# Patient Record
Sex: FEMALE | Race: WHITE | NOT HISPANIC OR LATINO | Employment: OTHER | URBAN - METROPOLITAN AREA
[De-identification: names, ages, dates, MRNs, and addresses within clinical notes are randomized per-mention and may not be internally consistent; named-entity substitution may affect disease eponyms.]

---

## 2017-01-02 ENCOUNTER — TELEPHONE (OUTPATIENT)
Dept: SOCIAL WORK | Facility: HOSPITAL | Age: 77
End: 2017-01-02

## 2017-01-02 NOTE — TELEPHONE ENCOUNTER
Spoke with pt today in f/u and she states she is feeling better and was able to fill her scripts and is taking them as directed. She will call tomorrow for f/u appt. Oly WELLER

## 2017-01-03 ENCOUNTER — OFFICE VISIT (OUTPATIENT)
Dept: INTERNAL MEDICINE | Facility: CLINIC | Age: 77
End: 2017-01-03

## 2017-01-03 VITALS
BODY MASS INDEX: 36.86 KG/M2 | OXYGEN SATURATION: 84 % | HEIGHT: 63 IN | DIASTOLIC BLOOD PRESSURE: 84 MMHG | HEART RATE: 76 BPM | WEIGHT: 208 LBS | SYSTOLIC BLOOD PRESSURE: 130 MMHG

## 2017-01-03 DIAGNOSIS — K57.32 DIVERTICULITIS OF LARGE INTESTINE WITHOUT PERFORATION OR ABSCESS WITHOUT BLEEDING: Primary | ICD-10-CM

## 2017-01-03 PROCEDURE — 99213 OFFICE O/P EST LOW 20 MIN: CPT | Performed by: INTERNAL MEDICINE

## 2017-01-03 NOTE — PATIENT INSTRUCTIONS
Diverticulitis  Diverticulitis is inflammation or infection of small pouches in your colon that form when you have a condition called diverticulosis. The pouches in your colon are called diverticula. Your colon, or large intestine, is where water is absorbed and stool is formed.  Complications of diverticulitis can include:  · Bleeding.  · Severe infection.  · Severe pain.  · Perforation of your colon.  · Obstruction of your colon.  CAUSES   Diverticulitis is caused by bacteria.  Diverticulitis happens when stool becomes trapped in diverticula. This allows bacteria to grow in the diverticula, which can lead to inflammation and infection.  RISK FACTORS  People with diverticulosis are at risk for diverticulitis. Eating a diet that does not include enough fiber from fruits and vegetables may make diverticulitis more likely to develop.  SYMPTOMS   Symptoms of diverticulitis may include:  · Abdominal pain and tenderness. The pain is normally located on the left side of the abdomen, but may occur in other areas.  · Fever and chills.  · Bloating.  · Cramping.  · Nausea.  · Vomiting.  · Constipation.  · Diarrhea.  · Blood in your stool.  DIAGNOSIS   Your health care provider will ask you about your medical history and do a physical exam. You may need to have tests done because many medical conditions can cause the same symptoms as diverticulitis. Tests may include:  · Blood tests.  · Urine tests.  · Imaging tests of the abdomen, including X-rays and CT scans.  When your condition is under control, your health care provider may recommend that you have a colonoscopy. A colonoscopy can show how severe your diverticula are and whether something else is causing your symptoms.  TREATMENT   Most cases of diverticulitis are mild and can be treated at home. Treatment may include:  · Taking over-the-counter pain medicines.  · Following a clear liquid diet.  · Taking antibiotic medicines by mouth for 7-10 days.  More severe cases may  be treated at a hospital. Treatment may include:  · Not eating or drinking.  · Taking prescription pain medicine.  · Receiving antibiotic medicines through an IV tube.  · Receiving fluids and nutrition through an IV tube.  · Surgery.  HOME CARE INSTRUCTIONS   · Follow your health care provider's instructions carefully.  · Follow a full liquid diet or other diet as directed by your health care provider. After your symptoms improve, your health care provider may tell you to change your diet. He or she may recommend you eat a high-fiber diet. Fruits and vegetables are good sources of fiber. Fiber makes it easier to pass stool.  · Take fiber supplements or probiotics as directed by your health care provider.  · Only take medicines as directed by your health care provider.  · Keep all your follow-up appointments.  SEEK MEDICAL CARE IF:   · Your pain does not improve.  · You have a hard time eating food.  · Your bowel movements do not return to normal.  SEEK IMMEDIATE MEDICAL CARE IF:   · Your pain becomes worse.  · Your symptoms do not get better.  · Your symptoms suddenly get worse.  · You have a fever.  · You have repeated vomiting.  · You have bloody or black, tarry stools.  MAKE SURE YOU:   · Understand these instructions.  · Will watch your condition.  · Will get help right away if you are not doing well or get worse.     This information is not intended to replace advice given to you by your health care provider. Make sure you discuss any questions you have with your health care provider.     Document Released: 09/27/2006 Document Revised: 12/23/2014 Document Reviewed: 11/12/2014  [employer] Interactive Patient Education ©2016 [employer]

## 2017-01-03 NOTE — MR AVS SNAPSHOT
Inessa GALEAS Shelton   1/3/2017 3:00 PM   Office Visit    Dept Phone:  [phone]   Encounter #:  [id number]    Provider:  Gisele Dockery MD   Department:  [facility] INTERNAL MEDICINE                Your Full Care Plan              Your Updated Medication List          This list is accurate as of: 1/3/17  3:35 PM.  Always use your most recent med list.                ACETAMINOPHEN EXTRA STRENGTH 500 MG tablet   Generic drug:  acetaminophen       amoxicillin-clavulanate 875-125 MG per tablet   Commonly known as:  AUGMENTIN   Take 1 tablet by mouth 2 (Two) Times a Day.       Calcium Citrate-Vitamin D 250-200 MG-UNIT tablet       celecoxib 200 MG capsule   Commonly known as:  CeleBREX   TAKE ONE CAPSULE BY MOUTH DAILY       ezetimibe 10 MG tablet   Commonly known as:  ZETIA   Take 1 tablet by mouth daily.       glucose blood test strip       HYDROcodone-acetaminophen 5-325 MG per tablet   Commonly known as:  NORCO   Take 1 tablet by mouth Every 6 (Six) Hours As Needed for moderate pain (4-6).       hydrocortisone 2.5 % ointment       levothyroxine 25 MCG tablet   Commonly known as:  SYNTHROID, LEVOTHROID   TAKE ONE TABLET BY MOUTH DAILY AS DIRECTED       losartan-hydrochlorothiazide 100-12.5 MG per tablet   Commonly known as:  HYZAAR   TAKE ONE TABLET BY MOUTH DAILY       MULTI-DAY VITAMINS tablet       omeprazole 40 MG capsule   Commonly known as:  priLOSEC       ondansetron ODT 4 MG disintegrating tablet   Commonly known as:  ZOFRAN-ODT   Take 1 tablet by mouth Every 6 (Six) Hours As Needed for nausea or vomiting.       sotalol 80 MG tablet   Commonly known as:  BETAPACE   TAKE ONE TABLET BY MOUTH DAILY       traMADol  MG 24 hr tablet   Commonly known as:  ULTRAM-ER   TAKE ONE TABLET BY MOUTH DAILY       warfarin 5 MG tablet   Commonly known as:  COUMADIN   TAKE ONE TABLET BY MOUTH DAILY               Instructions    Diverticulitis  Diverticulitis is inflammation or  infection of small pouches in your colon that form when you have a condition called diverticulosis. The pouches in your colon are called diverticula. Your colon, or large intestine, is where water is absorbed and stool is formed.  Complications of diverticulitis can include:  · Bleeding.  · Severe infection.  · Severe pain.  · Perforation of your colon.  · Obstruction of your colon.  CAUSES   Diverticulitis is caused by bacteria.  Diverticulitis happens when stool becomes trapped in diverticula. This allows bacteria to grow in the diverticula, which can lead to inflammation and infection.  RISK FACTORS  People with diverticulosis are at risk for diverticulitis. Eating a diet that does not include enough fiber from fruits and vegetables may make diverticulitis more likely to develop.  SYMPTOMS   Symptoms of diverticulitis may include:  · Abdominal pain and tenderness. The pain is normally located on the left side of the abdomen, but may occur in other areas.  · Fever and chills.  · Bloating.  · Cramping.  · Nausea.  · Vomiting.  · Constipation.  · Diarrhea.  · Blood in your stool.  DIAGNOSIS   Your health care provider will ask you about your medical history and do a physical exam. You may need to have tests done because many medical conditions can cause the same symptoms as diverticulitis. Tests may include:  · Blood tests.  · Urine tests.  · Imaging tests of the abdomen, including X-rays and CT scans.  When your condition is under control, your health care provider may recommend that you have a colonoscopy. A colonoscopy can show how severe your diverticula are and whether something else is causing your symptoms.  TREATMENT   Most cases of diverticulitis are mild and can be treated at home. Treatment may include:  · Taking over-the-counter pain medicines.  · Following a clear liquid diet.  · Taking antibiotic medicines by mouth for 7-10 days.  More severe cases may be treated at a hospital. Treatment may  include:  · Not eating or drinking.  · Taking prescription pain medicine.  · Receiving antibiotic medicines through an IV tube.  · Receiving fluids and nutrition through an IV tube.  · Surgery.  HOME CARE INSTRUCTIONS   · Follow your health care provider's instructions carefully.  · Follow a full liquid diet or other diet as directed by your health care provider. After your symptoms improve, your health care provider may tell you to change your diet. He or she may recommend you eat a high-fiber diet. Fruits and vegetables are good sources of fiber. Fiber makes it easier to pass stool.  · Take fiber supplements or probiotics as directed by your health care provider.  · Only take medicines as directed by your health care provider.  · Keep all your follow-up appointments.  SEEK MEDICAL CARE IF:   · Your pain does not improve.  · You have a hard time eating food.  · Your bowel movements do not return to normal.  SEEK IMMEDIATE MEDICAL CARE IF:   · Your pain becomes worse.  · Your symptoms do not get better.  · Your symptoms suddenly get worse.  · You have a fever.  · You have repeated vomiting.  · You have bloody or black, tarry stools.  MAKE SURE YOU:   · Understand these instructions.  · Will watch your condition.  · Will get help right away if you are not doing well or get worse.     This information is not intended to replace advice given to you by your health care provider. Make sure you discuss any questions you have with your health care provider.     Document Released: 09/27/2006 Document Revised: 12/23/2014 Document Reviewed: 11/12/2014  [employer] Interactive Patient Education ©2016 [employer]       Patient Instructions History      Upcoming Appointments     Visit Type Date Time Department    OFFICE VISIT 1/3/2017  3:00 PM [facility]    LAB 1/6/2017 11:40 AM [facility]    BONE DENSITY 1/30/2017  9:00 AM [facility]    [facility] 1/30/2017  8:50 AM [facility]    FOLLOW UP 2/6/2017 11:30 AM [facility]    FOLLOW UP SLEEP CLINIC 10/12/2017 11:00 AM  [facility]      [employer] Signup     [facility] [email or web address] allows you to send messages to your doctor, view your test results, renew your prescriptions, schedule appointments, and more. To sign up, go to [employer] and click on the Sign Up Now link in the New User? box. Enter your [email or web address] Activation Code exactly as it appears below along with the last four digits of your Social Security Number and your Date of Birth () to complete the sign-up process. If you do not sign up before the expiration date, you must request a new code.    [email or web address] Activation Code: [email or web address]  Expires: 2017  4:08 PM    If you have questions, you can email [facility] [email or web address] or call [phone] to talk to our [email or web address] staff. Remember, [email or web address] is NOT to be used for urgent needs. For medical emergencies, dial 911.               Other Info from Your Visit           Your Appointments     2017 11:40 AM EST   [facility] (--)    [address] [id number]   [phone]            2017  8:50 AM EST   [facility] with [facility] INTERNAL MEDICINE (--)    [address] [address]   [phone]            2017  9:00 AM EST   Bone Density with DEXA [facility] INTERNAL MEDICINE (--)    [address] [address]   [phone]            2017 11:30 AM EST   Follow Up with Gisele Dockery MD   [facility] INTERNAL MEDICINE (--)    [address] [address]   [phone]           Arrive 15 minutes prior to appointment.            Oct 12, 2017 11:00 AM EDT   Follow Up Sleep Clinic with  [facility] PROVIDER SCHEDULE   [facility] ([place])    [address] [facility] [address] [phone]            1.  If you are currently on a CPAP or BiPAP please bring in your SD card or memory card.  2.  If you are experiencing problems with your current mask, please bring your mask with you to your appointment.                Allergies     Iodinated Diagnostic Agents  Hives    Chest tightness  Facial swelling    Livalo [Pitavastatin]      Muscle tension and joint pain    Plaquenil [Hydroxychloroquine Sulfate]      Muscles tense up and joint pain    Statins      Joint pain      Reason for Visit     Diverticulitis ER follow up      Vital Signs     Blood Pressure Pulse Height Weight Oxygen Saturation Body Mass Index    130/84 76 62.5\" (158.8 cm) 208 lb (94.3 kg) 84% 37.44 kg/m2    Smoking Status                   Never Smoker             "

## 2017-01-06 ENCOUNTER — LAB (OUTPATIENT)
Dept: INTERNAL MEDICINE | Facility: CLINIC | Age: 77
End: 2017-01-06

## 2017-01-06 ENCOUNTER — ANTICOAGULATION VISIT (OUTPATIENT)
Dept: INTERNAL MEDICINE | Facility: CLINIC | Age: 77
End: 2017-01-06

## 2017-01-06 DIAGNOSIS — I48.91 ATRIAL FIBRILLATION, UNSPECIFIED TYPE (HCC): Primary | ICD-10-CM

## 2017-01-06 LAB — INR PPP: 2.1 (ref 0.9–1.1)

## 2017-01-06 PROCEDURE — 85610 PROTHROMBIN TIME: CPT | Performed by: INTERNAL MEDICINE

## 2017-01-06 PROCEDURE — 36416 COLLJ CAPILLARY BLOOD SPEC: CPT | Performed by: INTERNAL MEDICINE

## 2017-01-09 RX ORDER — LEVOTHYROXINE SODIUM 0.03 MG/1
TABLET ORAL
Qty: 30 TABLET | Refills: 5 | Status: SHIPPED | OUTPATIENT
Start: 2017-01-09 | End: 2017-07-08 | Stop reason: SDUPTHER

## 2017-01-16 RX ORDER — WARFARIN SODIUM 5 MG/1
TABLET ORAL
Qty: 30 TABLET | Refills: 0 | Status: SHIPPED | OUTPATIENT
Start: 2017-01-16 | End: 2017-02-25 | Stop reason: SDUPTHER

## 2017-01-19 ENCOUNTER — ANTICOAGULATION VISIT (OUTPATIENT)
Dept: INTERNAL MEDICINE | Facility: CLINIC | Age: 77
End: 2017-01-19

## 2017-01-19 ENCOUNTER — LAB (OUTPATIENT)
Dept: INTERNAL MEDICINE | Facility: CLINIC | Age: 77
End: 2017-01-19

## 2017-01-19 DIAGNOSIS — I48.91 ATRIAL FIBRILLATION, UNSPECIFIED TYPE (HCC): Primary | ICD-10-CM

## 2017-01-19 LAB — INR PPP: 2.5 (ref 0.9–1.1)

## 2017-01-19 PROCEDURE — 85610 PROTHROMBIN TIME: CPT | Performed by: INTERNAL MEDICINE

## 2017-01-19 PROCEDURE — 36416 COLLJ CAPILLARY BLOOD SPEC: CPT | Performed by: INTERNAL MEDICINE

## 2017-01-30 ENCOUNTER — CLINICAL SUPPORT (OUTPATIENT)
Dept: INTERNAL MEDICINE | Facility: CLINIC | Age: 77
End: 2017-01-30

## 2017-01-30 ENCOUNTER — ANTICOAGULATION VISIT (OUTPATIENT)
Dept: INTERNAL MEDICINE | Facility: CLINIC | Age: 77
End: 2017-01-30

## 2017-01-30 DIAGNOSIS — I48.91 ATRIAL FIBRILLATION, UNSPECIFIED TYPE (HCC): Primary | ICD-10-CM

## 2017-01-30 DIAGNOSIS — I10 ESSENTIAL HYPERTENSION: ICD-10-CM

## 2017-01-30 DIAGNOSIS — E03.9 ACQUIRED HYPOTHYROIDISM: Primary | ICD-10-CM

## 2017-01-30 DIAGNOSIS — E78.5 HYPERLIPIDEMIA, UNSPECIFIED HYPERLIPIDEMIA TYPE: ICD-10-CM

## 2017-01-30 DIAGNOSIS — Z78.0 POSTMENOPAUSAL: ICD-10-CM

## 2017-01-30 LAB — INR PPP: 3.3 (ref 0.9–1.1)

## 2017-01-30 PROCEDURE — 77080 DXA BONE DENSITY AXIAL: CPT | Performed by: INTERNAL MEDICINE

## 2017-01-30 PROCEDURE — 85610 PROTHROMBIN TIME: CPT | Performed by: INTERNAL MEDICINE

## 2017-01-30 PROCEDURE — 36416 COLLJ CAPILLARY BLOOD SPEC: CPT | Performed by: INTERNAL MEDICINE

## 2017-01-31 LAB
ALBUMIN SERPL-MCNC: 4.1 G/DL (ref 3.5–5.2)
ALBUMIN/GLOB SERPL: 1.6 G/DL
ALP SERPL-CCNC: 104 U/L (ref 39–117)
ALT SERPL-CCNC: 26 U/L (ref 1–33)
AST SERPL-CCNC: 18 U/L (ref 1–32)
BASOPHILS # BLD AUTO: 0.04 10*3/MM3 (ref 0–0.2)
BASOPHILS NFR BLD AUTO: 0.8 % (ref 0–1.5)
BILIRUB SERPL-MCNC: 0.4 MG/DL (ref 0.1–1.2)
BUN SERPL-MCNC: 26 MG/DL (ref 8–23)
BUN/CREAT SERPL: 29.2 (ref 7–25)
CALCIUM SERPL-MCNC: 9.6 MG/DL (ref 8.6–10.5)
CHLORIDE SERPL-SCNC: 102 MMOL/L (ref 98–107)
CHOLEST SERPL-MCNC: 245 MG/DL (ref 0–200)
CO2 SERPL-SCNC: 21.6 MMOL/L (ref 22–29)
CREAT SERPL-MCNC: 0.89 MG/DL (ref 0.57–1)
EOSINOPHIL # BLD AUTO: 0.33 10*3/MM3 (ref 0–0.7)
EOSINOPHIL NFR BLD AUTO: 6.5 % (ref 0.3–6.2)
ERYTHROCYTE [DISTWIDTH] IN BLOOD BY AUTOMATED COUNT: 13 % (ref 11.7–13)
GLOBULIN SER CALC-MCNC: 2.5 GM/DL
GLUCOSE SERPL-MCNC: 138 MG/DL (ref 65–99)
HCT VFR BLD AUTO: 42.3 % (ref 35.6–45.5)
HDLC SERPL-MCNC: 48 MG/DL (ref 40–60)
HGB BLD-MCNC: 14.3 G/DL (ref 11.9–15.5)
IMM GRANULOCYTES # BLD: 0 10*3/MM3 (ref 0–0.03)
IMM GRANULOCYTES NFR BLD: 0 % (ref 0–0.5)
LDLC SERPL CALC-MCNC: ABNORMAL MG/DL
LYMPHOCYTES # BLD AUTO: 1.81 10*3/MM3 (ref 0.9–4.8)
LYMPHOCYTES NFR BLD AUTO: 35.5 % (ref 19.6–45.3)
MCH RBC QN AUTO: 32 PG (ref 26.9–32)
MCHC RBC AUTO-ENTMCNC: 33.8 G/DL (ref 32.4–36.3)
MCV RBC AUTO: 94.6 FL (ref 80.5–98.2)
MONOCYTES # BLD AUTO: 0.46 10*3/MM3 (ref 0.2–1.2)
MONOCYTES NFR BLD AUTO: 9 % (ref 5–12)
NEUTROPHILS # BLD AUTO: 2.46 10*3/MM3 (ref 1.9–8.1)
NEUTROPHILS NFR BLD AUTO: 48.2 % (ref 42.7–76)
PLATELET # BLD AUTO: 241 10*3/MM3 (ref 140–500)
POTASSIUM SERPL-SCNC: 3.9 MMOL/L (ref 3.5–5.2)
PROT SERPL-MCNC: 6.6 G/DL (ref 6–8.5)
RBC # BLD AUTO: 4.47 10*6/MM3 (ref 3.9–5.2)
SODIUM SERPL-SCNC: 140 MMOL/L (ref 136–145)
TRIGL SERPL-MCNC: 422 MG/DL (ref 0–150)
TSH SERPL DL<=0.005 MIU/L-ACNC: 3 MIU/ML (ref 0.27–4.2)
VLDLC SERPL CALC-MCNC: ABNORMAL MG/DL
WBC # BLD AUTO: 5.1 10*3/MM3 (ref 4.5–10.7)

## 2017-02-06 ENCOUNTER — ANTICOAGULATION VISIT (OUTPATIENT)
Dept: INTERNAL MEDICINE | Facility: CLINIC | Age: 77
End: 2017-02-06

## 2017-02-06 ENCOUNTER — OFFICE VISIT (OUTPATIENT)
Dept: INTERNAL MEDICINE | Facility: CLINIC | Age: 77
End: 2017-02-06

## 2017-02-06 VITALS
DIASTOLIC BLOOD PRESSURE: 78 MMHG | BODY MASS INDEX: 36.86 KG/M2 | HEIGHT: 63 IN | SYSTOLIC BLOOD PRESSURE: 110 MMHG | WEIGHT: 208 LBS | OXYGEN SATURATION: 98 % | HEART RATE: 78 BPM

## 2017-02-06 DIAGNOSIS — E03.9 ACQUIRED HYPOTHYROIDISM: ICD-10-CM

## 2017-02-06 DIAGNOSIS — I10 ESSENTIAL HYPERTENSION: Primary | ICD-10-CM

## 2017-02-06 DIAGNOSIS — M85.80 OSTEOPENIA: ICD-10-CM

## 2017-02-06 DIAGNOSIS — K64.9 BLEEDING HEMORRHOID: Primary | ICD-10-CM

## 2017-02-06 DIAGNOSIS — I48.91 ATRIAL FIBRILLATION, UNSPECIFIED TYPE (HCC): ICD-10-CM

## 2017-02-06 DIAGNOSIS — E78.5 HYPERLIPIDEMIA, UNSPECIFIED HYPERLIPIDEMIA TYPE: ICD-10-CM

## 2017-02-06 LAB — INR PPP: 2.5 (ref 0.9–1.1)

## 2017-02-06 PROCEDURE — 99214 OFFICE O/P EST MOD 30 MIN: CPT | Performed by: INTERNAL MEDICINE

## 2017-02-06 PROCEDURE — 85610 PROTHROMBIN TIME: CPT | Performed by: INTERNAL MEDICINE

## 2017-02-06 PROCEDURE — 36416 COLLJ CAPILLARY BLOOD SPEC: CPT | Performed by: INTERNAL MEDICINE

## 2017-02-06 RX ORDER — FENOFIBRATE 145 MG/1
145 TABLET, COATED ORAL DAILY
Qty: 30 TABLET | Refills: 5 | Status: SHIPPED | OUTPATIENT
Start: 2017-02-06 | End: 2017-05-08 | Stop reason: ALTCHOICE

## 2017-02-06 NOTE — PROGRESS NOTES
Subjective     Inessa Peoples is a 77 y.o. female who presents with   Chief Complaint   Patient presents with   • Hypertension   • Hyperlipidemia   • Hypothyroidism       History of Present Illness     HTN. Control is good.   HLD. She has tried four statin drugs and has not been able to tolerate them. Zetia is not adequate.  Discussed adding Tricor.  Hypothyroidism. Thyroid is controlled.   Osteopenia. She is maintained on calcium and D.  Reviewed recent DEXA report.     Due for protime.      Review of Systems   Respiratory: Negative.    Cardiovascular: Negative.        The following portions of the patient's history were reviewed and updated as appropriate: allergies, current medications and problem list.    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       • Dyspnea on exertion 04/18/2016   • Chronic osteoarthritis 04/18/2016   • Deep vein thrombosis of lower extremity 04/18/2016     Note Last Updated: 4/18/2016     Description: developed unprovoked on Xarelto.     • Atrial fibrillation 04/18/2016       Current Outpatient Prescriptions on File Prior to Visit   Medication Sig Dispense Refill   • acetaminophen (ACETAMINOPHEN EXTRA STRENGTH) 500 MG tablet Take 500 mg by mouth. Up to two in the morning and 2 at night when needed     •  "amoxicillin-clavulanate (AUGMENTIN) 875-125 MG per tablet Take 1 tablet by mouth 2 (Two) Times a Day. 20 tablet 0   • Calcium Citrate-Vitamin D 250-200 MG-UNIT tablet Take by mouth.     • celecoxib (CeleBREX) 200 MG capsule TAKE ONE CAPSULE BY MOUTH DAILY 90 capsule 0   • ezetimibe (ZETIA) 10 MG tablet Take 1 tablet by mouth daily. (Patient taking differently: Take 5 mg by mouth daily.) 90 tablet 3   • glucose blood test strip OneTouch Ultra Blue In Vitro Strip; Patient Sig: OneTouch Ultra Blue In Vitro Strip ; 0; 31-Jul-2015; Active     • HYDROcodone-acetaminophen (NORCO) 5-325 MG per tablet Take 1 tablet by mouth Every 6 (Six) Hours As Needed for moderate pain (4-6). 12 tablet 0   • hydrocortisone 2.5 % ointment As needed     • levothyroxine (SYNTHROID, LEVOTHROID) 25 MCG tablet TAKE ONE TABLET BY MOUTH DAILY AS DIRECTED 30 tablet 0   • levothyroxine (SYNTHROID, LEVOTHROID) 25 MCG tablet TAKE ONE TABLET BY MOUTH DAILY AS DIRECTED 30 tablet 5   • losartan-hydrochlorothiazide (HYZAAR) 100-12.5 MG per tablet TAKE ONE TABLET BY MOUTH DAILY 90 tablet 0   • Multiple Vitamin (MULTI-DAY VITAMINS) tablet Take 1 tablet by mouth daily.     • omeprazole (PriLOSEC) 40 MG capsule daily.     • ondansetron ODT (ZOFRAN-ODT) 4 MG disintegrating tablet Take 1 tablet by mouth Every 6 (Six) Hours As Needed for nausea or vomiting. 12 tablet 0   • sotalol (BETAPACE) 80 MG tablet TAKE ONE TABLET BY MOUTH DAILY 30 tablet 5   • traMADol ER (ULTRAM-ER) 100 MG 24 hr tablet TAKE ONE TABLET BY MOUTH DAILY 90 tablet 0   • warfarin (COUMADIN) 5 MG tablet TAKE ONE TABLET BY MOUTH DAILY 30 tablet 0     No current facility-administered medications on file prior to visit.        Objective     Visit Vitals   • /78   • Pulse 78   • Ht 62.5\" (158.8 cm)   • Wt 208 lb (94.3 kg)   • SpO2 98%   • BMI 37.44 kg/m2       Physical Exam   Constitutional: She is oriented to person, place, and time. She appears well-developed and well-nourished.   HENT: "   Head: Normocephalic and atraumatic.   Cardiovascular: Normal rate, regular rhythm and normal heart sounds.    Pulmonary/Chest: Effort normal and breath sounds normal.   Neurological: She is alert and oriented to person, place, and time.   Skin: Skin is warm and dry.   Psychiatric: She has a normal mood and affect. Her behavior is normal.       Assessment/Plan   Inessa was seen today for hypertension, hyperlipidemia and hypothyroidism.    Diagnoses and all orders for this visit:    Essential hypertension    Hyperlipidemia, unspecified hyperlipidemia type    Acquired hypothyroidism    Osteopenia    Atrial fibrillation, unspecified type  -     POC INR    Other orders  -     fenofibrate (TRICOR) 145 MG tablet; Take 1 tablet by mouth Daily.        Discussion  HTN. Continue current regimen.  HLD. Add Tricor.    Hypothyroidism. Continue levothyroxine.   Osteopenia. I recommend to get 1200 mg of calcium and 1000 IUs of vitamin D through diet and supplements and to participate in a weight based exercise to prevent loss of bone mineral density. Bone mineral will be monitored every two years.    INR monitoring.  See flow sheet.  She therapeutic.  Recheck in two weeks since starting new med.        Future Appointments  Date Time Provider Department Center   2/8/2017 10:30 AM [facility] None   2/9/2017 1:45 PM AGA Clarke GS [facility] None   5/5/2017 11:10 AM [facility] None   5/8/2017 11:15 AM MD [facility] None   10/12/2017 11:00 AM  [facility] PROVIDER SCHEDULE  [facility]

## 2017-02-08 ENCOUNTER — OFFICE VISIT (OUTPATIENT)
Dept: CARDIOLOGY | Facility: CLINIC | Age: 77
End: 2017-02-08

## 2017-02-08 ENCOUNTER — LAB (OUTPATIENT)
Dept: LAB | Facility: HOSPITAL | Age: 77
End: 2017-02-08

## 2017-02-08 VITALS
HEART RATE: 67 BPM | SYSTOLIC BLOOD PRESSURE: 130 MMHG | DIASTOLIC BLOOD PRESSURE: 82 MMHG | BODY MASS INDEX: 36.41 KG/M2 | WEIGHT: 205.5 LBS | HEIGHT: 63 IN

## 2017-02-08 DIAGNOSIS — D86.9 SARCOIDOSIS: ICD-10-CM

## 2017-02-08 DIAGNOSIS — R06.09 DYSPNEA ON EXERTION: Primary | ICD-10-CM

## 2017-02-08 DIAGNOSIS — R06.09 DYSPNEA ON EXERTION: ICD-10-CM

## 2017-02-08 DIAGNOSIS — I48.91 ATRIAL FIBRILLATION, UNSPECIFIED TYPE (HCC): ICD-10-CM

## 2017-02-08 LAB — NT-PROBNP SERPL-MCNC: 45.5 PG/ML (ref 0–1800)

## 2017-02-08 PROCEDURE — 83880 ASSAY OF NATRIURETIC PEPTIDE: CPT

## 2017-02-08 PROCEDURE — 36415 COLL VENOUS BLD VENIPUNCTURE: CPT

## 2017-02-08 PROCEDURE — 99214 OFFICE O/P EST MOD 30 MIN: CPT | Performed by: NURSE PRACTITIONER

## 2017-02-09 PROCEDURE — 93000 ELECTROCARDIOGRAM COMPLETE: CPT | Performed by: NURSE PRACTITIONER

## 2017-02-09 NOTE — PROGRESS NOTES
Date of Office Visit: 2017  Encounter Provider: AGA Mobley  Place of Service: [facility] CARDIOLOGY  Patient Name: Inessa Peoples  :1940    Chief Complaint   Patient presents with   • Shortness of Breath   :     HPI: Inessa Peoples is a 77 y.o. female comes in today for  Yearly followup and complaints of shortness of breath. She is a patient of Dr. Stone, and I am seeing her for the 1st time today. She has a history of paroxysmal atrial fibrillation, sarcoid lung disease, sleep apnea, obesity, hypertension, and left bundle branch block. She has been on sotalol for her atrial fibrillation. In the past she was on Xarelto but developed a DVT and was switched to warfarin. She reports that her sarcoidosis was found in a lung nodule per her report. She reports that she was sent to [facility] for a complete workup and had tons of testing performed but then was unable to get in touch with a physician for followup. She has had a chronic left bundle branch block. Her last echocardiogram was recorded in 2015 showing an EF of 64%, grade I diastolic dysfunction, trace MR, trivial AR and mild OK. In 2016, she had a nuclear stress test for I believe complaints of shortness of breath. This was normal.     Today, she comes in to clinic reporting that she has had an increase in shortness of breath over the past year. She feels like she has had more trouble lately. With minimal exertion, she has severe shortness of breath. She just has to walk really slow and take her time. She is able to give herself a bath and able to go to the grocery store, but she has to walk with a cart. She has some quick palpitations that happen very infrequently. They are only a few beats at a time. She does not experience dizziness or chest pain with these. She feels like her abdomen is bloated but denies lower extremity edema. She complains of being lightheaded, but this is when she lies  down or sits up and she feels the room spinning, consistent with vertigo. She denies chest pain, orthopnea or PND. She states she has had an increase in fatigue over the past few months.         Past Medical History   Diagnosis Date   • A-fib    • Abnormal ECG    • Allergic rhinitis    • Atrial fibrillation    • Bleeds easily      warfarin   • Breast screening    • Broken bones    • Calf DVT (deep venous thrombosis)    • Chronic anticoagulation    • Chronic osteoarthritis    • CTS (carpal tunnel syndrome)    • Deep vein thrombosis      left calf   • AHN (dyspnea on exertion)    • Fatigue    • H/O malignant melanoma of skin    • Hematuria, gross    • Hyperlipidemia    • Hypertension    • Hypothyroidism    • IFG (impaired fasting glucose)    • Insomnia    • Kidney stone    • LBBB (left bundle branch block)    • Left leg swelling    • LLQ abdominal pain    • Long term current use of systemic steroids    • Low back pain    • Malignant melanoma of skin    • Neck pain    • Obesity    • Osteoarthritis    • Osteopenia    • Paralysis, diaphragm    • 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 for tuberculosis    • Rash    • RBBB (right bundle branch block)    • Renal calculi    • Sarcoid    • Sarcoidosis    • Sleep apnea      on CPAP   • Thyroid disease 1975     parathyroid tumor-removed   • UTI (urinary tract infection)    • Visit for screening mammogram        Past Surgical History   Procedure Laterality Date   • Cystotomy       bladder with direct removal of calculus   • Cholecystectomy     • Knee surgery Right    • Carpal tunnel release     • Other surgical history       parathyroid resection   • Kidney stone surgery     • Carpal tunnel release Bilateral    • Parathyroidectomy             Review of Systems   Constitution: Positive for malaise/fatigue. Negative for fever.   HENT: Negative for ear pain, hearing loss, nosebleeds and sore throat.    Eyes: Negative for double vision, pain, vision loss in left eye, vision loss in right eye and visual  "disturbance.   Cardiovascular: Positive for dyspnea on exertion. Negative for claudication and leg swelling.   Respiratory: Negative for cough, snoring and wheezing.    Endocrine: Negative for cold intolerance, heat intolerance and polyuria.   Skin: Negative for color change, itching and rash.   Musculoskeletal: Negative for joint pain, joint swelling and muscle cramps.   Gastrointestinal: Negative for abdominal pain, diarrhea, melena, nausea and vomiting.   Genitourinary: Negative for bladder incontinence and hematuria.   Neurological: Negative for excessive daytime sleepiness, dizziness, light-headedness, paresthesias and seizures.   Psychiatric/Behavioral: Negative for depression. The patient is not nervous/anxious.    All other systems reviewed and are negative.    All other systems reviewed and are negative    Allergies   Allergen Reactions   • Iodinated Diagnostic Agents Hives     Chest tightness  Facial swelling   • Livalo [Pitavastatin]      Muscle tension and joint pain   • Plaquenil [Hydroxychloroquine Sulfate]      Muscles tense up and joint pain   • Statins      Joint pain       All aspects of family and social history reviewed.          Objective:     Vitals:    02/08/17 1103   BP: 130/82   BP Location: Left arm   Cuff Size: Large Adult   Pulse: 67   Weight: 205 lb 8 oz (93.2 kg)   Height: 62.5\" (158.8 cm)     Body mass index is 36.99 kg/(m^2).    PHYSICAL EXAM:  Physical Exam   Constitutional: She is oriented to person, place, and time. She appears well-developed and well-nourished.   obese   HENT:   Head: Normocephalic and atraumatic.   Neck: Neck supple. No JVD present.   Cardiovascular: Normal rate, regular rhythm, normal heart sounds and intact distal pulses.    Pulses:       Carotid pulses are 2+ on the right side, and 2+ on the left side.       Radial pulses are 2+ on the right side, and 2+ on the left side.        Dorsalis pedis pulses are 2+ on the right side, and 2+ on the left side. "   Pulmonary/Chest: Effort normal and breath sounds normal. No accessory muscle usage. No respiratory distress. She has no rales.   Abdominal: Soft. Normal appearance and bowel sounds are normal. There is no tenderness.   Musculoskeletal: Normal range of motion. She exhibits no edema.   Neurological: She is alert and oriented to person, place, and time.   Skin: Skin is warm, dry and intact. She is not diaphoretic.   Psychiatric: She has a normal mood and affect. Her speech is normal and behavior is normal. Judgment and thought content normal. Cognition and memory are normal.         ECG 12 Lead  Date/Time: 2/9/2017 10:41 AM  Performed by: LESLI DONOHUE  Authorized by: LESLI DONOHUE   Rhythm: sinus rhythm  Rate: normal  BPM: 67  Conduction: left bundle branch block  ST Segments: ST segments normal  T Waves: T waves normal  QRS axis: normal  Other: no other findings  Clinical impression: abnormal ECG  Comments: Indication: sarcoid, LBBB                Assessment:       Diagnosis Plan   1. Dyspnea on exertion  BNP    Adult Transthoracic Echo Complete   2. Sarcoidosis  BNP    Adult Transthoracic Echo Complete   3. Atrial fibrillation, unspecified type          Orders Placed This Encounter   Procedures   • BNP     Standing Status:   Future     Number of Occurrences:   1     Standing Expiration Date:   2/8/2018   • Adult Transthoracic Echo Complete     Standing Status:   Future     Order Specific Question:   Reason for exam?     Answer:   Heart Failure, Cardiomyopathy, or Hypertension     Order Specific Question:   Hypertension, Heart Failure, or Cardiomyopathy specification?     Answer:   Known Cardiomyopathy     Order Specific Question:   Change in clinical status, cardiac exam, or medical therapy?     Answer:   Yes     Comments:   dyspnea       Current Outpatient Prescriptions   Medication Sig Dispense Refill   • acetaminophen (ACETAMINOPHEN EXTRA STRENGTH) 500 MG tablet Take 500 mg by mouth. Up to two in the  morning and 2 at night when needed     • celecoxib (CeleBREX) 200 MG capsule TAKE ONE CAPSULE BY MOUTH DAILY 90 capsule 0   • ezetimibe (ZETIA) 10 MG tablet Take 1 tablet by mouth daily. (Patient taking differently: Take 5 mg by mouth daily.) 90 tablet 3   • fenofibrate (TRICOR) 145 MG tablet Take 1 tablet by mouth Daily. 30 tablet 5   • glucose blood test strip OneTouch Ultra Blue In Vitro Strip; Patient Sig: OneTouch Ultra Blue In Vitro Strip ; 0; 31-Jul-2015; Active     • levothyroxine (SYNTHROID, LEVOTHROID) 25 MCG tablet TAKE ONE TABLET BY MOUTH DAILY AS DIRECTED 30 tablet 5   • losartan-hydrochlorothiazide (HYZAAR) 100-12.5 MG per tablet TAKE ONE TABLET BY MOUTH DAILY 90 tablet 0   • Multiple Vitamin (MULTI-DAY VITAMINS) tablet Take 1 tablet by mouth daily.     • omeprazole (PriLOSEC) 40 MG capsule daily.     • sotalol (BETAPACE) 80 MG tablet TAKE ONE TABLET BY MOUTH DAILY 30 tablet 5   • warfarin (COUMADIN) 5 MG tablet TAKE ONE TABLET BY MOUTH DAILY (Patient taking differently: TAKE ONE TABLET BY MOUTH DAILY 4 days wk and 2.5mg 3 days week) 30 tablet 0     No current facility-administered medications for this visit.             Plan:       1. sarcoidosis-she complains today of shortness of breath.  According to her history, she has had sarcoidosis and  she does not follow with anybody for this.  She reports she saw a pulmonologist about a year ago.  Her EKG does show a left bundle branch block.  She does not have a pacemaker ICD.  With her complaints of shortness of breath, we will obtain an echocardiogram.  I'm also going to check a BNP. If her echo and lab work are normal, I will advise follow-up with her pulmonologist. Her EKG remains stable with LBBB. She is at risk for heart block with sarcoidosis.     2. Atrial Fibrillation and Atrial Flutter  Assessment  • The patient has paroxysmal atrial fibrillation  • This is non-valvular in etiology  • The patient's CHADS2-VASc score is 3  • A EER1SS7-CEAr score  of 2 or more is considered a high risk for a thromboembolic event  • Warfarin prescribed    Plan  • Attempt to maintain sinus rhythm  • Continue sotalol for rhythm control  • QTc 508. Discussed with Dr. Stone. Ok to continue current dosing.           Follow up in office in 1 year or sooner if echo indicates    As always, it has been a pleasure to participate in this patient's care.      Sincerely,      AGA Mobley

## 2017-02-10 ENCOUNTER — TELEPHONE (OUTPATIENT)
Dept: CARDIOLOGY | Facility: CLINIC | Age: 77
End: 2017-02-10

## 2017-02-10 ENCOUNTER — HOSPITAL ENCOUNTER (OUTPATIENT)
Dept: CARDIOLOGY | Facility: HOSPITAL | Age: 77
Discharge: HOME | End: 2017-02-10
Admitting: NURSE PRACTITIONER

## 2017-02-10 VITALS
HEIGHT: 63 IN | BODY MASS INDEX: 36.32 KG/M2 | SYSTOLIC BLOOD PRESSURE: 120 MMHG | OXYGEN SATURATION: 98 % | WEIGHT: 205 LBS | DIASTOLIC BLOOD PRESSURE: 78 MMHG | HEART RATE: 64 BPM

## 2017-02-10 DIAGNOSIS — D86.9 SARCOIDOSIS: ICD-10-CM

## 2017-02-10 DIAGNOSIS — R06.09 DYSPNEA ON EXERTION: ICD-10-CM

## 2017-02-10 LAB
ASCENDING AORTA: 2.3 CM
BH CV ECHO MEAS - ACS: 2.1 CM
BH CV ECHO MEAS - AI DEC SLOPE: 119.3 CM/SEC^2
BH CV ECHO MEAS - AI MAX PG: 44.5 MMHG
BH CV ECHO MEAS - AI MAX VEL: 333.4 CM/SEC
BH CV ECHO MEAS - AI P1/2T: 818.7 MSEC
BH CV ECHO MEAS - AO MAX PG (FULL): 5 MMHG
BH CV ECHO MEAS - AO MAX PG: 10 MMHG
BH CV ECHO MEAS - AO MEAN PG (FULL): 2.8 MMHG
BH CV ECHO MEAS - AO MEAN PG: 5.3 MMHG
BH CV ECHO MEAS - AO ROOT AREA (BSA CORRECTED): 1.3
BH CV ECHO MEAS - AO ROOT AREA: 5.7 CM^2
BH CV ECHO MEAS - AO ROOT DIAM: 2.7 CM
BH CV ECHO MEAS - AO V2 MAX: 158.1 CM/SEC
BH CV ECHO MEAS - AO V2 MEAN: 108.6 CM/SEC
BH CV ECHO MEAS - AO V2 VTI: 35.6 CM
BH CV ECHO MEAS - AVA(I,A): 2.1 CM^2
BH CV ECHO MEAS - AVA(I,D): 2.1 CM^2
BH CV ECHO MEAS - AVA(V,A): 2.2 CM^2
BH CV ECHO MEAS - AVA(V,D): 2.2 CM^2
BH CV ECHO MEAS - BSA(HAYCOCK): 2.2 M^2
BH CV ECHO MEAS - BSA: 2.1 M^2
BH CV ECHO MEAS - BZI_BMI: 28.6 KILOGRAMS/M^2
BH CV ECHO MEAS - BZI_METRIC_HEIGHT: 180.3 CM
BH CV ECHO MEAS - BZI_METRIC_WEIGHT: 93 KG
BH CV ECHO MEAS - CONTRAST EF (2CH): 55.2 ML/M^2
BH CV ECHO MEAS - CONTRAST EF 4CH: 46.5 ML/M^2
BH CV ECHO MEAS - EDV(CUBED): 61 ML
BH CV ECHO MEAS - EDV(MOD-SP2): 143 ML
BH CV ECHO MEAS - EDV(MOD-SP4): 114 ML
BH CV ECHO MEAS - EDV(TEICH): 67.4 ML
BH CV ECHO MEAS - EF(CUBED): 77.4 %
BH CV ECHO MEAS - EF(MOD-SP2): 55.2 %
BH CV ECHO MEAS - EF(MOD-SP4): 51 %
BH CV ECHO MEAS - EF(TEICH): 70.2 %
BH CV ECHO MEAS - ESV(CUBED): 13.8 ML
BH CV ECHO MEAS - ESV(MOD-SP2): 64 ML
BH CV ECHO MEAS - ESV(MOD-SP4): 61 ML
BH CV ECHO MEAS - ESV(TEICH): 20.1 ML
BH CV ECHO MEAS - FS: 39.1 %
BH CV ECHO MEAS - IVS/LVPW: 1
BH CV ECHO MEAS - IVSD: 1.1 CM
BH CV ECHO MEAS - LAT PEAK E' VEL: 7 CM/SEC
BH CV ECHO MEAS - LV DIASTOLIC VOL/BSA (35-75): 53.5 ML/M^2
BH CV ECHO MEAS - LV MASS(C)D: 140.6 GRAMS
BH CV ECHO MEAS - LV MASS(C)DI: 66 GRAMS/M^2
BH CV ECHO MEAS - LV MAX PG: 5 MMHG
BH CV ECHO MEAS - LV MEAN PG: 2.5 MMHG
BH CV ECHO MEAS - LV SYSTOLIC VOL/BSA (12-30): 28.6 ML/M^2
BH CV ECHO MEAS - LV V1 MAX: 111.6 CM/SEC
BH CV ECHO MEAS - LV V1 MEAN: 72.4 CM/SEC
BH CV ECHO MEAS - LV V1 VTI: 23.5 CM
BH CV ECHO MEAS - LVIDD: 3.9 CM
BH CV ECHO MEAS - LVIDS: 2.4 CM
BH CV ECHO MEAS - LVLD AP2: 8.5 CM
BH CV ECHO MEAS - LVLD AP4: 7.9 CM
BH CV ECHO MEAS - LVLS AP2: 7 CM
BH CV ECHO MEAS - LVLS AP4: 7 CM
BH CV ECHO MEAS - LVOT AREA (M): 3.1 CM^2
BH CV ECHO MEAS - LVOT AREA: 3.2 CM^2
BH CV ECHO MEAS - LVOT DIAM: 2 CM
BH CV ECHO MEAS - LVPWD: 1.1 CM
BH CV ECHO MEAS - MED PEAK E' VEL: 6 CM/SEC
BH CV ECHO MEAS - MV A DUR: 0.14 SEC
BH CV ECHO MEAS - MV A MAX VEL: 89.3 CM/SEC
BH CV ECHO MEAS - MV DEC SLOPE: 401.1 CM/SEC^2
BH CV ECHO MEAS - MV DEC TIME: 0.19 SEC
BH CV ECHO MEAS - MV E MAX VEL: 77.1 CM/SEC
BH CV ECHO MEAS - MV E/A: 0.86
BH CV ECHO MEAS - MV MAX PG: 2.4 MMHG
BH CV ECHO MEAS - MV MEAN PG: 1.3 MMHG
BH CV ECHO MEAS - MV P1/2T MAX VEL: 79.8 CM/SEC
BH CV ECHO MEAS - MV P1/2T: 58.3 MSEC
BH CV ECHO MEAS - MV V2 MAX: 77.4 CM/SEC
BH CV ECHO MEAS - MV V2 MEAN: 55.3 CM/SEC
BH CV ECHO MEAS - MV V2 VTI: 32.2 CM
BH CV ECHO MEAS - MVA P1/2T LCG: 2.8 CM^2
BH CV ECHO MEAS - MVA(P1/2T): 3.8 CM^2
BH CV ECHO MEAS - MVA(VTI): 2.3 CM^2
BH CV ECHO MEAS - PA ACC TIME: 0.11 SEC
BH CV ECHO MEAS - PA MAX PG (FULL): 2.4 MMHG
BH CV ECHO MEAS - PA MAX PG: 3.9 MMHG
BH CV ECHO MEAS - PA PR(ACCEL): 29.9 MMHG
BH CV ECHO MEAS - PA V2 MAX: 99.1 CM/SEC
BH CV ECHO MEAS - PULM A REVS DUR: 0.12 SEC
BH CV ECHO MEAS - PULM A REVS VEL: 26.6 CM/SEC
BH CV ECHO MEAS - PULM DIAS VEL: 32.3 CM/SEC
BH CV ECHO MEAS - PULM S/D: 1.8
BH CV ECHO MEAS - PULM SYS VEL: 58.9 CM/SEC
BH CV ECHO MEAS - PVA(V,A): 2.1 CM^2
BH CV ECHO MEAS - PVA(V,D): 2.1 CM^2
BH CV ECHO MEAS - QP/QS: 0.66
BH CV ECHO MEAS - RAP SYSTOLE: 3 MMHG
BH CV ECHO MEAS - RV MAX PG: 1.6 MMHG
BH CV ECHO MEAS - RV MEAN PG: 0.79 MMHG
BH CV ECHO MEAS - RV V1 MAX: 62.6 CM/SEC
BH CV ECHO MEAS - RV V1 MEAN: 41.6 CM/SEC
BH CV ECHO MEAS - RV V1 VTI: 14.8 CM
BH CV ECHO MEAS - RVOT AREA: 3.3 CM^2
BH CV ECHO MEAS - RVOT DIAM: 2.1 CM
BH CV ECHO MEAS - RVSP: 27 MMHG
BH CV ECHO MEAS - SI(AO): 94.7 ML/M^2
BH CV ECHO MEAS - SI(CUBED): 22.2 ML/M^2
BH CV ECHO MEAS - SI(LVOT): 34.9 ML/M^2
BH CV ECHO MEAS - SI(MOD-SP2): 37.1 ML/M^2
BH CV ECHO MEAS - SI(MOD-SP4): 24.9 ML/M^2
BH CV ECHO MEAS - SI(TEICH): 22.2 ML/M^2
BH CV ECHO MEAS - SUP REN AO DIAM: 1.8 CM
BH CV ECHO MEAS - SV(AO): 201.9 ML
BH CV ECHO MEAS - SV(CUBED): 47.2 ML
BH CV ECHO MEAS - SV(LVOT): 74.3 ML
BH CV ECHO MEAS - SV(MOD-SP2): 79 ML
BH CV ECHO MEAS - SV(MOD-SP4): 53 ML
BH CV ECHO MEAS - SV(RVOT): 49.4 ML
BH CV ECHO MEAS - SV(TEICH): 47.3 ML
BH CV ECHO MEAS - TAPSE (>1.6): 2.2 CM2
BH CV ECHO MEAS - TR MAX VEL: 243 CM/SEC
BH CV XLRA - RV BASE: 2.9 CM
BH CV XLRA - TDI S': 11 CM/SEC
E/E' RATIO: 12.5
LEFT ATRIUM VOLUME INDEX: 26 ML/M2
LV EF 2D ECHO EST: 51 %
SINUS: 2.9 CM
STJ: 2.4 CM

## 2017-02-10 PROCEDURE — 93306 TTE W/DOPPLER COMPLETE: CPT | Performed by: INTERNAL MEDICINE

## 2017-02-10 PROCEDURE — C8929 TTE W OR WO FOL WCON,DOPPLER: HCPCS

## 2017-02-10 PROCEDURE — 25010000002 PERFLUTREN (DEFINITY) 8.476 MG IN SODIUM CHLORIDE 10 ML INJECTION: Performed by: NURSE PRACTITIONER

## 2017-02-10 RX ADMIN — PERFLUTREN 1.5 ML: 6.52 INJECTION, SUSPENSION INTRAVENOUS at 14:14

## 2017-02-20 RX ORDER — SOTALOL HYDROCHLORIDE 80 MG/1
TABLET ORAL
Qty: 30 TABLET | Refills: 4 | Status: SHIPPED | OUTPATIENT
Start: 2017-02-20 | End: 2017-07-22 | Stop reason: SDUPTHER

## 2017-02-27 RX ORDER — WARFARIN SODIUM 5 MG/1
TABLET ORAL
Qty: 30 TABLET | Refills: 0 | Status: SHIPPED | OUTPATIENT
Start: 2017-02-27 | End: 2017-04-08 | Stop reason: SDUPTHER

## 2017-03-03 ENCOUNTER — ANTICOAGULATION VISIT (OUTPATIENT)
Dept: INTERNAL MEDICINE | Facility: CLINIC | Age: 77
End: 2017-03-03

## 2017-03-03 ENCOUNTER — TRANSCRIBE ORDERS (OUTPATIENT)
Dept: CARDIOLOGY | Facility: CLINIC | Age: 77
End: 2017-03-03

## 2017-03-03 DIAGNOSIS — I48.91 ATRIAL FIBRILLATION, UNSPECIFIED TYPE (HCC): ICD-10-CM

## 2017-03-03 DIAGNOSIS — R06.02 SOB (SHORTNESS OF BREATH): Primary | ICD-10-CM

## 2017-03-03 LAB — INR PPP: 4.1 (ref 0.9–1.1)

## 2017-03-03 PROCEDURE — 36416 COLLJ CAPILLARY BLOOD SPEC: CPT | Performed by: INTERNAL MEDICINE

## 2017-03-03 PROCEDURE — 85610 PROTHROMBIN TIME: CPT | Performed by: INTERNAL MEDICINE

## 2017-03-08 ENCOUNTER — TRANSCRIBE ORDERS (OUTPATIENT)
Dept: ADMINISTRATIVE | Facility: HOSPITAL | Age: 77
End: 2017-03-08

## 2017-03-08 DIAGNOSIS — R06.02 SOB (SHORTNESS OF BREATH): Primary | ICD-10-CM

## 2017-03-10 ENCOUNTER — HOSPITAL ENCOUNTER (OUTPATIENT)
Dept: CARDIOLOGY | Facility: HOSPITAL | Age: 77
Discharge: HOME | End: 2017-03-10
Attending: INTERNAL MEDICINE | Admitting: INTERNAL MEDICINE

## 2017-03-10 ENCOUNTER — ANTICOAGULATION VISIT (OUTPATIENT)
Dept: INTERNAL MEDICINE | Facility: CLINIC | Age: 77
End: 2017-03-10

## 2017-03-10 VITALS
HEART RATE: 82 BPM | HEIGHT: 63 IN | SYSTOLIC BLOOD PRESSURE: 124 MMHG | BODY MASS INDEX: 36.32 KG/M2 | DIASTOLIC BLOOD PRESSURE: 80 MMHG | WEIGHT: 205 LBS | OXYGEN SATURATION: 98 %

## 2017-03-10 DIAGNOSIS — R06.02 SOB (SHORTNESS OF BREATH): ICD-10-CM

## 2017-03-10 DIAGNOSIS — I48.91 ATRIAL FIBRILLATION, UNSPECIFIED TYPE (HCC): ICD-10-CM

## 2017-03-10 LAB
BH CV SE - AT - BREATHING RESERVE: 49.3
BH CV SE - AT - PEAK METS: 3.6
BH CV SE - AT - PEAK RER: 0.99
BH CV SE - AT - PEAK VO2: 12.7
BH CV SE - AT - RESPIRATORY RATE: 30
BH CV SE - AT - RESTING O2 PULSE: 95
BH CV SE - REST - BREATHING RESERVE: 87.6
BH CV SE - REST - PEAK METS: 0.9
BH CV SE - REST - PEAK RER: 0.82
BH CV SE - REST - PEAK VO2: 3
BH CV SE - REST - RESPIRATORY RATE: 16
BH CV SE - REST - RESTING O2 PULSE: 96
BH CV SE - VO2 MAX - BREATHING RESERVE: 26.7
BH CV SE - VO2 MAX - PEAK METS: 4.2
BH CV SE - VO2 MAX - PEAK RER: 1.12
BH CV SE - VO2 MAX - RESPIRATORY RATE: 43
BH CV SE - VO2 MAX - RESTING O2 PULSE: 95
BH CV STRESS BP STAGE 1: NORMAL
BH CV STRESS BP STAGE 2: NORMAL
BH CV STRESS BP STAGE 3: NORMAL
BH CV STRESS DURATION MIN STAGE 1: 3
BH CV STRESS DURATION MIN STAGE 2: 3
BH CV STRESS DURATION MIN STAGE 3: 3
BH CV STRESS DURATION SEC STAGE 1: 0
BH CV STRESS DURATION SEC STAGE 2: 0
BH CV STRESS DURATION SEC STAGE 3: 0
BH CV STRESS HR STAGE 1: 88
BH CV STRESS HR STAGE 2: 99
BH CV STRESS HR STAGE 3: 122
BH CV STRESS METS STAGE 3: 4.2
BH CV STRESS O2 STAGE 1: 95
BH CV STRESS O2 STAGE 2: 95
BH CV STRESS O2 STAGE 3: 95
BH CV STRESS PROTOCOL 1: NORMAL
BH CV STRESS STAGE 1: 1
BH CV STRESS STAGE 2: 2
BH CV STRESS STAGE 3: 3
BHC CV SE - VO2 MAX - PEAK VO2: 14.7
INR PPP: 2.2 (ref 0.9–1.1)
Lab: 29
Lab: 34
Lab: 35
Lab: 38
Lab: 43
MAXIMAL PREDICTED HEART RATE: 145 BPM
PERCENT MAX PREDICTED HR: 84.14 %
STRESS BASELINE BP: NORMAL MMHG
STRESS BASELINE HR: 82 BPM
STRESS PERCENT HR: 99 %
STRESS POST O2 SAT PEAK: 95 %
STRESS POST PEAK BP: NORMAL MMHG
STRESS POST PEAK HR: 122 BPM
STRESS TARGET HR: 123 BPM

## 2017-03-10 PROCEDURE — 94621 CARDIOPULM EXERCISE TESTING: CPT | Performed by: INTERNAL MEDICINE

## 2017-03-10 PROCEDURE — 85610 PROTHROMBIN TIME: CPT | Performed by: INTERNAL MEDICINE

## 2017-03-10 PROCEDURE — 93018 CV STRESS TEST I&R ONLY: CPT | Performed by: INTERNAL MEDICINE

## 2017-03-10 PROCEDURE — 94621 CARDIOPULM EXERCISE TESTING: CPT

## 2017-03-10 PROCEDURE — 36416 COLLJ CAPILLARY BLOOD SPEC: CPT | Performed by: INTERNAL MEDICINE

## 2017-03-13 ENCOUNTER — HOSPITAL ENCOUNTER (OUTPATIENT)
Dept: NUCLEAR MEDICINE | Facility: HOSPITAL | Age: 77
Discharge: HOME | End: 2017-03-13
Attending: INTERNAL MEDICINE

## 2017-03-13 ENCOUNTER — HOSPITAL ENCOUNTER (OUTPATIENT)
Dept: CT IMAGING | Facility: HOSPITAL | Age: 77
Discharge: HOME | End: 2017-03-13
Attending: INTERNAL MEDICINE | Admitting: INTERNAL MEDICINE

## 2017-03-13 DIAGNOSIS — R06.02 SOB (SHORTNESS OF BREATH): ICD-10-CM

## 2017-03-13 PROCEDURE — 0 TECHNETIUM ALBUMIN AGGREGATED: Performed by: INTERNAL MEDICINE

## 2017-03-13 PROCEDURE — 71250 CT THORAX DX C-: CPT

## 2017-03-13 PROCEDURE — A9540 TC99M MAA: HCPCS | Performed by: INTERNAL MEDICINE

## 2017-03-13 PROCEDURE — 0 XENON XE 133: Performed by: INTERNAL MEDICINE

## 2017-03-13 PROCEDURE — A9558 XE133 XENON 10MCI: HCPCS | Performed by: INTERNAL MEDICINE

## 2017-03-13 PROCEDURE — 78582 LUNG VENTILAT&PERFUS IMAGING: CPT

## 2017-03-13 RX ORDER — LOSARTAN POTASSIUM AND HYDROCHLOROTHIAZIDE 12.5; 1 MG/1; MG/1
TABLET ORAL
Qty: 90 TABLET | Refills: 0 | Status: SHIPPED | OUTPATIENT
Start: 2017-03-13 | End: 2017-06-09 | Stop reason: SDUPTHER

## 2017-03-13 RX ORDER — CELECOXIB 200 MG/1
CAPSULE ORAL
Qty: 90 CAPSULE | Refills: 0 | Status: SHIPPED | OUTPATIENT
Start: 2017-03-13 | End: 2017-06-09 | Stop reason: SDUPTHER

## 2017-03-13 RX ADMIN — Medication 1 DOSE: at 13:21

## 2017-03-13 RX ADMIN — Medication 7.4 MILLICURIE: at 13:14

## 2017-03-17 ENCOUNTER — ANTICOAGULATION VISIT (OUTPATIENT)
Dept: INTERNAL MEDICINE | Facility: CLINIC | Age: 77
End: 2017-03-17

## 2017-03-17 DIAGNOSIS — I48.91 ATRIAL FIBRILLATION, UNSPECIFIED TYPE (HCC): ICD-10-CM

## 2017-03-17 LAB — INR PPP: 3 (ref 0.9–1.1)

## 2017-03-17 PROCEDURE — 85610 PROTHROMBIN TIME: CPT | Performed by: INTERNAL MEDICINE

## 2017-03-17 PROCEDURE — 36416 COLLJ CAPILLARY BLOOD SPEC: CPT | Performed by: INTERNAL MEDICINE

## 2017-03-28 ENCOUNTER — ANTICOAGULATION VISIT (OUTPATIENT)
Dept: INTERNAL MEDICINE | Facility: CLINIC | Age: 77
End: 2017-03-28

## 2017-03-28 DIAGNOSIS — I48.91 ATRIAL FIBRILLATION, UNSPECIFIED TYPE (HCC): ICD-10-CM

## 2017-03-28 LAB — INR PPP: 3.5 (ref 0.9–1.1)

## 2017-03-28 PROCEDURE — 85610 PROTHROMBIN TIME: CPT | Performed by: INTERNAL MEDICINE

## 2017-03-28 PROCEDURE — 36416 COLLJ CAPILLARY BLOOD SPEC: CPT | Performed by: INTERNAL MEDICINE

## 2017-04-10 RX ORDER — WARFARIN SODIUM 5 MG/1
TABLET ORAL
Qty: 30 TABLET | Refills: 0 | Status: SHIPPED | OUTPATIENT
Start: 2017-04-10 | End: 2017-05-22 | Stop reason: SDUPTHER

## 2017-04-11 ENCOUNTER — ANTICOAGULATION VISIT (OUTPATIENT)
Dept: INTERNAL MEDICINE | Facility: CLINIC | Age: 77
End: 2017-04-11

## 2017-04-11 ENCOUNTER — OFFICE VISIT (OUTPATIENT)
Dept: INTERNAL MEDICINE | Facility: CLINIC | Age: 77
End: 2017-04-11

## 2017-04-11 VITALS
OXYGEN SATURATION: 93 % | BODY MASS INDEX: 36.86 KG/M2 | SYSTOLIC BLOOD PRESSURE: 130 MMHG | DIASTOLIC BLOOD PRESSURE: 88 MMHG | WEIGHT: 208 LBS | HEIGHT: 63 IN | HEART RATE: 75 BPM

## 2017-04-11 DIAGNOSIS — I48.91 ATRIAL FIBRILLATION, UNSPECIFIED TYPE (HCC): ICD-10-CM

## 2017-04-11 DIAGNOSIS — K62.5 RECTAL BLEEDING: Primary | ICD-10-CM

## 2017-04-11 DIAGNOSIS — M54.50 MIDLINE LOW BACK PAIN WITHOUT SCIATICA, UNSPECIFIED CHRONICITY: ICD-10-CM

## 2017-04-11 LAB — INR PPP: 2.6 (ref 0.9–1.1)

## 2017-04-11 PROCEDURE — 99214 OFFICE O/P EST MOD 30 MIN: CPT | Performed by: INTERNAL MEDICINE

## 2017-04-11 PROCEDURE — 85610 PROTHROMBIN TIME: CPT | Performed by: INTERNAL MEDICINE

## 2017-04-11 PROCEDURE — 36416 COLLJ CAPILLARY BLOOD SPEC: CPT | Performed by: INTERNAL MEDICINE

## 2017-04-11 RX ORDER — NYSTATIN AND TRIAMCINOLONE ACETONIDE 100000; 1 [USP'U]/G; MG/G
OINTMENT TOPICAL 2 TIMES DAILY
Qty: 30 G | Refills: 0 | Status: SHIPPED | OUTPATIENT
Start: 2017-04-11 | End: 2017-07-13

## 2017-04-11 RX ORDER — IPRATROPIUM BROMIDE 42 UG/1
2 SPRAY, METERED NASAL AS NEEDED
COMMUNITY
Start: 2017-04-04 | End: 2017-10-16

## 2017-04-11 NOTE — PROGRESS NOTES
Subjective     Inessa Peoples is a 77 y.o. female who presents with   Chief Complaint   Patient presents with   • Hip Pain   • Back Pain   • Rectal Bleeding       History of Present Illness     Rectal bleeding.  Around the rectum she is irritated.  When she wipes she sees blood at times.  Only scant amount.  She is irritation in the perirectal area but also states her hemorrhoids bother her.  She had an appointment with Dr. Castro but had to cancel it.      She is having a lot of muscle and back pain.  She states the symptoms started after starting the fenofibrate.  She wants to stop it.      She is due for INR.      Review of Systems   Gastrointestinal: Negative for abdominal pain.       The following portions of the patient's history were reviewed and updated as appropriate: allergies, current medications and problem list.    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       • Dyspnea on exertion 04/18/2016   • Chronic osteoarthritis 04/18/2016   • Deep vein thrombosis of lower extremity 04/18/2016     Note Last Updated: 4/18/2016     Description: developed unprovoked on Xarelto.     • Atrial fibrillation 04/18/2016       Current Outpatient Prescriptions on File Prior to Visit   Medication Sig Dispense Refill   • acetaminophen (ACETAMINOPHEN  "EXTRA STRENGTH) 500 MG tablet Take 500 mg by mouth. Up to two in the morning and 2 at night when needed     • celecoxib (CeleBREX) 200 MG capsule TAKE ONE CAPSULE BY MOUTH DAILY 90 capsule 0   • ezetimibe (ZETIA) 10 MG tablet Take 1 tablet by mouth daily. (Patient taking differently: Take 5 mg by mouth daily.) 90 tablet 3   • fenofibrate (TRICOR) 145 MG tablet Take 1 tablet by mouth Daily. 30 tablet 5   • glucose blood test strip OneTouch Ultra Blue In Vitro Strip; Patient Sig: OneTouch Ultra Blue In Vitro Strip ; 0; 31-Jul-2015; Active     • levothyroxine (SYNTHROID, LEVOTHROID) 25 MCG tablet TAKE ONE TABLET BY MOUTH DAILY AS DIRECTED 30 tablet 5   • losartan-hydrochlorothiazide (HYZAAR) 100-12.5 MG per tablet TAKE ONE TABLET BY MOUTH DAILY 90 tablet 0   • Multiple Vitamin (MULTI-DAY VITAMINS) tablet Take 1 tablet by mouth daily.     • omeprazole (PriLOSEC) 40 MG capsule daily.     • sotalol (BETAPACE) 80 MG tablet TAKE ONE TABLET BY MOUTH DAILY 30 tablet 4   • warfarin (COUMADIN) 5 MG tablet TAKE ONE TABLET BY MOUTH DAILY 30 tablet 0     No current facility-administered medications on file prior to visit.        Objective     /88  Pulse 75  Ht 63\" (160 cm)  Wt 208 lb (94.3 kg)  SpO2 93%  BMI 36.85 kg/m2    Physical Exam   Constitutional: She is oriented to person, place, and time. She appears well-developed and well-nourished.   HENT:   Head: Normocephalic and atraumatic.   Pulmonary/Chest: Effort normal.   Genitourinary: Rectal exam shows internal hemorrhoid. Rectal exam shows no external hemorrhoid.   Genitourinary Comments: Erythema of the skin perirectally.     Neurological: She is alert and oriented to person, place, and time.   Psychiatric: She has a normal mood and affect. Her behavior is normal.       Assessment/Plan   Inessa was seen today for hip pain, back pain and rectal bleeding.    Diagnoses and all orders for this visit:    Rectal bleeding  -     Ambulatory Referral to General Surgery  -   "   POC INR    Atrial fibrillation, unspecified type  -     POC INR    Midline low back pain without sciatica, unspecified chronicity    Other orders  -     nystatin-triamcinolone (MYCOLOG) 081880-9.1 UNIT/GM-% ointment; Apply  topically 2 (Two) Times a Day.        Discussion    Rectal bleeding secondary and perirectal redness and irritation.  Trial of topical cream.  Refer to Dr. Castro who she has seen in the past for opinion on rectal bleeding.  Afib.  INR is therapeutic today.   LBP/myalgias.  She attributes to fenofibrate. Trial of stopping.    25 minutes spent with the patient with greater than 50% of time spent counseling the following topics:, diagnostic results, impressions           Future Appointments  Date Time Provider Department Center   5/5/2017 11:10 AM [facility] None   5/8/2017 11:15 AM [facility] None   10/12/2017 11:00 AM [facility] SCHEDU[facility]

## 2017-05-05 ENCOUNTER — ANTICOAGULATION VISIT (OUTPATIENT)
Dept: INTERNAL MEDICINE | Facility: CLINIC | Age: 77
End: 2017-05-05

## 2017-05-05 DIAGNOSIS — I48.91 ATRIAL FIBRILLATION, UNSPECIFIED TYPE (HCC): ICD-10-CM

## 2017-05-05 DIAGNOSIS — E78.5 HYPERLIPIDEMIA, UNSPECIFIED HYPERLIPIDEMIA TYPE: Primary | ICD-10-CM

## 2017-05-05 LAB
ALBUMIN SERPL-MCNC: 4.2 G/DL (ref 3.5–5.2)
ALBUMIN/GLOB SERPL: 1.6 G/DL
ALP SERPL-CCNC: 83 U/L (ref 39–117)
ALT SERPL-CCNC: 24 U/L (ref 1–33)
AST SERPL-CCNC: 21 U/L (ref 1–32)
BILIRUB SERPL-MCNC: 0.9 MG/DL (ref 0.1–1.2)
BUN SERPL-MCNC: 23 MG/DL (ref 8–23)
BUN/CREAT SERPL: 25.8 (ref 7–25)
CALCIUM SERPL-MCNC: 10.1 MG/DL (ref 8.6–10.5)
CHLORIDE SERPL-SCNC: 102 MMOL/L (ref 98–107)
CHOLEST SERPL-MCNC: 252 MG/DL (ref 0–200)
CO2 SERPL-SCNC: 22.9 MMOL/L (ref 22–29)
CREAT SERPL-MCNC: 0.89 MG/DL (ref 0.57–1)
GLOBULIN SER CALC-MCNC: 2.7 GM/DL
GLUCOSE SERPL-MCNC: 109 MG/DL (ref 65–99)
HDLC SERPL-MCNC: 57 MG/DL (ref 40–60)
INR PPP: 1.3 (ref 0.9–1.1)
LDLC SERPL CALC-MCNC: 152 MG/DL (ref 0–100)
POTASSIUM SERPL-SCNC: 4.1 MMOL/L (ref 3.5–5.2)
PROT SERPL-MCNC: 6.9 G/DL (ref 6–8.5)
SODIUM SERPL-SCNC: 141 MMOL/L (ref 136–145)
TRIGL SERPL-MCNC: 213 MG/DL (ref 0–150)
TSH SERPL DL<=0.005 MIU/L-ACNC: 2.62 MIU/ML (ref 0.27–4.2)
VLDLC SERPL CALC-MCNC: 42.6 MG/DL (ref 5–40)

## 2017-05-05 PROCEDURE — 36416 COLLJ CAPILLARY BLOOD SPEC: CPT | Performed by: INTERNAL MEDICINE

## 2017-05-05 PROCEDURE — 85610 PROTHROMBIN TIME: CPT | Performed by: INTERNAL MEDICINE

## 2017-05-08 ENCOUNTER — OFFICE VISIT (OUTPATIENT)
Dept: INTERNAL MEDICINE | Facility: CLINIC | Age: 77
End: 2017-05-08

## 2017-05-08 VITALS
SYSTOLIC BLOOD PRESSURE: 130 MMHG | OXYGEN SATURATION: 96 % | HEART RATE: 70 BPM | WEIGHT: 209 LBS | HEIGHT: 63 IN | DIASTOLIC BLOOD PRESSURE: 78 MMHG | BODY MASS INDEX: 37.03 KG/M2

## 2017-05-08 DIAGNOSIS — E03.9 ACQUIRED HYPOTHYROIDISM: ICD-10-CM

## 2017-05-08 DIAGNOSIS — I10 ESSENTIAL HYPERTENSION: Primary | ICD-10-CM

## 2017-05-08 DIAGNOSIS — E78.5 HYPERLIPIDEMIA, UNSPECIFIED HYPERLIPIDEMIA TYPE: ICD-10-CM

## 2017-05-08 PROCEDURE — 99214 OFFICE O/P EST MOD 30 MIN: CPT | Performed by: INTERNAL MEDICINE

## 2017-05-15 ENCOUNTER — OFFICE VISIT (OUTPATIENT)
Dept: SURGERY | Facility: CLINIC | Age: 77
End: 2017-05-15

## 2017-05-15 VITALS — BODY MASS INDEX: 37.03 KG/M2 | HEART RATE: 75 BPM | HEIGHT: 63 IN | OXYGEN SATURATION: 94 % | WEIGHT: 209 LBS

## 2017-05-15 DIAGNOSIS — G47.33 OSA ON CPAP: Chronic | ICD-10-CM

## 2017-05-15 DIAGNOSIS — I82.4Z2 DEEP VEIN THROMBOSIS (DVT) OF DISTAL VEIN OF LEFT LOWER EXTREMITY, UNSPECIFIED CHRONICITY (HCC): ICD-10-CM

## 2017-05-15 DIAGNOSIS — K57.32 DIVERTICULITIS OF LARGE INTESTINE WITHOUT PERFORATION OR ABSCESS WITHOUT BLEEDING: Primary | ICD-10-CM

## 2017-05-15 DIAGNOSIS — Z99.89 OSA ON CPAP: Chronic | ICD-10-CM

## 2017-05-15 DIAGNOSIS — Z79.01 CHRONIC ANTICOAGULATION: ICD-10-CM

## 2017-05-15 PROCEDURE — 99204 OFFICE O/P NEW MOD 45 MIN: CPT | Performed by: SURGERY

## 2017-05-15 RX ORDER — AMOXICILLIN AND CLAVULANATE POTASSIUM 875; 125 MG/1; MG/1
1 TABLET, FILM COATED ORAL 2 TIMES DAILY
Qty: 20 TABLET | Refills: 0 | Status: SHIPPED | OUTPATIENT
Start: 2017-05-15 | End: 2017-05-25

## 2017-05-22 RX ORDER — WARFARIN SODIUM 5 MG/1
TABLET ORAL
Qty: 30 TABLET | Refills: 0 | Status: SHIPPED | OUTPATIENT
Start: 2017-05-22 | End: 2017-07-06 | Stop reason: SDUPTHER

## 2017-05-25 ENCOUNTER — ANTICOAGULATION VISIT (OUTPATIENT)
Dept: INTERNAL MEDICINE | Facility: CLINIC | Age: 77
End: 2017-05-25

## 2017-05-25 DIAGNOSIS — I48.91 ATRIAL FIBRILLATION, UNSPECIFIED TYPE (HCC): ICD-10-CM

## 2017-05-25 LAB — INR PPP: 2 (ref 0.9–1.1)

## 2017-05-25 PROCEDURE — 85610 PROTHROMBIN TIME: CPT | Performed by: INTERNAL MEDICINE

## 2017-06-05 RX ORDER — EZETIMIBE 10 MG/1
TABLET ORAL
Qty: 30 TABLET | Refills: 10 | Status: SHIPPED | OUTPATIENT
Start: 2017-06-05 | End: 2017-07-13

## 2017-06-09 ENCOUNTER — ANTICOAGULATION VISIT (OUTPATIENT)
Dept: INTERNAL MEDICINE | Facility: CLINIC | Age: 77
End: 2017-06-09

## 2017-06-09 DIAGNOSIS — I48.91 ATRIAL FIBRILLATION, UNSPECIFIED TYPE (HCC): ICD-10-CM

## 2017-06-09 LAB — INR PPP: 2.2 (ref 0.9–1.1)

## 2017-06-09 PROCEDURE — 36416 COLLJ CAPILLARY BLOOD SPEC: CPT | Performed by: INTERNAL MEDICINE

## 2017-06-09 PROCEDURE — 85610 PROTHROMBIN TIME: CPT | Performed by: INTERNAL MEDICINE

## 2017-06-09 RX ORDER — LOSARTAN POTASSIUM AND HYDROCHLOROTHIAZIDE 12.5; 1 MG/1; MG/1
TABLET ORAL
Qty: 30 TABLET | Refills: 0 | Status: SHIPPED | OUTPATIENT
Start: 2017-06-09 | End: 2017-09-09 | Stop reason: SDUPTHER

## 2017-06-09 RX ORDER — CELECOXIB 200 MG/1
CAPSULE ORAL
Qty: 30 CAPSULE | Refills: 0 | Status: SHIPPED | OUTPATIENT
Start: 2017-06-09 | End: 2017-09-09 | Stop reason: SDUPTHER

## 2017-06-12 ENCOUNTER — OFFICE VISIT (OUTPATIENT)
Dept: SURGERY | Facility: CLINIC | Age: 77
End: 2017-06-12

## 2017-06-12 VITALS — BODY MASS INDEX: 36.95 KG/M2 | WEIGHT: 208.6 LBS | HEART RATE: 65 BPM | OXYGEN SATURATION: 97 %

## 2017-06-12 DIAGNOSIS — Z87.442 HISTORY OF KIDNEY STONES: ICD-10-CM

## 2017-06-12 DIAGNOSIS — J98.6 DIAPHRAGMATIC PARALYSIS: ICD-10-CM

## 2017-06-12 DIAGNOSIS — G47.33 OSA ON CPAP: Primary | Chronic | ICD-10-CM

## 2017-06-12 DIAGNOSIS — R06.09 DYSPNEA ON EXERTION: ICD-10-CM

## 2017-06-12 DIAGNOSIS — Z79.01 CHRONIC ANTICOAGULATION: ICD-10-CM

## 2017-06-12 DIAGNOSIS — I82.4Z2 DEEP VEIN THROMBOSIS (DVT) OF DISTAL VEIN OF LEFT LOWER EXTREMITY, UNSPECIFIED CHRONICITY (HCC): ICD-10-CM

## 2017-06-12 DIAGNOSIS — K57.32 DIVERTICULITIS OF LARGE INTESTINE WITHOUT PERFORATION OR ABSCESS WITHOUT BLEEDING: ICD-10-CM

## 2017-06-12 DIAGNOSIS — R10.9 FLANK PAIN: ICD-10-CM

## 2017-06-12 DIAGNOSIS — D86.9 SARCOIDOSIS: ICD-10-CM

## 2017-06-12 DIAGNOSIS — Z99.89 OSA ON CPAP: Primary | Chronic | ICD-10-CM

## 2017-06-12 DIAGNOSIS — I48.20 CHRONIC ATRIAL FIBRILLATION (HCC): ICD-10-CM

## 2017-06-12 PROCEDURE — 99214 OFFICE O/P EST MOD 30 MIN: CPT | Performed by: SURGERY

## 2017-06-12 NOTE — PROGRESS NOTES
Chief Complaint:  Left lower quadrant pain    STEPHANIA Wylie returns today, her prior visit having been on 5/15/17, for rectal bleeding.  That visit led to a suspicion that she actually had persistent diverticulitis, with her rectal bleeding being only of a perianal nature.  No prescribed her Augmentin, which she says virtually calls no improvement she denies any fever and says this really isn't associated with any sort of bowel habit changes.    She is very frustrated today saying she just feels bad, and is getting no answers.  She's been to see the pulmonologist and the cardiologist for her dyspnea on exertion.  She indicates that Dr. Stone told her it was due to her sotalol.  She seen the pulmonologist as well who did not think that there was anything of a single factor that could be attributable.  She says she can't even get a full sentence out without getting short of breath.    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    She describes her pain is worse unless she sitting down.  Bending forward in a chair makes it better.  Standing makes it worse, almost as if there is something that compresses and causes the pain.  She is even having pain up into her left rib.    I did review her prior x-rays, and it does seem that she's had an MRI that indicated at T12 11 1 disc protrusion causing thecal sac indentation.  This makes me think that it very well may be in fact a radiculopathy, although she says the pain begins in the front and radiates to the back.    I had to review her old CT of the chest as well due to her complaints of shortness of breath, in the context of possible operative risk.  It did not show a PEN and her nuclear medicine study did not show suggestion of a PE.    We had looked at her CT scan slice by slice at her last visit which shows focal wall thickening and pericolonic inflammation near the junction of the descending and sigmoid, done December 2016.   She has a contrast allergy and this makes getting an excellent CT result difficult.  She does have a history of kidney stones    Of note she has had melanoma.  Her sister has had colon cancer.    Her last colonoscopy was in 2013 by me and she had diverticulosis but no polyps.  I looked this up in the old records as she recalled that she had polyps, but she did not..    Also complicating the potential that she needs surgery is the fact that she hassleep apnea on CPAP.    Past Medical History:   Diagnosis Date   • Abnormal ECG    • Allergic rhinitis    • Atrial fibrillation    • Bleeds easily     warfarin   • Breast screening    • Broken bones    • Calf DVT (deep venous thrombosis)    • Chronic anticoagulation    • Colon polyp    • CTS (carpal tunnel syndrome)    • Deep vein thrombosis     left calf   • Diverticulitis of colon    • AHN (dyspnea on exertion)    • Fatigue    • H/O malignant melanoma of skin    • Hematuria, gross    • Hyperlipidemia    • Hypertension    • Hypothyroidism    • IFG (impaired fasting glucose)    • Insomnia    • LBBB (left bundle branch block)    • Left leg swelling    • LLQ abdominal pain    • Long term current use of systemic steroids    • Low back pain    • Malignant melanoma of skin    • Neck pain    • Obesity    • Osteoarthritis    • Osteopenia    • Paralysis, diaphragm    • 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 for tuberculosis    • Rash    • RBBB (right bundle branch block)    • Renal calculi    • Sarcoid    • Sarcoidosis    • Sigmoid diverticulosis 06/26/2013   • Sleep apnea     on CPAP   • UTI (urinary tract infection)    • Visit for screening mammogram      Past Surgical History:   Procedure Laterality Date   • CARPAL TUNNEL RELEASE Bilateral 1980'S    [facility]   • CATARACT EXTRACTION Bilateral    • COLONOSCOPY N/A 06/26/2013    MILD SIGMOID DIVERTICULOSIS, DR. TANISHA THORNTON   • COLONOSCOPY N/A 10/19/2010    SIGMOID DIVERTICULOSIS & INTERNAL HEMORRHOIDS, HEMORRHOIDAL BANDING X4,   TANISHA THORNTON   • COLONOSCOPY W/ BIOPSIES N/A 05/12/2008    RECTUM BIOPSY: COLONIC MUCOSA W/ FOCAL INCREASE OF INFLAMMATORY CELLS IN THE MUSCULARIS MUCOSA INCLUDING EOSINOPHILS, SIGMOID DIVERTICULOSIS, POSSIBLE PROCTITIS, DR. TANISHA THORNTON   • CYSTOSCOPY W/ LASER LITHOTRIPSY Right 10/10/2014    CYSTOSCOPY W/ BILATERAL RETROGRAD PYELOGRAMS, RIGHT URETERAL PYELOSCOPY & LASER LITOTRIPSY, RIGHT DOUBLE-J STENT PLACEMENT, DR. CLAIRE ROCK   • EXTRACORPOREAL SHOCK WAVE LITHOTRIPSY (ESWL) Right 09/10/2009    DR. CLAIRE ROCK   • LAPAROSCOPIC CHOLECYSTECTOMY N/A 07/19/2012    DR. TANISHA THORNTON   • LUMBAR EPIDURAL INJECTION N/A 04/23/2015    LUMBAR EPIDURAL STEROID INJECTION X3   • PARATHYROIDECTOMY     • TOTAL KNEE ARTHROPLASTY Right 03/25/2014    DR. MARK MARES     Family History   Problem Relation Age of Onset   • Heart disease Mother    • Diabetes type II Mother    • Heart disease Father    • Cancer Sister    • Heart disease Brother      CABG   • Cancer Brother    • Diabetes type II Brother    • Heart disease Sister    • Aneurysm Sister      abdominal aorta   • Colon cancer Sister    • Diabetes type II Sister    • Lung cancer Sister    • Colon polyps Sister    • COPD Sister      Social History     Social History   • Marital status:      Spouse name: EDY   • Number of children: N/A   • Years of education: N/A     Occupational History   • RETIRED      Social History Main Topics   • Smoking status: Never Smoker   • Smokeless tobacco: Never Used   • Alcohol use Yes      Comment: social drinker   • Drug use: No   • Sexual activity: Not on file     Other Topics Concern   • Not on file     Social History Narrative       Occupation/Additional Social Hx: Retired      Current Outpatient Prescriptions:   •  acetaminophen (ACETAMINOPHEN EXTRA STRENGTH) 500 MG tablet, Take 500 mg by mouth. Up to two in the morning and 2 at night when needed, Disp: , Rfl:   •  Calcium-Phosphorus-Vitamin D (CITRACAL +D3 PO), Take  1 tablet by mouth Daily., Disp: , Rfl:   •  celecoxib (CeleBREX) 200 MG capsule, TAKE ONE CAPSULE BY MOUTH DAILY, Disp: 30 capsule, Rfl: 0  •  ezetimibe (ZETIA) 10 MG tablet, Take 1 tablet by mouth daily., Disp: 90 tablet, Rfl: 3  •  ezetimibe (ZETIA) 10 MG tablet, TAKE ONE TABLET BY MOUTH DAILY, Disp: 30 tablet, Rfl: 10  •  ipratropium (ATROVENT) 0.06 % nasal spray, 2 sprays into each nostril As Needed., Disp: , Rfl:   •  levothyroxine (SYNTHROID, LEVOTHROID) 25 MCG tablet, TAKE ONE TABLET BY MOUTH DAILY AS DIRECTED, Disp: 30 tablet, Rfl: 5  •  losartan-hydrochlorothiazide (HYZAAR) 100-12.5 MG per tablet, TAKE ONE TABLET BY MOUTH DAILY, Disp: 30 tablet, Rfl: 0  •  Multiple Vitamin (MULTI-DAY VITAMINS) tablet, Take 1 tablet by mouth daily., Disp: , Rfl:   •  nystatin-triamcinolone (MYCOLOG) 859253-2.1 UNIT/GM-% ointment, Apply  topically 2 (Two) Times a Day. (Patient taking differently: Apply 1 application topically Daily.), Disp: 30 g, Rfl: 0  •  omeprazole (PriLOSEC) 40 MG capsule, Take 40 mg by mouth Daily., Disp: , Rfl:   •  sotalol (BETAPACE) 80 MG tablet, TAKE ONE TABLET BY MOUTH DAILY, Disp: 30 tablet, Rfl: 4  •  warfarin (COUMADIN) 5 MG tablet, TAKE ONE TABLET BY MOUTH DAILY, Disp: 30 tablet, Rfl: 0    Allergies   Allergen Reactions   • Fenofibrate      myalgias   • Livalo [Pitavastatin]      Muscle tension and joint pain   • Plaquenil [Hydroxychloroquine Sulfate]      Muscles tense up and joint pain   • Statins      Joint pain   • Iodinated Diagnostic Agents Rash     Chest tightness  Facial swelling       Preventative Medicine  Colonoscopy: 2013    Review of Systems   Constitutional: Positive for activity change, appetite change, fatigue and unexpected weight change (gain of about 20 # in last year).   HENT: Positive for postnasal drip, rhinorrhea, sneezing and trouble swallowing.    Respiratory: Positive for apnea, cough, choking, shortness of breath and stridor.         RIB PAIN ON LEFT   Cardiovascular:  Positive for leg swelling.   Gastrointestinal: Positive for abdominal distention, abdominal pain, anal bleeding and diarrhea.   Genitourinary: Positive for difficulty urinating and flank pain.   Musculoskeletal: Positive for arthralgias, back pain, gait problem, joint swelling, myalgias, neck pain and neck stiffness.   Neurological: Positive for dizziness.   All other systems reviewed and are negative.      Vitals:    06/12/17 1256   Pulse: 65   SpO2: 97%   Weight: 208 lb 9.6 oz (94.6 kg)       PHYSICAL EXAM:    Pulse 65  Wt 208 lb 9.6 oz (94.6 kg)  SpO2 97%  BMI 36.95 kg/m2  Body mass index is 36.95 kg/(m^2).    Constitutional: well developed, well nourished, Appears stated age  Eyes: sclera nonicteric, conjunctiva not injected  ENMT: Hearing grossly intact, trachea midline, thyroid without masses  CVS: RRR, no murmur, peripheral edema not present  Respiratory: CTA, normal respiratory effort   Gastrointestinal: no hepatosplenomegaly, abdomen slightly distended, tender in the left lower quadrant, with no guarding, no rebound, abdominal hernia not present  Genitourinary: inguinal hernia not present  Musculoskeletal: gait normal, muscle mass normal  Skin: warm and dry, no rashes visible, orderly  Neurological: awake and alert, seems to have reasonable capacity for understanding for medical decision making  Psychiatric: appears to have reasonable judgement,   Lymphatics: no cervical adenopathy    Radiographic Findings: CT with focal diverticulitis, distal descending/proximal sigmoid junction.  Happily her colon does not approximate the spleen closely at the splenic flexure, such that a splenic flexure mobilization would be less risky if needed.  MRI with T12L1 disc protrusion with the cul-de-sac indentation    Lab Findings: Creatinine is 0.89, INR is 2.2    Pamphlet reviewed: Laparoscopic colon resection previously    IMPRESSION:  · Sigmoid diverticulitis, persistent, possibly.  I'm uncertain if her pain is really  attributable to this versus a bulging disc  · Rectal bleeding, thought to be likely perianal in nature  · History of melanoma  · Sleep apnea on CPAP  · Osteoarthritis on Celebrex  · Hypertension on losartan/HCTZ, sotalol, Zetia  · History of DVT on Coumadin  · History of kidney stones  · Atrial fibrillation  · Sister with colon cancer  · Hypothyroidism on levothyroxine    PLAN:  · Discussed again the potential need for a colon resection.  Based on the chronicity of her symptoms I think it's quite likely that we may need to proceed in that direction, but obviously with her age, sleep apnea, history of DVT, atrial fibrillation, she would be at even further risk.  Even more concerning today is her complaints of dyspnea on exertion, unable to even get out of full sentence, with no clear method of improving that for possible surgery  · CT scan without contrast to evaluate him in fact she still has diverticulitis.  If she does I think we need to proceed with a colonoscopy and then surgery, and we'll need to discuss with Dr. Dockery the potential of any additional evaluation she thinks might be necessary to optimize her for surgery.  If the CT scan is negative, we'll discuss with Dr. Dockery about getting evaluation for possible radiculopathy.    Nasra Castro MD    28 minutes spent with 15 of this in counseling    Addend 06/19/17     CT  IMPRESSION:  Tiny nonobstructing calculi in the left kidney. Mild to  moderate colonic diverticulosis without evidence of diverticulitis.  Otherwise unremarkable CT scan of the abdomen and pelvis. No acute  process is identified.      This report was finalized on 6/16/2017 5:02 PM by Dr. Kieran Silva MD.    Will get Dr Dockery's thoughts.  MD Nasra Delatorre MD

## 2017-06-16 ENCOUNTER — HOSPITAL ENCOUNTER (OUTPATIENT)
Dept: CT IMAGING | Facility: HOSPITAL | Age: 77
Discharge: HOME | End: 2017-06-16
Attending: SURGERY | Admitting: SURGERY

## 2017-06-16 DIAGNOSIS — K57.32 DIVERTICULITIS OF LARGE INTESTINE WITHOUT PERFORATION OR ABSCESS WITHOUT BLEEDING: ICD-10-CM

## 2017-06-16 DIAGNOSIS — R10.9 FLANK PAIN: ICD-10-CM

## 2017-06-16 PROCEDURE — 74176 CT ABD & PELVIS W/O CONTRAST: CPT

## 2017-06-16 PROCEDURE — 0 DIATRIZOATE MEGLUMINE & SODIUM PER 1 ML: Performed by: SURGERY

## 2017-06-16 RX ADMIN — DIATRIZOATE MEGLUMINE AND DIATRIZOATE SODIUM 30 ML: 660; 100 LIQUID ORAL; RECTAL at 10:01

## 2017-06-20 ENCOUNTER — TELEPHONE (OUTPATIENT)
Dept: SURGERY | Facility: CLINIC | Age: 77
End: 2017-06-20

## 2017-06-21 NOTE — TELEPHONE ENCOUNTER
Please let her know that her CT did not show any inflammation in the colon, no diverticulitis.  I have put a call into Dr Dockery to see if she feels like her pain could be from her back.  If that work up is negative, i think we should do a c scope.    Nasra Castro MD

## 2017-06-23 ENCOUNTER — OFFICE VISIT (OUTPATIENT)
Dept: INTERNAL MEDICINE | Facility: CLINIC | Age: 77
End: 2017-06-23

## 2017-06-23 VITALS
HEART RATE: 74 BPM | DIASTOLIC BLOOD PRESSURE: 80 MMHG | HEIGHT: 63 IN | BODY MASS INDEX: 36.86 KG/M2 | SYSTOLIC BLOOD PRESSURE: 120 MMHG | WEIGHT: 208 LBS | OXYGEN SATURATION: 97 %

## 2017-06-23 DIAGNOSIS — G89.29 CHRONIC LEFT-SIDED LOW BACK PAIN WITHOUT SCIATICA: ICD-10-CM

## 2017-06-23 DIAGNOSIS — M54.50 CHRONIC LEFT-SIDED LOW BACK PAIN WITHOUT SCIATICA: ICD-10-CM

## 2017-06-23 DIAGNOSIS — R53.83 OTHER FATIGUE: ICD-10-CM

## 2017-06-23 DIAGNOSIS — R06.09 DOE (DYSPNEA ON EXERTION): Primary | ICD-10-CM

## 2017-06-23 DIAGNOSIS — M25.552 LEFT HIP PAIN: ICD-10-CM

## 2017-06-23 PROBLEM — I77.810 DILATION OF THORACIC AORTA: Status: ACTIVE | Noted: 2017-06-23

## 2017-06-23 LAB
HCT VFR BLDA CALC: 44.2 %
HGB BLDA-MCNC: 15.1 G/DL
LYMPHOCYTES # BLD: 34.2 %
MCH, POC: 32.2
MCHC, POC: 34.3
MCV, POC: 93.8
MONOCYTES # BLD: 8.6 %
PLATELET # BLD: 217 10*3/MM3
PMV BLD: 8.6 FL
POC NEUTROPHIL: 57.2 %
RBC, POC: 4.71
RDW, POC: 13.4
WBC # BLD: 5.8 10*3/UL

## 2017-06-23 PROCEDURE — 99215 OFFICE O/P EST HI 40 MIN: CPT | Performed by: INTERNAL MEDICINE

## 2017-06-23 PROCEDURE — 85025 COMPLETE CBC W/AUTO DIFF WBC: CPT | Performed by: INTERNAL MEDICINE

## 2017-06-23 RX ORDER — TRAMADOL HYDROCHLORIDE 50 MG/1
50 TABLET ORAL 3 TIMES DAILY
Qty: 90 TABLET | Refills: 1 | Status: SHIPPED | OUTPATIENT
Start: 2017-06-23 | End: 2018-07-03 | Stop reason: SDUPTHER

## 2017-06-23 RX ORDER — ALBUTEROL SULFATE 90 UG/1
2 AEROSOL, METERED RESPIRATORY (INHALATION) EVERY 4 HOURS PRN
Qty: 1 INHALER | Refills: 0 | Status: SHIPPED | OUTPATIENT
Start: 2017-06-23 | End: 2018-06-19

## 2017-06-23 NOTE — PROGRESS NOTES
Subjective     Inessa Peoples is a 77 y.o. female who presents with   Chief Complaint   Patient presents with   • Shortness of Breath     follow up   • Back Pain       History of Present Illness     SOA.  Occurs with exertion.  Very little exertion gets her very winded.  Going on six months.  Stays the same without progression.  No PND.  No orthonea.  No CP.  She wheezes with exertion.  Chronic cough.  She first saw her cardiologist.  She has had an echo and stress test.  They referred her back to her pulmonologist who did a CT of the chest and a VQ scan.  She had had PFTs in last one year and they were not repeated.  She was told it was likely the sotalol that she takes but she has been on that for a number of years.      Pain from the groin into the rib cage and radiates to the left side of back.  Movement makes the pain worse.  Going on for a number of years.  She has had back x-rays and an MRI.  She has also seen [facility] with they had little to offer surgically.  She states the focus of her pain is the left sided low back around her SI joint.      Review of Systems   Respiratory: Positive for shortness of breath and wheezing. Negative for cough.    Cardiovascular: Negative for chest pain and palpitations.       The following portions of the patient's history were reviewed and updated as appropriate: allergies, current medications and problem list.    Patient Active Problem List    Diagnosis Date Noted   • Dilation of thoracic aorta 06/23/2017   • Chronic anticoagulation 05/15/2017   • Diverticulitis of large intestine without perforation or abscess without bleeding 01/03/2017   • ANY on CPAP + 10 - Dr Lu 10/15/2016   • History of melanoma excision 08/05/2016   • GERD (gastroesophageal reflux disease) 08/05/2016   • History of kidney stones 08/05/2016   • Sarcoidosis 04/18/2016   • Diaphragmatic paralysis 04/18/2016   • Osteopenia 04/18/2016   • Neck pain 04/18/2016   • Low back pain 04/18/2016   • Insomnia  04/18/2016   • Impaired fasting glucose 04/18/2016   • Hypothyroidism 04/18/2016   • Hypertension 04/18/2016   • Hyperlipidemia 04/18/2016     Note Last Updated: 8/5/2016     Intolerant to trials of multiple statins over the years.       • Dyspnea on exertion 04/18/2016   • Chronic osteoarthritis 04/18/2016   • Deep vein thrombosis (DVT) of left lower extremity 04/18/2016     Note Last Updated: 4/18/2016     Description: developed unprovoked on Xarelto.     • Atrial fibrillation 04/18/2016       Current Outpatient Prescriptions on File Prior to Visit   Medication Sig Dispense Refill   • acetaminophen (ACETAMINOPHEN EXTRA STRENGTH) 500 MG tablet Take 500 mg by mouth. Up to two in the morning and 2 at night when needed     • Calcium-Phosphorus-Vitamin D (CITRACAL +D3 PO) Take 1 tablet by mouth Daily.     • celecoxib (CeleBREX) 200 MG capsule TAKE ONE CAPSULE BY MOUTH DAILY 30 capsule 0   • ezetimibe (ZETIA) 10 MG tablet Take 1 tablet by mouth daily. 90 tablet 3   • ezetimibe (ZETIA) 10 MG tablet TAKE ONE TABLET BY MOUTH DAILY 30 tablet 10   • ipratropium (ATROVENT) 0.06 % nasal spray 2 sprays into each nostril As Needed.     • levothyroxine (SYNTHROID, LEVOTHROID) 25 MCG tablet TAKE ONE TABLET BY MOUTH DAILY AS DIRECTED 30 tablet 5   • losartan-hydrochlorothiazide (HYZAAR) 100-12.5 MG per tablet TAKE ONE TABLET BY MOUTH DAILY 30 tablet 0   • Multiple Vitamin (MULTI-DAY VITAMINS) tablet Take 1 tablet by mouth daily.     • nystatin-triamcinolone (MYCOLOG) 281988-5.1 UNIT/GM-% ointment Apply  topically 2 (Two) Times a Day. (Patient taking differently: Apply 1 application topically Daily.) 30 g 0   • omeprazole (PriLOSEC) 40 MG capsule Take 40 mg by mouth Daily.     • sotalol (BETAPACE) 80 MG tablet TAKE ONE TABLET BY MOUTH DAILY 30 tablet 4   • warfarin (COUMADIN) 5 MG tablet TAKE ONE TABLET BY MOUTH DAILY 30 tablet 0     No current facility-administered medications on file prior to visit.        Objective     /80  " Pulse 74  Ht 63\" (160 cm)  Wt 208 lb (94.3 kg)  SpO2 97%  BMI 36.85 kg/m2    Physical Exam   Constitutional: She is oriented to person, place, and time. She appears well-developed and well-nourished.   HENT:   Head: Normocephalic and atraumatic.   Right Ear: Hearing and tympanic membrane normal.   Left Ear: Hearing and tympanic membrane normal.   Mouth/Throat: No oropharyngeal exudate or posterior oropharyngeal erythema.   Cardiovascular: Normal rate, regular rhythm and normal heart sounds.    Pulmonary/Chest: Effort normal and breath sounds normal.   Musculoskeletal:        Left hip: She exhibits normal range of motion, no tenderness, no bony tenderness and no deformity.        Lumbar back: She exhibits no tenderness, no bony tenderness and no deformity.   Neurological: She is alert and oriented to person, place, and time.   Skin: Skin is warm and dry.   Psychiatric: She has a normal mood and affect. Her behavior is normal.     X-rays of the left hip  performed today for following indication:   pain.  X-ray reveal djd.  There is no available x-ray for comparison.  X-ray sent to radiology for official interpretation and findings.        Assessment/Plan   Inessa was seen today for shortness of breath and back pain.    Diagnoses and all orders for this visit:    AHN (dyspnea on exertion)  -     Full Pulmonary Function Test With Bronchodilator  -     POC CBC With / Auto Diff    Other fatigue  -     POC CBC With / Auto Diff    Chronic left-sided low back pain without sciatica  -     Ambulatory Referral to Pain Management    Left hip pain  -     XR Hip With or Without Pelvis 2 - 3 View Left  -     Ambulatory Referral to Pain Management    Other orders  -     traMADol (ULTRAM) 50 MG tablet; Take 1 tablet by mouth 3 (Three) Times a Day.  -     albuterol (PROVENTIL HFA;VENTOLIN HFA) 108 (90 BASE) MCG/ACT inhaler; Inhale 2 puffs Every 4 (Four) Hours As Needed for Wheezing.        Discussion  Patient presents with " ongoing AHN for the past six months with evaluation and tests from both cardiology and pulmonary with echo, stress, CT, VQ.  PFTs are reordered because they have not been done in some time.  Sarcoidosis, weight, diastolic dysfunction and medications all likely contribute.  Rx for prn inhaler. PFTs are reordered.    Chronic LBP for several years with some radiation into the left groin.  I do think it is originating from her back however.  She has done epidurals in the past.  She has see neurosurgery and ortho in the past.  Rx for prn tradmadol which she has used in the past.  Refer to pain management to see if they have anything in the way of injections to offer.      As part of this patient's treatment plan I am prescribing controlled substances. The patient has been made aware of appropriate use of such medications, including potential risk of somnolence, limited ability to drive and/or work safely, and potential for dependence or overdose. It has also been made clear that these medications are for use by this patient only, without concomitant use of alcohol or other substances unless prescribed. The has completed prescribing agreement detailing terms of continued prescribing of controlled substances, including monitoring LUIS reports, urine drug screening, and pill counts if necessary. The patient is aware that inappropriate use will result in cessation of prescribing such medications.     45 minutes spent with the patient with 30 minutes spent counseling the following topics:, diagnostic results, risks and benefits of treatment options, impressions         Future Appointments  Date Time Provider Department Center   7/7/2017 2:30 PM [facility] None   8/18/2017 9:30 AM [facility] None   10/12/2017 11:00 AM  [facility] PROVIDER SCHEDULE  [facility]

## 2017-06-29 ENCOUNTER — HOSPITAL ENCOUNTER (OUTPATIENT)
Dept: RESPIRATORY THERAPY | Facility: HOSPITAL | Age: 77
Discharge: HOME | End: 2017-06-29
Admitting: INTERNAL MEDICINE

## 2017-06-29 PROCEDURE — 94726 PLETHYSMOGRAPHY LUNG VOLUMES: CPT

## 2017-06-29 PROCEDURE — 94729 DIFFUSING CAPACITY: CPT

## 2017-06-29 PROCEDURE — 94060 EVALUATION OF WHEEZING: CPT

## 2017-06-29 RX ORDER — ALBUTEROL SULFATE 2.5 MG/3ML
2.5 SOLUTION RESPIRATORY (INHALATION) ONCE
Status: COMPLETED | OUTPATIENT
Start: 2017-06-29 | End: 2017-06-29

## 2017-06-29 RX ADMIN — ALBUTEROL SULFATE 2.5 MG: 2.5 SOLUTION RESPIRATORY (INHALATION) at 11:01

## 2017-07-07 ENCOUNTER — ANTICOAGULATION VISIT (OUTPATIENT)
Dept: INTERNAL MEDICINE | Facility: CLINIC | Age: 77
End: 2017-07-07

## 2017-07-07 DIAGNOSIS — I48.20 CHRONIC ATRIAL FIBRILLATION (HCC): ICD-10-CM

## 2017-07-07 LAB — INR PPP: 2.3 (ref 0.9–1.1)

## 2017-07-07 PROCEDURE — 85610 PROTHROMBIN TIME: CPT | Performed by: INTERNAL MEDICINE

## 2017-07-07 PROCEDURE — 36416 COLLJ CAPILLARY BLOOD SPEC: CPT | Performed by: INTERNAL MEDICINE

## 2017-07-07 RX ORDER — WARFARIN SODIUM 5 MG/1
TABLET ORAL
Qty: 30 TABLET | Refills: 0 | Status: SHIPPED | OUTPATIENT
Start: 2017-07-07 | End: 2017-08-16 | Stop reason: SDUPTHER

## 2017-07-10 RX ORDER — LEVOTHYROXINE SODIUM 0.03 MG/1
TABLET ORAL
Qty: 30 TABLET | Refills: 4 | Status: SHIPPED | OUTPATIENT
Start: 2017-07-10 | End: 2017-12-12 | Stop reason: SDUPTHER

## 2017-07-13 ENCOUNTER — OFFICE VISIT (OUTPATIENT)
Dept: PAIN MEDICINE | Facility: CLINIC | Age: 77
End: 2017-07-13

## 2017-07-13 VITALS
SYSTOLIC BLOOD PRESSURE: 109 MMHG | TEMPERATURE: 97.8 F | DIASTOLIC BLOOD PRESSURE: 72 MMHG | HEART RATE: 75 BPM | RESPIRATION RATE: 18 BRPM | HEIGHT: 63 IN | OXYGEN SATURATION: 92 % | BODY MASS INDEX: 36.32 KG/M2 | WEIGHT: 205 LBS

## 2017-07-13 DIAGNOSIS — M54.5 LOW BACK PAIN, UNSPECIFIED BACK PAIN LATERALITY, UNSPECIFIED CHRONICITY, WITH SCIATICA PRESENCE UNSPECIFIED: ICD-10-CM

## 2017-07-13 DIAGNOSIS — G89.29 OTHER CHRONIC PAIN: Primary | ICD-10-CM

## 2017-07-13 DIAGNOSIS — M46.1 SACROILIAC INFLAMMATION (HCC): ICD-10-CM

## 2017-07-13 DIAGNOSIS — M51.36 BULGE OF LUMBAR DISC WITHOUT MYELOPATHY: ICD-10-CM

## 2017-07-13 DIAGNOSIS — M53.3 SACROILIAC PAIN: ICD-10-CM

## 2017-07-13 PROBLEM — M51.369 BULGE OF LUMBAR DISC WITHOUT MYELOPATHY: Status: ACTIVE | Noted: 2017-07-13

## 2017-07-13 LAB
POC AMPHETAMINES: NEGATIVE
POC BARBITURATES: NEGATIVE
POC BENZODIAZEPHINES: NEGATIVE
POC COCAINE: NEGATIVE
POC METHADONE: NEGATIVE
POC METHAMPHETAMINE SCREEN URINE: NEGATIVE
POC OPIATES: NEGATIVE
POC OXYCODONE: NEGATIVE
POC PHENCYCLIDINE: NEGATIVE
POC PROPOXYPHENE: NEGATIVE
POC THC: NEGATIVE
POC TRICYCLIC ANTIDEPRESSANTS: NEGATIVE

## 2017-07-13 PROCEDURE — 99204 OFFICE O/P NEW MOD 45 MIN: CPT | Performed by: NURSE PRACTITIONER

## 2017-07-13 PROCEDURE — 80305 DRUG TEST PRSMV DIR OPT OBS: CPT | Performed by: NURSE PRACTITIONER

## 2017-07-13 NOTE — PATIENT INSTRUCTIONS
Sacroiliac Joint Dysfunction  Sacroiliac joint dysfunction is a condition that causes inflammation on one or both sides of the sacroiliac (SI) joint. The SI joint connects the lower part of the spine (sacrum) with the two upper portions of the pelvis (ilium). This condition causes deep aching or burning pain in the low back. In some cases, the pain may also spread into one or both buttocks or hips or spread down the legs.  CAUSES  This condition may be caused by:  · Pregnancy. During pregnancy, extra stress is put on the SI joints because the pelvis widens.  · Injury, such as:    Car accidents.    Sport-related injuries.    Work-related injuries.  · Having one leg that is shorter than the other.  · Conditions that affect the joints, such as:    Rheumatoid arthritis.    Gout.    Psoriatic arthritis.    Joint infection (septic arthritis).  Sometimes, the cause of SI joint dysfunction is not known.  SYMPTOMS  Symptoms of this condition include:  · Aching or burning pain in the lower back. The pain may also spread to other areas, such as:    Buttocks.    Groin.    Thighs and legs.  · Muscle spasms in or around the painful areas.  · Increased pain when standing, walking, running, stair climbing, bending, or lifting.  DIAGNOSIS  Your health care provider will do a physical exam and take your medical history. During the exam, the health care provider may move one or both of your legs to different positions to check for pain. Various tests may be done to help verify the diagnosis, including:  · Imaging tests to look for other causes of pain. These may include:    MRI.    CT scan.    Bone scan.  · Diagnostic injection. A numbing medicine is injected into the SI joint using a needle. If the pain is temporarily improved or stopped after the injection, this can indicate that SI joint dysfunction is the problem.  TREATMENT  Treatment may vary depending on the cause and severity of your condition. Treatment options may  include:  · Applying ice or heat to the lower back area. This can help to reduce pain and muscle spasms.  · Medicines to relieve pain or inflammation or to relax the muscles.  · Wearing a back brace (sacroiliac brace) to help support the joint while your back is healing.  · Physical therapy to increase muscle strength around the joint and flexibility at the joint. This may also involve learning proper body positions and ways of moving to relieve stress on the joint.  · Direct manipulation of the SI joint.  · Injections of steroid medicine into the joint in order to reduce pain and swelling.  · Radiofrequency ablation to burn away nerves that are carrying pain messages from the joint.  · Use of a device that provides electrical stimulation in order to reduce pain at the joint.  · Surgery to put in screws and plates that limit or prevent joint motion. This is rare.  HOME CARE INSTRUCTIONS  · Rest as needed. Limit your activities as directed by your health care provider.  · Take medicines only as directed by your health care provider.  · If directed, apply ice to the affected area:    Put ice in a plastic bag.    Place a towel between your skin and the bag.    Leave the ice on for 20 minutes, 2-3 times per day.  · Use a heating pad or a moist heat pack as directed by your health care provider.  · Exercise as directed by your health care provider or physical therapist.  · Keep all follow-up visits as directed by your health care provider. This is important.  SEEK MEDICAL CARE IF:  · Your pain is not controlled with medicine.  · You have a fever.  · You have increasingly severe pain.  SEEK IMMEDIATE MEDICAL CARE IF:  · You have weakness, numbness, or tingling in your legs or feet.  · You lose control of your bladder or bowel.     This information is not intended to replace advice given to you by your health care provider. Make sure you discuss any questions you have with your health care provider.     Document Released:  03/16/2010 Document Revised: 05/03/2016 Document Reviewed: 08/25/2015  [employer] Interactive Patient Education ©2017 [employer]

## 2017-07-13 NOTE — PROGRESS NOTES
CHIEF COMPLAINT  Ms. Peoples states that her back pain started 3 years ago. She states that she has 3 lumbar epidural done at [facility] in 2015 but states they didn't help. She has tried Aqua therapy but states that it didn't help. She has seen neurosurgery in Sept 2016 and her back issue didn't need surgery at the time.    Subjective   Inessa Peoples is a  RHD 77 y.o. female.   She presents to the office for evaluation of low back pain. She was referred here by Dr. Dockery(PCP). She retired book-keeper/. She lives with her spouse. Does not smoke. Does drink alcohol, 2-3/week. Denies any illicit substance use. Family history is negative for alcoholism or addiction. Family history is negative for back problems. Family history is positive for osteoarthritis and Lupus(sister).    Complains of pain in her left low back, occasionally on right side. Pain can radiate into left hip and into posterior thighs. Also has tingling that can radiate into right calf and right foot. Her primary complaint is her left low back. Describes the left low back pain as continuous aching and throbbing. Worst during day. Pain increases with walking, standing, sitting and laying; pain decreases with changing positions, massage, medication. Was prescribed Tramadol 50 mg by her PCP. Usually takes at night and as needed.  Notes moderate relief. ADL's by self.    Back pain started in 2014, following her knee surgery. Pain has continued to worsen. Had 3 LESI performed at [facility] in 2015 with no improvement. Has completed PT/Aquatherapy with minimal improvement. Has been seen by neurosurgery with no surgical options.     Is on Coumadin for Afib. Seen by Dr. Stone.     Back Pain   This is a chronic problem. The current episode started more than 1 year ago. The problem occurs constantly. The problem has been gradually worsening since onset. The pain is present in the lumbar spine and sacro-iliac. The quality of the pain  is described as aching. The pain does not radiate (left hip, right calf and foot). The pain is at a severity of 5/10. The pain is moderate. The pain is worse during the day. The symptoms are aggravated by bending, position, sitting, standing and twisting. Associated symptoms include weakness (bilateral legs). Pertinent negatives include no bladder incontinence, bowel incontinence, chest pain or numbness. Risk factors include obesity, menopause, history of cancer and lack of exercise (history of melanoma--no Chemo or RAD). She has tried analgesics and heat for the symptoms. The treatment provided moderate relief.      PEG Assessment   What number best describes your pain on average in the past week?5  What number best describes how, during the past week, pain has interfered with your enjoyment of life?7  What number best describes how, during the past week, pain has interfered with your general activity?  5      Current Outpatient Prescriptions:   •  acetaminophen (ACETAMINOPHEN EXTRA STRENGTH) 500 MG tablet, Take 500 mg by mouth. Up to two in the morning and 2 at night when needed, Disp: , Rfl:   •  albuterol (PROVENTIL HFA;VENTOLIN HFA) 108 (90 BASE) MCG/ACT inhaler, Inhale 2 puffs Every 4 (Four) Hours As Needed for Wheezing., Disp: 1 inhaler, Rfl: 0  •  Calcium-Phosphorus-Vitamin D (CITRACAL +D3 PO), Take 1 tablet by mouth Daily., Disp: , Rfl:   •  celecoxib (CeleBREX) 200 MG capsule, TAKE ONE CAPSULE BY MOUTH DAILY, Disp: 30 capsule, Rfl: 0  •  ezetimibe (ZETIA) 10 MG tablet, Take 1 tablet by mouth daily., Disp: 90 tablet, Rfl: 3  •  ipratropium (ATROVENT) 0.06 % nasal spray, 2 sprays into each nostril As Needed., Disp: , Rfl:   •  levothyroxine (SYNTHROID, LEVOTHROID) 25 MCG tablet, TAKE ONE TABLET BY MOUTH DAILY AS DIRECTED, Disp: 30 tablet, Rfl: 4  •  losartan-hydrochlorothiazide (HYZAAR) 100-12.5 MG per tablet, TAKE ONE TABLET BY MOUTH DAILY, Disp: 30 tablet, Rfl: 0  •  Multiple Vitamin (MULTI-DAY VITAMINS)  tablet, Take 1 tablet by mouth daily., Disp: , Rfl:   •  omeprazole (PriLOSEC) 40 MG capsule, Take 40 mg by mouth Daily., Disp: , Rfl:   •  sotalol (BETAPACE) 80 MG tablet, TAKE ONE TABLET BY MOUTH DAILY, Disp: 30 tablet, Rfl: 4  •  traMADol (ULTRAM) 50 MG tablet, Take 1 tablet by mouth 3 (Three) Times a Day., Disp: 90 tablet, Rfl: 1  •  warfarin (COUMADIN) 5 MG tablet, TAKE ONE TABLET BY MOUTH DAILY, Disp: 30 tablet, Rfl: 0    The following portions of the patient's history were reviewed and updated as appropriate: allergies, current medications, past family history, past medical history, past social history, past surgical history and problem list.      REVIEW OF PERTINENT MEDICAL DATA    [facility] #[id number]--[facility] for tramadol 50 mg quantity of 90 from Dr. Gisele Dockery on 6/23/17.  Prescription for hydrocodone 5-3 25 quantity 12 from Carol Mccauley on 12-30-17.  Prescription for tramadol  mg quantity of 30 from Dr. Dockery on 7/11/16.  There are 2 providers and one pharmacy on this [place].  Otherwise [facility] is negative.    AHUMADA Inventory-- 7    Reviewed Dr. Dockery office visit 6-23-17-- patient presents for follow-up of shortness of breath.  Also complains of pain from the groin into the rib cage and radiates into left-sided back.  Has a normal present time.  Has had back x-rays and MRI.  Has seen neurosurgery with nothing to offer surgically.  Pain is located in left-sided low back around her SI joint.  Chronic low back pain for several years with some radiation into left groin.  Has seen [facility] neurosurgery.  Has done epidurals in the past.  Refer to pain management see if they have anything else to offer in terms of injections.  Also given prescription for tramadol 50 mg 1 by mouth 3 times a day when necessary.    LUMBAR MRI 8-9-16      Reviewed office visit with Alicia YUNG (neurosurgery) on 9/8/16--presents for low back pain.  Pain is isolated to low back with tenderness from L4 5  "that radiates bilaterally.  No radicular or myelopathic objective findings on exam.  Suggest start physical therapy.    Review of Systems   Constitutional: Positive for fatigue.   HENT: Negative for congestion.    Eyes: Negative for visual disturbance.   Respiratory: Positive for shortness of breath and wheezing. Negative for cough.    Cardiovascular: Positive for palpitations. Negative for chest pain and leg swelling.   Gastrointestinal: Negative for bowel incontinence, constipation and diarrhea.   Genitourinary: Positive for difficulty urinating. Negative for bladder incontinence.   Musculoskeletal: Positive for back pain.   Neurological: Positive for weakness (bilateral legs). Negative for numbness.   Psychiatric/Behavioral: Positive for sleep disturbance. Negative for suicidal ideas. The patient is nervous/anxious.        Vitals:    07/13/17 1029   BP: 109/72   Pulse: 75   Resp: 18   Temp: 97.8 °F (36.6 °C)   SpO2: 92%   Weight: 205 lb (93 kg)   Height: 62.5\" (158.8 cm)   PainSc:   5   PainLoc: Back     Objective   Physical Exam   Constitutional: She is oriented to person, place, and time. Vital signs are normal. She appears well-developed and well-nourished. She is cooperative.   HENT:   Head: Normocephalic and atraumatic.   Nose: Nose normal.   Eyes: Conjunctivae, EOM and lids are normal. Pupils are equal, round, and reactive to light.   Neck: Trachea normal. Neck supple.   Cardiovascular: Normal rate, regular rhythm and normal heart sounds.    Pulmonary/Chest: Effort normal and breath sounds normal. No respiratory distress.   Abdominal:   OBESE   Musculoskeletal:        Lumbar back: She exhibits decreased range of motion, tenderness, bony tenderness and pain.   NEGATIVE SLR BILATERALLY    EXQUISITE TENDERNESS TO PALPATION OF LEFT SI, MINIMAL ON RIGHT SI    POSITIVE LUKE ON LEFT AND CONTRALATERAL WITH PAIN ON LEFT, NEGATIVE ON RIGHT    NEGATIVE SLR BILATERALLY    Neurological: She is alert and oriented to " person, place, and time. She has normal strength. No cranial nerve deficit. Coordination and gait normal.   Reflex Scores:       Patellar reflexes are 2+ on the right side and 2+ on the left side.       Achilles reflexes are 2+ on the right side and 2+ on the left side.  Skin: Skin is warm, dry and intact.   Psychiatric: She has a normal mood and affect. Her speech is normal and behavior is normal. Judgment and thought content normal. Cognition and memory are normal.   Nursing note and vitals reviewed.      Assessment/Plan   Inessa was seen today for back pain.    Diagnoses and all orders for this visit:    Other chronic pain  -     Urine Drug Screen Confirmation  -     POC Urine Drug Screen, Triage    Low back pain, unspecified back pain laterality, unspecified chronicity, with sciatica presence unspecified  -     Urine Drug Screen Confirmation  -     POC Urine Drug Screen, Triage    Bulge of lumbar disc without myelopathy  -     Urine Drug Screen Confirmation  -     POC Urine Drug Screen, Triage    Sacroiliac inflammation  -     Case Request  -     Urine Drug Screen Confirmation  -     POC Urine Drug Screen, Triage    Sacroiliac pain  -     Case Request  -     Urine Drug Screen Confirmation  -     POC Urine Drug Screen, Triage      --- Routine new patient initial UDS in office today.  The specimen was viewed and the immunoassay result reviewed and is NEGATIVE(APPROPRIATE).  This specimen will be sent to [facility] laboratory for confirmation.     --- LEFT SI joint injection. Stop Coumadin 5 days prior. Reviewed the procedure at length with the patient.  Included in the review was expectations, complications, risk and benefits.The procedure was described in detail and the risks, benefits and alternatives were discussed with the patient (including but not limited to: bleeding, infection, nerve damage, worsening of pain, inability to perform injection, paralysis, seizures, and death) who agreed to proceed.  Discussed  the potential for sedation if warranted/wanted.  Questions were answered and in a way the patient could understand.  Patient verbalized understanding and wishes to proceed.  This intervention will be ordered.  --- Discussed retry of PT when pain is calmed down after injection.  --- could consider TFLESI for radicular symptoms but her primary complaint is her left low back pain.  --- Follow-up after procedure or sooner if needed       LUIS REPORT      LUIS report has been reviewed and scanned into the patient's chart.    Date of last LUIS : 7-11-17            EMR Dragon/Transcription disclaimer:   Much of this encounter note is an electronic transcription/translation of spoken language to printed text. The electronic translation of spoken language may permit erroneous, or at times, nonsensical words or phrases to be inadvertently transcribed; Although I have reviewed the note for such errors, some may still exist.

## 2017-07-23 RX ORDER — SOTALOL HYDROCHLORIDE 80 MG/1
TABLET ORAL
Qty: 90 TABLET | Refills: 0 | Status: SHIPPED | OUTPATIENT
Start: 2017-07-23 | End: 2017-10-22 | Stop reason: SDUPTHER

## 2017-07-24 ENCOUNTER — TELEPHONE (OUTPATIENT)
Dept: INTERNAL MEDICINE | Facility: CLINIC | Age: 77
End: 2017-07-24

## 2017-07-24 NOTE — TELEPHONE ENCOUNTER
She is having epidural injection on 8/3 and they want her to stop coumadin 5 days before is this ok? CC    Yes that is OK.  PARRISHW

## 2017-08-03 ENCOUNTER — OUTSIDE FACILITY SERVICE (OUTPATIENT)
Dept: PAIN MEDICINE | Facility: CLINIC | Age: 77
End: 2017-08-03

## 2017-08-03 ENCOUNTER — DOCUMENTATION (OUTPATIENT)
Dept: PAIN MEDICINE | Facility: CLINIC | Age: 77
End: 2017-08-03

## 2017-08-03 PROCEDURE — 27096 INJECT SACROILIAC JOINT: CPT | Performed by: ANESTHESIOLOGY

## 2017-08-04 NOTE — PROGRESS NOTES
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  We discussed various topics including but not limited to bleeding, infection, injury, postprocedural site soreness, painful flareup, worsening of clinical picture, paralysis, coma, and death.     SURGEON:  Wolfgang Means MD    REASON FOR PROCEDURE:    Presentation consistent with sacroilitis / SI related pain on history and physical exam    SEDATION:  Patient declined administration of moderate sedation    ANESTHETIC AGENT:  Marcaine 0.5%  STEROID AGENT:  40mg DepoMedrol    DESCRIPTON OF PROCEDURE:  After obtaining informed consent, IV access  was not obtained in the preoperative area.  The patient was transported to the operative suite and placed in the prone position with a pillow under the pelvic area. EKG, blood pressure, and pulse oximeter were monitored. The lumbosacral area was prepped with Chloraprep and draped in a sterile fashion.     Under fluoroscopic guidance the inferior most portion of the LEFT sacroiliac joint was identified. The overlying skin and subcutaneous tissue was anesthetized with 1% lidocaine. A 22-gauge spinal needle was introduced from the inferior most portion of the joint into the sacroiliac joint under fluoroscopic guidance in the AP dimension with slight oblique rotation to the contralateral side.  Aspiration was negative.  After confirming the position of the needle with fluoroscopy, 1 mL of Omnipaque was injected and after seeing appropriate spread into the joint a total of 2.5 mL of Marcaine, with approximately 40 mg of  DepoMedrol, was injected very slowly.  Vital signs remained stable.  The onset of analgesia was noted.    ESTIMATED BLOOD LOSS:  minimal  SPECIMENS:  None    COMPLICATIONS:  No complications were noted. and There was no indication of vascular uptake on live injection of contrast dye.    TOLERANCE & DISCHARGE CONDITION:    The patient tolerated the procedure well.  The patient was transported to the recovery area without difficulties.  The patient was discharged to home under the care of family in stable and satisfactory condition.    PLAN OF CARE:  1. The patient was given our standard instruction sheet and will resume all medications as per the medication reconciliation sheet.  2. The patient will Repeat injection 4 wks.  3. The patient is instructed to keep a pain log hourly for 8 hours after the procedure.

## 2017-08-11 ENCOUNTER — TELEPHONE (OUTPATIENT)
Dept: INTERNAL MEDICINE | Facility: CLINIC | Age: 77
End: 2017-08-11

## 2017-08-16 RX ORDER — WARFARIN SODIUM 5 MG/1
TABLET ORAL
Qty: 30 TABLET | Refills: 0 | Status: SHIPPED | OUTPATIENT
Start: 2017-08-16 | End: 2017-10-07 | Stop reason: SDUPTHER

## 2017-08-18 ENCOUNTER — OFFICE VISIT (OUTPATIENT)
Dept: INTERNAL MEDICINE | Facility: CLINIC | Age: 77
End: 2017-08-18

## 2017-08-18 ENCOUNTER — ANTICOAGULATION VISIT (OUTPATIENT)
Dept: INTERNAL MEDICINE | Facility: CLINIC | Age: 77
End: 2017-08-18

## 2017-08-18 VITALS
BODY MASS INDEX: 35.97 KG/M2 | OXYGEN SATURATION: 96 % | WEIGHT: 203 LBS | HEART RATE: 64 BPM | DIASTOLIC BLOOD PRESSURE: 78 MMHG | SYSTOLIC BLOOD PRESSURE: 128 MMHG | HEIGHT: 63 IN

## 2017-08-18 DIAGNOSIS — M54.5 LOW BACK PAIN, UNSPECIFIED BACK PAIN LATERALITY, UNSPECIFIED CHRONICITY, WITH SCIATICA PRESENCE UNSPECIFIED: ICD-10-CM

## 2017-08-18 DIAGNOSIS — I48.20 CHRONIC ATRIAL FIBRILLATION (HCC): ICD-10-CM

## 2017-08-18 DIAGNOSIS — I10 ESSENTIAL HYPERTENSION: Primary | ICD-10-CM

## 2017-08-18 DIAGNOSIS — Z79.01 CHRONIC ANTICOAGULATION: ICD-10-CM

## 2017-08-18 LAB — INR PPP: 1.8 (ref 0.9–1.1)

## 2017-08-18 PROCEDURE — 99214 OFFICE O/P EST MOD 30 MIN: CPT | Performed by: INTERNAL MEDICINE

## 2017-08-18 PROCEDURE — 36416 COLLJ CAPILLARY BLOOD SPEC: CPT | Performed by: INTERNAL MEDICINE

## 2017-08-18 PROCEDURE — 85610 PROTHROMBIN TIME: CPT | Performed by: INTERNAL MEDICINE

## 2017-08-18 RX ORDER — ACYCLOVIR 50 MG/G
OINTMENT TOPICAL
COMMUNITY
Start: 2017-08-03 | End: 2017-12-11

## 2017-08-18 NOTE — PROGRESS NOTES
Subjective     Inessa Peoples is a 77 y.o. female who presents with   Chief Complaint   Patient presents with   • Hypertension   • Anticoagulation   • Back Pain       History of Present Illness     HTN.  Control is good.   Anticoagualtion.  Due for INR.    Back pain.  Epidurals helped a lot.  She is feeling much better.  Only rarely using Tramadol.   Breathing is better.     Review of Systems   Respiratory: Positive for shortness of breath.    Cardiovascular: Positive for leg swelling. Negative for chest pain.       The following portions of the patient's history were reviewed and updated as appropriate: allergies, current medications and problem list.    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       • Dyspnea on exertion 04/18/2016   • Chronic osteoarthritis 04/18/2016   • History of DVT (deep vein thrombosis) 04/18/2016     Note Last Updated: 4/18/2016     Description: developed unprovoked on Xarelto.     • Atrial fibrillation 04/18/2016       Current Outpatient Prescriptions on File Prior to Visit   Medication  "Sig Dispense Refill   • acetaminophen (ACETAMINOPHEN EXTRA STRENGTH) 500 MG tablet Take 500 mg by mouth. Up to two in the morning and 2 at night when needed     • albuterol (PROVENTIL HFA;VENTOLIN HFA) 108 (90 BASE) MCG/ACT inhaler Inhale 2 puffs Every 4 (Four) Hours As Needed for Wheezing. 1 inhaler 0   • Calcium-Phosphorus-Vitamin D (CITRACAL +D3 PO) Take 1 tablet by mouth Daily.     • celecoxib (CeleBREX) 200 MG capsule TAKE ONE CAPSULE BY MOUTH DAILY 30 capsule 0   • ezetimibe (ZETIA) 10 MG tablet Take 1 tablet by mouth daily. 90 tablet 3   • ipratropium (ATROVENT) 0.06 % nasal spray 2 sprays into each nostril As Needed.     • levothyroxine (SYNTHROID, LEVOTHROID) 25 MCG tablet TAKE ONE TABLET BY MOUTH DAILY AS DIRECTED 30 tablet 4   • losartan-hydrochlorothiazide (HYZAAR) 100-12.5 MG per tablet TAKE ONE TABLET BY MOUTH DAILY 30 tablet 0   • Multiple Vitamin (MULTI-DAY VITAMINS) tablet Take 1 tablet by mouth daily.     • omeprazole (PriLOSEC) 40 MG capsule Take 40 mg by mouth Daily.     • sotalol (BETAPACE) 80 MG tablet TAKE ONE TABLET BY MOUTH DAILY 90 tablet 0   • traMADol (ULTRAM) 50 MG tablet Take 1 tablet by mouth 3 (Three) Times a Day. 90 tablet 1   • warfarin (COUMADIN) 5 MG tablet TAKE ONE TABLET BY MOUTH DAILY 30 tablet 0     No current facility-administered medications on file prior to visit.        Objective     /78  Pulse 64  Ht 63\" (160 cm)  Wt 203 lb (92.1 kg)  SpO2 96%  BMI 35.96 kg/m2    Physical Exam   Constitutional: She is oriented to person, place, and time. She appears well-developed and well-nourished.   HENT:   Head: Normocephalic and atraumatic.   Cardiovascular: Normal rate, regular rhythm and normal heart sounds.    Pulmonary/Chest: Effort normal and breath sounds normal.   Neurological: She is alert and oriented to person, place, and time.   Skin: Skin is warm and dry.   Psychiatric: She has a normal mood and affect. Her behavior is normal.       Assessment/Plan   Inessa was " seen today for hypertension, anticoagulation and back pain.    Diagnoses and all orders for this visit:    Essential hypertension    Chronic anticoagulation  -     POC INR    Other chronic pain    Low back pain, unspecified back pain laterality, unspecified chronicity, with sciatica presence unspecified        Discussion  HTN.  Continue current regimen.   Chronic anticoagulation.  See INR flowsheet.    Chronic LBP.  Continue with pain management for injections.  Continue prn Tramadol.         Future Appointments  Date Time Provider Department Center   8/31/2017 12:15 PM Wolfgang Means MD [facility] None   [facility]   11/13/2017 1:00 PM [facility] None   11/20/2017 1:00 PM Gisele Dockery MD [facility] None

## 2017-08-31 ENCOUNTER — OUTSIDE FACILITY SERVICE (OUTPATIENT)
Dept: PAIN MEDICINE | Facility: CLINIC | Age: 77
End: 2017-08-31

## 2017-08-31 ENCOUNTER — DOCUMENTATION (OUTPATIENT)
Dept: PAIN MEDICINE | Facility: CLINIC | Age: 77
End: 2017-08-31

## 2017-08-31 PROCEDURE — 27096 INJECT SACROILIAC JOINT: CPT | Performed by: ANESTHESIOLOGY

## 2017-09-01 NOTE — PROGRESS NOTES
LEFT Sacroiliac Joint Injection  [facility]    PREOPERATIVE DIAGNOSIS:   Sacroiliac joint dysfunction on the LEFT    POSTOPERATIVE DIAGNOSIS:  Sacroiliac joint dysfunction on the LEFT    PROCEDURE:  Sacroiliac Joint Injection, on the LEFT, with fluoroscopic guidance    PRE-PROCEDURE DISCUSSION WITH PATIENT:    Risks and complications were discussed with the patient prior to starting the procedure and informed consent was obtained.  We discussed various topics including but not limited to bleeding, infection, injury, postprocedural site soreness, painful flareup, worsening of clinical picture, paralysis, coma, and death.     SURGEON:  Wolfgang Means MD    REASON FOR PROCEDURE:    Tender to palpation over the affected SI joint. Previous diagnostic positivity of SI joint injection.   Positive Alfredito Test.    SEDATION:  Patient declined administration of moderate sedation    ANESTHETIC AGENT:  Marcaine 0.5%  STEROID AGENT:  40mg DepoMedrol    DESCRIPTON OF PROCEDURE:  After obtaining informed consent, IV access  was not obtained in the preoperative area.  The patient was transported to the operative suite and placed in the prone position with a pillow under the pelvic area. EKG, blood pressure, and pulse oximeter were monitored. The lumbosacral area was prepped with Chloraprep and draped in a sterile fashion.     Under fluoroscopic guidance the inferior most portion of the LEFT sacroiliac joint was identified. The overlying skin and subcutaneous tissue was anesthetized with 1% lidocaine. A 22-gauge spinal needle was introduced from the inferior most portion of the joint into the sacroiliac joint under fluoroscopic guidance in the AP dimension with slight oblique rotation to the contralateral side.  Aspiration was negative.  After confirming the position of the needle with fluoroscopy, 1 mL of Omnipaque was injected and after seeing appropriate spread into the joint a total of 2.5 mL of Marcaine,  with approximately 40 mg of DepoMedrol, was injected very slowly.  Vital signs remained stable.  The onset of analgesia was noted.    ESTIMATED BLOOD LOSS:  minimal  SPECIMENS:  None    COMPLICATIONS:  No complications were noted. and There was no indication of vascular uptake on live injection of contrast dye.    TOLERANCE & DISCHARGE CONDITION:    The patient tolerated the procedure well.  The patient was transported to the recovery area without difficulties.  The patient was discharged to home under the care of family in stable and satisfactory condition.    PLAN OF CARE:  1. The patient was given our standard instruction sheet and will resume all medications as per the medication reconciliation sheet.  2. The patient will Return to clinic 2-3 wks.  3. The patient is instructed to keep a pain log hourly for 8 hours after the procedure.

## 2017-09-07 ENCOUNTER — ANTICOAGULATION VISIT (OUTPATIENT)
Dept: INTERNAL MEDICINE | Facility: CLINIC | Age: 77
End: 2017-09-07

## 2017-09-07 DIAGNOSIS — I48.20 CHRONIC ATRIAL FIBRILLATION (HCC): ICD-10-CM

## 2017-09-07 LAB — INR PPP: 1.6 (ref 0.9–1.1)

## 2017-09-07 PROCEDURE — 36416 COLLJ CAPILLARY BLOOD SPEC: CPT | Performed by: INTERNAL MEDICINE

## 2017-09-07 PROCEDURE — 85610 PROTHROMBIN TIME: CPT | Performed by: INTERNAL MEDICINE

## 2017-09-07 NOTE — PATIENT INSTRUCTIONS
Pt stuck in left hand 2nd digit. Pt tolerated it well no reaction. 09/07/2017 RJ.   Patient was off her dose for 5 days due to a procedure. Patient started back on dose on 09/03/2017.

## 2017-09-11 RX ORDER — LOSARTAN POTASSIUM AND HYDROCHLOROTHIAZIDE 12.5; 1 MG/1; MG/1
TABLET ORAL
Qty: 30 TABLET | Refills: 0 | Status: SHIPPED | OUTPATIENT
Start: 2017-09-11 | End: 2017-10-07 | Stop reason: SDUPTHER

## 2017-09-11 RX ORDER — CELECOXIB 200 MG/1
CAPSULE ORAL
Qty: 30 CAPSULE | Refills: 0 | Status: SHIPPED | OUTPATIENT
Start: 2017-09-11 | End: 2017-10-07 | Stop reason: SDUPTHER

## 2017-09-15 ENCOUNTER — ANTICOAGULATION VISIT (OUTPATIENT)
Dept: INTERNAL MEDICINE | Facility: CLINIC | Age: 77
End: 2017-09-15

## 2017-09-15 DIAGNOSIS — I48.20 CHRONIC ATRIAL FIBRILLATION (HCC): ICD-10-CM

## 2017-09-15 LAB — INR PPP: 2 (ref 0.9–1.1)

## 2017-09-15 PROCEDURE — 90662 IIV NO PRSV INCREASED AG IM: CPT | Performed by: INTERNAL MEDICINE

## 2017-09-15 PROCEDURE — 36416 COLLJ CAPILLARY BLOOD SPEC: CPT | Performed by: INTERNAL MEDICINE

## 2017-09-15 PROCEDURE — 90471 IMMUNIZATION ADMIN: CPT | Performed by: INTERNAL MEDICINE

## 2017-09-15 PROCEDURE — 85610 PROTHROMBIN TIME: CPT | Performed by: INTERNAL MEDICINE

## 2017-09-20 ENCOUNTER — OFFICE VISIT (OUTPATIENT)
Dept: PAIN MEDICINE | Facility: CLINIC | Age: 77
End: 2017-09-20

## 2017-09-20 VITALS
HEIGHT: 63 IN | OXYGEN SATURATION: 94 % | SYSTOLIC BLOOD PRESSURE: 137 MMHG | RESPIRATION RATE: 16 BRPM | BODY MASS INDEX: 35.08 KG/M2 | DIASTOLIC BLOOD PRESSURE: 82 MMHG | HEART RATE: 67 BPM | TEMPERATURE: 97.6 F | WEIGHT: 198 LBS

## 2017-09-20 DIAGNOSIS — M51.36 BULGE OF LUMBAR DISC WITHOUT MYELOPATHY: ICD-10-CM

## 2017-09-20 DIAGNOSIS — M46.1 SACROILIAC INFLAMMATION (HCC): ICD-10-CM

## 2017-09-20 DIAGNOSIS — M53.3 SACROILIAC PAIN: ICD-10-CM

## 2017-09-20 DIAGNOSIS — G89.29 OTHER CHRONIC PAIN: Primary | ICD-10-CM

## 2017-09-20 DIAGNOSIS — M54.5 LOW BACK PAIN, UNSPECIFIED BACK PAIN LATERALITY, UNSPECIFIED CHRONICITY, WITH SCIATICA PRESENCE UNSPECIFIED: ICD-10-CM

## 2017-09-20 PROCEDURE — 99214 OFFICE O/P EST MOD 30 MIN: CPT | Performed by: NURSE PRACTITIONER

## 2017-09-20 NOTE — PROGRESS NOTES
"CHIEF COMPLAINT  Pt states L sided LBP is currently 70% reduced since 8/31/17 L S.I. Joint injection.  Pt however reports R sided LBP \"from waist up to R kidney\"      Subjective   Inessa Peoples is a 77 y.o. female  who presents to the office for follow-up of procedure.  She completed a left SI joint injection   on  8-31-17 and8-3-17 performed by Dr. Means for management of low back pain. Patient reports 70% ongoing relief from the procedure.  She states \"my pain is different now.\" She is now noticing pain in her right low back. Admits her left low back is \"a lot better.\"    Complains of pain in her right low back. Today her pain is 3-4/10VAS. Her pain level in her left low back is 1-2/10VAS. Has stiffness in the morning. Her pain is worst during the morning.  Pain improves as day progresses. Pain increases with bending, sitting on commode; pain decreases with rest, laying, ice. Is prescribed pain medication by her PCP.  ADL's by self.     Back Pain   This is a chronic problem. The current episode started more than 1 year ago. The problem occurs constantly. The pain is present in the lumbar spine and sacro-iliac. The quality of the pain is described as aching. The pain does not radiate (left hip, right calf and foot). The pain is at a severity of 3/10 (right sided low back\). The pain is moderate. The pain is worse during the day. The symptoms are aggravated by bending, position, sitting, standing and twisting. Associated symptoms include weakness (bilateral legs). Pertinent negatives include no bladder incontinence, bowel incontinence, chest pain or numbness. Risk factors include obesity, menopause, history of cancer and lack of exercise (history of melanoma--no Chemo or RAD). She has tried analgesics and heat (left SI joint injection--significant ongoing relief) for the symptoms. The treatment provided moderate relief.      PEG Assessment   What number best describes your pain on average in the past " "week?4  What number best describes how, during the past week, pain has interfered with your enjoyment of life?4  What number best describes how, during the past week, pain has interfered with your general activity?  7    The following portions of the patient's history were reviewed and updated as appropriate: allergies, current medications, past family history, past medical history, past social history, past surgical history and problem list.    Review of Systems   Constitutional: Positive for fatigue. Negative for activity change and appetite change.   HENT: Negative for congestion.    Eyes: Negative for visual disturbance.   Respiratory: Positive for apnea. Negative for cough, shortness of breath and wheezing.    Cardiovascular: Negative for chest pain, palpitations (At FIB) and leg swelling.   Gastrointestinal: Negative for bowel incontinence, constipation and diarrhea.   Genitourinary: Positive for difficulty urinating. Negative for bladder incontinence.   Musculoskeletal: Positive for back pain.   Neurological: Positive for weakness (bilateral legs). Negative for dizziness, light-headedness and numbness.   Psychiatric/Behavioral: Negative for sleep disturbance and suicidal ideas. The patient is nervous/anxious.        Vitals:    09/20/17 1054   BP: 137/82   Pulse: 67   Resp: 16   Temp: 97.6 °F (36.4 °C)   SpO2: 94%   Weight: 198 lb (89.8 kg)   Height: 63\" (160 cm)   PainSc: 3  Comment: R sided LBP ranges from 3-7/10   PainLoc: Back     Objective   Physical Exam   Constitutional: She is oriented to person, place, and time. Vital signs are normal. She appears well-developed and well-nourished. She is cooperative.   HENT:   Head: Normocephalic and atraumatic.   Nose: Nose normal.   Eyes: Conjunctivae, EOM and lids are normal. Pupils are equal, round, and reactive to light.   Neck: Trachea normal. Neck supple.   Cardiovascular: Normal rate, regular rhythm and normal heart sounds.    Pulmonary/Chest: Effort normal " and breath sounds normal. No respiratory distress.   Abdominal:   OBESE   Musculoskeletal:        Lumbar back: She exhibits decreased range of motion, tenderness, bony tenderness and pain.   NEGATIVE SLR BILATERALLY    Moderate tenderness to palpation of right, negative on left.    POSITIVE LUKE ON right AND CONTRALATERAL WITH PAIN ON LEFT, NEGATIVE ON left    NEGATIVE SLR BILATERALLY    Neurological: She is alert and oriented to person, place, and time. She has normal strength. No cranial nerve deficit. Coordination and gait normal.   Reflex Scores:       Patellar reflexes are 2+ on the right side and 2+ on the left side.  Skin: Skin is warm, dry and intact.   Psychiatric: She has a normal mood and affect. Her speech is normal and behavior is normal. Judgment and thought content normal. Cognition and memory are normal.   Nursing note and vitals reviewed.      Assessment/Plan   Inessa was seen today for back pain.    Diagnoses and all orders for this visit:    Other chronic pain    Bulge of lumbar disc without myelopathy  -     Ambulatory Referral to Physical Therapy Evaluate and treat ([facility] in [place])    Sacroiliac inflammation  -     Case Request  -     Ambulatory Referral to Physical Therapy Evaluate and treat ([facility] in [place])    Sacroiliac pain  -     Case Request  -     Ambulatory Referral to Physical Therapy Evaluate and treat ([facility] in [place])    Low back pain, unspecified back pain laterality, unspecified chronicity, with sciatica presence unspecified  -     Ambulatory Referral to Physical Therapy Evaluate and treat ([facility] in [place])      --- refer to PT. Patient requests [facility] in [place]. Can start after injection.  --- Right SI joint injection. Stop Coumadin 5 days prior. Reviewed the procedure at length with the patient.  Included in the review was expectations, complications, risk and benefits.The procedure was described in detail and the risks, benefits and  alternatives were discussed with the patient (including but not limited to: bleeding, infection, nerve damage, worsening of pain, inability to perform injection, paralysis, seizures, and death) who agreed to proceed.  Discussed the potential for sedation if warranted/wanted.  Questions were answered and in a way the patient could understand.  Patient verbalized understanding and wishes to proceed.  This intervention will be ordered.  --- Follow-up after procedure or sooner if needed.       LUIS REPORT    LUIS report has been reviewed and scanned into the patient's chart.    Date of last LUIS : 9-18-17        EMR Dragon/Transcription disclaimer:   Much of this encounter note is an electronic transcription/translation of spoken language to printed text. The electronic translation of spoken language may permit erroneous, or at times, nonsensical words or phrases to be inadvertently transcribed; Although I have reviewed the note for such errors, some may still exist.

## 2017-09-26 ENCOUNTER — OUTSIDE FACILITY SERVICE (OUTPATIENT)
Dept: PAIN MEDICINE | Facility: CLINIC | Age: 77
End: 2017-09-26

## 2017-09-26 ENCOUNTER — DOCUMENTATION (OUTPATIENT)
Dept: PAIN MEDICINE | Facility: CLINIC | Age: 77
End: 2017-09-26

## 2017-09-26 PROCEDURE — 27096 INJECT SACROILIAC JOINT: CPT | Performed by: ANESTHESIOLOGY

## 2017-09-26 NOTE — PROGRESS NOTES
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  We discussed various topics including but not limited to bleeding, infection, injury, postprocedural site soreness, painful flareup, worsening of clinical picture, paralysis, coma, and death.     SURGEON:  Wolfgang Means MD    REASON FOR PROCEDURE:    Tender to palpation over the affected SI joint. Positive Alfredito Test.    SEDATION:  Patient declined administration of moderate sedation    ANESTHETIC AGENT:  Marcaine 0.5%  STEROID AGENT:  40mg DepoMedrol    DESCRIPTON OF PROCEDURE:  After obtaining informed consent, IV access was not obtained in the preoperative area.  The patient was transported to the operative suite and placed in the prone position with a pillow under the pelvic area. EKG, blood pressure, and pulse oximeter were monitored. The lumbosacral area was prepped with Chloraprep and draped in a sterile fashion.     Under fluoroscopic guidance the inferior most portion of the RIGHT sacroiliac joint was identified. The overlying skin and subcutaneous tissue was anesthetized with 1% lidocaine. A 22-gauge spinal needle was introduced from the inferior most portion of the joint into the sacroiliac joint under fluoroscopic guidance in the AP dimension with slight oblique rotation to the contralateral side.  Aspiration was negative.  After confirming the position of the needle with fluoroscopy, 1 mL of Omnipaque was injected and after seeing appropriate spread into the joint a total of 2.5 mL of Marcaine, with approximately 40 mg of DepoMedrol, was injected  very slowly.  Vital signs remained stable.  The onset of analgesia was noted.    ESTIMATED BLOOD LOSS:  minimal  SPECIMENS:  None    COMPLICATIONS:  No complications were noted. and There was no indication of vascular uptake on live injection of contrast dye.    TOLERANCE & DISCHARGE CONDITION:    The patient tolerated the procedure well.  The patient was transported to the recovery area without difficulties.  The patient was discharged to home under the care of family in stable and satisfactory condition.    PLAN OF CARE:  1. The patient was given our standard instruction sheet and will resume all medications as per the medication reconciliation sheet.  2. The patient will Return to clinic 3 wks.  3. The patient is instructed to keep a pain log hourly for 8 hours after the procedure.

## 2017-09-29 ENCOUNTER — ANTICOAGULATION VISIT (OUTPATIENT)
Dept: INTERNAL MEDICINE | Facility: CLINIC | Age: 77
End: 2017-09-29

## 2017-09-29 DIAGNOSIS — I48.20 CHRONIC ATRIAL FIBRILLATION (HCC): ICD-10-CM

## 2017-09-29 LAB — INR PPP: 1.1 (ref 0.9–1.1)

## 2017-09-29 PROCEDURE — 85610 PROTHROMBIN TIME: CPT | Performed by: INTERNAL MEDICINE

## 2017-09-29 PROCEDURE — 36416 COLLJ CAPILLARY BLOOD SPEC: CPT | Performed by: INTERNAL MEDICINE

## 2017-09-29 NOTE — PATIENT INSTRUCTIONS
Patient states she held dose for 5 days due to injections for her back. She resume her dose back on Tuesday.

## 2017-10-09 ENCOUNTER — TELEPHONE (OUTPATIENT)
Dept: CARDIOLOGY | Facility: CLINIC | Age: 77
End: 2017-10-09

## 2017-10-09 ENCOUNTER — ANTICOAGULATION VISIT (OUTPATIENT)
Dept: INTERNAL MEDICINE | Facility: CLINIC | Age: 77
End: 2017-10-09

## 2017-10-09 DIAGNOSIS — I48.20 CHRONIC ATRIAL FIBRILLATION (HCC): ICD-10-CM

## 2017-10-09 LAB — INR PPP: 1.8 (ref 0.9–1.1)

## 2017-10-09 PROCEDURE — 85610 PROTHROMBIN TIME: CPT | Performed by: INTERNAL MEDICINE

## 2017-10-09 PROCEDURE — 36416 COLLJ CAPILLARY BLOOD SPEC: CPT | Performed by: INTERNAL MEDICINE

## 2017-10-09 RX ORDER — WARFARIN SODIUM 5 MG/1
TABLET ORAL
Qty: 30 TABLET | Refills: 0 | Status: SHIPPED | OUTPATIENT
Start: 2017-10-09 | End: 2017-11-18 | Stop reason: SDUPTHER

## 2017-10-09 RX ORDER — CELECOXIB 200 MG/1
CAPSULE ORAL
Qty: 30 CAPSULE | Refills: 0 | Status: SHIPPED | OUTPATIENT
Start: 2017-10-09 | End: 2017-11-11 | Stop reason: SDUPTHER

## 2017-10-09 RX ORDER — LOSARTAN POTASSIUM AND HYDROCHLOROTHIAZIDE 12.5; 1 MG/1; MG/1
TABLET ORAL
Qty: 30 TABLET | Refills: 0 | Status: SHIPPED | OUTPATIENT
Start: 2017-10-09 | End: 2017-11-11 | Stop reason: SDUPTHER

## 2017-10-09 NOTE — TELEPHONE ENCOUNTER
10/09/17  Inessa Peoples  1940    Home Phone [phone]   Mobile [phone]     Ms. Peoples was transferred to me while trying to schedule an appt before the end of the year. She states that she has had increased episodes of irregular heart rhythm recently. She has not measured her HR/ BP. She denies chest pain, SOA, or lightheadedness with the irregular rhythm.     She is taking 80mg sotalol daily and warfarin. She denies any recent changes in stress level, diet, alcohol intake, or medications.     When should she be seen?    Carol KEITA RN

## 2017-10-12 ENCOUNTER — HOSPITAL ENCOUNTER (OUTPATIENT)
Dept: SLEEP MEDICINE | Facility: HOSPITAL | Age: 77
Discharge: HOME | End: 2017-10-12
Admitting: INTERNAL MEDICINE

## 2017-10-12 PROCEDURE — 99213 OFFICE O/P EST LOW 20 MIN: CPT | Performed by: INTERNAL MEDICINE

## 2017-10-12 PROCEDURE — G0463 HOSPITAL OUTPT CLINIC VISIT: HCPCS

## 2017-10-15 NOTE — PROGRESS NOTES
Follow Up [facility] Note       Patient Care Team:  Gisele Dockery MD as PCP - General (Internal Medicine)    Chief Complaint:  ANY     Interval History:   The patient was last seen by me in October 2016.  She is stable and unchanged.  However, she describes some increasing back issues.  She goes to bed 11:30 PM and awakens at 9 AM.  She awakens 2-3 times during the nighttime.  [place] Sleepiness Scale is normal at 0.    Review of Systems:  Recorded on the Sleep Questionnaire.  Unremarkable except for nasal congestion and postnasal drip, AHN, and HUSSEIN.    Social History:  She has 2 caffeinated beverages a day.  Social History     Social History   • Marital status:      Spouse name: EDY   • Number of children: N/A   • Years of education: N/A     Occupational History   • RETIRED      Social History Main Topics   • Smoking status: Never Smoker   • Smokeless tobacco: Never Used   • Alcohol use Yes      Comment: social drinker   • Drug use: No   • Sexual activity: Not on file     Other Topics Concern   • Not on file     Social History Narrative       Allergies:  Fenofibrate; Livalo [pitavastatin]; Plaquenil [hydroxychloroquine sulfate]; Statins; and Iodinated diagnostic agents     Medication Review:  Her list was reviewed.      Vital Signs:  Height 62.5 inches and weight 198 and she is obese with a body mass index of 35-36.    Physical Exam:    Constitutional:  Well developed white female and appears in no apparent distress.  Awake & oriented times 3.  Normal mood with normal recent and remote memory and normal judgement.  Eyes:  Conjunctivae normal.  Oropharynx:  moist mucous membranes without exudate and a large tongue and normal uvula and moderate narrowing of the posterior pharyngeal opening.      Results Review:  DME is [facility] and she uses nasal pillows.  Downloads between April 15 and October 11, 2017 compliances 99% and average usage is 8 hours and 4 minutes and average AHI is normal without  leak and she uses CPAP +10.       Impression:   Obstructive sleep apnea adequately treated with CPAP +10 with good compliance and usage and no complaints of hypersomnolence.      Plan:  Good sleep hygiene measures should be maintained.  Weight loss would be beneficial in this patient who is obese by BMI.  The patient is benefiting from the treatment being provided.     The patient will continue CPAP +10.  The sleep disorder staff did review her interface.  A new small Wisp nasal mask recommended.  A new order will be sent to her DME for all needed supplies.    The patient will call for any problems and will follow up in one year.      Jewel Lu MD  10/15/17  5:37 PM

## 2017-10-16 ENCOUNTER — OFFICE VISIT (OUTPATIENT)
Dept: PAIN MEDICINE | Facility: CLINIC | Age: 77
End: 2017-10-16

## 2017-10-16 VITALS
HEIGHT: 63 IN | TEMPERATURE: 97.7 F | WEIGHT: 201 LBS | OXYGEN SATURATION: 95 % | HEART RATE: 72 BPM | BODY MASS INDEX: 35.61 KG/M2

## 2017-10-16 DIAGNOSIS — G89.29 OTHER CHRONIC PAIN: Primary | ICD-10-CM

## 2017-10-16 DIAGNOSIS — M54.5 LOW BACK PAIN, UNSPECIFIED BACK PAIN LATERALITY, UNSPECIFIED CHRONICITY, WITH SCIATICA PRESENCE UNSPECIFIED: ICD-10-CM

## 2017-10-16 DIAGNOSIS — M51.36 BULGE OF LUMBAR DISC WITHOUT MYELOPATHY: ICD-10-CM

## 2017-10-16 DIAGNOSIS — M46.1 SACROILIAC INFLAMMATION (HCC): ICD-10-CM

## 2017-10-16 DIAGNOSIS — M53.3 SACROILIAC PAIN: ICD-10-CM

## 2017-10-16 PROCEDURE — 99212 OFFICE O/P EST SF 10 MIN: CPT | Performed by: NURSE PRACTITIONER

## 2017-10-16 NOTE — PROGRESS NOTES
CHIEF COMPLAINT  Pt continues with significant pain reduction following 9/26/17 R S. I. Joint injection.  Pt also taking a compound pain ointment per Dr Tai for R shoulder arthritis pain.      Subjective   Inessa Peoples is a 77 y.o. female  who presents to the office for follow-up of procedure.  She completed a right SI joint injection   on  9-26-17 performed by Dr. Means for management of low back pain. Patient reports significant relief from the procedure.      She completed a left SI joint injection   on  8-31-17 and 8-3-17 performed by Dr. Means for management of low back pain.    Complains of pain in her low back. Today her pain is 0-1/10VAS. Describes the pain as intermittent and improved. Her pain, which was worst in morning, has improved as well. Pain increases with housework(mopping, sweeping); pain decreases with stretching, sit, cold and medication. ADL's by self.    Back Pain   This is a chronic problem. The current episode started more than 1 year ago. The problem occurs constantly. Progression since onset: improved since last office visit. The pain is present in the lumbar spine and sacro-iliac. The quality of the pain is described as aching. The pain does not radiate (left hip, right calf and foot). The pain is at a severity of 1/10 (right sided low back\). The pain is moderate. The pain is worse during the day. The symptoms are aggravated by bending, position, sitting, standing and twisting. Associated symptoms include weakness (bilateral legs). Pertinent negatives include no bladder incontinence, bowel incontinence, chest pain, dysuria or numbness. Risk factors include obesity, menopause, history of cancer and lack of exercise (history of melanoma--no Chemo or RAD). She has tried analgesics and heat (left SI joint injection--significant ongoing relief) for the symptoms. The treatment provided moderate relief.      PEG Assessment   What number best describes your pain on average in the past  "week?2  What number best describes how, during the past week, pain has interfered with your enjoyment of life?2  What number best describes how, during the past week, pain has interfered with your general activity?  2    The following portions of the patient's history were reviewed and updated as appropriate: allergies, current medications, past family history, past medical history, past social history, past surgical history and problem list.    Review of Systems   Constitutional: Positive for fatigue. Negative for activity change and appetite change.   HENT: Negative for congestion.    Eyes: Negative for visual disturbance.   Respiratory: Positive for apnea and shortness of breath (upon exertion). Negative for cough, chest tightness and wheezing.    Cardiovascular: Negative for chest pain, palpitations (At FIB) and leg swelling.   Gastrointestinal: Negative for bowel incontinence, constipation, diarrhea and nausea.   Genitourinary: Negative for bladder incontinence, difficulty urinating and dysuria.   Musculoskeletal: Positive for arthralgias and back pain.   Neurological: Positive for weakness (bilateral legs). Negative for dizziness, light-headedness and numbness.   Psychiatric/Behavioral: Negative for sleep disturbance and suicidal ideas. The patient is nervous/anxious.        Vitals:    10/16/17 1203   Pulse: 72   Temp: 97.7 °F (36.5 °C)   SpO2: 95%   Weight: 201 lb (91.2 kg)   Height: 63\" (160 cm)   PainSc: 0-No pain  Comment: R sided LBP ranges from 0-4/10   PainLoc: Back     Objective   Physical Exam   Constitutional: She is oriented to person, place, and time. Vital signs are normal. She appears well-developed and well-nourished. She is cooperative.   HENT:   Head: Normocephalic and atraumatic.   Nose: Nose normal.   Eyes: Conjunctivae and lids are normal.   Neck: Trachea normal. Neck supple.   Cardiovascular: Normal rate, regular rhythm and normal heart sounds.    Pulmonary/Chest: Effort normal and breath " sounds normal. No respiratory distress.   Abdominal:   OBESE   Musculoskeletal:        Lumbar back: She exhibits decreased range of motion, tenderness, bony tenderness and pain.   Minimal tenderness of bilateral SI joints       Neurological: She is alert and oriented to person, place, and time. Gait normal.   Reflex Scores:       Patellar reflexes are 2+ on the right side and 2+ on the left side.  Skin: Skin is warm, dry and intact.   Psychiatric: She has a normal mood and affect. Her speech is normal and behavior is normal. Judgment and thought content normal. Cognition and memory are normal.   Nursing note and vitals reviewed.      Assessment/Plan   Inessa was seen today for back pain.    Diagnoses and all orders for this visit:    Other chronic pain    Bulge of lumbar disc without myelopathy    Sacroiliac inflammation    Sacroiliac pain    Low back pain, unspecified back pain laterality, unspecified chronicity, with sciatica presence unspecified      --- Refer to PT--- patient requests [facility] in [place]  --- Repeat injection PRN. Could consider RFA in future PRN. Did discuss with patient.  --- Follow-up 6-8 weeks or sooner if needed.       LUIS REPORT      LUIS report has been reviewed and scanned into the patient's chart.    Date of last LUIS : 10-13-17    ----------  Education about Sacroiliac joint injections:  This Sacroiliac joint injection (blockade) we have suggested is intended for diagnostic purposes, with the intent of offering the patient Radiofrequency thermal rhizotomy (RF) of the sensory branches to the joint if the block is diagnostically effective.  The diagnostic blockade is necessary to determine the likelihood that RF therapy could be efficacious in providing long term relief to the patient.    In this procedure, the sacroiliac joint is aligned with imaging, and under image guidance a needle is placed with the needle tip into the joint.  The needle position is confirmed to be  appropriately in the joint before injection of medication into the joint.  When xray fluoroscopy is used, contrast dye is used to confirm a proper arthrogram (i.e., outline of the joint).  When ultrasound is used, IV fluid (normal saline) is injected to see the flow of the fluid into the joint.  Once confirmed, then the medication can be injected into the joint.  Oftentimes this medication is a combination of local anesthetics (for diagnostic purposes) and also a steroid (to decrease irritation & inflammation in the joint, also known as sacroilitis).      Medically, a successful RF procedure should provide a patient with 50% pain relief or more for at least 6 months.  Clinical experience suggests that successful patients receive relief more in the range of 12 months on average.  We also discussed that many patients receive therapeutic success from the intraarticular joint injection, and may not require RF ablation.  If a patient receives more than 8 weeks of relief from joint injection, then occasional repeat joint blocks for therapeutic purposes is a very reasonable alternative therapy.  This course of therapy is consistent with our LCDs according to our CMS  in the area, and therefore other insurance providers should follow accordingly.  We will monitor our patients to screen for these therapeutic responders and will offer RF therapy only when necessary.      We discussed that joint injections & also RF procedures are not without risks.  Best practices regarding anticoagulant use & neuraxial procedures will be respected.  Oftentimes a patient on an anticoagulant can be offered a joint injection safely, but again this is not risk-free, and such patients give consent with regards to this increased bleeding risk, which could cause problems including but not limited to worsening of pain, nerve damage, or muscle damage.  Patients that are ill or otherwise may be at risk for sepsis will not have their  spines accessed by neuraxial injections of any type.  This patient will not be offered these therapies if there is an increased risk.   We discussed that there is a risk of postprocedural pain and also a risk of worsening of clinical picture with these procedures as with any neuraxial procedure.    ----------          EMR Dragon/Transcription disclaimer:   Much of this encounter note is an electronic transcription/translation of spoken language to printed text. The electronic translation of spoken language may permit erroneous, or at times, nonsensical words or phrases to be inadvertently transcribed; Although I have reviewed the note for such errors, some may still exist.

## 2017-10-18 ENCOUNTER — TELEPHONE (OUTPATIENT)
Dept: INTERNAL MEDICINE | Facility: CLINIC | Age: 77
End: 2017-10-18

## 2017-10-18 DIAGNOSIS — Z12.31 ENCOUNTER FOR SCREENING MAMMOGRAM FOR BREAST CANCER: Primary | ICD-10-CM

## 2017-10-18 NOTE — TELEPHONE ENCOUNTER
Patient would like a referral put in for her annual mammogram. [facility].  JITENDRA Saldaña patient order placed. tony

## 2017-10-23 ENCOUNTER — ANTICOAGULATION VISIT (OUTPATIENT)
Dept: INTERNAL MEDICINE | Facility: CLINIC | Age: 77
End: 2017-10-23

## 2017-10-23 DIAGNOSIS — I48.20 CHRONIC ATRIAL FIBRILLATION (HCC): ICD-10-CM

## 2017-10-23 LAB — INR PPP: 1.5 (ref 0.9–1.1)

## 2017-10-23 PROCEDURE — 85610 PROTHROMBIN TIME: CPT | Performed by: INTERNAL MEDICINE

## 2017-10-23 PROCEDURE — 36416 COLLJ CAPILLARY BLOOD SPEC: CPT | Performed by: INTERNAL MEDICINE

## 2017-10-23 RX ORDER — SOTALOL HYDROCHLORIDE 80 MG/1
TABLET ORAL
Qty: 30 TABLET | Refills: 0 | Status: SHIPPED | OUTPATIENT
Start: 2017-10-23 | End: 2017-11-11 | Stop reason: SDUPTHER

## 2017-10-30 ENCOUNTER — ANTICOAGULATION VISIT (OUTPATIENT)
Dept: INTERNAL MEDICINE | Facility: CLINIC | Age: 77
End: 2017-10-30

## 2017-10-30 ENCOUNTER — TELEPHONE (OUTPATIENT)
Dept: SLEEP MEDICINE | Facility: HOSPITAL | Age: 77
End: 2017-10-30

## 2017-10-30 DIAGNOSIS — I48.20 CHRONIC ATRIAL FIBRILLATION (HCC): ICD-10-CM

## 2017-10-30 LAB — INR PPP: 2.1 (ref 0.9–1.1)

## 2017-10-30 PROCEDURE — 36416 COLLJ CAPILLARY BLOOD SPEC: CPT | Performed by: INTERNAL MEDICINE

## 2017-10-30 PROCEDURE — 85610 PROTHROMBIN TIME: CPT | Performed by: INTERNAL MEDICINE

## 2017-10-30 NOTE — TELEPHONE ENCOUNTER
Pt called asking what mask we fit her with , did not think the DME gave her the same mask.. Pt said she dropped her machine and DME said she needed an order for a new machine. Will have dr Lu sign order for new machine.

## 2017-11-06 ENCOUNTER — APPOINTMENT (OUTPATIENT)
Dept: WOMENS IMAGING | Facility: HOSPITAL | Age: 77
End: 2017-11-06

## 2017-11-06 PROCEDURE — G0202 SCR MAMMO BI INCL CAD: HCPCS | Performed by: RADIOLOGY

## 2017-11-06 PROCEDURE — 77067 SCR MAMMO BI INCL CAD: CPT | Performed by: RADIOLOGY

## 2017-11-06 PROCEDURE — 77063 BREAST TOMOSYNTHESIS BI: CPT | Performed by: RADIOLOGY

## 2017-11-13 ENCOUNTER — LAB (OUTPATIENT)
Dept: INTERNAL MEDICINE | Facility: CLINIC | Age: 77
End: 2017-11-13

## 2017-11-13 ENCOUNTER — ANTICOAGULATION VISIT (OUTPATIENT)
Dept: INTERNAL MEDICINE | Facility: CLINIC | Age: 77
End: 2017-11-13

## 2017-11-13 DIAGNOSIS — E78.5 HYPERLIPIDEMIA, UNSPECIFIED HYPERLIPIDEMIA TYPE: ICD-10-CM

## 2017-11-13 DIAGNOSIS — K21.9 GASTROESOPHAGEAL REFLUX DISEASE, ESOPHAGITIS PRESENCE NOT SPECIFIED: ICD-10-CM

## 2017-11-13 DIAGNOSIS — Z00.00 HEALTHCARE MAINTENANCE: ICD-10-CM

## 2017-11-13 DIAGNOSIS — E03.9 ACQUIRED HYPOTHYROIDISM: ICD-10-CM

## 2017-11-13 DIAGNOSIS — I10 ESSENTIAL HYPERTENSION: Primary | ICD-10-CM

## 2017-11-13 DIAGNOSIS — Z86.718 HISTORY OF DVT (DEEP VEIN THROMBOSIS): ICD-10-CM

## 2017-11-13 DIAGNOSIS — M85.80 OSTEOPENIA, UNSPECIFIED LOCATION: ICD-10-CM

## 2017-11-13 DIAGNOSIS — I48.20 CHRONIC ATRIAL FIBRILLATION (HCC): ICD-10-CM

## 2017-11-13 DIAGNOSIS — R73.01 IMPAIRED FASTING GLUCOSE: ICD-10-CM

## 2017-11-13 LAB — INR PPP: 1.8 (ref 0.9–1.1)

## 2017-11-13 PROCEDURE — 36416 COLLJ CAPILLARY BLOOD SPEC: CPT | Performed by: INTERNAL MEDICINE

## 2017-11-13 PROCEDURE — 85610 PROTHROMBIN TIME: CPT | Performed by: INTERNAL MEDICINE

## 2017-11-13 RX ORDER — CELECOXIB 200 MG/1
CAPSULE ORAL
Qty: 30 CAPSULE | Refills: 0 | Status: SHIPPED | OUTPATIENT
Start: 2017-11-13 | End: 2017-12-12 | Stop reason: SDUPTHER

## 2017-11-13 RX ORDER — SOTALOL HYDROCHLORIDE 80 MG/1
TABLET ORAL
Qty: 30 TABLET | Refills: 0 | Status: SHIPPED | OUTPATIENT
Start: 2017-11-13 | End: 2017-12-23 | Stop reason: SDUPTHER

## 2017-11-13 RX ORDER — LOSARTAN POTASSIUM AND HYDROCHLOROTHIAZIDE 12.5; 1 MG/1; MG/1
TABLET ORAL
Qty: 30 TABLET | Refills: 0 | Status: SHIPPED | OUTPATIENT
Start: 2017-11-13 | End: 2017-12-12 | Stop reason: SDUPTHER

## 2017-11-14 LAB
ALBUMIN SERPL-MCNC: 4.5 G/DL (ref 3.5–5.2)
ALBUMIN/GLOB SERPL: 1.6 G/DL
ALP SERPL-CCNC: 102 U/L (ref 39–117)
ALT SERPL-CCNC: 24 U/L (ref 1–33)
APPEARANCE UR: ABNORMAL
AST SERPL-CCNC: 18 U/L (ref 1–32)
BACTERIA #/AREA URNS HPF: ABNORMAL /HPF
BASOPHILS # BLD AUTO: 0.03 10*3/MM3 (ref 0–0.2)
BASOPHILS NFR BLD AUTO: 0.5 % (ref 0–1.5)
BILIRUB SERPL-MCNC: 1.1 MG/DL (ref 0.1–1.2)
BILIRUB UR QL STRIP: NEGATIVE
BUN SERPL-MCNC: 28 MG/DL (ref 8–23)
BUN/CREAT SERPL: 27.7 (ref 7–25)
CALCIUM SERPL-MCNC: 10.3 MG/DL (ref 8.6–10.5)
CHLORIDE SERPL-SCNC: 99 MMOL/L (ref 98–107)
CHOLEST SERPL-MCNC: 254 MG/DL (ref 0–200)
CO2 SERPL-SCNC: 24.1 MMOL/L (ref 22–29)
COLOR UR: YELLOW
CREAT SERPL-MCNC: 1.01 MG/DL (ref 0.57–1)
EOSINOPHIL # BLD AUTO: 0.36 10*3/MM3 (ref 0–0.7)
EOSINOPHIL NFR BLD AUTO: 5.9 % (ref 0.3–6.2)
EPI CELLS #/AREA URNS HPF: >10 /HPF
ERYTHROCYTE [DISTWIDTH] IN BLOOD BY AUTOMATED COUNT: 13.3 % (ref 11.7–13)
GFR SERPLBLD CREATININE-BSD FMLA CKD-EPI: 53 ML/MIN/1.73
GFR SERPLBLD CREATININE-BSD FMLA CKD-EPI: 65 ML/MIN/1.73
GLOBULIN SER CALC-MCNC: 2.8 GM/DL
GLUCOSE SERPL-MCNC: 115 MG/DL (ref 65–99)
GLUCOSE UR QL: NEGATIVE
HCT VFR BLD AUTO: 42.5 % (ref 35.6–45.5)
HDLC SERPL-MCNC: 58 MG/DL (ref 40–60)
HGB BLD-MCNC: 14.6 G/DL (ref 11.9–15.5)
HGB UR QL STRIP: NEGATIVE
IMM GRANULOCYTES # BLD: 0.02 10*3/MM3 (ref 0–0.03)
IMM GRANULOCYTES NFR BLD: 0.3 % (ref 0–0.5)
KETONES UR QL STRIP: NEGATIVE
LDLC SERPL CALC-MCNC: 153 MG/DL (ref 0–100)
LEUKOCYTE ESTERASE UR QL STRIP: NEGATIVE
LYMPHOCYTES # BLD AUTO: 2.27 10*3/MM3 (ref 0.9–4.8)
LYMPHOCYTES NFR BLD AUTO: 37.5 % (ref 19.6–45.3)
MCH RBC QN AUTO: 32.5 PG (ref 26.9–32)
MCHC RBC AUTO-ENTMCNC: 34.4 G/DL (ref 32.4–36.3)
MCV RBC AUTO: 94.7 FL (ref 80.5–98.2)
MICRO URNS: ABNORMAL
MICRO URNS: ABNORMAL
MONOCYTES # BLD AUTO: 0.67 10*3/MM3 (ref 0.2–1.2)
MONOCYTES NFR BLD AUTO: 11.1 % (ref 5–12)
MUCOUS THREADS URNS QL MICRO: PRESENT /HPF
NEUTROPHILS # BLD AUTO: 2.71 10*3/MM3 (ref 1.9–8.1)
NEUTROPHILS NFR BLD AUTO: 44.7 % (ref 42.7–76)
NITRITE UR QL STRIP: NEGATIVE
PH UR STRIP: 5 [PH] (ref 5–7.5)
PLATELET # BLD AUTO: 268 10*3/MM3 (ref 140–500)
POTASSIUM SERPL-SCNC: 4.1 MMOL/L (ref 3.5–5.2)
PROT SERPL-MCNC: 7.3 G/DL (ref 6–8.5)
PROT UR QL STRIP: NEGATIVE
RBC # BLD AUTO: 4.49 10*6/MM3 (ref 3.9–5.2)
RBC #/AREA URNS HPF: ABNORMAL /HPF
SODIUM SERPL-SCNC: 139 MMOL/L (ref 136–145)
SP GR UR: 1.02 (ref 1–1.03)
TRIGL SERPL-MCNC: 217 MG/DL (ref 0–150)
TSH SERPL DL<=0.005 MIU/L-ACNC: 2.41 MIU/ML (ref 0.27–4.2)
URINALYSIS REFLEX: ABNORMAL
UROBILINOGEN UR STRIP-MCNC: 0.2 MG/DL (ref 0.2–1)
VLDLC SERPL CALC-MCNC: 43.4 MG/DL (ref 5–40)
WBC # BLD AUTO: 6.06 10*3/MM3 (ref 4.5–10.7)
WBC #/AREA URNS HPF: ABNORMAL /HPF
YEAST #/AREA URNS HPF: PRESENT /HPF

## 2017-11-20 ENCOUNTER — OFFICE VISIT (OUTPATIENT)
Dept: INTERNAL MEDICINE | Facility: CLINIC | Age: 77
End: 2017-11-20

## 2017-11-20 VITALS
HEIGHT: 63 IN | BODY MASS INDEX: 35.79 KG/M2 | DIASTOLIC BLOOD PRESSURE: 70 MMHG | WEIGHT: 202 LBS | HEART RATE: 67 BPM | OXYGEN SATURATION: 98 % | SYSTOLIC BLOOD PRESSURE: 110 MMHG

## 2017-11-20 DIAGNOSIS — Z12.4 ENCOUNTER FOR SCREENING FOR CERVICAL CANCER: ICD-10-CM

## 2017-11-20 DIAGNOSIS — Z00.00 WELL ADULT EXAM: Primary | ICD-10-CM

## 2017-11-20 PROCEDURE — 99397 PER PM REEVAL EST PAT 65+ YR: CPT | Performed by: INTERNAL MEDICINE

## 2017-11-20 RX ORDER — IPRATROPIUM BROMIDE 42 UG/1
SPRAY, METERED NASAL
COMMUNITY
Start: 2017-11-18 | End: 2017-11-20

## 2017-11-20 RX ORDER — WARFARIN SODIUM 5 MG/1
TABLET ORAL
Qty: 30 TABLET | Refills: 0 | Status: SHIPPED | OUTPATIENT
Start: 2017-11-20 | End: 2017-12-31 | Stop reason: SDUPTHER

## 2017-11-20 NOTE — PROGRESS NOTES
Subjective     Inessa Peoples is a 77 y.o. female who presents for a complete physical exam.      History of Present Illness     HTN. Control is good.   HLD. She has tried four statin drugs and fenofibrate.  She has not been able to tolerate them.  She is able to tolerate Zetia only.  She is interested in seeing a dietician.    Hypothyroidism. Thyroid is controlled.   Chronic LBP.  She is maintained on tramadol qhs and Celebrex during the day.    Due for protime.    Review of Systems   Constitutional: Negative.    HENT: Negative.    Eyes: Negative.    Respiratory: Positive for shortness of breath.    Cardiovascular: Negative.    Gastrointestinal: Negative.    Endocrine: Negative.    Genitourinary: Negative.    Musculoskeletal: Positive for back pain.   Skin: Negative.    Allergic/Immunologic: Negative.    Neurological: Negative.    Hematological: Negative.    Psychiatric/Behavioral: Negative.        The following portions of the patient's history were reviewed and updated as appropriate: allergies, current medications, past family history, past medical history, past social history, past surgical history and problem list.  Health maintenance tab was reviewed and updated with the patient.       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   • Chronic anticoagulation 05/15/2017   • Diverticulitis of large intestine without perforation or abscess without bleeding 01/03/2017   • ANY on CPAP + 10 - Dr Lu 10/15/2016   • History of melanoma excision 08/05/2016   • GERD (gastroesophageal reflux disease) 08/05/2016   • History of kidney stones 08/05/2016   • Sarcoidosis 04/18/2016   • Diaphragmatic paralysis 04/18/2016   • Osteopenia 04/18/2016   • Neck pain 04/18/2016   • Low back pain 04/18/2016   • Insomnia  04/18/2016   • Impaired fasting glucose 04/18/2016   • Hypothyroidism 04/18/2016   • Hypertension 04/18/2016   • Hyperlipidemia 04/18/2016     Note Last Updated: 8/5/2016     Intolerant to trials of multiple statins over the years.       • Dyspnea on exertion 04/18/2016   • Chronic osteoarthritis 04/18/2016   • History of DVT (deep vein thrombosis) 04/18/2016     Note Last Updated: 4/18/2016     Description: developed unprovoked on Xarelto.     • Atrial fibrillation 04/18/2016       Past Medical History:   Diagnosis Date   • Abnormal ECG    • Allergic rhinitis    • Atrial fibrillation    • Bleeds easily     warfarin   • Breast screening    • Broken bones    • Calf DVT (deep venous thrombosis)    • Chronic anticoagulation    • Colon polyp    • CTS (carpal tunnel syndrome)    • Deep vein thrombosis     left calf   • Diverticulitis of colon    • AHN (dyspnea on exertion)    • Fatigue    • H/O malignant melanoma of skin    • Hematuria, gross    • Hyperlipidemia    • Hypertension    • Hypothyroidism    • IFG (impaired fasting glucose)    • Insomnia    • LBBB (left bundle branch block)    • Left leg swelling    • LLQ abdominal pain    • Long term current use of systemic steroids    • Low back pain    • Malignant melanoma of skin    • Neck pain    • Obesity    • Osteoarthritis    • Osteopenia    • Paralysis, diaphragm    • 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 for tuberculosis    • Rash    • RBBB (right bundle branch block)    • Renal calculi    • Sarcoid    • Sarcoidosis    • Sigmoid diverticulosis 06/26/2013   • Sleep apnea     on CPAP   • UTI (urinary tract infection)    • Visit for screening mammogram        Past Surgical History:   Procedure Laterality Date   • CARPAL TUNNEL RELEASE Bilateral 1980'S    [facility]   • CATARACT EXTRACTION Bilateral    • COLONOSCOPY N/A 06/26/2013    MILD SIGMOID DIVERTICULOSIS, DR. TANISHA THORNTON   • COLONOSCOPY N/A 10/19/2010    SIGMOID DIVERTICULOSIS & INTERNAL HEMORRHOIDS, HEMORRHOIDAL  BANDING X4, DR. TANISHA THORNTON   • COLONOSCOPY W/ BIOPSIES N/A 05/12/2008    RECTUM BIOPSY: COLONIC MUCOSA W/ FOCAL INCREASE OF INFLAMMATORY CELLS IN THE MUSCULARIS MUCOSA INCLUDING EOSINOPHILS, SIGMOID DIVERTICULOSIS, POSSIBLE PROCTITIS, DR. TANISHA THORNTON   • CYSTOSCOPY W/ LASER LITHOTRIPSY Right 10/10/2014    CYSTOSCOPY W/ BILATERAL RETROGRAD PYELOGRAMS, RIGHT URETERAL PYELOSCOPY & LASER LITOTRIPSY, RIGHT DOUBLE-J STENT PLACEMENT, DR. CLAIRE ROCK   • EXTRACORPOREAL SHOCK WAVE LITHOTRIPSY (ESWL) Right 09/10/2009    DR. CLAIRE ROCK   • LAPAROSCOPIC CHOLECYSTECTOMY N/A 07/19/2012    DR. TANISHA THORNTON   • LUMBAR EPIDURAL INJECTION N/A 04/23/2015    LUMBAR EPIDURAL STEROID INJECTION X3   • PARATHYROIDECTOMY     • TOTAL KNEE ARTHROPLASTY Right 03/25/2014    DR. MARK MARES       Family History   Problem Relation Age of Onset   • Heart disease Mother    • Diabetes type II Mother    • Heart disease Father    • Cancer Sister    • Heart disease Brother      CABG   • Cancer Brother    • Diabetes type II Brother    • Heart disease Sister    • Aneurysm Sister      abdominal aorta   • Colon cancer Sister    • Diabetes type II Sister    • Lung cancer Sister    • Colon polyps Sister    • COPD Sister        Social History     Social History   • Marital status:      Spouse name: EDY   • Number of children: N/A   • Years of education: N/A     Occupational History   • RETIRED      Social History Main Topics   • Smoking status: Never Smoker   • Smokeless tobacco: Never Used   • Alcohol use Yes      Comment: social drinker   • Drug use: No   • Sexual activity: Not on file     Other Topics Concern   • Not on file     Social History Narrative       Current Outpatient Prescriptions on File Prior to Visit   Medication Sig Dispense Refill   • acetaminophen (ACETAMINOPHEN EXTRA STRENGTH) 500 MG tablet Take 500 mg by mouth. Up to two in the morning and 2 at night when needed     • acyclovir (ZOVIRAX) 5 % ointment     "  • albuterol (PROVENTIL HFA;VENTOLIN HFA) 108 (90 BASE) MCG/ACT inhaler Inhale 2 puffs Every 4 (Four) Hours As Needed for Wheezing. 1 inhaler 0   • celecoxib (CeleBREX) 200 MG capsule TAKE ONE CAPSULE BY MOUTH DAILY 30 capsule 0   • ezetimibe (ZETIA) 10 MG tablet Take 1 tablet by mouth daily. 90 tablet 3   • levothyroxine (SYNTHROID, LEVOTHROID) 25 MCG tablet TAKE ONE TABLET BY MOUTH DAILY AS DIRECTED 30 tablet 4   • losartan-hydrochlorothiazide (HYZAAR) 100-12.5 MG per tablet TAKE ONE TABLET BY MOUTH DAILY 30 tablet 0   • Multiple Vitamin (MULTI-DAY VITAMINS) tablet Take 1 tablet by mouth daily.     • omeprazole (PriLOSEC) 40 MG capsule Take 40 mg by mouth Daily.     • sotalol (BETAPACE) 80 MG tablet TAKE ONE TABLET BY MOUTH DAILY 30 tablet 0   • traMADol (ULTRAM) 50 MG tablet Take 1 tablet by mouth 3 (Three) Times a Day. 90 tablet 1   • warfarin (COUMADIN) 5 MG tablet TAKE ONE TABLET BY MOUTH DAILY 30 tablet 0   • Calcium-Phosphorus-Vitamin D (CITRACAL +D3 PO) Take 1 tablet by mouth Daily.       No current facility-administered medications on file prior to visit.        Allergies   Allergen Reactions   • Fenofibrate      myalgias   • Livalo [Pitavastatin]      Muscle tension and joint pain   • Plaquenil [Hydroxychloroquine Sulfate]      Muscles tense up and joint pain   • Statins      Joint pain   • Iodinated Diagnostic Agents Rash     Chest tightness  Facial swelling       Immunization History   Administered Date(s) Administered   • Flu Vaccine High Dose PF 65YR+ 09/15/2017   • Influenza, Quadrivalent 10/30/2015   • Pneumococcal Conjugate 07/23/2015   • Pneumococcal Polysaccharide 08/05/2016   • Tdap 09/15/2006, 08/05/2016   • Zoster 09/15/2007       Objective     /70  Pulse 67  Ht 63\" (160 cm)  Wt 202 lb (91.6 kg)  SpO2 98%  BMI 35.78 kg/m2    Physical Exam   Constitutional: She is oriented to person, place, and time. She appears well-developed and well-nourished.   HENT:   Head: Normocephalic and " atraumatic.   Right Ear: Hearing, tympanic membrane and external ear normal.   Left Ear: Hearing, tympanic membrane and external ear normal.   Nose: Nose normal.   Mouth/Throat: Oropharynx is clear and moist.   Neck: Neck supple. No thyromegaly present.   Cardiovascular: Normal rate, regular rhythm and normal heart sounds.    No murmur heard.  Pulmonary/Chest: Effort normal and breath sounds normal. Right breast exhibits no mass. Left breast exhibits no mass.   Abdominal: Soft. She exhibits no distension. There is no hepatosplenomegaly. There is no tenderness.   Genitourinary: Vagina normal and uterus normal. No breast tenderness. There is no lesion on the right labia. There is no lesion on the left labia. Cervix exhibits no motion tenderness, no discharge and no friability. Right adnexum displays no mass and no tenderness. Left adnexum displays no mass and no tenderness.   Lymphadenopathy:     She has no cervical adenopathy.   Neurological: She is alert and oriented to person, place, and time.   Skin: Skin is warm and dry.   Psychiatric: She has a normal mood and affect. Her speech is normal and behavior is normal. Judgment and thought content normal. Cognition and memory are normal.       Assessment/Plan   Inessa was seen today for annual exam.    Diagnoses and all orders for this visit:    Well adult exam    Encounter for screening for cervical cancer   -     Pap IG, Rfx HPV ASCU - ThinPrep Vial, Cervix        Discussion    Patient presents today for a CPE.      Jorge has a poor diet.   Patient follows an inadequate exercise regimen.   Mammogram is up to date.   Colon cancer screening is up to date.   Pap smear obtained today. Immunizations are up to date.   DEXA is up to date.  I recommend a diet high in fruits, vegetables, whole grains, lean meats, nuts and beans.  I recommend limiting red meat, full fat dairy, eggs and processed white carbohydrates.  I recommend aerobic exercise at least 3 days per week. I  also recommend to watch salt intake.    HTN. Continue current regimen.  HLD.  Continue Zetia.    Hypothyroidism. Continue levothyroxine.   Chronic LBP.  Contract updated.  Continue current regimen.        Health Maintenance   Topic Date Due   • MEDICARE ANNUAL WELLNESS  01/27/2016   • MAMMOGRAM  11/16/2017   • COLONOSCOPY  06/26/2018   • LIPID PANEL  11/13/2018   • DXA SCAN  01/30/2019   • TDAP/TD VACCINES (3 - Td) 08/05/2026   • INFLUENZA VACCINE  Completed   • PNEUMOCOCCAL VACCINES (65+ LOW/MEDIUM RISK)  Completed   • ZOSTER VACCINE  Completed            Future Appointments  Date Time Provider Department Center   12/11/2017 3:20 PM AGA Ross [facility] PM EASPT None   12/13/2017 11:40 AM Demar Stone MD [facility] CD [facility] None   10/10/2018 10:45 AM Jewel Lu MD [facility] None

## 2017-11-22 LAB
CONV .: NORMAL
CYTOLOGIST CVX/VAG CYTO: NORMAL
CYTOLOGY CVX/VAG DOC THIN PREP: NORMAL
DX ICD CODE: NORMAL
HIV 1 & 2 AB SER-IMP: NORMAL
OTHER STN SPEC: NORMAL
PATH REPORT.FINAL DX SPEC: NORMAL
STAT OF ADQ CVX/VAG CYTO-IMP: NORMAL

## 2017-12-06 ENCOUNTER — ANTICOAGULATION VISIT (OUTPATIENT)
Dept: INTERNAL MEDICINE | Facility: CLINIC | Age: 77
End: 2017-12-06

## 2017-12-06 DIAGNOSIS — Z79.01 CHRONIC ANTICOAGULATION: Primary | ICD-10-CM

## 2017-12-06 LAB
INR PPP: 2 (ref 0.9–1.1)
INR PPP: 2 (ref 0.9–1.1)

## 2017-12-06 PROCEDURE — 85610 PROTHROMBIN TIME: CPT | Performed by: INTERNAL MEDICINE

## 2017-12-06 PROCEDURE — 36416 COLLJ CAPILLARY BLOOD SPEC: CPT | Performed by: INTERNAL MEDICINE

## 2017-12-07 ENCOUNTER — ANTICOAGULATION VISIT (OUTPATIENT)
Dept: INTERNAL MEDICINE | Facility: CLINIC | Age: 77
End: 2017-12-07

## 2017-12-07 DIAGNOSIS — I48.91 ATRIAL FIBRILLATION, UNSPECIFIED TYPE (HCC): ICD-10-CM

## 2017-12-11 ENCOUNTER — OFFICE VISIT (OUTPATIENT)
Dept: PAIN MEDICINE | Facility: CLINIC | Age: 77
End: 2017-12-11

## 2017-12-11 VITALS
WEIGHT: 200.4 LBS | HEIGHT: 63 IN | RESPIRATION RATE: 18 BRPM | BODY MASS INDEX: 35.51 KG/M2 | OXYGEN SATURATION: 97 % | HEART RATE: 70 BPM | SYSTOLIC BLOOD PRESSURE: 144 MMHG | DIASTOLIC BLOOD PRESSURE: 69 MMHG | TEMPERATURE: 98 F

## 2017-12-11 DIAGNOSIS — M51.36 BULGE OF LUMBAR DISC WITHOUT MYELOPATHY: ICD-10-CM

## 2017-12-11 DIAGNOSIS — M46.1 SACROILIAC INFLAMMATION (HCC): ICD-10-CM

## 2017-12-11 DIAGNOSIS — M53.3 SACROILIAC PAIN: ICD-10-CM

## 2017-12-11 DIAGNOSIS — G89.29 OTHER CHRONIC PAIN: Primary | ICD-10-CM

## 2017-12-11 PROCEDURE — 99212 OFFICE O/P EST SF 10 MIN: CPT | Performed by: NURSE PRACTITIONER

## 2017-12-11 NOTE — PROGRESS NOTES
"CHIEF COMPLAINT  Follow-up for back pain. Ms. Peoples states that her back pain has gotten better since her last appt. She states that she has completed PT since her last appt.    Subjective   Inessa Peoples is a 77 y.o. female  who presents to the office for follow-up.She has a history of chronic back pain, which is improved since last office visit. Has been going to PT but ran out of insurance for this. Wants to return and her PT is trying to appeal to insurance.     She reports no pain in her back today. The pain can increase with being on feet and household chores.  She continues sparing use of Tramadol as prescribed by Dr. Dcokery. ADL\"s by self.     Back Pain   This is a chronic problem. The current episode started more than 1 year ago. The problem occurs intermittently. Progression since onset: improved since last office visit. The pain is present in the lumbar spine and sacro-iliac. The quality of the pain is described as aching. The pain does not radiate (left hip, right calf and foot). The pain is at a severity of 0/10 (right sided low back\). The patient is experiencing no pain. The pain is worse during the day. The symptoms are aggravated by bending, position, sitting, standing and twisting (housework). Pertinent negatives include no bladder incontinence, bowel incontinence, chest pain, dysuria, numbness or weakness. Risk factors include obesity, menopause, history of cancer and lack of exercise (history of melanoma--no Chemo or RAD). She has tried analgesics and heat (left SI joint injection--significant ongoing relief) for the symptoms. The treatment provided moderate relief.      PEG Assessment   What number best describes your pain on average in the past week?0  What number best describes how, during the past week, pain has interfered with your enjoyment of life?0  What number best describes how, during the past week, pain has interfered with your general activity?  0    The following portions of " "the patient's history were reviewed and updated as appropriate: allergies, current medications, past family history, past medical history, past social history, past surgical history and problem list.    Review of Systems   Constitutional: Negative for fatigue.   HENT: Negative for congestion.    Eyes: Negative for visual disturbance.   Respiratory: Negative for cough, shortness of breath and wheezing.    Cardiovascular: Negative.  Negative for chest pain.   Gastrointestinal: Negative for bowel incontinence, constipation and diarrhea.   Genitourinary: Negative for bladder incontinence, difficulty urinating and dysuria.   Musculoskeletal: Negative for back pain.   Neurological: Negative for weakness and numbness.   Psychiatric/Behavioral: Negative for sleep disturbance and suicidal ideas. The patient is not nervous/anxious.        Vitals:    12/11/17 1522   BP: 144/69   Pulse: 70   Resp: 18   Temp: 98 °F (36.7 °C)   SpO2: 97%   Weight: 90.9 kg (200 lb 6.4 oz)   Height: 160 cm (63\")   PainSc: 0-No pain     Objective   Physical Exam   Constitutional: She is oriented to person, place, and time. Vital signs are normal. She appears well-developed and well-nourished. She is cooperative.   HENT:   Head: Normocephalic and atraumatic.   Nose: Nose normal.   Eyes: Conjunctivae and lids are normal.   Neck: Trachea normal. Neck supple.   Cardiovascular: Normal rate.    Pulmonary/Chest: Effort normal. No respiratory distress.   Abdominal:   OBESE   Musculoskeletal:        Lumbar back: She exhibits no tenderness, no bony tenderness and no pain.   Minimal tenderness of bilateral SI joints        Neurological: She is alert and oriented to person, place, and time. Gait normal.   Reflex Scores:       Patellar reflexes are 2+ on the right side and 2+ on the left side.  Skin: Skin is warm, dry and intact.   Psychiatric: She has a normal mood and affect. Her speech is normal and behavior is normal. Judgment and thought content normal. " Cognition and memory are normal.   Nursing note and vitals reviewed.      Assessment/Plan   Inessa was seen today for back pain.    Diagnoses and all orders for this visit:    Other chronic pain    Bulge of lumbar disc without myelopathy    Sacroiliac pain    Sacroiliac inflammation      --- can repeat SI joint injections in future PRN.  --- Suggest continuing with PT as she has had improvement with this.  --- Follow-up 6-12 months or sooner if needed.         LUIS REPORT  LUIS report has been reviewed and scanned into the patient's chart.    Date of last LUIS : 12-7-17        EMR Dragon/Transcription disclaimer:   Much of this encounter note is an electronic transcription/translation of spoken language to printed text. The electronic translation of spoken language may permit erroneous, or at times, nonsensical words or phrases to be inadvertently transcribed; Although I have reviewed the note for such errors, some may still exist.

## 2017-12-12 RX ORDER — LEVOTHYROXINE SODIUM 0.03 MG/1
TABLET ORAL
Qty: 30 TABLET | Refills: 3 | Status: SHIPPED | OUTPATIENT
Start: 2017-12-12 | End: 2018-04-22 | Stop reason: SDUPTHER

## 2017-12-12 RX ORDER — LOSARTAN POTASSIUM AND HYDROCHLOROTHIAZIDE 12.5; 1 MG/1; MG/1
TABLET ORAL
Qty: 30 TABLET | Refills: 0 | Status: SHIPPED | OUTPATIENT
Start: 2017-12-12 | End: 2018-01-14 | Stop reason: SDUPTHER

## 2017-12-12 RX ORDER — CELECOXIB 200 MG/1
CAPSULE ORAL
Qty: 30 CAPSULE | Refills: 0 | Status: SHIPPED | OUTPATIENT
Start: 2017-12-12 | End: 2018-01-06 | Stop reason: SDUPTHER

## 2017-12-13 ENCOUNTER — OFFICE VISIT (OUTPATIENT)
Dept: CARDIOLOGY | Facility: CLINIC | Age: 77
End: 2017-12-13

## 2017-12-13 VITALS
SYSTOLIC BLOOD PRESSURE: 162 MMHG | DIASTOLIC BLOOD PRESSURE: 108 MMHG | HEART RATE: 80 BPM | WEIGHT: 200 LBS | HEIGHT: 63 IN | BODY MASS INDEX: 35.44 KG/M2

## 2017-12-13 DIAGNOSIS — Z79.01 CHRONIC ANTICOAGULATION: ICD-10-CM

## 2017-12-13 DIAGNOSIS — R06.09 DYSPNEA ON EXERTION: ICD-10-CM

## 2017-12-13 DIAGNOSIS — I10 ESSENTIAL HYPERTENSION: ICD-10-CM

## 2017-12-13 DIAGNOSIS — I48.0 PAROXYSMAL ATRIAL FIBRILLATION (HCC): Primary | ICD-10-CM

## 2017-12-13 PROCEDURE — 93000 ELECTROCARDIOGRAM COMPLETE: CPT | Performed by: INTERNAL MEDICINE

## 2017-12-13 PROCEDURE — 99213 OFFICE O/P EST LOW 20 MIN: CPT | Performed by: INTERNAL MEDICINE

## 2017-12-13 RX ORDER — SPIRONOLACTONE 25 MG/1
25 TABLET ORAL DAILY
Qty: 30 TABLET | Refills: 3 | Status: SHIPPED | OUTPATIENT
Start: 2017-12-13 | End: 2018-04-07 | Stop reason: SDUPTHER

## 2017-12-13 NOTE — PROGRESS NOTES
Date of Office Visit: 2017  Encounter Provider: Demar Stone MD  Place of Service: [facility] CARDIOLOGY  Patient Name: Inessa Peoples  : 1940    Subjective:     Encounter Date:2017      Patient ID: Inessa Peoples is a 77 y.o. female who has a cc of PAF and a one year cc of AHN. And decreased exercise tolerance.     Palp are rare and short-lived and at night.     No anginal chest pain,     No soa at rest    No fainting,  No orthostasis.   No edema.   Exercise tolerance: parking lot to here she gets dyspnea.     There have been no hospital admission since the last visit.     There have been no bleeding events.       Past Medical History:   Diagnosis Date   • Abnormal ECG    • Allergic rhinitis    • Atrial fibrillation    • Bleeds easily     warfarin   • Breast screening    • Broken bones    • Calf DVT (deep venous thrombosis)    • Chronic anticoagulation    • Colon polyp    • CTS (carpal tunnel syndrome)    • Deep vein thrombosis     left calf   • Diverticulitis of colon    • AHN (dyspnea on exertion)    • Fatigue    • H/O malignant melanoma of skin    • Hematuria, gross    • Hyperlipidemia    • Hypertension    • Hypothyroidism    • IFG (impaired fasting glucose)    • Insomnia    • LBBB (left bundle branch block)    • Left leg swelling    • LLQ abdominal pain    • Long term current use of systemic steroids    • Low back pain    • Malignant melanoma of skin    • Neck pain    • Obesity    • Osteoarthritis    • Osteopenia    • Paralysis, diaphragm    • 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 for tuberculosis    • Rash    • RBBB (right bundle branch block)    • Renal calculi    • Sarcoid    • Sarcoidosis    • Sigmoid diverticulosis 2013   • Sleep apnea     on CPAP   • UTI (urinary tract infection)    • Visit for screening mammogram        Social History     Social History   • Marital status:      Spouse name: EDY   • Number of children: N/A   • Years of education: N/A  "    Occupational History   • RETIRED      Social History Main Topics   • Smoking status: Never Smoker   • Smokeless tobacco: Never Used   • Alcohol use Yes      Comment: social drinker   • Drug use: No   • Sexual activity: Not on file     Other Topics Concern   • Not on file     Social History Narrative       Review of Systems   Constitution: Negative for fever and night sweats.   HENT: Negative for ear pain and stridor.    Eyes: Negative for discharge and visual halos.   Cardiovascular: Negative for cyanosis.   Respiratory: Positive for shortness of breath. Negative for hemoptysis and sputum production.    Hematologic/Lymphatic: Negative for adenopathy.   Skin: Negative for nail changes and unusual hair distribution.   Musculoskeletal: Positive for arthritis and joint pain. Negative for gout and joint swelling.   Gastrointestinal: Negative for bowel incontinence and flatus.   Genitourinary: Negative for dysuria and flank pain.   Neurological: Negative for seizures and tremors.   Psychiatric/Behavioral: Negative for altered mental status. The patient is not nervous/anxious.             Objective:     Vitals:    12/13/17 1143   BP: (!) 162/108   Pulse: 80   Weight: 90.7 kg (200 lb)   Height: 158.8 cm (62.5\")         Physical Exam   Constitutional: She is oriented to person, place, and time.   HENT:   Head: Normocephalic and atraumatic.   Eyes: Right eye exhibits no discharge. Left eye exhibits no discharge.   Neck: No JVD present. No thyromegaly present.   Cardiovascular: Normal rate and regular rhythm.  Exam reveals no gallop and no friction rub.    No murmur heard.  Pulmonary/Chest: Effort normal and breath sounds normal. She has no rales.   Abdominal: Soft. Bowel sounds are normal. There is no tenderness.   Musculoskeletal: Normal range of motion. She exhibits no edema or deformity.   Neurological: She is alert and oriented to person, place, and time. She exhibits normal muscle tone.   Skin: Skin is warm and dry. " No erythema.   Psychiatric: She has a normal mood and affect. Her behavior is normal. Thought content normal.         ECG 12 Lead  Date/Time: 12/13/2017 12:26 PM  Performed by: SERA RM  Authorized by: SERA RM   Comparison: compared with previous ECG   Similar to previous ECG  Rhythm: sinus rhythm  Conduction: left bundle branch block  Clinical impression: abnormal ECG            Lab Review:       Assessment:          Diagnosis Plan   1. Paroxysmal atrial fibrillation     2. Essential hypertension     3. Chronic anticoagulation     4. Dyspnea on exertion            Plan:       AHN most likely multifactorial -- had a hypertensive response to exercise on stress    Prev ok nuclear    Great LV function     No AF     Not likely due to to BB as she has been on this before    BP up today too     I rec exercise bid; aldactone and labs   And fu

## 2017-12-20 ENCOUNTER — LAB (OUTPATIENT)
Dept: LAB | Facility: HOSPITAL | Age: 77
End: 2017-12-20

## 2017-12-20 DIAGNOSIS — I10 ESSENTIAL HYPERTENSION: ICD-10-CM

## 2017-12-20 DIAGNOSIS — I48.0 PAROXYSMAL ATRIAL FIBRILLATION (HCC): ICD-10-CM

## 2017-12-20 LAB
ANION GAP SERPL CALCULATED.3IONS-SCNC: 15 MMOL/L
BUN BLD-MCNC: 34 MG/DL (ref 8–23)
BUN/CREAT SERPL: 32.1 (ref 7–25)
CALCIUM SPEC-SCNC: 10 MG/DL (ref 8.6–10.5)
CHLORIDE SERPL-SCNC: 102 MMOL/L (ref 98–107)
CO2 SERPL-SCNC: 24 MMOL/L (ref 22–29)
CREAT BLD-MCNC: 1.06 MG/DL (ref 0.57–1)
GFR SERPL CREATININE-BSD FRML MDRD: 51 ML/MIN/1.73
GLUCOSE BLD-MCNC: 129 MG/DL (ref 65–99)
POTASSIUM BLD-SCNC: 3.5 MMOL/L (ref 3.5–5.2)
SODIUM BLD-SCNC: 141 MMOL/L (ref 136–145)

## 2017-12-20 PROCEDURE — 80048 BASIC METABOLIC PNL TOTAL CA: CPT

## 2017-12-20 PROCEDURE — 36415 COLL VENOUS BLD VENIPUNCTURE: CPT

## 2017-12-22 ENCOUNTER — TELEPHONE (OUTPATIENT)
Dept: CARDIOLOGY | Facility: CLINIC | Age: 77
End: 2017-12-22

## 2017-12-26 RX ORDER — SOTALOL HYDROCHLORIDE 80 MG/1
TABLET ORAL
Qty: 30 TABLET | Refills: 2 | Status: SHIPPED | OUTPATIENT
Start: 2017-12-26 | End: 2018-03-26 | Stop reason: SDUPTHER

## 2018-01-02 RX ORDER — WARFARIN SODIUM 5 MG/1
TABLET ORAL
Qty: 30 TABLET | Refills: 0 | Status: SHIPPED | OUTPATIENT
Start: 2018-01-02 | End: 2018-02-10 | Stop reason: SDUPTHER

## 2018-01-03 ENCOUNTER — ANTICOAGULATION VISIT (OUTPATIENT)
Dept: INTERNAL MEDICINE | Facility: CLINIC | Age: 78
End: 2018-01-03

## 2018-01-03 DIAGNOSIS — I48.0 PAROXYSMAL ATRIAL FIBRILLATION (HCC): ICD-10-CM

## 2018-01-03 LAB — INR PPP: 3.4 (ref 0.9–1.1)

## 2018-01-03 PROCEDURE — 36416 COLLJ CAPILLARY BLOOD SPEC: CPT | Performed by: INTERNAL MEDICINE

## 2018-01-03 PROCEDURE — 85610 PROTHROMBIN TIME: CPT | Performed by: INTERNAL MEDICINE

## 2018-01-08 RX ORDER — CELECOXIB 200 MG/1
CAPSULE ORAL
Qty: 30 CAPSULE | Refills: 0 | Status: SHIPPED | OUTPATIENT
Start: 2018-01-08 | End: 2018-02-10 | Stop reason: SDUPTHER

## 2018-01-15 ENCOUNTER — ANTICOAGULATION VISIT (OUTPATIENT)
Dept: INTERNAL MEDICINE | Facility: CLINIC | Age: 78
End: 2018-01-15

## 2018-01-15 DIAGNOSIS — I48.0 PAROXYSMAL ATRIAL FIBRILLATION (HCC): ICD-10-CM

## 2018-01-15 LAB — INR PPP: 1.9 (ref 0.9–1.1)

## 2018-01-15 PROCEDURE — 36416 COLLJ CAPILLARY BLOOD SPEC: CPT | Performed by: INTERNAL MEDICINE

## 2018-01-15 PROCEDURE — 85610 PROTHROMBIN TIME: CPT | Performed by: INTERNAL MEDICINE

## 2018-01-15 RX ORDER — LOSARTAN POTASSIUM AND HYDROCHLOROTHIAZIDE 12.5; 1 MG/1; MG/1
TABLET ORAL
Qty: 30 TABLET | Refills: 0 | Status: SHIPPED | OUTPATIENT
Start: 2018-01-15 | End: 2018-02-10 | Stop reason: SDUPTHER

## 2018-01-24 NOTE — TELEPHONE ENCOUNTER
Echocardiogram stable. bnp normal. Pt to f/u with her pulmonologist.    Attending Nte:  22 mos old male brought in by parents for cough x last night. No fevers. Parenst state cough was dry and harsh. Dad was sick last week with cough. Has been eating and drinking well, also has stuffy nose. No meds given. NKDA. No daily meds. Vaccines UTD. No medical history. No surgeries. Here VSS> heis very well appearing. Ears-TM intact, Throat-no erythema, Heart-S1S2nl, Lungs CTA bl, Abd soft. WHile cryign, mild croupy stridor noted. WIll give dexamethasone and dc home. To also send RVP. If symptoms persists or worsen, to return to ER>  Shanelle Lopez MD

## 2018-01-30 ENCOUNTER — ANTICOAGULATION VISIT (OUTPATIENT)
Dept: INTERNAL MEDICINE | Facility: CLINIC | Age: 78
End: 2018-01-30

## 2018-01-30 DIAGNOSIS — I48.0 PAROXYSMAL ATRIAL FIBRILLATION (HCC): ICD-10-CM

## 2018-01-30 LAB — INR PPP: 1.6 (ref 0.9–1.1)

## 2018-01-30 PROCEDURE — 36416 COLLJ CAPILLARY BLOOD SPEC: CPT | Performed by: INTERNAL MEDICINE

## 2018-01-30 PROCEDURE — 85610 PROTHROMBIN TIME: CPT | Performed by: INTERNAL MEDICINE

## 2018-02-12 RX ORDER — WARFARIN SODIUM 5 MG/1
TABLET ORAL
Qty: 30 TABLET | Refills: 0 | Status: SHIPPED | OUTPATIENT
Start: 2018-02-12 | End: 2018-03-24 | Stop reason: SDUPTHER

## 2018-02-12 RX ORDER — LOSARTAN POTASSIUM AND HYDROCHLOROTHIAZIDE 12.5; 1 MG/1; MG/1
TABLET ORAL
Qty: 30 TABLET | Refills: 0 | Status: SHIPPED | OUTPATIENT
Start: 2018-02-12 | End: 2018-03-10 | Stop reason: SDUPTHER

## 2018-02-12 RX ORDER — CELECOXIB 200 MG/1
CAPSULE ORAL
Qty: 30 CAPSULE | Refills: 0 | Status: SHIPPED | OUTPATIENT
Start: 2018-02-12 | End: 2018-03-10 | Stop reason: SDUPTHER

## 2018-02-13 ENCOUNTER — OFFICE VISIT (OUTPATIENT)
Dept: INTERNAL MEDICINE | Facility: CLINIC | Age: 78
End: 2018-02-13

## 2018-02-13 VITALS
HEIGHT: 63 IN | WEIGHT: 198 LBS | TEMPERATURE: 97.8 F | DIASTOLIC BLOOD PRESSURE: 80 MMHG | SYSTOLIC BLOOD PRESSURE: 138 MMHG | BODY MASS INDEX: 35.08 KG/M2 | OXYGEN SATURATION: 97 % | HEART RATE: 78 BPM

## 2018-02-13 DIAGNOSIS — J20.8 ACUTE BRONCHITIS DUE TO OTHER SPECIFIED ORGANISMS: Primary | ICD-10-CM

## 2018-02-13 DIAGNOSIS — I48.0 PAROXYSMAL ATRIAL FIBRILLATION (HCC): ICD-10-CM

## 2018-02-13 LAB — INR PPP: 2.9 (ref 0.9–1.1)

## 2018-02-13 PROCEDURE — 36416 COLLJ CAPILLARY BLOOD SPEC: CPT | Performed by: INTERNAL MEDICINE

## 2018-02-13 PROCEDURE — 71046 X-RAY EXAM CHEST 2 VIEWS: CPT | Performed by: INTERNAL MEDICINE

## 2018-02-13 PROCEDURE — 99214 OFFICE O/P EST MOD 30 MIN: CPT | Performed by: INTERNAL MEDICINE

## 2018-02-13 PROCEDURE — 85610 PROTHROMBIN TIME: CPT | Performed by: INTERNAL MEDICINE

## 2018-02-13 RX ORDER — IPRATROPIUM BROMIDE 42 UG/1
SPRAY, METERED NASAL
COMMUNITY
Start: 2018-01-03 | End: 2018-03-27

## 2018-02-13 RX ORDER — DOXYCYCLINE 100 MG/1
100 CAPSULE ORAL EVERY 12 HOURS SCHEDULED
Qty: 20 CAPSULE | Refills: 0 | Status: SHIPPED | OUTPATIENT
Start: 2018-02-13 | End: 2018-03-27

## 2018-02-13 NOTE — PROGRESS NOTES
Subjective     Inessa Peoples is a 78 y.o. female who presents with   Chief Complaint   Patient presents with   • Cough     times 2 weeks   • coughing up blood     today       History of Present Illness     Cough for two weeks with productive of purulent sputum.  Head and nasal congestion.  SInus pain associated.  No fever.  No achiness.    Niquel helpful.  Today coughed up some blood with a particular severe cough.  Worse at night.      She is on coumadin.  Protime done today.     Review of Systems   Constitutional: Negative for fever.   HENT: Positive for congestion and sinus pain.    Respiratory: Positive for cough. Negative for shortness of breath.    Cardiovascular: Negative for chest pain and leg swelling.       The following portions of the patient's history were reviewed and updated as appropriate: allergies, current medications and problem list.    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       • Dyspnea on exertion 04/18/2016   • Chronic osteoarthritis 04/18/2016   • History of DVT (deep vein thrombosis)  "04/18/2016     Note Last Updated: 4/18/2016     Description: developed unprovoked on Xarelto.     • Atrial fibrillation 04/18/2016       Current Outpatient Prescriptions on File Prior to Visit   Medication Sig Dispense Refill   • albuterol (PROVENTIL HFA;VENTOLIN HFA) 108 (90 BASE) MCG/ACT inhaler Inhale 2 puffs Every 4 (Four) Hours As Needed for Wheezing. 1 inhaler 0   • Calcium-Phosphorus-Vitamin D (CITRACAL +D3 PO) Take 1 tablet by mouth Daily.     • celecoxib (CeleBREX) 200 MG capsule TAKE ONE CAPSULE BY MOUTH DAILY 30 capsule 0   • ezetimibe (ZETIA) 10 MG tablet Take 1 tablet by mouth daily. 90 tablet 3   • levothyroxine (SYNTHROID, LEVOTHROID) 25 MCG tablet TAKE ONE TABLET BY MOUTH DAILY AS DIRECTED 30 tablet 3   • losartan-hydrochlorothiazide (HYZAAR) 100-12.5 MG per tablet TAKE ONE TABLET BY MOUTH DAILY 30 tablet 0   • Melatonin 3 MG tablet Take  by mouth.     • Multiple Vitamin (MULTI-DAY VITAMINS) tablet Take 1 tablet by mouth daily.     • omeprazole (PriLOSEC) 40 MG capsule Take 40 mg by mouth Daily.     • PENNSAID 2 % solution      • sotalol (BETAPACE) 80 MG tablet TAKE ONE TABLET BY MOUTH DAILY 30 tablet 2   • spironolactone (ALDACTONE) 25 MG tablet Take 1 tablet by mouth Daily. 30 tablet 3   • traMADol (ULTRAM) 50 MG tablet Take 1 tablet by mouth 3 (Three) Times a Day. 90 tablet 1   • warfarin (COUMADIN) 5 MG tablet TAKE ONE TABLET BY MOUTH DAILY 30 tablet 0     No current facility-administered medications on file prior to visit.        Objective     /80  Pulse 78  Temp 97.8 °F (36.6 °C)  Ht 158.8 cm (62.52\")  Wt 89.8 kg (198 lb)  SpO2 97%  BMI 35.61 kg/m2    Physical Exam   Constitutional: She is oriented to person, place, and time. She appears well-developed and well-nourished.   HENT:   Head: Normocephalic and atraumatic.   Right Ear: Hearing and tympanic membrane normal.   Left Ear: Hearing and tympanic membrane normal.   Mouth/Throat: No oropharyngeal exudate or posterior oropharyngeal " erythema.   Cardiovascular: Normal rate, regular rhythm and normal heart sounds.    Pulmonary/Chest: Effort normal and breath sounds normal.   Neurological: She is alert and oriented to person, place, and time.   Skin: Skin is warm and dry.   Psychiatric: She has a normal mood and affect. Her behavior is normal.     X-rays of the chest  performed today for following indication:   cough.  X-ray reveal nad, elevated left diaphragm.  No change 4/27/2016.  X-ray sent to radiology for official interpretation and findings.        Assessment/Plan   Inessa was seen today for cough and coughing up blood.    Diagnoses and all orders for this visit:    Acute bronchitis due to other specified organisms    Paroxysmal atrial fibrillation  -     POC INR  -     XR Chest PA & Lateral    Other orders  -     doxycycline (MONODOX) 100 MG capsule; Take 1 capsule by mouth Every 12 (Twelve) Hours.        Discussion  Patient presents with episodes of acute bronchitis.  A prescription for antibiotics is provided today.  .  Let me know they are not feeling better over the next 3 days or if there is any change in symptoms.    Afib on coumadin.  INR is 2.9.  Likely antibiotic will impact.  No change and recheck one week to follow.             Future Appointments  Date Time Provider Department Center   3/14/2018 11:20 AM AGA Bhakta CD [facility] None   5/14/2018 10:10 AM [facility] None   5/21/2018 11:15 AM MD NICOLE Et[facility] None   10/10/2018 10:45 AM Jewel Lu MD [facility] None

## 2018-02-28 ENCOUNTER — ANTICOAGULATION VISIT (OUTPATIENT)
Dept: INTERNAL MEDICINE | Facility: CLINIC | Age: 78
End: 2018-02-28

## 2018-02-28 DIAGNOSIS — I48.91 ATRIAL FIBRILLATION, UNSPECIFIED TYPE (HCC): ICD-10-CM

## 2018-02-28 LAB — INR PPP: 5.2 (ref 0.9–1.1)

## 2018-02-28 PROCEDURE — 36416 COLLJ CAPILLARY BLOOD SPEC: CPT | Performed by: INTERNAL MEDICINE

## 2018-02-28 PROCEDURE — 85610 PROTHROMBIN TIME: CPT | Performed by: INTERNAL MEDICINE

## 2018-03-02 ENCOUNTER — ANTICOAGULATION VISIT (OUTPATIENT)
Dept: INTERNAL MEDICINE | Facility: CLINIC | Age: 78
End: 2018-03-02

## 2018-03-02 DIAGNOSIS — I48.91 ATRIAL FIBRILLATION, UNSPECIFIED TYPE (HCC): ICD-10-CM

## 2018-03-02 LAB — INR PPP: 3.1 (ref 0.9–1.1)

## 2018-03-02 PROCEDURE — 36416 COLLJ CAPILLARY BLOOD SPEC: CPT | Performed by: INTERNAL MEDICINE

## 2018-03-02 PROCEDURE — 85610 PROTHROMBIN TIME: CPT | Performed by: INTERNAL MEDICINE

## 2018-03-12 RX ORDER — CELECOXIB 200 MG/1
CAPSULE ORAL
Qty: 30 CAPSULE | Refills: 0 | Status: SHIPPED | OUTPATIENT
Start: 2018-03-12 | End: 2018-04-13 | Stop reason: SDUPTHER

## 2018-03-12 RX ORDER — LOSARTAN POTASSIUM AND HYDROCHLOROTHIAZIDE 12.5; 1 MG/1; MG/1
TABLET ORAL
Qty: 30 TABLET | Refills: 0 | Status: SHIPPED | OUTPATIENT
Start: 2018-03-12 | End: 2018-04-07 | Stop reason: SDUPTHER

## 2018-03-15 ENCOUNTER — ANTICOAGULATION VISIT (OUTPATIENT)
Dept: INTERNAL MEDICINE | Facility: CLINIC | Age: 78
End: 2018-03-15

## 2018-03-15 DIAGNOSIS — I48.91 ATRIAL FIBRILLATION, UNSPECIFIED TYPE (HCC): ICD-10-CM

## 2018-03-15 LAB — INR PPP: 2.5 (ref 0.9–1.1)

## 2018-03-15 PROCEDURE — 85610 PROTHROMBIN TIME: CPT | Performed by: INTERNAL MEDICINE

## 2018-03-15 PROCEDURE — 36416 COLLJ CAPILLARY BLOOD SPEC: CPT | Performed by: INTERNAL MEDICINE

## 2018-03-26 RX ORDER — WARFARIN SODIUM 5 MG/1
TABLET ORAL
Qty: 30 TABLET | Refills: 0 | Status: SHIPPED | OUTPATIENT
Start: 2018-03-26 | End: 2018-05-11 | Stop reason: SDUPTHER

## 2018-03-27 ENCOUNTER — OFFICE VISIT (OUTPATIENT)
Dept: CARDIOLOGY | Facility: CLINIC | Age: 78
End: 2018-03-27

## 2018-03-27 VITALS
WEIGHT: 195 LBS | SYSTOLIC BLOOD PRESSURE: 132 MMHG | BODY MASS INDEX: 34.55 KG/M2 | HEART RATE: 72 BPM | HEIGHT: 63 IN | DIASTOLIC BLOOD PRESSURE: 84 MMHG

## 2018-03-27 DIAGNOSIS — E66.09 CLASS 2 OBESITY DUE TO EXCESS CALORIES WITH BODY MASS INDEX (BMI) OF 35.0 TO 35.9 IN ADULT, UNSPECIFIED WHETHER SERIOUS COMORBIDITY PRESENT: ICD-10-CM

## 2018-03-27 DIAGNOSIS — R06.09 DYSPNEA ON EXERTION: ICD-10-CM

## 2018-03-27 DIAGNOSIS — G47.33 OSA ON CPAP: Chronic | ICD-10-CM

## 2018-03-27 DIAGNOSIS — I10 ESSENTIAL HYPERTENSION: ICD-10-CM

## 2018-03-27 DIAGNOSIS — Z99.89 OSA ON CPAP: Chronic | ICD-10-CM

## 2018-03-27 DIAGNOSIS — I48.0 PAROXYSMAL ATRIAL FIBRILLATION (HCC): Primary | ICD-10-CM

## 2018-03-27 PROBLEM — E66.812 CLASS 2 OBESITY DUE TO EXCESS CALORIES WITH BODY MASS INDEX (BMI) OF 35.0 TO 35.9 IN ADULT: Status: ACTIVE | Noted: 2018-03-27

## 2018-03-27 PROCEDURE — 99213 OFFICE O/P EST LOW 20 MIN: CPT | Performed by: NURSE PRACTITIONER

## 2018-03-27 PROCEDURE — 93000 ELECTROCARDIOGRAM COMPLETE: CPT | Performed by: NURSE PRACTITIONER

## 2018-03-27 RX ORDER — SOTALOL HYDROCHLORIDE 80 MG/1
TABLET ORAL
Qty: 30 TABLET | Refills: 1 | Status: SHIPPED | OUTPATIENT
Start: 2018-03-27 | End: 2018-05-26 | Stop reason: SDUPTHER

## 2018-03-27 NOTE — PROGRESS NOTES
Date of Office Visit: 2018  Encounter Provider: AGA Bhakta  Place of Service: [facility] CARDIOLOGY  Patient Name: Inessa Peoples  :1940    Chief Complaint   Patient presents with   • Atrial Fibrillation   • Shortness of Breath   :     HPI: Inessa Peoples is a 78 y.o. female who is a patient of Dr. Arteaga with PAF. She saw him in December with complaints of dyspnea with minimal exertion. Felt to be multifactorial. He added spironolactone and she returns today for follow up.    She is better. The spironolactone seems to have helped her dyspnea. She still has some but is improved. She has not had any episodes of PAF. She does have occasional palpitations. She had PNA and bronchitis it February and is following up with Dr. Dockery today but says that she thinks it has all resolved. She does not have chest pain or edema.     She is on warfarin and sotalol 80 mg daily. She had BMP one week after starting spironolactone and it was fine. She has lost 5 lbs since last visit. She has ANY and is compliant with CPAP.       Past Medical History:   Diagnosis Date   • Abnormal ECG    • Allergic rhinitis    • Atrial fibrillation    • Bleeds easily     warfarin   • Breast screening    • Broken bones    • Calf DVT (deep venous thrombosis)    • Chronic anticoagulation    • Colon polyp    • CTS (carpal tunnel syndrome)    • Deep vein thrombosis     left calf   • Diverticulitis of colon    • AHN (dyspnea on exertion)    • Fatigue    • H/O malignant melanoma of skin    • Hematuria, gross    • Hyperlipidemia    • Hypertension    • Hypothyroidism    • IFG (impaired fasting glucose)    • Insomnia    • LBBB (left bundle branch block)    • Left leg swelling    • LLQ abdominal pain    • Long term current use of systemic steroids    • Low back pain    • Malignant melanoma of skin    • Neck pain    • Obesity    • Osteoarthritis    • Osteopenia    • Paralysis, diaphragm    • Positive  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 TANISHA THORNTON   • COLONOSCOPY N/A 10/19/2010    SIGMOID DIVERTICULOSIS & INTERNAL HEMORRHOIDS, HEMORRHOIDAL BANDING X4, DR. TANISHA THORNTON   • COLONOSCOPY W/ BIOPSIES N/A 05/12/2008    RECTUM BIOPSY: COLONIC MUCOSA W/ FOCAL INCREASE OF INFLAMMATORY CELLS IN THE MUSCULARIS MUCOSA INCLUDING EOSINOPHILS, SIGMOID DIVERTICULOSIS, POSSIBLE PROCTITIS, DR. TANISHA THORNTON   • CYSTOSCOPY W/ LASER LITHOTRIPSY Right 10/10/2014    CYSTOSCOPY W/ BILATERAL RETROGRAD PYELOGRAMS, RIGHT URETERAL PYELOSCOPY & LASER LITOTRIPSY, RIGHT DOUBLE-J STENT PLACEMENT, DR. CLAIRE ROCK   • EXTRACORPOREAL SHOCK WAVE LITHOTRIPSY (ESWL) Right 09/10/2009    DR. CLAIRE ROCK   • LAPAROSCOPIC CHOLECYSTECTOMY N/A 07/19/2012    DR. TANISHA THORNTON   • LUMBAR EPIDURAL INJECTION N/A 04/23/2015    LUMBAR EPIDURAL STEROID INJECTION X3   • PARATHYROIDECTOMY     • TOTAL KNEE ARTHROPLASTY Right 03/25/2014    DR. MARK MARES       Social History     Social History   • Marital status:      Spouse name: EDY   • Number of children: N/A   • Years of education: N/A     Occupational History   • RETIRED      Social History Main Topics   • Smoking status: Never Smoker   • Smokeless tobacco: Never Used   • Alcohol use Yes      Comment: social drinker   • Drug use: No   • Sexual activity: Not on file     Other Topics Concern   • Not on file     Social History Narrative   • No narrative on file       Family History   Problem Relation Age of Onset   • Heart disease Mother     • Diabetes type II Mother    • Heart disease Father    • Cancer Sister    • Heart disease Brother      CABG   • Cancer Brother    • Diabetes type II Brother    • Heart disease Sister    • Aneurysm Sister      abdominal aorta   • Colon cancer Sister    • Diabetes type II Sister    • Lung cancer Sister    • Colon polyps Sister    • COPD Sister        Review of Systems   Constitution: Negative for chills, fever and malaise/fatigue.   Cardiovascular: Positive for dyspnea on exertion (improved) and palpitations (occasional). Negative for chest pain, leg swelling, near-syncope, orthopnea, paroxysmal nocturnal dyspnea and syncope.   Respiratory: Negative for cough and shortness of breath.    Musculoskeletal: Negative for joint pain, joint swelling and myalgias.   Gastrointestinal: Negative for abdominal pain, diarrhea, melena, nausea and vomiting.   Genitourinary: Negative for frequency and hematuria.   Neurological: Negative for light-headedness, numbness, paresthesias and seizures.   Allergic/Immunologic: Negative.    All other systems reviewed and are negative.      Allergies   Allergen Reactions   • Fenofibrate      myalgias   • Livalo [Pitavastatin]      Muscle tension and joint pain   • Plaquenil [Hydroxychloroquine Sulfate]      Muscles tense up and joint pain   • Statins      Joint pain   • Iodinated Diagnostic Agents Rash     Chest tightness  Facial swelling         Current Outpatient Prescriptions:   •  albuterol (PROVENTIL HFA;VENTOLIN HFA) 108 (90 BASE) MCG/ACT inhaler, Inhale 2 puffs Every 4 (Four) Hours As Needed for Wheezing., Disp: 1 inhaler, Rfl: 0  •  Calcium-Phosphorus-Vitamin D (CITRACAL +D3 PO), Take 1 tablet by mouth Daily., Disp: , Rfl:   •  celecoxib (CeleBREX) 200 MG capsule, TAKE ONE CAPSULE BY MOUTH DAILY, Disp: 30 capsule, Rfl: 0  •  ezetimibe (ZETIA) 10 MG tablet, Take 1 tablet by mouth daily., Disp: 90 tablet, Rfl: 3  •  levothyroxine (SYNTHROID, LEVOTHROID) 25 MCG tablet, TAKE ONE TABLET BY  "MOUTH DAILY AS DIRECTED, Disp: 30 tablet, Rfl: 3  •  losartan-hydrochlorothiazide (HYZAAR) 100-12.5 MG per tablet, TAKE ONE TABLET BY MOUTH DAILY, Disp: 30 tablet, Rfl: 0  •  Melatonin 3 MG tablet, Take  by mouth., Disp: , Rfl:   •  Multiple Vitamin (MULTI-DAY VITAMINS) tablet, Take 1 tablet by mouth daily., Disp: , Rfl:   •  omeprazole (PriLOSEC) 40 MG capsule, Take 40 mg by mouth Daily., Disp: , Rfl:   •  PENNSAID 2 % solution, , Disp: , Rfl:   •  sotalol (BETAPACE) 80 MG tablet, TAKE ONE TABLET BY MOUTH DAILY, Disp: 30 tablet, Rfl: 1  •  spironolactone (ALDACTONE) 25 MG tablet, Take 1 tablet by mouth Daily., Disp: 30 tablet, Rfl: 3  •  traMADol (ULTRAM) 50 MG tablet, Take 1 tablet by mouth 3 (Three) Times a Day., Disp: 90 tablet, Rfl: 1  •  warfarin (COUMADIN) 5 MG tablet, TAKE ONE TABLET BY MOUTH DAILY, Disp: 30 tablet, Rfl: 0      Objective:     Vitals:    03/27/18 0950   BP: 132/84   Pulse: 72   Weight: 88.5 kg (195 lb)   Height: 158.8 cm (62.52\")     Body mass index is 35.08 kg/m².    PHYSICAL EXAM:    Vitals Reviewed.   General Appearance: No acute distress, well developed and well nourished.   Eyes: Conjunctiva and lids: No erythema, swelling, or discharge. Sclera non-icteric.   HENT: Atraumatic, normocephalic. External eyes, ears, and nose normal.   Respiratory: No signs of respiratory distress. Respiration rhythm and depth normal.   Clear to auscultation. No rales, crackles, rhonchi, or wheezing auscultated.   Cardiovascular:  Jugular Venous Pressure: Normal  Heart Rate and Rhythm: Normal, Heart Sounds: Normal S1 and S2. No S3 or S4 noted.  Murmurs: No murmurs noted. No rubs, thrills, or gallops.   Arterial Pulses:  Posterior tibialis and dorsalis pedis pulses normal.   Lower Extremities: No edema noted.  Gastrointestinal:  Abdomen soft, non-distended, non-tender.   Musculoskeletal: Normal movement of extremities  Skin and Nails: General appearance normal. No pallor, cyanosis, diaphoresis. Skin " temperature normal. No clubbing of fingernails.   Psychiatric: Patient alert and oriented to person, place, and time. Speech and behavior appropriate. Normal mood and affect.       ECG 12 Lead  Date/Time: 3/27/2018 10:04 AM  Performed by: BOB HERNANDEZ  Authorized by: BOB HERNANDEZ   Comparison: compared with previous ECG from 12/13/2017  Similar to previous ECG  Rhythm: sinus rhythm  Conduction: left bundle branch block              Assessment:       Diagnosis Plan   1. Paroxysmal atrial fibrillation     2. Essential hypertension     3. ANY on CPAP + 10 - Dr Lu     4. Dyspnea on exertion     5. Class 2 obesity due to excess calories with body mass index (BMI) of 35.0 to 35.9 in adult, unspecified whether serious comorbidity present            Plan:       1. Atrial Fibrillation and Atrial Flutter  Assessment  • The patient has paroxysmal atrial fibrillation  • The patient's CHADS2-VASc score is 4  • A [id number] score of 2 or more is considered a high risk for a thromboembolic event  • Warfarin prescribed  • PAF. No episodes. Occasional palpitations. Continue warfarin and sotalol.EKG okay. Renal function ok in December.     Plan  • Attempt to maintain sinus rhythm  • Continue warfarin for antithrombotic therapy, bleeding issues discussed  • Continue sotalol for rhythm control    2. HTN, controlled. She monitors at home.    3. ANY, compliant with CPAP.    4 AHN, improved with addition of spironolactone.    5. For the problem of overweight/obesity, we discussed the importance of lifestyle measures and strategies for weight loss, such as improved nutrition, regular exercise and sleep hygiene.      Return to see Dr. Stone in 6 months.     As always, it has been a pleasure to participate in your patient's care.      Sincerely,         AGA Hartman

## 2018-03-30 DIAGNOSIS — R93.89 ABNORMAL CHEST XRAY: Primary | ICD-10-CM

## 2018-04-05 ENCOUNTER — HOSPITAL ENCOUNTER (OUTPATIENT)
Dept: CT IMAGING | Facility: HOSPITAL | Age: 78
Discharge: HOME | End: 2018-04-05
Admitting: INTERNAL MEDICINE

## 2018-04-05 DIAGNOSIS — R93.89 ABNORMAL CHEST XRAY: ICD-10-CM

## 2018-04-05 PROCEDURE — 71250 CT THORAX DX C-: CPT

## 2018-04-09 RX ORDER — SPIRONOLACTONE 25 MG/1
TABLET ORAL
Qty: 30 TABLET | Refills: 2 | Status: SHIPPED | OUTPATIENT
Start: 2018-04-09 | End: 2018-07-12 | Stop reason: SDUPTHER

## 2018-04-09 RX ORDER — LOSARTAN POTASSIUM AND HYDROCHLOROTHIAZIDE 12.5; 1 MG/1; MG/1
TABLET ORAL
Qty: 30 TABLET | Refills: 0 | Status: SHIPPED | OUTPATIENT
Start: 2018-04-09 | End: 2018-05-12 | Stop reason: SDUPTHER

## 2018-04-11 ENCOUNTER — OFFICE VISIT (OUTPATIENT)
Dept: PAIN MEDICINE | Facility: CLINIC | Age: 78
End: 2018-04-11

## 2018-04-11 VITALS
OXYGEN SATURATION: 96 % | TEMPERATURE: 97.9 F | BODY MASS INDEX: 34.73 KG/M2 | HEART RATE: 62 BPM | DIASTOLIC BLOOD PRESSURE: 74 MMHG | WEIGHT: 196 LBS | SYSTOLIC BLOOD PRESSURE: 128 MMHG | HEIGHT: 63 IN | RESPIRATION RATE: 18 BRPM

## 2018-04-11 DIAGNOSIS — M51.36 BULGE OF LUMBAR DISC WITHOUT MYELOPATHY: ICD-10-CM

## 2018-04-11 DIAGNOSIS — G89.29 OTHER CHRONIC PAIN: Primary | ICD-10-CM

## 2018-04-11 DIAGNOSIS — M53.3 SACROILIAC PAIN: ICD-10-CM

## 2018-04-11 DIAGNOSIS — M54.5 LOW BACK PAIN, UNSPECIFIED BACK PAIN LATERALITY, UNSPECIFIED CHRONICITY, WITH SCIATICA PRESENCE UNSPECIFIED: ICD-10-CM

## 2018-04-11 PROCEDURE — 99214 OFFICE O/P EST MOD 30 MIN: CPT | Performed by: NURSE PRACTITIONER

## 2018-04-11 NOTE — PROGRESS NOTES
CHIEF COMPLAINT  Back pain has decreased since last office visit.    Subjective   Inessa Peoples is a 78 y.o. female  who presents to the office for follow-up.She has a history of back pain.    Previous benefit with SI joint injection, last was on 9/2017.  She was last seen in December of 2017 and was doing quite well at that time.  Was doing great with mininal pain up until about a month ago, pain has returned and has been progressively worsening over the past month.      Back Pain   This is a chronic problem. The current episode started more than 1 year ago. The problem occurs intermittently. The pain is present in the lumbar spine and sacro-iliac. The quality of the pain is described as aching. The pain does not radiate. The pain is at a severity of 4/10. The patient is experiencing no pain. The pain is worse during the day. The symptoms are aggravated by bending, position, sitting, standing and twisting (housework). Pertinent negatives include no bladder incontinence, bowel incontinence, chest pain, dysuria, fever, headaches, numbness or weakness. Risk factors include obesity, menopause, history of cancer and lack of exercise (history of melanoma--no Chemo or RAD). She has tried analgesics and heat (SI joint inejction) for the symptoms. The treatment provided significant relief.      PEG Assessment   What number best describes your pain on average in the past week?4  What number best describes how, during the past week, pain has interfered with your enjoyment of life?0  What number best describes how, during the past week, pain has interfered with your general activity?  2    The following portions of the patient's history were reviewed and updated as appropriate: allergies, current medications, past family history, past medical history, past social history, past surgical history and problem list.    Review of Systems   Constitutional: Negative for chills and fever.   Respiratory: Positive for shortness of  "breath.    Cardiovascular: Negative for chest pain.   Gastrointestinal: Negative for bowel incontinence, constipation, diarrhea, nausea and vomiting.   Genitourinary: Negative for bladder incontinence, difficulty urinating, dyspareunia and dysuria.   Musculoskeletal: Positive for back pain.   Neurological: Negative for dizziness, weakness, light-headedness, numbness and headaches.   Psychiatric/Behavioral: Negative for confusion, hallucinations, self-injury, sleep disturbance and suicidal ideas. The patient is not nervous/anxious.        Vitals:    04/11/18 1537   BP: 128/74   Pulse: 62   Resp: 18   Temp: 97.9 °F (36.6 °C)   SpO2: 96%   Weight: 88.9 kg (196 lb)   Height: 158.8 cm (62.5\")   PainSc:   4   PainLoc: Back         Objective   Physical Exam   Constitutional: She is oriented to person, place, and time. Vital signs are normal. She appears well-developed and well-nourished. She is cooperative.   HENT:   Head: Normocephalic and atraumatic.   Nose: Nose normal.   Eyes: Conjunctivae and lids are normal.   Neck: Trachea normal. Neck supple.   Cardiovascular: Normal rate.    Pulmonary/Chest: Effort normal. No respiratory distress.   Abdominal:   OBESE   Musculoskeletal:        Lumbar back: She exhibits tenderness and pain. She exhibits no bony tenderness.   + tenderness left SI  + LUKE left       Neurological: She is alert and oriented to person, place, and time. Gait normal.   Reflex Scores:       Patellar reflexes are 2+ on the right side and 2+ on the left side.  Skin: Skin is warm, dry and intact.   Psychiatric: She has a normal mood and affect. Her speech is normal and behavior is normal. Judgment and thought content normal. Cognition and memory are normal.   Nursing note and vitals reviewed.      Assessment/Plan   Inessa was seen today for back pain.    Diagnoses and all orders for this visit:    Other chronic pain    Bulge of lumbar disc without myelopathy    Low back pain, unspecified back pain laterality, " unspecified chronicity, with sciatica presence unspecified    Sacroiliac pain  -     Case Request      --- Left SI joint injection.  Coumadin must be held X 5 days   --- Follow-up after procedure           LUIS REPORT  LUIS report has been reviewed and scanned into the patient's chart.    Date of last LUIS : 4/10/18    EMR Dragon/Transcription disclaimer:   Much of this encounter note is an electronic transcription/translation of spoken language to printed text. The electronic translation of spoken language may permit erroneous, or at times, nonsensical words or phrases to be inadvertently transcribed; Although I have reviewed the note for such errors, some may still exist.       Initial Visit with AGA Aldana

## 2018-04-16 ENCOUNTER — ANTICOAGULATION VISIT (OUTPATIENT)
Dept: INTERNAL MEDICINE | Facility: CLINIC | Age: 78
End: 2018-04-16

## 2018-04-16 DIAGNOSIS — I48.0 PAROXYSMAL ATRIAL FIBRILLATION (HCC): ICD-10-CM

## 2018-04-16 LAB — INR PPP: 2.2 (ref 0.9–1.1)

## 2018-04-16 PROCEDURE — 85610 PROTHROMBIN TIME: CPT | Performed by: INTERNAL MEDICINE

## 2018-04-16 PROCEDURE — 36416 COLLJ CAPILLARY BLOOD SPEC: CPT | Performed by: INTERNAL MEDICINE

## 2018-04-16 RX ORDER — CELECOXIB 200 MG/1
CAPSULE ORAL
Qty: 30 CAPSULE | Refills: 0 | Status: SHIPPED | OUTPATIENT
Start: 2018-04-16 | End: 2018-05-12 | Stop reason: SDUPTHER

## 2018-04-23 RX ORDER — LEVOTHYROXINE SODIUM 0.03 MG/1
TABLET ORAL
Qty: 30 TABLET | Refills: 2 | Status: SHIPPED | OUTPATIENT
Start: 2018-04-23 | End: 2018-07-28 | Stop reason: SDUPTHER

## 2018-04-26 ENCOUNTER — DOCUMENTATION (OUTPATIENT)
Dept: PAIN MEDICINE | Facility: CLINIC | Age: 78
End: 2018-04-26

## 2018-04-26 ENCOUNTER — OUTSIDE FACILITY SERVICE (OUTPATIENT)
Dept: PAIN MEDICINE | Facility: CLINIC | Age: 78
End: 2018-04-26

## 2018-04-26 PROCEDURE — 27096 INJECT SACROILIAC JOINT: CPT | Performed by: ANESTHESIOLOGY

## 2018-04-27 NOTE — PROGRESS NOTES
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  We discussed various topics including but not limited to bleeding, infection, injury, postprocedural site soreness, painful flareup, worsening of clinical picture, paralysis, coma, and death.     SURGEON:  Wolfgang Means MD    REASON FOR PROCEDURE:    Patient has pain consistent with SI pathology on history and physical exam. Positive sacroiliac provocation maneuvers noted.   Tender to palpation over the affected SI joint. Previous clinically significant therapeutic effect of SI joint injection.   She held coumadin appropriately.     SEDATION:  Patient declined administration of moderate sedation    ANESTHETIC AGENT:  Marcaine 0.5%  STEROID AGENT:  40mg DepoMedrol    DESCRIPTON OF PROCEDURE:  After obtaining informed consent, IV access  was not obtained in the preoperative area.  The patient was transported to the operative suite and placed in the prone position with a pillow under the pelvic area. EKG, blood pressure, and pulse oximeter were monitored. The lumbosacral area was prepped with Chloraprep and draped in a sterile fashion.     Under fluoroscopic guidance the inferior most portion of the LEFT sacroiliac joint was identified. The overlying skin and subcutaneous tissue was anesthetized with 1% lidocaine. A 22-gauge spinal needle was introduced from the inferior most portion of the joint into the sacroiliac joint under fluoroscopic guidance in the AP dimension with slight oblique rotation to the contralateral side.  Aspiration was negative.  After confirming  the position of the needle with fluoroscopy, 1 mL of Omnipaque was injected and after seeing appropriate spread into the joint a total of 2.5 mL of Marcaine, with approximately 40 mg of DepoMedrol, was injected very slowly.  Vital signs remained stable.  The onset of analgesia was noted.    ESTIMATED BLOOD LOSS:  minimal  SPECIMENS:  None    COMPLICATIONS:  No complications were noted. and There was no indication of vascular uptake on live injection of contrast dye.    TOLERANCE & DISCHARGE CONDITION:    The patient tolerated the procedure well.  The patient was transported to the recovery area without difficulties.  The patient was discharged to home under the care of family in stable and satisfactory condition.    PLAN OF CARE:  1. The patient was given our standard instruction sheet and will resume all medications as per the medication reconciliation sheet.  2. The patient will Return to clinic 4-6 wks and Repeat injection in 2-4 wks PRN.  3. The patient is instructed to keep a pain log hourly for 8 hours after the procedure.

## 2018-05-04 ENCOUNTER — ANTICOAGULATION VISIT (OUTPATIENT)
Dept: INTERNAL MEDICINE | Facility: CLINIC | Age: 78
End: 2018-05-04

## 2018-05-04 DIAGNOSIS — I48.0 PAROXYSMAL ATRIAL FIBRILLATION (HCC): ICD-10-CM

## 2018-05-04 LAB — INR PPP: 1.5 (ref 0.9–1.1)

## 2018-05-04 PROCEDURE — 85610 PROTHROMBIN TIME: CPT | Performed by: INTERNAL MEDICINE

## 2018-05-04 PROCEDURE — 36416 COLLJ CAPILLARY BLOOD SPEC: CPT | Performed by: INTERNAL MEDICINE

## 2018-05-07 RX ORDER — EZETIMIBE 10 MG/1
TABLET ORAL
Qty: 30 TABLET | Refills: 9 | Status: SHIPPED | OUTPATIENT
Start: 2018-05-07 | End: 2019-03-09 | Stop reason: SDUPTHER

## 2018-05-11 ENCOUNTER — ANTICOAGULATION VISIT (OUTPATIENT)
Dept: INTERNAL MEDICINE | Facility: CLINIC | Age: 78
End: 2018-05-11

## 2018-05-11 DIAGNOSIS — I48.0 PAROXYSMAL ATRIAL FIBRILLATION (HCC): ICD-10-CM

## 2018-05-11 LAB — INR PPP: 1.7 (ref 0.9–1.1)

## 2018-05-11 PROCEDURE — 85610 PROTHROMBIN TIME: CPT | Performed by: INTERNAL MEDICINE

## 2018-05-11 PROCEDURE — 36416 COLLJ CAPILLARY BLOOD SPEC: CPT | Performed by: INTERNAL MEDICINE

## 2018-05-14 DIAGNOSIS — I10 ESSENTIAL HYPERTENSION: ICD-10-CM

## 2018-05-14 DIAGNOSIS — E03.9 ACQUIRED HYPOTHYROIDISM: Primary | ICD-10-CM

## 2018-05-14 DIAGNOSIS — E78.5 HYPERLIPIDEMIA, UNSPECIFIED HYPERLIPIDEMIA TYPE: ICD-10-CM

## 2018-05-14 LAB
ALBUMIN SERPL-MCNC: 4.3 G/DL (ref 3.5–5.2)
ALBUMIN/GLOB SERPL: 1.8 G/DL
ALP SERPL-CCNC: 90 U/L (ref 39–117)
ALT SERPL-CCNC: 22 U/L (ref 1–33)
AST SERPL-CCNC: 15 U/L (ref 1–32)
BASOPHILS # BLD AUTO: 0.03 10*3/MM3 (ref 0–0.2)
BASOPHILS NFR BLD AUTO: 0.5 % (ref 0–1.5)
BILIRUB SERPL-MCNC: 0.7 MG/DL (ref 0.1–1.2)
BUN SERPL-MCNC: 43 MG/DL (ref 8–23)
BUN/CREAT SERPL: 34.4 (ref 7–25)
CALCIUM SERPL-MCNC: 10.4 MG/DL (ref 8.6–10.5)
CHLORIDE SERPL-SCNC: 102 MMOL/L (ref 98–107)
CHOLEST SERPL-MCNC: 225 MG/DL (ref 0–200)
CO2 SERPL-SCNC: 23.6 MMOL/L (ref 22–29)
CREAT SERPL-MCNC: 1.25 MG/DL (ref 0.57–1)
EOSINOPHIL # BLD AUTO: 0.22 10*3/MM3 (ref 0–0.7)
EOSINOPHIL NFR BLD AUTO: 3.6 % (ref 0.3–6.2)
ERYTHROCYTE [DISTWIDTH] IN BLOOD BY AUTOMATED COUNT: 12.9 % (ref 11.7–13)
GFR SERPLBLD CREATININE-BSD FMLA CKD-EPI: 42 ML/MIN/1.73
GFR SERPLBLD CREATININE-BSD FMLA CKD-EPI: 51 ML/MIN/1.73
GLOBULIN SER CALC-MCNC: 2.4 GM/DL
GLUCOSE SERPL-MCNC: 95 MG/DL (ref 65–99)
HCT VFR BLD AUTO: 41.5 % (ref 35.6–45.5)
HDLC SERPL-MCNC: 60 MG/DL (ref 40–60)
HGB BLD-MCNC: 14.2 G/DL (ref 11.9–15.5)
IMM GRANULOCYTES # BLD: 0.02 10*3/MM3 (ref 0–0.03)
IMM GRANULOCYTES NFR BLD: 0.3 % (ref 0–0.5)
LDLC SERPL CALC-MCNC: 120 MG/DL (ref 0–100)
LYMPHOCYTES # BLD AUTO: 2.33 10*3/MM3 (ref 0.9–4.8)
LYMPHOCYTES NFR BLD AUTO: 38.4 % (ref 19.6–45.3)
MCH RBC QN AUTO: 32.8 PG (ref 26.9–32)
MCHC RBC AUTO-ENTMCNC: 34.2 G/DL (ref 32.4–36.3)
MCV RBC AUTO: 95.8 FL (ref 80.5–98.2)
MONOCYTES # BLD AUTO: 0.48 10*3/MM3 (ref 0.2–1.2)
MONOCYTES NFR BLD AUTO: 7.9 % (ref 5–12)
NEUTROPHILS # BLD AUTO: 3 10*3/MM3 (ref 1.9–8.1)
NEUTROPHILS NFR BLD AUTO: 49.6 % (ref 42.7–76)
PLATELET # BLD AUTO: 245 10*3/MM3 (ref 140–500)
POTASSIUM SERPL-SCNC: 4.8 MMOL/L (ref 3.5–5.2)
PROT SERPL-MCNC: 6.7 G/DL (ref 6–8.5)
RBC # BLD AUTO: 4.33 10*6/MM3 (ref 3.9–5.2)
SODIUM SERPL-SCNC: 139 MMOL/L (ref 136–145)
TRIGL SERPL-MCNC: 227 MG/DL (ref 0–150)
TSH SERPL DL<=0.005 MIU/L-ACNC: 3.08 MIU/ML (ref 0.27–4.2)
VLDLC SERPL CALC-MCNC: 45.4 MG/DL (ref 5–40)
WBC # BLD AUTO: 6.06 10*3/MM3 (ref 4.5–10.7)

## 2018-05-14 RX ORDER — WARFARIN SODIUM 5 MG/1
TABLET ORAL
Qty: 30 TABLET | Refills: 0 | Status: SHIPPED | OUTPATIENT
Start: 2018-05-14 | End: 2018-06-23 | Stop reason: SDUPTHER

## 2018-05-14 RX ORDER — CELECOXIB 200 MG/1
CAPSULE ORAL
Qty: 30 CAPSULE | Refills: 0 | Status: SHIPPED | OUTPATIENT
Start: 2018-05-14 | End: 2018-06-11 | Stop reason: SDUPTHER

## 2018-05-14 RX ORDER — LOSARTAN POTASSIUM AND HYDROCHLOROTHIAZIDE 12.5; 1 MG/1; MG/1
TABLET ORAL
Qty: 30 TABLET | Refills: 0 | Status: SHIPPED | OUTPATIENT
Start: 2018-05-14 | End: 2018-05-21

## 2018-05-21 ENCOUNTER — OFFICE VISIT (OUTPATIENT)
Dept: INTERNAL MEDICINE | Facility: CLINIC | Age: 78
End: 2018-05-21

## 2018-05-21 VITALS
SYSTOLIC BLOOD PRESSURE: 140 MMHG | HEIGHT: 63 IN | WEIGHT: 194 LBS | HEART RATE: 77 BPM | OXYGEN SATURATION: 96 % | DIASTOLIC BLOOD PRESSURE: 82 MMHG | BODY MASS INDEX: 34.38 KG/M2

## 2018-05-21 DIAGNOSIS — I10 ESSENTIAL HYPERTENSION: ICD-10-CM

## 2018-05-21 DIAGNOSIS — Z12.11 COLON CANCER SCREENING: Primary | ICD-10-CM

## 2018-05-21 DIAGNOSIS — E03.9 ACQUIRED HYPOTHYROIDISM: ICD-10-CM

## 2018-05-21 DIAGNOSIS — M25.552 LEFT HIP PAIN: ICD-10-CM

## 2018-05-21 DIAGNOSIS — E78.5 HYPERLIPIDEMIA, UNSPECIFIED HYPERLIPIDEMIA TYPE: ICD-10-CM

## 2018-05-21 PROCEDURE — 73502 X-RAY EXAM HIP UNI 2-3 VIEWS: CPT | Performed by: INTERNAL MEDICINE

## 2018-05-21 PROCEDURE — 99214 OFFICE O/P EST MOD 30 MIN: CPT | Performed by: INTERNAL MEDICINE

## 2018-05-21 RX ORDER — LOSARTAN POTASSIUM 100 MG/1
100 TABLET ORAL DAILY
Qty: 90 TABLET | Refills: 3 | Status: SHIPPED | OUTPATIENT
Start: 2018-05-21 | End: 2018-06-12

## 2018-05-21 NOTE — PROGRESS NOTES
Subjective     Inessa Peoples is a 78 y.o. female who presents with   Chief Complaint   Patient presents with   • Hypertension   • Hyperlipidemia   • Hypothyroidism       History of Present Illness     HTN.  Fair control with current.  She is on aldactone now.  Her kidneys look dry.  She is not a good water drinker.    HLD.  Control is fair with Zetia.  Hypothyroidism. Good with levothyroxine.     C/o fall three weeks ago.  Fell on her left hip.  It is hurting ever since.  Hurts to do stairs.  Hurts to lay on it.      Review of Systems   Respiratory: Negative.    Cardiovascular: Negative.         Leg cramps.         The following portions of the patient's history were reviewed and updated as appropriate: allergies, current medications and problem list.    Patient Active Problem List    Diagnosis Date Noted   • Class 2 obesity due to excess calories with body mass index (BMI) of 35.0 to 35.9 in adult 03/27/2018   • Chronic pain 07/13/2017   • Bulge of lumbar disc without myelopathy 07/13/2017   • Sacroiliac inflammation 07/13/2017   • Sacroiliac pain 07/13/2017   • Dilation of thoracic aorta 06/23/2017   • Chronic anticoagulation 05/15/2017   • Diverticulitis of large intestine without perforation or abscess without bleeding 01/03/2017   • ANY on CPAP + 10 - Dr Lu 10/15/2016   • History of melanoma excision 08/05/2016   • GERD (gastroesophageal reflux disease) 08/05/2016   • History of kidney stones 08/05/2016   • Sarcoidosis 04/18/2016   • 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       • Dyspnea on exertion 04/18/2016   • Chronic osteoarthritis 04/18/2016   • History of DVT (deep vein  "thrombosis) 04/18/2016     Note Last Updated: 4/18/2016     Description: developed unprovoked on Xarelto.     • Atrial fibrillation 04/18/2016       Current Outpatient Prescriptions on File Prior to Visit   Medication Sig Dispense Refill   • albuterol (PROVENTIL HFA;VENTOLIN HFA) 108 (90 BASE) MCG/ACT inhaler Inhale 2 puffs Every 4 (Four) Hours As Needed for Wheezing. 1 inhaler 0   • Calcium-Phosphorus-Vitamin D (CITRACAL +D3 PO) Take 1 tablet by mouth Daily.     • celecoxib (CeleBREX) 200 MG capsule TAKE ONE CAPSULE BY MOUTH DAILY 30 capsule 0   • ezetimibe (ZETIA) 10 MG tablet TAKE ONE TABLET BY MOUTH DAILY 30 tablet 9   • levothyroxine (SYNTHROID, LEVOTHROID) 25 MCG tablet TAKE ONE TABLET BY MOUTH DAILY AS DIRECTED 30 tablet 2   • Melatonin 3 MG tablet Take  by mouth.     • Multiple Vitamin (MULTI-DAY VITAMINS) tablet Take 1 tablet by mouth daily.     • omeprazole (PriLOSEC) 40 MG capsule Take 40 mg by mouth Daily.     • PENNSAID 2 % solution      • sotalol (BETAPACE) 80 MG tablet TAKE ONE TABLET BY MOUTH DAILY 30 tablet 1   • spironolactone (ALDACTONE) 25 MG tablet TAKE ONE TABLET BY MOUTH DAILY 30 tablet 2   • traMADol (ULTRAM) 50 MG tablet Take 1 tablet by mouth 3 (Three) Times a Day. 90 tablet 1   • warfarin (COUMADIN) 5 MG tablet TAKE ONE TABLET BY MOUTH DAILY 30 tablet 0   • [DISCONTINUED] losartan-hydrochlorothiazide (HYZAAR) 100-12.5 MG per tablet TAKE ONE TABLET BY MOUTH DAILY 30 tablet 0     No current facility-administered medications on file prior to visit.        Objective     /82   Pulse 77   Ht 158.8 cm (62.52\")   Wt 88 kg (194 lb)   SpO2 96%   BMI 34.90 kg/m²     Physical Exam   Constitutional: She is oriented to person, place, and time. She appears well-developed and well-nourished.   HENT:   Head: Normocephalic and atraumatic.   Cardiovascular: Normal rate, regular rhythm and normal heart sounds.    Pulmonary/Chest: Effort normal and breath sounds normal.   Neurological: She is alert " and oriented to person, place, and time.   Skin: Skin is warm and dry.   Psychiatric: She has a normal mood and affect. Her behavior is normal.       Assessment/Plan   Inessa was seen today for hypertension, hyperlipidemia and hypothyroidism.    Diagnoses and all orders for this visit:    Colon cancer screening  -     Ambulatory Referral For Screening Colonoscopy    Left hip pain  -     XR Hip With or Without Pelvis 2 - 3 View Left    Essential hypertension    Hyperlipidemia, unspecified hyperlipidemia type    Acquired hypothyroidism    Other orders  -     losartan (COZAAR) 100 MG tablet; Take 1 tablet by mouth Daily.        Discussion    HTN.  Change to losartan from losartan HCT since she is dry on aldactone and having cramps.  F/u three weeks.   HLD.  Continue current.  Hypothyroidism.  Continue levothyroxine.    Left hip pain.  No evidence of fracture.  Trial of conservative management with NSAIDs and rest.  Let me know if not feeling better over the next several weeks or if there is any change in symptoms.           Future Appointments  Date Time Provider Department Center   5/29/2018 12:40 PM [facility] None   6/11/2018 9:40 AM [facility] None   6/15/2018 11:15 AM Gisele Dockery MD [facility] None   10/10/2018 10:45 AM Jewel Lu MD [facility] None   10/26/2018 11:40 AM Demar Stone MD [facility] None

## 2018-05-23 ENCOUNTER — TELEPHONE (OUTPATIENT)
Dept: INTERNAL MEDICINE | Facility: CLINIC | Age: 78
End: 2018-05-23

## 2018-05-23 NOTE — TELEPHONE ENCOUNTER
msg left informing pt. Also told pt to return call to office if she had any questions or concerns.    ----- Message from Gisele Dockery MD sent at 5/22/2018  4:28 PM EDT -----  Radiologist called mild OA in hip.

## 2018-05-25 ENCOUNTER — ANTICOAGULATION VISIT (OUTPATIENT)
Dept: INTERNAL MEDICINE | Facility: CLINIC | Age: 78
End: 2018-05-25

## 2018-05-25 DIAGNOSIS — I48.0 PAROXYSMAL ATRIAL FIBRILLATION (HCC): ICD-10-CM

## 2018-05-25 LAB — INR PPP: 5.1 (ref 0.9–1.1)

## 2018-05-25 PROCEDURE — 85610 PROTHROMBIN TIME: CPT | Performed by: INTERNAL MEDICINE

## 2018-05-25 PROCEDURE — 36416 COLLJ CAPILLARY BLOOD SPEC: CPT | Performed by: INTERNAL MEDICINE

## 2018-05-29 ENCOUNTER — OFFICE VISIT (OUTPATIENT)
Dept: PAIN MEDICINE | Facility: CLINIC | Age: 78
End: 2018-05-29

## 2018-05-29 VITALS
RESPIRATION RATE: 18 BRPM | SYSTOLIC BLOOD PRESSURE: 132 MMHG | TEMPERATURE: 98.1 F | WEIGHT: 198 LBS | HEIGHT: 63 IN | BODY MASS INDEX: 35.08 KG/M2 | DIASTOLIC BLOOD PRESSURE: 84 MMHG | HEART RATE: 55 BPM | OXYGEN SATURATION: 96 %

## 2018-05-29 DIAGNOSIS — G89.29 OTHER CHRONIC PAIN: Primary | ICD-10-CM

## 2018-05-29 DIAGNOSIS — M51.36 BULGE OF LUMBAR DISC WITHOUT MYELOPATHY: ICD-10-CM

## 2018-05-29 DIAGNOSIS — M54.5 LOW BACK PAIN, UNSPECIFIED BACK PAIN LATERALITY, UNSPECIFIED CHRONICITY, WITH SCIATICA PRESENCE UNSPECIFIED: ICD-10-CM

## 2018-05-29 DIAGNOSIS — M53.3 SACROILIAC PAIN: ICD-10-CM

## 2018-05-29 PROCEDURE — 99213 OFFICE O/P EST LOW 20 MIN: CPT | Performed by: NURSE PRACTITIONER

## 2018-05-29 RX ORDER — SOTALOL HYDROCHLORIDE 80 MG/1
TABLET ORAL
Qty: 30 TABLET | Refills: 0 | Status: SHIPPED | OUTPATIENT
Start: 2018-05-29 | End: 2018-06-30 | Stop reason: SDUPTHER

## 2018-05-29 NOTE — PROGRESS NOTES
CHIEF COMPLAINT  Back pain has increased since last visit. She states she received relief from her injection until she fell the first part of May and states her back and Left hip have increased pain.    Subjective   Inessa Peoples is a 78 y.o. female  who presents to the office for follow-up of procedure.  She completed a LEFT Sacroiliac Joint Injection   on  4/26/18 performed by Dr. Means for management of low back pain/sacroiliac joint pain. Patient reports significant relief from the procedure until she fell at the beginning of May.  Previous SI joint injections provided benefit for close to 6 months.      Back Pain   This is a chronic problem. The current episode started more than 1 year ago. The problem occurs intermittently. The pain is present in the lumbar spine and sacro-iliac. The quality of the pain is described as aching. The pain does not radiate. The pain is at a severity of 5/10. The patient is experiencing no pain. The pain is worse during the day. The symptoms are aggravated by bending, position, sitting, standing and twisting (housework). Pertinent negatives include no bladder incontinence, bowel incontinence, chest pain, dysuria, fever, headaches, numbness or weakness. Risk factors include obesity, menopause, history of cancer and lack of exercise. She has tried analgesics and heat (SI joint inejction) for the symptoms. The treatment provided significant relief.      PEG Assessment   What number best describes your pain on average in the past week?5  What number best describes how, during the past week, pain has interfered with your enjoyment of life?1  What number best describes how, during the past week, pain has interfered with your general activity?  1    The following portions of the patient's history were reviewed and updated as appropriate: allergies, current medications, past family history, past medical history, past social history, past surgical history and problem list.    Review of  "Systems   Constitutional: Negative for chills and fever.   Respiratory: Positive for shortness of breath.    Cardiovascular: Negative for chest pain.   Gastrointestinal: Negative for bowel incontinence, constipation, diarrhea, nausea and vomiting.   Genitourinary: Negative for bladder incontinence, difficulty urinating, dyspareunia and dysuria.   Musculoskeletal: Positive for back pain.   Neurological: Negative for dizziness, weakness, light-headedness, numbness and headaches.   Psychiatric/Behavioral: Negative for confusion, hallucinations, self-injury, sleep disturbance and suicidal ideas. The patient is not nervous/anxious.      Vitals:    05/29/18 1245   BP: 132/84   Pulse: 55   Resp: 18   Temp: 98.1 °F (36.7 °C)   SpO2: 96%   Weight: 89.8 kg (198 lb)   Height: 158.8 cm (62.52\")   PainSc:   5   PainLoc: Back     Objective   Physical Exam   Constitutional: She is oriented to person, place, and time. Vital signs are normal. She appears well-developed and well-nourished. She is cooperative.   HENT:   Head: Normocephalic and atraumatic.   Nose: Nose normal.   Eyes: Conjunctivae and lids are normal.   Neck: Trachea normal. Neck supple.   Cardiovascular: Normal rate.    Pulmonary/Chest: Effort normal. No respiratory distress.   Abdominal:   OBESE   Musculoskeletal:        Lumbar back: She exhibits tenderness and pain. She exhibits no bony tenderness.   + tenderness left SI  + LUKE left       Neurological: She is alert and oriented to person, place, and time. Gait normal.   Reflex Scores:       Patellar reflexes are 2+ on the right side and 2+ on the left side.  Skin: Skin is warm, dry and intact.   Psychiatric: She has a normal mood and affect. Her speech is normal and behavior is normal. Cognition and memory are normal.   Nursing note and vitals reviewed.    Assessment/Plan   Inessa was seen today for back pain.    Diagnoses and all orders for this visit:    Other chronic pain    Bulge of lumbar disc without " myelopathy    Low back pain, unspecified back pain laterality, unspecified chronicity, with sciatica presence unspecified    Sacroiliac pain      --- repeat left SI joint injection.  HOLD COUMADIN X 5 DAYS FOR PROCEDURE  --- Could consider RF in the future if no ongoing therapeutic benefit after the next SI joint injection  --- Follow-up after procedure         LUIS REPORT  LUIS report has been reviewed and scanned into the patient's chart.    As the clinician, I personally reviewed the LUIS from 5/25/2018 while the patient was in the office today.    EMR Dragon/Transcription disclaimer:   Much of this encounter note is an electronic transcription/translation of spoken language to printed text. The electronic translation of spoken language may permit erroneous, or at times, nonsensical words or phrases to be inadvertently transcribed; Although I have reviewed the note for such errors, some may still exist.

## 2018-05-30 ENCOUNTER — TRANSCRIBE ORDERS (OUTPATIENT)
Dept: ADMINISTRATIVE | Facility: HOSPITAL | Age: 78
End: 2018-05-30

## 2018-05-30 DIAGNOSIS — R13.19 OTHER DYSPHAGIA: Primary | ICD-10-CM

## 2018-06-01 ENCOUNTER — ANTICOAGULATION VISIT (OUTPATIENT)
Dept: INTERNAL MEDICINE | Facility: CLINIC | Age: 78
End: 2018-06-01

## 2018-06-01 DIAGNOSIS — I48.0 PAROXYSMAL ATRIAL FIBRILLATION (HCC): ICD-10-CM

## 2018-06-01 LAB — INR PPP: 1.4 (ref 0.9–1.1)

## 2018-06-01 PROCEDURE — 36416 COLLJ CAPILLARY BLOOD SPEC: CPT | Performed by: INTERNAL MEDICINE

## 2018-06-01 PROCEDURE — 85610 PROTHROMBIN TIME: CPT | Performed by: INTERNAL MEDICINE

## 2018-06-05 ENCOUNTER — HOSPITAL ENCOUNTER (OUTPATIENT)
Dept: GENERAL RADIOLOGY | Facility: HOSPITAL | Age: 78
Discharge: HOME | End: 2018-06-05
Attending: OTOLARYNGOLOGY | Admitting: OTOLARYNGOLOGY

## 2018-06-05 DIAGNOSIS — R13.19 OTHER DYSPHAGIA: ICD-10-CM

## 2018-06-05 PROCEDURE — 92611 MOTION FLUOROSCOPY/SWALLOW: CPT

## 2018-06-05 PROCEDURE — 74230 X-RAY XM SWLNG FUNCJ C+: CPT

## 2018-06-05 PROCEDURE — G8997 SWALLOW GOAL STATUS: HCPCS

## 2018-06-05 PROCEDURE — G8998 SWALLOW D/C STATUS: HCPCS

## 2018-06-05 PROCEDURE — G8996 SWALLOW CURRENT STATUS: HCPCS

## 2018-06-05 RX ADMIN — BARIUM SULFATE 65 ML: 960 POWDER, FOR SUSPENSION ORAL at 09:37

## 2018-06-05 RX ADMIN — BARIUM SULFATE 50 ML: 400 SUSPENSION ORAL at 09:38

## 2018-06-05 RX ADMIN — BARIUM SULFATE 8 ML: 980 POWDER, FOR SUSPENSION ORAL at 09:39

## 2018-06-08 ENCOUNTER — ANTICOAGULATION VISIT (OUTPATIENT)
Dept: INTERNAL MEDICINE | Facility: CLINIC | Age: 78
End: 2018-06-08

## 2018-06-08 DIAGNOSIS — I48.0 PAROXYSMAL ATRIAL FIBRILLATION (HCC): ICD-10-CM

## 2018-06-08 DIAGNOSIS — I10 ESSENTIAL (PRIMARY) HYPERTENSION: Primary | ICD-10-CM

## 2018-06-08 LAB — INR PPP: 1.7 (ref 0.9–1.1)

## 2018-06-08 PROCEDURE — 36416 COLLJ CAPILLARY BLOOD SPEC: CPT | Performed by: INTERNAL MEDICINE

## 2018-06-08 PROCEDURE — 85610 PROTHROMBIN TIME: CPT | Performed by: INTERNAL MEDICINE

## 2018-06-09 LAB
ALBUMIN SERPL-MCNC: 4.1 G/DL (ref 3.5–5.2)
ALBUMIN/GLOB SERPL: 1.6 G/DL
ALP SERPL-CCNC: 83 U/L (ref 39–117)
ALT SERPL-CCNC: 19 U/L (ref 1–33)
AST SERPL-CCNC: 15 U/L (ref 1–32)
BILIRUB SERPL-MCNC: 0.6 MG/DL (ref 0.1–1.2)
BUN SERPL-MCNC: 19 MG/DL (ref 8–23)
BUN/CREAT SERPL: 19.2 (ref 7–25)
CALCIUM SERPL-MCNC: 9.9 MG/DL (ref 8.6–10.5)
CHLORIDE SERPL-SCNC: 103 MMOL/L (ref 98–107)
CO2 SERPL-SCNC: 23.2 MMOL/L (ref 22–29)
CREAT SERPL-MCNC: 0.99 MG/DL (ref 0.57–1)
GFR SERPLBLD CREATININE-BSD FMLA CKD-EPI: 55 ML/MIN/1.73
GFR SERPLBLD CREATININE-BSD FMLA CKD-EPI: 66 ML/MIN/1.73
GLOBULIN SER CALC-MCNC: 2.6 GM/DL
GLUCOSE SERPL-MCNC: 93 MG/DL (ref 65–99)
POTASSIUM SERPL-SCNC: 4.5 MMOL/L (ref 3.5–5.2)
PROT SERPL-MCNC: 6.7 G/DL (ref 6–8.5)
SODIUM SERPL-SCNC: 139 MMOL/L (ref 136–145)

## 2018-06-11 RX ORDER — CELECOXIB 200 MG/1
CAPSULE ORAL
Qty: 30 CAPSULE | Refills: 0 | Status: SHIPPED | OUTPATIENT
Start: 2018-06-11 | End: 2018-07-12 | Stop reason: SDUPTHER

## 2018-06-12 ENCOUNTER — OFFICE VISIT (OUTPATIENT)
Dept: INTERNAL MEDICINE | Facility: CLINIC | Age: 78
End: 2018-06-12

## 2018-06-12 VITALS
SYSTOLIC BLOOD PRESSURE: 148 MMHG | BODY MASS INDEX: 35.97 KG/M2 | HEART RATE: 69 BPM | OXYGEN SATURATION: 97 % | DIASTOLIC BLOOD PRESSURE: 80 MMHG | WEIGHT: 200 LBS

## 2018-06-12 DIAGNOSIS — I10 ESSENTIAL HYPERTENSION: Primary | ICD-10-CM

## 2018-06-12 DIAGNOSIS — R79.89 ELEVATED SERUM CREATININE: ICD-10-CM

## 2018-06-12 PROCEDURE — 99213 OFFICE O/P EST LOW 20 MIN: CPT | Performed by: INTERNAL MEDICINE

## 2018-06-12 RX ORDER — LOSARTAN POTASSIUM AND HYDROCHLOROTHIAZIDE 12.5; 1 MG/1; MG/1
1 TABLET ORAL DAILY
Qty: 90 TABLET | Refills: 3 | Status: SHIPPED | OUTPATIENT
Start: 2018-06-12 | End: 2018-10-26

## 2018-06-12 NOTE — PATIENT INSTRUCTIONS
"DASH Eating Plan  DASH stands for \"Dietary Approaches to Stop Hypertension.\" The DASH eating plan is a healthy eating plan that has been shown to reduce high blood pressure (hypertension). It may also reduce your risk for type 2 diabetes, heart disease, and stroke. The DASH eating plan may also help with weight loss.  What are tips for following this plan?  General guidelines   · Avoid eating more than 2,300 mg (milligrams) of salt (sodium) a day. If you have hypertension, you may need to reduce your sodium intake to 1,500 mg a day.  · Limit alcohol intake to no more than 1 drink a day for nonpregnant women and 2 drinks a day for men. One drink equals 12 oz of beer, 5 oz of wine, or 1½ oz of hard liquor.  · Work with your health care provider to maintain a healthy body weight or to lose weight. Ask what an ideal weight is for you.  · Get at least 30 minutes of exercise that causes your heart to beat faster (aerobic exercise) most days of the week. Activities may include walking, swimming, or biking.  · Work with your health care provider or diet and nutrition specialist (dietitian) to adjust your eating plan to your individual calorie needs.  Reading food labels   · Check food labels for the amount of sodium per serving. Choose foods with less than 5 percent of the Daily Value of sodium. Generally, foods with less than 300 mg of sodium per serving fit into this eating plan.  · To find whole grains, look for the word \"whole\" as the first word in the ingredient list.  Shopping   · Buy products labeled as \"low-sodium\" or \"no salt added.\"  · Buy fresh foods. Avoid canned foods and premade or frozen meals.  Cooking   · Avoid adding salt when cooking. Use salt-free seasonings or herbs instead of table salt or sea salt. Check with your health care provider or pharmacist before using salt substitutes.  · Do not mcdonald foods. Cook foods using healthy methods such as baking, boiling, grilling, and broiling instead.  · Cook with " heart-healthy oils, such as olive, canola, soybean, or sunflower oil.  Meal planning     · Eat a balanced diet that includes:  ¨ 5 or more servings of fruits and vegetables each day. At each meal, try to fill half of your plate with fruits and vegetables.  ¨ Up to 6-8 servings of whole grains each day.  ¨ Less than 6 oz of lean meat, poultry, or fish each day. A 3-oz serving of meat is about the same size as a deck of cards. One egg equals 1 oz.  ¨ 2 servings of low-fat dairy each day.  ¨ A serving of nuts, seeds, or beans 5 times each week.  ¨ Heart-healthy fats. Healthy fats called Omega-3 fatty acids are found in foods such as flaxseeds and coldwater fish, like sardines, salmon, and mackerel.  · Limit how much you eat of the following:  ¨ Canned or prepackaged foods.  ¨ Food that is high in trans fat, such as fried foods.  ¨ Food that is high in saturated fat, such as fatty meat.  ¨ Sweets, desserts, sugary drinks, and other foods with added sugar.  ¨ Full-fat dairy products.  · Do not salt foods before eating.  · Try to eat at least 2 vegetarian meals each week.  · Eat more home-cooked food and less restaurant, buffet, and fast food.  · When eating at a restaurant, ask that your food be prepared with less salt or no salt, if possible.  What foods are recommended?  The items listed may not be a complete list. Talk with your dietitian about what dietary choices are best for you.  Grains   Whole-grain or whole-wheat bread. Whole-grain or whole-wheat pasta. Brown rice. Oatmeal. Quinoa. Bulgur. Whole-grain and low-sodium cereals. Elyssa bread. Low-fat, low-sodium crackers. Whole-wheat flour tortillas.  Vegetables   Fresh or frozen vegetables (raw, steamed, roasted, or grilled). Low-sodium or reduced-sodium tomato and vegetable juice. Low-sodium or reduced-sodium tomato sauce and tomato paste. Low-sodium or reduced-sodium canned vegetables.  Fruits   All fresh, dried, or frozen fruit. Canned fruit in natural juice  (without added sugar).  Meat and other protein foods   Skinless chicken or turkey. Ground chicken or turkey. Pork with fat trimmed off. Fish and seafood. Egg whites. Dried beans, peas, or lentils. Unsalted nuts, nut butters, and seeds. Unsalted canned beans. Lean cuts of beef with fat trimmed off. Low-sodium, lean deli meat.  Dairy   Low-fat (1%) or fat-free (skim) milk. Fat-free, low-fat, or reduced-fat cheeses. Nonfat, low-sodium ricotta or cottage cheese. Low-fat or nonfat yogurt. Low-fat, low-sodium cheese.  Fats and oils   Soft margarine without trans fats. Vegetable oil. Low-fat, reduced-fat, or light mayonnaise and salad dressings (reduced-sodium). Canola, safflower, olive, soybean, and sunflower oils. Avocado.  Seasoning and other foods   Herbs. Spices. Seasoning mixes without salt. Unsalted popcorn and pretzels. Fat-free sweets.  What foods are not recommended?  The items listed may not be a complete list. Talk with your dietitian about what dietary choices are best for you.  Grains   Baked goods made with fat, such as croissants, muffins, or some breads. Dry pasta or rice meal packs.  Vegetables   Creamed or fried vegetables. Vegetables in a cheese sauce. Regular canned vegetables (not low-sodium or reduced-sodium). Regular canned tomato sauce and paste (not low-sodium or reduced-sodium). Regular tomato and vegetable juice (not low-sodium or reduced-sodium). Pickles. Olives.  Fruits   Canned fruit in a light or heavy syrup. Fried fruit. Fruit in cream or butter sauce.  Meat and other protein foods   Fatty cuts of meat. Ribs. Fried meat. Anand. Sausage. Bologna and other processed lunch meats. Salami. Fatback. Hotdogs. Bratwurst. Salted nuts and seeds. Canned beans with added salt. Canned or smoked fish. Whole eggs or egg yolks. Chicken or turkey with skin.  Dairy   Whole or 2% milk, cream, and half-and-half. Whole or full-fat cream cheese. Whole-fat or sweetened yogurt. Full-fat cheese. Nondairy creamers.  Whipped toppings. Processed cheese and cheese spreads.  Fats and oils   Butter. Stick margarine. Lard. Shortening. Ghee. Anand fat. Tropical oils, such as coconut, palm kernel, or palm oil.  Seasoning and other foods   Salted popcorn and pretzels. Onion salt, garlic salt, seasoned salt, table salt, and sea salt. Worcestershire sauce. Tartar sauce. Barbecue sauce. Teriyaki sauce. Soy sauce, including reduced-sodium. Steak sauce. Canned and packaged gravies. Fish sauce. Oyster sauce. Cocktail sauce. Horseradish that you find on the shelf. Ketchup. Mustard. Meat flavorings and tenderizers. Bouillon cubes. Hot sauce and Tabasco sauce. Premade or packaged marinades. Premade or packaged taco seasonings. Relishes. Regular salad dressings.  Where to find more information:  · National Heart, Lung, and Blood [place]: www.nhlbi.nih.gov  · American Heart Association: www.heart.org  Summary  · The DASH eating plan is a healthy eating plan that has been shown to reduce high blood pressure (hypertension). It may also reduce your risk for type 2 diabetes, heart disease, and stroke.  · With the DASH eating plan, you should limit salt (sodium) intake to 2,300 mg a day. If you have hypertension, you may need to reduce your sodium intake to 1,500 mg a day.  · When on the DASH eating plan, aim to eat more fresh fruits and vegetables, whole grains, lean proteins, low-fat dairy, and heart-healthy fats.  · Work with your health care provider or diet and nutrition specialist (dietitian) to adjust your eating plan to your individual calorie needs.  This information is not intended to replace advice given to you by your health care provider. Make sure you discuss any questions you have with your health care provider.  Document Released: 12/06/2012 Document Revised: 12/11/2017 Document Reviewed: 12/11/2017  [employer] Patient Education © 2017 [employer]

## 2018-06-12 NOTE — PROGRESS NOTES
Subjective     Inessa Peoples is a 78 y.o. female who presents with   Chief Complaint   Patient presents with   • Bilateral Edema       History of Present Illness     Off HCTZ she has started to swell.  She wants to go back on.  She is trying to be a better water drinker.    HTN.  She is under fair control at home.  Reviewed recent labs, kidneys are improved.      Review of Systems    The following portions of the patient's history were reviewed and updated as appropriate: allergies, current medications and problem list.    Patient Active Problem List    Diagnosis Date Noted   • Class 2 obesity due to excess calories with body mass index (BMI) of 35.0 to 35.9 in adult 03/27/2018   • Chronic pain 07/13/2017   • Bulge of lumbar disc without myelopathy 07/13/2017   • Sacroiliac inflammation 07/13/2017   • Sacroiliac pain 07/13/2017   • Dilation of thoracic aorta 06/23/2017   • Chronic anticoagulation 05/15/2017   • Diverticulitis of large intestine without perforation or abscess without bleeding 01/03/2017   • ANY on CPAP + 10 - Dr Lu 10/15/2016   • History of melanoma excision 08/05/2016   • GERD (gastroesophageal reflux disease) 08/05/2016   • History of kidney stones 08/05/2016   • Sarcoidosis 04/18/2016   • 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       • Dyspnea on exertion 04/18/2016   • Chronic osteoarthritis 04/18/2016   • History of DVT (deep vein thrombosis) 04/18/2016     Note Last Updated: 4/18/2016     Description: developed unprovoked on Xarelto.     • Atrial fibrillation 04/18/2016       Current Outpatient Prescriptions on File Prior to Visit   Medication Sig Dispense Refill   •  albuterol (PROVENTIL HFA;VENTOLIN HFA) 108 (90 BASE) MCG/ACT inhaler Inhale 2 puffs Every 4 (Four) Hours As Needed for Wheezing. 1 inhaler 0   • Calcium-Phosphorus-Vitamin D (CITRACAL +D3 PO) Take 1 tablet by mouth Daily.     • celecoxib (CeleBREX) 200 MG capsule TAKE ONE CAPSULE BY MOUTH DAILY 30 capsule 0   • ezetimibe (ZETIA) 10 MG tablet TAKE ONE TABLET BY MOUTH DAILY 30 tablet 9   • levothyroxine (SYNTHROID, LEVOTHROID) 25 MCG tablet TAKE ONE TABLET BY MOUTH DAILY AS DIRECTED 30 tablet 2   • Melatonin 3 MG tablet Take  by mouth.     • Multiple Vitamin (MULTI-DAY VITAMINS) tablet Take 1 tablet by mouth daily.     • omeprazole (PriLOSEC) 40 MG capsule Take 40 mg by mouth Daily.     • PENNSAID 2 % solution      • sotalol (BETAPACE) 80 MG tablet TAKE ONE TABLET BY MOUTH DAILY 30 tablet 0   • spironolactone (ALDACTONE) 25 MG tablet TAKE ONE TABLET BY MOUTH DAILY 30 tablet 2   • traMADol (ULTRAM) 50 MG tablet Take 1 tablet by mouth 3 (Three) Times a Day. 90 tablet 1   • warfarin (COUMADIN) 5 MG tablet TAKE ONE TABLET BY MOUTH DAILY 30 tablet 0   • [DISCONTINUED] losartan (COZAAR) 100 MG tablet Take 1 tablet by mouth Daily. 90 tablet 3     No current facility-administered medications on file prior to visit.        Objective     /80   Pulse 69   Wt 90.7 kg (200 lb)   SpO2 97%   BMI 35.97 kg/m²     Physical Exam   Constitutional: She is oriented to person, place, and time. She appears well-developed and well-nourished.   HENT:   Head: Normocephalic and atraumatic.   Cardiovascular: Normal rate, regular rhythm and normal heart sounds.    No LE edema   Pulmonary/Chest: Effort normal and breath sounds normal.   Neurological: She is alert and oriented to person, place, and time.   Skin: Skin is warm and dry.   Psychiatric: She has a normal mood and affect. Her behavior is normal.       Assessment/Plan   Inessa was seen today for bilateral edema.    Diagnoses and all orders for this visit:    Essential  hypertension    Elevated serum creatinine  -     Basic Metabolic Panel    Other orders  -     losartan-hydrochlorothiazide (HYZAAR) 100-12.5 MG per tablet; Take 1 tablet by mouth Daily.        Discussion  Patient presents in f/u of kidney function and HTN.  She request to return back to losartan HCT.  She will continue to work on water consumption.  Recheck BMP when she has her next protime.         Future Appointments  Date Time Provider Department Center   6/18/2018 1:00 PM [facility] None   6/18/2018 1:00 PM [facility] None   6/19/2018 11:00 AM [facility] None   9/14/2018 1:00 PM Gisele Dockery MD [facility] None   10/10/2018 10:45 AM Jewel Lu MD [facility] None   10/26/2018 11:40 AM Demar Stone MD [facility] None

## 2018-06-18 ENCOUNTER — ANTICOAGULATION VISIT (OUTPATIENT)
Dept: INTERNAL MEDICINE | Facility: CLINIC | Age: 78
End: 2018-06-18

## 2018-06-18 DIAGNOSIS — I48.0 PAROXYSMAL ATRIAL FIBRILLATION (HCC): ICD-10-CM

## 2018-06-18 LAB — INR PPP: 1.8 (ref 0.9–1.1)

## 2018-06-18 PROCEDURE — 85610 PROTHROMBIN TIME: CPT | Performed by: INTERNAL MEDICINE

## 2018-06-18 PROCEDURE — 36416 COLLJ CAPILLARY BLOOD SPEC: CPT | Performed by: INTERNAL MEDICINE

## 2018-06-19 ENCOUNTER — OFFICE VISIT (OUTPATIENT)
Dept: SURGERY | Facility: CLINIC | Age: 78
End: 2018-06-19

## 2018-06-19 VITALS — WEIGHT: 196.3 LBS | OXYGEN SATURATION: 98 % | HEART RATE: 72 BPM | HEIGHT: 63 IN | BODY MASS INDEX: 34.78 KG/M2

## 2018-06-19 DIAGNOSIS — Z86.79 HISTORY OF ATRIAL FIBRILLATION: ICD-10-CM

## 2018-06-19 DIAGNOSIS — K21.9 GASTROESOPHAGEAL REFLUX DISEASE, ESOPHAGITIS PRESENCE NOT SPECIFIED: ICD-10-CM

## 2018-06-19 DIAGNOSIS — Z86.718 HISTORY OF DVT OF LOWER EXTREMITY: ICD-10-CM

## 2018-06-19 DIAGNOSIS — G47.30 SLEEP APNEA WITH USE OF CONTINUOUS POSITIVE AIRWAY PRESSURE (CPAP): ICD-10-CM

## 2018-06-19 DIAGNOSIS — Z87.39 HISTORY OF OSTEOARTHRITIS: ICD-10-CM

## 2018-06-19 DIAGNOSIS — Z80.0 FAMILY HISTORY OF COLON CANCER: ICD-10-CM

## 2018-06-19 DIAGNOSIS — Z12.11 COLON CANCER SCREENING: Primary | ICD-10-CM

## 2018-06-19 DIAGNOSIS — Z79.01 ANTICOAGULATED ON COUMADIN: ICD-10-CM

## 2018-06-19 LAB
BUN SERPL-MCNC: 30 MG/DL (ref 8–23)
BUN/CREAT SERPL: 26.1 (ref 7–25)
CALCIUM SERPL-MCNC: 10.6 MG/DL (ref 8.6–10.5)
CHLORIDE SERPL-SCNC: 102 MMOL/L (ref 98–107)
CO2 SERPL-SCNC: 23.9 MMOL/L (ref 22–29)
CREAT SERPL-MCNC: 1.15 MG/DL (ref 0.57–1)
GFR SERPLBLD CREATININE-BSD FMLA CKD-EPI: 46 ML/MIN/1.73
GFR SERPLBLD CREATININE-BSD FMLA CKD-EPI: 56 ML/MIN/1.73
GLUCOSE SERPL-MCNC: 135 MG/DL (ref 65–99)
POTASSIUM SERPL-SCNC: 4.1 MMOL/L (ref 3.5–5.2)
SODIUM SERPL-SCNC: 140 MMOL/L (ref 136–145)

## 2018-06-19 PROCEDURE — 99214 OFFICE O/P EST MOD 30 MIN: CPT | Performed by: PHYSICIAN ASSISTANT

## 2018-06-19 NOTE — PROGRESS NOTES
CC:   Chief Complaint   Patient presents with   • Colonoscopy      Subjective:   Inessa Peoples is a 78 y.o. female who is seen today for evaluation of GI symptoms and Coumadin usage at the referral of Dr. Gisele Dockery.     She reports that she had a DVT 3 years ago and was then prescribed Coumadin. She denies any surgery or travel prior to the DVT that would have precipitated it. She denies any history of Factor V Leiden. She reports that she has been off of her Coumadin for sciatic nerve shots, but denies taking any Lovenox injections during that time. Her last INR was drawn on 6/18/2018 and was found to be 1.8. She reports that she is having another one drawn on 6/25/2018.     She also endorses a history of GERD, for which she takes Omeprazole. She states that this controls her symptoms without any nocturnal aspiration.     Regarding lower GI symptoms, she reports some occasional left-sided abdominal pain but that is not worsening. She denies any rectal bleeding or changes in her stools.     She denies any family history of upper GI malignancies. She has a family history of Colon Cancer in her brother and sister.     Her last colonoscopy was performed in 2013 with Dr. Castro. Findings included mild sigmoid diverticulosis. Recommended follow up in 5 years due to her sister's history of colon cancer.     Past Medical History:   Diagnosis Date   • Abnormal ECG    • Allergic rhinitis    • Atrial fibrillation    • Bleeds easily     warfarin   • Breast screening    • Broken bones    • Calf DVT (deep venous thrombosis)    • Chronic anticoagulation    • Colon polyp    • CTS (carpal tunnel syndrome)    • Deep vein thrombosis     left calf   • Diverticulitis of colon    • AHN (dyspnea on exertion)    • Fatigue    • H/O malignant melanoma of skin    • Hematuria, gross    • History of kidney stones    • Hyperlipidemia    • Hypertension    • Hyperthyroidism    • IFG (impaired fasting glucose)    • Insomnia    • LBBB  (left bundle branch block)    • Left leg swelling    • LLQ abdominal pain    • Long term current use of systemic steroids    • Low back pain    • Malignant melanoma of skin    • Neck pain    • Obesity    • Osteoarthritis    • Osteopenia    • Paralysis, diaphragm    • 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 for tuberculosis    • Rash    • RBBB (right bundle branch block)    • Renal calculi    • Sarcoid    • Sigmoid diverticulosis 06/26/2013   • Sleep apnea     on CPAP   • UTI (urinary tract infection)    • Visit for screening mammogram      Past Surgical History:   Procedure Laterality Date   • CARPAL TUNNEL RELEASE Bilateral 1980'S    [facility]   • CATARACT EXTRACTION Bilateral 2001    Dr. Nic Hidalgo   • COLONOSCOPY N/A 06/26/2013    MILD SIGMOID DIVERTICULOSIS, repeat 5 years based on sister w/ colona cancer-DR. TANISHA THORNTON   • COLONOSCOPY N/A 10/19/2010    SIGMOID DIVERTICULOSIS & INTERNAL HEMORRHOIDS, HEMORRHOIDAL BANDING X4, DR. TANISHA THORNTON   • COLONOSCOPY W/ BIOPSIES N/A 05/12/2008    RECTUM BIOPSY: COLONIC MUCOSA W/ FOCAL INCREASE OF INFLAMMATORY CELLS IN THE MUSCULARIS MUCOSA INCLUDING EOSINOPHILS, SIGMOID DIVERTICULOSIS, POSSIBLE PROCTITIS, DR. TANISHA THORNTON   • CYSTOSCOPY, RETROGRADE PYELOGRAM AND STENT INSERTION Bilateral 10/10/2014    w/ Right ureteral pyeloscopy & laser lithotripsy, Right Double-J stent placement-Dr. Julio Tai   • EXTRACORPOREAL SHOCK WAVE LITHOTRIPSY (ESWL) Right 09/10/2009    DR. JULIO TAI   • LAPAROSCOPIC CHOLECYSTECTOMY N/A 07/19/2012    DR. TANISHA THORNTON   • LUMBAR EPIDURAL INJECTION N/A 04/23/2015    LUMBAR EPIDURAL STEROID INJECTION X3   • PARATHYROIDECTOMY N/A 2001   • SKIN CANCER EXCISION     • THUMB ARTHROSCOPY Right 1982   • TOTAL KNEE ARTHROPLASTY Right 03/25/2014    [employer] [facility] total knee, 55 femoral component, 71 tibial tray w/ a 40 mm stem, 34 mm x 7.8 mm three-peg patella, and a 14 standard poly-Dr. Ty Canchola         Current Outpatient  Prescriptions:   •  Calcium-Phosphorus-Vitamin D (CITRACAL +D3 PO), Take 1 tablet by mouth Daily., Disp: , Rfl:   •  celecoxib (CeleBREX) 200 MG capsule, TAKE ONE CAPSULE BY MOUTH DAILY, Disp: 30 capsule, Rfl: 0  •  ezetimibe (ZETIA) 10 MG tablet, TAKE ONE TABLET BY MOUTH DAILY, Disp: 30 tablet, Rfl: 9  •  fluocinonide (LIDEX) 0.05 % external solution, Apply 1 application topically 2 (Two) Times a Day., Disp: , Rfl:   •  levothyroxine (SYNTHROID, LEVOTHROID) 25 MCG tablet, TAKE ONE TABLET BY MOUTH DAILY AS DIRECTED, Disp: 30 tablet, Rfl: 2  •  losartan-hydrochlorothiazide (HYZAAR) 100-12.5 MG per tablet, Take 1 tablet by mouth Daily., Disp: 90 tablet, Rfl: 3  •  melatonin 5 MG sublingual tablet sublingual tablet, Take 5 mg by mouth Every Night., Disp: , Rfl:   •  Multiple Vitamin (MULTI-DAY VITAMINS) tablet, Take 1 tablet by mouth daily., Disp: , Rfl:   •  omeprazole (PriLOSEC) 40 MG capsule, Take 40 mg by mouth Daily., Disp: , Rfl:   •  sotalol (BETAPACE) 80 MG tablet, TAKE ONE TABLET BY MOUTH DAILY, Disp: 30 tablet, Rfl: 0  •  spironolactone (ALDACTONE) 25 MG tablet, TAKE ONE TABLET BY MOUTH DAILY, Disp: 30 tablet, Rfl: 2  •  traMADol (ULTRAM) 50 MG tablet, Take 1 tablet by mouth 3 (Three) Times a Day. (Patient taking differently: Take 50 mg by mouth Daily.), Disp: 90 tablet, Rfl: 1  •  warfarin (COUMADIN) 5 MG tablet, TAKE ONE TABLET BY MOUTH DAILY, Disp: 30 tablet, Rfl: 0  •  PENNSAID 2 % solution, , Disp: , Rfl:       Allergies   Allergen Reactions   • Iodinated Diagnostic Agents Swelling and Other (See Comments)     PAINS ACROSS CHEST, Facial swelling   • Fenofibrate Myalgia   • Livalo [Pitavastatin] Myalgia     Joint Pain, Muscle tightness   • Plaquenil [Hydroxychloroquine Sulfate] Myalgia     Joint Pain, Muscle Tightness   • Statins Myalgia     Joint pain, Muscle Tightness       Family History   Problem Relation Age of Onset   • Heart disease Mother    • Diabetes type II Mother    • Heart disease Father    •  "Cancer Sister    • Heart disease Brother         CABG   • Diabetes type II Brother    • Colon cancer Brother    • Colon polyps Brother    • Heart disease Sister    • Aneurysm Sister         abdominal aorta   • Colon cancer Sister    • Diabetes type II Sister    • Lung cancer Sister    • Colon polyps Sister    • COPD Sister      Social History   Substance Use Topics   • Smoking status: Never Smoker   • Smokeless tobacco: Never Used   • Alcohol use Yes      Comment: social drinker     Occupation/Social: She is  and would be helped by her spouse if she needed surgery. She does not smoke, she drinks socially. She is under a mild level of stress and the most strenuous activity that she performs is house chores.     Preventative Medicine  Colonoscopy: 6/26/2013 Dr. Castro. Sigmoid diverticulosis found. Recommended 5 year follow up due to sister with colon cancer  Mammogram: Dec 2017    Review of Systems   Constitutional: Positive for fatigue.   HENT: Positive for congestion, postnasal drip, rhinorrhea, sneezing, trouble swallowing and voice change.    Respiratory: Positive for apnea, cough, choking and shortness of breath.    Gastrointestinal: Positive for abdominal pain and diarrhea.        Reflux   Genitourinary: Positive for flank pain and urgency.   Musculoskeletal: Positive for arthralgias, back pain, gait problem, joint swelling, myalgias, neck pain and neck stiffness.   Skin:        Breast tenderness   Allergic/Immunologic: Positive for environmental allergies and immunocompromised state.   Neurological: Positive for weakness.   Psychiatric/Behavioral: Positive for dysphoric mood.   All other systems reviewed and are negative.    Objective:   Vitals:    06/19/18 1101   Pulse: 72   SpO2: 98%   Weight: 89 kg (196 lb 4.8 oz)   Height: 158.8 cm (62.5\")     Body mass index is 35.33 kg/m².    Physical Exam   Constitutional: She is oriented to person, place, and time. She appears well-developed and well-nourished. "   HENT:   Head: Normocephalic and atraumatic.   Eyes: Conjunctivae are normal. No scleral icterus.   Cardiovascular: Normal rate and regular rhythm.    No murmur heard.  Pulmonary/Chest: Effort normal and breath sounds normal. She has no wheezes.   Abdominal: Soft. Bowel sounds are normal. She exhibits no distension. There is tenderness ( mild, left lower quadrant to palpation).   Musculoskeletal: Normal range of motion. She exhibits no deformity.   Neurological: She is alert and oriented to person, place, and time.   Skin: Skin is warm and dry.   Psychiatric: She has a normal mood and affect. Her behavior is normal.     1. Colon cancer screening    2. Family history of colon cancer in brother and sister    3. GERD, controlled on Omeprazole    4. History of DVT of lower extremity, on Coumadin    5. Anticoagulated on Coumadin, INR 1.8 on 6/18/2018    6. History of atrial fibrillation    7. Sleep apnea with use of continuous positive airway pressure (CPAP)    8. History of osteoarthritis, on Celebrex      Discussed at today's office visit were her symptoms of GERD, which are controlled on Omeprazole and do not include any nocturnal aspiration. She is due for a screening colonoscopy, and does not have any lower GI symptoms at this time. The risks and rationale associated with colonoscopy were discussed with the patient at the time of the visit. Any additional follow up will be arranged after the results of the colonoscopy have been reviewed    Also discussed is her need for Coumadin anticoagulation with her history of DVT. She will contact Dr. Dockery's office for approval to be off of her Coumadin for 5 days prior to Cscope, as well as to discuss if Lovenox bridging will be necessary.     She was instructed to bring her CPAP the day of the procedure.     Discussed Celebrex for osteoarthritis, and recommended she hold it for 5 days. She feels that she can hold it for 5 days prior to colonoscopy.     She verbalized  agreement with and understanding of the plan.       Addendum 6/25/2018: Discussed with Dr. Castro, who recommended holding Coumadin 4 days, no Lovenox bridge, and no need to contact Dr. Dockery. Called patient with updated instructions. She verbalized agreement with and understanding of the plan and had no further questions.

## 2018-06-19 NOTE — PATIENT INSTRUCTIONS
Bring your CPAP the day of your procedure  Please check with Dr. Dockery's office regarding holding Coumadin prior to your colonoscopy: if it is ok to hold for 5 days and whether she would like Lovenox bridging during that time.

## 2018-06-25 RX ORDER — WARFARIN SODIUM 5 MG/1
TABLET ORAL
Qty: 30 TABLET | Refills: 2 | Status: SHIPPED | OUTPATIENT
Start: 2018-06-25 | End: 2018-10-21 | Stop reason: SDUPTHER

## 2018-07-02 ENCOUNTER — ANTICOAGULATION VISIT (OUTPATIENT)
Dept: INTERNAL MEDICINE | Facility: CLINIC | Age: 78
End: 2018-07-02

## 2018-07-02 DIAGNOSIS — I48.0 PAROXYSMAL ATRIAL FIBRILLATION (HCC): ICD-10-CM

## 2018-07-02 LAB — INR PPP: 2.7 (ref 0.9–1.1)

## 2018-07-02 PROCEDURE — 85610 PROTHROMBIN TIME: CPT | Performed by: INTERNAL MEDICINE

## 2018-07-02 PROCEDURE — 36416 COLLJ CAPILLARY BLOOD SPEC: CPT | Performed by: INTERNAL MEDICINE

## 2018-07-02 RX ORDER — SOTALOL HYDROCHLORIDE 80 MG/1
TABLET ORAL
Qty: 30 TABLET | Refills: 0 | Status: SHIPPED | OUTPATIENT
Start: 2018-07-02 | End: 2018-07-29 | Stop reason: SDUPTHER

## 2018-07-05 RX ORDER — TRAMADOL HYDROCHLORIDE 50 MG/1
TABLET ORAL
Qty: 90 TABLET | Refills: 5 | Status: SHIPPED | OUTPATIENT
Start: 2018-07-05 | End: 2020-05-11

## 2018-07-06 ENCOUNTER — OFFICE VISIT (OUTPATIENT)
Dept: INTERNAL MEDICINE | Facility: CLINIC | Age: 78
End: 2018-07-06

## 2018-07-06 VITALS
SYSTOLIC BLOOD PRESSURE: 114 MMHG | HEART RATE: 56 BPM | WEIGHT: 198 LBS | OXYGEN SATURATION: 96 % | TEMPERATURE: 97.8 F | HEIGHT: 63 IN | DIASTOLIC BLOOD PRESSURE: 78 MMHG | BODY MASS INDEX: 35.08 KG/M2

## 2018-07-06 DIAGNOSIS — J20.8 ACUTE BRONCHITIS DUE TO OTHER SPECIFIED ORGANISMS: Primary | ICD-10-CM

## 2018-07-06 PROCEDURE — 99213 OFFICE O/P EST LOW 20 MIN: CPT | Performed by: INTERNAL MEDICINE

## 2018-07-06 RX ORDER — DOXYCYCLINE 100 MG/1
100 CAPSULE ORAL EVERY 12 HOURS SCHEDULED
Qty: 20 CAPSULE | Refills: 0 | Status: SHIPPED | OUTPATIENT
Start: 2018-07-06 | End: 2018-09-14

## 2018-07-06 NOTE — PROGRESS NOTES
Subjective     Inessa Peoples is a 78 y.o. female who presents with   Chief Complaint   Patient presents with   • Cough   • URI   • Diarrhea       History of Present Illness     Start one week ago with cough and congestion.  SOA and wheezes is associated.  She is very achy. Ears hurt.  NO sinus pain.  Sore throat is associated.  No chest pain.  No fever.  She was started on cefdinir by her daughter four days ago.      Review of Systems   Constitutional: Positive for fever.   Respiratory: Positive for cough, shortness of breath and wheezing.    Cardiovascular: Negative for chest pain.   Gastrointestinal: Positive for diarrhea.   Musculoskeletal: Positive for back pain.       The following portions of the patient's history were reviewed and updated as appropriate: allergies, current medications and problem list.    Patient Active Problem List    Diagnosis Date Noted   • Family history of colon cancer 06/19/2018     Note Last Updated: 6/19/2018     Added automatically from request for surgery [id number]     • Colon cancer screening 06/19/2018     Note Last Updated: 6/19/2018     Added automatically from request for surgery [id number]     • Class 2 obesity due to excess calories with body mass index (BMI) of 35.0 to 35.9 in adult 03/27/2018   • Chronic pain 07/13/2017   • Bulge of lumbar disc without myelopathy 07/13/2017   • Sacroiliac inflammation (CMS/HCC) 07/13/2017   • Sacroiliac pain 07/13/2017   • Dilation of thoracic aorta (CMS/HCC) 06/23/2017   • Chronic anticoagulation 05/15/2017   • Diverticulitis of large intestine without perforation or abscess without bleeding 01/03/2017   • ANY on CPAP + 10 - Dr Lu 10/15/2016   • History of melanoma excision 08/05/2016   • GERD (gastroesophageal reflux disease) 08/05/2016   • History of kidney stones 08/05/2016   • Sarcoidosis 04/18/2016   • Diaphragmatic paralysis 04/18/2016   • Osteopenia 04/18/2016   • Neck pain 04/18/2016   • Low back pain 04/18/2016   • Insomnia  "04/18/2016   • Impaired fasting glucose 04/18/2016   • Hypothyroidism 04/18/2016   • Hypertension 04/18/2016   • Hyperlipidemia 04/18/2016     Note Last Updated: 8/5/2016     Intolerant to trials of multiple statins over the years.       • Dyspnea on exertion 04/18/2016   • Chronic osteoarthritis 04/18/2016   • History of DVT (deep vein thrombosis) 04/18/2016     Note Last Updated: 4/18/2016     Description: developed unprovoked on Xarelto.     • Atrial fibrillation (CMS/HCC) 04/18/2016       Current Outpatient Prescriptions on File Prior to Visit   Medication Sig Dispense Refill   • Calcium-Phosphorus-Vitamin D (CITRACAL +D3 PO) Take 1 tablet by mouth Daily.     • celecoxib (CeleBREX) 200 MG capsule TAKE ONE CAPSULE BY MOUTH DAILY 30 capsule 0   • ezetimibe (ZETIA) 10 MG tablet TAKE ONE TABLET BY MOUTH DAILY 30 tablet 9   • fluocinonide (LIDEX) 0.05 % external solution Apply 1 application topically 2 (Two) Times a Day.     • levothyroxine (SYNTHROID, LEVOTHROID) 25 MCG tablet TAKE ONE TABLET BY MOUTH DAILY AS DIRECTED 30 tablet 2   • losartan-hydrochlorothiazide (HYZAAR) 100-12.5 MG per tablet Take 1 tablet by mouth Daily. 90 tablet 3   • melatonin 5 MG sublingual tablet sublingual tablet Take 5 mg by mouth Every Night.     • Multiple Vitamin (MULTI-DAY VITAMINS) tablet Take 1 tablet by mouth daily.     • omeprazole (PriLOSEC) 40 MG capsule Take 40 mg by mouth Daily.     • PENNSAID 2 % solution      • sotalol (BETAPACE) 80 MG tablet TAKE ONE TABLET BY MOUTH DAILY 30 tablet 0   • spironolactone (ALDACTONE) 25 MG tablet TAKE ONE TABLET BY MOUTH DAILY 30 tablet 2   • traMADol (ULTRAM) 50 MG tablet TAKE ONE TABLET BY MOUTH THREE TIMES A DAY 90 tablet 5   • warfarin (COUMADIN) 5 MG tablet TAKE ONE TABLET BY MOUTH DAILY 30 tablet 2     No current facility-administered medications on file prior to visit.        Objective     /78   Pulse 56   Temp 97.8 °F (36.6 °C)   Ht 158.8 cm (62.52\")   Wt 89.8 kg (198 lb)   " SpO2 96%   BMI 35.61 kg/m²     Physical Exam   Constitutional: She is oriented to person, place, and time. She appears well-developed and well-nourished.   HENT:   Head: Normocephalic and atraumatic.   Right Ear: Hearing and tympanic membrane normal.   Left Ear: Hearing and tympanic membrane normal.   Mouth/Throat: No oropharyngeal exudate or posterior oropharyngeal erythema.   Cardiovascular: Normal rate, regular rhythm and normal heart sounds.    Pulmonary/Chest: Effort normal and breath sounds normal.   Neurological: She is alert and oriented to person, place, and time.   Skin: Skin is warm and dry.   Psychiatric: She has a normal mood and affect. Her behavior is normal.       Assessment/Plan   Inessa was seen today for cough, uri and diarrhea.    Diagnoses and all orders for this visit:    Acute bronchitis due to other specified organisms    Other orders  -     doxycycline (MONODOX) 100 MG capsule; Take 1 capsule by mouth Every 12 (Twelve) Hours.        Discussion  Patient presents with episodes of acute bronchitis.  A prescription for antibiotics is provided today.  The patient is instructed to take along with Mucinex DM.  Let me know they are not feeling better over the next 3 days or if there is any change in symptoms.             Future Appointments  Date Time Provider Department Center   7/11/2018 1:00 PM [facility] None   9/14/2018 1:00 PM MD [facility] Etienne [facility] None   10/10/2018 10:45 AM MD ARLE[facility] Helton [facility] None   10/26/2018 11:40 AM MD ARLEEN Hay [facility] None

## 2018-07-13 RX ORDER — SPIRONOLACTONE 25 MG/1
TABLET ORAL
Qty: 30 TABLET | Refills: 1 | Status: SHIPPED | OUTPATIENT
Start: 2018-07-13 | End: 2018-09-10 | Stop reason: SDUPTHER

## 2018-07-13 RX ORDER — CELECOXIB 200 MG/1
200 CAPSULE ORAL DAILY
Qty: 30 CAPSULE | Refills: 5 | Status: SHIPPED | OUTPATIENT
Start: 2018-07-13 | End: 2019-02-16 | Stop reason: SDUPTHER

## 2018-07-13 RX ORDER — CELECOXIB 200 MG/1
CAPSULE ORAL
Qty: 30 CAPSULE | Refills: 0 | Status: SHIPPED | OUTPATIENT
Start: 2018-07-13 | End: 2018-07-13 | Stop reason: SDUPTHER

## 2018-07-18 ENCOUNTER — ANTICOAGULATION VISIT (OUTPATIENT)
Dept: INTERNAL MEDICINE | Facility: CLINIC | Age: 78
End: 2018-07-18

## 2018-07-18 DIAGNOSIS — I48.0 PAROXYSMAL ATRIAL FIBRILLATION (HCC): ICD-10-CM

## 2018-07-18 LAB — INR PPP: 1.7 (ref 0.9–1.1)

## 2018-07-18 PROCEDURE — 85610 PROTHROMBIN TIME: CPT | Performed by: INTERNAL MEDICINE

## 2018-07-18 PROCEDURE — 36416 COLLJ CAPILLARY BLOOD SPEC: CPT | Performed by: INTERNAL MEDICINE

## 2018-07-30 RX ORDER — LEVOTHYROXINE SODIUM 0.03 MG/1
TABLET ORAL
Qty: 30 TABLET | Refills: 1 | Status: SHIPPED | OUTPATIENT
Start: 2018-07-30 | End: 2018-09-27 | Stop reason: SDUPTHER

## 2018-07-30 RX ORDER — SOTALOL HYDROCHLORIDE 80 MG/1
TABLET ORAL
Qty: 30 TABLET | Refills: 0 | Status: SHIPPED | OUTPATIENT
Start: 2018-07-30 | End: 2018-08-27 | Stop reason: SDUPTHER

## 2018-08-01 ENCOUNTER — ANTICOAGULATION VISIT (OUTPATIENT)
Dept: INTERNAL MEDICINE | Facility: CLINIC | Age: 78
End: 2018-08-01

## 2018-08-01 DIAGNOSIS — I48.0 PAROXYSMAL ATRIAL FIBRILLATION (HCC): ICD-10-CM

## 2018-08-01 LAB — INR PPP: 1.5 (ref 0.9–1.1)

## 2018-08-01 PROCEDURE — 85610 PROTHROMBIN TIME: CPT | Performed by: INTERNAL MEDICINE

## 2018-08-01 PROCEDURE — 36416 COLLJ CAPILLARY BLOOD SPEC: CPT | Performed by: INTERNAL MEDICINE

## 2018-08-01 NOTE — PROGRESS NOTES
Pt is having a colonoscopy on 8/6/18 and her surgeon told her to hold her medicine until after the procedure. Please advise.

## 2018-08-06 ENCOUNTER — HOSPITAL ENCOUNTER (OUTPATIENT)
Facility: HOSPITAL | Age: 78
Setting detail: HOSPITAL OUTPATIENT SURGERY
Discharge: HOME | End: 2018-08-06
Attending: SURGERY | Admitting: SURGERY

## 2018-08-06 ENCOUNTER — ANESTHESIA (OUTPATIENT)
Dept: GASTROENTEROLOGY | Facility: HOSPITAL | Age: 78
End: 2018-08-06

## 2018-08-06 ENCOUNTER — ANESTHESIA EVENT (OUTPATIENT)
Dept: GASTROENTEROLOGY | Facility: HOSPITAL | Age: 78
End: 2018-08-06

## 2018-08-06 VITALS
WEIGHT: 190.25 LBS | SYSTOLIC BLOOD PRESSURE: 106 MMHG | OXYGEN SATURATION: 95 % | HEART RATE: 62 BPM | RESPIRATION RATE: 16 BRPM | BODY MASS INDEX: 33.71 KG/M2 | TEMPERATURE: 97.4 F | HEIGHT: 63 IN | DIASTOLIC BLOOD PRESSURE: 56 MMHG

## 2018-08-06 PROCEDURE — 25010000002 GLUCAGON (RDNA) PER 1 MG: Performed by: SURGERY

## 2018-08-06 PROCEDURE — 45378 DIAGNOSTIC COLONOSCOPY: CPT | Performed by: SURGERY

## 2018-08-06 PROCEDURE — 25010000002 PROPOFOL 10 MG/ML EMULSION: Performed by: ANESTHESIOLOGY

## 2018-08-06 RX ORDER — LIDOCAINE HYDROCHLORIDE 20 MG/ML
INJECTION, SOLUTION INFILTRATION; PERINEURAL AS NEEDED
Status: DISCONTINUED | OUTPATIENT
Start: 2018-08-06 | End: 2018-08-06 | Stop reason: SURG

## 2018-08-06 RX ORDER — PROPOFOL 10 MG/ML
VIAL (ML) INTRAVENOUS CONTINUOUS PRN
Status: DISCONTINUED | OUTPATIENT
Start: 2018-08-06 | End: 2018-08-06 | Stop reason: SURG

## 2018-08-06 RX ORDER — PROPOFOL 10 MG/ML
VIAL (ML) INTRAVENOUS AS NEEDED
Status: DISCONTINUED | OUTPATIENT
Start: 2018-08-06 | End: 2018-08-06 | Stop reason: SURG

## 2018-08-06 RX ORDER — SODIUM CHLORIDE, SODIUM LACTATE, POTASSIUM CHLORIDE, CALCIUM CHLORIDE 600; 310; 30; 20 MG/100ML; MG/100ML; MG/100ML; MG/100ML
30 INJECTION, SOLUTION INTRAVENOUS CONTINUOUS PRN
Status: DISCONTINUED | OUTPATIENT
Start: 2018-08-06 | End: 2018-08-06 | Stop reason: HOSPADM

## 2018-08-06 RX ADMIN — SODIUM CHLORIDE, POTASSIUM CHLORIDE, SODIUM LACTATE AND CALCIUM CHLORIDE 30 ML/HR: 600; 310; 30; 20 INJECTION, SOLUTION INTRAVENOUS at 10:10

## 2018-08-06 RX ADMIN — PROPOFOL 50 MG: 10 INJECTION, EMULSION INTRAVENOUS at 11:00

## 2018-08-06 RX ADMIN — PROPOFOL 75 MG: 10 INJECTION, EMULSION INTRAVENOUS at 10:58

## 2018-08-06 RX ADMIN — LIDOCAINE HYDROCHLORIDE 50 MG: 20 INJECTION, SOLUTION INFILTRATION; PERINEURAL at 10:57

## 2018-08-06 RX ADMIN — PROPOFOL 125 MCG/KG/MIN: 10 INJECTION, EMULSION INTRAVENOUS at 10:58

## 2018-08-06 NOTE — ANESTHESIA PREPROCEDURE EVALUATION
Anesthesia Evaluation     Patient summary reviewed     NPO Liquid Status: > 8 hours           Airway   No difficulty expected  Dental      Pulmonary    (+) sleep apnea,   Cardiovascular     Rhythm: regular    (+) hypertension, PVD,       Neuro/Psych  GI/Hepatic/Renal/Endo    (+) obesity,   hypothyroidism,     Musculoskeletal     Abdominal    Substance History      OB/GYN          Other                        Anesthesia Plan    ASA 3     MAC     Anesthetic plan and risks discussed with patient.

## 2018-08-06 NOTE — ANESTHESIA POSTPROCEDURE EVALUATION
"Patient: Inessa Peoples    Procedure Summary     Date:  08/06/18 Room / Location:  [facility] ENDOSCOPY 4 /  [facility]    Anesthesia Start:  1053 Anesthesia Stop:  1114    Procedure:  COLONOSCOPY TO CECUM AND INTO TERMINAL ILEUM (N/A ) Diagnosis:       Family history of colon cancer      Colon cancer screening      (Family history of colon cancer [Z80.0])      (Colon cancer screening [Z12.11])    Surgeon:  Nasra Castro MD Provider:  Wing Echevarria MD    Anesthesia Type:  MAC ASA Status:  3          Anesthesia Type: MAC  Last vitals  BP   (!) 84/66 (08/06/18 1123)   Temp   36.3 °C (97.4 °F) (08/06/18 0958)   Pulse   64 (08/06/18 1123)   Resp   16 (08/06/18 1123)     SpO2   95 % (08/06/18 1123)     Post Anesthesia Care and Evaluation    Patient location during evaluation: bedside  Patient participation: complete - patient participated  Level of consciousness: awake and alert  Pain score: 0  Pain management: adequate  Airway patency: patent  Anesthetic complications: No anesthetic complications    Cardiovascular status: acceptable  Respiratory status: acceptable  Hydration status: acceptable    Comments: BP (!) 84/66 (BP Location: Left arm)   Pulse 64   Temp 36.3 °C (97.4 °F) (Oral)   Resp 16   Ht 158.8 cm (62.5\")   Wt 86.3 kg (190 lb 4 oz)   SpO2 95%   BMI 34.24 kg/m²       "

## 2018-08-06 NOTE — H&P
Cc: Endoscopy Visit    HPI: 78 y.o. female here for screening c scope with a brother and sister with colon cancer.  She has known diverticulosis, and has had intermittent left lower quadrant pain for the last year.  She has been off her coumadin for 5 days, that being for a fib and a history of DVT.    Past Medical History:   Diagnosis Date   • Abnormal ECG    • Allergic rhinitis    • Atrial fibrillation (CMS/HCC)    • Bleeds easily (CMS/HCC)     warfarin   • Breast screening    • Broken bones    • Calf DVT (deep venous thrombosis) (CMS/HCC)    • Chronic anticoagulation    • Colon polyp    • CTS (carpal tunnel syndrome)    • Deep vein thrombosis (CMS/HCC)     left calf   • Diverticulitis of colon    • AHN (dyspnea on exertion)    • Fatigue    • H/O malignant melanoma of skin    • Hematuria, gross    • History of colon polyps    • History of kidney stones    • Hyperlipidemia    • Hypertension    • Hyperthyroidism    • IFG (impaired fasting glucose)    • Insomnia    • LBBB (left bundle branch block)    • Left leg swelling    • LLQ abdominal pain    • Long term current use of systemic steroids    • Low back pain    • Malignant melanoma of skin (CMS/HCC)    • Neck pain    • Obesity    • Osteoarthritis    • Osteopenia    • Paralysis, diaphragm    • 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 for tuberculosis    • Rash    • RBBB (right bundle branch block)    • Renal calculi    • Sarcoid    • Sigmoid diverticulosis 06/26/2013   • Sleep apnea     on CPAP   • UTI (urinary tract infection)    • Visit for screening mammogram        Past Surgical History:   Procedure Laterality Date   • CARPAL TUNNEL RELEASE Bilateral 1980'S    [facility]   • CATARACT EXTRACTION Bilateral 2001    Dr. Nic Hidalgo   • COLONOSCOPY N/A 06/26/2013    MILD SIGMOID DIVERTICULOSIS, repeat 5 years based on sister w/ colona cancer-DR. TANISHA THORNTON   • COLONOSCOPY N/A 10/19/2010    SIGMOID DIVERTICULOSIS & INTERNAL HEMORRHOIDS, HEMORRHOIDAL BANDING X4, DR. GARAY  NORBERTO   • COLONOSCOPY W/ BIOPSIES N/A 05/12/2008    RECTUM BIOPSY: COLONIC MUCOSA W/ FOCAL INCREASE OF INFLAMMATORY CELLS IN THE MUSCULARIS MUCOSA INCLUDING EOSINOPHILS, SIGMOID DIVERTICULOSIS, POSSIBLE PROCTITIS, DR. TANISHA THORNTON   • CYSTOSCOPY, RETROGRADE PYELOGRAM AND STENT INSERTION Bilateral 10/10/2014    w/ Right ureteral pyeloscopy & laser lithotripsy, Right Double-J stent placement-Dr. Julio Tai   • EXTRACORPOREAL SHOCK WAVE LITHOTRIPSY (ESWL) Right 09/10/2009    DR. JULIO TAI   • LAPAROSCOPIC CHOLECYSTECTOMY N/A 07/19/2012    DR. TANISHA THORNTON   • LUMBAR EPIDURAL INJECTION N/A 04/23/2015    LUMBAR EPIDURAL STEROID INJECTION X3   • PARATHYROIDECTOMY N/A 2001   • SKIN CANCER EXCISION     • THUMB ARTHROSCOPY Right 1982   • TOTAL KNEE ARTHROPLASTY Right 03/25/2014    [employer] [facility] total knee, 55 femoral component, 71 tibial tray w/ a 40 mm stem, 34 mm x 7.8 mm three-peg patella, and a 14 standard poly-DrUriel Canchola       is allergic to iodinated diagnostic agents; fenofibrate; livalo [pitavastatin]; plaquenil [hydroxychloroquine sulfate]; and statins.       Medication List      ASK your doctor about these medications    celecoxib 200 MG capsule  Commonly known as:  CeleBREX  Take 1 capsule by mouth Daily.     CITRACAL +D3 PO     doxycycline 100 MG capsule  Commonly known as:  MONODOX  Take 1 capsule by mouth Every 12 (Twelve) Hours.     ezetimibe 10 MG tablet  Commonly known as:  ZETIA  TAKE ONE TABLET BY MOUTH DAILY     fluocinonide 0.05 % external solution  Commonly known as:  LIDEX     levothyroxine 25 MCG tablet  Commonly known as:  SYNTHROID, LEVOTHROID  TAKE ONE TABLET BY MOUTH DAILY AS DIRECTED     losartan-hydrochlorothiazide 100-12.5 MG per tablet  Commonly known as:  HYZAAR  Take 1 tablet by mouth Daily.     melatonin 5 MG sublingual tablet sublingual tablet     MULTI-DAY VITAMINS tablet     omeprazole 40 MG capsule  Commonly known as:  priLOSEC     PENNSAID 2 %  solution  Generic drug:  Diclofenac Sodium     sotalol 80 MG tablet  Commonly known as:  BETAPACE  TAKE ONE TABLET BY MOUTH DAILY     spironolactone 25 MG tablet  Commonly known as:  ALDACTONE  TAKE ONE TABLET BY MOUTH DAILY     traMADol 50 MG tablet  Commonly known as:  ULTRAM  TAKE ONE TABLET BY MOUTH THREE TIMES A DAY     warfarin 5 MG tablet  Commonly known as:  COUMADIN  TAKE ONE TABLET BY MOUTH DAILY          Family History   Problem Relation Age of Onset   • Heart disease Mother    • Diabetes type II Mother    • Heart disease Father    • Cancer Sister    • Heart disease Brother         CABG   • Diabetes type II Brother    • Colon cancer Brother    • Colon polyps Brother    • Heart disease Sister    • Aneurysm Sister         abdominal aorta   • Colon cancer Sister    • Diabetes type II Sister    • Lung cancer Sister    • Colon polyps Sister    • COPD Sister        Social History     Social History   • Marital status:      Spouse name: EDY   • Number of children: 4   • Years of education: 14yrs     Occupational History   • RETIRED      Social History Main Topics   • Smoking status: Never Smoker   • Smokeless tobacco: Never Used   • Alcohol use Yes      Comment: social drinker   • Drug use: No   • Sexual activity: Defer     Other Topics Concern   • Not on file     Social History Narrative   • No narrative on file       Vitals:    08/06/18 0958   BP: 111/58   Pulse: 68   Resp: 18   Temp: 97.4 °F (36.3 °C)   SpO2: 94%       Body mass index is 34.24 kg/m².    Physical Exam    General: No acute distress  Lungs: No labored breathing, Pulse oximetry on room air is 94%.  Heart: RRR  Abdo: Soft  Mental:  Awake, alert, and oriented    Imp:     · Left lower quadrant pain  · Brother and sister with colon cancer     Plan:  · c lupis Castro MD  10:55 AM

## 2018-08-06 NOTE — DISCHARGE INSTRUCTIONS
For the next 24 hours patient needs to be with a responsible adult.    For 24 hours DO NOT drive, operate machinery, appliances, drink alcohol, make important decisions or sign legal documents.    Start with a light or bland diet and advance to regular diet as tolerated.    Call Dr Castro for problems [phone]    Problems may include but not limited to: large amounts of bleeding, trouble breathing, repeated vomiting, severe unrelieved pain, fever or chills.      WHAT ARE DIVERTICULOSIS AND DIVERTICULITIS?  Many people have small pouches in their colons that bulge outward through weak spots, like an inner tube that pokes through weak places in a tire.  Each pouch is called a diverticulum.  The condition of having diverticula is DIVERTICULOSIS.  The condition becomes more common as people age.  About half of all people over the age of 60 have diverticulosis    When pouches become infected or inflamed, the condition is called DIVERTICULITIS.  This happens in 10% to 25% of people with diverticulosis.  Diverticulosis and diverticulitis are also called DIVERTICULAR DISEASE.     WHAT ARE THE SYMPTOMS?  Diverticulosis - Most people do not have any discomfort or symptoms.  However, symptoms may include mild cramps, bloating, and constipation.  Other diseases such as irritable bowel syndrome (IBS) and stomach ulcers cause similar problems, so these symptoms do not always mean a person has diverticulosis.  You should visit your doctor if you have these troubling symptoms.    Diverticulitis - The most common symptom is abdominal pain.  The most common sign is tenderness around the left side of the lower abdomen.  If infection is the cause, fever, nausea, vomiting, chills, cramping, and constipation may occur as well.  The severity depends on the extent of the infection and complications.    WHAT ARE THE COMPLICATIONS?  Diverticulitis can lead to bleeding, infections,perforations or tears, or blockages.  These complications  always require treatment to prevent them from proggressing and causing serous illness.    Bleeding from a diverticula is a rare complication.  When this occurs, blood may appear in the toilet or in your stool.  Bleeding can be severe, but it may stop by itself and not require treatment.  Doctors believe bleeding diverticula are caused by a small blood vessel in a diverticulum that weakens and finally bursts.  If you have bleeding from the rectum, you should see your doctor.  If the bleeding does not stop you may need surgery.    Abscess, Perforation, and Peritonitis - The infection causing diverticulitis often clears up after a few days of treatment with antibiotics.  If the condition gets worse, an abscess may form in the colon.  An abscess is an infected area with pus that may cause swelling and destroy tissue.  Sometimes the infected diverticula may develop small holes, called perforations.  These perforations allow pus to leak out of the colon into the abdominal area.  If the abscess is small and remains in the colon, it may clear up after treatment with antibiotics.  If not, the doctor may need to drain it.  A large abscess can become a serious problem if the infection leaks out and contaminates areas outside the colon.  Infection that spreads into the abdominal cavity is called peritonitis.  Peritonitis requires immediate surgery toclean the abdominal cavity and remove the damaged part of the colon.  Without surgery, peritonitis can be fatal.    FISTULA  A fistula is an abnormal connection of tissue between two organs or between an organ and the skin.  When damaged tissues come into contact with each other during infection, they sometimes stick together.  If they heal that way, a fistula forms.  When diverticulitis-related infection spreads through out the colon, the colon's tissue may stick to nearby tissues.  The organs usually involved are the bladder, small intestine, and skin.  The problem can be  corrected with surgery to remove the fistula and affected part of the colon.    INTESTINAL OBSTRUCTION  The scarring caused by infection may cause partial or total blockage of the large intestine.  When this happens, the colon is unable to move bowel contents normally.  When the obstruction totally blocks the intestine, emergency surgery is necessary.  Partial blockage is not an emergency, so the surgery to correct it can be planned.    WHAT CAUSES DIVERTICULAR DISEASE  Although not proven, the dominant theory is that a low-fiber diet is the main cause of diverticular disease.  The disease was first noticed in the [place] in the early 1900s.  At about the same time, processed foods were introduced into the American diet.  Many processed foods contain refined, low-fiber flour.  Unlike whole-wheat flour, refined flour has no wheat bran.    Diverticular disease is common in developed or industrialized countries-particularly the [place], [place], and [place]-where low-fiber diets are common.  The disease is rare in countries of [place] and [place], where people eat high-fiber vegetable diets.    Fiber is the part of fruits, vegetables, and whole grains that the body cannot digest.  Some fiber dissolves easily in water (soluble fiber).  It takes on a soft, jelly-like texture in the intestines.  Some fiber passes almost unchanged through the intestines (insoluble fiber).  Both kinds of fiber help make stools soft and easy to pass.  Fiber also prevents constipation.    Constipation makes the muscles strain to move stool that is too hard.  It is the main cause of increased pressure in the colon.  This excess pressure might cause the weak spots in the colon to bulge out and become diverticula.  Diverticulitis occurs when diverticula become infected or inflamed.  Doctors are not certain what causes the infection.  It may begin when stool or bacteria are caught in the diverticula.  An attack of diverticulitis can  develop suddenly and without warning.    HOW DOES THE DOCTOR DIAGNOSE DIVERTICULAR DISEASE  The doctor asks about medical history, does a physical exam, and may perform one or more diagnostic tests.  Because most people do not have symptoms, diverticulosis is often found through tests ordered for another ailment.    When taking a medical history, the doctor may ask about bowel habits, symptoms, pain, diet, and medications.  The physical exam usually involves a digital rectal exam.  To preform this test. The doctor inserts a gloved, lubricated finger into the rectum to detect tenderness, blockage, or blood.  The doctor may check stool for signs of bleeding and test blood for signs of infection.  The doctor may also order x-rays or other tests.    WHAT IS THE TREATMENT FOR DIVERTICULAR DISEASE  Increasing the amount of fiber in the diet may reduce symptoms of diverticulosis and prevent complications such as diverticulitis.  Fiber keeps stool soft and lowers pressure inside the colon so that bowel contents can move through easily.  The American Dietetic Association. Recommends 20 to 35 grams of fiber each day.  The doctor may also recommend taking a fiber product such as Citrucel or Metamucil once a dya.  These products are mixed water and provide about 2 to 3.5 grams of fiber per  Tablespoon, mixed with 8 ounces of water.    Avoidance of nuts, popcorn, and sunflower, pumpkin, marybeth, and sesame seeds has been recommended by physicians out of fear that food particles could enter, block, or irritate the diverticula.  However, no scientific data support this treatment measure.  Eating a high-fiber diet is the only requirement highly emphasized across the medical literature.  Eliminating specific foods is not necessary.  The seeds in tomatoes, zucchini, cucumbers, strawberries, and raspberries, as well as poppy seeds, are generally considered harmless.  People differ in amounts and types of foods the can eat.  Decisions  about diet should be made based on what works best for each person.  Keeping a food diary may help identify what foods may cause symptoms.    If cramps, bloating, and constipation are problems, the doctor may prescribe  Short course of pain medication.  However, many medications affect emptying of the colon, an undesirable side effect for people with diverticulosis.    DIVERTICULITIS  Treatment focuses on clearing up the infection and inflammation, resting the colon, and preventing or minimizing complications.  An attack of diverticulitis without complications may respond to antibiotics within a few days if treated early.  To help the colon rest, the doctor may recommend bed rest and a liquid diet, along with a pain reliever.    An acute attack with severe pain or sever infection may require a hospital stay.  Most acute cases of diverticulitis are treated with antibiotics and a liquid diet.  The antibiotics are given by injection into a vein.  In some cases, however, surgery may be necessary.    WHEN IS SURGERY NECESSARY  If attacks are severe or frequent, the doctor may advise surgery.  The surgeon removes the affected part of the colon and joins the remaining sections.  This typed of surgery, called colon resection, aims to keep attacks from coming back and to prevent complications.  The doctor may also recommend surgery for complications of a fistula or intestinal obstruction.    If antibiotics do not correct an attack, emergency surgery may be required.  Other reasons for emergency surgery include a large abscess, perforation, peritonitis, or continued bleeding.    Emergency surgery usually involves 2 operations.  The first will clear the infected abdominal cavity and remove part of the colon.  Because infection and sometimes obstruction, it is not safe to rejoin the colon during the first operation.  Instead, the surgeon creates a temporary hole, or stoma, in the abdomen.  The end of the colon is connected to  the hole, a procedure called a colostomy, to allow normal eating and bowel movements.  The stool goes into a bag attached to the opening in the abdomen.  In the second operation, the surgeon rejoins the ends of the colon.

## 2018-08-06 NOTE — OP NOTE
Colonoscopy Procedure Note  Inessa Peoples  1940  Date of Procedure: 08/06/18    Pre-operative Diagnosis:    · Intermittent left lower quadrant pain  · Brother and sister with colon cancer    Post-operative Diagnosis:  · Diverticulosis, with mild blunting of the haustral folds    Procedure: Colonoscopy with terminal ileoscopy     Findings/Treatments:   · Diverticulosis, with mild blunting of the haustral folds consistent with diverticulitis previously       Recommendations:   · C scope in 3 years  · Resume coumadin today  · Diverticulosis pamphlet  · Copy of photographs to be given from today's procedure prior to discharge.    Surgeon: Matthew    Anesthetic: MAC per Wing Echevarria MD    Indications:  As above    Scope Withdrawal Time:  6 minutes  27 seconds    Procedure Details     MAC anesthesia was induced.  The 180 Colonoscopy was inserted blindly into the rectum and advanced to the cecum, with relative ease,  without need for pressure, lift, or turning.  Cecum was identified by ileocecal valve and appendiceal orifice and photographed for documentation.  Terminal ileum was intubated and was normal.    Prep quality was excellent.  A careful inspection was made as the colonoscope was withdrawn, including a retroflexed view of the rectum; there was no suggestion of presence of angiodysplasias, colitis, or polyps but there were moderate number of diverticula, with blunting of the haustral folds,with no interventions.     Retroflexion in the rectum revealed no abnormalities.    Nasra Castro MD  08/06/18  11:13 AM

## 2018-08-13 ENCOUNTER — ANTICOAGULATION VISIT (OUTPATIENT)
Dept: INTERNAL MEDICINE | Facility: CLINIC | Age: 78
End: 2018-08-13

## 2018-08-13 DIAGNOSIS — I48.0 PAROXYSMAL ATRIAL FIBRILLATION (HCC): ICD-10-CM

## 2018-08-13 LAB — INR PPP: 1.2 (ref 0.9–1.1)

## 2018-08-13 PROCEDURE — 85610 PROTHROMBIN TIME: CPT | Performed by: INTERNAL MEDICINE

## 2018-08-13 PROCEDURE — 36416 COLLJ CAPILLARY BLOOD SPEC: CPT | Performed by: INTERNAL MEDICINE

## 2018-08-28 RX ORDER — SOTALOL HYDROCHLORIDE 80 MG/1
TABLET ORAL
Qty: 30 TABLET | Refills: 0 | Status: SHIPPED | OUTPATIENT
Start: 2018-08-28 | End: 2018-09-27 | Stop reason: SDUPTHER

## 2018-08-29 ENCOUNTER — ANTICOAGULATION VISIT (OUTPATIENT)
Dept: INTERNAL MEDICINE | Facility: CLINIC | Age: 78
End: 2018-08-29

## 2018-08-29 DIAGNOSIS — I48.0 PAROXYSMAL ATRIAL FIBRILLATION (HCC): ICD-10-CM

## 2018-08-29 LAB — INR PPP: 1.6 (ref 0.9–1.1)

## 2018-08-29 PROCEDURE — 36416 COLLJ CAPILLARY BLOOD SPEC: CPT | Performed by: INTERNAL MEDICINE

## 2018-08-29 PROCEDURE — 85610 PROTHROMBIN TIME: CPT | Performed by: INTERNAL MEDICINE

## 2018-09-10 RX ORDER — SPIRONOLACTONE 25 MG/1
TABLET ORAL
Qty: 30 TABLET | Refills: 6 | Status: SHIPPED | OUTPATIENT
Start: 2018-09-10 | End: 2019-04-13 | Stop reason: SDUPTHER

## 2018-09-14 ENCOUNTER — OFFICE VISIT (OUTPATIENT)
Dept: INTERNAL MEDICINE | Facility: CLINIC | Age: 78
End: 2018-09-14

## 2018-09-14 ENCOUNTER — ANTICOAGULATION VISIT (OUTPATIENT)
Dept: INTERNAL MEDICINE | Facility: CLINIC | Age: 78
End: 2018-09-14

## 2018-09-14 VITALS
WEIGHT: 199 LBS | HEART RATE: 63 BPM | SYSTOLIC BLOOD PRESSURE: 124 MMHG | BODY MASS INDEX: 35.82 KG/M2 | DIASTOLIC BLOOD PRESSURE: 84 MMHG | OXYGEN SATURATION: 95 %

## 2018-09-14 DIAGNOSIS — E03.9 ACQUIRED HYPOTHYROIDISM: ICD-10-CM

## 2018-09-14 DIAGNOSIS — E78.5 HYPERLIPIDEMIA, UNSPECIFIED HYPERLIPIDEMIA TYPE: ICD-10-CM

## 2018-09-14 DIAGNOSIS — I48.0 PAROXYSMAL ATRIAL FIBRILLATION (HCC): ICD-10-CM

## 2018-09-14 DIAGNOSIS — I10 ESSENTIAL HYPERTENSION: Primary | ICD-10-CM

## 2018-09-14 LAB — INR PPP: 2.9 (ref 0.9–1.1)

## 2018-09-14 PROCEDURE — 36416 COLLJ CAPILLARY BLOOD SPEC: CPT | Performed by: INTERNAL MEDICINE

## 2018-09-14 PROCEDURE — 90662 IIV NO PRSV INCREASED AG IM: CPT | Performed by: INTERNAL MEDICINE

## 2018-09-14 PROCEDURE — G0008 ADMIN INFLUENZA VIRUS VAC: HCPCS | Performed by: INTERNAL MEDICINE

## 2018-09-14 PROCEDURE — 99214 OFFICE O/P EST MOD 30 MIN: CPT | Performed by: INTERNAL MEDICINE

## 2018-09-14 PROCEDURE — 85610 PROTHROMBIN TIME: CPT | Performed by: INTERNAL MEDICINE

## 2018-09-14 NOTE — PROGRESS NOTES
Subjective     Inessa Peoples is a 78 y.o. female who presents with   Chief Complaint   Patient presents with   • Hypertension   • Hyperlipidemia   • Hypothyroidism       History of Present Illness     HTN. Control is good.   HLD.  She is able to tolerate Zetia only.    Hypothyroidism. She is due for check of labs.  Due protime    Review of Systems    The following portions of the patient's history were reviewed and updated as appropriate: allergies, current medications and problem list.    Patient Active Problem List    Diagnosis Date Noted   • Family history of colon cancer 06/19/2018     Note Last Updated: 6/19/2018     Added automatically from request for surgery [id number]     • Colon cancer screening 06/19/2018     Note Last Updated: 6/19/2018     Added automatically from request for surgery [id number]     • Class 2 obesity due to excess calories with body mass index (BMI) of 35.0 to 35.9 in adult 03/27/2018   • Chronic pain 07/13/2017   • Bulge of lumbar disc without myelopathy 07/13/2017   • Sacroiliac inflammation (CMS/HCC) 07/13/2017   • Sacroiliac pain 07/13/2017   • Dilation of thoracic aorta (CMS/HCC) 06/23/2017   • Chronic anticoagulation 05/15/2017   • Diverticulitis of large intestine without perforation or abscess without bleeding 01/03/2017   • ANY on CPAP + 10 - Dr Lu 10/15/2016   • History of melanoma excision 08/05/2016   • GERD (gastroesophageal reflux disease) 08/05/2016   • History of kidney stones 08/05/2016   • Sarcoidosis 04/18/2016   • 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       • Dyspnea on exertion 04/18/2016   • Chronic osteoarthritis 04/18/2016   • History of DVT  (deep vein thrombosis) 04/18/2016     Note Last Updated: 4/18/2016     Description: developed unprovoked on Xarelto.     • Atrial fibrillation (CMS/HCC) 04/18/2016       Current Outpatient Prescriptions on File Prior to Visit   Medication Sig Dispense Refill   • Calcium-Phosphorus-Vitamin D (CITRACAL +D3 PO) Take 1 tablet by mouth Daily.     • celecoxib (CeleBREX) 200 MG capsule Take 1 capsule by mouth Daily. 30 capsule 5   • ezetimibe (ZETIA) 10 MG tablet TAKE ONE TABLET BY MOUTH DAILY 30 tablet 9   • fluocinonide (LIDEX) 0.05 % external solution Apply 1 application topically 2 (Two) Times a Day.     • levothyroxine (SYNTHROID, LEVOTHROID) 25 MCG tablet TAKE ONE TABLET BY MOUTH DAILY AS DIRECTED 30 tablet 1   • losartan-hydrochlorothiazide (HYZAAR) 100-12.5 MG per tablet Take 1 tablet by mouth Daily. 90 tablet 3   • melatonin 5 MG sublingual tablet sublingual tablet Take 5 mg by mouth Every Night.     • Multiple Vitamin (MULTI-DAY VITAMINS) tablet Take 1 tablet by mouth daily.     • PENNSAID 2 % solution      • sotalol (BETAPACE) 80 MG tablet TAKE ONE TABLET BY MOUTH DAILY 30 tablet 0   • spironolactone (ALDACTONE) 25 MG tablet TAKE ONE TABLET BY MOUTH DAILY 30 tablet 6   • traMADol (ULTRAM) 50 MG tablet TAKE ONE TABLET BY MOUTH THREE TIMES A DAY 90 tablet 5   • warfarin (COUMADIN) 5 MG tablet TAKE ONE TABLET BY MOUTH DAILY 30 tablet 2   • omeprazole (PriLOSEC) 40 MG capsule Take 40 mg by mouth Daily.     • [DISCONTINUED] doxycycline (MONODOX) 100 MG capsule Take 1 capsule by mouth Every 12 (Twelve) Hours. 20 capsule 0     No current facility-administered medications on file prior to visit.        Objective     /84   Pulse 63   Wt 90.3 kg (199 lb)   SpO2 95%   BMI 35.82 kg/m²     Physical Exam   Constitutional: She is oriented to person, place, and time. She appears well-developed and well-nourished.   HENT:   Head: Normocephalic and atraumatic.   Cardiovascular: Normal rate, regular rhythm and normal  heart sounds.    Pulmonary/Chest: Effort normal and breath sounds normal.   Neurological: She is alert and oriented to person, place, and time.   Skin: Skin is warm and dry.   Psychiatric: She has a normal mood and affect. Her behavior is normal.       Assessment/Plan   Inessa was seen today for hypertension, hyperlipidemia and hypothyroidism.    Diagnoses and all orders for this visit:    Essential hypertension  -     CBC & Differential  -     Comprehensive Metabolic Panel  -     TSH Rfx On Abnormal To Free T4    Hyperlipidemia, unspecified hyperlipidemia type  -     CBC & Differential  -     Comprehensive Metabolic Panel  -     TSH Rfx On Abnormal To Free T4    Acquired hypothyroidism  -     CBC & Differential  -     Comprehensive Metabolic Panel  -     TSH Rfx On Abnormal To Free T4    Paroxysmal atrial fibrillation (CMS/HCC)    Other orders  -     Fluzone High Dose =>65Years        Discussion  HTN. Continue current regimen.  HLD.  Continue Zetia.    Hypothyroidism. Continue levothyroxine.   Chronic afib.  Check protime today.  See flow sheet.        Future Appointments  Date Time Provider Department Center   10/10/2018 10:45 AM Jewel Lu MD [facility] None   10/26/2018 11:40 AM Demar Stone MD [id number] None

## 2018-09-15 LAB
ALBUMIN SERPL-MCNC: 4.2 G/DL (ref 3.5–5.2)
ALBUMIN/GLOB SERPL: 1.7 G/DL
ALP SERPL-CCNC: 83 U/L (ref 39–117)
ALT SERPL-CCNC: 22 U/L (ref 1–33)
AST SERPL-CCNC: 17 U/L (ref 1–32)
BASOPHILS # BLD AUTO: 0.03 10*3/MM3 (ref 0–0.2)
BASOPHILS NFR BLD AUTO: 0.5 % (ref 0–1.5)
BILIRUB SERPL-MCNC: 0.5 MG/DL (ref 0.1–1.2)
BUN SERPL-MCNC: 28 MG/DL (ref 8–23)
BUN/CREAT SERPL: 28.6 (ref 7–25)
CALCIUM SERPL-MCNC: 10.1 MG/DL (ref 8.6–10.5)
CHLORIDE SERPL-SCNC: 102 MMOL/L (ref 98–107)
CO2 SERPL-SCNC: 22.9 MMOL/L (ref 22–29)
CREAT SERPL-MCNC: 0.98 MG/DL (ref 0.57–1)
EOSINOPHIL # BLD AUTO: 0.26 10*3/MM3 (ref 0–0.7)
EOSINOPHIL NFR BLD AUTO: 4.4 % (ref 0.3–6.2)
ERYTHROCYTE [DISTWIDTH] IN BLOOD BY AUTOMATED COUNT: 13.1 % (ref 11.7–13)
GLOBULIN SER CALC-MCNC: 2.5 GM/DL
GLUCOSE SERPL-MCNC: 93 MG/DL (ref 65–99)
HCT VFR BLD AUTO: 38 % (ref 35.6–45.5)
HGB BLD-MCNC: 13 G/DL (ref 11.9–15.5)
IMM GRANULOCYTES # BLD: 0.01 10*3/MM3 (ref 0–0.03)
IMM GRANULOCYTES NFR BLD: 0.2 % (ref 0–0.5)
LYMPHOCYTES # BLD AUTO: 2.08 10*3/MM3 (ref 0.9–4.8)
LYMPHOCYTES NFR BLD AUTO: 35.1 % (ref 19.6–45.3)
MCH RBC QN AUTO: 31.9 PG (ref 26.9–32)
MCHC RBC AUTO-ENTMCNC: 34.2 G/DL (ref 32.4–36.3)
MCV RBC AUTO: 93.1 FL (ref 80.5–98.2)
MONOCYTES # BLD AUTO: 0.49 10*3/MM3 (ref 0.2–1.2)
MONOCYTES NFR BLD AUTO: 8.3 % (ref 5–12)
NEUTROPHILS # BLD AUTO: 3.07 10*3/MM3 (ref 1.9–8.1)
NEUTROPHILS NFR BLD AUTO: 51.7 % (ref 42.7–76)
PLATELET # BLD AUTO: 243 10*3/MM3 (ref 140–500)
POTASSIUM SERPL-SCNC: 4.4 MMOL/L (ref 3.5–5.2)
PROT SERPL-MCNC: 6.7 G/DL (ref 6–8.5)
RBC # BLD AUTO: 4.08 10*6/MM3 (ref 3.9–5.2)
SODIUM SERPL-SCNC: 138 MMOL/L (ref 136–145)
TSH SERPL DL<=0.005 MIU/L-ACNC: 2.55 MIU/ML (ref 0.27–4.2)
WBC # BLD AUTO: 5.93 10*3/MM3 (ref 4.5–10.7)

## 2018-09-27 RX ORDER — SOTALOL HYDROCHLORIDE 80 MG/1
TABLET ORAL
Qty: 30 TABLET | Refills: 3 | Status: SHIPPED | OUTPATIENT
Start: 2018-09-27 | End: 2019-01-26 | Stop reason: SDUPTHER

## 2018-09-27 RX ORDER — LEVOTHYROXINE SODIUM 0.03 MG/1
TABLET ORAL
Qty: 30 TABLET | Refills: 0 | Status: SHIPPED | OUTPATIENT
Start: 2018-09-27 | End: 2018-10-31 | Stop reason: SDUPTHER

## 2018-10-04 ENCOUNTER — ANTICOAGULATION VISIT (OUTPATIENT)
Dept: INTERNAL MEDICINE | Facility: CLINIC | Age: 78
End: 2018-10-04

## 2018-10-04 DIAGNOSIS — I48.0 PAROXYSMAL ATRIAL FIBRILLATION (HCC): ICD-10-CM

## 2018-10-04 LAB — INR PPP: 2.7 (ref 0.9–1.1)

## 2018-10-04 PROCEDURE — 36416 COLLJ CAPILLARY BLOOD SPEC: CPT | Performed by: INTERNAL MEDICINE

## 2018-10-04 PROCEDURE — 85610 PROTHROMBIN TIME: CPT | Performed by: INTERNAL MEDICINE

## 2018-10-10 ENCOUNTER — OFFICE VISIT (OUTPATIENT)
Dept: SLEEP MEDICINE | Facility: HOSPITAL | Age: 78
End: 2018-10-10
Attending: INTERNAL MEDICINE

## 2018-10-10 VITALS
SYSTOLIC BLOOD PRESSURE: 107 MMHG | DIASTOLIC BLOOD PRESSURE: 69 MMHG | HEART RATE: 61 BPM | BODY MASS INDEX: 36.44 KG/M2 | OXYGEN SATURATION: 97 % | HEIGHT: 62 IN | WEIGHT: 198 LBS

## 2018-10-10 DIAGNOSIS — Z99.89 OSA ON CPAP: Primary | Chronic | ICD-10-CM

## 2018-10-10 DIAGNOSIS — G47.33 OSA ON CPAP: Primary | Chronic | ICD-10-CM

## 2018-10-10 PROCEDURE — 99213 OFFICE O/P EST LOW 20 MIN: CPT | Performed by: INTERNAL MEDICINE

## 2018-10-10 PROCEDURE — G0463 HOSPITAL OUTPT CLINIC VISIT: HCPCS

## 2018-10-10 NOTE — PROGRESS NOTES
"Follow Up [facility] Disorders [facility] Note     Chief Complaint:  ANY     Primary Care Physician: Gisele Dockery MD    Interval History:   The patient was seen by me one year ago.  She is stable.  She goes to bed 11 PM and awakens at 9 AM.  Her mask wakes her up because it's uncomfortable and she has to adjusted.  Trout Sleepiness Scale is normal 1.  She does complain of back pain.    Review of Systems:  Recorded on the Sleep Questionnaire.  Unremarkable except for nasal congestion and postnasal drip and occasional cough    Social History:    Social History     Social History   • Marital status:      Spouse name: EDY   • Number of children: 4   • Years of education: 14yrs     Occupational History   • RETIRED      Social History Main Topics   • Smoking status: Never Smoker   • Smokeless tobacco: Never Used   • Alcohol use Yes      Comment: social drinker   • Drug use: No   • Sexual activity: Defer     Other Topics Concern   • Not on file       Allergies:  Iodinated diagnostic agents; Fenofibrate; Livalo [pitavastatin]; Plaquenil [hydroxychloroquine sulfate]; and Statins     Medication Review:  Reviewed.      Vital Signs:    Vitals:    10/10/18 0900   BP: 107/69   BP Location: Left arm   Patient Position: Sitting   Pulse: 61   SpO2: 97%   Weight: 89.8 kg (198 lb)   Height: 157.5 cm (62\")     Body mass index is 36.21 kg/m².    Physical Exam:    Constitutional:  Well developed white female and appears in no apparent distress.  Awake & oriented times 3.  Normal mood with normal recent and remote memory and normal judgement.  Eyes:  Conjunctivae normal.  Oropharynx:  moist mucous membranes without exudate and a large tongue and normal uvula and moderate narrowing of the posterior pharyngeal opening      Results Review:  DME is [facility] and she uses a nasal interface.  Downloads between July 12 in October 9, 2018 compliances 99%.  Average usage is 8 hours and 4 minutes.  Average AHI is normal without leak.  CPAP " pressure is +10.       Impression:   Obstructive sleep apnea adequately treated with CPAP +10 with good compliance and usage and no significant complaints of upper somnolence       Plan:  Good sleep hygiene measures should be maintained.  Weight loss would be beneficial in this patient who is obese by BMI.  The patient is benefiting from the treatment being provided.     The patient will continue CPAP +10 and a new prescription will be sent to her DME    The patient will call for any problems and will follow up in one year.      Jewel Lu MD  Sleep Medicine  10/10/18  11:04 AM

## 2018-10-22 RX ORDER — WARFARIN SODIUM 5 MG/1
TABLET ORAL
Qty: 30 TABLET | Refills: 1 | Status: SHIPPED | OUTPATIENT
Start: 2018-10-22 | End: 2019-01-17 | Stop reason: SDUPTHER

## 2018-10-26 ENCOUNTER — OFFICE VISIT (OUTPATIENT)
Dept: CARDIOLOGY | Facility: CLINIC | Age: 78
End: 2018-10-26

## 2018-10-26 ENCOUNTER — ANTICOAGULATION VISIT (OUTPATIENT)
Dept: INTERNAL MEDICINE | Facility: CLINIC | Age: 78
End: 2018-10-26

## 2018-10-26 VITALS
DIASTOLIC BLOOD PRESSURE: 88 MMHG | HEART RATE: 67 BPM | HEIGHT: 62 IN | BODY MASS INDEX: 36.62 KG/M2 | WEIGHT: 199 LBS | SYSTOLIC BLOOD PRESSURE: 126 MMHG

## 2018-10-26 DIAGNOSIS — I48.0 PAROXYSMAL ATRIAL FIBRILLATION (HCC): ICD-10-CM

## 2018-10-26 DIAGNOSIS — J98.6 DIAPHRAGMATIC PARALYSIS: ICD-10-CM

## 2018-10-26 DIAGNOSIS — I48.0 PAROXYSMAL ATRIAL FIBRILLATION (HCC): Primary | ICD-10-CM

## 2018-10-26 DIAGNOSIS — R06.09 DYSPNEA ON EXERTION: ICD-10-CM

## 2018-10-26 DIAGNOSIS — I10 ESSENTIAL HYPERTENSION: ICD-10-CM

## 2018-10-26 LAB — INR PPP: 4.6 (ref 0.9–1.1)

## 2018-10-26 PROCEDURE — 85610 PROTHROMBIN TIME: CPT | Performed by: INTERNAL MEDICINE

## 2018-10-26 PROCEDURE — 99213 OFFICE O/P EST LOW 20 MIN: CPT | Performed by: INTERNAL MEDICINE

## 2018-10-26 PROCEDURE — 36416 COLLJ CAPILLARY BLOOD SPEC: CPT | Performed by: INTERNAL MEDICINE

## 2018-10-26 PROCEDURE — 93000 ELECTROCARDIOGRAM COMPLETE: CPT | Performed by: INTERNAL MEDICINE

## 2018-10-26 PROCEDURE — 93793 ANTICOAG MGMT PT WARFARIN: CPT | Performed by: INTERNAL MEDICINE

## 2018-10-26 RX ORDER — LOSARTAN POTASSIUM AND HYDROCHLOROTHIAZIDE 12.5; 5 MG/1; MG/1
1 TABLET ORAL DAILY
Qty: 90 TABLET | Refills: 3 | Status: SHIPPED | OUTPATIENT
Start: 2018-10-26 | End: 2019-01-11

## 2018-10-26 NOTE — PROGRESS NOTES
Date of Office Visit: 10/26/2018  Encounter Provider: Demar Stone MD  Place of Service: [facility] CARDIOLOGY  Patient Name: Inessa Peoples  : 1940    Subjective:     Encounter Date:10/26/2018      Patient ID: Inessa Peoples is a 78 y.o. female who has a cc of PAF on sotalol and HTN and LBBB    Occ episodes of skipped heart beats mostly when she is tired and no prolonged AF     She reports having sometimes days where her pressure is below 100 systolic     No anginal chest pain,   Moderate ahn that is better on aldactone / she has paralyzed diaphragm   No soa,   No fainting,  No orthostasis.   No edema.   Exercise tolerance: poor -- limited to about a 1/4 mile.     There have been no hospital admission since the last visit.     There have been no bleeding events.       Past Medical History:   Diagnosis Date   • Abnormal ECG    • Allergic rhinitis    • Atrial fibrillation (CMS/HCC)    • Bleeds easily (CMS/HCC)     warfarin   • Breast screening    • Broken bones    • Calf DVT (deep venous thrombosis) (CMS/HCC)    • Chronic anticoagulation    • Colon polyp    • CTS (carpal tunnel syndrome)    • Deep vein thrombosis (CMS/HCC)     left calf   • Diverticulitis of colon    • AHN (dyspnea on exertion)    • Fatigue    • H/O malignant melanoma of skin    • Hematuria, gross    • History of colon polyps    • History of kidney stones    • Hyperlipidemia    • Hypertension    • Hyperthyroidism    • IFG (impaired fasting glucose)    • Insomnia    • LBBB (left bundle branch block)    • Left leg swelling    • LLQ abdominal pain    • Long term current use of systemic steroids    • Low back pain    • Malignant melanoma of skin (CMS/HCC)    • Neck pain    • Obesity    • Osteoarthritis    • Osteopenia    • Paralysis, diaphragm    • 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 for tuberculosis    • Rash    • RBBB (right bundle branch block)    • Renal calculi    • Sarcoid    • Sigmoid diverticulosis 2013   • Sleep  "apnea     on CPAP   • UTI (urinary tract infection)    • Visit for screening mammogram        Social History     Social History   • Marital status:      Spouse name: EDY   • Number of children: 4   • Years of education: 14yrs     Occupational History   • RETIRED      Social History Main Topics   • Smoking status: Never Smoker   • Smokeless tobacco: Never Used   • Alcohol use Yes      Comment: social drinker   • Drug use: No   • Sexual activity: Defer     Other Topics Concern   • Not on file     Social History Narrative   • No narrative on file       Review of Systems   Constitution: Negative for fever and night sweats.   HENT: Negative for ear pain and stridor.    Eyes: Negative for discharge and visual halos.   Cardiovascular: Negative for cyanosis.   Respiratory: Positive for shortness of breath. Negative for hemoptysis and sputum production.    Hematologic/Lymphatic: Negative for adenopathy.   Skin: Negative for nail changes and unusual hair distribution.   Musculoskeletal: Positive for arthritis, back pain and joint pain. Negative for gout and joint swelling.   Gastrointestinal: Negative for bowel incontinence and flatus.   Genitourinary: Negative for dysuria and flank pain.   Neurological: Negative for seizures and tremors.   Psychiatric/Behavioral: Negative for altered mental status. The patient is not nervous/anxious.             Objective:     Vitals:    10/26/18 1150   BP: 126/88   Pulse: 67   Weight: 90.3 kg (199 lb)   Height: 157.5 cm (62.01\")         Physical Exam   Constitutional: She is oriented to person, place, and time.   HENT:   Head: Normocephalic and atraumatic.   Eyes: Right eye exhibits no discharge. Left eye exhibits no discharge.   Neck: No JVD present. No thyromegaly present.   Cardiovascular: Normal rate and regular rhythm.  Exam reveals no gallop and no friction rub.    No murmur heard.  Pulmonary/Chest: Effort normal and breath sounds normal. She has no rales.   Abdominal: Soft. " Bowel sounds are normal. There is no tenderness.   Musculoskeletal: Normal range of motion. She exhibits no edema or deformity.   Neurological: She is alert and oriented to person, place, and time. She exhibits normal muscle tone.   Skin: Skin is warm and dry. No erythema.   Psychiatric: She has a normal mood and affect. Her behavior is normal. Thought content normal.         ECG 12 Lead  Date/Time: 10/26/2018 12:17 PM  Performed by: SERA RM  Authorized by: SERA RM   Comparison: compared with previous ECG   Similar to previous ECG  Rhythm: sinus rhythm  Conduction: complete LBBB  Clinical impression: abnormal ECG            Lab Review:       Assessment:          Diagnosis Plan   1. Paroxysmal atrial fibrillation (CMS/HCC)     2. Essential hypertension     3. Diaphragmatic paralysis     4. Dyspnea on exertion            Plan:       Atrial Fibrillation and Atrial Flutter  Assessment  • The patient has paroxysmal atrial fibrillation  • This is non-valvular in etiology    Plan  • Continue warfarin for antithrombotic therapy, bleeding issues discussed  • Continue sotalol for rhythm control  • Continue beta blocker for rate control      Concerned about low BP   Will reduce losartan dose     Encouraged exercise activity and weight loss.

## 2018-10-31 ENCOUNTER — TELEPHONE (OUTPATIENT)
Dept: INTERNAL MEDICINE | Facility: CLINIC | Age: 78
End: 2018-10-31

## 2018-10-31 RX ORDER — LEVOTHYROXINE SODIUM 0.03 MG/1
TABLET ORAL
Qty: 30 TABLET | Refills: 0 | Status: SHIPPED | OUTPATIENT
Start: 2018-10-31 | End: 2018-11-25 | Stop reason: SDUPTHER

## 2018-10-31 NOTE — TELEPHONE ENCOUNTER
Patient called pharmacy for refill on Levothyroxine 3 days ago. She states she has not heard anything from them. I checked chart and do not see that it has been refilled.

## 2018-11-06 ENCOUNTER — OFFICE VISIT (OUTPATIENT)
Dept: PAIN MEDICINE | Facility: CLINIC | Age: 78
End: 2018-11-06

## 2018-11-06 VITALS
TEMPERATURE: 97.3 F | DIASTOLIC BLOOD PRESSURE: 70 MMHG | HEIGHT: 62 IN | HEART RATE: 63 BPM | OXYGEN SATURATION: 92 % | RESPIRATION RATE: 18 BRPM | WEIGHT: 200 LBS | BODY MASS INDEX: 36.8 KG/M2 | SYSTOLIC BLOOD PRESSURE: 108 MMHG

## 2018-11-06 DIAGNOSIS — G89.29 OTHER CHRONIC PAIN: Primary | ICD-10-CM

## 2018-11-06 DIAGNOSIS — M46.1 SACROILIAC INFLAMMATION (HCC): ICD-10-CM

## 2018-11-06 DIAGNOSIS — M51.36 BULGE OF LUMBAR DISC WITHOUT MYELOPATHY: ICD-10-CM

## 2018-11-06 DIAGNOSIS — Z79.01 CHRONIC ANTICOAGULATION: ICD-10-CM

## 2018-11-06 DIAGNOSIS — M47.816 LUMBAR FACET ARTHROPATHY: ICD-10-CM

## 2018-11-06 PROCEDURE — 99214 OFFICE O/P EST MOD 30 MIN: CPT | Performed by: NURSE PRACTITIONER

## 2018-11-06 RX ORDER — METHOCARBAMOL 750 MG/1
750 TABLET, FILM COATED ORAL NIGHTLY PRN
Qty: 30 TABLET | Refills: 1 | Status: SHIPPED | OUTPATIENT
Start: 2018-11-06 | End: 2019-03-08 | Stop reason: SDUPTHER

## 2018-11-06 NOTE — PROGRESS NOTES
"CHIEF COMPLAINT  Back pain has increased since last visit    Subjective   Inessa Peoples is a 78 y.o. female  who presents to the office for follow-up.She has a history of chronic back pain. Reports this is worse since last office visit. Last evaluated 5-29-18.    Complains of pain in her back. Today her pain is 8/10VAS(no irregular breathing, fixed hair and make-up). Describes the pain as continuous aching and throbbing. \"It feels like a lot of pressure in my tailbone and my upper low back.\" Pain increases with walking, prolonged standing; pain decreases with sitting, laying, and previous injections.  Trying to do HEP in morning.  Is taking Tramadol per her PCP. Takes Tylenol OTC PRN.  Cannot take NSAIDS due to Coumadin. ADL's by self.  THe pain is now more in the middle of her back and radiates up and out. Has difficulty with household chores, such as doing dishes. Has to lean to relieve pressure on back.  Admits she leans on grocery cart while shopping.    She completed a LEFT Sacroiliac Joint Injection   on  4/26/18 performed by Dr. Means for management of low back pain/sacroiliac joint pain. Reported significant relief previously.    She completed a right SI joint injection   on  9-26-17 performed by Dr. Means for management of low back pain.  Back Pain   This is a chronic problem. The current episode started more than 1 year ago. The problem occurs intermittently. Progression since onset: worse since last office visit. The pain is present in the lumbar spine and sacro-iliac. The quality of the pain is described as aching. The pain does not radiate (left hip, right calf and foot). The pain is at a severity of 8/10 (right sided low back\). The pain is worse during the day. The symptoms are aggravated by bending, position, sitting, standing and twisting (housework). Associated symptoms include numbness and weakness. Pertinent negatives include no bladder incontinence, bowel incontinence, chest pain, " "dysuria, fever or headaches. Risk factors include obesity, menopause, history of cancer and lack of exercise (history of melanoma--no Chemo or RAD). She has tried analgesics and heat (left SI joint injection--significant ongoing relief) for the symptoms. The treatment provided moderate relief.          PEG Assessment   What number best describes your pain on average in the past week?8  What number best describes how, during the past week, pain has interfered with your enjoyment of life?5  What number best describes how, during the past week, pain has interfered with your general activity?  6    The following portions of the patient's history were reviewed and updated as appropriate: allergies, current medications, past family history, past medical history, past social history, past surgical history and problem list.    Review of Systems   Constitutional: Negative for chills and fever.   Respiratory: Positive for shortness of breath.    Cardiovascular: Negative for chest pain.   Gastrointestinal: Negative for bowel incontinence, constipation, diarrhea, nausea and vomiting.   Genitourinary: Negative for bladder incontinence, difficulty urinating, dyspareunia and dysuria.   Musculoskeletal: Positive for back pain.   Neurological: Positive for weakness and numbness. Negative for dizziness, light-headedness and headaches.   Psychiatric/Behavioral: Positive for sleep disturbance. Negative for confusion, hallucinations, self-injury and suicidal ideas. The patient is not nervous/anxious.        Vitals:    11/06/18 1356   BP: 108/70   Pulse: 63   Resp: 18   Temp: 97.3 °F (36.3 °C)   SpO2: 92%   Weight: 90.7 kg (200 lb)   Height: 157.5 cm (62.01\")   PainSc:   8   PainLoc: Back     Objective   Physical Exam   Constitutional: She is oriented to person, place, and time. Vital signs are normal. She appears well-developed and well-nourished. She is cooperative.   HENT:   Head: Normocephalic and atraumatic.   Nose: Nose normal. "   Eyes: Conjunctivae and lids are normal.   Neck: Trachea normal. Neck supple.   Cardiovascular: Normal rate.    Pulmonary/Chest: Effort normal.   Abdominal:   OBESE   Musculoskeletal:        Lumbar back: She exhibits tenderness, bony tenderness (moderate tenderness of bilateral L2-L5 facets; + loading manuever) and pain.   Minimal tenderness of bilateral SI joints     Negative SLR bilaterally        Neurological: She is alert and oriented to person, place, and time. Gait abnormal.   Reflex Scores:       Patellar reflexes are 2+ on the right side and 2+ on the left side.  Skin: Skin is warm, dry and intact.   Psychiatric: She has a normal mood and affect. Her speech is normal and behavior is normal. Judgment and thought content normal. Cognition and memory are normal.   Nursing note and vitals reviewed.      Assessment/Plan   Inessa was seen today for back pain.    Diagnoses and all orders for this visit:    Other chronic pain    Bulge of lumbar disc without myelopathy    Sacroiliac inflammation (CMS/HCC)    Lumbar facet arthropathy  -     Case Request    Chronic anticoagulation    Other orders  -     methocarbamol (ROBAXIN) 750 MG tablet; Take 1 tablet by mouth At Night As Needed for Muscle Spasms.      --- bilateral L2-L5 MBB x2, 2-4 weeks apart. Stop Coumadin 5 days prior. Reviewed the procedure at length with the patient.  Included in the review was expectations, complications, risk and benefits.The procedure was described in detail and the risks, benefits and alternatives were discussed with the patient (including but not limited to: bleeding, infection, nerve damage, worsening of pain, inability to perform injection, paralysis, seizures, and death) who agreed to proceed.  Discussed the potential for sedation if warranted/wanted.  Questions were answered and in a way the patient could understand.  Patient verbalized understanding and wishes to proceed.  This intervention will be ordered.  --- Trial of Robaxin  "750 mg ngihtly PRN. Discussed medication with the patient.  Included in this discussion was the potential for side effects and adverse events.  Patient verbalized understanding and wished to proceed.  Prescription will be sent to pharmacy.  --- Follow-up after procedure or sooner if needed    -------  Education about Medial Branch Blockade and RF Therapy:    This medial branch blockade (MBB) suggested is intended for diagnostic purposes, with the intent of offering the patient Radiofrequency thermal rhizotomy (RF) if the MBB is diagnostically effective.  The diagnostic blockade is necessary to determine the likelihood that RF therapy could be efficacious in providing long term relief to the patient.    Medial branches are sensory nerve branches that connect to a facet joint and transmit sensations & pain signals from that joint.  Facet is a term for the type of joints found in the spine.  Medial branches are the nerves that go to a facet, and therefore are also sometimes called \"facet joint nerves\" (FJNs).      In a medial branch blockade procedure, xray fluoroscopy is used to verify the locations of the outside of the joint lines which are being targeted.  Under xray guidance, needles are placed to these areas.  Contrast dye is injected to confirm proper placement, with dye flowing over the joint area, and to ensure that the dye does not flow into unintended areas such as a vein.  When this is confirmed, local anesthetic is injected to block the medial branch at that joint level.      If MBBs are diagnostically successful in blocking pain, then the patient is most likely a great candidate for Radiofrequency of those facet joint nerves.  In the RF procedure, needles are placed to the joint lines in the same fashion, and after testing, the needle tips are heated to thermally treat the nerves, blocking the nerves by in essence damaging the nerves with the heat treatment.       Medically, a successful RF procedure " should provide a patient with 50% pain relief or more for at least 6 months.  Clinical experience suggests that successful patients receive relief more in the range of 12 months on average.  We also discussed that a fortunate minority of patients receive therapeutic success from the MBB, and may not require RF ablation.  If a patient receives more than 8 weeks of relief from MBB, then occasional repeat MBB for therapeutic purposes is a very reasonable alternative therapy.  This course of therapy is consistent with our LCDs according to our CMS  in the area, and therefore other insurance providers should follow accordingly.  We will monitor our patients to screen for these therapeutic responders and will offer RF therapy only when necessary.        We discussed that MBB & RF are not without risks.  Guidelines regarding anticoagulant use & neuraxial procedures will be respected.  Patients that are ill or otherwise may be at risk for sepsis will not have their spines accessed by neuraxial injections of any type.  This patient will not be offered these therapies if there is an increased risk.   We discussed that there is a risk of postprocedural pain and also a risk of worsening of clinical picture with these procedures as with any neuraxial procedure.    -------         LUIS REPORT  LUIS report has been reviewed and scanned into the patient's chart.    As the clinician, I personally reviewed the LUIS from 11-5-18 while the patient was in the office today.          EMR Dragon/Transcription disclaimer:   Much of this encounter note is an electronic transcription/translation of spoken language to printed text. The electronic translation of spoken language may permit erroneous, or at times, nonsensical words or phrases to be inadvertently transcribed; Although I have reviewed the note for such errors, some may still exist.

## 2018-11-08 ENCOUNTER — PRIOR AUTHORIZATION (OUTPATIENT)
Dept: PAIN MEDICINE | Facility: CLINIC | Age: 78
End: 2018-11-08

## 2018-11-08 NOTE — TELEPHONE ENCOUNTER
Sent PA for Methocarbamal 750 mg to [facility] through [employer] (Key # [id number]) and waiting on response

## 2018-11-09 ENCOUNTER — ANTICOAGULATION VISIT (OUTPATIENT)
Dept: INTERNAL MEDICINE | Facility: CLINIC | Age: 78
End: 2018-11-09

## 2018-11-09 DIAGNOSIS — I48.0 PAROXYSMAL ATRIAL FIBRILLATION (HCC): ICD-10-CM

## 2018-11-09 LAB — INR PPP: 2.1 (ref 0.9–1.1)

## 2018-11-09 PROCEDURE — 85610 PROTHROMBIN TIME: CPT | Performed by: INTERNAL MEDICINE

## 2018-11-09 PROCEDURE — 36416 COLLJ CAPILLARY BLOOD SPEC: CPT | Performed by: INTERNAL MEDICINE

## 2018-11-09 PROCEDURE — 93793 ANTICOAG MGMT PT WARFARIN: CPT | Performed by: INTERNAL MEDICINE

## 2018-11-15 ENCOUNTER — DOCUMENTATION (OUTPATIENT)
Dept: PAIN MEDICINE | Facility: CLINIC | Age: 78
End: 2018-11-15

## 2018-11-15 ENCOUNTER — OUTSIDE FACILITY SERVICE (OUTPATIENT)
Dept: PAIN MEDICINE | Facility: CLINIC | Age: 78
End: 2018-11-15

## 2018-11-15 PROCEDURE — 64495 INJ PARAVERT F JNT L/S 3 LEV: CPT | Performed by: ANESTHESIOLOGY

## 2018-11-15 PROCEDURE — 64494 INJ PARAVERT F JNT L/S 2 LEV: CPT | Performed by: ANESTHESIOLOGY

## 2018-11-15 PROCEDURE — 64493 INJ PARAVERT F JNT L/S 1 LEV: CPT | Performed by: ANESTHESIOLOGY

## 2018-11-16 NOTE — PROGRESS NOTES
Bilateral L2-5 Lumbar Medial Branch Blockade  [facility]    PREOPERATIVE DIAGNOSIS:  Lumbar spondylosis without myelopathy    POSTOPERATIVE DIAGNOSIS:  Lumbar spondylosis without myelopathy    PROCEDURE:   Diagnostic Bilateral Lumbar Medial Branch Nerve Blockades, with fluoroscopy:  L2, L3, L4, and L5 nerves (at the L3, L4, L5 transverse processes and the sacral alar groove) to block facet joints L3-4, L4-5, and L5-S1  1. 01363-47 -- Bilateral Lumbar Facet blocks, 1st Level  2. 43370-36 -- Bilateral Lumbar Facet blocks, 2nd  Level  3. 96212-30 -- Bilateral Lumbar Facet blocks, 3rd Level    PRE-PROCEDURE DISCUSSION WITH PATIENT:    Risks and complications were discussed with the patient prior to starting the procedure and informed consent was obtained.      SURGEON:  Wolfgang Means MD    REASON FOR PROCEDURE:   The patient complains of pain that seems to have a significant axial component, The patient has a mixed nociceptive / neuropathic painful presentation but the axial pain seems to be more problematic and incomplete relief after SIJ injection as previously noted.     SEDATION:  Patient declined administration of moderate sedation    ANESTHETIC:  Marcaine 0.5%  STEROID:  Methylprednisolone (DEPO MEDROL) 80mg/ml  TOTAL VOLUME OF SOLUTION: 8ml    DESCRIPTON OF PROCEDURE:  After obtaining informed consent, IV access was not obtained in the preoperative area.   The patient was taken to the operating room.  The patient was placed in the prone position with a pillow under the abdomen. All pressure points were well padded.  EKG, blood pressure, and pulse oximeter were monitored.  The patient was monitored and sedated by the RN under my direction. The lumbosacral area was prepped with Chloraprep and draped in a sterile fashion. Under fluoroscopic guidance the transverse processes of the L3, L4, and L5 vertebrae at the junctions of the superior articular processes were identified on the right. Also  identified was the groove between the ala and the superior articular process of the sacrum on the ipsilateral side.  Skin and subcutaneous tissue were anesthetized with 1% lidocaine above each of these points. A 22-gauge spinal needle was introduced under fluoroscopic guidance at the above junctions. Aspiration was negative for blood and CSF.  After confirming the position of the needle with fluoroscope in all views, 0.25 mL of Omnipaque was injected, and after seeing the proper spread a total of 1 mL of the anesthetic solution noted above was injected at each of these points.  Needles were removed intact from each of the areas.  A similar procedure was repeated to block the L2, L3, L4, and L5 nerves on the contralateral side.   Onset of analgesia was noted.  Vital signs remained stable throughout.      ESTIMATED BLOOD LOSS:  <5 mL  SPECIMENS:  none    COMPLICATIONS:   No complications were noted., There was no indication of vascular uptake on live injection of contrast dye. and The patient did not have any signs of postprocedure numbness nor weakness.    TOLERANCE & DISCHARGE CONDITION:    The patient tolerated the procedure well.  The patient was transported to the recovery area without difficulties.  The patient was discharged to home under the care of family in stable and satisfactory condition.    PLAN OF CARE:  1. The patient was given our standard instruction sheet.  2. We discussed that Lumbar Medial Branch Blockade is a diagnostic procedure in consideration for radiofrequency ablation if two diagnostic procedures prove to be positive for significant benefit.  If sustained relief of 6 to eight weeks is obtained, then an alternative plan could be therapeutic lumbar branch blockades.  3. The patient is asked to keep a pain log each hour for 8 hours after the procedure today.  4. The patient will  Repeat injection 2-4 wks.  5. The patient will resume all medications as per the medication reconciliation  sheet.

## 2018-11-26 RX ORDER — LEVOTHYROXINE SODIUM 0.03 MG/1
TABLET ORAL
Qty: 30 TABLET | Refills: 0 | Status: SHIPPED | OUTPATIENT
Start: 2018-11-26 | End: 2018-12-29 | Stop reason: SDUPTHER

## 2018-11-27 ENCOUNTER — APPOINTMENT (OUTPATIENT)
Dept: WOMENS IMAGING | Facility: HOSPITAL | Age: 78
End: 2018-11-27

## 2018-11-27 PROCEDURE — 77063 BREAST TOMOSYNTHESIS BI: CPT | Performed by: RADIOLOGY

## 2018-11-27 PROCEDURE — 77067 SCR MAMMO BI INCL CAD: CPT | Performed by: RADIOLOGY

## 2018-11-30 ENCOUNTER — ANTICOAGULATION VISIT (OUTPATIENT)
Dept: INTERNAL MEDICINE | Facility: CLINIC | Age: 78
End: 2018-11-30

## 2018-11-30 DIAGNOSIS — I48.0 PAROXYSMAL ATRIAL FIBRILLATION (HCC): ICD-10-CM

## 2018-11-30 LAB — INR PPP: 1.3 (ref 0.9–1.1)

## 2018-11-30 PROCEDURE — 36416 COLLJ CAPILLARY BLOOD SPEC: CPT | Performed by: INTERNAL MEDICINE

## 2018-11-30 PROCEDURE — 85610 PROTHROMBIN TIME: CPT | Performed by: INTERNAL MEDICINE

## 2018-12-06 ENCOUNTER — DOCUMENTATION (OUTPATIENT)
Dept: PAIN MEDICINE | Facility: CLINIC | Age: 78
End: 2018-12-06

## 2018-12-06 NOTE — PROGRESS NOTES
She presented for repeat LMBB today... The plan was anticipated to be a Bilateral L25 LMBB.    She had the same on 11-15-18.    Today she tells us that she had 100% relief the day of the procedure on 11-15-18 and has 90%+ ongoing relief.    So repeat procedure was cancelled.    Plan:  Office visit follow up in 2-4 wks.    - if pain returns soon then repeat LMBB

## 2018-12-14 ENCOUNTER — ANTICOAGULATION VISIT (OUTPATIENT)
Dept: INTERNAL MEDICINE | Facility: CLINIC | Age: 78
End: 2018-12-14

## 2018-12-14 DIAGNOSIS — I48.0 PAROXYSMAL ATRIAL FIBRILLATION (HCC): ICD-10-CM

## 2018-12-14 LAB — INR PPP: 2.3 (ref 0.9–1.1)

## 2018-12-14 PROCEDURE — 93793 ANTICOAG MGMT PT WARFARIN: CPT | Performed by: INTERNAL MEDICINE

## 2018-12-14 PROCEDURE — 36416 COLLJ CAPILLARY BLOOD SPEC: CPT | Performed by: INTERNAL MEDICINE

## 2018-12-14 PROCEDURE — 85610 PROTHROMBIN TIME: CPT | Performed by: INTERNAL MEDICINE

## 2018-12-27 ENCOUNTER — OFFICE VISIT (OUTPATIENT)
Dept: PAIN MEDICINE | Facility: CLINIC | Age: 78
End: 2018-12-27

## 2018-12-27 VITALS
HEART RATE: 65 BPM | SYSTOLIC BLOOD PRESSURE: 102 MMHG | BODY MASS INDEX: 36.07 KG/M2 | TEMPERATURE: 97.6 F | WEIGHT: 196 LBS | RESPIRATION RATE: 18 BRPM | DIASTOLIC BLOOD PRESSURE: 64 MMHG | HEIGHT: 62 IN | OXYGEN SATURATION: 94 %

## 2018-12-27 DIAGNOSIS — M51.36 BULGE OF LUMBAR DISC WITHOUT MYELOPATHY: ICD-10-CM

## 2018-12-27 DIAGNOSIS — M46.1 SACROILIAC INFLAMMATION (HCC): ICD-10-CM

## 2018-12-27 DIAGNOSIS — G89.29 OTHER CHRONIC PAIN: Primary | ICD-10-CM

## 2018-12-27 DIAGNOSIS — M47.816 LUMBAR FACET ARTHROPATHY: ICD-10-CM

## 2018-12-27 PROCEDURE — 99213 OFFICE O/P EST LOW 20 MIN: CPT | Performed by: NURSE PRACTITIONER

## 2018-12-27 NOTE — PROGRESS NOTES
CHIEF COMPLAINT  Back pain has decreased since last visit. she had 100% relief the day of the procedure on 11-15-18 and has 90%+ ongoing relief. She states the pain is starting to come back    Subjective   Inessa Peoples is a 78 y.o. female  who presents to the office for follow-up of procedure.  She completed a Bilateral L2-5 Lumbar Medial Branch Blockade   on  11/15/18 performed by Dr. Means for management of LOW BACK PAIN. Patient reports SIGNIFICANT relief from the procedure. This started wearing off over the past few days.   Was to have second LMBB on 12-6-18 but was still having significant relief. The procedure was cancelled at that time.    Complains of pain in her low back. Today her pain is 3/10VAs. Describes the pain as continuous dull pressure. Pain increases with walking, standing, household chores; pain decreases with sitting, laying down, and medication. ADL's by self. Denies any bowel or bladder changes since last office visit.    She completed a LEFT Sacroiliac Joint Injection   on  4/26/18 performed by Dr. Means for management of low back pain/sacroiliac joint pain. Reported significant relief previously.    She completed a right SI joint injection   on  9-26-17 performed by Dr. Means for management of low back pain.  Back Pain   This is a chronic problem. The current episode started more than 1 year ago. The problem occurs intermittently. Progression since onset: improved since last office visit. The pain is present in the lumbar spine and sacro-iliac. The quality of the pain is described as aching. The pain does not radiate (left hip, right calf and foot). The pain is at a severity of 3/10. The pain is worse during the day. The symptoms are aggravated by bending, position, sitting, standing and twisting (housework). Pertinent negatives include no bladder incontinence, bowel incontinence, chest pain, dysuria, fever, headaches, numbness or weakness. Risk factors include obesity, menopause,  "history of cancer and lack of exercise (history of melanoma--no Chemo or RAD). She has tried analgesics and heat (left SI joint injection--significant ongoing relief) for the symptoms. The treatment provided moderate relief.      PEG Assessment   What number best describes your pain on average in the past week?3  What number best describes how, during the past week, pain has interfered with your enjoyment of life?0  What number best describes how, during the past week, pain has interfered with your general activity?  0    The following portions of the patient's history were reviewed and updated as appropriate: allergies, current medications, past family history, past medical history, past social history, past surgical history and problem list.    Review of Systems   Constitutional: Negative for chills and fever.   Respiratory: Positive for shortness of breath.    Cardiovascular: Negative for chest pain.   Gastrointestinal: Positive for diarrhea. Negative for bowel incontinence, constipation, nausea and vomiting.   Genitourinary: Negative for bladder incontinence, difficulty urinating, dyspareunia and dysuria.   Musculoskeletal: Positive for back pain.   Neurological: Negative for dizziness, weakness, light-headedness, numbness and headaches.   Psychiatric/Behavioral: Negative for confusion, hallucinations, self-injury, sleep disturbance and suicidal ideas. The patient is not nervous/anxious.        Vitals:    12/27/18 1242   BP: 102/64   Pulse: 65   Resp: 18   Temp: 97.6 °F (36.4 °C)   TempSrc: Oral   SpO2: 94%   Weight: 88.9 kg (196 lb)   Height: 157.5 cm (62.01\")   PainSc:   3   PainLoc: Back     Objective   Physical Exam   Constitutional: She is oriented to person, place, and time. Vital signs are normal. She appears well-developed and well-nourished. She is cooperative.   HENT:   Head: Normocephalic and atraumatic.   Nose: Nose normal.   Eyes: Conjunctivae and lids are normal.   Neck: Trachea normal. Neck " supple.   Cardiovascular: Normal rate.   Pulmonary/Chest: Effort normal.   Abdominal:   OBESE   Musculoskeletal:        Lumbar back: She exhibits tenderness, bony tenderness (moderate tenderness of bilateral L2-L5 facets; + loading manuever) and pain.   Minimal tenderness of bilateral SI joints     Negative SLR bilaterally        Neurological: She is alert and oriented to person, place, and time. Gait abnormal.   Reflex Scores:       Patellar reflexes are 2+ on the right side and 2+ on the left side.  Skin: Skin is warm, dry and intact.   Psychiatric: She has a normal mood and affect. Her speech is normal and behavior is normal. Judgment and thought content normal. Cognition and memory are normal.   Nursing note and vitals reviewed.      Assessment/Plan   Inessa was seen today for back pain.    Diagnoses and all orders for this visit:    Other chronic pain    Bulge of lumbar disc without myelopathy    Sacroiliac inflammation (CMS/HCC)    Lumbar facet arthropathy  -     Case Request      --- repeat bilateral L2-L5 MBB x1. No blood thinners. Reviewed the procedure at length with the patient.  Included in the review was expectations, complications, risk and benefits.The procedure was described in detail and the risks, benefits and alternatives were discussed with the patient (including but not limited to: bleeding, infection, nerve damage, worsening of pain, inability to perform injection, paralysis, seizures, and death) who agreed to proceed.  Discussed the potential for sedation if warranted/wanted.  The procedure will plan to be performed at [facility] with fluoroscopic guidance(unless ultrasound is indicated). Questions were answered and in a way the patient could understand.  Patient verbalized understanding and wishes to proceed.  This intervention will be ordered.  --- Follow-up after procedure or sooner if needed.     LUIS REPORT  LUIS report has been reviewed and scanned into the  patient's chart.    As the clinician, I personally reviewed the LUIS from 12-24-18 while the patient was in the office today.          EMR Dragon/Transcription disclaimer:   Much of this encounter note is an electronic transcription/translation of spoken language to printed text. The electronic translation of spoken language may permit erroneous, or at times, nonsensical words or phrases to be inadvertently transcribed; Although I have reviewed the note for such errors, some may still exist.

## 2018-12-31 RX ORDER — LEVOTHYROXINE SODIUM 0.03 MG/1
TABLET ORAL
Qty: 30 TABLET | Refills: 0 | Status: SHIPPED | OUTPATIENT
Start: 2018-12-31 | End: 2019-01-26 | Stop reason: SDUPTHER

## 2019-01-02 ENCOUNTER — ANTICOAGULATION VISIT (OUTPATIENT)
Dept: INTERNAL MEDICINE | Facility: CLINIC | Age: 79
End: 2019-01-02

## 2019-01-02 DIAGNOSIS — I48.0 PAROXYSMAL ATRIAL FIBRILLATION (HCC): ICD-10-CM

## 2019-01-02 LAB — INR PPP: 7.8 (ref 0.9–1.1)

## 2019-01-02 PROCEDURE — 36416 COLLJ CAPILLARY BLOOD SPEC: CPT | Performed by: INTERNAL MEDICINE

## 2019-01-02 PROCEDURE — 85610 PROTHROMBIN TIME: CPT | Performed by: INTERNAL MEDICINE

## 2019-01-02 PROCEDURE — 93793 ANTICOAG MGMT PT WARFARIN: CPT | Performed by: INTERNAL MEDICINE

## 2019-01-04 ENCOUNTER — ANTICOAGULATION VISIT (OUTPATIENT)
Dept: INTERNAL MEDICINE | Facility: CLINIC | Age: 79
End: 2019-01-04

## 2019-01-04 DIAGNOSIS — I48.0 PAROXYSMAL ATRIAL FIBRILLATION (HCC): ICD-10-CM

## 2019-01-04 LAB — INR PPP: 4.9 (ref 0.9–1.1)

## 2019-01-04 PROCEDURE — 93793 ANTICOAG MGMT PT WARFARIN: CPT | Performed by: INTERNAL MEDICINE

## 2019-01-04 PROCEDURE — 36416 COLLJ CAPILLARY BLOOD SPEC: CPT | Performed by: INTERNAL MEDICINE

## 2019-01-04 PROCEDURE — 85610 PROTHROMBIN TIME: CPT | Performed by: INTERNAL MEDICINE

## 2019-01-07 ENCOUNTER — ANTICOAGULATION VISIT (OUTPATIENT)
Dept: INTERNAL MEDICINE | Facility: CLINIC | Age: 79
End: 2019-01-07

## 2019-01-07 DIAGNOSIS — E03.9 HYPOTHYROIDISM, UNSPECIFIED TYPE: ICD-10-CM

## 2019-01-07 DIAGNOSIS — I10 HYPERTENSION, UNSPECIFIED TYPE: ICD-10-CM

## 2019-01-07 DIAGNOSIS — I48.0 PAROXYSMAL ATRIAL FIBRILLATION (HCC): ICD-10-CM

## 2019-01-07 DIAGNOSIS — E78.5 HYPERLIPIDEMIA, UNSPECIFIED HYPERLIPIDEMIA TYPE: Primary | ICD-10-CM

## 2019-01-07 LAB — INR PPP: 1.9 (ref 0.9–1.1)

## 2019-01-07 PROCEDURE — 85610 PROTHROMBIN TIME: CPT | Performed by: INTERNAL MEDICINE

## 2019-01-07 PROCEDURE — 93793 ANTICOAG MGMT PT WARFARIN: CPT | Performed by: INTERNAL MEDICINE

## 2019-01-07 PROCEDURE — 36416 COLLJ CAPILLARY BLOOD SPEC: CPT | Performed by: INTERNAL MEDICINE

## 2019-01-11 ENCOUNTER — OFFICE VISIT (OUTPATIENT)
Dept: INTERNAL MEDICINE | Facility: CLINIC | Age: 79
End: 2019-01-11

## 2019-01-11 VITALS
WEIGHT: 203 LBS | HEIGHT: 62 IN | HEART RATE: 69 BPM | BODY MASS INDEX: 37.36 KG/M2 | SYSTOLIC BLOOD PRESSURE: 110 MMHG | DIASTOLIC BLOOD PRESSURE: 70 MMHG | OXYGEN SATURATION: 98 %

## 2019-01-11 DIAGNOSIS — D86.9 SARCOIDOSIS: ICD-10-CM

## 2019-01-11 DIAGNOSIS — E03.9 ACQUIRED HYPOTHYROIDISM: ICD-10-CM

## 2019-01-11 DIAGNOSIS — E78.5 HYPERLIPIDEMIA, UNSPECIFIED HYPERLIPIDEMIA TYPE: ICD-10-CM

## 2019-01-11 DIAGNOSIS — I48.0 PAROXYSMAL ATRIAL FIBRILLATION (HCC): ICD-10-CM

## 2019-01-11 DIAGNOSIS — Z00.00 MEDICARE ANNUAL WELLNESS VISIT, SUBSEQUENT: Primary | ICD-10-CM

## 2019-01-11 DIAGNOSIS — Z82.49 FAMILY HISTORY OF AORTIC ANEURYSM: ICD-10-CM

## 2019-01-11 DIAGNOSIS — I10 ESSENTIAL HYPERTENSION: ICD-10-CM

## 2019-01-11 DIAGNOSIS — Z13.6 SCREENING FOR AAA (ABDOMINAL AORTIC ANEURYSM): ICD-10-CM

## 2019-01-11 PROBLEM — Z12.11 COLON CANCER SCREENING: Status: RESOLVED | Noted: 2018-06-19 | Resolved: 2019-01-11

## 2019-01-11 LAB
ALBUMIN SERPL-MCNC: 4 G/DL (ref 3.5–5.2)
ALBUMIN/GLOB SERPL: 1.3 G/DL
ALP SERPL-CCNC: 98 U/L (ref 39–117)
ALT SERPL-CCNC: 24 U/L (ref 1–33)
APPEARANCE UR: CLEAR
AST SERPL-CCNC: 18 U/L (ref 1–32)
BACTERIA #/AREA URNS HPF: ABNORMAL /HPF
BACTERIA UR CULT: NORMAL
BACTERIA UR CULT: NORMAL
BASOPHILS # BLD AUTO: 0.05 10*3/MM3 (ref 0–0.2)
BASOPHILS NFR BLD AUTO: 0.9 % (ref 0–1.5)
BILIRUB SERPL-MCNC: 0.6 MG/DL (ref 0.1–1.2)
BILIRUB UR QL STRIP: NEGATIVE
BUN SERPL-MCNC: 25 MG/DL (ref 8–23)
BUN/CREAT SERPL: 25 (ref 7–25)
CALCIUM SERPL-MCNC: 10.1 MG/DL (ref 8.6–10.5)
CASTS URNS MICRO: ABNORMAL
CASTS URNS QL MICRO: PRESENT /LPF
CHLORIDE SERPL-SCNC: 106 MMOL/L (ref 98–107)
CHOLEST SERPL-MCNC: 219 MG/DL (ref 0–200)
CO2 SERPL-SCNC: 25.7 MMOL/L (ref 22–29)
COLOR UR: YELLOW
CREAT SERPL-MCNC: 1 MG/DL (ref 0.57–1)
EOSINOPHIL # BLD AUTO: 0.32 10*3/MM3 (ref 0–0.7)
EOSINOPHIL NFR BLD AUTO: 5.8 % (ref 0.3–6.2)
EPI CELLS #/AREA URNS HPF: >10 /HPF
ERYTHROCYTE [DISTWIDTH] IN BLOOD BY AUTOMATED COUNT: 13.1 % (ref 11.7–13)
GLOBULIN SER CALC-MCNC: 3.1 GM/DL
GLUCOSE SERPL-MCNC: 107 MG/DL (ref 65–99)
GLUCOSE UR QL: NEGATIVE
HCT VFR BLD AUTO: 40.9 % (ref 35.6–45.5)
HDLC SERPL-MCNC: 55 MG/DL (ref 40–60)
HGB BLD-MCNC: 13.6 G/DL (ref 11.9–15.5)
HGB UR QL STRIP: NEGATIVE
IMM GRANULOCYTES # BLD AUTO: 0.02 10*3/MM3 (ref 0–0.03)
IMM GRANULOCYTES NFR BLD AUTO: 0.4 % (ref 0–0.5)
KETONES UR QL STRIP: NEGATIVE
LDLC SERPL CALC-MCNC: 132 MG/DL (ref 0–100)
LEUKOCYTE ESTERASE UR QL STRIP: NEGATIVE
LYMPHOCYTES # BLD AUTO: 2.03 10*3/MM3 (ref 0.9–4.8)
LYMPHOCYTES NFR BLD AUTO: 36.6 % (ref 19.6–45.3)
MCH RBC QN AUTO: 31.4 PG (ref 26.9–32)
MCHC RBC AUTO-ENTMCNC: 33.3 G/DL (ref 32.4–36.3)
MCV RBC AUTO: 94.5 FL (ref 80.5–98.2)
MICRO URNS: NORMAL
MICRO URNS: NORMAL
MONOCYTES # BLD AUTO: 0.67 10*3/MM3 (ref 0.2–1.2)
MONOCYTES NFR BLD AUTO: 12.1 % (ref 5–12)
MUCOUS THREADS URNS QL MICRO: PRESENT /HPF
NEUTROPHILS # BLD AUTO: 2.46 10*3/MM3 (ref 1.9–8.1)
NEUTROPHILS NFR BLD AUTO: 44.2 % (ref 42.7–76)
NITRITE UR QL STRIP: NEGATIVE
PH UR STRIP: 5.5 [PH] (ref 5–7.5)
PLATELET # BLD AUTO: 288 10*3/MM3 (ref 140–500)
POTASSIUM SERPL-SCNC: 4.5 MMOL/L (ref 3.5–5.2)
PROT SERPL-MCNC: 7.1 G/DL (ref 6–8.5)
PROT UR QL STRIP: NEGATIVE
RBC # BLD AUTO: 4.33 10*6/MM3 (ref 3.9–5.2)
RBC #/AREA URNS HPF: ABNORMAL /HPF
SODIUM SERPL-SCNC: 143 MMOL/L (ref 136–145)
SP GR UR: 1.02 (ref 1–1.03)
TRIGL SERPL-MCNC: 158 MG/DL (ref 0–150)
TSH SERPL DL<=0.005 MIU/L-ACNC: 2.88 MIU/ML (ref 0.27–4.2)
URINALYSIS REFLEX: NORMAL
UROBILINOGEN UR STRIP-MCNC: 0.2 MG/DL (ref 0.2–1)
VLDLC SERPL CALC-MCNC: 31.6 MG/DL (ref 5–40)
WBC # BLD AUTO: 5.55 10*3/MM3 (ref 4.5–10.7)
WBC #/AREA URNS HPF: ABNORMAL /HPF

## 2019-01-11 PROCEDURE — 99214 OFFICE O/P EST MOD 30 MIN: CPT | Performed by: INTERNAL MEDICINE

## 2019-01-11 PROCEDURE — G0402 INITIAL PREVENTIVE EXAM: HCPCS | Performed by: INTERNAL MEDICINE

## 2019-01-11 RX ORDER — LOSARTAN POTASSIUM 50 MG/1
50 TABLET ORAL DAILY
Qty: 90 TABLET | Refills: 3 | Status: SHIPPED | OUTPATIENT
Start: 2019-01-11 | End: 2020-02-03

## 2019-01-11 RX ORDER — FLUOROURACIL 50 MG/G
CREAM TOPICAL 2 TIMES DAILY
COMMUNITY
End: 2020-05-11

## 2019-01-11 RX ORDER — FLUOCINONIDE TOPICAL SOLUTION USP, 0.05% 0.5 MG/ML
SOLUTION TOPICAL 2 TIMES DAILY
COMMUNITY
End: 2020-05-11

## 2019-01-11 NOTE — PROGRESS NOTES
Subjective     Inessa Peoples is a 78 y.o. female who presents for an annual wellness visit as well as check up of htn, hld, thyroid.      History of Present Illness     HTN. Control is good.   HLD. She has tried four statin drugs and fenofibrate.  She has not been able to tolerate them.  She is able to tolerate Zetia only.  She is interested in seeing a dietician.    Hypothyroidism. Thyroid is controlled.   Chronic LBP.  She is maintained on tramadol qhs and Celebrex during the day.    Afib.  She is maintained on coumadin.   Discussed ongoing issue of SOA.  Discussed full cardiopulmonary work up with cardiology and pulmonary and reviewed findings.  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      Review of Systems   Respiratory: Positive for shortness of breath.    Cardiovascular: Positive for leg swelling. Negative for chest pain.   Musculoskeletal: Positive for arthralgias and back pain.       The following portions of the patient's history were reviewed and updated as appropriate: allergies, current medications, past family history, past medical history, past social history, past surgical history and problem list.  Health maintenance tab was reviewed and updated with the patient.       Patient Active Problem List    Diagnosis Date Noted   • Lumbar facet arthropathy 11/06/2018   • Family history of colon cancer 06/19/2018     Note Last Updated: 6/19/2018     Added automatically from request for surgery [id number]     • Class 2 obesity due to excess calories with body mass index (BMI) of 35.0 to 35.9 in adult 03/27/2018   • Chronic pain 07/13/2017   • Bulge of lumbar disc without myelopathy 07/13/2017   • Sacroiliac inflammation (CMS/[facility]) 07/13/2017   • Sacroiliac pain 07/13/2017   • Dilation of thoracic aorta (CMS/HCC) 06/23/2017   • Chronic anticoagulation 05/15/2017   • Diverticulitis of large intestine without perforation or abscess without bleeding 01/03/2017   •  ANY on CPAP + 10 - Dr Lu 10/15/2016   • History of melanoma excision 08/05/2016   • GERD (gastroesophageal reflux disease) 08/05/2016   • History of kidney stones 08/05/2016   • Sarcoidosis 04/18/2016   • 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       • Dyspnea on exertion 04/18/2016   • Chronic osteoarthritis 04/18/2016   • History of DVT (deep vein thrombosis) 04/18/2016     Note Last Updated: 4/18/2016     Description: developed unprovoked on Xarelto.     • Atrial fibrillation (CMS/HCC) 04/18/2016       Past Medical History:   Diagnosis Date   • Abnormal ECG    • Allergic rhinitis    • Atrial fibrillation (CMS/HCC)    • Bleeds easily (CMS/HCC)     warfarin   • Breast screening    • Broken bones    • Calf DVT (deep venous thrombosis) (CMS/HCC)    • Chronic anticoagulation    • Colon polyp    • CTS (carpal tunnel syndrome)    • Deep vein thrombosis (CMS/HCC)     left calf   • Diverticulitis of colon    • AHN (dyspnea on exertion)    • Fatigue    • H/O malignant melanoma of skin    • Hematuria, gross    • History of colon polyps    • History of kidney stones    • Hyperlipidemia    • Hypertension    • Hyperthyroidism    • IFG (impaired fasting glucose)    • Insomnia    • LBBB (left bundle branch block)    • Left leg swelling    • LLQ abdominal pain    • Long term current use of systemic steroids    • Low back pain    • Malignant melanoma of skin (CMS/HCC)    • Neck pain    • Obesity    • Osteoarthritis    • Osteopenia    • Paralysis, diaphragm    • 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 for tuberculosis    • Rash    • RBBB (right bundle branch block)    • Renal calculi    • Sarcoid    • Sigmoid diverticulosis  06/26/2013   • Sleep apnea     on CPAP   • UTI (urinary tract infection)    • Visit for screening mammogram        Past Surgical History:   Procedure Laterality Date   • CARPAL TUNNEL RELEASE Bilateral 1980'S    [facility]   • CATARACT EXTRACTION Bilateral 2001    Dr. Nic Hidalgo   • COLONOSCOPY N/A 06/26/2013    MILD SIGMOID DIVERTICULOSIS, repeat 5 years based on sister w/ colona cancer-DR. NASRA CASTRO   • COLONOSCOPY N/A 10/19/2010    SIGMOID DIVERTICULOSIS & INTERNAL HEMORRHOIDS, HEMORRHOIDAL BANDING X4, DR. NASRA CASTRO   • COLONOSCOPY N/A 8/6/2018    Procedure: COLONOSCOPY TO CECUM AND INTO TERMINAL ILEUM;  Surgeon: Nasra Castro MD;  Location: [facility] ENDOSCOPY;  Service: Gastroenterology   • COLONOSCOPY W/ BIOPSIES N/A 05/12/2008    RECTUM BIOPSY: COLONIC MUCOSA W/ FOCAL INCREASE OF INFLAMMATORY CELLS IN THE MUSCULARIS MUCOSA INCLUDING EOSINOPHILS, SIGMOID DIVERTICULOSIS, POSSIBLE PROCTITIS, DR. NASRA CASTRO   • CYSTOSCOPY, RETROGRADE PYELOGRAM AND STENT INSERTION Bilateral 10/10/2014    w/ Right ureteral pyeloscopy & laser lithotripsy, Right Double-J stent placement-Dr. Julio Tai   • EXTRACORPOREAL SHOCK WAVE LITHOTRIPSY (ESWL) Right 09/10/2009    DR. JULIO TAI   • LAPAROSCOPIC CHOLECYSTECTOMY N/A 07/19/2012    DR. NASRA CASTRO   • LUMBAR EPIDURAL INJECTION N/A 04/23/2015    LUMBAR EPIDURAL STEROID INJECTION X3   • PARATHYROIDECTOMY N/A 2001   • SKIN CANCER EXCISION     • THUMB ARTHROSCOPY Right 1982   • TOTAL KNEE ARTHROPLASTY Right 03/25/2014    [employer] total knee, 55 femoral component, 71 tibial tray w/ a 40 mm stem, 34 mm x 7.8 mm three-peg patella, and a 14 standard poly-Dr. Ty Canchola       Family History   Problem Relation Age of Onset   • Heart disease Mother    • Diabetes type II Mother    • Heart disease Father    • Cancer Sister    • Heart disease Brother         CABG   • Diabetes type II Brother    • Colon cancer Brother    • Colon polyps Brother     • Heart disease Sister    • Aneurysm Sister         abdominal aorta   • Colon cancer Sister    • Diabetes type II Sister    • Lung cancer Sister    • Colon polyps Sister    • COPD Sister        Social History     Socioeconomic History   • Marital status:      Spouse name: EDY   • Number of children: 4   • Years of education: 14yrs   • Highest education level: Not on file   Social Needs   • Financial resource strain: Not on file   • Food insecurity - worry: Not on file   • Food insecurity - inability: Not on file   • Transportation needs - medical: Not on file   • Transportation needs - non-medical: Not on file   Occupational History   • Occupation: RETIRED   Tobacco Use   • Smoking status: Never Smoker   • Smokeless tobacco: Never Used   Substance and Sexual Activity   • Alcohol use: Yes     Comment: social drinker   • Drug use: No   • Sexual activity: Defer   Other Topics Concern   • Not on file   Social History Narrative   • Not on file       Current Outpatient Medications on File Prior to Visit   Medication Sig Dispense Refill   • Calcium-Phosphorus-Vitamin D (CITRACAL +D3 PO) Take 1 tablet by mouth Daily.     • celecoxib (CeleBREX) 200 MG capsule Take 1 capsule by mouth Daily. 30 capsule 5   • ezetimibe (ZETIA) 10 MG tablet TAKE ONE TABLET BY MOUTH DAILY 30 tablet 9   • fluocinonide (LIDEX) 0.05 % external solution Apply  topically to the appropriate area as directed 2 (Two) Times a Day.     • fluorouracil (EFUDEX) 5 % cream Apply  topically to the appropriate area as directed 2 (Two) Times a Day.     • levothyroxine (SYNTHROID, LEVOTHROID) 25 MCG tablet TAKE ONE TABLET BY MOUTH DAILY AS DIRECTED 30 tablet 0   • melatonin 5 MG sublingual tablet sublingual tablet Take 5 mg by mouth Every Night.     • methocarbamol (ROBAXIN) 750 MG tablet Take 1 tablet by mouth At Night As Needed for Muscle Spasms. 30 tablet 1   • Multiple Vitamin (MULTI-DAY VITAMINS) tablet Take 1 tablet by mouth daily.     • sotalol  "(BETAPACE) 80 MG tablet TAKE ONE TABLET BY MOUTH DAILY 30 tablet 3   • spironolactone (ALDACTONE) 25 MG tablet TAKE ONE TABLET BY MOUTH DAILY 30 tablet 6   • traMADol (ULTRAM) 50 MG tablet TAKE ONE TABLET BY MOUTH THREE TIMES A DAY 90 tablet 5   • warfarin (COUMADIN) 5 MG tablet TAKE ONE TABLET BY MOUTH DAILY 30 tablet 1   • [DISCONTINUED] losartan-hydrochlorothiazide (HYZAAR) 50-12.5 MG per tablet Take 1 tablet by mouth Daily. 90 tablet 3     No current facility-administered medications on file prior to visit.        Allergies   Allergen Reactions   • Iodinated Diagnostic Agents Swelling and Other (See Comments)     PAINS ACROSS CHEST, Facial swelling   • Fenofibrate Myalgia   • Livalo [Pitavastatin] Myalgia     Joint Pain, Muscle tightness   • Plaquenil [Hydroxychloroquine Sulfate] Myalgia     Joint Pain, Muscle Tightness   • Statins Myalgia     Joint pain, Muscle Tightness       Immunization History   Administered Date(s) Administered   • Flu Mist 10/30/2015   • Flu Vaccine High Dose PF 65YR+ 09/15/2017, 09/14/2018   • Pneumococcal Conjugate 13-Valent (PCV13) 07/23/2015   • Pneumococcal Polysaccharide (PPSV23) 08/05/2016   • Tdap 09/15/2006, 08/05/2016   • Zostavax 09/15/2007       Objective     /70   Pulse 69   Ht 157.5 cm (62.01\")   Wt 92.1 kg (203 lb)   SpO2 98%   BMI 37.12 kg/m²     Physical Exam   Constitutional: She is oriented to person, place, and time. She appears well-developed and well-nourished.   HENT:   Head: Normocephalic and atraumatic.   Right Ear: Hearing, tympanic membrane and external ear normal.   Left Ear: Hearing, tympanic membrane and external ear normal.   Nose: Nose normal.   Mouth/Throat: Oropharynx is clear and moist. No oropharyngeal exudate or posterior oropharyngeal erythema.   Neck: Neck supple. No thyromegaly present.   Cardiovascular: Normal rate, regular rhythm and normal heart sounds.   No murmur heard.  Pulmonary/Chest: Effort normal and breath sounds normal. "   Abdominal: Soft. She exhibits no distension. There is no hepatosplenomegaly. There is no tenderness.   Lymphadenopathy:     She has no cervical adenopathy.   Neurological: She is alert and oriented to person, place, and time.   Skin: Skin is warm and dry.   Psychiatric: She has a normal mood and affect. Her speech is normal and behavior is normal. Judgment and thought content normal. Cognition and memory are normal.       Assessment/Plan   Inessa was seen today for medicare wellness-subsequent.    Diagnoses and all orders for this visit:    Medicare annual wellness visit, subsequent    Family history of aortic aneurysm  -     Abdominal Aortic Aneurysm Screening Medicare CAR; Future    Screening for AAA (abdominal aortic aneurysm)  -     Abdominal Aortic Aneurysm Screening Medicare CAR; Future    Essential hypertension    Hyperlipidemia, unspecified hyperlipidemia type    Paroxysmal atrial fibrillation (CMS/HCC)    Acquired hypothyroidism    Other orders  -     losartan (COZAAR) 50 MG tablet; Take 1 tablet by mouth Daily.        Discussion    AWV.  See scanned forms and/or computer for preet history, PHQ-9, functional ability questionnaire, cognitive impairment screening.  Direct observation of cognitive abilities:  The patient does not exhibit any impairment in cognitive abilities upon direct observation at today's visit.   These were all reviewed with the patient and the patient was provided with a personal prevention plan of service in patient instructions.  Patient was given advice or information on the following topics:  nutrition, exercise, weight management  HTN. Continue current regimen.  HLD.  Continue Zetia.    Hypothyroidism. Continue levothyroxine.   Afib.  Continue rate management and coumadin.  Due check on Monday.   Chronic AHN underlying sarcoid and diaphragm elevation.  Reviewed PFTs.  Trial of Stiolto sample given.    Chronic OA pain.  Continue Celebrex, tylenol and prn Tramadol.    I have  recommended that the patient get the following immunizations:  Shingrix and hepatitis A.        Health Maintenance   Topic Date Due   • HEPATITIS A VACCINE ADULT (1 of 2) 01/20/1958   • ZOSTER VACCINE (2 of 3) 11/10/2007   • MEDICARE ANNUAL WELLNESS  01/27/2016   • DXA SCAN  01/30/2019   • LIPID PANEL  01/08/2020   • MAMMOGRAM  11/27/2020   • COLONOSCOPY  08/06/2021   • TDAP/TD VACCINES (3 - Td) 08/05/2026   • INFLUENZA VACCINE  Completed   • PNEUMOCOCCAL VACCINES (65+ LOW/MEDIUM RISK)  Completed            Future Appointments   Date Time Provider Department Center   1/14/2019  2:30 PM [facility] None   5/3/2019 10:40 AM Mirela Ríos APRN [facility] None   10/10/2019 10:15 AM Jewel Lu MD [facility] None

## 2019-01-14 ENCOUNTER — TRANSCRIBE ORDERS (OUTPATIENT)
Dept: ADMINISTRATIVE | Facility: HOSPITAL | Age: 79
End: 2019-01-14

## 2019-01-14 ENCOUNTER — ANTICOAGULATION VISIT (OUTPATIENT)
Dept: INTERNAL MEDICINE | Facility: CLINIC | Age: 79
End: 2019-01-14

## 2019-01-14 DIAGNOSIS — I48.0 PAROXYSMAL ATRIAL FIBRILLATION (HCC): ICD-10-CM

## 2019-01-14 LAB — INR PPP: 3.8 (ref 0.9–1.1)

## 2019-01-14 PROCEDURE — 36416 COLLJ CAPILLARY BLOOD SPEC: CPT | Performed by: INTERNAL MEDICINE

## 2019-01-14 PROCEDURE — 85610 PROTHROMBIN TIME: CPT | Performed by: INTERNAL MEDICINE

## 2019-01-14 PROCEDURE — 93793 ANTICOAG MGMT PT WARFARIN: CPT | Performed by: INTERNAL MEDICINE

## 2019-01-17 RX ORDER — WARFARIN SODIUM 5 MG/1
TABLET ORAL
Qty: 30 TABLET | Refills: 0 | Status: SHIPPED | OUTPATIENT
Start: 2019-01-17 | End: 2019-01-28 | Stop reason: SDUPTHER

## 2019-01-18 DIAGNOSIS — Z82.49 FAMILY HISTORY OF AORTIC ANEURYSM: Primary | ICD-10-CM

## 2019-01-21 ENCOUNTER — ANTICOAGULATION VISIT (OUTPATIENT)
Dept: INTERNAL MEDICINE | Facility: CLINIC | Age: 79
End: 2019-01-21

## 2019-01-21 DIAGNOSIS — I48.0 PAROXYSMAL ATRIAL FIBRILLATION (HCC): ICD-10-CM

## 2019-01-21 LAB — INR PPP: 1.8 (ref 0.9–1.1)

## 2019-01-21 PROCEDURE — 93793 ANTICOAG MGMT PT WARFARIN: CPT | Performed by: INTERNAL MEDICINE

## 2019-01-21 PROCEDURE — 85610 PROTHROMBIN TIME: CPT | Performed by: INTERNAL MEDICINE

## 2019-01-21 PROCEDURE — 36416 COLLJ CAPILLARY BLOOD SPEC: CPT | Performed by: INTERNAL MEDICINE

## 2019-01-28 ENCOUNTER — ANTICOAGULATION VISIT (OUTPATIENT)
Dept: INTERNAL MEDICINE | Facility: CLINIC | Age: 79
End: 2019-01-28

## 2019-01-28 DIAGNOSIS — I48.0 PAROXYSMAL ATRIAL FIBRILLATION (HCC): ICD-10-CM

## 2019-01-28 DIAGNOSIS — Z13.6 ENCOUNTER FOR SCREENING FOR VASCULAR DISEASE: Primary | ICD-10-CM

## 2019-01-28 LAB — INR PPP: 1.3 (ref 0.9–1.1)

## 2019-01-28 PROCEDURE — 36416 COLLJ CAPILLARY BLOOD SPEC: CPT | Performed by: INTERNAL MEDICINE

## 2019-01-28 PROCEDURE — 93793 ANTICOAG MGMT PT WARFARIN: CPT | Performed by: INTERNAL MEDICINE

## 2019-01-28 PROCEDURE — 85610 PROTHROMBIN TIME: CPT | Performed by: INTERNAL MEDICINE

## 2019-01-28 RX ORDER — WARFARIN SODIUM 3 MG/1
5 TABLET ORAL DAILY
Qty: 30 TABLET | Refills: 11 | Status: SHIPPED | OUTPATIENT
Start: 2019-01-28 | End: 2019-01-29

## 2019-01-28 RX ORDER — SOTALOL HYDROCHLORIDE 80 MG/1
TABLET ORAL
Qty: 30 TABLET | Refills: 2 | Status: SHIPPED | OUTPATIENT
Start: 2019-01-28 | End: 2019-04-27 | Stop reason: SDUPTHER

## 2019-01-28 RX ORDER — LEVOTHYROXINE SODIUM 0.03 MG/1
TABLET ORAL
Qty: 30 TABLET | Refills: 0 | Status: SHIPPED | OUTPATIENT
Start: 2019-01-28 | End: 2019-03-02 | Stop reason: SDUPTHER

## 2019-01-29 RX ORDER — WARFARIN SODIUM 3 MG/1
3 TABLET ORAL DAILY
Qty: 90 TABLET | Refills: 1 | Status: SHIPPED | OUTPATIENT
Start: 2019-01-29 | End: 2019-07-06 | Stop reason: SDUPTHER

## 2019-01-30 ENCOUNTER — APPOINTMENT (OUTPATIENT)
Dept: ULTRASOUND IMAGING | Facility: HOSPITAL | Age: 79
End: 2019-01-30

## 2019-02-04 ENCOUNTER — TELEPHONE (OUTPATIENT)
Dept: INTERNAL MEDICINE | Facility: CLINIC | Age: 79
End: 2019-02-04

## 2019-02-04 ENCOUNTER — ANTICOAGULATION VISIT (OUTPATIENT)
Dept: INTERNAL MEDICINE | Facility: CLINIC | Age: 79
End: 2019-02-04

## 2019-02-04 DIAGNOSIS — I48.0 PAROXYSMAL ATRIAL FIBRILLATION (HCC): ICD-10-CM

## 2019-02-04 LAB
INR PPP: 1.7 (ref 0.9–1.1)
INR PPP: 1.7 (ref 0.9–1.1)

## 2019-02-04 PROCEDURE — 85610 PROTHROMBIN TIME: CPT | Performed by: INTERNAL MEDICINE

## 2019-02-04 PROCEDURE — 36416 COLLJ CAPILLARY BLOOD SPEC: CPT | Performed by: INTERNAL MEDICINE

## 2019-02-04 NOTE — TELEPHONE ENCOUNTER
----- Message from Gisele Dockery MD sent at 2/4/2019 10:20 AM EST -----  No change and recheck Friday.  If not therapeutic, I will increase dose.

## 2019-02-08 ENCOUNTER — HOSPITAL ENCOUNTER (OUTPATIENT)
Dept: CARDIOLOGY | Facility: HOSPITAL | Age: 79
Discharge: HOME | End: 2019-02-08
Admitting: INTERNAL MEDICINE

## 2019-02-08 ENCOUNTER — ANTICOAGULATION VISIT (OUTPATIENT)
Dept: INTERNAL MEDICINE | Facility: CLINIC | Age: 79
End: 2019-02-08

## 2019-02-08 VITALS
BODY MASS INDEX: 37.17 KG/M2 | SYSTOLIC BLOOD PRESSURE: 136 MMHG | HEART RATE: 60 BPM | WEIGHT: 202 LBS | DIASTOLIC BLOOD PRESSURE: 67 MMHG | HEIGHT: 62 IN

## 2019-02-08 DIAGNOSIS — Z13.6 ENCOUNTER FOR SCREENING FOR VASCULAR DISEASE: ICD-10-CM

## 2019-02-08 DIAGNOSIS — I48.0 PAROXYSMAL ATRIAL FIBRILLATION (HCC): ICD-10-CM

## 2019-02-08 LAB
BH CV ECHO MEAS - DIST AO DIAM: 1.34 CM
BH CV VAS BP LEFT ARM: NORMAL MMHG
BH CV VAS BP RIGHT ARM: NORMAL MMHG
BH CV XLRA MEAS - MID AO DIAM: 1.64 CM
BH CV XLRA MEAS - PAD LEFT ABI DP: 1.07
BH CV XLRA MEAS - PAD LEFT ABI PT: 1.1
BH CV XLRA MEAS - PAD LEFT ARM: 136 MMHG
BH CV XLRA MEAS - PAD LEFT LEG DP: 146 MMHG
BH CV XLRA MEAS - PAD LEFT LEG PT: 150 MMHG
BH CV XLRA MEAS - PAD RIGHT ABI DP: 1.1
BH CV XLRA MEAS - PAD RIGHT ABI PT: 1.1
BH CV XLRA MEAS - PAD RIGHT ARM: 132 MMHG
BH CV XLRA MEAS - PAD RIGHT LEG DP: 150 MMHG
BH CV XLRA MEAS - PAD RIGHT LEG PT: 150 MMHG
BH CV XLRA MEAS - PROX AO DIAM: 2.05 CM
BH CV XLRA MEAS LEFT ICA/CCA RATIO: 1.16
BH CV XLRA MEAS LEFT MID CCA PSV: NORMAL CM/SEC
BH CV XLRA MEAS LEFT MID ICA PSV: NORMAL CM/SEC
BH CV XLRA MEAS LEFT PROX ECA PSV: NORMAL CM/SEC
BH CV XLRA MEAS RIGHT ICA/CCA RATIO: 1.14
BH CV XLRA MEAS RIGHT MID CCA PSV: NORMAL CM/SEC
BH CV XLRA MEAS RIGHT MID ICA PSV: NORMAL CM/SEC
BH CV XLRA MEAS RIGHT PROX ECA PSV: NORMAL CM/SEC
INR PPP: 1.8 (ref 0.9–1.1)

## 2019-02-08 PROCEDURE — 93799 UNLISTED CV SVC/PROCEDURE: CPT

## 2019-02-08 PROCEDURE — 36416 COLLJ CAPILLARY BLOOD SPEC: CPT | Performed by: INTERNAL MEDICINE

## 2019-02-08 PROCEDURE — 93793 ANTICOAG MGMT PT WARFARIN: CPT | Performed by: INTERNAL MEDICINE

## 2019-02-08 PROCEDURE — 85610 PROTHROMBIN TIME: CPT | Performed by: INTERNAL MEDICINE

## 2019-02-18 RX ORDER — CELECOXIB 200 MG/1
CAPSULE ORAL
Qty: 30 CAPSULE | Refills: 4 | Status: SHIPPED | OUTPATIENT
Start: 2019-02-18 | End: 2019-07-12

## 2019-02-22 ENCOUNTER — ANTICOAGULATION VISIT (OUTPATIENT)
Dept: INTERNAL MEDICINE | Facility: CLINIC | Age: 79
End: 2019-02-22

## 2019-02-22 DIAGNOSIS — I48.0 PAROXYSMAL ATRIAL FIBRILLATION (HCC): ICD-10-CM

## 2019-02-22 LAB — INR PPP: 1.6 (ref 0.9–1.1)

## 2019-02-22 PROCEDURE — 36416 COLLJ CAPILLARY BLOOD SPEC: CPT | Performed by: INTERNAL MEDICINE

## 2019-02-22 PROCEDURE — 85610 PROTHROMBIN TIME: CPT | Performed by: INTERNAL MEDICINE

## 2019-02-22 PROCEDURE — 93793 ANTICOAG MGMT PT WARFARIN: CPT | Performed by: INTERNAL MEDICINE

## 2019-03-04 ENCOUNTER — ANTICOAGULATION VISIT (OUTPATIENT)
Dept: INTERNAL MEDICINE | Facility: CLINIC | Age: 79
End: 2019-03-04

## 2019-03-04 DIAGNOSIS — I48.0 PAROXYSMAL ATRIAL FIBRILLATION (HCC): ICD-10-CM

## 2019-03-04 LAB — INR PPP: 1.8 (ref 0.9–1.1)

## 2019-03-04 PROCEDURE — 85610 PROTHROMBIN TIME: CPT | Performed by: INTERNAL MEDICINE

## 2019-03-04 PROCEDURE — 93793 ANTICOAG MGMT PT WARFARIN: CPT | Performed by: INTERNAL MEDICINE

## 2019-03-04 PROCEDURE — 36416 COLLJ CAPILLARY BLOOD SPEC: CPT | Performed by: INTERNAL MEDICINE

## 2019-03-04 RX ORDER — LEVOTHYROXINE SODIUM 0.03 MG/1
TABLET ORAL
Qty: 30 TABLET | Refills: 0 | Status: SHIPPED | OUTPATIENT
Start: 2019-03-04 | End: 2019-03-30 | Stop reason: SDUPTHER

## 2019-03-11 RX ORDER — EZETIMIBE 10 MG/1
TABLET ORAL
Qty: 30 TABLET | Refills: 8 | Status: SHIPPED | OUTPATIENT
Start: 2019-03-11 | End: 2019-05-31 | Stop reason: SDUPTHER

## 2019-03-11 RX ORDER — METHOCARBAMOL 750 MG/1
TABLET, FILM COATED ORAL
Qty: 30 TABLET | Refills: 0 | Status: SHIPPED | OUTPATIENT
Start: 2019-03-11 | End: 2019-05-07 | Stop reason: SDUPTHER

## 2019-03-20 ENCOUNTER — ANTICOAGULATION VISIT (OUTPATIENT)
Dept: INTERNAL MEDICINE | Facility: CLINIC | Age: 79
End: 2019-03-20

## 2019-03-20 DIAGNOSIS — I48.91 ATRIAL FIBRILLATION, UNSPECIFIED TYPE (HCC): ICD-10-CM

## 2019-03-20 LAB — INR PPP: 2.8 (ref 0.9–1.1)

## 2019-03-20 PROCEDURE — 93793 ANTICOAG MGMT PT WARFARIN: CPT | Performed by: INTERNAL MEDICINE

## 2019-03-20 PROCEDURE — 85610 PROTHROMBIN TIME: CPT | Performed by: INTERNAL MEDICINE

## 2019-03-20 PROCEDURE — 36416 COLLJ CAPILLARY BLOOD SPEC: CPT | Performed by: INTERNAL MEDICINE

## 2019-03-25 ENCOUNTER — TELEPHONE (OUTPATIENT)
Dept: INTERNAL MEDICINE | Facility: CLINIC | Age: 79
End: 2019-03-25

## 2019-03-25 DIAGNOSIS — M79.671 RIGHT FOOT PAIN: Primary | ICD-10-CM

## 2019-04-01 RX ORDER — LEVOTHYROXINE SODIUM 0.03 MG/1
TABLET ORAL
Qty: 30 TABLET | Refills: 0 | Status: SHIPPED | OUTPATIENT
Start: 2019-04-01 | End: 2019-04-27 | Stop reason: SDUPTHER

## 2019-04-02 ENCOUNTER — ANTICOAGULATION VISIT (OUTPATIENT)
Dept: INTERNAL MEDICINE | Facility: CLINIC | Age: 79
End: 2019-04-02

## 2019-04-02 DIAGNOSIS — I48.91 ATRIAL FIBRILLATION, UNSPECIFIED TYPE (HCC): ICD-10-CM

## 2019-04-02 LAB — INR PPP: 2.7 (ref 0.9–1.1)

## 2019-04-02 PROCEDURE — 85610 PROTHROMBIN TIME: CPT | Performed by: INTERNAL MEDICINE

## 2019-04-02 PROCEDURE — 93793 ANTICOAG MGMT PT WARFARIN: CPT | Performed by: INTERNAL MEDICINE

## 2019-04-02 PROCEDURE — 36416 COLLJ CAPILLARY BLOOD SPEC: CPT | Performed by: INTERNAL MEDICINE

## 2019-04-10 ENCOUNTER — ANTICOAGULATION VISIT (OUTPATIENT)
Dept: INTERNAL MEDICINE | Facility: CLINIC | Age: 79
End: 2019-04-10

## 2019-04-10 DIAGNOSIS — I48.91 ATRIAL FIBRILLATION, UNSPECIFIED TYPE (HCC): ICD-10-CM

## 2019-04-10 LAB — INR PPP: 3.1 (ref 0.9–1.1)

## 2019-04-10 PROCEDURE — 93793 ANTICOAG MGMT PT WARFARIN: CPT | Performed by: INTERNAL MEDICINE

## 2019-04-10 PROCEDURE — 85610 PROTHROMBIN TIME: CPT | Performed by: INTERNAL MEDICINE

## 2019-04-10 PROCEDURE — 36416 COLLJ CAPILLARY BLOOD SPEC: CPT | Performed by: INTERNAL MEDICINE

## 2019-04-15 RX ORDER — SPIRONOLACTONE 25 MG/1
TABLET ORAL
Qty: 30 TABLET | Refills: 5 | Status: SHIPPED | OUTPATIENT
Start: 2019-04-15 | End: 2019-06-03 | Stop reason: SDUPTHER

## 2019-04-23 ENCOUNTER — ANTICOAGULATION VISIT (OUTPATIENT)
Dept: INTERNAL MEDICINE | Facility: CLINIC | Age: 79
End: 2019-04-23

## 2019-04-23 DIAGNOSIS — I48.91 ATRIAL FIBRILLATION, UNSPECIFIED TYPE (HCC): ICD-10-CM

## 2019-04-23 LAB — INR PPP: 3.3 (ref 0.9–1.1)

## 2019-04-23 PROCEDURE — 36416 COLLJ CAPILLARY BLOOD SPEC: CPT | Performed by: INTERNAL MEDICINE

## 2019-04-23 PROCEDURE — 85610 PROTHROMBIN TIME: CPT | Performed by: INTERNAL MEDICINE

## 2019-04-23 PROCEDURE — 93793 ANTICOAG MGMT PT WARFARIN: CPT | Performed by: INTERNAL MEDICINE

## 2019-04-29 RX ORDER — LEVOTHYROXINE SODIUM 0.03 MG/1
TABLET ORAL
Qty: 30 TABLET | Refills: 0 | Status: SHIPPED | OUTPATIENT
Start: 2019-04-29 | End: 2019-06-01 | Stop reason: SDUPTHER

## 2019-04-29 RX ORDER — SOTALOL HYDROCHLORIDE 80 MG/1
TABLET ORAL
Qty: 30 TABLET | Refills: 1 | Status: SHIPPED | OUTPATIENT
Start: 2019-04-29 | End: 2019-06-12 | Stop reason: SDUPTHER

## 2019-04-30 ENCOUNTER — TELEPHONE (OUTPATIENT)
Dept: INTERNAL MEDICINE | Facility: CLINIC | Age: 79
End: 2019-04-30

## 2019-04-30 NOTE — TELEPHONE ENCOUNTER
Patient states she is having surgery on her foot and needs to know when to stop warfarin? [facility]    One week prior.  SLW    Patient advised. [facility]

## 2019-05-03 ENCOUNTER — OFFICE VISIT (OUTPATIENT)
Dept: CARDIOLOGY | Facility: CLINIC | Age: 79
End: 2019-05-03

## 2019-05-03 VITALS
HEIGHT: 61 IN | HEART RATE: 66 BPM | SYSTOLIC BLOOD PRESSURE: 134 MMHG | DIASTOLIC BLOOD PRESSURE: 72 MMHG | WEIGHT: 196 LBS | BODY MASS INDEX: 37 KG/M2

## 2019-05-03 DIAGNOSIS — I10 ESSENTIAL HYPERTENSION: ICD-10-CM

## 2019-05-03 DIAGNOSIS — E66.09 CLASS 2 OBESITY DUE TO EXCESS CALORIES WITH BODY MASS INDEX (BMI) OF 35.0 TO 35.9 IN ADULT, UNSPECIFIED WHETHER SERIOUS COMORBIDITY PRESENT: ICD-10-CM

## 2019-05-03 DIAGNOSIS — Z99.89 OSA ON CPAP: Chronic | ICD-10-CM

## 2019-05-03 DIAGNOSIS — G47.33 OSA ON CPAP: Chronic | ICD-10-CM

## 2019-05-03 DIAGNOSIS — I48.0 PAROXYSMAL ATRIAL FIBRILLATION (HCC): Primary | ICD-10-CM

## 2019-05-03 PROCEDURE — 99213 OFFICE O/P EST LOW 20 MIN: CPT | Performed by: NURSE PRACTITIONER

## 2019-05-03 PROCEDURE — 93000 ELECTROCARDIOGRAM COMPLETE: CPT | Performed by: NURSE PRACTITIONER

## 2019-05-03 NOTE — PROGRESS NOTES
Date of Office Visit: 2019  Encounter Provider: AGA Bhakta  Place of Service: [facility] CARDIOLOGY  Patient Name: Inessa Peoples  :1940    Chief Complaint   Patient presents with   • Paroxysmal atrial fibrillation   :     HPI: Inessa Peoples is a 79 y.o. female who is a patient of Dr. Arteaga with a history of PAF, left bundle branch block and hypertension.  She presents today for routine follow-up.    Today she reports that she is doing pretty good.  She has not had any prolonged episodes of atrial fib and has just some occasional palpitations.  She is on warfarin for anticoagulation and has not had any bleeding issues.  She denies any chest pain, significant dyspnea, PND, orthopnea or edema.    She does have sleep apnea and is compliant with her CPAP.  She has recently started doing the keto diet and says that she has lost 15 pounds and she thinks that this is helped her breathing and has made her overall feel better.    She did have lab work in January which showed that her renal function was okay.  She is currently on sotalol 80 mg daily.       Past Medical History:   Diagnosis Date   • Abnormal ECG    • Allergic rhinitis    • Atrial fibrillation (CMS/HCC)    • Bleeds easily (CMS/HCC)     warfarin   • Breast screening    • Broken bones    • Calf DVT (deep venous thrombosis) (CMS/HCC)    • Chronic anticoagulation    • Colon polyp    • CTS (carpal tunnel syndrome)    • Deep vein thrombosis (CMS/HCC)     left calf   • Diverticulitis of colon    • AHN (dyspnea on exertion)    • Fatigue    • H/O malignant melanoma of skin    • Hematuria, gross    • History of colon polyps    • History of kidney stones    • Hyperlipidemia    • Hypertension    • Hyperthyroidism    • IFG (impaired fasting glucose)    • Insomnia    • LBBB (left bundle branch block)    • Left leg swelling    • LLQ abdominal pain    • Long term current use of systemic steroids    • Low back pain     • Malignant melanoma of skin (CMS/HCC)    • Neck pain    • Obesity     class 2 obesity due to excess calories with BMI of 35.0 to 35.9 in adult   • Osteoarthritis    • Osteopenia    • Paralysis, diaphragm    • Paroxysmal atrial fibrillation (CMS/HCC)    • 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 Lu   • UTI (urinary tract infection)    • Visit for screening mammogram        Past Surgical History:   Procedure Laterality Date   • CARPAL TUNNEL RELEASE Bilateral 1980'S    [facility]   • CATARACT EXTRACTION Bilateral 2001    Dr. Nic Hidalgo   • COLONOSCOPY N/A 06/26/2013    MILD SIGMOID DIVERTICULOSIS, repeat 5 years based on sister w/ colona cancer-DR. NASRA CASTRO   • COLONOSCOPY N/A 10/19/2010    SIGMOID DIVERTICULOSIS & INTERNAL HEMORRHOIDS, HEMORRHOIDAL BANDING X4, DR. NASRA CASTRO   • COLONOSCOPY N/A 8/6/2018    Procedure: COLONOSCOPY TO CECUM AND INTO TERMINAL ILEUM;  Surgeon: Nasra Castro MD;  Location: [facility] ENDOSCOPY;  Service: Gastroenterology   • COLONOSCOPY W/ BIOPSIES N/A 05/12/2008    RECTUM BIOPSY: COLONIC MUCOSA W/ FOCAL INCREASE OF INFLAMMATORY CELLS IN THE MUSCULARIS MUCOSA INCLUDING EOSINOPHILS, SIGMOID DIVERTICULOSIS, POSSIBLE PROCTITIS, DR. NASRA CASTRO   • CYSTOSCOPY, RETROGRADE PYELOGRAM AND STENT INSERTION Bilateral 10/10/2014    w/ Right ureteral pyeloscopy & laser lithotripsy, Right Double-J stent placement-Dr. Julio Tai   • EXTRACORPOREAL SHOCK WAVE LITHOTRIPSY (ESWL) Right 09/10/2009    DR. JULIO TAI   • LAPAROSCOPIC CHOLECYSTECTOMY N/A 07/19/2012    DR. NASRA CASTRO   • LUMBAR EPIDURAL INJECTION N/A 04/23/2015    LUMBAR EPIDURAL STEROID INJECTION X3   • PARATHYROIDECTOMY N/A 2001   • SKIN CANCER EXCISION     • THUMB ARTHROSCOPY Right 1982   • TOTAL KNEE ARTHROPLASTY Right 03/25/2014    [employer]  Ryanguard total knee, 55 femoral component, 71 tibial tray w/ a 40 mm stem, 34 mm x 7.8 mm three-peg patella, and a 14 standard poly-Dr. Ty Canchola       Social History     Socioeconomic History   • Marital status:      Spouse name: EDY   • Number of children: 4   • Years of education: 14yrs   • Highest education level: Not on file   Occupational History   • Occupation: RETIRED   Tobacco Use   • Smoking status: Never Smoker   • Smokeless tobacco: Never Used   Substance and Sexual Activity   • Alcohol use: Yes     Comment: social drinker   • Drug use: No   • Sexual activity: Defer       Family History   Problem Relation Age of Onset   • Heart disease Mother    • Diabetes type II Mother    • Heart disease Father    • Cancer Sister    • Colon cancer Sister    • Colon polyps Sister    • Lung cancer Sister    • Heart disease Brother         CABG   • Diabetes type II Brother    • Colon cancer Brother    • Colon polyps Brother    • Arrhythmia Brother    • Heart disease Sister    • Diabetes type II Sister    • Aortic aneurysm Sister    • COPD Sister    • Arrhythmia Sister    • Cerebral aneurysm Sister    • COPD Sister    • Lupus Sister    • Asthma Maternal Aunt    • Aortic aneurysm Brother        Review of Systems   Constitution: Negative for chills, fever and malaise/fatigue.   Cardiovascular: Positive for palpitations (Occasional). Negative for chest pain, dyspnea on exertion, leg swelling, near-syncope, orthopnea, paroxysmal nocturnal dyspnea and syncope.   Respiratory: Negative for cough and shortness of breath.    Hematologic/Lymphatic: Negative.    Musculoskeletal: Negative for joint pain, joint swelling and myalgias.   Gastrointestinal: Negative for abdominal pain, diarrhea, melena, nausea and vomiting.   Genitourinary: Negative for frequency and hematuria.   Neurological: Negative for light-headedness, numbness, paresthesias and seizures.   Allergic/Immunologic: Negative.    All other systems reviewed and  are negative.      Allergies   Allergen Reactions   • Iodinated Diagnostic Agents Swelling and Other (See Comments)     PAINS ACROSS CHEST, Facial swelling   • Fenofibrate Myalgia   • Livalo [Pitavastatin] Myalgia     Joint Pain, Muscle tightness   • Plaquenil [Hydroxychloroquine Sulfate] Myalgia     Joint Pain, Muscle Tightness   • Statins Myalgia     Joint pain, Muscle Tightness         Current Outpatient Medications:   •  Calcium-Phosphorus-Vitamin D (CITRACAL +D3 PO), Take 1 tablet by mouth Daily., Disp: , Rfl:   •  celecoxib (CeleBREX) 200 MG capsule, TAKE ONE CAPSULE BY MOUTH DAILY, Disp: 30 capsule, Rfl: 4  •  ezetimibe (ZETIA) 10 MG tablet, TAKE ONE TABLET BY MOUTH DAILY, Disp: 30 tablet, Rfl: 8  •  fluocinonide (LIDEX) 0.05 % external solution, Apply  topically to the appropriate area as directed 2 (Two) Times a Day., Disp: , Rfl:   •  fluorouracil (EFUDEX) 5 % cream, Apply  topically to the appropriate area as directed 2 (Two) Times a Day., Disp: , Rfl:   •  levothyroxine (SYNTHROID, LEVOTHROID) 25 MCG tablet, TAKE ONE TABLET BY MOUTH DAILY AS DIRECTED, Disp: 30 tablet, Rfl: 0  •  losartan (COZAAR) 50 MG tablet, Take 1 tablet by mouth Daily., Disp: 90 tablet, Rfl: 3  •  melatonin 5 MG sublingual tablet sublingual tablet, Take 5 mg by mouth Every Night., Disp: , Rfl:   •  methocarbamol (ROBAXIN) 750 MG tablet, TAKE ONE TABLET BY MOUTH ONCE NIGHTLY AS NEEDED FOR MUSCLE SPASMS, Disp: 30 tablet, Rfl: 0  •  Multiple Vitamin (MULTI-DAY VITAMINS) tablet, Take 1 tablet by mouth daily., Disp: , Rfl:   •  sotalol (BETAPACE) 80 MG tablet, TAKE ONE TABLET BY MOUTH DAILY, Disp: 30 tablet, Rfl: 1  •  spironolactone (ALDACTONE) 25 MG tablet, TAKE ONE TABLET BY MOUTH DAILY, Disp: 30 tablet, Rfl: 5  •  traMADol (ULTRAM) 50 MG tablet, TAKE ONE TABLET BY MOUTH THREE TIMES A DAY, Disp: 90 tablet, Rfl: 5  •  warfarin (COUMADIN) 3 MG tablet, Take 1 tablet by mouth Daily., Disp: 90 tablet, Rfl: 1      Objective:     Vitals:     "05/03/19 1051   BP: 134/72   Pulse: 66   Weight: 88.9 kg (196 lb)   Height: 156.2 cm (61.5\")     Body mass index is 36.44 kg/m².    PHYSICAL EXAM:    Vitals Reviewed.   General Appearance: No acute distress, well developed and well nourished.   Eyes: Conjunctiva and lids: No erythema, swelling, or discharge. Sclera non-icteric.   HENT: Atraumatic, normocephalic. External eyes, ears, and nose normal.   Respiratory: No signs of respiratory distress. Respiration rhythm and depth normal.   Clear to auscultation. No rales, crackles, rhonchi, or wheezing auscultated.   Cardiovascular:  Jugular Venous Pressure: Normal  Heart Rate and Rhythm: Normal, Heart Sounds: Normal S1 and S2. No S3 or S4 noted.  Murmurs: No murmurs noted. No rubs, thrills, or gallops.   Arterial Pulses:  Posterior tibialis and dorsalis pedis pulses normal.   Lower Extremities: No edema noted.  Gastrointestinal:  Abdomen soft, non-distended, non-tender.   Musculoskeletal: Normal movement of extremities  Skin and Nails: General appearance normal. No pallor, cyanosis, diaphoresis. Skin temperature normal. No clubbing of fingernails.   Psychiatric: Patient alert and oriented to person, place, and time. Speech and behavior appropriate. Normal mood and affect.       ECG 12 Lead  Date/Time: 5/3/2019 11:28 AM  Performed by: Mirela Ríos APRN  Authorized by: Mirela Ríos APRN   Comparison: compared with previous ECG   Similar to previous ECG  Rhythm: sinus rhythm  BPM: 66  Conduction: left bundle branch block              Assessment:       Diagnosis Plan   1. Paroxysmal atrial fibrillation (CMS/HCC)     2. Essential hypertension     3. ANY on CPAP + 10 - Dr Lu     4. Class 2 obesity due to excess calories with body mass index (BMI) of 35.0 to 35.9 in adult, unspecified whether serious comorbidity present            Plan:       1. Atrial Fibrillation and Atrial Flutter  Assessment  • The patient has paroxysmal atrial fibrillation  • The patient's " CHADS2-VASc score is 6  • A [id number] score of 2 or more is considered a high risk for a thromboembolic event  • Warfarin prescribed  • Sinus rhythm.  Sotalol daily for PAF, well controlled.  Has not had any sustained episodes and has only occasional palpitations.    Warfarin for anticoagulation, no bleeding issues.    Renal function was normal in January.    Plan  • Attempt to maintain sinus rhythm  • Continue warfarin for antithrombotic therapy, bleeding issues discussed  • Continue sotalol for rhythm control    2. HTN, controlled.    3.  ANY, compliant with CPAP.    4. For the problem of overweight/obesity, we discussed the importance of lifestyle measures and strategies for weight loss, such as improved nutrition, regular exercise and sleep hygiene.      Follow-up with Dr. Stone in 6 months.    As always, it has been a pleasure to participate in your patient's care.      Sincerely,         AGA Hartman

## 2019-05-07 ENCOUNTER — ANTICOAGULATION VISIT (OUTPATIENT)
Dept: INTERNAL MEDICINE | Facility: CLINIC | Age: 79
End: 2019-05-07

## 2019-05-07 DIAGNOSIS — I48.0 PAROXYSMAL ATRIAL FIBRILLATION (HCC): ICD-10-CM

## 2019-05-07 LAB — INR PPP: 2.9 (ref 0.9–1.1)

## 2019-05-07 PROCEDURE — 36416 COLLJ CAPILLARY BLOOD SPEC: CPT | Performed by: INTERNAL MEDICINE

## 2019-05-07 PROCEDURE — 93793 ANTICOAG MGMT PT WARFARIN: CPT | Performed by: INTERNAL MEDICINE

## 2019-05-07 PROCEDURE — 85610 PROTHROMBIN TIME: CPT | Performed by: INTERNAL MEDICINE

## 2019-05-08 RX ORDER — METHOCARBAMOL 750 MG/1
TABLET, FILM COATED ORAL
Qty: 30 TABLET | Refills: 0 | Status: SHIPPED | OUTPATIENT
Start: 2019-05-08 | End: 2019-08-10 | Stop reason: SDUPTHER

## 2019-05-29 ENCOUNTER — OFFICE VISIT (OUTPATIENT)
Dept: PAIN MEDICINE | Facility: CLINIC | Age: 79
End: 2019-05-29

## 2019-05-29 VITALS
DIASTOLIC BLOOD PRESSURE: 68 MMHG | WEIGHT: 194.8 LBS | OXYGEN SATURATION: 94 % | SYSTOLIC BLOOD PRESSURE: 120 MMHG | RESPIRATION RATE: 18 BRPM | HEIGHT: 62 IN | TEMPERATURE: 97.5 F | BODY MASS INDEX: 35.85 KG/M2 | HEART RATE: 65 BPM

## 2019-05-29 DIAGNOSIS — M70.62 TROCHANTERIC BURSITIS OF BOTH HIPS: ICD-10-CM

## 2019-05-29 DIAGNOSIS — Z79.01 CHRONIC ANTICOAGULATION: ICD-10-CM

## 2019-05-29 DIAGNOSIS — M51.36 BULGE OF LUMBAR DISC WITHOUT MYELOPATHY: Primary | ICD-10-CM

## 2019-05-29 DIAGNOSIS — M47.816 LUMBAR FACET ARTHROPATHY: ICD-10-CM

## 2019-05-29 DIAGNOSIS — M70.61 TROCHANTERIC BURSITIS OF BOTH HIPS: ICD-10-CM

## 2019-05-29 DIAGNOSIS — M46.1 SACROILIAC INFLAMMATION (HCC): ICD-10-CM

## 2019-05-29 PROCEDURE — 99214 OFFICE O/P EST MOD 30 MIN: CPT | Performed by: NURSE PRACTITIONER

## 2019-05-29 RX ORDER — HYDROCODONE BITARTRATE AND ACETAMINOPHEN 7.5; 325 MG/1; MG/1
TABLET ORAL
COMMUNITY
Start: 2019-05-21 | End: 2019-11-12

## 2019-05-29 NOTE — PROGRESS NOTES
"CHIEF COMPLAINT  Follow-up for back pain. Ms. Peoples states that her back pain has improved since her last appt. She states that she has had an increase in hip pain for the last couple of months.    Subjective   Inessa Peoples is a 79 y.o. female  who presents to the office for follow-up.She has a history of chronic back pain. REports this is imrpved since last office visit but only since right foot surgery. Has been off feet and back.     She completed a Bilateral L2-5 Lumbar Medial Branch Blockade   on  11/15/18 performed by Dr. Means for management of LOW BACK PAIN. Patient reports SIGNIFICANT relief from the procedure. This started wearing off over the past few days.   Was to have second LMBB on 12-6-18 but was still having significant relief. The procedure was cancelled at that time.    She completed a LEFT Sacroiliac Joint Injection   on  4/26/18 performed by Dr. Means for management of low back pain/sacroiliac joint pain. Reported significant relief previously.   She completed a right SI joint injection   on  9-26-17 performed by Dr. Means for management of low back pain.    Had right foot surgery 5-21-19 with Dr Salinas Hassan. \"For Jasmyn.\"     Complains of pain in her hips and low back. Today her pain is 5/10VAS. Describes the pain as nearly continuous aching and throbbing. Pain icnreases with walking, standing, stairs; pain decreases with medication, rest and procedures. ADL's by self. Denies any bowel or bladder changes.     Back Pain   This is a chronic problem. The current episode started more than 1 year ago. The problem occurs intermittently. Progression since onset: improved since last office visit. The pain is present in the lumbar spine and sacro-iliac. The quality of the pain is described as aching. The pain does not radiate (left hip, right calf and foot). The pain is at a severity of 3/10. The pain is worse during the day. The symptoms are aggravated by bending, position, sitting, " standing and twisting (housework). Pertinent negatives include no bladder incontinence, bowel incontinence, chest pain, dysuria, fever, headaches, numbness or weakness. Risk factors include obesity, menopause, history of cancer and lack of exercise (history of melanoma--no Chemo or RAD). She has tried analgesics and heat (left SI joint injection--significant ongoing relief) for the symptoms. The treatment provided moderate relief.   Hip Pain    The incident occurred more than 1 week ago. There was no injury mechanism. The pain is present in the left hip and right hip. The pain is at a severity of 5/10. The pain is moderate. The pain has been worsening since onset. Associated symptoms include an inability to bear weight and a loss of motion. Pertinent negatives include no numbness. She has tried NSAIDs for the symptoms.        PEG Assessment   What number best describes your pain on average in the past week?3  What number best describes how, during the past week, pain has interfered with your enjoyment of life?3  What number best describes how, during the past week, pain has interfered with your general activity?  3      The following portions of the patient's history were reviewed and updated as appropriate: allergies, current medications, past family history, past medical history, past social history, past surgical history and problem list.    Review of Systems   Constitutional: Negative for fatigue and fever.   HENT: Negative for congestion.    Eyes: Negative for visual disturbance.   Respiratory: Negative for cough, shortness of breath and wheezing.    Cardiovascular: Negative.  Negative for chest pain.   Gastrointestinal: Negative for bowel incontinence, constipation and diarrhea.   Genitourinary: Negative for bladder incontinence, difficulty urinating and dysuria.   Musculoskeletal: Positive for arthralgias (bilateral hips) and back pain.   Neurological: Negative for weakness, numbness and headaches.  "  Psychiatric/Behavioral: Negative for sleep disturbance and suicidal ideas. The patient is not nervous/anxious.        Vitals:    05/29/19 1441   BP: 120/68   Pulse: 65   Resp: 18   Temp: 97.5 °F (36.4 °C)   SpO2: 94%   Weight: 88.4 kg (194 lb 12.8 oz)   Height: 156.2 cm (61.5\")   PainSc:   3   PainLoc: Back     Objective   Physical Exam   Constitutional: She is oriented to person, place, and time. Vital signs are normal. She appears well-developed and well-nourished. She is cooperative.   HENT:   Head: Normocephalic and atraumatic.   Nose: Nose normal.   Eyes: Conjunctivae and lids are normal.   Neck: Trachea normal. Neck supple.   Cardiovascular: Normal rate.   Pulmonary/Chest: Effort normal.   Abdominal:   OBESE   Musculoskeletal:        Right hip: She exhibits decreased range of motion, tenderness and bony tenderness.        Left hip: She exhibits decreased range of motion, tenderness and bony tenderness.        Lumbar back: She exhibits tenderness, bony tenderness (moderate tenderness of bilateral L2-L5 facets; + loading manuever) and pain.   Minimal tenderness of bilateral SI joints     Negative SLR bilaterally   Walking shoe of right foot     Neurological: She is alert and oriented to person, place, and time. Gait abnormal.   Reflex Scores:       Patellar reflexes are 2+ on the right side and 2+ on the left side.  Skin: Skin is warm, dry and intact.   Psychiatric: She has a normal mood and affect. Her speech is normal and behavior is normal. Judgment and thought content normal. Cognition and memory are normal.   Nursing note and vitals reviewed.          Assessment/Plan   Inessa was seen today for back pain.    Diagnoses and all orders for this visit:    Bulge of lumbar disc without myelopathy    Sacroiliac inflammation (CMS/HCC)    Lumbar facet arthropathy  -     Case Request    Trochanteric bursitis of both hips  -     Case Request    Chronic anticoagulation      --- bilateral L2-L5 MBB and bilateral " "greater trochanteric bursa injections. Stop Warfarin 5 days prior to procedure.  No blood thinners. Reviewed the procedure at length with the patient.  Included in the review was expectations, complications, risk and benefits.The procedure was described in detail and the risks, benefits and alternatives were discussed with the patient (including but not limited to: bleeding, infection, nerve damage, worsening of pain, inability to perform injection, paralysis, seizures, and death) who agreed to proceed.  Discussed the potential for sedation if warranted/wanted.  The procedure will plan to be performed at [facility] with fluoroscopic guidance(unless ultrasound is indicated). Questions were answered and in a way the patient could understand.  Patient verbalized understanding and wishes to proceed.  This intervention will be ordered.  Discussed with patient that all procedures are part of a multimodal plan of care and include either formal PT or a home exercise program.    ---continue with regimen otherwise.  --- Follow-up after procedure or sooner if needed.  -------  Education about Medial Branch Blockade and RF Therapy:    This medial branch blockade (MBB) suggested is intended for diagnostic purposes, with the intent of offering the patient Radiofrequency thermal rhizotomy (RF) if the MBB is diagnostically effective.  The diagnostic blockade is necessary to determine the likelihood that RF therapy could be efficacious in providing long term relief to the patient.    Medial branches are sensory nerve branches that connect to a facet joint and transmit sensations & pain signals from that joint.  Facet is a term for the type of joints found in the spine.  Medial branches are the nerves that go to a facet, and therefore are also sometimes called \"facet joint nerves\" (FJNs).      In a medial branch blockade procedure, xray fluoroscopy is used to verify the locations of the outside of the joint " lines which are being targeted.  Under xray guidance, needles are placed to these areas.  Contrast dye is injected to confirm proper placement, with dye flowing over the joint area, and to ensure that the dye does not flow into unintended areas such as a vein.  When this is confirmed, local anesthetic is injected to block the medial branch at that joint level.      If MBBs are diagnostically successful in blocking pain, then the patient is most likely a great candidate for Radiofrequency of those facet joint nerves.  In the RF procedure, needles are placed to the joint lines in the same fashion, and after testing, the needle tips are heated to thermally treat the nerves, blocking the nerves by in essence damaging the nerves with the heat treatment.       Medically, a successful RF procedure should provide a patient with 50% pain relief or more for at least 6 months.  Clinical experience suggests that successful patients receive relief more in the range of 12 months on average.  We also discussed that a fortunate minority of patients receive therapeutic success from the MBB, and may not require RF ablation.  If a patient receives more than 8 weeks of relief from MBB, then occasional repeat MBB for therapeutic purposes is a very reasonable alternative therapy.  This course of therapy is consistent with our LCDs according to our CMS  in the area, and therefore other insurance providers should follow accordingly.  We will monitor our patients to screen for these therapeutic responders and will offer RF therapy only when necessary.        We discussed that MBB & RF are not without risks.  Guidelines regarding anticoagulant use & neuraxial procedures will be respected.  Patients that are ill or otherwise may be at risk for sepsis will not have their spines accessed by neuraxial injections of any type.  This patient will not be offered these therapies if there is an increased risk.   We discussed that there is  a risk of postprocedural pain and also a risk of worsening of clinical picture with these procedures as with any neuraxial procedure.    -------         LUIS REPORT  LUIS report has been reviewed and scanned into the patient's chart.    As the clinician, I personally reviewed the LUIS from 5-28-19 while the patient was in the office today.        EMR Dragon/Transcription disclaimer:   Much of this encounter note is an electronic transcription/translation of spoken language to printed text. The electronic translation of spoken language may permit erroneous, or at times, nonsensical words or phrases to be inadvertently transcribed; Although I have reviewed the note for such errors, some may still exist.

## 2019-05-31 RX ORDER — EZETIMIBE 10 MG/1
10 TABLET ORAL DAILY
Qty: 30 TABLET | Refills: 8 | Status: SHIPPED | OUTPATIENT
Start: 2019-05-31 | End: 2020-05-11 | Stop reason: SDUPTHER

## 2019-06-03 RX ORDER — LEVOTHYROXINE SODIUM 0.03 MG/1
TABLET ORAL
Qty: 30 TABLET | Refills: 0 | Status: SHIPPED | OUTPATIENT
Start: 2019-06-03 | End: 2019-06-29 | Stop reason: SDUPTHER

## 2019-06-03 RX ORDER — SPIRONOLACTONE 25 MG/1
25 TABLET ORAL DAILY
Qty: 90 TABLET | Refills: 3 | Status: SHIPPED | OUTPATIENT
Start: 2019-06-03 | End: 2020-11-16

## 2019-06-04 ENCOUNTER — ANTICOAGULATION VISIT (OUTPATIENT)
Dept: INTERNAL MEDICINE | Facility: CLINIC | Age: 79
End: 2019-06-04

## 2019-06-04 DIAGNOSIS — I48.0 PAROXYSMAL ATRIAL FIBRILLATION (HCC): ICD-10-CM

## 2019-06-04 LAB — INR PPP: 1.9 (ref 0.9–1.1)

## 2019-06-04 PROCEDURE — 36416 COLLJ CAPILLARY BLOOD SPEC: CPT | Performed by: INTERNAL MEDICINE

## 2019-06-04 PROCEDURE — 93793 ANTICOAG MGMT PT WARFARIN: CPT | Performed by: INTERNAL MEDICINE

## 2019-06-04 PROCEDURE — 85610 PROTHROMBIN TIME: CPT | Performed by: INTERNAL MEDICINE

## 2019-06-12 RX ORDER — SOTALOL HYDROCHLORIDE 80 MG/1
80 TABLET ORAL DAILY
Qty: 90 TABLET | Refills: 3 | Status: SHIPPED | OUTPATIENT
Start: 2019-06-12 | End: 2019-07-12

## 2019-06-25 ENCOUNTER — OUTSIDE FACILITY SERVICE (OUTPATIENT)
Dept: PAIN MEDICINE | Facility: CLINIC | Age: 79
End: 2019-06-25

## 2019-06-25 ENCOUNTER — DOCUMENTATION (OUTPATIENT)
Dept: PAIN MEDICINE | Facility: CLINIC | Age: 79
End: 2019-06-25

## 2019-06-25 PROCEDURE — 64494 INJ PARAVERT F JNT L/S 2 LEV: CPT | Performed by: ANESTHESIOLOGY

## 2019-06-25 PROCEDURE — 64495 INJ PARAVERT F JNT L/S 3 LEV: CPT | Performed by: ANESTHESIOLOGY

## 2019-06-25 PROCEDURE — 20610 DRAIN/INJ JOINT/BURSA W/O US: CPT | Performed by: ANESTHESIOLOGY

## 2019-06-25 PROCEDURE — 64493 INJ PARAVERT F JNT L/S 1 LEV: CPT | Performed by: ANESTHESIOLOGY

## 2019-06-26 NOTE — PROGRESS NOTES
TWO INDEPENDENT PROCEDURES:  LMBB & GTB    It is of note that preoperatively and INR was checked, which was 1.1.  She was instructed to wait 8 hours before restarting warfarin, and to hold the warfarin in the same manner that she did this time before further procedures if such as necessary.    -------      Bilateral L2-5 Lumbar Medial Branch Blockade  [facility]    PREOPERATIVE DIAGNOSIS:  Lumbar spondylosis without myelopathy    POSTOPERATIVE DIAGNOSIS:  Lumbar spondylosis without myelopathy    PROCEDURE:   Diagnostic Bilateral Lumbar Medial Branch Nerve Blockades, with fluoroscopy:  L2, L3, L4, and L5 nerves (at the L3, L4, L5 transverse processes and the sacral alar groove) to block facet joints L3-4, L4-5, and L5-S1  1. 07773-82 -- Bilateral Lumbar Facet blocks, 1st Level  2. 87113-41 -- Bilateral Lumbar Facet blocks, 2nd  Level  3. 58990-74 -- Bilateral Lumbar Facet blocks, 3rd Level    PRE-PROCEDURE DISCUSSION WITH PATIENT:    Risks and complications were discussed with the patient prior to starting the procedure and informed consent was obtained.      SURGEON:  Wolfgang Means MD    REASON FOR PROCEDURE:   The patient complains of pain that seems to have a significant axial component and Tenderness of the affected facet joints on exam under fluoroscopy    SEDATION:  Patient declined administration of moderate sedation    ANESTHETIC:  Marcaine 0.5%  STEROID:  Methylprednisolone (DEPO MEDROL) 80mg/ml  TOTAL VOLUME OF SOLUTION: 8ml    DESCRIPTON OF PROCEDURE:  After obtaining informed consent, IV access was not obtained in the preoperative area.   The patient was taken to the operating room.  The patient was placed in the prone position with a pillow under the abdomen. All pressure points were well padded.  EKG, blood pressure, and pulse oximeter were monitored.  The patient was monitored and sedated by the RN under my direction. The lumbosacral area was prepped with Chloraprep and draped in  a sterile fashion. Under fluoroscopic guidance the transverse processes of the L3, L4, and L5 vertebrae at the junctions of the superior articular processes were identified on the right. Also identified was the groove between the ala and the superior articular process of the sacrum on the ipsilateral side.  Skin and subcutaneous tissue were anesthetized with 1% lidocaine above each of these points. A 22-gauge spinal needle was introduced under fluoroscopic guidance at the above junctions. Aspiration was negative for blood and CSF.  After confirming the position of the needle with fluoroscope in all views, 0.25 mL of Omnipaque was injected, and after seeing the proper spread a total of 1 mL of the anesthetic solution noted above was injected at each of these points.  Needles were removed intact from each of the areas.  A similar procedure was repeated to block the L2, L3, L4, and L5 nerves on the contralateral side.   Onset of analgesia was noted.  Vital signs remained stable throughout.      ESTIMATED BLOOD LOSS:  <5 mL  SPECIMENS:  none    COMPLICATIONS:   No complications were noted., There was no indication of vascular uptake on live injection of contrast dye., There was no indication of intrathecal uptake on live injection of contrast dye., There was not any evidence of dural puncture.   and The patient did not have any signs of postprocedure numbness nor weakness.    TOLERANCE & DISCHARGE CONDITION:    The patient tolerated the procedure well.  The patient was transported to the recovery area without difficulties.  The patient was discharged to home under the care of family in stable and satisfactory condition.    PLAN OF CARE:  1. The patient was given our standard instruction sheet.  2. We discussed that Lumbar Medial Branch Blockade is a diagnostic procedure in consideration for radiofrequency ablation if two diagnostic procedures prove to be positive for significant benefit.  If sustained relief of 6 to  eight weeks is obtained, then an alternative plan could be therapeutic lumbar branch blockades.  3. The patient is asked to keep a pain log each hour for 8 hours after the procedure today.  4. The patient will  Return to clinic 4 wks.  5. The patient will resume all medications as per the medication reconciliation sheet.        -------    Bilateral Greater Trochanteric Hip Bursa injection under fluoroscopic guidance    [facility]        PREOPERATIVE DIAGNOSIS:   Greater trochanteric hip bursitis bilaterally     POSTOPERATIVE DIAGNOSIS:  Same as preop     PROCEDURE:  Greater trochanteric bursa injections, under fluoroscopic guidance, bilaterally     PRE-PROCEDURE DISCUSSION WITH PATIENT:    Risks and complications were discussed with the patient prior to starting the procedure and informed consent was obtained.  We discussed various topics including but not limited to bleeding, infection, injury, postprocedural site soreness, painful flareup, worsening of clinical picture, paralysis, coma, and death.      SURGEON:  Wolfgang Means MD     REASON FOR PROCEDURE:     Hip bursitis is flared up with tenderness of the GT Bursa on each side       SEDATION:  Patient declined administration of moderate sedation    ANESTHETIC AGENT:  Marcaine 0.5%  STEROID AGENT:  Methylprednisolone (DEPO MEDROL) 80mg/ml  Total volume of injected solution:   8 mL, with half this volume injected on each side    DESCRIPTON OF PROCEDURE:    After obtaining informed consent, IV access was obtained in the preoperative area.  The patient was transported to the operative suite and placed in the right lateral decubitus position with a pillow under the pelvic area. EKG, blood pressure, and pulse oximeter were monitored.  Injectate solution prepared as above    Area over the greater trochanter on the Left side was palpated and marked on the skin and then cleansed with Hibiclens ×2. A sterile needle was inserted about 1 & 1/2 inches  in to the skin and into the bursa, under fluoro guidance, with periosteum contact over the greater trochanter and the needle was withdrawn about 2 mm into the bursa. Aspiration was negative and slowly after confirming bursa spread with contrast solution then injectate was injected into the hip bursa.  The needle was removed intact on each side and bleeding was minimal. The insertion site was dressed with a Band-Aid.  There were no apparent complications.      The same was performed on the contralateral side in similar fashion.       ESTIMATED BLOOD LOSS:  None  SPECIMENS:  None  COMPLICATIONS:   No complications were noted. and The patient did not have any signs of postprocedure numbness nor weakness.       TOLERANCE & DISCHARGE CONDITION:    The patient tolerated the procedure well.  The patient was transported to the recovery area without difficulties.  The patient was discharged to home under the care of family in stable and satisfactory condition.     PLAN OF CARE:  1. The patient was given our standard instruction sheet and will resume all medications as per the medication reconciliation sheet.  2. The patient will Return to clinic 4 wks  3. The patient is instructed to keep a pain log hourly for 8 hours after the procedure.

## 2019-07-01 ENCOUNTER — ANTICOAGULATION VISIT (OUTPATIENT)
Dept: INTERNAL MEDICINE | Facility: CLINIC | Age: 79
End: 2019-07-01

## 2019-07-01 DIAGNOSIS — I48.0 PAROXYSMAL ATRIAL FIBRILLATION (HCC): ICD-10-CM

## 2019-07-01 LAB — INR PPP: 1.8 (ref 0.9–1.1)

## 2019-07-01 PROCEDURE — 93793 ANTICOAG MGMT PT WARFARIN: CPT | Performed by: INTERNAL MEDICINE

## 2019-07-01 PROCEDURE — 36416 COLLJ CAPILLARY BLOOD SPEC: CPT | Performed by: INTERNAL MEDICINE

## 2019-07-01 PROCEDURE — 85610 PROTHROMBIN TIME: CPT | Performed by: INTERNAL MEDICINE

## 2019-07-01 RX ORDER — LEVOTHYROXINE SODIUM 0.03 MG/1
TABLET ORAL
Qty: 30 TABLET | Refills: 0 | Status: SHIPPED | OUTPATIENT
Start: 2019-07-01 | End: 2019-07-27 | Stop reason: SDUPTHER

## 2019-07-01 RX ORDER — SOTALOL HYDROCHLORIDE 80 MG/1
TABLET ORAL
Qty: 30 TABLET | Refills: 0 | Status: SHIPPED | OUTPATIENT
Start: 2019-07-01 | End: 2019-07-27 | Stop reason: SDUPTHER

## 2019-07-08 DIAGNOSIS — E78.5 HYPERLIPIDEMIA, UNSPECIFIED HYPERLIPIDEMIA TYPE: Primary | ICD-10-CM

## 2019-07-08 RX ORDER — WARFARIN SODIUM 3 MG/1
TABLET ORAL
Qty: 90 TABLET | Refills: 0 | Status: SHIPPED | OUTPATIENT
Start: 2019-07-08 | End: 2019-10-05 | Stop reason: SDUPTHER

## 2019-07-09 LAB
ALBUMIN SERPL-MCNC: 3.6 G/DL (ref 3.5–5.2)
ALBUMIN/GLOB SERPL: 1.1 G/DL
ALP SERPL-CCNC: 110 U/L (ref 39–117)
ALT SERPL-CCNC: 25 U/L (ref 1–33)
AST SERPL-CCNC: 16 U/L (ref 1–32)
BASOPHILS # BLD AUTO: 0.04 10*3/MM3 (ref 0–0.2)
BASOPHILS NFR BLD AUTO: 0.3 % (ref 0–1.5)
BILIRUB SERPL-MCNC: 0.8 MG/DL (ref 0.2–1.2)
BUN SERPL-MCNC: 25 MG/DL (ref 8–23)
BUN/CREAT SERPL: 25.3 (ref 7–25)
CALCIUM SERPL-MCNC: 10 MG/DL (ref 8.6–10.5)
CHLORIDE SERPL-SCNC: 99 MMOL/L (ref 98–107)
CHOLEST SERPL-MCNC: 218 MG/DL (ref 0–200)
CO2 SERPL-SCNC: 22.6 MMOL/L (ref 22–29)
CREAT SERPL-MCNC: 0.99 MG/DL (ref 0.57–1)
EOSINOPHIL # BLD AUTO: 0.2 10*3/MM3 (ref 0–0.4)
EOSINOPHIL NFR BLD AUTO: 1.6 % (ref 0.3–6.2)
ERYTHROCYTE [DISTWIDTH] IN BLOOD BY AUTOMATED COUNT: 13.1 % (ref 12.3–15.4)
GLOBULIN SER CALC-MCNC: 3.3 GM/DL
GLUCOSE SERPL-MCNC: 102 MG/DL (ref 65–99)
HCT VFR BLD AUTO: 46.7 % (ref 34–46.6)
HDLC SERPL-MCNC: 61 MG/DL (ref 40–60)
HGB BLD-MCNC: 15.5 G/DL (ref 12–15.9)
IMM GRANULOCYTES # BLD AUTO: 0.19 10*3/MM3 (ref 0–0.05)
IMM GRANULOCYTES NFR BLD AUTO: 1.6 % (ref 0–0.5)
LDLC SERPL CALC-MCNC: 118 MG/DL (ref 0–100)
LYMPHOCYTES # BLD AUTO: 2.21 10*3/MM3 (ref 0.7–3.1)
LYMPHOCYTES NFR BLD AUTO: 18.1 % (ref 19.6–45.3)
MCH RBC QN AUTO: 31.5 PG (ref 26.6–33)
MCHC RBC AUTO-ENTMCNC: 33.2 G/DL (ref 31.5–35.7)
MCV RBC AUTO: 94.9 FL (ref 79–97)
MONOCYTES # BLD AUTO: 0.8 10*3/MM3 (ref 0.1–0.9)
MONOCYTES NFR BLD AUTO: 6.5 % (ref 5–12)
NEUTROPHILS # BLD AUTO: 8.8 10*3/MM3 (ref 1.7–7)
NEUTROPHILS NFR BLD AUTO: 71.9 % (ref 42.7–76)
NRBC BLD AUTO-RTO: 0 /100 WBC (ref 0–0.2)
PLATELET # BLD AUTO: 308 10*3/MM3 (ref 140–450)
POTASSIUM SERPL-SCNC: 5 MMOL/L (ref 3.5–5.2)
PROT SERPL-MCNC: 6.9 G/DL (ref 6–8.5)
RBC # BLD AUTO: 4.92 10*6/MM3 (ref 3.77–5.28)
SODIUM SERPL-SCNC: 136 MMOL/L (ref 136–145)
TRIGL SERPL-MCNC: 193 MG/DL (ref 0–150)
TSH SERPL DL<=0.005 MIU/L-ACNC: 2.17 MIU/ML (ref 0.27–4.2)
VLDLC SERPL CALC-MCNC: 38.6 MG/DL
WBC # BLD AUTO: 12.24 10*3/MM3 (ref 3.4–10.8)

## 2019-07-12 ENCOUNTER — OFFICE VISIT (OUTPATIENT)
Dept: INTERNAL MEDICINE | Facility: CLINIC | Age: 79
End: 2019-07-12

## 2019-07-12 ENCOUNTER — ANTICOAGULATION VISIT (OUTPATIENT)
Dept: INTERNAL MEDICINE | Facility: CLINIC | Age: 79
End: 2019-07-12

## 2019-07-12 VITALS
BODY MASS INDEX: 36.06 KG/M2 | SYSTOLIC BLOOD PRESSURE: 110 MMHG | DIASTOLIC BLOOD PRESSURE: 70 MMHG | HEIGHT: 61 IN | WEIGHT: 191 LBS | HEART RATE: 68 BPM | OXYGEN SATURATION: 98 %

## 2019-07-12 DIAGNOSIS — E78.5 HYPERLIPIDEMIA, UNSPECIFIED HYPERLIPIDEMIA TYPE: ICD-10-CM

## 2019-07-12 DIAGNOSIS — I48.0 PAROXYSMAL ATRIAL FIBRILLATION (HCC): ICD-10-CM

## 2019-07-12 DIAGNOSIS — I10 ESSENTIAL HYPERTENSION: Primary | ICD-10-CM

## 2019-07-12 DIAGNOSIS — T81.49XA SURGICAL WOUND INFECTION: ICD-10-CM

## 2019-07-12 DIAGNOSIS — E03.9 ACQUIRED HYPOTHYROIDISM: ICD-10-CM

## 2019-07-12 LAB — INR PPP: 3.5 (ref 0.9–1.1)

## 2019-07-12 PROCEDURE — 99214 OFFICE O/P EST MOD 30 MIN: CPT | Performed by: INTERNAL MEDICINE

## 2019-07-12 PROCEDURE — 85610 PROTHROMBIN TIME: CPT | Performed by: INTERNAL MEDICINE

## 2019-07-12 PROCEDURE — 36416 COLLJ CAPILLARY BLOOD SPEC: CPT | Performed by: INTERNAL MEDICINE

## 2019-07-12 NOTE — PROGRESS NOTES
Subjective     Inessa Peoples is a 79 y.o. female who presents with   Chief Complaint   Patient presents with   • Hypertension   • Hyperlipidemia   • Hypothyroidism       History of Present Illness     HTN. Control is good.   HLD. She has tried four statin drugs and fenofibrate.  She has not been able to tolerate them.  She is able to tolerate Zetia only.  She is interested in seeing a dietician.    Hypothyroidism. Thyroid is controlled.   Afib.  She is maintained on coumadin and is due for check.  Reviewed labs.  WBC count is a little up.  She states she has had issue with right foot since surgery six weeks ago.  She was given a zpak post-op but it continues to bother her.  No fever.  She is scheduled for f/u on the 25th.    Review of Systems   Constitutional: Negative for fever.   Respiratory: Negative.    Cardiovascular: Negative.        The following portions of the patient's history were reviewed and updated as appropriate: allergies, current medications and problem list.    Patient Active Problem List    Diagnosis Date Noted   • Trochanteric bursitis of both hips 05/29/2019   • Lumbar facet arthropathy 11/06/2018   • Family history of colon cancer 06/19/2018     Note Last Updated: 6/19/2018     Added automatically from request for surgery [id number]     • Class 2 obesity due to excess calories with body mass index (BMI) of 35.0 to 35.9 in adult 03/27/2018   • Chronic pain 07/13/2017   • Bulge of lumbar disc without myelopathy 07/13/2017   • Sacroiliac inflammation (CMS/HCC) 07/13/2017   • Sacroiliac pain 07/13/2017   • Dilation of thoracic aorta (CMS/HCC) 06/23/2017   • Chronic anticoagulation 05/15/2017   • Diverticulitis of large intestine without perforation or abscess without bleeding 01/03/2017   • ANY on CPAP + 10 - Dr Lu 10/15/2016   • History of melanoma excision 08/05/2016   • GERD (gastroesophageal reflux disease) 08/05/2016   • History of kidney stones 08/05/2016   • Sarcoidosis 04/18/2016   •  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       • Dyspnea on exertion 04/18/2016   • Chronic osteoarthritis 04/18/2016   • History of DVT (deep vein thrombosis) 04/18/2016     Note Last Updated: 4/18/2016     Description: developed unprovoked on Xarelto.     • Atrial fibrillation (CMS/HCC) 04/18/2016       Current Outpatient Medications on File Prior to Visit   Medication Sig Dispense Refill   • Calcium-Phosphorus-Vitamin D (CITRACAL +D3 PO) Take 1 tablet by mouth Daily.     • ezetimibe (ZETIA) 10 MG tablet Take 1 tablet by mouth Daily. 30 tablet 8   • fluocinonide (LIDEX) 0.05 % external solution Apply  topically to the appropriate area as directed 2 (Two) Times a Day.     • fluorouracil (EFUDEX) 5 % cream Apply  topically to the appropriate area as directed 2 (Two) Times a Day.     • HYDROcodone-acetaminophen (NORCO) 7.5-325 MG per tablet      • levothyroxine (SYNTHROID, LEVOTHROID) 25 MCG tablet TAKE ONE TABLET BY MOUTH DAILY AS DIRECTED 30 tablet 0   • losartan (COZAAR) 50 MG tablet Take 1 tablet by mouth Daily. 90 tablet 3   • melatonin 5 MG sublingual tablet sublingual tablet Take 5 mg by mouth Every Night.     • methocarbamol (ROBAXIN) 750 MG tablet TAKE ONE TABLET BY MOUTH ONCE NIGHTLY AS NEEDED FOR MUSCLE SPASMS 30 tablet 0   • Multiple Vitamin (MULTI-DAY VITAMINS) tablet Take 1 tablet by mouth daily.     • sotalol (BETAPACE) 80 MG tablet TAKE ONE TABLET BY MOUTH DAILY 30 tablet 0   • spironolactone (ALDACTONE) 25 MG tablet Take 1 tablet by mouth Daily. 90 tablet 3   • traMADol (ULTRAM) 50 MG tablet TAKE ONE TABLET BY MOUTH THREE TIMES A DAY 90 tablet 5   • warfarin (COUMADIN) 3 MG tablet TAKE  "ONE TABLET BY MOUTH DAILY 90 tablet 0   • [DISCONTINUED] celecoxib (CeleBREX) 200 MG capsule TAKE ONE CAPSULE BY MOUTH DAILY 30 capsule 4   • [DISCONTINUED] sotalol (BETAPACE) 80 MG tablet Take 1 tablet by mouth Daily. 90 tablet 3     No current facility-administered medications on file prior to visit.        Objective     /70   Pulse 68   Ht 156.2 cm (61.5\")   Wt 86.6 kg (191 lb)   SpO2 98%   BMI 35.51 kg/m²     Physical Exam   Constitutional: She is oriented to person, place, and time. She appears well-developed and well-nourished.   HENT:   Head: Normocephalic and atraumatic.   Pulmonary/Chest: Effort normal.   Musculoskeletal:        Right foot: There is tenderness and swelling. There is no bony tenderness.   Neurological: She is alert and oriented to person, place, and time.   Psychiatric: She has a normal mood and affect. Her behavior is normal.       Assessment/Plan   Inessa was seen today for hypertension, hyperlipidemia and hypothyroidism.    Diagnoses and all orders for this visit:    Essential hypertension    Hyperlipidemia, unspecified hyperlipidemia type    Acquired hypothyroidism    Surgical wound infection  -     Ambulatory Referral to Podiatry    Paroxysmal atrial fibrillation (CMS/HCC)  -     POCT INR        Discussion    HTN. Continue current regimen.  HLD.  Continue Zetia.    Hypothyroidism. Continue levothyroxine.   Afib.  Continue rate management and coumadin.  She is a little up on INR likely secondary to recent antibiotic use.  Hold one day and recheck one week.    I concerned about a surgical wound infection.  I don't think she should wait until the 25th to see Dr. Hassan again.  She has already been treated with a zpak.  We called to get her into her podiatrist today.  They are able to see her Monday.         Future Appointments   Date Time Provider Department Center   7/31/2019 11:40 AM Micheline Castillo APRN [facility] PM [facility] None   10/17/2019 11:30 AM Jewel Lu MD [facility] " [facility] None   11/8/2019  9:00 AM Demar Stone MD [facility] CD [facility] None

## 2019-07-15 RX ORDER — CELECOXIB 200 MG/1
CAPSULE ORAL
Qty: 30 CAPSULE | Refills: 3 | Status: SHIPPED | OUTPATIENT
Start: 2019-07-15 | End: 2019-11-16 | Stop reason: SDUPTHER

## 2019-07-19 ENCOUNTER — ANTICOAGULATION VISIT (OUTPATIENT)
Dept: INTERNAL MEDICINE | Facility: CLINIC | Age: 79
End: 2019-07-19

## 2019-07-19 DIAGNOSIS — I48.0 PAROXYSMAL ATRIAL FIBRILLATION (HCC): ICD-10-CM

## 2019-07-19 LAB — INR PPP: 4.2 (ref 0.9–1.1)

## 2019-07-19 PROCEDURE — 85610 PROTHROMBIN TIME: CPT | Performed by: INTERNAL MEDICINE

## 2019-07-19 PROCEDURE — 36416 COLLJ CAPILLARY BLOOD SPEC: CPT | Performed by: INTERNAL MEDICINE

## 2019-07-19 PROCEDURE — 93793 ANTICOAG MGMT PT WARFARIN: CPT | Performed by: INTERNAL MEDICINE

## 2019-07-22 ENCOUNTER — ANTICOAGULATION VISIT (OUTPATIENT)
Dept: INTERNAL MEDICINE | Facility: CLINIC | Age: 79
End: 2019-07-22

## 2019-07-22 DIAGNOSIS — I48.0 PAROXYSMAL ATRIAL FIBRILLATION (HCC): ICD-10-CM

## 2019-07-22 LAB — INR PPP: 2.7 (ref 0.9–1.1)

## 2019-07-22 PROCEDURE — 93793 ANTICOAG MGMT PT WARFARIN: CPT | Performed by: INTERNAL MEDICINE

## 2019-07-22 PROCEDURE — 36416 COLLJ CAPILLARY BLOOD SPEC: CPT | Performed by: INTERNAL MEDICINE

## 2019-07-22 PROCEDURE — 85610 PROTHROMBIN TIME: CPT | Performed by: INTERNAL MEDICINE

## 2019-07-29 RX ORDER — SOTALOL HYDROCHLORIDE 80 MG/1
TABLET ORAL
Qty: 30 TABLET | Refills: 0 | Status: SHIPPED | OUTPATIENT
Start: 2019-07-29 | End: 2019-08-28 | Stop reason: SDUPTHER

## 2019-07-29 RX ORDER — LEVOTHYROXINE SODIUM 0.03 MG/1
TABLET ORAL
Qty: 30 TABLET | Refills: 0 | Status: SHIPPED | OUTPATIENT
Start: 2019-07-29 | End: 2019-08-24 | Stop reason: SDUPTHER

## 2019-07-31 ENCOUNTER — OFFICE VISIT (OUTPATIENT)
Dept: PAIN MEDICINE | Facility: CLINIC | Age: 79
End: 2019-07-31

## 2019-07-31 VITALS
HEART RATE: 69 BPM | TEMPERATURE: 97 F | RESPIRATION RATE: 20 BRPM | OXYGEN SATURATION: 96 % | SYSTOLIC BLOOD PRESSURE: 124 MMHG | DIASTOLIC BLOOD PRESSURE: 77 MMHG | HEIGHT: 62 IN | BODY MASS INDEX: 35.33 KG/M2 | WEIGHT: 192 LBS

## 2019-07-31 DIAGNOSIS — M47.816 LUMBAR FACET ARTHROPATHY: ICD-10-CM

## 2019-07-31 DIAGNOSIS — M51.36 BULGE OF LUMBAR DISC WITHOUT MYELOPATHY: ICD-10-CM

## 2019-07-31 DIAGNOSIS — G89.29 OTHER CHRONIC PAIN: Primary | ICD-10-CM

## 2019-07-31 PROCEDURE — 99212 OFFICE O/P EST SF 10 MIN: CPT | Performed by: NURSE PRACTITIONER

## 2019-07-31 NOTE — PROGRESS NOTES
CHIEF COMPLAINT  F/u back pain. Pt had TWO INDEPENDENT PROCEDURES:  LMBB & GTB on 6/25/19. Pt sts receiving 95% pain relief x 3 weeks then pain returned to baseline. Pt had right foot surgery in May 2019, which became infected and is very painful which pt sts has exacerbated her lower back pain lately.     Subjective   Inessa Peoples is a 79 y.o. female  who presents to the office for follow-up.She has a history of chronic back pain. Reports this has been variable since last office visit.  Patient presents to the office for follow-up of PROCEDURE. Patient had a bilateral L2-L5 MBB performed by Dr. SPARKS on 6-23-19 for management of LOW BACK PAIN. Patient reports 90% relief from the procedure which lasted a few weeks.  Patient presents to the office for follow-up of PROCEDURE. Patient had a bilateral greater trochanteric bursa injection performed by Dr. SPARKS on 6-23-19 for management of hip pain. Patient reports 90% ONGOING relief from the procedure.    Had right great toe surgery in mid May. Had infection after surgery. Just finished antibiotics.    Complains of pain in her low back, hips and right foot. Today her pain is 3/10VAS.  Reports her hip pain is improved. Describes her back pain as intermittent aching and throbbing. Pain increases with walking, standing; pain decreases with rest, being off feet. ADL's by self. Denies any bowel or bladder changes.     Wants to wait on repeating back procedures until her right foot is feeling better.   Back Pain   This is a chronic problem. The current episode started more than 1 year ago. The problem occurs intermittently. Progression since onset: variable since last office visit. The pain is present in the lumbar spine and sacro-iliac. The quality of the pain is described as aching. The pain does not radiate (left hip, right calf and foot). The pain is at a severity of 3/10. The pain is worse during the day. The symptoms are aggravated by bending, position, sitting,  standing and twisting (housework). Associated symptoms include headaches (occ) and weakness (right foot). Pertinent negatives include no bladder incontinence, bowel incontinence, chest pain, dysuria, fever or numbness. Risk factors include obesity, menopause, history of cancer and lack of exercise (history of melanoma--no Chemo or RAD). She has tried analgesics and heat (left SI joint injection--significant ongoing relief) for the symptoms. The treatment provided moderate relief.   Hip Pain    The incident occurred more than 1 week ago. There was no injury mechanism. The pain is present in the left hip and right hip. The pain is at a severity of 3/10. The pain is moderate. The pain has been fluctuating since onset. Associated symptoms include an inability to bear weight and a loss of motion. Pertinent negatives include no numbness. She has tried NSAIDs for the symptoms.      She completed a Bilateral L2-5 Lumbar Medial Branch Blockade   on  11/15/18 performed by Dr. Means for management of LOW BACK PAIN. Patient reports SIGNIFICANT relief from the procedure. This started wearing off over the past few days.   Was to have second LMBB on 12-6-18 but was still having significant relief. The procedure was cancelled at that time.     She completed a LEFT Sacroiliac Joint Injection   on  4/26/18 performed by Dr. Means for management of low back pain/sacroiliac joint pain. Reported significant relief previously.  She completed a right SI joint injection   on  9-26-17 performed by Dr. Means for management of low back pain.    PEG Assessment   What number best describes your pain on average in the past week?3  What number best describes how, during the past week, pain has interfered with your enjoyment of life?1  What number best describes how, during the past week, pain has interfered with your general activity?  1    The following portions of the patient's history were reviewed and updated as appropriate: allergies,  "current medications, past family history, past medical history, past social history, past surgical history and problem list.    Review of Systems   Constitutional: Positive for activity change (dec) and fatigue (occ). Negative for fever.   HENT: Negative for congestion.    Eyes: Negative for visual disturbance.   Respiratory: Negative for cough, shortness of breath and wheezing.    Cardiovascular: Negative.  Negative for chest pain.   Gastrointestinal: Positive for nausea (occ r/t headaches). Negative for bowel incontinence, constipation and diarrhea.   Genitourinary: Negative for bladder incontinence, difficulty urinating and dysuria.   Musculoskeletal: Positive for arthralgias (right foot ) and back pain.   Neurological: Positive for weakness (right foot) and headaches (occ). Negative for dizziness and numbness.   Psychiatric/Behavioral: Negative for agitation, sleep disturbance and suicidal ideas. The patient is not nervous/anxious.        Vitals:    07/31/19 1144   BP: 124/77   Pulse: 69   Resp: 20   Temp: 97 °F (36.1 °C)   SpO2: 96%   Weight: 87.1 kg (192 lb)   Height: 156.2 cm (61.5\")   PainSc:   3   PainLoc: Back     Objective   Physical Exam   Constitutional: She is oriented to person, place, and time. Vital signs are normal. She appears well-developed and well-nourished. She is cooperative.   HENT:   Head: Normocephalic and atraumatic.   Nose: Nose normal.   Eyes: Conjunctivae and lids are normal.   Neck: Trachea normal. Neck supple.   Cardiovascular: Normal rate.   Pulmonary/Chest: Effort normal.   Abdominal:   OBESE   Musculoskeletal:        Right hip: She exhibits tenderness.        Left hip: She exhibits tenderness.        Lumbar back: She exhibits tenderness, bony tenderness (moderate tenderness of bilateral L2-L5 facets; + loading manuever) and pain.        Neurological: She is alert and oriented to person, place, and time. Gait abnormal.   Skin: Skin is warm, dry and intact.   Psychiatric: She has a " normal mood and affect. Her speech is normal and behavior is normal. Judgment and thought content normal. Cognition and memory are normal.   Nursing note and vitals reviewed.      Assessment/Plan   Inessa was seen today for back pain.    Diagnoses and all orders for this visit:    Other chronic pain    Lumbar facet arthropathy    Bulge of lumbar disc without myelopathy      --- consider lumbar RFL. Patient wants to wait at this time.  --- Follow-up 8-12 weeks or sooner if needed.       LUIS REPORT    LUIS report has been reviewed and scanned into the patient's chart.    As the clinician, I personally reviewed the LUIS from 7-30-19 while the patient was in the office today.          EMR Dragon/Transcription disclaimer:   Much of this encounter note is an electronic transcription/translation of spoken language to printed text. The electronic translation of spoken language may permit erroneous, or at times, nonsensical words or phrases to be inadvertently transcribed; Although I have reviewed the note for such errors, some may still exist.

## 2019-08-06 ENCOUNTER — ANTICOAGULATION VISIT (OUTPATIENT)
Dept: INTERNAL MEDICINE | Facility: CLINIC | Age: 79
End: 2019-08-06

## 2019-08-06 DIAGNOSIS — I48.0 PAROXYSMAL ATRIAL FIBRILLATION (HCC): ICD-10-CM

## 2019-08-06 LAB — INR PPP: 3.1 (ref 0.9–1.1)

## 2019-08-06 PROCEDURE — 85610 PROTHROMBIN TIME: CPT | Performed by: INTERNAL MEDICINE

## 2019-08-06 PROCEDURE — 36416 COLLJ CAPILLARY BLOOD SPEC: CPT | Performed by: INTERNAL MEDICINE

## 2019-08-12 RX ORDER — METHOCARBAMOL 750 MG/1
TABLET, FILM COATED ORAL
Qty: 30 TABLET | Refills: 0 | Status: SHIPPED | OUTPATIENT
Start: 2019-08-12 | End: 2019-09-15 | Stop reason: SDUPTHER

## 2019-08-16 ENCOUNTER — ANTICOAGULATION VISIT (OUTPATIENT)
Dept: INTERNAL MEDICINE | Facility: CLINIC | Age: 79
End: 2019-08-16

## 2019-08-16 DIAGNOSIS — I48.0 PAROXYSMAL ATRIAL FIBRILLATION (HCC): ICD-10-CM

## 2019-08-16 LAB — INR PPP: 1.9 (ref 0.9–1.1)

## 2019-08-16 PROCEDURE — 36416 COLLJ CAPILLARY BLOOD SPEC: CPT | Performed by: INTERNAL MEDICINE

## 2019-08-16 PROCEDURE — 93793 ANTICOAG MGMT PT WARFARIN: CPT | Performed by: INTERNAL MEDICINE

## 2019-08-16 PROCEDURE — 85610 PROTHROMBIN TIME: CPT | Performed by: INTERNAL MEDICINE

## 2019-08-26 RX ORDER — LEVOTHYROXINE SODIUM 0.03 MG/1
TABLET ORAL
Qty: 30 TABLET | Refills: 0 | Status: SHIPPED | OUTPATIENT
Start: 2019-08-26 | End: 2019-09-28 | Stop reason: SDUPTHER

## 2019-08-29 RX ORDER — SOTALOL HYDROCHLORIDE 80 MG/1
TABLET ORAL
Qty: 30 TABLET | Refills: 0 | Status: SHIPPED | OUTPATIENT
Start: 2019-08-29 | End: 2019-09-28 | Stop reason: SDUPTHER

## 2019-08-30 ENCOUNTER — ANTICOAGULATION VISIT (OUTPATIENT)
Dept: INTERNAL MEDICINE | Facility: CLINIC | Age: 79
End: 2019-08-30

## 2019-08-30 DIAGNOSIS — I48.0 PAROXYSMAL ATRIAL FIBRILLATION (HCC): ICD-10-CM

## 2019-08-30 LAB — INR PPP: 1.9 (ref 0.9–1.1)

## 2019-08-30 PROCEDURE — 85610 PROTHROMBIN TIME: CPT | Performed by: INTERNAL MEDICINE

## 2019-08-30 PROCEDURE — 36416 COLLJ CAPILLARY BLOOD SPEC: CPT | Performed by: INTERNAL MEDICINE

## 2019-09-03 ENCOUNTER — TELEPHONE (OUTPATIENT)
Dept: INTERNAL MEDICINE | Facility: CLINIC | Age: 79
End: 2019-09-03

## 2019-09-03 ENCOUNTER — ANTICOAGULATION VISIT (OUTPATIENT)
Dept: INTERNAL MEDICINE | Facility: CLINIC | Age: 79
End: 2019-09-03

## 2019-09-03 DIAGNOSIS — I48.0 PAROXYSMAL ATRIAL FIBRILLATION (HCC): ICD-10-CM

## 2019-09-03 NOTE — TELEPHONE ENCOUNTER
Foot Dr put her on Prednisone. Does she need need to adjust her warfarin? CLC    No, but we need to monitor her INR as we have been doing.  SLW    Patient advised. CLC

## 2019-09-09 ENCOUNTER — ANTICOAGULATION VISIT (OUTPATIENT)
Dept: INTERNAL MEDICINE | Facility: CLINIC | Age: 79
End: 2019-09-09

## 2019-09-09 DIAGNOSIS — I48.0 PAROXYSMAL ATRIAL FIBRILLATION (HCC): ICD-10-CM

## 2019-09-09 LAB — INR PPP: 2.7 (ref 0.9–1.1)

## 2019-09-09 PROCEDURE — 85610 PROTHROMBIN TIME: CPT | Performed by: INTERNAL MEDICINE

## 2019-09-09 PROCEDURE — 93793 ANTICOAG MGMT PT WARFARIN: CPT | Performed by: INTERNAL MEDICINE

## 2019-09-09 PROCEDURE — 36416 COLLJ CAPILLARY BLOOD SPEC: CPT | Performed by: INTERNAL MEDICINE

## 2019-09-16 RX ORDER — METHOCARBAMOL 750 MG/1
TABLET, FILM COATED ORAL
Qty: 30 TABLET | Refills: 1 | Status: SHIPPED | OUTPATIENT
Start: 2019-09-16 | End: 2019-12-04 | Stop reason: SDUPTHER

## 2019-09-30 ENCOUNTER — ANTICOAGULATION VISIT (OUTPATIENT)
Dept: INTERNAL MEDICINE | Facility: CLINIC | Age: 79
End: 2019-09-30

## 2019-09-30 DIAGNOSIS — I48.0 PAROXYSMAL ATRIAL FIBRILLATION (HCC): ICD-10-CM

## 2019-09-30 LAB — INR PPP: 1.2 (ref 0.9–1.1)

## 2019-09-30 PROCEDURE — 85610 PROTHROMBIN TIME: CPT | Performed by: INTERNAL MEDICINE

## 2019-09-30 PROCEDURE — 36416 COLLJ CAPILLARY BLOOD SPEC: CPT | Performed by: INTERNAL MEDICINE

## 2019-09-30 RX ORDER — SOTALOL HYDROCHLORIDE 80 MG/1
TABLET ORAL
Qty: 30 TABLET | Refills: 0 | Status: SHIPPED | OUTPATIENT
Start: 2019-09-30 | End: 2019-10-26 | Stop reason: SDUPTHER

## 2019-09-30 RX ORDER — LEVOTHYROXINE SODIUM 0.03 MG/1
TABLET ORAL
Qty: 30 TABLET | Refills: 0 | Status: SHIPPED | OUTPATIENT
Start: 2019-09-30 | End: 2019-10-26 | Stop reason: SDUPTHER

## 2019-10-04 ENCOUNTER — ANTICOAGULATION VISIT (OUTPATIENT)
Dept: INTERNAL MEDICINE | Facility: CLINIC | Age: 79
End: 2019-10-04

## 2019-10-04 DIAGNOSIS — I48.0 PAROXYSMAL ATRIAL FIBRILLATION (HCC): ICD-10-CM

## 2019-10-04 LAB — INR PPP: 1.5 (ref 0.9–1.1)

## 2019-10-04 PROCEDURE — 36416 COLLJ CAPILLARY BLOOD SPEC: CPT | Performed by: INTERNAL MEDICINE

## 2019-10-04 PROCEDURE — 90653 IIV ADJUVANT VACCINE IM: CPT | Performed by: INTERNAL MEDICINE

## 2019-10-04 PROCEDURE — 85610 PROTHROMBIN TIME: CPT | Performed by: INTERNAL MEDICINE

## 2019-10-04 PROCEDURE — G0008 ADMIN INFLUENZA VIRUS VAC: HCPCS | Performed by: INTERNAL MEDICINE

## 2019-10-07 RX ORDER — WARFARIN SODIUM 3 MG/1
TABLET ORAL
Qty: 90 TABLET | Refills: 0 | Status: SHIPPED | OUTPATIENT
Start: 2019-10-07 | End: 2020-01-27

## 2019-10-09 ENCOUNTER — ANTICOAGULATION VISIT (OUTPATIENT)
Dept: INTERNAL MEDICINE | Facility: CLINIC | Age: 79
End: 2019-10-09

## 2019-10-09 DIAGNOSIS — I48.0 PAROXYSMAL ATRIAL FIBRILLATION (HCC): ICD-10-CM

## 2019-10-09 LAB — INR PPP: 2.9 (ref 0.9–1.1)

## 2019-10-09 PROCEDURE — 85610 PROTHROMBIN TIME: CPT | Performed by: INTERNAL MEDICINE

## 2019-10-09 PROCEDURE — 36416 COLLJ CAPILLARY BLOOD SPEC: CPT | Performed by: INTERNAL MEDICINE

## 2019-10-14 RX ORDER — SPIRONOLACTONE 25 MG/1
TABLET ORAL
Qty: 30 TABLET | Refills: 4 | Status: SHIPPED | OUTPATIENT
Start: 2019-10-14 | End: 2019-11-12

## 2019-10-16 ENCOUNTER — ANTICOAGULATION VISIT (OUTPATIENT)
Dept: INTERNAL MEDICINE | Facility: CLINIC | Age: 79
End: 2019-10-16

## 2019-10-16 DIAGNOSIS — I48.0 PAROXYSMAL ATRIAL FIBRILLATION (HCC): ICD-10-CM

## 2019-10-16 LAB — INR PPP: 3.3 (ref 0.9–1.1)

## 2019-10-16 PROCEDURE — 85610 PROTHROMBIN TIME: CPT | Performed by: INTERNAL MEDICINE

## 2019-10-16 PROCEDURE — 36416 COLLJ CAPILLARY BLOOD SPEC: CPT | Performed by: INTERNAL MEDICINE

## 2019-10-17 ENCOUNTER — OFFICE VISIT (OUTPATIENT)
Dept: SLEEP MEDICINE | Facility: HOSPITAL | Age: 79
End: 2019-10-17
Attending: INTERNAL MEDICINE

## 2019-10-17 VITALS — WEIGHT: 203 LBS | HEIGHT: 62 IN | BODY MASS INDEX: 37.36 KG/M2

## 2019-10-17 DIAGNOSIS — E66.01 CLASS 2 SEVERE OBESITY DUE TO EXCESS CALORIES WITH SERIOUS COMORBIDITY AND BODY MASS INDEX (BMI) OF 37.0 TO 37.9 IN ADULT (HCC): ICD-10-CM

## 2019-10-17 DIAGNOSIS — Z99.89 OSA ON CPAP: Primary | Chronic | ICD-10-CM

## 2019-10-17 DIAGNOSIS — G47.33 OSA ON CPAP: Primary | Chronic | ICD-10-CM

## 2019-10-17 PROCEDURE — G0463 HOSPITAL OUTPT CLINIC VISIT: HCPCS

## 2019-10-17 PROCEDURE — 99213 OFFICE O/P EST LOW 20 MIN: CPT | Performed by: INTERNAL MEDICINE

## 2019-10-17 NOTE — PROGRESS NOTES
"Follow Up [facility] Disorders [facility] Note     Chief Complaint:  ANY     Primary Care Physician: Gisele Dockery MD    Interval History:   I last saw the patient one year ago.  She states she is unchanged.  She is stable.  She goes to bed at 11 PM and awakens at 9 AM.  Harlan Sleepiness Scale is normal at 0.    Patient had foot surgery twice since May.    Review of Systems:    A complete review of systems was done and all were negative with the exception of postnasal drip, AHN, and HUSSEIN    Social History:    Social History     Socioeconomic History   • Marital status:      Spouse name: EDY   • Number of children: 4   • Years of education: 14yrs   • Highest education level: Not on file   Occupational History   • Occupation: RETIRED   Tobacco Use   • Smoking status: Never Smoker   • Smokeless tobacco: Never Used   Substance and Sexual Activity   • Alcohol use: Yes     Comment: social drinker   • Drug use: No   • Sexual activity: Defer       Allergies:  Iodinated diagnostic agents; Fenofibrate; Livalo [pitavastatin]; Plaquenil [hydroxychloroquine sulfate]; and Statins     Medication Review: Her list was reviewed.      Vital Signs:    Vitals:    10/17/19 1100   Weight: 92.1 kg (203 lb)   Height: 157.5 cm (62\")     Body mass index is 37.13 kg/m².    Physical Exam:    Constitutional:  Well developed 79 y.o. female that appears in no apparent distress.  Awake & oriented times 3.  Normal mood with normal recent and remote memory and normal judgement.  Eyes:  Conjunctivae normal.  Oropharynx:  moist mucous membranes without exudate and a large tongue and normal uvula and moderate narrowing of the posterior pharyngeal opening     Results Review:  DME is [facility] and she uses a nasal mask.  Downloads between 7/19 and 10/16/2019 compliance 99%.  Average usage is 8 hours and 10 minutes.  Average AHI is normal without leak.  CPAP pressure is +10.       Impression:   Obstructive sleep apnea adequately treated with CPAP +10 " with good compliance and usage and no complaints of hypersomnolence.    Plan:  Good sleep hygiene measures should be maintained.  Weight loss would be beneficial in this patient who is obese by BMI.  The patient is benefiting from the treatment being provided.     The patient will continue CPAP +10.  She is uncomfortable with her nasal mask and she was shown an Air Fit N 30 I small and new orders sent to her DME    The patient will call for any problems and will follow up in 1 year.      Jewel Lu MD  Sleep Medicine  10/17/19  12:22 PM

## 2019-10-17 NOTE — PROGRESS NOTES
Pt mask fitted in lab with Airfit N30i size small. Pt requesting to switch DME companies to [employer].

## 2019-10-23 ENCOUNTER — OFFICE VISIT (OUTPATIENT)
Dept: PAIN MEDICINE | Facility: CLINIC | Age: 79
End: 2019-10-23

## 2019-10-23 ENCOUNTER — APPOINTMENT (OUTPATIENT)
Dept: LAB | Facility: HOSPITAL | Age: 79
End: 2019-10-23

## 2019-10-23 ENCOUNTER — OFFICE VISIT (OUTPATIENT)
Dept: INTERNAL MEDICINE | Facility: CLINIC | Age: 79
End: 2019-10-23

## 2019-10-23 ENCOUNTER — HOSPITAL ENCOUNTER (OUTPATIENT)
Dept: CT IMAGING | Facility: HOSPITAL | Age: 79
Discharge: HOME | End: 2019-10-23
Admitting: INTERNAL MEDICINE

## 2019-10-23 ENCOUNTER — ANTICOAGULATION VISIT (OUTPATIENT)
Dept: INTERNAL MEDICINE | Facility: CLINIC | Age: 79
End: 2019-10-23

## 2019-10-23 VITALS
OXYGEN SATURATION: 97 % | BODY MASS INDEX: 37.54 KG/M2 | HEIGHT: 62 IN | SYSTOLIC BLOOD PRESSURE: 158 MMHG | WEIGHT: 204 LBS | HEART RATE: 67 BPM | DIASTOLIC BLOOD PRESSURE: 70 MMHG

## 2019-10-23 VITALS
OXYGEN SATURATION: 95 % | HEART RATE: 62 BPM | SYSTOLIC BLOOD PRESSURE: 174 MMHG | WEIGHT: 203 LBS | RESPIRATION RATE: 16 BRPM | HEIGHT: 62 IN | TEMPERATURE: 97.8 F | DIASTOLIC BLOOD PRESSURE: 50 MMHG | BODY MASS INDEX: 37.36 KG/M2

## 2019-10-23 DIAGNOSIS — I48.0 PAROXYSMAL ATRIAL FIBRILLATION (HCC): ICD-10-CM

## 2019-10-23 DIAGNOSIS — G89.29 OTHER CHRONIC PAIN: Primary | ICD-10-CM

## 2019-10-23 DIAGNOSIS — M47.816 LUMBAR FACET ARTHROPATHY: ICD-10-CM

## 2019-10-23 DIAGNOSIS — M51.36 BULGE OF LUMBAR DISC WITHOUT MYELOPATHY: ICD-10-CM

## 2019-10-23 DIAGNOSIS — M54.6 THORACIC SPINE PAIN: ICD-10-CM

## 2019-10-23 DIAGNOSIS — M46.1 SACROILIAC INFLAMMATION (HCC): ICD-10-CM

## 2019-10-23 DIAGNOSIS — M54.6 ACUTE LEFT-SIDED THORACIC BACK PAIN: ICD-10-CM

## 2019-10-23 DIAGNOSIS — M53.3 SACROILIAC PAIN: ICD-10-CM

## 2019-10-23 DIAGNOSIS — R06.02 SOB (SHORTNESS OF BREATH): ICD-10-CM

## 2019-10-23 DIAGNOSIS — R07.89 CHEST WALL PAIN: ICD-10-CM

## 2019-10-23 DIAGNOSIS — M54.6 ACUTE LEFT-SIDED THORACIC BACK PAIN: Primary | ICD-10-CM

## 2019-10-23 LAB
D DIMER PPP FEU-MCNC: 0.77 MCGFEU/ML (ref 0–0.49)
INR PPP: 1.9 (ref 0.9–1.1)

## 2019-10-23 PROCEDURE — 85610 PROTHROMBIN TIME: CPT | Performed by: INTERNAL MEDICINE

## 2019-10-23 PROCEDURE — 36416 COLLJ CAPILLARY BLOOD SPEC: CPT | Performed by: INTERNAL MEDICINE

## 2019-10-23 PROCEDURE — 85379 FIBRIN DEGRADATION QUANT: CPT | Performed by: INTERNAL MEDICINE

## 2019-10-23 PROCEDURE — 36415 COLL VENOUS BLD VENIPUNCTURE: CPT | Performed by: INTERNAL MEDICINE

## 2019-10-23 PROCEDURE — 99212 OFFICE O/P EST SF 10 MIN: CPT | Performed by: NURSE PRACTITIONER

## 2019-10-23 PROCEDURE — 99214 OFFICE O/P EST MOD 30 MIN: CPT | Performed by: INTERNAL MEDICINE

## 2019-10-23 RX ORDER — CLINDAMYCIN HYDROCHLORIDE 300 MG/1
CAPSULE ORAL
COMMUNITY
Start: 2019-10-22 | End: 2019-11-12

## 2019-10-23 RX ORDER — AMLODIPINE BESYLATE 5 MG/1
5 TABLET ORAL DAILY
Qty: 30 TABLET | Refills: 6 | Status: SHIPPED | OUTPATIENT
Start: 2019-10-23 | End: 2020-05-25

## 2019-10-23 RX ORDER — FLUCONAZOLE 150 MG/1
TABLET ORAL
COMMUNITY
Start: 2019-10-04 | End: 2019-11-12

## 2019-10-23 NOTE — PROGRESS NOTES
"CHIEF COMPLAINT  Pt is here to f/u on back pain. Pt sts her pain has increased.    Subjective   Inessa Peoples is a 79 y.o. female  who presents to the office for follow-up.She has a history of chronic back pain. Reports her pain is worse since last office visit.    Had original right foot surgery in May 2019. Developed an infection and had surgery in September. \"they said I may need another surgery and possibly remove part of the bone.\"  Remains in walking boot.     Complains of pain her low back. TOday her pain is 7/10VAS. Describes the pain as nearly continuous aching and throbbing. Pain increases with walking, standing, activity; pain decreases with being off feet. Admits her right foot problems are impacting her pain. ADL's by self. Denies any bowel or bladder changes.    Back Pain   This is a chronic problem. The current episode started more than 1 year ago. The problem occurs intermittently. Progression since onset: worse since last office visit. The pain is present in the lumbar spine and sacro-iliac. The quality of the pain is described as aching. The pain does not radiate (left hip, right calf and foot). The pain is at a severity of 7/10. The pain is worse during the day. The symptoms are aggravated by bending, position, sitting, standing and twisting (housework). Associated symptoms include headaches (occ) and weakness (right foot). Pertinent negatives include no abdominal pain, bladder incontinence, bowel incontinence, chest pain, dysuria, fever or numbness. Risk factors include obesity, menopause, history of cancer and lack of exercise (history of melanoma--no Chemo or RAD). She has tried analgesics and heat (left SI joint injection--significant ongoing relief) for the symptoms. The treatment provided moderate relief.   Hip Pain    The incident occurred more than 1 week ago. There was no injury mechanism. The pain is present in the left hip and right hip. The pain is at a severity of 3/10. The pain is " moderate. The pain has been fluctuating since onset. Associated symptoms include an inability to bear weight and a loss of motion. Pertinent negatives include no numbness. She has tried NSAIDs for the symptoms.      Patient had a bilateral L2-L5 MBB performed by Dr. SPARKS on 6-23-19 for management of LOW BACK PAIN. Patient reports 90% relief from the procedure which lasted a few weeks.  Patient had a bilateral greater trochanteric bursa injection performed by Dr. SPARKS on 6-23-19 for management of hip pain. Patient reports 90% ONGOING relief from the procedure.  She completed a Bilateral L2-5 Lumbar Medial Branch Blockade   on  11/15/18 performed by Dr. Sparks for management of LOW BACK PAIN. Patient reports SIGNIFICANT relief from the procedure. This started wearing off over the past few days.   Was to have second LMBB on 12-6-18 but was still having significant relief. The procedure was cancelled at that time.     She completed a LEFT Sacroiliac Joint Injection   on  4/26/18 performed by Dr. Sparks for management of low back pain/sacroiliac joint pain. Reported significant relief previously.  She completed a right SI joint injection   on  9-26-17 performed by Dr. Sparks for management of low back pain.    PEG Assessment   What number best describes your pain on average in the past week?7  What number best describes how, during the past week, pain has interfered with your enjoyment of life?8  What number best describes how, during the past week, pain has interfered with your general activity?  8    The following portions of the patient's history were reviewed and updated as appropriate: allergies, current medications, past family history, past medical history, past social history, past surgical history and problem list.    Review of Systems   Constitutional: Negative for fatigue and fever.   HENT: Negative for congestion.    Respiratory: Negative for shortness of breath.    Cardiovascular: Negative for chest  "pain.   Gastrointestinal: Negative for abdominal pain and bowel incontinence.   Genitourinary: Negative for bladder incontinence, difficulty urinating and dysuria.   Musculoskeletal: Positive for back pain. Negative for neck pain.   Neurological: Positive for weakness (right foot) and headaches (occ). Negative for dizziness and numbness.   Psychiatric/Behavioral: Negative for sleep disturbance.       Vitals:    10/23/19 1142   BP: 174/50   Pulse: 62   Resp: 16   Temp: 97.8 °F (36.6 °C)   SpO2: 95%   Weight: 92.1 kg (203 lb)   Height: 157.5 cm (62.01\")   PainSc:   7   PainLoc: Back         Objective   Physical Exam   Constitutional: She is oriented to person, place, and time. Vital signs are normal. She appears well-developed and well-nourished. She is cooperative.   HENT:   Head: Normocephalic and atraumatic.   Nose: Nose normal.   Eyes: Conjunctivae and lids are normal.   Neck: Trachea normal. Neck supple.   Cardiovascular: Normal rate.   Pulmonary/Chest: Effort normal.   Abdominal:   OBESE   Musculoskeletal:        Lumbar back: She exhibits tenderness, bony tenderness (moderate tenderness of bilateral L2-L5 facets) and pain.        Neurological: She is alert and oriented to person, place, and time. Gait abnormal.   Skin: Skin is warm, dry and intact.   Psychiatric: She has a normal mood and affect. Her speech is normal and behavior is normal. Judgment and thought content normal. Cognition and memory are normal.   Nursing note and vitals reviewed.      Assessment/Plan   Inessa was seen today for back pain.    Diagnoses and all orders for this visit:    Other chronic pain    Sacroiliac pain    Sacroiliac inflammation (CMS/HCC)    Bulge of lumbar disc without myelopathy    Lumbar facet arthropathy      --- Reviewed consideration for RFL. Patient wants to wait at this time. \"I'm jsut overwhelmed with my foot stuff going on.\" Also she reports she is having left lateral chest pain. States it has been occurring since " Sunday. Encouraged patient to go to ER. She states she has an appointment with PCP in an hour and a half. Reviewed emergency care if needed.  --- Reviewed RFL does not expose to steroids.   --- Follow-up PRN.     LUIS REPORT      LUIS report has been reviewed and scanned into the patient's chart.    As the clinician, I personally reviewed the LUIS from 10-21-19 while the patient was in the office today.          EMR Dragon/Transcription disclaimer:   Much of this encounter note is an electronic transcription/translation of spoken language to printed text. The electronic translation of spoken language may permit erroneous, or at times, nonsensical words or phrases to be inadvertently transcribed; Although I have reviewed the note for such errors, some may still exist.

## 2019-10-23 NOTE — PROGRESS NOTES
Subjective     Inessa Peoples is a 79 y.o. female who presents with   Chief Complaint   Patient presents with   • Shortness of Breath   • Shoulder Blade Pain   • Hypertension       History of Present Illness     Left sided thoracic pain.  Going on four days.  She states she is a little more SOA.   No fever.  No sore throat.  No ear pain.  Pain was abrupt onset.    No cough.  Pain is constant and of moderate severity.  No increase with movement.     Review of Systems   Constitutional: Negative for fever.   HENT: Negative.    Respiratory: Positive for shortness of breath. Negative for cough, chest tightness and wheezing.    Cardiovascular: Negative for chest pain.       The following portions of the patient's history were reviewed and updated as appropriate: allergies, current medications and problem list.    Patient Active Problem List    Diagnosis Date Noted   • Trochanteric bursitis of both hips 05/29/2019   • Lumbar facet arthropathy 11/06/2018   • Family history of colon cancer 06/19/2018     Note Last Updated: 6/19/2018     Added automatically from request for surgery [id number]     • Class 2 obesity due to excess calories with body mass index (BMI) of 35.0 to 35.9 in adult 03/27/2018   • Chronic pain 07/13/2017   • Bulge of lumbar disc without myelopathy 07/13/2017   • Sacroiliac inflammation (CMS/HCC) 07/13/2017   • Sacroiliac pain 07/13/2017   • Dilation of thoracic aorta (CMS/HCC) 06/23/2017   • Chronic anticoagulation 05/15/2017   • Diverticulitis of large intestine without perforation or abscess without bleeding 01/03/2017   • ANY on CPAP + 10 - Dr Lu 10/15/2016   • History of melanoma excision 08/05/2016   • GERD (gastroesophageal reflux disease) 08/05/2016   • History of kidney stones 08/05/2016   • Sarcoidosis 04/18/2016   • 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       • Dyspnea on exertion 04/18/2016   • Chronic osteoarthritis 04/18/2016   • History of DVT (deep vein thrombosis) 04/18/2016     Note Last Updated: 4/18/2016     Description: developed unprovoked on Xarelto.     • Atrial fibrillation (CMS/HCC) 04/18/2016       Current Outpatient Medications on File Prior to Visit   Medication Sig Dispense Refill   • Calcium-Phosphorus-Vitamin D (CITRACAL +D3 PO) Take 1 tablet by mouth Daily.     • celecoxib (CeleBREX) 200 MG capsule TAKE ONE CAPSULE BY MOUTH DAILY 30 capsule 3   • clindamycin (CLEOCIN) 300 MG capsule      • ezetimibe (ZETIA) 10 MG tablet Take 1 tablet by mouth Daily. 30 tablet 8   • fluconazole (DIFLUCAN) 150 MG tablet      • fluocinonide (LIDEX) 0.05 % external solution Apply  topically to the appropriate area as directed 2 (Two) Times a Day.     • fluorouracil (EFUDEX) 5 % cream Apply  topically to the appropriate area as directed 2 (Two) Times a Day.     • HYDROcodone-acetaminophen (NORCO) 7.5-325 MG per tablet      • levothyroxine (SYNTHROID, LEVOTHROID) 25 MCG tablet TAKE ONE TABLET BY MOUTH DAILY 30 tablet 0   • losartan (COZAAR) 50 MG tablet Take 1 tablet by mouth Daily. 90 tablet 3   • melatonin 5 MG sublingual tablet sublingual tablet Take 5 mg by mouth Every Night.     • methocarbamol (ROBAXIN) 750 MG tablet TAKE ONE TABLET BY MOUTH ONCE NIGHTLY AS NEEDED FOR MUSCLE SPASMS 30 tablet 1   • Multiple Vitamin (MULTI-DAY VITAMINS) tablet Take 1 tablet by mouth daily.     • sotalol (BETAPACE) 80 MG tablet TAKE ONE TABLET BY MOUTH DAILY 30 tablet 0   • spironolactone (ALDACTONE) 25 MG tablet Take 1 tablet by mouth Daily. 90 tablet 3   • spironolactone (ALDACTONE) 25 MG tablet TAKE ONE TABLET BY MOUTH DAILY 30 tablet 4   • traMADol (ULTRAM) 50 MG tablet TAKE ONE TABLET  "BY MOUTH THREE TIMES A DAY 90 tablet 5   • warfarin (COUMADIN) 3 MG tablet TAKE ONE TABLET BY MOUTH DAILY 90 tablet 0     No current facility-administered medications on file prior to visit.        Objective     /70   Pulse 67   Ht 157.5 cm (62.01\")   Wt 92.5 kg (204 lb)   SpO2 97%   BMI 37.30 kg/m²     Physical Exam   Constitutional: She is oriented to person, place, and time. She appears well-developed and well-nourished.   HENT:   Head: Normocephalic and atraumatic.   Right Ear: Hearing and tympanic membrane normal.   Left Ear: Hearing and tympanic membrane normal.   Mouth/Throat: No oropharyngeal exudate or posterior oropharyngeal erythema.   Cardiovascular: Normal rate, regular rhythm and normal heart sounds.   Pulmonary/Chest: Effort normal and breath sounds normal.   Neurological: She is alert and oriented to person, place, and time.   Skin: Skin is warm and dry.   Psychiatric: She has a normal mood and affect. Her behavior is normal.     X-rays of the chest  performed today for following indication:   Pain in back.  X-ray reveal left elevation of diaphragm.   No change from 3/29/2018.  X-ray sent to radiology for official interpretation and findings.        Assessment/Plan   Inessa was seen today for shortness of breath, shoulder blade pain and hypertension.    Diagnoses and all orders for this visit:    Acute left-sided thoracic back pain  -     XR Spine Thoracic 3 View  -     XR Chest PA & Lateral  -     CT angiogram chest w contrast; Future  -     D-dimer, Quantitative  -     NM Lung Ventilation Perfusion; Future  -     XR Chest PA & Lateral; Future    Thoracic spine pain  -     XR Spine Thoracic 3 View  -     XR Chest PA & Lateral  -     Cancel: CBC & Differential  -     Cancel: Comprehensive Metabolic Panel  -     Cancel: CT angiogram chest w contrast; Future  -     CBC & Differential  -     Comprehensive Metabolic Panel  -     CT angiogram chest w contrast; Future  -     D-dimer, " Quantitative  -     NM Lung Ventilation Perfusion; Future  -     XR Chest PA & Lateral; Future    Paroxysmal atrial fibrillation (CMS/HCC)  -     POC INR  -     D-dimer, Quantitative    SOB (shortness of breath)  -     Cancel: CT angiogram chest w contrast; Future  -     CBC & Differential  -     Comprehensive Metabolic Panel  -     CT angiogram chest w contrast; Future  -     D-dimer, Quantitative  -     NM Lung Ventilation Perfusion; Future  -     XR Chest PA & Lateral; Future    Other orders  -     amLODIPine (NORVASC) 5 MG tablet; Take 1 tablet by mouth Daily.        Discussion    Patient presents with new left sided thoracic pain with minor SOA.  CXR is unchanged today.  D-dimer is positive today.  VQ scan will be ordered to rule out PE since she is unable to take contrast.  The pain is possible musculoskeletal.  I discussed with the patient a trial of conservative management with:   tylenol, rest and heat.  Let me know if not feeling better over the next several weeks or if there is any change in symptoms.  Atrial fibrillation.  INR is 1.9 today.  No change and recheck two weeks.    HTN.  Add Norvasc.  Discussed with the patient onset on action and the potential side effects including ankle edema.  The patient will let me know of any side effects from the medication.      15/25 minutes was spent in counseling of the following topics:, test results, impressions, treatment options           Future Appointments   Date Time Provider Department Center   11/8/2019  9:00 AM Demar Stone MD [id number] None   10/21/2020 11:00 AM Jewel Lu MD [facility] None

## 2019-10-23 NOTE — NURSING NOTE
Patient here for outpatient CTA Chest PE protocol.  Patient is allergic to Iodine contrast.  D/w Dr. Dockery over the phone.  Patient is not pre-medicated, cannot have the IV Contrast per Radiologist protocol.  She is cancelling the CT.  Patient is to go to the Lab for a stat d-dimer and return to her office to see what results show and they will go from there.  Informed patient and  of above, verbalized understanding.  Walked patient to lab and had them sign in.  Informed Rachel at desk of above.

## 2019-10-24 ENCOUNTER — HOSPITAL ENCOUNTER (OUTPATIENT)
Dept: GENERAL RADIOLOGY | Facility: HOSPITAL | Age: 79
Discharge: HOME | End: 2019-10-24

## 2019-10-24 ENCOUNTER — HOSPITAL ENCOUNTER (OUTPATIENT)
Dept: NUCLEAR MEDICINE | Facility: HOSPITAL | Age: 79
Discharge: HOME | End: 2019-10-24

## 2019-10-24 ENCOUNTER — HOSPITAL ENCOUNTER (OUTPATIENT)
Dept: GENERAL RADIOLOGY | Facility: HOSPITAL | Age: 79
Discharge: HOME | End: 2019-10-24
Admitting: INTERNAL MEDICINE

## 2019-10-24 DIAGNOSIS — R06.02 SOB (SHORTNESS OF BREATH): ICD-10-CM

## 2019-10-24 DIAGNOSIS — R07.89 CHEST WALL PAIN: ICD-10-CM

## 2019-10-24 DIAGNOSIS — M54.6 ACUTE LEFT-SIDED THORACIC BACK PAIN: ICD-10-CM

## 2019-10-24 PROCEDURE — 72072 X-RAY EXAM THORAC SPINE 3VWS: CPT

## 2019-10-24 PROCEDURE — 0 TECHNETIUM ALBUMIN AGGREGATED: Performed by: INTERNAL MEDICINE

## 2019-10-24 PROCEDURE — A9540 TC99M MAA: HCPCS | Performed by: INTERNAL MEDICINE

## 2019-10-24 PROCEDURE — 78582 LUNG VENTILAT&PERFUS IMAGING: CPT

## 2019-10-24 PROCEDURE — A9558 XE133 XENON 10MCI: HCPCS | Performed by: INTERNAL MEDICINE

## 2019-10-24 PROCEDURE — 71046 X-RAY EXAM CHEST 2 VIEWS: CPT

## 2019-10-24 PROCEDURE — 0 XENON XE 133: Performed by: INTERNAL MEDICINE

## 2019-10-24 RX ADMIN — XENON XE-133 9.3 MILLICURIE: 10 GAS RESPIRATORY (INHALATION) at 09:37

## 2019-10-24 RX ADMIN — Medication 1 DOSE: at 09:37

## 2019-10-25 ENCOUNTER — TELEPHONE (OUTPATIENT)
Dept: INTERNAL MEDICINE | Facility: CLINIC | Age: 79
End: 2019-10-25

## 2019-10-25 NOTE — TELEPHONE ENCOUNTER
No change she thinks its muscular as well. CLC    I d/w patient.  No change in left sided thoracic pain.  Trial of muscle relaxer over the weekend.  Given me progress report on Monday.  BEST

## 2019-10-26 PROBLEM — E66.01 CLASS 2 SEVERE OBESITY DUE TO EXCESS CALORIES WITH SERIOUS COMORBIDITY AND BODY MASS INDEX (BMI) OF 37.0 TO 37.9 IN ADULT: Status: ACTIVE | Noted: 2018-03-27

## 2019-10-28 ENCOUNTER — TELEPHONE (OUTPATIENT)
Dept: INTERNAL MEDICINE | Facility: CLINIC | Age: 79
End: 2019-10-28

## 2019-10-28 DIAGNOSIS — R07.89 CHEST WALL PAIN: Primary | ICD-10-CM

## 2019-10-28 RX ORDER — SOTALOL HYDROCHLORIDE 80 MG/1
TABLET ORAL
Qty: 30 TABLET | Refills: 0 | Status: SHIPPED | OUTPATIENT
Start: 2019-10-28 | End: 2019-11-30 | Stop reason: SDUPTHER

## 2019-10-28 RX ORDER — LEVOTHYROXINE SODIUM 0.03 MG/1
TABLET ORAL
Qty: 30 TABLET | Refills: 0 | Status: SHIPPED | OUTPATIENT
Start: 2019-10-28 | End: 2019-11-23 | Stop reason: SDUPTHER

## 2019-10-28 NOTE — TELEPHONE ENCOUNTER
Still having a lot of left side pain. CLC    Advise patient.  I would like to do a CT of the chest.  Ask Elham to schedule ASAP ? Tomorrow.  SLW    Patient advised. Does she need to prep for dye? CLC    I ordered without dye.  BEST

## 2019-10-29 ENCOUNTER — TELEPHONE (OUTPATIENT)
Dept: INTERNAL MEDICINE | Facility: CLINIC | Age: 79
End: 2019-10-29

## 2019-10-29 NOTE — TELEPHONE ENCOUNTER
Patient left msg regarding when she will be scheduled. I forwarded msg below to Elham, as requested.

## 2019-10-31 ENCOUNTER — HOSPITAL ENCOUNTER (OUTPATIENT)
Dept: CT IMAGING | Facility: HOSPITAL | Age: 79
Discharge: HOME | End: 2019-10-31
Admitting: INTERNAL MEDICINE

## 2019-10-31 DIAGNOSIS — R07.89 CHEST WALL PAIN: ICD-10-CM

## 2019-10-31 PROCEDURE — 71250 CT THORAX DX C-: CPT

## 2019-11-07 ENCOUNTER — TELEPHONE (OUTPATIENT)
Dept: PAIN MEDICINE | Facility: CLINIC | Age: 79
End: 2019-11-07

## 2019-11-07 DIAGNOSIS — M47.816 LUMBAR FACET ARTHROPATHY: Primary | ICD-10-CM

## 2019-11-07 NOTE — TELEPHONE ENCOUNTER
Placed order. Thanks.BB    ----- Message from Larissa Newsome sent at 11/7/2019  2:59 PM EST -----  Patient called stating her foot and chest are feeling better and wants to proceed with RFL, if you put in the order i'll send it back to get auth. Thanks!

## 2019-11-08 ENCOUNTER — OFFICE VISIT (OUTPATIENT)
Dept: CARDIOLOGY | Facility: CLINIC | Age: 79
End: 2019-11-08

## 2019-11-08 VITALS
HEART RATE: 71 BPM | HEIGHT: 62 IN | SYSTOLIC BLOOD PRESSURE: 154 MMHG | DIASTOLIC BLOOD PRESSURE: 82 MMHG | WEIGHT: 202 LBS | BODY MASS INDEX: 37.17 KG/M2

## 2019-11-08 DIAGNOSIS — I48.0 PAROXYSMAL ATRIAL FIBRILLATION (HCC): Primary | ICD-10-CM

## 2019-11-08 DIAGNOSIS — Z79.01 CHRONIC ANTICOAGULATION: ICD-10-CM

## 2019-11-08 DIAGNOSIS — G47.33 OSA ON CPAP: Chronic | ICD-10-CM

## 2019-11-08 DIAGNOSIS — I10 ESSENTIAL HYPERTENSION: ICD-10-CM

## 2019-11-08 DIAGNOSIS — Z99.89 OSA ON CPAP: Chronic | ICD-10-CM

## 2019-11-08 PROBLEM — I44.7 LBBB (LEFT BUNDLE BRANCH BLOCK): Status: ACTIVE | Noted: 2019-11-08

## 2019-11-08 PROCEDURE — 93000 ELECTROCARDIOGRAM COMPLETE: CPT | Performed by: INTERNAL MEDICINE

## 2019-11-08 PROCEDURE — 99214 OFFICE O/P EST MOD 30 MIN: CPT | Performed by: INTERNAL MEDICINE

## 2019-11-08 NOTE — PROGRESS NOTES
Date of Office Visit: 2019  Encounter Provider: Demar Stone MD  Place of Service: [facility] CARDIOLOGY  Patient Name: Inessa Peoples  : 1940    Subjective:     Encounter Date:2019      Patient ID: Inessa Peoples is a 79 y.o. female who has a cc of PAF, LBBB abnd HTN and she has struggled with her foot - orig surgery for bunion then it got infected and now has had 2 surgeries -- still hurts and limits her mobility and and she's been on antibiotics for months.    She has some episodes of fast and irreg beats -- mostly when she sits down, lasting seconds.       Her BP has been high too. Temperature is 92-95.       No anginal chest pain, Mildly sig ahn w/ minimal exertion.     She has had pain in her shoulder blade radiating to her left chest. -- CT negative. She still has this discomfort all the time --   No soa,   No fainting,  No orthostasis.   No edema.   Exercise tolerance: sedentary     There have been no hospital admission since the last visit.     There have been no bleeding events.       Past Medical History:   Diagnosis Date   • Abnormal ECG    • Allergic rhinitis    • Atrial fibrillation (CMS/HCC)    • Bleeds easily (CMS/HCC)     warfarin   • Breast screening    • Broken bones    • Calf DVT (deep venous thrombosis) (CMS/HCC)    • Chronic anticoagulation    • Colon polyp    • CTS (carpal tunnel syndrome)    • Deep vein thrombosis (CMS/HCC)     left calf   • Diverticulitis of colon    • AHN (dyspnea on exertion)    • Fatigue    • H/O malignant melanoma of skin    • Hematuria, gross    • History of colon polyps    • History of kidney stones    • Hyperlipidemia    • Hypertension    • Hyperthyroidism    • IFG (impaired fasting glucose)    • Insomnia    • LBBB (left bundle branch block)    • Left leg swelling    • LLQ abdominal pain    • Long term current use of systemic steroids    • Low back pain    • Malignant melanoma of skin (CMS/HCC)    • Neck pain    •  "Obesity     class 2 obesity due to excess calories with BMI of 35.0 to 35.9 in adult   • Osteoarthritis    • Osteopenia    • Paralysis, diaphragm    • Paroxysmal atrial fibrillation (CMS/HCC)    • 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 Lu   • UTI (urinary tract infection)    • Visit for screening mammogram        Social History     Socioeconomic History   • Marital status:      Spouse name: EDY   • Number of children: 4   • Years of education: 14yrs   • Highest education level: Not on file   Occupational History   • Occupation: RETIRED   Tobacco Use   • Smoking status: Never Smoker   • Smokeless tobacco: Never Used   Substance and Sexual Activity   • Alcohol use: Yes     Comment: social drinker   • Drug use: No   • Sexual activity: Defer       Review of Systems   Constitution: Negative for fever and night sweats.   HENT: Negative for ear pain and stridor.    Eyes: Negative for discharge and visual halos.   Cardiovascular: Negative for cyanosis.   Respiratory: Negative for hemoptysis and sputum production.    Hematologic/Lymphatic: Negative for adenopathy.   Skin: Negative for nail changes and unusual hair distribution.   Musculoskeletal: Negative for gout and joint swelling.   Gastrointestinal: Negative for bowel incontinence and flatus.   Genitourinary: Negative for dysuria and flank pain.   Neurological: Negative for seizures and tremors.   Psychiatric/Behavioral: Negative for altered mental status. The patient is not nervous/anxious.             Objective:     Vitals:    11/08/19 0931   BP: 154/82   BP Location: Right arm   Patient Position: Sitting   Cuff Size: Large Adult   Pulse: 71   Weight: 91.6 kg (202 lb)   Height: 157.5 cm (62\")         Physical Exam   Constitutional: She is oriented to person, place, and time.   HENT:   Head: " Normocephalic and atraumatic.   Eyes: Right eye exhibits no discharge. Left eye exhibits no discharge.   Neck: No JVD present. No thyromegaly present.   Cardiovascular: Normal rate and regular rhythm. Exam reveals no gallop and no friction rub.   No murmur heard.  Pulmonary/Chest: Effort normal and breath sounds normal. She has no rales.   Abdominal: Soft. Bowel sounds are normal. There is no tenderness.   Musculoskeletal: Normal range of motion. She exhibits no edema or deformity.   Neurological: She is alert and oriented to person, place, and time. She exhibits normal muscle tone.   Skin: Skin is warm and dry. No erythema.   Psychiatric: She has a normal mood and affect. Her behavior is normal. Thought content normal.         ECG 12 Lead  Date/Time: 11/8/2019 9:54 AM  Performed by: Demar Stone MD  Authorized by: Demar Stone MD   Comparison: compared with previous ECG   Similar to previous ECG  Rhythm: sinus rhythm  Conduction: left bundle branch block    Clinical impression: abnormal EKG            Lab Review:       Assessment:          Diagnosis Plan   1. Paroxysmal atrial fibrillation (CMS/HCC)     2. Essential hypertension     3. ANY on CPAP + 10 - Dr Lu     4. Chronic anticoagulation            Plan:     Heart wise -- her symptoms are not anginal; her exam is ok, no CHF on exam, history of normal ef. No edema, no murmur and she does not appear ill.    BP is up a bit but she is on NSAID and is overweight -- Will defer to dr zavala on bp but I might add more ARB and/or stop NSAID. Goal  systolic     Tolerating the warfarin ok.     Rhythm control wise -- she is doing well on sotalol with minimal symptoms, low dose and normal QT>     On CPAP    We disc weight loos.     I don't think we need to do any further cardiac tests

## 2019-11-12 ENCOUNTER — ANTICOAGULATION VISIT (OUTPATIENT)
Dept: INTERNAL MEDICINE | Facility: CLINIC | Age: 79
End: 2019-11-12

## 2019-11-12 ENCOUNTER — OFFICE VISIT (OUTPATIENT)
Dept: INTERNAL MEDICINE | Facility: CLINIC | Age: 79
End: 2019-11-12

## 2019-11-12 VITALS
WEIGHT: 203 LBS | OXYGEN SATURATION: 98 % | DIASTOLIC BLOOD PRESSURE: 88 MMHG | BODY MASS INDEX: 37.36 KG/M2 | HEIGHT: 62 IN | HEART RATE: 74 BPM | SYSTOLIC BLOOD PRESSURE: 130 MMHG

## 2019-11-12 DIAGNOSIS — I48.0 PAROXYSMAL ATRIAL FIBRILLATION (HCC): ICD-10-CM

## 2019-11-12 DIAGNOSIS — I10 ESSENTIAL HYPERTENSION: Primary | ICD-10-CM

## 2019-11-12 LAB — INR PPP: 2.1 (ref 0.9–1.1)

## 2019-11-12 PROCEDURE — 36416 COLLJ CAPILLARY BLOOD SPEC: CPT | Performed by: INTERNAL MEDICINE

## 2019-11-12 PROCEDURE — 99213 OFFICE O/P EST LOW 20 MIN: CPT | Performed by: INTERNAL MEDICINE

## 2019-11-12 PROCEDURE — 85610 PROTHROMBIN TIME: CPT | Performed by: INTERNAL MEDICINE

## 2019-11-12 NOTE — PROGRESS NOTES
Subjective     Inessa Peoples is a 79 y.o. female who presents with   Chief Complaint   Patient presents with   • Hypertension       History of Present Illness     HTN.  BP f/u.  It is starting to improve.  She just started Norvasc on 10/23.  We discussed that it is not up to full effect of six weeks yet.    Review of Systems   Respiratory: Negative.    Cardiovascular: Negative.        The following portions of the patient's history were reviewed and updated as appropriate: allergies, current medications and problem list.    Patient Active Problem List    Diagnosis Date Noted   • LBBB (left bundle branch block) 11/08/2019   • Trochanteric bursitis of both hips 05/29/2019   • Lumbar facet arthropathy 11/06/2018   • Family history of colon cancer 06/19/2018     Note Last Updated: 6/19/2018     Added automatically from request for surgery [id number]     • Class 2 severe obesity due to excess calories with serious comorbidity and body mass index (BMI) of 37.0 to 37.9 in adult (CMS/[facility]) 03/27/2018   • Chronic pain 07/13/2017   • Bulge of lumbar disc without myelopathy 07/13/2017   • Sacroiliac inflammation (CMS/[facility]) 07/13/2017   • Sacroiliac pain 07/13/2017   • Dilation of thoracic aorta (CMS/[facility]) 06/23/2017   • Chronic anticoagulation 05/15/2017   • Diverticulitis of large intestine without perforation or abscess without bleeding 01/03/2017   • ANY on CPAP + 10 - Dr Lu 10/15/2016   • History of melanoma excision 08/05/2016   • GERD (gastroesophageal reflux disease) 08/05/2016   • History of kidney stones 08/05/2016   • Sarcoidosis 04/18/2016   • 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       • Dyspnea  on exertion 04/18/2016   • Chronic osteoarthritis 04/18/2016   • History of DVT (deep vein thrombosis) 04/18/2016     Note Last Updated: 4/18/2016     Description: developed unprovoked on Xarelto.     • Atrial fibrillation (CMS/HCC) 04/18/2016       Current Outpatient Medications on File Prior to Visit   Medication Sig Dispense Refill   • amLODIPine (NORVASC) 5 MG tablet Take 1 tablet by mouth Daily. 30 tablet 6   • Calcium-Phosphorus-Vitamin D (CITRACAL +D3 PO) Take 1 tablet by mouth Daily.     • celecoxib (CeleBREX) 200 MG capsule TAKE ONE CAPSULE BY MOUTH DAILY 30 capsule 3   • ezetimibe (ZETIA) 10 MG tablet Take 1 tablet by mouth Daily. 30 tablet 8   • fluocinonide (LIDEX) 0.05 % external solution Apply  topically to the appropriate area as directed 2 (Two) Times a Day.     • fluorouracil (EFUDEX) 5 % cream Apply  topically to the appropriate area as directed 2 (Two) Times a Day.     • levothyroxine (SYNTHROID, LEVOTHROID) 25 MCG tablet TAKE ONE TABLET BY MOUTH DAILY 30 tablet 0   • losartan (COZAAR) 50 MG tablet Take 1 tablet by mouth Daily. 90 tablet 3   • melatonin 5 MG sublingual tablet sublingual tablet Take 5 mg by mouth Every Night.     • methocarbamol (ROBAXIN) 750 MG tablet TAKE ONE TABLET BY MOUTH ONCE NIGHTLY AS NEEDED FOR MUSCLE SPASMS 30 tablet 1   • Multiple Vitamin (MULTI-DAY VITAMINS) tablet Take 1 tablet by mouth daily.     • sotalol (BETAPACE) 80 MG tablet TAKE ONE TABLET BY MOUTH DAILY 30 tablet 0   • spironolactone (ALDACTONE) 25 MG tablet Take 1 tablet by mouth Daily. 90 tablet 3   • traMADol (ULTRAM) 50 MG tablet TAKE ONE TABLET BY MOUTH THREE TIMES A DAY 90 tablet 5   • warfarin (COUMADIN) 3 MG tablet TAKE ONE TABLET BY MOUTH DAILY 90 tablet 0   • [DISCONTINUED] HYDROcodone-acetaminophen (NORCO) 7.5-325 MG per tablet      • [DISCONTINUED] spironolactone (ALDACTONE) 25 MG tablet TAKE ONE TABLET BY MOUTH DAILY 30 tablet 4   • [DISCONTINUED] clindamycin (CLEOCIN) 300 MG capsule      •  "[DISCONTINUED] fluconazole (DIFLUCAN) 150 MG tablet        No current facility-administered medications on file prior to visit.        Objective     /88   Pulse 74   Ht 157.5 cm (62.01\")   Wt 92.1 kg (203 lb)   SpO2 98%   BMI 37.12 kg/m²     Physical Exam   Constitutional: She is oriented to person, place, and time. She appears well-developed and well-nourished.   HENT:   Head: Normocephalic and atraumatic.   Cardiovascular: Normal rate, regular rhythm and normal heart sounds.   Pulmonary/Chest: Effort normal and breath sounds normal.   Neurological: She is alert and oriented to person, place, and time.   Skin: Skin is warm and dry.   Psychiatric: She has a normal mood and affect. Her behavior is normal.       Assessment/Plan   Inessa was seen today for hypertension.    Diagnoses and all orders for this visit:    Essential hypertension    Paroxysmal atrial fibrillation (CMS/HCC)  -     POC INR        Discussion    HTN.  The patient will continue current regimen.    The patient is instructed to monitor at home and call in checks.    Chronic atrial fibrillation.  She is therapeutic.  See flow sheet.         Future Appointments   Date Time Provider Department Center   12/4/2019  9:00 AM Wolfgang Means MD [facility] PM [facility] None   1/13/2020 10:30 AM [facility] None   1/17/2020  2:30 PM Gisele Dockery MD [facility] None   5/11/2020 11:30 AM Mirela Ríos APRN [facility] CD [facility] None   10/21/2020 11:00 AM Jewel Lu MD [facility] None         "

## 2019-11-18 RX ORDER — CELECOXIB 200 MG/1
CAPSULE ORAL
Qty: 30 CAPSULE | Refills: 2 | Status: SHIPPED | OUTPATIENT
Start: 2019-11-18 | End: 2020-02-24

## 2019-11-25 RX ORDER — LEVOTHYROXINE SODIUM 0.03 MG/1
TABLET ORAL
Qty: 30 TABLET | Refills: 0 | Status: SHIPPED | OUTPATIENT
Start: 2019-11-25 | End: 2019-12-30

## 2019-12-02 ENCOUNTER — APPOINTMENT (OUTPATIENT)
Dept: WOMENS IMAGING | Facility: HOSPITAL | Age: 79
End: 2019-12-02

## 2019-12-02 PROCEDURE — 77063 BREAST TOMOSYNTHESIS BI: CPT | Performed by: RADIOLOGY

## 2019-12-02 PROCEDURE — 77067 SCR MAMMO BI INCL CAD: CPT | Performed by: RADIOLOGY

## 2019-12-02 RX ORDER — SOTALOL HYDROCHLORIDE 80 MG/1
TABLET ORAL
Qty: 30 TABLET | Refills: 0 | Status: SHIPPED | OUTPATIENT
Start: 2019-12-02 | End: 2019-12-30

## 2019-12-04 ENCOUNTER — LAB REQUISITION (OUTPATIENT)
Dept: LAB | Facility: HOSPITAL | Age: 79
End: 2019-12-04

## 2019-12-04 ENCOUNTER — OUTSIDE FACILITY SERVICE (OUTPATIENT)
Dept: PAIN MEDICINE | Facility: CLINIC | Age: 79
End: 2019-12-04

## 2019-12-04 ENCOUNTER — DOCUMENTATION (OUTPATIENT)
Dept: PAIN MEDICINE | Facility: CLINIC | Age: 79
End: 2019-12-04

## 2019-12-04 DIAGNOSIS — Z00.00 ENCOUNTER FOR GENERAL ADULT MEDICAL EXAMINATION WITHOUT ABNORMAL FINDINGS: ICD-10-CM

## 2019-12-04 LAB
INR PPP: 0.9
PROTHROMBIN TIME: 11.2 SECONDS (ref 11–15)

## 2019-12-04 PROCEDURE — 85610 PROTHROMBIN TIME: CPT

## 2019-12-04 PROCEDURE — 99152 MOD SED SAME PHYS/QHP 5/>YRS: CPT | Performed by: ANESTHESIOLOGY

## 2019-12-04 PROCEDURE — 64635 DESTROY LUMB/SAC FACET JNT: CPT | Performed by: ANESTHESIOLOGY

## 2019-12-04 PROCEDURE — 64636 DESTROY L/S FACET JNT ADDL: CPT | Performed by: ANESTHESIOLOGY

## 2019-12-04 NOTE — PROGRESS NOTES
Bilateral L2-5 Lumbar Medial Branch RADIOFREQUENCY  [facility]      PREOPERATIVE DIAGNOSIS:  Lumbar spondylosis without myelopathy    POSTOPERATIVE DIAGNOSIS:  Lumbar spondylosis without myelopathy    PROCEDURE:   Diagnostic Bilateral Lumbar Medial Branch Nerve thermal radiofrequency lesioning, with fluoroscopy:  L2, L3, L4, and L5 nerves (at the L3, L4, L5 transverse processes and the sacral alar groove) to thermally treat the innervation to facet joints L3-4, L4-5, and L5-S1  1. 04777-75 -- Bilateral L/S facet neuro destr., 1st Level  2. 83206-95 -- Bilateral L/S facet neuro destr., 2nd  Level  3. 71404-12 -- Bilateral  L/S facet neuro destr., 3rd Level    PRE-PROCEDURE DISCUSSION WITH PATIENT:    Risks and complications were discussed with the patient prior to starting the procedure and informed consent was obtained.      SURGEON:  Wolfgang Means MD    REASON FOR PROCEDURE:    The patient complains of pain that seems to have a significant axial component. Previous diagnostic positivity of MULTIPLE Lumbar Medial Branch Blockades at the same levels.    SEDATION:  Versed 2mg & Fentanyl 100 mcg IV (of note she was given Versed only after the intraop testing was completed)  TIME OF PROCEDURE:   The intraoperative procedure time after administration of the sedative was 25 minutes.       ANESTHETIC:  Lidocaine 2%  STEROID:  NONE      DESCRIPTON OF PROCEDURE:  After obtaining informed consent, IV access was obtained in the preoperative area.   The patient was taken to the operating room.  The patient was placed in the prone position with a pillow under the abdomen. All pressure points were well padded.  EKG, blood pressure, and pulse oximeter were monitored.  The patient was monitored and sedated by the RN under my direction. The lumbosacral area was prepped with Chloraprep and draped in a sterile fashion.     Under fluoroscopic guidance the transverse processes of the L3, L4, and L5 vertebrae at  the junctions of the superior articular processes were identified on the right.  Also identified was the groove between the ala and the superior articular process of the sacrum on the ipsilateral side.  Skin and subcutaneous tissue were anesthetized with 1ml of 1% lidocaine above each of these points. Then, radiofrequency probe needles were advanced in this fluoro view to the above junctions.  Aspiration was negative for blood and CSF.  After confirming the position of the needle with fluoroscope in all views, testing was initiated.  First, sensory testing was started on each needle a 1V and 50Hz and slowly decreased until painful pressure stimulation diminished at 0.5V.  Next, motor testing was confirmed to be negative at 3V and 2Hz for any radicular stimulation.  Then 1mL of the local anesthetic was instilled in each needle.  Two minutes elapsed, and during this time a lateral fluoroscopic view was confirmed again to ensure the needles had not advanced nor retracted.  Then, Radiofrequency Lesioning was initiated for 2.5 minutes at 85 degrees Celsius.  Needles were removed intact from each of the areas.     A similar procedure was repeated to address the L2, L3, L4, and L5 nerves on the contralateral side.   Onset of analgesia was noted.  Vital signs remained stable throughout.      ESTIMATED BLOOD LOSS:  <5 mL  SPECIMENS:  none    COMPLICATIONS:   Complications were noted as follows...  she had some skin irritation at the grounding pad site.  no breaks in skin.  and The patient did not have any signs of postprocedure numbness nor weakness.    TOLERANCE & DISCHARGE CONDITION:    The patient tolerated the procedure well.  The patient was transported to the recovery area without difficulties.  The patient was discharged to home under the care of family in stable and satisfactory condition.    PLAN OF CARE:  1. The patient was given our standard instruction sheet.  2. The patient will  Return to clinic 6 wks.  3. The  patient will resume all medications as per the medication reconciliation sheet.

## 2019-12-05 RX ORDER — METHOCARBAMOL 750 MG/1
TABLET, FILM COATED ORAL
Qty: 30 TABLET | Refills: 1 | Status: SHIPPED | OUTPATIENT
Start: 2019-12-05 | End: 2020-03-11

## 2019-12-20 ENCOUNTER — ANTICOAGULATION VISIT (OUTPATIENT)
Dept: INTERNAL MEDICINE | Facility: CLINIC | Age: 79
End: 2019-12-20

## 2019-12-20 DIAGNOSIS — I48.0 PAROXYSMAL ATRIAL FIBRILLATION (HCC): ICD-10-CM

## 2019-12-20 LAB — INR PPP: 1.3 (ref 0.9–1.1)

## 2019-12-20 PROCEDURE — 85610 PROTHROMBIN TIME: CPT | Performed by: INTERNAL MEDICINE

## 2019-12-20 PROCEDURE — 36416 COLLJ CAPILLARY BLOOD SPEC: CPT | Performed by: INTERNAL MEDICINE

## 2019-12-30 RX ORDER — LEVOTHYROXINE SODIUM 0.03 MG/1
TABLET ORAL
Qty: 30 TABLET | Refills: 0 | Status: SHIPPED | OUTPATIENT
Start: 2019-12-30 | End: 2020-01-23

## 2019-12-30 RX ORDER — SOTALOL HYDROCHLORIDE 80 MG/1
TABLET ORAL
Qty: 30 TABLET | Refills: 0 | Status: SHIPPED | OUTPATIENT
Start: 2019-12-30 | End: 2020-01-23

## 2019-12-31 ENCOUNTER — ANTICOAGULATION VISIT (OUTPATIENT)
Dept: INTERNAL MEDICINE | Facility: CLINIC | Age: 79
End: 2019-12-31

## 2019-12-31 DIAGNOSIS — I48.0 PAROXYSMAL ATRIAL FIBRILLATION (HCC): ICD-10-CM

## 2019-12-31 LAB — INR PPP: 2.1 (ref 0.9–1.1)

## 2019-12-31 PROCEDURE — 85610 PROTHROMBIN TIME: CPT | Performed by: INTERNAL MEDICINE

## 2020-01-03 ENCOUNTER — TELEPHONE (OUTPATIENT)
Dept: CARDIOLOGY | Facility: CLINIC | Age: 80
End: 2020-01-03

## 2020-01-03 NOTE — TELEPHONE ENCOUNTER
Pt is scheduled for a procedure with [facility] 1-8-2020. They are asking if pt can move forward with procedure. Pt has NO history of stroke or valve replacement...Terra

## 2020-01-21 ENCOUNTER — ANTICOAGULATION VISIT (OUTPATIENT)
Dept: INTERNAL MEDICINE | Facility: CLINIC | Age: 80
End: 2020-01-21

## 2020-01-21 ENCOUNTER — TELEPHONE (OUTPATIENT)
Dept: INTERNAL MEDICINE | Facility: CLINIC | Age: 80
End: 2020-01-21

## 2020-01-21 DIAGNOSIS — I48.0 PAROXYSMAL ATRIAL FIBRILLATION (HCC): ICD-10-CM

## 2020-01-21 LAB — INR PPP: 1.3

## 2020-01-21 NOTE — TELEPHONE ENCOUNTER
INR 1.3 taking 3 mg daily.  CC    I d/w patient nurse.  Just had foot surgery.  No change and recheck Friday.  She is also on six weeks of Rocephin and will need to be monitored closely on INR.  Nita please record.  BEST

## 2020-01-23 RX ORDER — SOTALOL HYDROCHLORIDE 80 MG/1
TABLET ORAL
Qty: 30 TABLET | Refills: 0 | Status: SHIPPED | OUTPATIENT
Start: 2020-01-23 | End: 2020-03-02

## 2020-01-23 RX ORDER — LEVOTHYROXINE SODIUM 0.03 MG/1
TABLET ORAL
Qty: 30 TABLET | Refills: 0 | Status: SHIPPED | OUTPATIENT
Start: 2020-01-23 | End: 2020-03-02

## 2020-01-24 ENCOUNTER — TELEPHONE (OUTPATIENT)
Dept: INTERNAL MEDICINE | Facility: CLINIC | Age: 80
End: 2020-01-24

## 2020-01-24 NOTE — TELEPHONE ENCOUNTER
No answer by nurse and no recording.   Called patient and advised her of medication dosage and recheck date. CLC

## 2020-01-24 NOTE — TELEPHONE ENCOUNTER
Home health called inr is 1.5 taking 3 mg daily. CLC    No change and recheck Monday.  Nita please record.  PARRISHW

## 2020-01-27 ENCOUNTER — TELEPHONE (OUTPATIENT)
Dept: INTERNAL MEDICINE | Facility: CLINIC | Age: 80
End: 2020-01-27

## 2020-01-27 RX ORDER — WARFARIN SODIUM 3 MG/1
TABLET ORAL
Qty: 90 TABLET | Refills: 0 | Status: SHIPPED | OUTPATIENT
Start: 2020-01-27 | End: 2020-04-06

## 2020-01-27 NOTE — TELEPHONE ENCOUNTER
Sachi-Nurse [phone]    INR 1.4 taking 3 mg daily.   CC    Change to 4.5 mg MWF and 3.0 mg Rest.  Recheck Friday.    Nita please change on flowsheet.  TONY    Nurse comes on Mondays is that okay? CLC    OK.  tony

## 2020-01-28 ENCOUNTER — ANTICOAGULATION VISIT (OUTPATIENT)
Dept: INTERNAL MEDICINE | Facility: CLINIC | Age: 80
End: 2020-01-28

## 2020-01-28 DIAGNOSIS — I48.0 PAROXYSMAL ATRIAL FIBRILLATION (HCC): ICD-10-CM

## 2020-01-28 LAB — INR PPP: 1.4

## 2020-01-29 ENCOUNTER — OFFICE VISIT (OUTPATIENT)
Dept: PAIN MEDICINE | Facility: CLINIC | Age: 80
End: 2020-01-29

## 2020-01-29 VITALS
SYSTOLIC BLOOD PRESSURE: 118 MMHG | HEART RATE: 64 BPM | WEIGHT: 205.4 LBS | DIASTOLIC BLOOD PRESSURE: 67 MMHG | TEMPERATURE: 98.1 F | RESPIRATION RATE: 20 BRPM | OXYGEN SATURATION: 95 % | BODY MASS INDEX: 37.8 KG/M2 | HEIGHT: 62 IN

## 2020-01-29 DIAGNOSIS — M47.816 LUMBAR FACET ARTHROPATHY: ICD-10-CM

## 2020-01-29 DIAGNOSIS — M19.90 CHRONIC OSTEOARTHRITIS: ICD-10-CM

## 2020-01-29 DIAGNOSIS — M46.1 SACROILIAC INFLAMMATION (HCC): ICD-10-CM

## 2020-01-29 DIAGNOSIS — G89.29 OTHER CHRONIC PAIN: Primary | ICD-10-CM

## 2020-01-29 DIAGNOSIS — M51.36 BULGE OF LUMBAR DISC WITHOUT MYELOPATHY: ICD-10-CM

## 2020-01-29 DIAGNOSIS — M53.3 SACROILIAC PAIN: ICD-10-CM

## 2020-01-29 PROCEDURE — 99212 OFFICE O/P EST SF 10 MIN: CPT | Performed by: NURSE PRACTITIONER

## 2020-01-29 RX ORDER — WARFARIN SODIUM 3 MG/1
3 TABLET ORAL DAILY
COMMUNITY
End: 2020-05-11

## 2020-01-29 RX ORDER — AMLODIPINE BESYLATE 5 MG/1
5 TABLET ORAL DAILY
COMMUNITY
Start: 2019-12-21 | End: 2020-05-11

## 2020-01-29 RX ORDER — METHOCARBAMOL 750 MG/1
750 TABLET, FILM COATED ORAL
COMMUNITY
End: 2020-05-11

## 2020-01-29 RX ORDER — CEFTRIAXONE 2 G/1
INJECTION, POWDER, FOR SOLUTION INTRAMUSCULAR; INTRAVENOUS
COMMUNITY
Start: 2020-01-28 | End: 2020-05-11

## 2020-01-29 RX ORDER — LEVOTHYROXINE SODIUM 0.03 MG/1
25 TABLET ORAL DAILY
COMMUNITY
End: 2020-05-11 | Stop reason: SDUPTHER

## 2020-01-29 RX ORDER — SOTALOL HYDROCHLORIDE 80 MG/1
80 TABLET ORAL
COMMUNITY
End: 2020-06-01

## 2020-01-29 RX ORDER — LOSARTAN POTASSIUM 50 MG/1
50 TABLET ORAL DAILY
COMMUNITY
End: 2020-06-01 | Stop reason: SDUPTHER

## 2020-01-29 RX ORDER — CHOLECALCIFEROL (VITAMIN D3) 125 MCG
5 CAPSULE ORAL DAILY
COMMUNITY
End: 2020-05-11 | Stop reason: SDUPTHER

## 2020-01-29 RX ORDER — CELECOXIB 200 MG/1
200 CAPSULE ORAL DAILY
COMMUNITY
Start: 2019-12-14 | End: 2020-04-26

## 2020-01-29 RX ORDER — SPIRONOLACTONE 25 MG/1
25 TABLET ORAL DAILY
COMMUNITY
End: 2020-05-11 | Stop reason: SDUPTHER

## 2020-01-29 RX ORDER — EZETIMIBE 10 MG/1
10 TABLET ORAL DAILY
COMMUNITY
Start: 2019-12-07 | End: 2020-06-08 | Stop reason: SDUPTHER

## 2020-01-29 NOTE — PROGRESS NOTES
CHIEF COMPLAINT  F/u back pain. Pt had Bilateral L2-5 Lumbar Medial Branch RADIOFREQUENCY on 12/4/19. Pt sts receiving 75% relief x a month and is still receiving relief today. Pt sts still feels pressure in lower back.     Subjective   Inessa Peoples is a 80 y.o. female  who presents to the office for follow-up.She has a history of chronic back pain. Reports her pain is improved since last office visit.    Patient presents to the office for follow-up of PROCEDURE. Patient had a bilateral L2-L5 RFL performed by Dr. MEANS on 12-4-19 for management of LOW BACK PAIN. Patient reports 75% ONGOING relief from the procedure.    Complains of pain in her low back. Today her pain is 4/10VAS. Describes her pain as continuous pressure with standing, this is alleviated with sitting, laying and resting. Denies any bowel or bladder changes. ADL's by self.     Has surgery on right great toe, last I&D 1-8-20 with DR Chaney. Under ID with Dr Rae. Continue with IV Rocephin.    Patient had a bilateral L2-L5 MBB performed by Dr. MEANS on 6-23-19 for management of LOW BACK PAIN. Patient reports 90% relief from the procedure which lasted a few weeks.  Patient had a bilateral greater trochanteric bursa injection performed by Dr. MEANS on 6-23-19 for management of hip pain. Patient reports 90% ONGOING relief from the procedure.  She completed a Bilateral L2-5 Lumbar Medial Branch Blockade   on  11/15/18 performed by Dr. Means for management of LOW BACK PAIN. Patient reports SIGNIFICANT relief from the procedure. This started wearing off over the past few days.   Was to have second LMBB on 12-6-18 but was still having significant relief. The procedure was cancelled at that time.     She completed a LEFT Sacroiliac Joint Injection   on  4/26/18 performed by Dr. Means for management of low back pain/sacroiliac joint pain. Reported significant relief previously.  She completed a right SI joint injection   on  9-26-17 performed by  Dr. Means for management of low back pain.  Back Pain   This is a chronic problem. The current episode started more than 1 year ago. The problem occurs intermittently. Progression since onset: improved since last office visit. The pain is present in the lumbar spine and sacro-iliac. The quality of the pain is described as aching. The pain does not radiate (left hip, right calf and foot). The pain is at a severity of 4/10. The pain is worse during the day. The symptoms are aggravated by bending, position, sitting, standing and twisting (housework). Associated symptoms include weakness (right foot). Pertinent negatives include no abdominal pain, bladder incontinence, bowel incontinence, chest pain, dysuria, fever, headaches or numbness. Risk factors include obesity, menopause, history of cancer and lack of exercise (history of melanoma--no Chemo or RAD). She has tried analgesics and heat (left SI joint injection--significant ongoing relief) for the symptoms. The treatment provided moderate relief.   Hip Pain    The incident occurred more than 1 week ago. There was no injury mechanism. The pain is present in the left hip and right hip. The pain is at a severity of 3/10. The pain is moderate. The pain has been fluctuating since onset. Associated symptoms include an inability to bear weight and a loss of motion. Pertinent negatives include no numbness. She has tried NSAIDs for the symptoms.      PEG Assessment   What number best describes your pain on average in the past week?4  What number best describes how, during the past week, pain has interfered with your enjoyment of life?4  What number best describes how, during the past week, pain has interfered with your general activity?  4    The following portions of the patient's history were reviewed and updated as appropriate: allergies, current medications, past family history, past medical history, past social history, past surgical history and problem list.    Review  "of Systems   Constitutional: Positive for activity change (improved). Negative for fatigue and fever.   HENT: Negative for congestion.    Eyes: Negative for visual disturbance.   Respiratory: Negative for shortness of breath.    Cardiovascular: Negative for chest pain.   Gastrointestinal: Negative for abdominal pain, bowel incontinence, constipation and diarrhea.   Genitourinary: Negative for bladder incontinence, difficulty urinating and dysuria.   Musculoskeletal: Positive for back pain. Negative for gait problem and neck pain.   Neurological: Positive for weakness (right foot). Negative for dizziness, numbness and headaches.   Psychiatric/Behavioral: Negative for agitation, sleep disturbance and suicidal ideas. The patient is not nervous/anxious.        Vitals:    01/29/20 1432   BP: 118/67   Pulse: 64   Resp: 20   Temp: 98.1 °F (36.7 °C)   SpO2: 95%   Weight: 93.2 kg (205 lb 6.4 oz)   Height: 157.5 cm (62.01\")   PainSc:   4   PainLoc: Back     Objective   Physical Exam   Constitutional: She is oriented to person, place, and time. Vital signs are normal. She appears well-developed and well-nourished. She is cooperative.   HENT:   Head: Normocephalic and atraumatic.   Nose: Nose normal.   Eyes: Conjunctivae and lids are normal.   Neck: Trachea normal. Neck supple.   Cardiovascular: Normal rate.   Pulmonary/Chest: Effort normal.   Abdominal:   OBESE   Musculoskeletal:        Lumbar back: She exhibits tenderness, bony tenderness (mild tenderness of bilateral L2-L5 facets) and pain.        Neurological: She is alert and oriented to person, place, and time. Gait abnormal.   Skin: Skin is warm, dry and intact.   Psychiatric: She has a normal mood and affect. Her speech is normal and behavior is normal. Judgment and thought content normal. Cognition and memory are normal.   Nursing note and vitals reviewed.      Assessment/Plan   Inessa was seen today for back pain.    Diagnoses and all orders for this visit:    Other " chronic pain    Sacroiliac pain    Chronic osteoarthritis    Bulge of lumbar disc without myelopathy    Sacroiliac inflammation (CMS/HCC)    Lumbar facet arthropathy      --- hold on lumbar interventions at this time.  --- continue with Dr bustillos.  --- Follow-up 3 months or sooner if needed.     LUIS REPORT    LUIS report has been reviewed and scanned into the patient's chart.    As the clinician, I personally reviewed the LUIS from 1-28-20 while the patient was in the office today.          EMR Dragon/Transcription disclaimer:   Much of this encounter note is an electronic transcription/translation of spoken language to printed text. The electronic translation of spoken language may permit erroneous, or at times, nonsensical words or phrases to be inadvertently transcribed; Although I have reviewed the note for such errors, some may still exist.

## 2020-02-03 RX ORDER — LOSARTAN POTASSIUM 50 MG/1
TABLET ORAL
Qty: 90 TABLET | Refills: 2 | Status: SHIPPED | OUTPATIENT
Start: 2020-02-03 | End: 2020-05-11 | Stop reason: SDUPTHER

## 2020-02-04 ENCOUNTER — TELEPHONE (OUTPATIENT)
Dept: INTERNAL MEDICINE | Facility: CLINIC | Age: 80
End: 2020-02-04

## 2020-02-04 NOTE — TELEPHONE ENCOUNTER
INR 2.0 4.5 mg 3 days and 3 mg the rest. CC    Record on flowsheet.  No change and recheck one week.  PARRISHW

## 2020-02-11 ENCOUNTER — TELEPHONE (OUTPATIENT)
Dept: INTERNAL MEDICINE | Facility: CLINIC | Age: 80
End: 2020-02-11

## 2020-02-11 ENCOUNTER — ANTICOAGULATION VISIT (OUTPATIENT)
Dept: INTERNAL MEDICINE | Facility: CLINIC | Age: 80
End: 2020-02-11

## 2020-02-11 DIAGNOSIS — I48.0 PAROXYSMAL ATRIAL FIBRILLATION (HCC): ICD-10-CM

## 2020-02-11 LAB — INR PPP: 2.8

## 2020-02-17 ENCOUNTER — ANTICOAGULATION VISIT (OUTPATIENT)
Dept: INTERNAL MEDICINE | Facility: CLINIC | Age: 80
End: 2020-02-17

## 2020-02-17 ENCOUNTER — TELEPHONE (OUTPATIENT)
Dept: INTERNAL MEDICINE | Facility: CLINIC | Age: 80
End: 2020-02-17

## 2020-02-17 DIAGNOSIS — I48.0 PAROXYSMAL ATRIAL FIBRILLATION (HCC): ICD-10-CM

## 2020-02-17 LAB — INR PPP: 1.9

## 2020-02-24 RX ORDER — CELECOXIB 200 MG/1
CAPSULE ORAL
Qty: 30 CAPSULE | Refills: 1 | Status: SHIPPED | OUTPATIENT
Start: 2020-02-24 | End: 2020-04-26

## 2020-02-25 ENCOUNTER — ANTICOAGULATION VISIT (OUTPATIENT)
Dept: INTERNAL MEDICINE | Facility: CLINIC | Age: 80
End: 2020-02-25

## 2020-02-25 DIAGNOSIS — I48.0 PAROXYSMAL ATRIAL FIBRILLATION (HCC): ICD-10-CM

## 2020-02-25 LAB — INR PPP: 2 (ref 0.9–1.1)

## 2020-02-25 PROCEDURE — 93793 ANTICOAG MGMT PT WARFARIN: CPT | Performed by: INTERNAL MEDICINE

## 2020-02-25 PROCEDURE — 36416 COLLJ CAPILLARY BLOOD SPEC: CPT | Performed by: INTERNAL MEDICINE

## 2020-02-25 PROCEDURE — 85610 PROTHROMBIN TIME: CPT | Performed by: INTERNAL MEDICINE

## 2020-03-02 RX ORDER — LEVOTHYROXINE SODIUM 0.03 MG/1
TABLET ORAL
Qty: 30 TABLET | Refills: 0 | Status: SHIPPED | OUTPATIENT
Start: 2020-03-02 | End: 2020-04-06

## 2020-03-02 RX ORDER — SOTALOL HYDROCHLORIDE 80 MG/1
TABLET ORAL
Qty: 30 TABLET | Refills: 0 | Status: SHIPPED | OUTPATIENT
Start: 2020-03-02 | End: 2020-05-11 | Stop reason: SDUPTHER

## 2020-03-09 RX ORDER — SOTALOL HYDROCHLORIDE 80 MG/1
TABLET ORAL
Qty: 30 TABLET | Refills: 0 | Status: SHIPPED | OUTPATIENT
Start: 2020-03-09 | End: 2020-05-04

## 2020-03-11 RX ORDER — METHOCARBAMOL 750 MG/1
TABLET, FILM COATED ORAL
Qty: 30 TABLET | Refills: 1 | Status: SHIPPED | OUTPATIENT
Start: 2020-03-11 | End: 2020-05-11

## 2020-03-16 RX ORDER — SPIRONOLACTONE 25 MG/1
TABLET ORAL
Qty: 60 TABLET | Refills: 3 | Status: SHIPPED | OUTPATIENT
Start: 2020-03-16 | End: 2020-05-11 | Stop reason: SDUPTHER

## 2020-03-23 ENCOUNTER — ANTICOAGULATION VISIT (OUTPATIENT)
Dept: INTERNAL MEDICINE | Facility: CLINIC | Age: 80
End: 2020-03-23

## 2020-03-23 DIAGNOSIS — I48.0 PAROXYSMAL ATRIAL FIBRILLATION (HCC): ICD-10-CM

## 2020-03-23 LAB — INR PPP: 3.3 (ref 0.9–1.1)

## 2020-03-23 PROCEDURE — 93793 ANTICOAG MGMT PT WARFARIN: CPT | Performed by: INTERNAL MEDICINE

## 2020-03-23 PROCEDURE — 85610 PROTHROMBIN TIME: CPT | Performed by: INTERNAL MEDICINE

## 2020-03-23 PROCEDURE — 36416 COLLJ CAPILLARY BLOOD SPEC: CPT | Performed by: INTERNAL MEDICINE

## 2020-04-03 ENCOUNTER — ANTICOAGULATION VISIT (OUTPATIENT)
Dept: INTERNAL MEDICINE | Facility: CLINIC | Age: 80
End: 2020-04-03

## 2020-04-03 DIAGNOSIS — I48.0 PAROXYSMAL ATRIAL FIBRILLATION (HCC): ICD-10-CM

## 2020-04-03 LAB — INR PPP: 1.9 (ref 0.9–1.1)

## 2020-04-03 PROCEDURE — 36416 COLLJ CAPILLARY BLOOD SPEC: CPT | Performed by: INTERNAL MEDICINE

## 2020-04-03 PROCEDURE — 85610 PROTHROMBIN TIME: CPT | Performed by: INTERNAL MEDICINE

## 2020-04-06 RX ORDER — WARFARIN SODIUM 3 MG/1
TABLET ORAL
Qty: 90 TABLET | Refills: 1 | Status: SHIPPED | OUTPATIENT
Start: 2020-04-06 | End: 2020-10-05

## 2020-04-06 RX ORDER — LEVOTHYROXINE SODIUM 0.03 MG/1
TABLET ORAL
Qty: 90 TABLET | Refills: 1 | Status: SHIPPED | OUTPATIENT
Start: 2020-04-06 | End: 2020-09-27

## 2020-04-17 ENCOUNTER — ANTICOAGULATION VISIT (OUTPATIENT)
Dept: INTERNAL MEDICINE | Facility: CLINIC | Age: 80
End: 2020-04-17

## 2020-04-17 DIAGNOSIS — I48.0 PAROXYSMAL ATRIAL FIBRILLATION (HCC): ICD-10-CM

## 2020-04-17 LAB — INR PPP: 2 (ref 0.9–1.1)

## 2020-04-17 PROCEDURE — 85610 PROTHROMBIN TIME: CPT | Performed by: INTERNAL MEDICINE

## 2020-04-26 RX ORDER — CELECOXIB 200 MG/1
CAPSULE ORAL
Qty: 90 CAPSULE | Refills: 0 | Status: SHIPPED | OUTPATIENT
Start: 2020-04-26 | End: 2020-07-30

## 2020-04-29 ENCOUNTER — TELEMEDICINE (OUTPATIENT)
Dept: PAIN MEDICINE | Facility: CLINIC | Age: 80
End: 2020-04-29

## 2020-04-29 DIAGNOSIS — M70.62 TROCHANTERIC BURSITIS OF BOTH HIPS: ICD-10-CM

## 2020-04-29 DIAGNOSIS — M46.1 SACROILIAC INFLAMMATION (HCC): ICD-10-CM

## 2020-04-29 DIAGNOSIS — M19.90 CHRONIC OSTEOARTHRITIS: ICD-10-CM

## 2020-04-29 DIAGNOSIS — M47.816 LUMBAR FACET ARTHROPATHY: ICD-10-CM

## 2020-04-29 DIAGNOSIS — G89.29 OTHER CHRONIC PAIN: Primary | ICD-10-CM

## 2020-04-29 DIAGNOSIS — M51.36 BULGE OF LUMBAR DISC WITHOUT MYELOPATHY: ICD-10-CM

## 2020-04-29 DIAGNOSIS — M70.61 TROCHANTERIC BURSITIS OF BOTH HIPS: ICD-10-CM

## 2020-04-29 PROCEDURE — 99213 OFFICE O/P EST LOW 20 MIN: CPT | Performed by: NURSE PRACTITIONER

## 2020-04-29 NOTE — PROGRESS NOTES
TELEMEDICINE - VIDEO VISIT    You have chosen to receive care through a telehealth visit.  Do you consent to use a video/audio connection for your medical care today? Yes    CHIEF COMPLAINT  Back pain and joint pain  Subjective   Inessa Peoples is a 80 y.o. female  who presents for a video visit follow-up.She has a history of chronic back and joint pain.    Complains of pain in her low back, hips and joints. Today her pain is 5-6/10VAS. Her left low back is her primary complaint. This is different from her middle back pain. THis is still stable.  She continues with CElebrex 200 mg daily (per Dr zavala), as well as Tylenol PRN and Robaxin PRN.  Also has started taking BACK PAIN OFF, has noticed improvement with this.  Cannot take NSAIDS due to blood thinner.     Has been having intermittent urge incontinence in morning upon awakening. No other instances of incontinence.    Patient had a bilateral L2-L5 RFL performed by Dr. MEANS on 12-4-19 for management of LOW BACK PAIN. Patient reports 75% ONGOING relief from the procedure.    Has surgery on right great toe, last I&D 1-8-20 with DR Chaney. Under ID with Dr Rae. Continued with IV Rocephin. Reports it has now healed.      Patient had a bilateral L2-L5 MBB performed by Dr. MEANS on 6-23-19 for management of LOW BACK PAIN. Patient reports 90% relief from the procedure which lasted a few weeks.  Patient had a bilateral greater trochanteric bursa injection performed by Dr. MEANS on 6-23-19 for management of hip pain. Patient reports 90% ONGOING relief from the procedure.  She completed a Bilateral L2-5 Lumbar Medial Branch Blockade   on  11/15/18 performed by Dr. Means for management of LOW BACK PAIN. Patient reports SIGNIFICANT relief from the procedure. This started wearing off over the past few days.   Was to have second LMBB on 12-6-18 but was still having significant relief. The procedure was cancelled at that time.     She completed a LEFT Sacroiliac  Joint Injection   on  4/26/18 performed by Dr. Means for management of low back pain/sacroiliac joint pain. Reported significant relief previously.  She completed a right SI joint injection   on  9-26-17 performed by Dr. Means for management of low back pain.    Back Pain   This is a chronic problem. The current episode started more than 1 year ago. The problem occurs intermittently. The pain is present in the lumbar spine and sacro-iliac. The quality of the pain is described as aching. The pain does not radiate (left hip, right calf and foot). The pain is at a severity of 5/10. The pain is moderate. The pain is worse during the day. The symptoms are aggravated by bending, position, sitting, standing and twisting (housework). Associated symptoms include bladder incontinence and weakness (right foot). Pertinent negatives include no abdominal pain, bowel incontinence, chest pain, dysuria, fever, headaches or numbness. Risk factors include obesity, menopause, history of cancer and lack of exercise (history of melanoma--no Chemo or RAD). She has tried analgesics and heat (left SI joint injection--significant ongoing relief) for the symptoms. The treatment provided moderate relief.   Hip Pain    The incident occurred more than 1 week ago. There was no injury mechanism. The pain is present in the left hip and right hip. The pain is moderate. The pain has been fluctuating since onset. Associated symptoms include an inability to bear weight and a loss of motion. Pertinent negatives include no numbness. She has tried NSAIDs for the symptoms.        The following portions of the patient's history were reviewed and updated as appropriate: allergies, current medications, past family history, past medical history, past social history, past surgical history and problem list.    Review of Systems   Constitutional: Negative for fever.   Cardiovascular: Negative for chest pain.   Gastrointestinal: Negative for abdominal pain and  bowel incontinence.   Genitourinary: Positive for bladder incontinence. Negative for dysuria.   Musculoskeletal: Positive for back pain.   Neurological: Positive for weakness (right foot). Negative for numbness and headaches.     There were no vitals filed for this visit.    Objective   Physical Exam   Constitutional: She is oriented to person, place, and time. She appears well-developed and well-nourished.   HENT:   Head: Normocephalic and atraumatic.   Pulmonary/Chest: Effort normal.   Neurological: She is alert and oriented to person, place, and time.   Psychiatric: She has a normal mood and affect. Her speech is normal and behavior is normal. Judgment and thought content normal. Cognition and memory are normal.       Assessment/Plan   Diagnoses and all orders for this visit:    Other chronic pain    Sacroiliac inflammation (CMS/HCC)    Trochanteric bursitis of both hips    Lumbar facet arthropathy    Chronic osteoarthritis    Bulge of lumbar disc without myelopathy      ----------------    Our practice is offering alternative &/or electronic methods to continue to follow our patients while at the same time further the efforts toward social distancing, in accordance with our organizational policies, professional societies' guidance, and gubernatorial mandates.  I support the Healthy at Home campaign and in this visit I have counseled the patient on our needs to limit in-person office visits and physical encounters with medical facilities whenever possible.  I have also educated the patient on the medical necessities of maintaining social distancing while we continue to function during this crisis period.      The patient was counseled on the need to consider telehealth options. The patient was offered the opportunity for a Video Visit using [employer]. The patient agreed to a Video Visit. The patient was counseled on the need for a check-in visit. The patient was educated about our efforts to comply with monitoring  standards when prescribing potent medications.    TIME:  Total Time:  13 minutes. Topics discussed are outlined in the Assessment/Plan section of the note.  Was able to establish an visual connection. After 5 minutes of attempting, could still not obtain an audio connection. Therefore changed to telephone encounter.  ----------------    --- discussed HEP, chiropractor.  --- Discussed consideration for left SI joint and left hip bursa injection PRN. Will wait at this time. Patient wants to try home techniques first. Will call if pain worsens.  --- Reviewed RED FLAG SYMPTOMS, including but not limited to-- fever, chills, stiff neck, headache, flu-like symptoms, increased tenderness and redness. Also reviewed loss of bowel and bladder control and saddle anesthesia. If patient notices this, he is to call office immediately. Patient verbalized understanding.  --- Will call as needed for refills.   --- reviewed si joint injection. Reviewed she gavino not need to stop coumadin.  --- Follow-up 3-4 months or sooner if needed.    Unable to complete visit using a video connection to the patient. A phone visit was used to complete this visits. Total time of discussion was 13 minutes.         LUIS REPORT    LUIS report has been reviewed and scanned into the patient's chart.    As the clinician, I personally reviewed the LUIS from 4-27-20 while the patient was on the telemedicine visit today.      -------    EMR Dragon/Transcription disclaimer:   Much of this encounter note is an electronic transcription/translation of spoken language to printed text. The electronic translation of spoken language may permit erroneous, or at times, nonsensical words or phrases to be inadvertently transcribed; Although I have reviewed the note for such errors, some may still exist.

## 2020-05-04 RX ORDER — SOTALOL HYDROCHLORIDE 80 MG/1
TABLET ORAL
Qty: 30 TABLET | Refills: 0 | Status: SHIPPED | OUTPATIENT
Start: 2020-05-04 | End: 2020-05-11 | Stop reason: SDUPTHER

## 2020-05-11 ENCOUNTER — TELEMEDICINE (OUTPATIENT)
Dept: CARDIOLOGY | Facility: CLINIC | Age: 80
End: 2020-05-11

## 2020-05-11 VITALS
BODY MASS INDEX: 38.09 KG/M2 | HEIGHT: 62 IN | DIASTOLIC BLOOD PRESSURE: 75 MMHG | WEIGHT: 207 LBS | HEART RATE: 69 BPM | SYSTOLIC BLOOD PRESSURE: 133 MMHG

## 2020-05-11 DIAGNOSIS — I48.0 PAROXYSMAL ATRIAL FIBRILLATION (HCC): Primary | ICD-10-CM

## 2020-05-11 DIAGNOSIS — I10 ESSENTIAL HYPERTENSION: ICD-10-CM

## 2020-05-11 DIAGNOSIS — E66.01 CLASS 2 SEVERE OBESITY DUE TO EXCESS CALORIES WITH SERIOUS COMORBIDITY AND BODY MASS INDEX (BMI) OF 37.0 TO 37.9 IN ADULT (HCC): ICD-10-CM

## 2020-05-11 PROCEDURE — 99442 PR PHYS/QHP TELEPHONE EVALUATION 11-20 MIN: CPT | Performed by: NURSE PRACTITIONER

## 2020-05-11 RX ORDER — TRIAMCINOLONE ACETONIDE 55 UG/1
2 SPRAY, METERED NASAL DAILY
COMMUNITY
End: 2021-12-01

## 2020-05-11 RX ORDER — FEXOFENADINE HCL 180 MG/1
180 TABLET ORAL DAILY
COMMUNITY
End: 2022-02-16 | Stop reason: ALTCHOICE

## 2020-05-11 NOTE — PROGRESS NOTES
Date of Office Visit: 2020  Encounter Provider: AGA Bhakta  Place of Service: [facility]  Patient Name: Inessa Peoples  :1940    Chief Complaint   Patient presents with   • Follow-up   • Atrial Fibrillation   :     HPI: Inessa Peoples is a 80 y.o. female who is a patient of Dr. Arteaga. She has a history of PAF, LBBB, HTN, ANY treated with CPAP, sarcoid and obesity.     She was last seen by Dr. Stone in 10/2019.     She has been on sotalol for her PAF and  chronically AC with warfarin.     She had surgery for osteomyelitis of her great toe of her right foot in January. This was her 3rd surgery in a year.    She was scheduled to come into office today for a routine follow up visit but secondary to COVID 19 pandemic we are doing a telehealth visit.    This patient has consented to a telehealth visit via video. The visit was scheduled as a telehealth video visit to comply with patient safety concerns in accordance with CDC recommendations.  All vitals recorded within this visit are reported by the patient.  I spent  20 minutes in total including but not limited to the 13 minutes spent in direct conversation with this patient.     We were unable to get her camera to allow access via Vizibility so this ended up being a telephone/audio visit.     She reports that she is doing okay. She has some occasional episodes of palpitations and rapid heart beat but they only last seconds to minutes. She has chronic dyspnea with exertion which she says is unchanged--she attributes this to her sarcoid.     She has joint pain all over from arthritis which she says has been worse the last few weeks---we discussed, suspect it is the recent change in weather---she is sensitive to this.     She has recovered from her foot surgery and has been trying to get back to some regular exercise on her treadmill---she has only been doing this for a week or so.     She denies  chest pain, PND, or orthopnea. She reports some ankle edema---which after discussing is probably from the amlodipine.     She is on warfarin for AC, no bleeding issues---followed by Dr. Dockery.      Past Medical History:   Diagnosis Date   • Abnormal ECG    • Allergic rhinitis    • Atrial fibrillation (CMS/HCC)    • Bleeds easily (CMS/HCC)     warfarin   • Breast screening    • Broken bones    • Calf DVT (deep venous thrombosis) (CMS/HCC)    • Chronic anticoagulation    • Colon polyp    • CTS (carpal tunnel syndrome)    • Deep vein thrombosis (CMS/HCC)     left calf   • Diverticulitis of colon    • AHN (dyspnea on exertion)    • Fatigue    • H/O malignant melanoma of skin    • Hematuria, gross    • History of colon polyps    • History of kidney stones    • Hyperlipidemia    • Hypertension    • Hyperthyroidism    • IFG (impaired fasting glucose)    • Insomnia    • LBBB (left bundle branch block)    • Left leg swelling    • LLQ abdominal pain    • Long term current use of systemic steroids    • Low back pain    • Malignant melanoma of skin (CMS/HCC)    • Neck pain    • Obesity     class 2 obesity due to excess calories with BMI of 35.0 to 35.9 in adult   • Osteoarthritis    • Osteopenia    • Paralysis, diaphragm    • Paroxysmal atrial fibrillation (CMS/HCC)    • 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 Lu   • UTI (urinary tract infection)    • Visit for screening mammogram        Past Surgical History:   Procedure Laterality Date   • CARPAL TUNNEL RELEASE Bilateral 1980'S    [facility]   • CATARACT EXTRACTION Bilateral 2001    Dr. Nic Hidalgo   • COLONOSCOPY N/A 06/26/2013    MILD SIGMOID DIVERTICULOSIS, repeat 5 years based on sister w/ colona cancer-DR. TANISHA THORNTON   • COLONOSCOPY N/A 10/19/2010    SIGMOID DIVERTICULOSIS & INTERNAL  HEMORRHOIDS, HEMORRHOIDAL BANDING X4, DR. NASRA CASTRO   • COLONOSCOPY N/A 8/6/2018    Procedure: COLONOSCOPY TO CECUM AND INTO TERMINAL ILEUM;  Surgeon: Nasra Castro MD;  Location: [facility];  Service: Gastroenterology   • COLONOSCOPY W/ BIOPSIES N/A 05/12/2008    RECTUM BIOPSY: COLONIC MUCOSA W/ FOCAL INCREASE OF INFLAMMATORY CELLS IN THE MUSCULARIS MUCOSA INCLUDING EOSINOPHILS, SIGMOID DIVERTICULOSIS, POSSIBLE PROCTITIS, DR. NASRA CASTRO   • CYSTOSCOPY, RETROGRADE PYELOGRAM AND STENT INSERTION Bilateral 10/10/2014    w/ Right ureteral pyeloscopy & laser lithotripsy, Right Double-J stent placement-Dr. Julio Tai   • EXTRACORPOREAL SHOCK WAVE LITHOTRIPSY (ESWL) Right 09/10/2009    DR. JULIO TAI   • HAMMER TOE REPAIR     • LAPAROSCOPIC CHOLECYSTECTOMY N/A 07/19/2012    DR. NASRA CASTRO   • LUMBAR EPIDURAL INJECTION N/A 04/23/2015    LUMBAR EPIDURAL STEROID INJECTION X3   • PARATHYROIDECTOMY N/A 2001   • SKIN CANCER EXCISION     • THUMB ARTHROSCOPY Right 1982   • TOTAL KNEE ARTHROPLASTY Right 03/25/2014    [employer] [facility] total knee, 55 femoral component, 71 tibial tray w/ a 40 mm stem, 34 mm x 7.8 mm three-peg patella, and a 14 standard poly-Dr. Ty Canchola       Social History     Socioeconomic History   • Marital status:      Spouse name: EDY   • Number of children: 4   • Years of education: 14yrs   • Highest education level: Not on file   Occupational History   • Occupation: RETIRED   Tobacco Use   • Smoking status: Never Smoker   • Smokeless tobacco: Never Used   Substance and Sexual Activity   • Alcohol use: Yes     Comment: social drinker/caffeine use    • Drug use: No   • Sexual activity: Defer       Family History   Problem Relation Age of Onset   • Heart disease Mother    • Diabetes type II Mother    • Heart disease Father    • Cancer Sister    • Colon cancer Sister    • Colon polyps Sister    • Lung cancer Sister    • Heart disease Brother         CABG   • Diabetes  type II Brother    • Colon cancer Brother    • Colon polyps Brother    • Arrhythmia Brother    • Heart disease Sister    • Diabetes type II Sister    • Aortic aneurysm Sister    • COPD Sister    • Arrhythmia Sister    • Cerebral aneurysm Sister    • COPD Sister    • Lupus Sister    • Asthma Maternal Aunt    • Aortic aneurysm Brother        Review of Systems   Constitution: Positive for malaise/fatigue. Negative for chills and fever.   Cardiovascular: Positive for dyspnea on exertion, irregular heartbeat, leg swelling and palpitations. Negative for chest pain, near-syncope, orthopnea, paroxysmal nocturnal dyspnea and syncope.   Respiratory: Negative for cough and shortness of breath.    Hematologic/Lymphatic: Negative.    Musculoskeletal: Positive for arthritis and joint pain. Negative for joint swelling and myalgias.   Gastrointestinal: Negative for abdominal pain, diarrhea, melena, nausea and vomiting.   Genitourinary: Negative for frequency and hematuria.   Neurological: Negative for light-headedness, numbness, paresthesias and seizures.   Allergic/Immunologic: Negative.    All other systems reviewed and are negative.      Allergies   Allergen Reactions   • Iodinated Diagnostic Agents Swelling and Other (See Comments)     PAINS ACROSS CHEST, Facial swelling   • Fenofibrate Myalgia   • Livalo [Pitavastatin] Myalgia     Joint Pain, Muscle tightness   • Plaquenil [Hydroxychloroquine Sulfate] Myalgia     Joint Pain, Muscle Tightness   • Statins Myalgia     Joint pain, Muscle Tightness         Current Outpatient Medications:   •  amLODIPine (NORVASC) 5 MG tablet, Take 1 tablet by mouth Daily., Disp: 30 tablet, Rfl: 6  •  Calcium-Phosphorus-Vitamin D (CITRACAL +D3 PO), Take 1 tablet by mouth Daily., Disp: , Rfl:   •  celecoxib (CeleBREX) 200 MG capsule, TAKE 1 CAPSULE BY MOUTH DAILY, Disp: 90 capsule, Rfl: 0  •  ezetimibe (ZETIA) 10 MG tablet, Take 10 mg by mouth Daily., Disp: , Rfl:   •  fexofenadine (ALLEGRA) 180 MG  "tablet, Take 180 mg by mouth Daily., Disp: , Rfl:   •  levothyroxine (SYNTHROID, LEVOTHROID) 25 MCG tablet, TAKE ONE TABLET BY MOUTH DAILY, Disp: 90 tablet, Rfl: 1  •  losartan (COZAAR) 50 MG tablet, Take 50 mg by mouth Daily., Disp: , Rfl:   •  melatonin 5 MG sublingual tablet sublingual tablet, Take 5 mg by mouth Every Night., Disp: , Rfl:   •  Multiple Vitamin (MULTI-DAY VITAMINS) tablet, Take 1 tablet by mouth daily., Disp: , Rfl:   •  sotalol (BETAPACE) 80 MG tablet, Take 80 mg by mouth., Disp: , Rfl:   •  spironolactone (ALDACTONE) 25 MG tablet, Take 1 tablet by mouth Daily., Disp: 90 tablet, Rfl: 3  •  Triamcinolone Acetonide (NASACORT) 55 MCG/ACT nasal inhaler, into the nostril(s) as directed by provider Daily., Disp: , Rfl:   •  warfarin (COUMADIN) 3 MG tablet, TAKE ONE TABLET BY MOUTH DAILY, Disp: 90 tablet, Rfl: 1      Objective:     Vitals:    05/11/20 1120   BP: 133/75   BP Location: Left arm   Patient Position: Sitting   Cuff Size: Adult   Pulse: 69   Weight: 93.9 kg (207 lb)   Height: 157.5 cm (62\")     Body mass index is 37.86 kg/m².    PHYSICAL EXAM: Audio telehealth visit.     Vitals Reviewed.     Respiratory: No signs of respiratory distress during audio visit.      Psychiatric: Patient alert and oriented to person, place, and time. Speech and behavior appropriate. Normal mood and affect.         Assessment:       Diagnosis Plan   1. Paroxysmal atrial fibrillation (CMS/HCC)     2. Essential hypertension     3. Class 2 severe obesity due to excess calories with serious comorbidity and body mass index (BMI) of 37.0 to 37.9 in adult (CMS/HCC)            Plan:       1. PAF, well controlled on sotalol, warfarin for AC.    2. HTN, controlled.     3. Obesity. We discussed the importance of weight loss, nutrition and regular exercise.     She is coming to Dr. Dockery's office on Friday for INR---her office is in the same building as ours so will have her stop by for EKG only to check QTc with her being on " sotalol and have her follow up with Dr. Stone in 6 months. She had labs in January and renal function was normal.     As always, it has been a pleasure to participate in your patient's care.      Sincerely,         AGA Hartman

## 2020-05-12 RX ORDER — METHOCARBAMOL 750 MG/1
TABLET, FILM COATED ORAL
Qty: 30 TABLET | Refills: 1 | Status: SHIPPED | OUTPATIENT
Start: 2020-05-12 | End: 2020-06-01

## 2020-05-15 ENCOUNTER — ANTICOAGULATION VISIT (OUTPATIENT)
Dept: INTERNAL MEDICINE | Facility: CLINIC | Age: 80
End: 2020-05-15

## 2020-05-15 ENCOUNTER — CLINICAL SUPPORT (OUTPATIENT)
Dept: CARDIOLOGY | Facility: CLINIC | Age: 80
End: 2020-05-15

## 2020-05-15 DIAGNOSIS — I48.0 PAF (PAROXYSMAL ATRIAL FIBRILLATION) (HCC): Primary | ICD-10-CM

## 2020-05-15 DIAGNOSIS — I48.0 PAROXYSMAL ATRIAL FIBRILLATION (HCC): ICD-10-CM

## 2020-05-15 LAB — INR PPP: 2.6 (ref 0.9–1.1)

## 2020-05-15 PROCEDURE — 36416 COLLJ CAPILLARY BLOOD SPEC: CPT | Performed by: INTERNAL MEDICINE

## 2020-05-15 PROCEDURE — 93793 ANTICOAG MGMT PT WARFARIN: CPT | Performed by: INTERNAL MEDICINE

## 2020-05-15 PROCEDURE — 85610 PROTHROMBIN TIME: CPT | Performed by: INTERNAL MEDICINE

## 2020-05-15 NOTE — PROGRESS NOTES
ECG 12 Lead  Date/Time: 5/18/2020 8:11 AM  Performed by: Mirela Ríos APRN  Authorized by: Mirela Ríos APRN   Comparison: compared with previous ECG   Similar to previous ECG  Rhythm: sinus rhythm  BPM: 65  Conduction: left bundle branch block  Comments: QTc ok           Pt in office today for EKG Only. Medications reviewed and verified. Pt stated c/o SOA, but was normal for her. No other complaints. AGA Hartman viewed EKG and cleared pt to go home.    LITO Medel

## 2020-05-18 PROCEDURE — 93000 ELECTROCARDIOGRAM COMPLETE: CPT | Performed by: NURSE PRACTITIONER

## 2020-05-25 RX ORDER — AMLODIPINE BESYLATE 5 MG/1
TABLET ORAL
Qty: 90 TABLET | Refills: 3 | Status: SHIPPED | OUTPATIENT
Start: 2020-05-25 | End: 2020-06-01

## 2020-06-01 ENCOUNTER — OFFICE VISIT (OUTPATIENT)
Dept: INTERNAL MEDICINE | Facility: CLINIC | Age: 80
End: 2020-06-01

## 2020-06-01 VITALS
BODY MASS INDEX: 38.64 KG/M2 | HEIGHT: 62 IN | HEART RATE: 61 BPM | WEIGHT: 210 LBS | SYSTOLIC BLOOD PRESSURE: 130 MMHG | OXYGEN SATURATION: 96 % | DIASTOLIC BLOOD PRESSURE: 70 MMHG

## 2020-06-01 DIAGNOSIS — M25.50 ARTHRALGIA, UNSPECIFIED JOINT: ICD-10-CM

## 2020-06-01 DIAGNOSIS — R60.0 BILATERAL LEG EDEMA: ICD-10-CM

## 2020-06-01 DIAGNOSIS — I10 ESSENTIAL HYPERTENSION: ICD-10-CM

## 2020-06-01 DIAGNOSIS — R05.9 COUGH: ICD-10-CM

## 2020-06-01 DIAGNOSIS — R06.02 SOB (SHORTNESS OF BREATH): Primary | ICD-10-CM

## 2020-06-01 PROCEDURE — 99214 OFFICE O/P EST MOD 30 MIN: CPT | Performed by: INTERNAL MEDICINE

## 2020-06-01 RX ORDER — LOSARTAN POTASSIUM 100 MG/1
100 TABLET ORAL DAILY
Qty: 90 TABLET | Refills: 3 | Status: SHIPPED | OUTPATIENT
Start: 2020-06-01 | End: 2020-12-15 | Stop reason: SDUPTHER

## 2020-06-01 RX ORDER — SOTALOL HYDROCHLORIDE 80 MG/1
TABLET ORAL
Qty: 30 TABLET | Refills: 0 | Status: SHIPPED | OUTPATIENT
Start: 2020-06-01 | End: 2020-07-06

## 2020-06-01 NOTE — PROGRESS NOTES
Subjective     Inessa Peoples is a 80 y.o. female who presents with   Chief Complaint   Patient presents with   • joint pain     swelling of feet and legs   • Edema       History of Present Illness     C/o fatigue.  Muscle aches and joint aches.  Multiple joints hurt.  She is followed by the pain.  Bilateral shoulders, knees, hip, elbows. She has seen rheumatology in the past but no autoimmune condition was found.   Swelling in her legs and her ankle.  She is having trouble breathing.  This is a chronic issue with history of a full work up but worse than when I saw her for this in October.  She is UTD with her cardiologist.   SOA is worse with exertion.  No PND.  No orthopenia.  She last saw [facility] for this in 2017.      Review of Systems   Constitutional: Positive for fatigue.   Respiratory: Positive for cough and shortness of breath. Negative for wheezing.    Cardiovascular: Positive for leg swelling. Negative for chest pain.   Musculoskeletal: Positive for arthralgias and myalgias.       The following portions of the patient's history were reviewed and updated as appropriate: allergies, current medications and problem list.    Patient Active Problem List    Diagnosis Date Noted   • LBBB (left bundle branch block) 11/08/2019   • Trochanteric bursitis of both hips 05/29/2019   • Lumbar facet arthropathy 11/06/2018   • Family history of colon cancer 06/19/2018     Note Last Updated: 6/19/2018     Added automatically from request for surgery [id number]     • Class 2 severe obesity due to excess calories with serious comorbidity and body mass index (BMI) of 37.0 to 37.9 in adult (CMS/[facility]) 03/27/2018   • Chronic pain 07/13/2017   • Bulge of lumbar disc without myelopathy 07/13/2017   • Sacroiliac inflammation (CMS/[facility]) 07/13/2017   • Sacroiliac pain 07/13/2017   • Dilation of thoracic aorta (CMS/[facility]) 06/23/2017   • Chronic anticoagulation 05/15/2017   • Diverticulitis of large intestine without  perforation or abscess without bleeding 01/03/2017   • ANY on CPAP + 10 - Dr Lu 10/15/2016   • History of melanoma excision 08/05/2016   • GERD (gastroesophageal reflux disease) 08/05/2016   • History of kidney stones 08/05/2016   • Sarcoidosis 04/18/2016   • 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       • Dyspnea on exertion 04/18/2016   • Chronic osteoarthritis 04/18/2016   • History of DVT (deep vein thrombosis) 04/18/2016     Note Last Updated: 4/18/2016     Description: developed unprovoked on Xarelto.     • Atrial fibrillation (CMS/HCC) 04/18/2016       Current Outpatient Medications on File Prior to Visit   Medication Sig Dispense Refill   • Calcium-Phosphorus-Vitamin D (CITRACAL +D3 PO) Take 1 tablet by mouth Daily.     • celecoxib (CeleBREX) 200 MG capsule TAKE 1 CAPSULE BY MOUTH DAILY 90 capsule 0   • ezetimibe (ZETIA) 10 MG tablet Take 10 mg by mouth Daily.     • fexofenadine (ALLEGRA) 180 MG tablet Take 180 mg by mouth Daily.     • levothyroxine (SYNTHROID, LEVOTHROID) 25 MCG tablet TAKE ONE TABLET BY MOUTH DAILY 90 tablet 1   • melatonin 5 MG sublingual tablet sublingual tablet Take 5 mg by mouth Every Night.     • Multiple Vitamin (MULTI-DAY VITAMINS) tablet Take 1 tablet by mouth daily.     • sotalol (BETAPACE) 80 MG tablet TAKE ONE TABLET BY MOUTH DAILY 30 tablet 0   • spironolactone (ALDACTONE) 25 MG tablet Take 1 tablet by mouth Daily. 90 tablet 3   • Triamcinolone Acetonide (NASACORT) 55 MCG/ACT nasal inhaler into the nostril(s) as directed by provider Daily.     • warfarin (COUMADIN) 3 MG tablet TAKE ONE TABLET BY MOUTH DAILY 90 tablet 1   • [DISCONTINUED] amLODIPine (NORVASC)  "5 MG tablet TAKE ONE TABLET BY MOUTH DAILY 90 tablet 3   • [DISCONTINUED] losartan (COZAAR) 50 MG tablet Take 50 mg by mouth Daily.     • methocarbamol (ROBAXIN) 750 MG tablet TAKE ONE TABLET BY MOUTH ONCE NIGHTLY AS NEEDED FOR MUSCLE SPASMS 30 tablet 1   • [DISCONTINUED] sotalol (BETAPACE) 80 MG tablet Take 80 mg by mouth.       No current facility-administered medications on file prior to visit.        Objective     /70   Pulse 61   Ht 157.5 cm (62\")   Wt 95.3 kg (210 lb)   SpO2 96%   BMI 38.41 kg/m²     Physical Exam   Constitutional: She is oriented to person, place, and time. She appears well-developed and well-nourished.   HENT:   Head: Normocephalic and atraumatic.   Cardiovascular: Normal rate, regular rhythm and normal heart sounds.   Pulmonary/Chest: Effort normal and breath sounds normal.   Musculoskeletal:        Right elbow: She exhibits no swelling and no effusion.        Left elbow: She exhibits no swelling and no effusion.        Right wrist: She exhibits no tenderness, no bony tenderness, no swelling and no effusion.        Left wrist: She exhibits no tenderness, no bony tenderness, no swelling and no effusion.        Right knee: She exhibits no swelling and no effusion.        Left knee: She exhibits no swelling and no effusion.   Neurological: She is alert and oriented to person, place, and time.   Skin: Skin is warm and dry.   Psychiatric: She has a normal mood and affect. Her behavior is normal.       Assessment/Plan   Inessa was seen today for joint pain and edema.    Diagnoses and all orders for this visit:    SOB (shortness of breath)  -     CT Chest Without Contrast; Future  -     Pulmonary Function Test; Future  -     CBC & Differential  -     TSH Rfx On Abnormal To Free T4  -     Comprehensive Metabolic Panel  -     URBANO With / DsDNA, RNP, Sjogrens A / B, Smith  -     C-reactive Protein  -     Cyclic Citrul Peptide Antibody, IgG / IgA  -     Rheumatoid Factor  -     Sedimentation " Rate  -     proBNP    Cough  -     CT Chest Without Contrast; Future  -     Pulmonary Function Test; Future  -     CBC & Differential  -     TSH Rfx On Abnormal To Free T4  -     Comprehensive Metabolic Panel  -     URBANO With / DsDNA, RNP, Sjogrens A / B, Smith  -     C-reactive Protein  -     Cyclic Citrul Peptide Antibody, IgG / IgA  -     Rheumatoid Factor  -     Sedimentation Rate  -     proBNP    Arthralgia, unspecified joint  -     CBC & Differential  -     TSH Rfx On Abnormal To Free T4  -     Comprehensive Metabolic Panel  -     URBANO With / DsDNA, RNP, Sjogrens A / B, Smith  -     C-reactive Protein  -     Cyclic Citrul Peptide Antibody, IgG / IgA  -     Rheumatoid Factor  -     Sedimentation Rate  -     proBNP    Essential hypertension    Bilateral leg edema    Other orders  -     losartan (COZAAR) 100 MG tablet; Take 1 tablet by mouth Daily.        Discussion    Patient presents with multiple issues going on.  For her edema she is UTD with her cardiologist.  Norvasc is likely contributing.  She is going to stop.  Increase losartan to 100mg daily.    Multiple joint arthralgias/myalgias.  Further evaluate with additional labs today.    Progressive AHN and chronic cough.  Repeat CT of the chest and PFTs.    15/25 minutes was spent in counseling of the following topics:, impressions, treatment options         Future Appointments   Date Time Provider Department Center   6/15/2020 11:30 AM [facility] None   10/21/2020 11:00 AM Jewel Lu MD [facility] None   11/16/2020 11:00 AM [facility] None   11/23/2020  1:30 PM Gisele Dockery MD [facility] None

## 2020-06-03 LAB
ALBUMIN SERPL-MCNC: 4.4 G/DL (ref 3.5–5.2)
ALBUMIN/GLOB SERPL: 1.7 G/DL
ALP SERPL-CCNC: 94 U/L (ref 39–117)
ALT SERPL-CCNC: 22 U/L (ref 1–33)
ANA SER QL: NEGATIVE
AST SERPL-CCNC: 16 U/L (ref 1–32)
BASOPHILS # BLD AUTO: 0.04 10*3/MM3 (ref 0–0.2)
BASOPHILS NFR BLD AUTO: 0.8 % (ref 0–1.5)
BILIRUB SERPL-MCNC: 0.9 MG/DL (ref 0.2–1.2)
BUN SERPL-MCNC: 25 MG/DL (ref 8–23)
BUN/CREAT SERPL: 25.3 (ref 7–25)
CALCIUM SERPL-MCNC: 10.2 MG/DL (ref 8.6–10.5)
CCP IGA+IGG SERPL IA-ACNC: 11 UNITS (ref 0–19)
CHLORIDE SERPL-SCNC: 105 MMOL/L (ref 98–107)
CO2 SERPL-SCNC: 24.2 MMOL/L (ref 22–29)
CREAT SERPL-MCNC: 0.99 MG/DL (ref 0.57–1)
CRP SERPL-MCNC: 0.15 MG/DL (ref 0–0.5)
EOSINOPHIL # BLD AUTO: 0.19 10*3/MM3 (ref 0–0.4)
EOSINOPHIL NFR BLD AUTO: 3.6 % (ref 0.3–6.2)
ERYTHROCYTE [DISTWIDTH] IN BLOOD BY AUTOMATED COUNT: 13.5 % (ref 12.3–15.4)
ERYTHROCYTE [SEDIMENTATION RATE] IN BLOOD BY WESTERGREN METHOD: 6 MM/HR (ref 0–30)
GLOBULIN SER CALC-MCNC: 2.6 GM/DL
GLUCOSE SERPL-MCNC: 106 MG/DL (ref 65–99)
HCT VFR BLD AUTO: 41.1 % (ref 34–46.6)
HGB BLD-MCNC: 14.4 G/DL (ref 12–15.9)
IMM GRANULOCYTES # BLD AUTO: 0.01 10*3/MM3 (ref 0–0.05)
IMM GRANULOCYTES NFR BLD AUTO: 0.2 % (ref 0–0.5)
LYMPHOCYTES # BLD AUTO: 1.74 10*3/MM3 (ref 0.7–3.1)
LYMPHOCYTES NFR BLD AUTO: 33.4 % (ref 19.6–45.3)
MCH RBC QN AUTO: 32.1 PG (ref 26.6–33)
MCHC RBC AUTO-ENTMCNC: 35 G/DL (ref 31.5–35.7)
MCV RBC AUTO: 91.5 FL (ref 79–97)
MONOCYTES # BLD AUTO: 0.51 10*3/MM3 (ref 0.1–0.9)
MONOCYTES NFR BLD AUTO: 9.8 % (ref 5–12)
NEUTROPHILS # BLD AUTO: 2.72 10*3/MM3 (ref 1.7–7)
NEUTROPHILS NFR BLD AUTO: 52.2 % (ref 42.7–76)
NRBC BLD AUTO-RTO: 0 /100 WBC (ref 0–0.2)
NT-PROBNP SERPL-MCNC: 116 PG/ML (ref 0–738)
PLATELET # BLD AUTO: 249 10*3/MM3 (ref 140–450)
POTASSIUM SERPL-SCNC: 4.8 MMOL/L (ref 3.5–5.2)
PROT SERPL-MCNC: 7 G/DL (ref 6–8.5)
RBC # BLD AUTO: 4.49 10*6/MM3 (ref 3.77–5.28)
RHEUMATOID FACT SERPL-ACNC: 10.3 IU/ML (ref 0–13.9)
SODIUM SERPL-SCNC: 137 MMOL/L (ref 136–145)
TSH SERPL DL<=0.005 MIU/L-ACNC: 2.04 UIU/ML (ref 0.27–4.2)
WBC # BLD AUTO: 5.21 10*3/MM3 (ref 3.4–10.8)

## 2020-06-08 RX ORDER — EZETIMIBE 10 MG/1
10 TABLET ORAL DAILY
Qty: 30 TABLET | Refills: 2 | Status: SHIPPED | OUTPATIENT
Start: 2020-06-08 | End: 2020-06-22

## 2020-06-09 ENCOUNTER — TELEPHONE (OUTPATIENT)
Dept: PAIN MEDICINE | Facility: CLINIC | Age: 80
End: 2020-06-09

## 2020-06-09 ENCOUNTER — HOSPITAL ENCOUNTER (OUTPATIENT)
Dept: CT IMAGING | Facility: HOSPITAL | Age: 80
Discharge: HOME | End: 2020-06-09
Admitting: INTERNAL MEDICINE

## 2020-06-09 DIAGNOSIS — M46.1 SACROILIAC INFLAMMATION (HCC): Primary | ICD-10-CM

## 2020-06-09 DIAGNOSIS — R05.9 COUGH: ICD-10-CM

## 2020-06-09 DIAGNOSIS — M70.62 GREATER TROCHANTERIC BURSITIS OF LEFT HIP: ICD-10-CM

## 2020-06-09 DIAGNOSIS — R06.02 SOB (SHORTNESS OF BREATH): ICD-10-CM

## 2020-06-09 PROCEDURE — 71250 CT THORAX DX C-: CPT

## 2020-06-09 NOTE — TELEPHONE ENCOUNTER
Patient wants to proceed with the left SI joint and left hip bursa injection that was discussed in April.

## 2020-06-11 DIAGNOSIS — R06.09 DOE (DYSPNEA ON EXERTION): Primary | ICD-10-CM

## 2020-06-12 ENCOUNTER — LAB REQUISITION (OUTPATIENT)
Dept: LAB | Facility: HOSPITAL | Age: 80
End: 2020-06-12

## 2020-06-12 DIAGNOSIS — M46.1 SACROILIITIS, NOT ELSEWHERE CLASSIFIED (HCC): ICD-10-CM

## 2020-06-13 PROCEDURE — U0004 COV-19 TEST NON-CDC HGH THRU: HCPCS | Performed by: ANESTHESIOLOGY

## 2020-06-15 LAB
REF LAB TEST METHOD: NORMAL
SARS-COV-2 RNA RESP QL NAA+PROBE: NOT DETECTED

## 2020-06-16 ENCOUNTER — DOCUMENTATION (OUTPATIENT)
Dept: PAIN MEDICINE | Facility: CLINIC | Age: 80
End: 2020-06-16

## 2020-06-16 ENCOUNTER — OUTSIDE FACILITY SERVICE (OUTPATIENT)
Dept: PAIN MEDICINE | Facility: CLINIC | Age: 80
End: 2020-06-16

## 2020-06-16 ENCOUNTER — APPOINTMENT (OUTPATIENT)
Dept: RESPIRATORY THERAPY | Facility: HOSPITAL | Age: 80
End: 2020-06-16

## 2020-06-16 PROCEDURE — 20610 DRAIN/INJ JOINT/BURSA W/O US: CPT | Performed by: ANESTHESIOLOGY

## 2020-06-16 PROCEDURE — 27096 INJECT SACROILIAC JOINT: CPT | Performed by: ANESTHESIOLOGY

## 2020-06-16 NOTE — PROGRESS NOTES
Unilateral Sacroiliac Joint Injection + Greater Trochanteric Hip Bursa injection under fluoroscopic guidance    [facility]        PREOPERATIVE DIAGNOSIS:   Sacroiliac joint dysfunction on the Left as well as greater trochanteric hip bursitis on the Left     POSTOPERATIVE DIAGNOSIS:  Same as preop     PROCEDURE:  Sacroiliac Joint Injection with fluoroscopic guidance, plus greater trochanteric bursa injection, under fluoroscopic guidance as well, on the aforementioned laterality      PRE-PROCEDURE DISCUSSION WITH PATIENT:    Risks and complications were discussed with the patient prior to starting the procedure and informed consent was obtained.  We discussed various topics including but not limited to bleeding, infection, injury, postprocedural site soreness, painful flareup, worsening of clinical picture, paralysis, coma, and death.      SURGEON:  Wolfgang Means MD     REASON FOR PROCEDURE:    Patient has pain consistent with SI pathology on history and physical exam. Positive sacroiliac provocation maneuvers noted.   Hip bursitis is flared up also.     SEDATION:  Patient declined administration of moderate sedation    ANESTHETIC AGENT:  Marcaine 0.5%  STEROID AGENT:  80mg DepoMedrol split between the SI joint injection and hip bursa     DESCRIPTON OF PROCEDURE:  After obtaining informed consent, IV access was obtained in the preoperative area.  The patient was transported to the operative suite and placed in the prone position with a pillow under the pelvic area. EKG, blood pressure, and pulse oximeter were monitored.     A solution was prepared of 5ml of Marcaine 0.5% and 80mg Depomedrol, for a total of 6ml.    The lumbosacral area was prepped with Chloraprep and draped in a sterile fashion. Under fluoroscopic guidance the inferior most portion of the aforementioned SI joint was identified. The overlying skin and subcutaneous tissue was anesthetized with 1% lidocaine. A 22-gauge spinal needle  was introduced from the inferior most portion of the joint into the SI joint under fluoroscopic guidance in the AP dimension with slight oblique rotation to the contralateral side.  Aspiration was negative.  After confirming the position of the needle with fluoroscopy, 1 mL of Omnipaque was injected and after seeing appropriate spread into the joint a total of 3mL of 0.5% Marcaine, with approximately 40 mg of DepoMedrol, was injected very slowly.  Needle was removed intact.  Vital signs remained stable.  The onset of analgesia was noted.      Area over the greater trochanter on the aforementioned side was palpated and marked on the skin and then cleansed with Hibiclens ×2. A sterile needle was inserted about 1 & 1/2 inches in to the skin and into the bursa, under fluoro guidance, with periosteum contact over the greater trochanter and the needle was withdrawn about 2 mm into the bursa. Aspiration was negative and slowly after confirming bursa spread with omnipaque on each side, the rest of the injectate syringe was injected into the hip bursa.   The needle was removed intact on each side and bleeding was minimal. The insertion site was dressed with a Band-Aid.  There were no apparent complications.       ESTIMATED BLOOD LOSS:  None  SPECIMENS:  None  COMPLICATIONS:   No complications were noted., There was no indication of vascular uptake on live injection of contrast dye. and The patient did not have any signs of postprocedure numbness nor weakness.     TOLERANCE & DISCHARGE CONDITION:    The patient tolerated the procedure well.  The patient was transported to the recovery area without difficulties.  The patient was discharged to home under the care of family in stable and satisfactory condition.     PLAN OF CARE:  1. The patient was given our standard instruction sheet and will resume all medications as per the medication reconciliation sheet.  2. The patient will Return to clinic in 8 wks and Repeat injection in  4 wks PRN  3. The patient is instructed to keep a pain log hourly for 8 hours after the procedure.

## 2020-06-22 ENCOUNTER — TRANSCRIBE ORDERS (OUTPATIENT)
Dept: CARDIOLOGY | Facility: CLINIC | Age: 80
End: 2020-06-22

## 2020-06-22 ENCOUNTER — TELEPHONE (OUTPATIENT)
Dept: CARDIOLOGY | Facility: CLINIC | Age: 80
End: 2020-06-22

## 2020-06-22 ENCOUNTER — HOSPITAL ENCOUNTER (OUTPATIENT)
Dept: CARDIOLOGY | Facility: HOSPITAL | Age: 80
Discharge: HOME | End: 2020-06-22
Admitting: INTERNAL MEDICINE

## 2020-06-22 ENCOUNTER — LAB (OUTPATIENT)
Dept: LAB | Facility: HOSPITAL | Age: 80
End: 2020-06-22

## 2020-06-22 VITALS
OXYGEN SATURATION: 93 % | HEART RATE: 64 BPM | BODY MASS INDEX: 38.64 KG/M2 | DIASTOLIC BLOOD PRESSURE: 102 MMHG | HEIGHT: 62 IN | SYSTOLIC BLOOD PRESSURE: 160 MMHG | WEIGHT: 210 LBS

## 2020-06-22 DIAGNOSIS — R94.39 ABNORMAL STRESS TEST: Primary | ICD-10-CM

## 2020-06-22 DIAGNOSIS — Z13.6 SCREENING FOR ISCHEMIC HEART DISEASE: ICD-10-CM

## 2020-06-22 DIAGNOSIS — R06.09 DOE (DYSPNEA ON EXERTION): ICD-10-CM

## 2020-06-22 DIAGNOSIS — Z01.810 PRE-OPERATIVE CARDIOVASCULAR EXAMINATION: Primary | ICD-10-CM

## 2020-06-22 DIAGNOSIS — Z01.810 PRE-OPERATIVE CARDIOVASCULAR EXAMINATION: ICD-10-CM

## 2020-06-22 LAB
ANION GAP SERPL CALCULATED.3IONS-SCNC: 14.6 MMOL/L (ref 5–15)
ASCENDING AORTA: 3.1 CM
BASOPHILS # BLD AUTO: 0.03 10*3/MM3 (ref 0–0.2)
BASOPHILS NFR BLD AUTO: 0.4 % (ref 0–1.5)
BH CV ECHO MEAS - ACS: 2 CM
BH CV ECHO MEAS - AO MAX PG: 9.9 MMHG
BH CV ECHO MEAS - AO ROOT AREA (BSA CORRECTED): 1.8
BH CV ECHO MEAS - AO ROOT AREA: 9.6 CM^2
BH CV ECHO MEAS - AO ROOT DIAM: 3.5 CM
BH CV ECHO MEAS - AO V2 MAX: 157 CM/SEC
BH CV ECHO MEAS - ASC AORTA: 3.1 CM
BH CV ECHO MEAS - BSA(HAYCOCK): 2.1 M^2
BH CV ECHO MEAS - BSA: 2 M^2
BH CV ECHO MEAS - BZI_BMI: 38.4 KILOGRAMS/M^2
BH CV ECHO MEAS - BZI_METRIC_HEIGHT: 157.5 CM
BH CV ECHO MEAS - BZI_METRIC_WEIGHT: 95.3 KG
BH CV ECHO MEAS - EDV(MOD-SP4): 99 ML
BH CV ECHO MEAS - EDV(TEICH): 44.2 ML
BH CV ECHO MEAS - EF(CUBED): 69.4 %
BH CV ECHO MEAS - EF(MOD-BP): 63 %
BH CV ECHO MEAS - EF(MOD-SP4): 41.4 %
BH CV ECHO MEAS - EF(TEICH): 62.3 %
BH CV ECHO MEAS - ESV(MOD-SP4): 58 ML
BH CV ECHO MEAS - ESV(TEICH): 16.7 ML
BH CV ECHO MEAS - FS: 32.6 %
BH CV ECHO MEAS - IVS/LVPW: 1.2
BH CV ECHO MEAS - IVSD: 1.1 CM
BH CV ECHO MEAS - LAT PEAK E' VEL: 7.1 CM/SEC
BH CV ECHO MEAS - LV DIASTOLIC VOL/BSA (35-75): 50.7 ML/M^2
BH CV ECHO MEAS - LV MASS(C)D: 100.6 GRAMS
BH CV ECHO MEAS - LV MASS(C)DI: 51.6 GRAMS/M^2
BH CV ECHO MEAS - LV SYSTOLIC VOL/BSA (12-30): 29.7 ML/M^2
BH CV ECHO MEAS - LVIDD: 3.3 CM
BH CV ECHO MEAS - LVIDS: 2.2 CM
BH CV ECHO MEAS - LVLD AP4: 7.2 CM
BH CV ECHO MEAS - LVLS AP4: 6.8 CM
BH CV ECHO MEAS - LVOT AREA (M): 2.8 CM^2
BH CV ECHO MEAS - LVOT AREA: 2.9 CM^2
BH CV ECHO MEAS - LVOT DIAM: 1.9 CM
BH CV ECHO MEAS - LVPWD: 0.97 CM
BH CV ECHO MEAS - MED PEAK E' VEL: 7 CM/SEC
BH CV ECHO MEAS - MV A DUR: 0.12 SEC
BH CV ECHO MEAS - MV A MAX VEL: 107.1 CM/SEC
BH CV ECHO MEAS - MV DEC SLOPE: 290 CM/SEC^2
BH CV ECHO MEAS - MV DEC TIME: 0.25 SEC
BH CV ECHO MEAS - MV E MAX VEL: 72.8 CM/SEC
BH CV ECHO MEAS - MV E/A: 0.68
BH CV ECHO MEAS - MV P1/2T MAX VEL: 74.1 CM/SEC
BH CV ECHO MEAS - MV P1/2T: 74.9 MSEC
BH CV ECHO MEAS - MVA P1/2T LCG: 3 CM^2
BH CV ECHO MEAS - MVA(P1/2T): 2.9 CM^2
BH CV ECHO MEAS - PULM A REVS DUR: 0.13 SEC
BH CV ECHO MEAS - PULM A REVS VEL: 32.6 CM/SEC
BH CV ECHO MEAS - PULM DIAS VEL: 31.7 CM/SEC
BH CV ECHO MEAS - PULM S/D: 1.9
BH CV ECHO MEAS - PULM SYS VEL: 60.4 CM/SEC
BH CV ECHO MEAS - SI(CUBED): 12.8 ML/M^2
BH CV ECHO MEAS - SI(MOD-SP4): 21 ML/M^2
BH CV ECHO MEAS - SI(TEICH): 14.1 ML/M^2
BH CV ECHO MEAS - SV(CUBED): 25 ML
BH CV ECHO MEAS - SV(MOD-SP4): 41 ML
BH CV ECHO MEAS - SV(TEICH): 27.5 ML
BH CV ECHO MEAS - TAPSE (>1.6): 2.1 CM2
BH CV ECHO MEASUREMENTS AVERAGE E/E' RATIO: 10.33
BH CV STRESS BP STAGE 1: NORMAL
BH CV STRESS BP STAGE 2: NORMAL
BH CV STRESS DURATION MIN STAGE 1: 3
BH CV STRESS DURATION MIN STAGE 2: 3
BH CV STRESS DURATION SEC STAGE 1: 0
BH CV STRESS DURATION SEC STAGE 2: 0
BH CV STRESS ECHO POST STRESS EJECTION FRACTION EF: 40 %
BH CV STRESS GRADE STAGE 1: 10
BH CV STRESS GRADE STAGE 2: 12
BH CV STRESS HR STAGE 1: 101
BH CV STRESS HR STAGE 2: 119
BH CV STRESS METS STAGE 1: 5
BH CV STRESS METS STAGE 2: 7.5
BH CV STRESS PROTOCOL 1: NORMAL
BH CV STRESS SPEED STAGE 1: 1.7
BH CV STRESS SPEED STAGE 2: 2.5
BH CV STRESS STAGE 1: 1
BH CV STRESS STAGE 2: 2
BH CV XLRA - RV BASE: 2.5 CM
BH CV XLRA - RV LENGTH: 6.2 CM
BH CV XLRA - RV MID: 2.3 CM
BH CV XLRA - TDI S': 10 CM/SEC
BUN BLD-MCNC: 29 MG/DL (ref 8–23)
BUN/CREAT SERPL: 24.6 (ref 7–25)
CALCIUM SPEC-SCNC: 9.8 MG/DL (ref 8.6–10.5)
CHLORIDE SERPL-SCNC: 102 MMOL/L (ref 98–107)
CO2 SERPL-SCNC: 18.4 MMOL/L (ref 22–29)
CREAT BLD-MCNC: 1.18 MG/DL (ref 0.57–1)
DEPRECATED RDW RBC AUTO: 45.9 FL (ref 37–54)
EOSINOPHIL # BLD AUTO: 0.15 10*3/MM3 (ref 0–0.4)
EOSINOPHIL NFR BLD AUTO: 2 % (ref 0.3–6.2)
ERYTHROCYTE [DISTWIDTH] IN BLOOD BY AUTOMATED COUNT: 13.3 % (ref 12.3–15.4)
GFR SERPL CREATININE-BSD FRML MDRD: 44 ML/MIN/1.73
GLUCOSE BLD-MCNC: 141 MG/DL (ref 65–99)
HCT VFR BLD AUTO: 43.5 % (ref 34–46.6)
HGB BLD-MCNC: 14.9 G/DL (ref 12–15.9)
IMM GRANULOCYTES # BLD AUTO: 0.05 10*3/MM3 (ref 0–0.05)
IMM GRANULOCYTES NFR BLD AUTO: 0.7 % (ref 0–0.5)
LEFT ATRIUM VOLUME INDEX: 24 ML/M2
LYMPHOCYTES # BLD AUTO: 1.95 10*3/MM3 (ref 0.7–3.1)
LYMPHOCYTES NFR BLD AUTO: 26.1 % (ref 19.6–45.3)
MAXIMAL PREDICTED HEART RATE: 142 BPM
MCH RBC QN AUTO: 32.1 PG (ref 26.6–33)
MCHC RBC AUTO-ENTMCNC: 34.3 G/DL (ref 31.5–35.7)
MCV RBC AUTO: 93.8 FL (ref 79–97)
MONOCYTES # BLD AUTO: 0.63 10*3/MM3 (ref 0.1–0.9)
MONOCYTES NFR BLD AUTO: 8.4 % (ref 5–12)
NEUTROPHILS # BLD AUTO: 4.67 10*3/MM3 (ref 1.7–7)
NEUTROPHILS NFR BLD AUTO: 62.4 % (ref 42.7–76)
NRBC BLD AUTO-RTO: 0.1 /100 WBC (ref 0–0.2)
PERCENT MAX PREDICTED HR: 83.8 %
PLATELET # BLD AUTO: 270 10*3/MM3 (ref 140–450)
PMV BLD AUTO: 10.5 FL (ref 6–12)
POTASSIUM BLD-SCNC: 4.4 MMOL/L (ref 3.5–5.2)
RBC # BLD AUTO: 4.64 10*6/MM3 (ref 3.77–5.28)
SINUS: 3.1 CM
SODIUM BLD-SCNC: 135 MMOL/L (ref 136–145)
STJ: 2.8 CM
STRESS BASELINE BP: NORMAL MMHG
STRESS PERCENT HR: 99 %
STRESS POST EXERCISE DUR MIN: 6 MIN
STRESS POST PEAK BP: NORMAL MMHG
STRESS POST PEAK HR: 119 BPM
STRESS TARGET HR: 121 BPM
WBC NRBC COR # BLD: 7.48 10*3/MM3 (ref 3.4–10.8)

## 2020-06-22 PROCEDURE — 93017 CV STRESS TEST TRACING ONLY: CPT

## 2020-06-22 PROCEDURE — 93352 ADMIN ECG CONTRAST AGENT: CPT | Performed by: INTERNAL MEDICINE

## 2020-06-22 PROCEDURE — 93018 CV STRESS TEST I&R ONLY: CPT | Performed by: INTERNAL MEDICINE

## 2020-06-22 PROCEDURE — 80048 BASIC METABOLIC PNL TOTAL CA: CPT

## 2020-06-22 PROCEDURE — 93325 DOPPLER ECHO COLOR FLOW MAPG: CPT | Performed by: INTERNAL MEDICINE

## 2020-06-22 PROCEDURE — 93350 STRESS TTE ONLY: CPT

## 2020-06-22 PROCEDURE — 93350 STRESS TTE ONLY: CPT | Performed by: INTERNAL MEDICINE

## 2020-06-22 PROCEDURE — 93320 DOPPLER ECHO COMPLETE: CPT | Performed by: INTERNAL MEDICINE

## 2020-06-22 PROCEDURE — 25010000002 PERFLUTREN (DEFINITY) 8.476 MG IN SODIUM CHLORIDE 0.9 % 10 ML INJECTION: Performed by: INTERNAL MEDICINE

## 2020-06-22 PROCEDURE — 93325 DOPPLER ECHO COLOR FLOW MAPG: CPT

## 2020-06-22 PROCEDURE — 85025 COMPLETE CBC W/AUTO DIFF WBC: CPT

## 2020-06-22 PROCEDURE — 93016 CV STRESS TEST SUPVJ ONLY: CPT | Performed by: INTERNAL MEDICINE

## 2020-06-22 PROCEDURE — 93320 DOPPLER ECHO COMPLETE: CPT

## 2020-06-22 PROCEDURE — 36415 COLL VENOUS BLD VENIPUNCTURE: CPT

## 2020-06-22 RX ORDER — EZETIMIBE 10 MG/1
TABLET ORAL
Qty: 90 TABLET | Refills: 3 | Status: SHIPPED | OUTPATIENT
Start: 2020-06-22 | End: 2020-12-07 | Stop reason: SDUPTHER

## 2020-06-22 RX ADMIN — PERFLUTREN 3 ML: 6.52 INJECTION, SUSPENSION INTRAVENOUS at 14:21

## 2020-06-22 NOTE — TELEPHONE ENCOUNTER
Oly,  This patient is a patient of yours and Dr. Stone's.  She had a markedly abnormal stress echo.  However her O2 saturation also dropped with exercise.  Getting her set up for right and left heart cath.KIM

## 2020-06-24 ENCOUNTER — TELEPHONE (OUTPATIENT)
Dept: INTERNAL MEDICINE | Facility: CLINIC | Age: 80
End: 2020-06-24

## 2020-06-24 ENCOUNTER — TRANSCRIBE ORDERS (OUTPATIENT)
Dept: SLEEP MEDICINE | Facility: HOSPITAL | Age: 80
End: 2020-06-24

## 2020-06-24 ENCOUNTER — TELEPHONE (OUTPATIENT)
Dept: CARDIOLOGY | Facility: CLINIC | Age: 80
End: 2020-06-24

## 2020-06-24 DIAGNOSIS — Z01.818 OTHER SPECIFIED PRE-OPERATIVE EXAMINATION: Primary | ICD-10-CM

## 2020-06-24 PROBLEM — R94.39 ABNORMAL STRESS TEST: Status: ACTIVE | Noted: 2020-06-24

## 2020-06-24 NOTE — TELEPHONE ENCOUNTER
Patient is having cardiac cath Monday 06/29/2020 and wants to know when she should stop her warfarin. Please call her at [phone].

## 2020-06-24 NOTE — TELEPHONE ENCOUNTER
Pt is on Warfarin and is scheduled for a right and left heart cath with you on 6/29. How many days she need to hold it? Thank you.

## 2020-06-25 ENCOUNTER — APPOINTMENT (OUTPATIENT)
Dept: RESPIRATORY THERAPY | Facility: HOSPITAL | Age: 80
End: 2020-06-25

## 2020-06-26 ENCOUNTER — LAB (OUTPATIENT)
Dept: LAB | Facility: HOSPITAL | Age: 80
End: 2020-06-26

## 2020-06-26 DIAGNOSIS — Z01.818 OTHER SPECIFIED PRE-OPERATIVE EXAMINATION: ICD-10-CM

## 2020-06-26 PROCEDURE — C9803 HOPD COVID-19 SPEC COLLECT: HCPCS

## 2020-06-26 PROCEDURE — U0004 COV-19 TEST NON-CDC HGH THRU: HCPCS

## 2020-06-27 LAB
REF LAB TEST METHOD: NORMAL
SARS-COV-2 RNA RESP QL NAA+PROBE: NOT DETECTED

## 2020-06-29 ENCOUNTER — HOSPITAL ENCOUNTER (OUTPATIENT)
Facility: HOSPITAL | Age: 80
Setting detail: HOSPITAL OUTPATIENT SURGERY
Discharge: HOME | End: 2020-06-29
Attending: INTERNAL MEDICINE | Admitting: INTERNAL MEDICINE

## 2020-06-29 VITALS
DIASTOLIC BLOOD PRESSURE: 83 MMHG | TEMPERATURE: 98.3 F | OXYGEN SATURATION: 94 % | SYSTOLIC BLOOD PRESSURE: 149 MMHG | BODY MASS INDEX: 37.54 KG/M2 | RESPIRATION RATE: 16 BRPM | HEART RATE: 63 BPM | WEIGHT: 204 LBS | HEIGHT: 62 IN

## 2020-06-29 DIAGNOSIS — R94.39 ABNORMAL STRESS TEST: ICD-10-CM

## 2020-06-29 LAB
INR PPP: 1 (ref 0.8–1.2)
INR PPP: 1 (ref 0.9–1.1)
PROTHROMBIN TIME: 12 SECONDS
PROTHROMBIN TIME: 12 SECONDS (ref 12.8–15.2)

## 2020-06-29 PROCEDURE — 0 IOPAMIDOL PER 1 ML: Performed by: INTERNAL MEDICINE

## 2020-06-29 PROCEDURE — 25010000002 HEPARIN (PORCINE) PER 1000 UNITS: Performed by: INTERNAL MEDICINE

## 2020-06-29 PROCEDURE — 85610 PROTHROMBIN TIME: CPT

## 2020-06-29 PROCEDURE — 25010000002 METHYLPREDNISOLONE PER 125 MG: Performed by: INTERNAL MEDICINE

## 2020-06-29 PROCEDURE — C1894 INTRO/SHEATH, NON-LASER: HCPCS | Performed by: INTERNAL MEDICINE

## 2020-06-29 PROCEDURE — 99153 MOD SED SAME PHYS/QHP EA: CPT | Performed by: INTERNAL MEDICINE

## 2020-06-29 PROCEDURE — 93460 R&L HRT ART/VENTRICLE ANGIO: CPT | Performed by: INTERNAL MEDICINE

## 2020-06-29 PROCEDURE — 25010000002 FENTANYL CITRATE (PF) 100 MCG/2ML SOLUTION: Performed by: INTERNAL MEDICINE

## 2020-06-29 PROCEDURE — C1769 GUIDE WIRE: HCPCS | Performed by: INTERNAL MEDICINE

## 2020-06-29 PROCEDURE — 85014 HEMATOCRIT: CPT

## 2020-06-29 PROCEDURE — 85018 HEMOGLOBIN: CPT

## 2020-06-29 PROCEDURE — 25010000002 DIPHENHYDRAMINE PER 50 MG: Performed by: INTERNAL MEDICINE

## 2020-06-29 PROCEDURE — 25010000002 MIDAZOLAM PER 1 MG: Performed by: INTERNAL MEDICINE

## 2020-06-29 PROCEDURE — 99152 MOD SED SAME PHYS/QHP 5/>YRS: CPT | Performed by: INTERNAL MEDICINE

## 2020-06-29 RX ORDER — LIDOCAINE HYDROCHLORIDE 10 MG/ML
0.1 INJECTION, SOLUTION EPIDURAL; INFILTRATION; INTRACAUDAL; PERINEURAL ONCE AS NEEDED
Status: DISCONTINUED | OUTPATIENT
Start: 2020-06-29 | End: 2020-06-29 | Stop reason: HOSPADM

## 2020-06-29 RX ORDER — LIDOCAINE HYDROCHLORIDE 20 MG/ML
INJECTION, SOLUTION INFILTRATION; PERINEURAL AS NEEDED
Status: DISCONTINUED | OUTPATIENT
Start: 2020-06-29 | End: 2020-06-29 | Stop reason: HOSPADM

## 2020-06-29 RX ORDER — SODIUM CHLORIDE 0.9 % (FLUSH) 0.9 %
10 SYRINGE (ML) INJECTION AS NEEDED
Status: CANCELLED | OUTPATIENT
Start: 2020-06-29

## 2020-06-29 RX ORDER — METHOCARBAMOL 750 MG/1
750 TABLET, FILM COATED ORAL NIGHTLY
COMMUNITY
End: 2020-11-23

## 2020-06-29 RX ORDER — SODIUM CHLORIDE 0.9 % (FLUSH) 0.9 %
10 SYRINGE (ML) INJECTION AS NEEDED
Status: DISCONTINUED | OUTPATIENT
Start: 2020-06-29 | End: 2020-06-29 | Stop reason: HOSPADM

## 2020-06-29 RX ORDER — SODIUM CHLORIDE 9 MG/ML
75 INJECTION, SOLUTION INTRAVENOUS CONTINUOUS
Status: DISCONTINUED | OUTPATIENT
Start: 2020-06-29 | End: 2020-06-29 | Stop reason: HOSPADM

## 2020-06-29 RX ORDER — FENTANYL CITRATE 50 UG/ML
INJECTION, SOLUTION INTRAMUSCULAR; INTRAVENOUS AS NEEDED
Status: DISCONTINUED | OUTPATIENT
Start: 2020-06-29 | End: 2020-06-29 | Stop reason: HOSPADM

## 2020-06-29 RX ORDER — METHYLPREDNISOLONE SODIUM SUCCINATE 125 MG/2ML
100 INJECTION, POWDER, LYOPHILIZED, FOR SOLUTION INTRAMUSCULAR; INTRAVENOUS ONCE
Status: COMPLETED | OUTPATIENT
Start: 2020-06-29 | End: 2020-06-29

## 2020-06-29 RX ORDER — ACETAMINOPHEN 325 MG/1
650 TABLET ORAL EVERY 4 HOURS PRN
Status: CANCELLED | OUTPATIENT
Start: 2020-06-29

## 2020-06-29 RX ORDER — DIPHENHYDRAMINE HYDROCHLORIDE 50 MG/ML
50 INJECTION INTRAMUSCULAR; INTRAVENOUS ONCE
Status: COMPLETED | OUTPATIENT
Start: 2020-06-29 | End: 2020-06-29

## 2020-06-29 RX ORDER — SODIUM CHLORIDE 0.9 % (FLUSH) 0.9 %
3 SYRINGE (ML) INJECTION EVERY 12 HOURS SCHEDULED
Status: DISCONTINUED | OUTPATIENT
Start: 2020-06-29 | End: 2020-06-29 | Stop reason: HOSPADM

## 2020-06-29 RX ORDER — SODIUM CHLORIDE 0.9 % (FLUSH) 0.9 %
3 SYRINGE (ML) INJECTION EVERY 12 HOURS SCHEDULED
Status: CANCELLED | OUTPATIENT
Start: 2020-06-29

## 2020-06-29 RX ORDER — MIDAZOLAM HYDROCHLORIDE 1 MG/ML
INJECTION INTRAMUSCULAR; INTRAVENOUS AS NEEDED
Status: DISCONTINUED | OUTPATIENT
Start: 2020-06-29 | End: 2020-06-29 | Stop reason: HOSPADM

## 2020-06-29 RX ADMIN — METHYLPREDNISOLONE SODIUM SUCCINATE 100 MG: 125 INJECTION, POWDER, FOR SOLUTION INTRAMUSCULAR; INTRAVENOUS at 07:31

## 2020-06-29 RX ADMIN — DIPHENHYDRAMINE HYDROCHLORIDE 50 MG: 50 INJECTION, SOLUTION INTRAMUSCULAR; INTRAVENOUS at 07:31

## 2020-06-29 RX ADMIN — SODIUM CHLORIDE 75 ML/HR: 9 INJECTION, SOLUTION INTRAVENOUS at 07:18

## 2020-06-30 LAB
HCT VFR BLDA CALC: 39 % (ref 38–51)
HCT VFR BLDA CALC: 41 % (ref 38–51)
HGB BLDA-MCNC: 13.3 G/DL (ref 12–17)
HGB BLDA-MCNC: 13.9 G/DL (ref 12–17)
SAO2 % BLDA: 67 % (ref 95–98)
SAO2 % BLDA: 94 % (ref 95–98)

## 2020-07-06 RX ORDER — SOTALOL HYDROCHLORIDE 80 MG/1
80 TABLET ORAL DAILY
Qty: 30 TABLET | Refills: 3 | Status: SHIPPED | OUTPATIENT
Start: 2020-07-06 | End: 2020-07-29 | Stop reason: SDUPTHER

## 2020-07-14 ENCOUNTER — ANTICOAGULATION VISIT (OUTPATIENT)
Dept: INTERNAL MEDICINE | Facility: CLINIC | Age: 80
End: 2020-07-14

## 2020-07-14 DIAGNOSIS — I48.0 PAROXYSMAL ATRIAL FIBRILLATION (HCC): ICD-10-CM

## 2020-07-14 LAB — INR PPP: 3 (ref 0.9–1.1)

## 2020-07-14 PROCEDURE — 85610 PROTHROMBIN TIME: CPT | Performed by: INTERNAL MEDICINE

## 2020-07-14 PROCEDURE — 36416 COLLJ CAPILLARY BLOOD SPEC: CPT | Performed by: INTERNAL MEDICINE

## 2020-07-14 PROCEDURE — 93793 ANTICOAG MGMT PT WARFARIN: CPT | Performed by: INTERNAL MEDICINE

## 2020-07-15 ENCOUNTER — TRANSCRIBE ORDERS (OUTPATIENT)
Dept: SLEEP MEDICINE | Facility: HOSPITAL | Age: 80
End: 2020-07-15

## 2020-07-15 DIAGNOSIS — Z01.818 OTHER SPECIFIED PRE-OPERATIVE EXAMINATION: Primary | ICD-10-CM

## 2020-07-18 ENCOUNTER — LAB (OUTPATIENT)
Dept: LAB | Facility: HOSPITAL | Age: 80
End: 2020-07-18

## 2020-07-18 DIAGNOSIS — Z01.818 OTHER SPECIFIED PRE-OPERATIVE EXAMINATION: ICD-10-CM

## 2020-07-18 PROCEDURE — U0004 COV-19 TEST NON-CDC HGH THRU: HCPCS

## 2020-07-18 PROCEDURE — C9803 HOPD COVID-19 SPEC COLLECT: HCPCS

## 2020-07-20 LAB
REF LAB TEST METHOD: NORMAL
SARS-COV-2 RNA RESP QL NAA+PROBE: NOT DETECTED

## 2020-07-21 ENCOUNTER — HOSPITAL ENCOUNTER (OUTPATIENT)
Dept: RESPIRATORY THERAPY | Facility: HOSPITAL | Age: 80
Discharge: HOME | End: 2020-07-21
Admitting: INTERNAL MEDICINE

## 2020-07-21 DIAGNOSIS — R06.02 SOB (SHORTNESS OF BREATH): ICD-10-CM

## 2020-07-21 DIAGNOSIS — R05.9 COUGH: ICD-10-CM

## 2020-07-21 LAB
BDY SITE: NORMAL
HGB BLDA-MCNC: 14.9 G/DL (ref 12–18)

## 2020-07-21 PROCEDURE — 82820 HEMOGLOBIN-OXYGEN AFFINITY: CPT | Performed by: INTERNAL MEDICINE

## 2020-07-21 PROCEDURE — 94729 DIFFUSING CAPACITY: CPT

## 2020-07-21 PROCEDURE — 94640 AIRWAY INHALATION TREATMENT: CPT

## 2020-07-21 PROCEDURE — 94726 PLETHYSMOGRAPHY LUNG VOLUMES: CPT

## 2020-07-21 PROCEDURE — 94060 EVALUATION OF WHEEZING: CPT

## 2020-07-21 RX ORDER — ALBUTEROL SULFATE 2.5 MG/3ML
2.5 SOLUTION RESPIRATORY (INHALATION) ONCE
Status: COMPLETED | OUTPATIENT
Start: 2020-07-21 | End: 2020-07-21

## 2020-07-21 RX ADMIN — ALBUTEROL SULFATE 2.5 MG: 2.5 SOLUTION RESPIRATORY (INHALATION) at 10:44

## 2020-07-29 RX ORDER — SOTALOL HYDROCHLORIDE 80 MG/1
80 TABLET ORAL DAILY
Qty: 90 TABLET | Refills: 1 | Status: SHIPPED | OUTPATIENT
Start: 2020-07-29 | End: 2020-09-02

## 2020-07-30 RX ORDER — CELECOXIB 200 MG/1
CAPSULE ORAL
Qty: 30 CAPSULE | Refills: 0 | Status: SHIPPED | OUTPATIENT
Start: 2020-07-30 | End: 2020-08-31

## 2020-08-03 ENCOUNTER — ANTICOAGULATION VISIT (OUTPATIENT)
Dept: INTERNAL MEDICINE | Facility: CLINIC | Age: 80
End: 2020-08-03

## 2020-08-03 ENCOUNTER — OFFICE VISIT (OUTPATIENT)
Dept: INTERNAL MEDICINE | Facility: CLINIC | Age: 80
End: 2020-08-03

## 2020-08-03 VITALS
HEIGHT: 62 IN | WEIGHT: 201 LBS | SYSTOLIC BLOOD PRESSURE: 132 MMHG | OXYGEN SATURATION: 98 % | HEART RATE: 63 BPM | BODY MASS INDEX: 36.99 KG/M2 | DIASTOLIC BLOOD PRESSURE: 84 MMHG

## 2020-08-03 DIAGNOSIS — I48.0 PAROXYSMAL ATRIAL FIBRILLATION (HCC): ICD-10-CM

## 2020-08-03 DIAGNOSIS — R13.10 DYSPHAGIA, UNSPECIFIED TYPE: ICD-10-CM

## 2020-08-03 DIAGNOSIS — R06.09 DOE (DYSPNEA ON EXERTION): Primary | ICD-10-CM

## 2020-08-03 DIAGNOSIS — R93.1 ABNORMAL ECHOCARDIOGRAM: ICD-10-CM

## 2020-08-03 PROCEDURE — 99214 OFFICE O/P EST MOD 30 MIN: CPT | Performed by: INTERNAL MEDICINE

## 2020-08-03 RX ORDER — FLUOROURACIL 50 MG/G
CREAM TOPICAL DAILY
Status: ON HOLD | COMMUNITY
Start: 2020-06-18 | End: 2021-01-05

## 2020-08-03 NOTE — PROGRESS NOTES
Subjective     Inessa Peoples is a 80 y.o. female who presents with   Chief Complaint   Patient presents with   • Results       History of Present Illness     Food gets stuck in chest.  She has to drink water to get things to go things to go down.  Getting over the last six months. Feels the sensation in her chest.      AHN.  This has been an ongoing issue for some time.  Reviewed all recent tests.  Stress echo was abnormal but CATH was normal.  Her EF decreased from 63 to 40 with exertion and O2 sats decreased.  PFTs showed probable mild restrictive disease with a moderate diffusion deficit.  No change with bronchodilators.       Review of Systems   Constitutional: Positive for fatigue. Negative for fever.   Respiratory: Positive for shortness of breath. Negative for cough and wheezing.    Cardiovascular: Positive for leg swelling. Negative for chest pain.   Musculoskeletal: Positive for arthralgias.       The following portions of the patient's history were reviewed and updated as appropriate: allergies, current medications and problem list.    Patient Active Problem List    Diagnosis Date Noted   • Abnormal stress test 06/24/2020     Note Last Updated: 6/24/2020     Added automatically from request for surgery [id number]     • LBBB (left bundle branch block) 11/08/2019   • Trochanteric bursitis of both hips 05/29/2019   • Lumbar facet arthropathy 11/06/2018   • Family history of colon cancer 06/19/2018     Note Last Updated: 6/19/2018     Added automatically from request for surgery [id number]     • Class 2 severe obesity due to excess calories with serious comorbidity and body mass index (BMI) of 37.0 to 37.9 in adult (CMS/[facility]) 03/27/2018   • Chronic pain 07/13/2017   • Bulge of lumbar disc without myelopathy 07/13/2017   • Sacroiliac inflammation (CMS/[facility]) 07/13/2017   • Sacroiliac pain 07/13/2017   • Dilation of thoracic aorta (CMS/[facility]) 06/23/2017   • Chronic anticoagulation 05/15/2017   • Diverticulitis of large  intestine without perforation or abscess without bleeding 01/03/2017   • ANY on CPAP + 10 - Dr Lu 10/15/2016   • History of melanoma excision 08/05/2016   • GERD (gastroesophageal reflux disease) 08/05/2016   • History of kidney stones 08/05/2016   • Sarcoidosis 04/18/2016   • 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       • Dyspnea on exertion 04/18/2016   • Chronic osteoarthritis 04/18/2016   • History of DVT (deep vein thrombosis) 04/18/2016     Note Last Updated: 4/18/2016     Description: developed unprovoked on Xarelto.     • Atrial fibrillation (CMS/HCC) 04/18/2016       Current Outpatient Medications on File Prior to Visit   Medication Sig Dispense Refill   • Calcium-Phosphorus-Vitamin D (CITRACAL +D3 PO) Take 1 tablet by mouth Daily.     • celecoxib (CeleBREX) 200 MG capsule TAKE ONE CAPSULE BY MOUTH DAILY 30 capsule 0   • ezetimibe (ZETIA) 10 MG tablet TAKE ONE TABLET BY MOUTH DAILY (Patient taking differently: Take 10 mg by mouth Daily.) 90 tablet 3   • fexofenadine (ALLEGRA) 180 MG tablet Take 180 mg by mouth Daily.     • fluorouracil (EFUDEX) 5 % cream      • levothyroxine (SYNTHROID, LEVOTHROID) 25 MCG tablet TAKE ONE TABLET BY MOUTH DAILY (Patient taking differently: Take 25 mcg by mouth Daily.) 90 tablet 1   • losartan (COZAAR) 100 MG tablet Take 1 tablet by mouth Daily. 90 tablet 3   • melatonin 5 MG sublingual tablet sublingual tablet Take 5 mg by mouth Every Night.     • methocarbamol (ROBAXIN) 750 MG tablet Take 750 mg by mouth Every Night.     • Multiple Vitamin (MULTI-DAY VITAMINS) tablet Take 1 tablet by mouth daily.     • sotalol (BETAPACE) 80 MG tablet Take 1 tablet by  "mouth Daily. 90 tablet 1   • spironolactone (ALDACTONE) 25 MG tablet Take 1 tablet by mouth Daily. 90 tablet 3   • Triamcinolone Acetonide (NASACORT) 55 MCG/ACT nasal inhaler 2 sprays into the nostril(s) as directed by provider Daily.     • warfarin (COUMADIN) 3 MG tablet TAKE ONE TABLET BY MOUTH DAILY (Patient taking differently: Take 3 mg by mouth Every Night.) 90 tablet 1     No current facility-administered medications on file prior to visit.        Objective     /84   Pulse 63   Ht 157.5 cm (62.01\")   Wt 91.2 kg (201 lb)   SpO2 98%   BMI 36.75 kg/m²     Physical Exam   Constitutional: She is oriented to person, place, and time. She appears well-developed and well-nourished.   HENT:   Head: Normocephalic and atraumatic.   Cardiovascular: Normal rate, regular rhythm and normal heart sounds.   Pulmonary/Chest: Effort normal and breath sounds normal.   Neurological: She is alert and oriented to person, place, and time.   Skin: Skin is warm and dry.   Psychiatric: She has a normal mood and affect. Her behavior is normal.       Assessment/Plan   Inessa was seen today for results.    Diagnoses and all orders for this visit:    AHN (dyspnea on exertion)  -     Ambulatory Referral to Cardiology    Dysphagia, unspecified type  -     FL esophagram complete; Future  -     Ambulatory Referral to Gastroenterology    Abnormal echocardiogram  -     Ambulatory Referral to Cardiology        Discussion    AHN.  Ongoing progressive issue with full work up.  I don't understand her stress echo results in relation to a normal CATH.  Why does her EF decrease from 63 to 40 with a decrease in 02 sats.  She has seen pulmonary in the past.  Her PFTs are not that remarkable.  Refer back to cardiology for opinion on this.    Dysphagia.  Esophagram to further evaluate.  Refer to GI for EGD.    15/25 minutes was spent in counseling of the following topics:, test results, impressions         Future Appointments   Date Time Provider " Department Center   8/13/2020  1:30 PM Micheline Castillo APRN [facility]   9/2/2020  9:30 AM Noe Reynoso MD [facility] None   10/21/2020 11:00 AM Jewel Lu MD [facility] None   11/16/2020 11:00 AM [facility] None   11/23/2020  1:30 PM Gisele Dockery MD [facility] None

## 2020-08-10 ENCOUNTER — TRANSCRIBE ORDERS (OUTPATIENT)
Dept: SLEEP MEDICINE | Facility: HOSPITAL | Age: 80
End: 2020-08-10

## 2020-08-10 DIAGNOSIS — Z01.818 OTHER SPECIFIED PRE-OPERATIVE EXAMINATION: Primary | ICD-10-CM

## 2020-08-12 ENCOUNTER — LAB (OUTPATIENT)
Dept: LAB | Facility: HOSPITAL | Age: 80
End: 2020-08-12

## 2020-08-12 DIAGNOSIS — Z01.818 OTHER SPECIFIED PRE-OPERATIVE EXAMINATION: ICD-10-CM

## 2020-08-12 PROCEDURE — C9803 HOPD COVID-19 SPEC COLLECT: HCPCS

## 2020-08-12 PROCEDURE — U0004 COV-19 TEST NON-CDC HGH THRU: HCPCS

## 2020-08-13 ENCOUNTER — TELEMEDICINE (OUTPATIENT)
Dept: PAIN MEDICINE | Facility: CLINIC | Age: 80
End: 2020-08-13

## 2020-08-13 DIAGNOSIS — M53.3 SACROILIAC PAIN: ICD-10-CM

## 2020-08-13 DIAGNOSIS — M47.816 LUMBAR FACET ARTHROPATHY: ICD-10-CM

## 2020-08-13 DIAGNOSIS — M19.90 CHRONIC OSTEOARTHRITIS: ICD-10-CM

## 2020-08-13 DIAGNOSIS — M51.36 BULGE OF LUMBAR DISC WITHOUT MYELOPATHY: ICD-10-CM

## 2020-08-13 DIAGNOSIS — G89.29 OTHER CHRONIC PAIN: Primary | ICD-10-CM

## 2020-08-13 LAB
REF LAB TEST METHOD: NORMAL
SARS-COV-2 RNA RESP QL NAA+PROBE: NOT DETECTED

## 2020-08-13 PROCEDURE — 99442 PR PHYS/QHP TELEPHONE EVALUATION 11-20 MIN: CPT | Performed by: NURSE PRACTITIONER

## 2020-08-13 NOTE — PROGRESS NOTES
TELEMEDICINE - VIDEO VISIT    You have chosen to receive care through a telehealth visit.  Do you consent to use a video/audio connection for your medical care today? Yes    CHIEF COMPLAINT  Back and joint pain    Subjective   Inessa Peoples is a 80 y.o. female  who presents for a video visit follow-up.She has a history of chronic back and joint pain. Reports her pain pattern is WORSE since last evaluation.      Patient presents for follow-up of PROCEDURE. Patient had a left SI +GTB injection performed by Dr. SPARKS on 6-16-20 for management of LOW BACK PAIN. Patient reports 75% relief from the procedure for approximately 2 weeks. She had a stress test on 6-29-20 and her pain worsened since then.  Is still having improved pain in left hip since injection.     Complains of pain in her low back(left worse than right).  Today her pain is 5-6/10VAS. Describes the pain as intermittent aching and pressure, especially with being on feet. Pain increases with walking, standing, household chores, prolonged sitting; pain decreases with changing position, laying down, medication. Continues with Celebrex 200 mg daily. ADL's by self. Denies any bowel or bladder incontinence.     Patient had a bilateral L2-L5 MBB performed by Dr. SPARKS on 6-23-19 for management of LOW BACK PAIN. Patient reports 90% relief from the procedure which lasted a few weeks.  Patient had a bilateral greater trochanteric bursa injection performed by Dr. SPARKS on 6-23-19 for management of hip pain. Patient reports 90% ONGOING relief from the procedure.  She completed a Bilateral L2-5 Lumbar Medial Branch Blockade   on  11/15/18 performed by Dr. Sparks for management of LOW BACK PAIN. Patient reports SIGNIFICANT relief from the procedure. This started wearing off over the past few days.   Was to have second LMBB on 12-6-18 but was still having significant relief. The procedure was cancelled at that time.     She completed a LEFT Sacroiliac Joint  Injection   on  4/26/18 performed by Dr. Means for management of low back pain/sacroiliac joint pain. Reported significant relief previously.  She completed a right SI joint injection   on  9-26-17 performed by Dr. Means for management of low back pain.      Patient had cardiac catheterization. Normal. Following up with cardiology(Dr Reynoso).    Back Pain   This is a chronic problem. The current episode started more than 1 year ago. The problem occurs intermittently. The pain is present in the lumbar spine and sacro-iliac. The quality of the pain is described as aching. The pain does not radiate (left hip, right calf and foot). The pain is at a severity of 5/10. The pain is moderate. The pain is worse during the day. The symptoms are aggravated by bending, position, sitting, standing and twisting (housework). Associated symptoms include bladder incontinence and weakness (right foot). Pertinent negatives include no abdominal pain, bowel incontinence, chest pain, dysuria, fever, headaches or numbness. Risk factors include obesity, menopause, history of cancer and lack of exercise (history of melanoma--no Chemo or RAD). She has tried analgesics and heat (left SI joint injection--significant ongoing relief) for the symptoms. The treatment provided moderate relief.   Hip Pain    The incident occurred more than 1 week ago. There was no injury mechanism. The pain is present in the left hip and right hip. The pain is moderate. The pain has been fluctuating since onset. Associated symptoms include an inability to bear weight and a loss of motion. Pertinent negatives include no numbness. She has tried NSAIDs for the symptoms.      The following portions of the patient's history were reviewed and updated as appropriate: allergies, current medications, past family history, past medical history, past social history, past surgical history and problem list.      Review of Systems   Constitutional: Negative for chills and  fever.   Respiratory: Negative for chest tightness and shortness of breath.    Cardiovascular: Negative for chest pain.   Gastrointestinal: Negative for abdominal pain and bowel incontinence.   Genitourinary: Positive for bladder incontinence. Negative for dysuria.   Musculoskeletal: Positive for back pain.   Neurological: Positive for weakness (right foot). Negative for numbness and headaches.   Psychiatric/Behavioral: Negative for sleep disturbance. The patient is not nervous/anxious.        There were no vitals filed for this visit.    Objective   Physical Exam   Constitutional: She is oriented to person, place, and time.   Pulmonary/Chest: Effort normal.   Neurological: She is alert and oriented to person, place, and time.   Psychiatric: She has a normal mood and affect. Her speech is normal and behavior is normal. Judgment and thought content normal. Cognition and memory are normal.       Assessment/Plan   Diagnoses and all orders for this visit:    Other chronic pain    Bulge of lumbar disc without myelopathy    Sacroiliac pain    Lumbar facet arthropathy    Chronic osteoarthritis      ----------------    Our practice is offering alternative &/or electronic methods to continue to follow our patients while at the same time further the efforts toward social distancing, in accordance with our organizational policies, professional societies' guidance, and gubernatorial mandates.  I support the Healthy at Home campaign and in this visit I have counseled the patient on our needs to limit in-person office visits and physical encounters with medical facilities whenever possible.  I have also educated the patient on the medical necessities of maintaining social distancing while we continue to function during this crisis period.      The patient was counseled on the need to consider telehealth options. The patient was offered the opportunity for a Video Visit using [employer]. The patient agreed to a Video Visit. The  patient had obstacles which preclude consideration for a Video Visit. As a Video Visit was not practical, the patient was offered the option for a Telephone Check-In. The patient agreed to a Telephone Check-In. The patient was counseled on the need for a check-in visit.    TIME:  Total Time:  17 minutes. Topics discussed are outlined in the Assessment/Plan section of the note.      ----------------    --- Referral to PT.  Patient requests [facility] in [place].   --- Discussed chiropractor.  --- Consider updating lumbar MRI. Patient wants to wait at this time.  --- Discussed repeating lumbar MBB. Patient wants to wait at this time.  --- Follow-up 6-8 weeks or sooner if needed.         Unable to complete visit using a video connection to the patient. A phone visit was used to complete this visits. Total time of discussion was 17 minutes.    -------    EMR Dragon/Transcription disclaimer:   Much of this encounter note is an electronic transcription/translation of spoken language to printed text. The electronic translation of spoken language may permit erroneous, or at times, nonsensical words or phrases to be inadvertently transcribed; Although I have reviewed the note for such errors, some may still exist.

## 2020-08-14 ENCOUNTER — TELEPHONE (OUTPATIENT)
Dept: INTERNAL MEDICINE | Facility: CLINIC | Age: 80
End: 2020-08-14

## 2020-08-14 ENCOUNTER — HOSPITAL ENCOUNTER (OUTPATIENT)
Dept: GENERAL RADIOLOGY | Facility: HOSPITAL | Age: 80
Discharge: HOME | End: 2020-08-14
Admitting: INTERNAL MEDICINE

## 2020-08-14 DIAGNOSIS — R13.10 DYSPHAGIA, UNSPECIFIED TYPE: ICD-10-CM

## 2020-08-14 PROCEDURE — 74221 X-RAY XM ESOPHAGUS 2CNTRST: CPT

## 2020-08-14 RX ADMIN — BARIUM SULFATE 135 ML: 980 POWDER, FOR SUSPENSION ORAL at 08:36

## 2020-08-14 RX ADMIN — BARIUM SULFATE 183 ML: 960 POWDER, FOR SUSPENSION ORAL at 08:35

## 2020-08-14 RX ADMIN — ANTACID/ANTIFLATULENT 1 TABLET: 380; 550; 10; 10 GRANULE, EFFERVESCENT ORAL at 08:36

## 2020-08-14 RX ADMIN — BARIUM SULFATE 700 MG: 700 TABLET ORAL at 08:35

## 2020-08-14 NOTE — TELEPHONE ENCOUNTER
----- Message from Noe Reynoso MD sent at 8/6/2020 10:26 AM EDT -----  My first inclination is that the interpretation of the stress test is incorrect.  She does not appear to have coronary disease or an underlying cardiomyopathy.  Her venous and arterial saturation during the catheterization were both within normal limits.  Her arterial sat was 94% on room air and her pulmonary artery saturation was 67%.  I do not believe she has an intracardiac shunt.  It is still possible that she has underlying lung disease accounting for her desaturation.  I also think her obesity is a major contributing factor.  ----- Message -----  From: Gisele Dockery MD  Sent: 8/4/2020   5:40 PM EDT  To: Noe Reynoso MD    You recently did a CATH on this patient.  She had an abnormal stress echo with a decrease in EF from 63 to 40 with a decrease in O2 sats to 80% with exertion.  But CATH was normal.  She has had progressing AHN for some time.  She has seen pulmonary, had  PFTs.  Trying to understand her stress echo results in relation to normal CATH.  She is seeing you in f/u next month.

## 2020-08-17 ENCOUNTER — APPOINTMENT (OUTPATIENT)
Dept: GENERAL RADIOLOGY | Facility: HOSPITAL | Age: 80
End: 2020-08-17

## 2020-08-21 ENCOUNTER — ANTICOAGULATION VISIT (OUTPATIENT)
Dept: INTERNAL MEDICINE | Facility: CLINIC | Age: 80
End: 2020-08-21

## 2020-08-21 DIAGNOSIS — I48.0 PAROXYSMAL ATRIAL FIBRILLATION (HCC): ICD-10-CM

## 2020-08-21 LAB — INR PPP: 2.2 (ref 0.9–1.1)

## 2020-08-21 PROCEDURE — 36416 COLLJ CAPILLARY BLOOD SPEC: CPT | Performed by: INTERNAL MEDICINE

## 2020-08-21 PROCEDURE — 85610 PROTHROMBIN TIME: CPT | Performed by: INTERNAL MEDICINE

## 2020-08-21 PROCEDURE — 93793 ANTICOAG MGMT PT WARFARIN: CPT | Performed by: INTERNAL MEDICINE

## 2020-08-25 ENCOUNTER — OFFICE (OUTPATIENT)
Dept: URBAN - METROPOLITAN AREA CLINIC 66 | Facility: CLINIC | Age: 80
End: 2020-08-25

## 2020-08-25 VITALS
HEART RATE: 69 BPM | TEMPERATURE: 97.5 F | SYSTOLIC BLOOD PRESSURE: 137 MMHG | DIASTOLIC BLOOD PRESSURE: 80 MMHG | WEIGHT: 210 LBS | HEIGHT: 62 IN

## 2020-08-25 DIAGNOSIS — K21.9 GASTRO-ESOPHAGEAL REFLUX DISEASE WITHOUT ESOPHAGITIS: ICD-10-CM

## 2020-08-25 DIAGNOSIS — R06.09 OTHER FORMS OF DYSPNEA: ICD-10-CM

## 2020-08-25 DIAGNOSIS — R13.10 DYSPHAGIA, UNSPECIFIED: ICD-10-CM

## 2020-08-25 DIAGNOSIS — R49.0 DYSPHONIA: ICD-10-CM

## 2020-08-25 PROCEDURE — 99203 OFFICE O/P NEW LOW 30 MIN: CPT | Performed by: NURSE PRACTITIONER

## 2020-08-31 RX ORDER — CELECOXIB 200 MG/1
CAPSULE ORAL
Qty: 90 CAPSULE | Refills: 3 | Status: SHIPPED | OUTPATIENT
Start: 2020-08-31 | End: 2020-09-02

## 2020-09-02 ENCOUNTER — OFFICE VISIT (OUTPATIENT)
Dept: CARDIOLOGY | Facility: CLINIC | Age: 80
End: 2020-09-02

## 2020-09-02 VITALS
WEIGHT: 212 LBS | HEART RATE: 81 BPM | DIASTOLIC BLOOD PRESSURE: 82 MMHG | HEIGHT: 62 IN | SYSTOLIC BLOOD PRESSURE: 156 MMHG | OXYGEN SATURATION: 99 % | BODY MASS INDEX: 39.01 KG/M2

## 2020-09-02 DIAGNOSIS — R06.09 DYSPNEA ON EXERTION: Primary | ICD-10-CM

## 2020-09-02 DIAGNOSIS — I44.7 LBBB (LEFT BUNDLE BRANCH BLOCK): ICD-10-CM

## 2020-09-02 DIAGNOSIS — I48.0 PAROXYSMAL ATRIAL FIBRILLATION (HCC): ICD-10-CM

## 2020-09-02 DIAGNOSIS — E66.01 CLASS 2 SEVERE OBESITY DUE TO EXCESS CALORIES WITH SERIOUS COMORBIDITY AND BODY MASS INDEX (BMI) OF 37.0 TO 37.9 IN ADULT (HCC): ICD-10-CM

## 2020-09-02 PROCEDURE — 93000 ELECTROCARDIOGRAM COMPLETE: CPT | Performed by: INTERNAL MEDICINE

## 2020-09-02 PROCEDURE — 99214 OFFICE O/P EST MOD 30 MIN: CPT | Performed by: INTERNAL MEDICINE

## 2020-09-02 RX ORDER — MELOXICAM 15 MG/1
15 TABLET ORAL DAILY
Qty: 30 TABLET | Refills: 11 | Status: SHIPPED | OUTPATIENT
Start: 2020-09-02 | End: 2020-11-23

## 2020-09-02 NOTE — PROGRESS NOTES
Date of Office Visit: 2020    Patient Name: Inessa Peoples  : 1940    Encounter Provider: Noe Reynoso MD  Referring Provider: Gisele Dockery MD  Place of Service: [facility] CARDIOLOGY  Patient Care Team:  Gisele Dockery MD as PCP - General (Internal Medicine)  Micheline Castillo APRN as PCP - Claims Attributed      Chief Complaint   Patient presents with   • Atrial Fibrillation     History of Present Illness    The patient is a 80-year-old white female who returns to the office today after her recent catheterization.    The patient has a history of atrial fibrillation and has been on sotalol for a number of years.  She does not report any recurrence.  She has follow-up with Dr. Stone and his notes indicate the same.  Recently she has been complaining of progressive exertional dyspnea.  With any physical activity she states she needs to stop because of severe shortness of breath.  A stress echocardiogram was performed in  of this year which was abnormal indicating a significant decrease in left ventricular function with exercise.  She underwent a catheterization and was found to have normal coronary arteries, normal left ventricular function other than that induced by left bundle branch block and normal intracardiac pressures.  A contributing factor is her weight.  She remains severely overweight with her BMI approaching 40.  Past Medical History:   Diagnosis Date   • Abnormal ECG    • Allergic rhinitis    • Atrial fibrillation (CMS/HCC)    • Bleeds easily (CMS/HCC)     warfarin   • Breast screening    • Broken bones    • Calf DVT (deep venous thrombosis) (CMS/HCC)    • Chronic anticoagulation    • Colon polyp    • CTS (carpal tunnel syndrome)    • Deep vein thrombosis (CMS/HCC)     left calf   • Diverticulitis of colon    • AHN (dyspnea on exertion)    • Fatigue    • H/O malignant melanoma of skin    • Hematuria, gross    • History of colon  polyps    • History of kidney stones    • Hyperlipidemia    • Hypertension    • Hyperthyroidism    • IFG (impaired fasting glucose)    • Insomnia    • LBBB (left bundle branch block)    • Left leg swelling    • LLQ abdominal pain    • Long term current use of systemic steroids    • Low back pain    • Malignant melanoma of skin (CMS/HCC)    • Neck pain    • Obesity     class 2 obesity due to excess calories with BMI of 35.0 to 35.9 in adult   • Osteoarthritis    • Osteopenia    • Paralysis, diaphragm    • Paroxysmal atrial fibrillation (CMS/HCC)    • 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 Lu   • Sleep apnea     USES CPAP   • UTI (urinary tract infection)    • Visit for screening mammogram          Past Surgical History:   Procedure Laterality Date   • CARDIAC CATHETERIZATION N/A 6/29/2020    Procedure: Coronary angiography, [place] L;  Surgeon: Noe Reynoso MD;  Location:  [facility];  Service: Cardiovascular;  Laterality: N/A;   • CARDIAC CATHETERIZATION N/A 6/29/2020    Procedure: Left ventriculography;  Surgeon: Noe Reynoso MD;  Location:  [facility];  Service: Cardiovascular;  Laterality: N/A;   • CARDIAC CATHETERIZATION N/A 6/29/2020    Procedure: Right and Left Heart Cath;  Surgeon: Noe Reynoso MD;  Location:  [facility];  Service: Cardiovascular;  Laterality: N/A;   • CARDIAC CATHETERIZATION     • CARPAL TUNNEL RELEASE Bilateral 1980'S    [facility]GE[facility]   • CATARACT EXTRACTION Bilateral 2001    Dr. Nic Hidalgo   • COLONOSCOPY N/A 06/26/2013    MILD SIGMOID DIVERTICULOSIS, repeat 5 years based on sister w/ colona cancer-DR. TANISHA THORNTON   • COLONOSCOPY N/A 10/19/2010    SIGMOID DIVERTICULOSIS & INTERNAL HEMORRHOIDS, HEMORRHOIDAL BANDING X4, DR. TANISHA THORNTON   •  COLONOSCOPY N/A 8/6/2018    Procedure: COLONOSCOPY TO CECUM AND INTO TERMINAL ILEUM;  Surgeon: Nasra Castro MD;  Location: [facility] ENDOSCOPY;  Service: Gastroenterology   • COLONOSCOPY W/ BIOPSIES N/A 05/12/2008    RECTUM BIOPSY: COLONIC MUCOSA W/ FOCAL INCREASE OF INFLAMMATORY CELLS IN THE MUSCULARIS MUCOSA INCLUDING EOSINOPHILS, SIGMOID DIVERTICULOSIS, POSSIBLE PROCTITIS, DR. NASRA CASTRO   • CYSTOSCOPY, RETROGRADE PYELOGRAM AND STENT INSERTION Bilateral 10/10/2014    w/ Right ureteral pyeloscopy & laser lithotripsy, Right Double-J stent placement-Dr. Julio Tai   • EXTRACORPOREAL SHOCK WAVE LITHOTRIPSY (ESWL) Right 09/10/2009    DR. JULIO TAI   • HAMMER TOE REPAIR     • LAPAROSCOPIC CHOLECYSTECTOMY N/A 07/19/2012    DR. NASRA CASTRO   • LUMBAR EPIDURAL INJECTION N/A 04/23/2015    LUMBAR EPIDURAL STEROID INJECTION X3   • PARATHYROIDECTOMY N/A 2001   • SKIN CANCER EXCISION     • THUMB ARTHROSCOPY Right 1982   • TOTAL KNEE ARTHROPLASTY Right 03/25/2014    [employer] total knee, 55 femoral component, 71 tibial tray w/ a 40 mm stem, 34 mm x 7.8 mm three-peg patella, and a 14 standard poly-Dr. Ty Canchola           Current Outpatient Medications:   •  Calcium-Phosphorus-Vitamin D (CITRACAL +D3 PO), Take 1 tablet by mouth Daily., Disp: , Rfl:   •  ezetimibe (ZETIA) 10 MG tablet, TAKE ONE TABLET BY MOUTH DAILY (Patient taking differently: Take 10 mg by mouth Daily.), Disp: 90 tablet, Rfl: 3  •  fexofenadine (ALLEGRA) 180 MG tablet, Take 180 mg by mouth Daily., Disp: , Rfl:   •  fluorouracil (EFUDEX) 5 % cream, Apply  topically to the appropriate area as directed Daily., Disp: , Rfl:   •  levothyroxine (SYNTHROID, LEVOTHROID) 25 MCG tablet, TAKE ONE TABLET BY MOUTH DAILY (Patient taking differently: Take 25 mcg by mouth Daily.), Disp: 90 tablet, Rfl: 1  •  losartan (COZAAR) 100 MG tablet, Take 1 tablet by mouth Daily., Disp: 90 tablet, Rfl: 3  •  melatonin 5 MG sublingual tablet sublingual  tablet, Take 5 mg by mouth Every Night., Disp: , Rfl:   •  meloxicam (MOBIC) 15 MG tablet, Take 1 tablet by mouth Daily., Disp: 30 tablet, Rfl: 11  •  methocarbamol (ROBAXIN) 750 MG tablet, Take 750 mg by mouth Every Night., Disp: , Rfl:   •  Multiple Vitamin (MULTI-DAY VITAMINS) tablet, Take 1 tablet by mouth daily., Disp: , Rfl:   •  spironolactone (ALDACTONE) 25 MG tablet, Take 1 tablet by mouth Daily., Disp: 90 tablet, Rfl: 3  •  Triamcinolone Acetonide (NASACORT) 55 MCG/ACT nasal inhaler, 2 sprays into the nostril(s) as directed by provider Daily., Disp: , Rfl:   •  warfarin (COUMADIN) 3 MG tablet, TAKE ONE TABLET BY MOUTH DAILY (Patient taking differently: Take 3 mg by mouth Every Night.), Disp: 90 tablet, Rfl: 1      Social History     Socioeconomic History   • Marital status:      Spouse name: EDY   • Number of children: 4   • Years of education: 14yrs   • Highest education level: Not on file   Occupational History   • Occupation: RETIRED   Tobacco Use   • Smoking status: Never Smoker   • Smokeless tobacco: Never Used   Substance and Sexual Activity   • Alcohol use: Yes     Comment: social drinker/caffeine use    • Drug use: No   • Sexual activity: Defer         Review of Systems   Constitution: Negative.   HENT: Negative.    Eyes: Negative.    Cardiovascular: Positive for dyspnea on exertion.   Respiratory: Negative.    Endocrine: Negative.    Skin: Negative.    Musculoskeletal: Negative.    Gastrointestinal: Negative.    Neurological: Negative.    Psychiatric/Behavioral: Negative.        Procedures      ECG 12 Lead  Date/Time: 9/2/2020 10:31 AM  Performed by: Noe Ryenoso MD  Authorized by: Noe Reynoso MD   Comparison: compared with previous ECG from 5/15/2020  Similar to previous ECG  Rhythm: sinus rhythm  Rate: normal  Conduction: left bundle branch block  QRS axis: left                  Objective:    /82 (BP Location: Right arm, Patient Position: Sitting, Cuff Size: Large  "Adult)   Pulse 81   Ht 157.5 cm (62\")   Wt 96.2 kg (212 lb)   SpO2 99%   BMI 38.78 kg/m²         Physical Exam   Constitutional: She is oriented to person, place, and time. She appears well-developed and well-nourished.   HENT:   Head: Normocephalic.   Eyes: Pupils are equal, round, and reactive to light.   Neck: Normal range of motion. No JVD present. Carotid bruit is not present. No thyromegaly present.   Cardiovascular: Normal rate, regular rhythm, S1 normal, S2 normal, normal heart sounds and intact distal pulses. Exam reveals no gallop and no friction rub.   No murmur heard.  Pulmonary/Chest: Effort normal and breath sounds normal.   Abdominal: Soft. Bowel sounds are normal.   Musculoskeletal: She exhibits no edema.   Neurological: She is alert and oriented to person, place, and time.   Skin: Skin is warm, dry and intact. No erythema.   Psychiatric: She has a normal mood and affect.   Vitals reviewed.          Assessment:       Diagnosis Plan   1. Dyspnea on exertion     2. Paroxysmal atrial fibrillation (CMS/HCC)     3. LBBB (left bundle branch block)     4. Class 2 severe obesity due to excess calories with serious comorbidity and body mass index (BMI) of 37.0 to 37.9 in adult (CMS/HCC)       1.  Exertional dyspnea: I believe this is multifactorial.  She does not have coronary disease.  She does not have pulmonary hypertension.  Obesity is certainly a factor.  She does have an abnormal CT scan demonstrating some bronchiectasis and atelectasis.  In addition she may be becoming intolerant to the sotalol.  2.  Paroxysmal atrial fibrillation: No recurrence in 3 years.  She remains anticoagulated with warfarin not only for PAF but also for history of deep venous thrombosis.  I am going to have her stop the sotalol because of a possible intolerance of the drug.  She will remain on warfarin.  She will let me know if she has recurrence.  3.  Left bundle branch block: Chronic       Plan:         "

## 2020-09-18 ENCOUNTER — ANTICOAGULATION VISIT (OUTPATIENT)
Dept: INTERNAL MEDICINE | Facility: CLINIC | Age: 80
End: 2020-09-18

## 2020-09-18 DIAGNOSIS — I48.0 PAROXYSMAL ATRIAL FIBRILLATION (HCC): ICD-10-CM

## 2020-09-18 DIAGNOSIS — Z23 NEED FOR VACCINATION: Primary | ICD-10-CM

## 2020-09-18 LAB — INR PPP: 2.5 (ref 0.9–1.1)

## 2020-09-18 PROCEDURE — 36416 COLLJ CAPILLARY BLOOD SPEC: CPT | Performed by: INTERNAL MEDICINE

## 2020-09-18 PROCEDURE — 90694 VACC AIIV4 NO PRSRV 0.5ML IM: CPT | Performed by: INTERNAL MEDICINE

## 2020-09-18 PROCEDURE — 85610 PROTHROMBIN TIME: CPT | Performed by: INTERNAL MEDICINE

## 2020-09-18 PROCEDURE — G0008 ADMIN INFLUENZA VIRUS VAC: HCPCS | Performed by: INTERNAL MEDICINE

## 2020-09-22 ENCOUNTER — TRANSCRIBE ORDERS (OUTPATIENT)
Dept: SLEEP MEDICINE | Facility: HOSPITAL | Age: 80
End: 2020-09-22

## 2020-09-22 DIAGNOSIS — Z01.818 OTHER SPECIFIED PRE-OPERATIVE EXAMINATION: Primary | ICD-10-CM

## 2020-09-27 RX ORDER — LEVOTHYROXINE SODIUM 0.03 MG/1
TABLET ORAL
Qty: 90 TABLET | Refills: 0 | Status: SHIPPED | OUTPATIENT
Start: 2020-09-27 | End: 2020-12-07 | Stop reason: SDUPTHER

## 2020-09-28 ENCOUNTER — LAB (OUTPATIENT)
Dept: LAB | Facility: HOSPITAL | Age: 80
End: 2020-09-28

## 2020-09-28 DIAGNOSIS — Z01.818 OTHER SPECIFIED PRE-OPERATIVE EXAMINATION: ICD-10-CM

## 2020-09-28 PROCEDURE — C9803 HOPD COVID-19 SPEC COLLECT: HCPCS

## 2020-09-28 PROCEDURE — U0004 COV-19 TEST NON-CDC HGH THRU: HCPCS

## 2020-09-29 LAB — SARS-COV-2 RNA RESP QL NAA+PROBE: NOT DETECTED

## 2020-09-30 ENCOUNTER — HOSPITAL ENCOUNTER (OUTPATIENT)
Facility: HOSPITAL | Age: 80
Setting detail: HOSPITAL OUTPATIENT SURGERY
Discharge: HOME | End: 2020-09-30
Attending: INTERNAL MEDICINE | Admitting: INTERNAL MEDICINE

## 2020-09-30 VITALS — HEIGHT: 62 IN | WEIGHT: 211.2 LBS | BODY MASS INDEX: 38.87 KG/M2

## 2020-09-30 PROCEDURE — G0463 HOSPITAL OUTPT CLINIC VISIT: HCPCS | Performed by: INTERNAL MEDICINE

## 2020-09-30 RX ORDER — SODIUM CHLORIDE, SODIUM LACTATE, POTASSIUM CHLORIDE, CALCIUM CHLORIDE 600; 310; 30; 20 MG/100ML; MG/100ML; MG/100ML; MG/100ML
1000 INJECTION, SOLUTION INTRAVENOUS CONTINUOUS
Status: DISCONTINUED | OUTPATIENT
Start: 2020-09-30 | End: 2020-09-30 | Stop reason: HOSPADM

## 2020-10-05 RX ORDER — WARFARIN SODIUM 3 MG/1
TABLET ORAL
Qty: 90 TABLET | Refills: 0 | Status: SHIPPED | OUTPATIENT
Start: 2020-10-05 | End: 2020-12-07 | Stop reason: SDUPTHER

## 2020-10-14 ENCOUNTER — OFFICE VISIT (OUTPATIENT)
Dept: SLEEP MEDICINE | Facility: HOSPITAL | Age: 80
End: 2020-10-14

## 2020-10-14 VITALS — HEIGHT: 63 IN | WEIGHT: 213 LBS | BODY MASS INDEX: 37.74 KG/M2

## 2020-10-14 DIAGNOSIS — Z99.89 OSA ON CPAP: Primary | Chronic | ICD-10-CM

## 2020-10-14 DIAGNOSIS — E66.01 CLASS 2 SEVERE OBESITY DUE TO EXCESS CALORIES WITH SERIOUS COMORBIDITY AND BODY MASS INDEX (BMI) OF 38.0 TO 38.9 IN ADULT (HCC): ICD-10-CM

## 2020-10-14 DIAGNOSIS — G47.33 OSA ON CPAP: Primary | Chronic | ICD-10-CM

## 2020-10-14 PROCEDURE — G0463 HOSPITAL OUTPT CLINIC VISIT: HCPCS

## 2020-10-14 PROCEDURE — 99213 OFFICE O/P EST LOW 20 MIN: CPT | Performed by: INTERNAL MEDICINE

## 2020-10-14 NOTE — PROGRESS NOTES
"Follow Up [facility] Disorders Center Note     Chief Complaint:  ANY     Primary Care Physician: Gisele Dockery MD    Interval History:   The patient is a 80 y.o. female who I last saw 1 year ago.  She states she is unchanged.  She goes to bed at 11 PM and awakens at 9:30 AM.  She will use the restroom during the nighttime.  [place] Sleepiness Scale is normal at 0    Earlier this year, the patient did have foot surgery.  The patient has a history of sarcoid.  She is having some increasing shortness of breath with exertion which could be related to decreased activity but she wishes to get pulmonary evaluation because of her history.    Review of Systems:    A complete review of systems was done and all were negative with the exception of postnasal drip, and shortness of breath with some wheezing    Social History:    Social History     Socioeconomic History   • Marital status:      Spouse name: EDY   • Number of children: 4   • Years of education: 14yrs   • Highest education level: Not on file   Occupational History   • Occupation: RETIRED   Tobacco Use   • Smoking status: Never Smoker   • Smokeless tobacco: Never Used   Substance and Sexual Activity   • Alcohol use: Yes     Comment: social drinker/caffeine use    • Drug use: No   • Sexual activity: Defer       Allergies:  Iodinated diagnostic agents, Fenofibrate, Livalo [pitavastatin], Plaquenil [hydroxychloroquine sulfate], and Statins     Medication Review:  Reviewed.      Vital Signs:    Vitals:    10/14/20 1100   Weight: 96.6 kg (213 lb)   Height: 158.8 cm (62.5\")     Body mass index is 38.34 kg/m².    Physical Exam:    Constitutional:  Well developed 80 y.o. female that appears in no apparent distress.  Awake & oriented times 3.  Normal mood with normal recent and remote memory and normal judgement.  Eyes:  Conjunctivae normal.  Oropharynx: Previously, moist mucous membranes without exudate and a large tongue and normal uvula and moderate narrowing of " the posterior pharyngeal opening, patient is wearing a facemask.     Results Review:  DME is [facility] and she uses a [facility].  Downloads between 7/15 and 10/12/2020 compliance 100%.  Average usage is 9 hours and 7 minutes.  Average AHI is normal without leak. CPAP pressure is +10.    Impression:   Obstructive sleep apnea adequately treated with CPAP +10 with good compliance and usage and no complaints of hypersomnolence.    Plan:  Good sleep hygiene measures should be maintained.  Weight loss would be beneficial in this patient who is obese by BMI.  The patient is benefiting from the treatment being provided.     The patient will continue CPAP +10 and a new prescription will be sent to her DME    I will help arrange pulmonary evaluation.    The patient will call for any problems and will follow up in 1 year.      Jewel Lu MD  Sleep Medicine  10/14/20  11:58 EDT

## 2020-10-23 ENCOUNTER — ANTICOAGULATION VISIT (OUTPATIENT)
Dept: INTERNAL MEDICINE | Facility: CLINIC | Age: 80
End: 2020-10-23

## 2020-10-23 DIAGNOSIS — I48.0 PAROXYSMAL ATRIAL FIBRILLATION (HCC): ICD-10-CM

## 2020-10-23 LAB — INR PPP: 2.5 (ref 0.9–1.1)

## 2020-10-23 PROCEDURE — 36416 COLLJ CAPILLARY BLOOD SPEC: CPT | Performed by: INTERNAL MEDICINE

## 2020-10-23 PROCEDURE — 85610 PROTHROMBIN TIME: CPT | Performed by: INTERNAL MEDICINE

## 2020-10-28 ENCOUNTER — TRANSCRIBE ORDERS (OUTPATIENT)
Dept: SLEEP MEDICINE | Facility: HOSPITAL | Age: 80
End: 2020-10-28

## 2020-10-28 DIAGNOSIS — Z01.818 OTHER SPECIFIED PRE-OPERATIVE EXAMINATION: Primary | ICD-10-CM

## 2020-11-09 ENCOUNTER — LAB (OUTPATIENT)
Dept: LAB | Facility: HOSPITAL | Age: 80
End: 2020-11-09

## 2020-11-09 DIAGNOSIS — Z01.818 OTHER SPECIFIED PRE-OPERATIVE EXAMINATION: ICD-10-CM

## 2020-11-09 PROCEDURE — U0004 COV-19 TEST NON-CDC HGH THRU: HCPCS

## 2020-11-09 PROCEDURE — C9803 HOPD COVID-19 SPEC COLLECT: HCPCS

## 2020-11-10 LAB — SARS-COV-2 RNA RESP QL NAA+PROBE: NOT DETECTED

## 2020-11-11 ENCOUNTER — HOSPITAL ENCOUNTER (OUTPATIENT)
Facility: HOSPITAL | Age: 80
Setting detail: HOSPITAL OUTPATIENT SURGERY
Discharge: HOME | End: 2020-11-11
Attending: INTERNAL MEDICINE | Admitting: INTERNAL MEDICINE

## 2020-11-11 ENCOUNTER — ON CAMPUS - OUTPATIENT (OUTPATIENT)
Dept: URBAN - METROPOLITAN AREA HOSPITAL 114 | Facility: HOSPITAL | Age: 80
End: 2020-11-11

## 2020-11-11 ENCOUNTER — ANESTHESIA EVENT (OUTPATIENT)
Dept: GASTROENTEROLOGY | Facility: HOSPITAL | Age: 80
End: 2020-11-11

## 2020-11-11 ENCOUNTER — ANESTHESIA (OUTPATIENT)
Dept: GASTROENTEROLOGY | Facility: HOSPITAL | Age: 80
End: 2020-11-11

## 2020-11-11 VITALS
HEART RATE: 71 BPM | HEIGHT: 61 IN | SYSTOLIC BLOOD PRESSURE: 156 MMHG | BODY MASS INDEX: 40.23 KG/M2 | RESPIRATION RATE: 16 BRPM | WEIGHT: 213.1 LBS | TEMPERATURE: 97.9 F | DIASTOLIC BLOOD PRESSURE: 85 MMHG | OXYGEN SATURATION: 96 %

## 2020-11-11 DIAGNOSIS — K22.2 ESOPHAGEAL OBSTRUCTION: ICD-10-CM

## 2020-11-11 DIAGNOSIS — K21.00 GASTRO-ESOPHAGEAL REFLUX DISEASE WITH ESOPHAGITIS, WITHOUT B: ICD-10-CM

## 2020-11-11 DIAGNOSIS — K22.8 OTHER SPECIFIED DISEASES OF ESOPHAGUS: ICD-10-CM

## 2020-11-11 DIAGNOSIS — R10.13 EPIGASTRIC PAIN: ICD-10-CM

## 2020-11-11 DIAGNOSIS — R10.13 DYSPEPSIA: ICD-10-CM

## 2020-11-11 DIAGNOSIS — K29.50 UNSPECIFIED CHRONIC GASTRITIS WITHOUT BLEEDING: ICD-10-CM

## 2020-11-11 DIAGNOSIS — R13.10 DYSPHAGIA: ICD-10-CM

## 2020-11-11 DIAGNOSIS — R13.14 DYSPHAGIA, PHARYNGOESOPHAGEAL PHASE: ICD-10-CM

## 2020-11-11 PROCEDURE — 88342 IMHCHEM/IMCYTCHM 1ST ANTB: CPT | Performed by: INTERNAL MEDICINE

## 2020-11-11 PROCEDURE — 43239 EGD BIOPSY SINGLE/MULTIPLE: CPT | Performed by: INTERNAL MEDICINE

## 2020-11-11 PROCEDURE — 88305 TISSUE EXAM BY PATHOLOGIST: CPT | Performed by: INTERNAL MEDICINE

## 2020-11-11 PROCEDURE — 43450 DILATE ESOPHAGUS 1/MULT PASS: CPT | Performed by: INTERNAL MEDICINE

## 2020-11-11 PROCEDURE — 25010000002 PROPOFOL 10 MG/ML EMULSION: Performed by: ANESTHESIOLOGY

## 2020-11-11 RX ORDER — PROPOFOL 10 MG/ML
VIAL (ML) INTRAVENOUS CONTINUOUS PRN
Status: DISCONTINUED | OUTPATIENT
Start: 2020-11-11 | End: 2020-11-11 | Stop reason: SURG

## 2020-11-11 RX ORDER — SODIUM CHLORIDE 0.9 % (FLUSH) 0.9 %
10 SYRINGE (ML) INJECTION AS NEEDED
Status: DISCONTINUED | OUTPATIENT
Start: 2020-11-11 | End: 2020-11-11 | Stop reason: HOSPADM

## 2020-11-11 RX ORDER — PROPOFOL 10 MG/ML
VIAL (ML) INTRAVENOUS AS NEEDED
Status: DISCONTINUED | OUTPATIENT
Start: 2020-11-11 | End: 2020-11-11 | Stop reason: SURG

## 2020-11-11 RX ORDER — SODIUM CHLORIDE 0.9 % (FLUSH) 0.9 %
3 SYRINGE (ML) INJECTION EVERY 12 HOURS SCHEDULED
Status: DISCONTINUED | OUTPATIENT
Start: 2020-11-11 | End: 2020-11-11 | Stop reason: HOSPADM

## 2020-11-11 RX ORDER — LIDOCAINE HYDROCHLORIDE 20 MG/ML
INJECTION, SOLUTION INFILTRATION; PERINEURAL AS NEEDED
Status: DISCONTINUED | OUTPATIENT
Start: 2020-11-11 | End: 2020-11-11 | Stop reason: SURG

## 2020-11-11 RX ORDER — SODIUM CHLORIDE, SODIUM LACTATE, POTASSIUM CHLORIDE, CALCIUM CHLORIDE 600; 310; 30; 20 MG/100ML; MG/100ML; MG/100ML; MG/100ML
30 INJECTION, SOLUTION INTRAVENOUS CONTINUOUS PRN
Status: DISCONTINUED | OUTPATIENT
Start: 2020-11-11 | End: 2020-11-11 | Stop reason: HOSPADM

## 2020-11-11 RX ADMIN — SODIUM CHLORIDE, POTASSIUM CHLORIDE, SODIUM LACTATE AND CALCIUM CHLORIDE 30 ML/HR: 600; 310; 30; 20 INJECTION, SOLUTION INTRAVENOUS at 07:37

## 2020-11-11 RX ADMIN — PROPOFOL 140 MCG/KG/MIN: 10 INJECTION, EMULSION INTRAVENOUS at 08:30

## 2020-11-11 RX ADMIN — PROPOFOL 120 MG: 10 INJECTION, EMULSION INTRAVENOUS at 08:30

## 2020-11-11 RX ADMIN — LIDOCAINE HYDROCHLORIDE 40 MG: 20 INJECTION, SOLUTION INFILTRATION; PERINEURAL at 08:30

## 2020-11-11 NOTE — ANESTHESIA PREPROCEDURE EVALUATION
Anesthesia Evaluation     no history of anesthetic complications:               Airway   Mallampati: II  TM distance: >3 FB  Neck ROM: full  Dental - normal exam     Pulmonary    (+) shortness of breath, sleep apnea,   (-) not a smoker    ROS comment: Paralysis of diaghram  Cardiovascular     (+) hypertension, dysrhythmias Atrial Fib, AHN, DVT, hyperlipidemia,     ROS comment: LBBB RBBB    Neuro/Psych  (-) seizures, dizziness/light headedness, syncope  GI/Hepatic/Renal/Endo    (+) morbid obesity, GERD,  renal disease,     Musculoskeletal     Abdominal    Substance History      OB/GYN          Other   autoimmune disease (sarcoid) ,                    Anesthesia Plan    ASA 3     MAC     intravenous induction     Anesthetic plan, all risks, benefits, and alternatives have been provided, discussed and informed consent has been obtained with: patient.

## 2020-11-11 NOTE — ANESTHESIA POSTPROCEDURE EVALUATION
"Patient: Inessa Peoples    Procedure Summary     Date: 11/11/20 Room / [facility]:  [facility]    Anesthesia Start: 0827 Anesthesia Stop: 0854    Procedure: ESOPHAGOGASTRODUODENOSCOPY with biopsies and dilation 54fr fernández (N/A Esophagus) Diagnosis:     Surgeon: Garret Piedra MD Provider: Shanon aTi MD    Anesthesia Type: MAC ASA Status: 3          Anesthesia Type: MAC    Vitals  Vitals Value Taken Time   /97 11/11/20 0900   Temp     Pulse 79 11/11/20 0900   Resp 16 11/11/20 0900   SpO2 97 % 11/11/20 0900           Post Anesthesia Care and Evaluation    Patient location during evaluation: PHASE II  Patient participation: complete - patient participated  Level of consciousness: awake and alert  Pain management: adequate  Airway patency: patent  Anesthetic complications: No anesthetic complications    Cardiovascular status: acceptable  Respiratory status: acceptable  Hydration status: acceptable    Comments: /97 (BP Location: Left arm, Patient Position: Sitting)   Pulse 79   Temp 36.6 °C (97.9 °F) (Oral)   Resp 16   Ht 153.7 cm (60.5\")   Wt 96.7 kg (213 lb 1.6 oz)   SpO2 97%   BMI 40.93 kg/m²         "

## 2020-11-11 NOTE — DISCHARGE INSTRUCTIONS
For the next 24 hours patient needs to be with a responsible adult.    For 24 hours DO NOT drive, operate machinery, appliances, drink alcohol, make important decisions or sign legal documents.    Start with a light or bland diet and advance to regular diet as tolerated.    Follow recommendations on procedure report provided by your doctor    Call Dr Piedra for problems [phone]    Problems may include but not limited to: large amounts of bleeding, trouble breathing, repeated vomiting, severe unrelieved pain, fever or chills.

## 2020-11-11 NOTE — H&P
Patient Care Team:  Gisele Dockery MD as PCP - General (Internal Medicine)    Chief complaint DIFFICULTY SWALLOWING      Subjective   She states she has issues with solid food, some abdominal discomfort with eating in the upper abdomen. No nausea or vomiting.  She states she gets very short of breath with exertion , and her voice will get raspy when short of breath   History of Present Illness    Review of Systems   HENT: Positive for trouble swallowing.    Respiratory: Positive for shortness of breath.    Gastrointestinal: Positive for abdominal pain.        Past Medical History:   Diagnosis Date   • Abnormal ECG    • Allergic rhinitis    • Atrial fibrillation (CMS/HCC)    • Bleeds easily (CMS/HCC)     warfarin   • Breast screening    • Broken bones    • Calf DVT (deep venous thrombosis) (CMS/HCC)    • Chronic anticoagulation    • Colon polyp    • CTS (carpal tunnel syndrome)    • Deep vein thrombosis (CMS/HCC)     left calf   • Diverticulitis of colon    • AHN (dyspnea on exertion)    • Fatigue    • H/O malignant melanoma of skin    • Hematuria, gross    • History of colon polyps    • History of kidney stones    • Hyperlipidemia    • Hypertension    • Hyperthyroidism    • IFG (impaired fasting glucose)    • Insomnia    • LBBB (left bundle branch block)    • Left leg swelling    • LLQ abdominal pain    • Long term current use of systemic steroids    • Low back pain    • Malignant melanoma of skin (CMS/HCC)    • Neck pain    • Obesity     class 2 obesity due to excess calories with BMI of 35.0 to 35.9 in adult   • Osteoarthritis    • Osteopenia    • Paralysis, diaphragm    • Paroxysmal atrial fibrillation (CMS/HCC)    • 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 Lu   • Sleep apnea     USES CPAP   • UTI (urinary tract infection)    • Visit for screening  mammogram      Past Surgical History:   Procedure Laterality Date   • CARDIAC CATHETERIZATION N/A 6/29/2020    Procedure: Coronary angiography, [place];  Surgeon: Noe Reynoso MD;  Location:  [facility];  Service: Cardiovascular;  Laterality: N/A;   • CARDIAC CATHETERIZATION N/A 6/29/2020    Procedure: Left ventriculography;  Surgeon: Noe Reynoso MD;  Location:  [facility];  Service: Cardiovascular;  Laterality: N/A;   • CARDIAC CATHETERIZATION N/A 6/29/2020    Procedure: Right and Left Heart Cath;  Surgeon: Noe Reynoso MD;  Location:  [facility];  Service: Cardiovascular;  Laterality: N/A;   • CARDIAC CATHETERIZATION     • CARPAL TUNNEL RELEASE Bilateral 1980'S    [facility] SURGEONS   • CATARACT EXTRACTION Bilateral 2001    Dr. Nic Hidalgo   • COLONOSCOPY N/A 06/26/2013    MILD SIGMOID DIVERTICULOSIS, repeat 5 years based on sister w/ colona cancer-DR. NASRA CASTRO   • COLONOSCOPY N/A 10/19/2010    SIGMOID DIVERTICULOSIS & INTERNAL HEMORRHOIDS, HEMORRHOIDAL BANDING X4, DR. NASRA CASTRO   • COLONOSCOPY N/A 8/6/2018    Procedure: COLONOSCOPY TO CECUM AND INTO TERMINAL ILEUM;  Surgeon: Nasra Castro MD;  Location: [facility] ENDOSCOPY;  Service: Gastroenterology   • COLONOSCOPY W/ BIOPSIES N/A 05/12/2008    RECTUM BIOPSY: COLONIC MUCOSA W/ FOCAL INCREASE OF INFLAMMATORY CELLS IN THE MUSCULARIS MUCOSA INCLUDING EOSINOPHILS, SIGMOID DIVERTICULOSIS, POSSIBLE PROCTITIS, DR. NASRA CASTRO   • CYSTOSCOPY, RETROGRADE PYELOGRAM AND STENT INSERTION Bilateral 10/10/2014    w/ Right ureteral pyeloscopy & laser lithotripsy, Right Double-J stent placement-Dr. Julio Tai   • EXTRACORPOREAL SHOCK WAVE LITHOTRIPSY (ESWL) Right 09/10/2009    DR. JULIO TAI   • HAMMER TOE REPAIR     • LAPAROSCOPIC CHOLECYSTECTOMY N/A 07/19/2012    DR. NASRA CASTRO   • LUMBAR EPIDURAL INJECTION N/A 04/23/2015    LUMBAR EPIDURAL STEROID INJECTION X3   •  PARATHYROIDECTOMY N/A 2001   • SKIN CANCER EXCISION     • THUMB ARTHROSCOPY Right 1982   • TOTAL KNEE ARTHROPLASTY Right 03/25/2014    [employer] total knee, 55 femoral component, 71 tibial tray w/ a 40 mm stem, 34 mm x 7.8 mm three-peg patella, and a 14 standard poly-DrUriel Canchola     Family History   Problem Relation Age of Onset   • Heart disease Mother    • Diabetes type II Mother    • Heart disease Father    • Cancer Sister    • Colon cancer Sister    • Colon polyps Sister    • Lung cancer Sister    • Heart disease Brother         CABG   • Diabetes type II Brother    • Colon cancer Brother    • Colon polyps Brother    • Arrhythmia Brother    • Heart disease Sister    • Diabetes type II Sister    • Aortic aneurysm Sister    • COPD Sister    • Arrhythmia Sister    • Cerebral aneurysm Sister    • COPD Sister    • Lupus Sister    • Asthma Maternal Aunt    • Aortic aneurysm Brother      Social History     Tobacco Use   • Smoking status: Never Smoker   • Smokeless tobacco: Never Used   Substance Use Topics   • Alcohol use: Yes     Comment: social drinker/caffeine use    • Drug use: No     E-cigarette/Vaping     E-cigarette/Vaping Substances     Medications Prior to Admission   Medication Sig Dispense Refill Last Dose   • Calcium-Phosphorus-Vitamin D (CITRACAL +D3 PO) Take 1 tablet by mouth Daily.   11/10/2020 at Unknown time   • ezetimibe (ZETIA) 10 MG tablet TAKE ONE TABLET BY MOUTH DAILY (Patient taking differently: Take 10 mg by mouth Daily.) 90 tablet 3 11/10/2020 at Unknown time   • fexofenadine (ALLEGRA) 180 MG tablet Take 180 mg by mouth Daily.   11/10/2020 at Unknown time   • fluorouracil (EFUDEX) 5 % cream Apply  topically to the appropriate area as directed Daily.   Past Month at Unknown time   • levothyroxine (SYNTHROID, LEVOTHROID) 25 MCG tablet TAKE ONE TABLET BY MOUTH DAILY 90 tablet 0 11/10/2020 at Unknown time   • losartan (COZAAR) 100 MG tablet Take 1 tablet by mouth Daily. 90 tablet 3  11/10/2020 at Unknown time   • melatonin 5 MG sublingual tablet sublingual tablet Take 5 mg by mouth Every Night.   11/10/2020 at Unknown time   • meloxicam (MOBIC) 15 MG tablet Take 1 tablet by mouth Daily. 30 tablet 11 11/10/2020 at Unknown time   • Multiple Vitamin (MULTI-DAY VITAMINS) tablet Take 1 tablet by mouth daily.   11/10/2020 at Unknown time   • spironolactone (ALDACTONE) 25 MG tablet Take 1 tablet by mouth Daily. 90 tablet 3 11/10/2020 at Unknown time   • Triamcinolone Acetonide (NASACORT) 55 MCG/ACT nasal inhaler 2 sprays into the nostril(s) as directed by provider Daily.   11/10/2020 at Unknown time   • methocarbamol (ROBAXIN) 750 MG tablet Take 750 mg by mouth Every Night.   More than a month at Unknown time   • warfarin (COUMADIN) 3 MG tablet TAKE ONE TABLET BY MOUTH DAILY 90 tablet 0 11/7/2020     Allergies:  Iodinated diagnostic agents, Fenofibrate, Livalo [pitavastatin], Plaquenil [hydroxychloroquine sulfate], and Statins    Objective      Vital Signs  Temp:  [97.9 °F (36.6 °C)] 97.9 °F (36.6 °C)  Heart Rate:  [79] 79  Resp:  [16] 16  BP: (150)/(77) 150/77    Physical Exam  HENT:      Right Ear: External ear normal.      Mouth/Throat:      Pharynx: Oropharynx is clear.   Eyes:      Conjunctiva/sclera: Conjunctivae normal.   Cardiovascular:      Rate and Rhythm: Normal rate.   Pulmonary:      Effort: Pulmonary effort is normal.   Abdominal:      General: Abdomen is flat.   Skin:     General: Skin is warm.   Neurological:      Mental Status: She is alert.   Psychiatric:         Mood and Affect: Mood normal.         Results Review:   I reviewed the patient's other test results and agree with the interpretation      Assessment/Plan       * No active hospital problems. *      Assessment:  (Solid food dysphagia  Dyspepsia  Exercise dyspnea).     Plan:   (Upper tract endoscopy with possible esophageal dilation  risks, alternatives and benefits discussed with patient and patient is agreeable to  proceed  I have advise patient to discuss with PCP this shortness of breath with exertion and her voice fatigue ).       I discussed the patients findings and my recommendations with patient and nursing staff    Garret Piedra MD  11/11/20  08:30 EST

## 2020-11-13 ENCOUNTER — TELEPHONE (OUTPATIENT)
Dept: INTERNAL MEDICINE | Facility: CLINIC | Age: 80
End: 2020-11-13

## 2020-11-13 LAB
LAB AP CASE REPORT: NORMAL
PATH REPORT.ADDENDUM SPEC: NORMAL
PATH REPORT.FINAL DX SPEC: NORMAL
PATH REPORT.GROSS SPEC: NORMAL

## 2020-11-16 DIAGNOSIS — E78.5 HYPERLIPIDEMIA, UNSPECIFIED HYPERLIPIDEMIA TYPE: Primary | ICD-10-CM

## 2020-11-16 DIAGNOSIS — E03.9 ACQUIRED HYPOTHYROIDISM: ICD-10-CM

## 2020-11-16 DIAGNOSIS — I10 ESSENTIAL HYPERTENSION: ICD-10-CM

## 2020-11-16 RX ORDER — SPIRONOLACTONE 25 MG/1
TABLET ORAL
Qty: 60 TABLET | Refills: 2 | Status: SHIPPED | OUTPATIENT
Start: 2020-11-16 | End: 2020-12-04 | Stop reason: SDUPTHER

## 2020-11-17 LAB
ALBUMIN SERPL-MCNC: 4.5 G/DL (ref 3.5–5.2)
ALBUMIN/GLOB SERPL: 2.1 G/DL
ALP SERPL-CCNC: 91 U/L (ref 39–117)
ALT SERPL-CCNC: 26 U/L (ref 1–33)
APPEARANCE UR: CLEAR
AST SERPL-CCNC: 17 U/L (ref 1–32)
BACTERIA #/AREA URNS HPF: ABNORMAL /HPF
BASOPHILS # BLD AUTO: 0.05 10*3/MM3 (ref 0–0.2)
BASOPHILS NFR BLD AUTO: 1 % (ref 0–1.5)
BILIRUB SERPL-MCNC: 0.6 MG/DL (ref 0–1.2)
BILIRUB UR QL STRIP: NEGATIVE
BUN SERPL-MCNC: 23 MG/DL (ref 8–23)
BUN/CREAT SERPL: 22.3 (ref 7–25)
CALCIUM SERPL-MCNC: 10 MG/DL (ref 8.6–10.5)
CASTS URNS MICRO: ABNORMAL
CHLORIDE SERPL-SCNC: 101 MMOL/L (ref 98–107)
CHOLEST SERPL-MCNC: 230 MG/DL (ref 0–200)
CO2 SERPL-SCNC: 24.6 MMOL/L (ref 22–29)
COLOR UR: YELLOW
CREAT SERPL-MCNC: 1.03 MG/DL (ref 0.57–1)
EOSINOPHIL # BLD AUTO: 0.25 10*3/MM3 (ref 0–0.4)
EOSINOPHIL NFR BLD AUTO: 5.1 % (ref 0.3–6.2)
EPI CELLS #/AREA URNS HPF: ABNORMAL /HPF
ERYTHROCYTE [DISTWIDTH] IN BLOOD BY AUTOMATED COUNT: 11.8 % (ref 12.3–15.4)
GLOBULIN SER CALC-MCNC: 2.1 GM/DL
GLUCOSE SERPL-MCNC: 104 MG/DL (ref 65–99)
GLUCOSE UR QL: NEGATIVE
HCT VFR BLD AUTO: 43.3 % (ref 34–46.6)
HDLC SERPL-MCNC: 63 MG/DL (ref 40–60)
HGB BLD-MCNC: 15 G/DL (ref 12–15.9)
HGB UR QL STRIP: NEGATIVE
IMM GRANULOCYTES # BLD AUTO: 0.02 10*3/MM3 (ref 0–0.05)
IMM GRANULOCYTES NFR BLD AUTO: 0.4 % (ref 0–0.5)
KETONES UR QL STRIP: NEGATIVE
LDLC SERPL CALC-MCNC: 139 MG/DL (ref 0–100)
LEUKOCYTE ESTERASE UR QL STRIP: NEGATIVE
LYMPHOCYTES # BLD AUTO: 1.76 10*3/MM3 (ref 0.7–3.1)
LYMPHOCYTES NFR BLD AUTO: 36.2 % (ref 19.6–45.3)
MCH RBC QN AUTO: 31.4 PG (ref 26.6–33)
MCHC RBC AUTO-ENTMCNC: 34.6 G/DL (ref 31.5–35.7)
MCV RBC AUTO: 90.8 FL (ref 79–97)
MONOCYTES # BLD AUTO: 0.54 10*3/MM3 (ref 0.1–0.9)
MONOCYTES NFR BLD AUTO: 11.1 % (ref 5–12)
NEUTROPHILS # BLD AUTO: 2.24 10*3/MM3 (ref 1.7–7)
NEUTROPHILS NFR BLD AUTO: 46.2 % (ref 42.7–76)
NITRITE UR QL STRIP: NEGATIVE
NRBC BLD AUTO-RTO: 0 /100 WBC (ref 0–0.2)
PH UR STRIP: 6 [PH] (ref 5–8)
PLATELET # BLD AUTO: 239 10*3/MM3 (ref 140–450)
POTASSIUM SERPL-SCNC: 4.7 MMOL/L (ref 3.5–5.2)
PROT SERPL-MCNC: 6.6 G/DL (ref 6–8.5)
PROT UR QL STRIP: NEGATIVE
RBC # BLD AUTO: 4.77 10*6/MM3 (ref 3.77–5.28)
RBC #/AREA URNS HPF: ABNORMAL /HPF
SODIUM SERPL-SCNC: 136 MMOL/L (ref 136–145)
SP GR UR: 1.01 (ref 1–1.03)
TRIGL SERPL-MCNC: 157 MG/DL (ref 0–150)
TSH SERPL DL<=0.005 MIU/L-ACNC: 2.26 UIU/ML (ref 0.27–4.2)
UROBILINOGEN UR STRIP-MCNC: NORMAL MG/DL
VLDLC SERPL CALC-MCNC: 28 MG/DL (ref 5–40)
WBC # BLD AUTO: 4.86 10*3/MM3 (ref 3.4–10.8)
WBC #/AREA URNS HPF: ABNORMAL /HPF

## 2020-11-18 ENCOUNTER — OFFICE (OUTPATIENT)
Dept: URBAN - METROPOLITAN AREA CLINIC 66 | Facility: CLINIC | Age: 80
End: 2020-11-18

## 2020-11-18 VITALS
SYSTOLIC BLOOD PRESSURE: 126 MMHG | HEART RATE: 82 BPM | TEMPERATURE: 97.3 F | DIASTOLIC BLOOD PRESSURE: 77 MMHG | WEIGHT: 213 LBS | HEIGHT: 62 IN

## 2020-11-18 DIAGNOSIS — R06.09 OTHER FORMS OF DYSPNEA: ICD-10-CM

## 2020-11-18 DIAGNOSIS — K21.9 GASTRO-ESOPHAGEAL REFLUX DISEASE WITHOUT ESOPHAGITIS: ICD-10-CM

## 2020-11-18 PROCEDURE — 99213 OFFICE O/P EST LOW 20 MIN: CPT | Performed by: NURSE PRACTITIONER

## 2020-11-23 ENCOUNTER — ANTICOAGULATION VISIT (OUTPATIENT)
Dept: INTERNAL MEDICINE | Facility: CLINIC | Age: 80
End: 2020-11-23

## 2020-11-23 ENCOUNTER — OFFICE VISIT (OUTPATIENT)
Dept: INTERNAL MEDICINE | Facility: CLINIC | Age: 80
End: 2020-11-23

## 2020-11-23 VITALS
OXYGEN SATURATION: 98 % | WEIGHT: 213 LBS | HEART RATE: 74 BPM | BODY MASS INDEX: 40.22 KG/M2 | SYSTOLIC BLOOD PRESSURE: 140 MMHG | DIASTOLIC BLOOD PRESSURE: 82 MMHG | HEIGHT: 61 IN

## 2020-11-23 DIAGNOSIS — E03.9 ACQUIRED HYPOTHYROIDISM: ICD-10-CM

## 2020-11-23 DIAGNOSIS — I48.0 PAROXYSMAL ATRIAL FIBRILLATION (HCC): ICD-10-CM

## 2020-11-23 DIAGNOSIS — I77.810 DILATION OF THORACIC AORTA (HCC): ICD-10-CM

## 2020-11-23 DIAGNOSIS — Z00.00 MEDICARE ANNUAL WELLNESS VISIT, SUBSEQUENT: Primary | ICD-10-CM

## 2020-11-23 DIAGNOSIS — E78.5 HYPERLIPIDEMIA, UNSPECIFIED HYPERLIPIDEMIA TYPE: ICD-10-CM

## 2020-11-23 DIAGNOSIS — I10 ESSENTIAL HYPERTENSION: ICD-10-CM

## 2020-11-23 DIAGNOSIS — R06.09 DYSPNEA ON EXERTION: ICD-10-CM

## 2020-11-23 DIAGNOSIS — D86.9 SARCOIDOSIS: ICD-10-CM

## 2020-11-23 DIAGNOSIS — J98.6 DIAPHRAGMATIC PARALYSIS: ICD-10-CM

## 2020-11-23 PROBLEM — R94.39 ABNORMAL STRESS TEST: Status: RESOLVED | Noted: 2020-06-24 | Resolved: 2020-11-23

## 2020-11-23 LAB — INR PPP: 1.8 (ref 0.9–1.1)

## 2020-11-23 PROCEDURE — 85610 PROTHROMBIN TIME: CPT | Performed by: INTERNAL MEDICINE

## 2020-11-23 PROCEDURE — 99214 OFFICE O/P EST MOD 30 MIN: CPT | Performed by: INTERNAL MEDICINE

## 2020-11-23 PROCEDURE — G0439 PPPS, SUBSEQ VISIT: HCPCS | Performed by: INTERNAL MEDICINE

## 2020-11-23 PROCEDURE — 36416 COLLJ CAPILLARY BLOOD SPEC: CPT | Performed by: INTERNAL MEDICINE

## 2020-11-23 RX ORDER — OMEPRAZOLE 40 MG/1
40 CAPSULE, DELAYED RELEASE ORAL DAILY
COMMUNITY
Start: 2020-11-20 | End: 2021-12-01

## 2020-11-23 RX ORDER — FAMOTIDINE 40 MG/1
40 TABLET, FILM COATED ORAL DAILY
COMMUNITY
Start: 2020-11-20 | End: 2021-07-14 | Stop reason: ALTCHOICE

## 2020-11-23 NOTE — PROGRESS NOTES
Subjective     Inessa Peoples is a 80 y.o. female who presents for an annual wellness visit as well as check up of htn, hld, thyroid.      History of Present Illness     HTN.  Control is fair.   HLD.  Control is fair on Zetia.    Hypothyroidism.  Good control on levothyroxine.    Afib.  On coumadin for atrial fib.    Thoracic aorta dilation.  Last CT reviewed and stable.      Biggest issue is continued AHN.  She has had a complete cardiopulmonary work up and has been seen by cardiology and pulmonary in the past.  Breathing is worse.  She would like another pulmonary opinion.  Last PFTs showed restrctive pulmonary process with moderate decrease of diffusion capacity.      Compared to one year ago, the patient feels her physical health is worse.  Compared to one year ago, the patient feels her mental health is the same.    Reviewed chart for potential of high risk medication in the elderly: yes  Reviewed chart for potential of harmful drug interactions in the elderly:yes    Discussed the patient's BMI with her. The BMI is in the acceptable range.    Current medical providers:  Patient Care Team:  Gisele Dockery MD as PCP - General (Internal Medicine)    Review of Systems   Constitutional: Positive for fever.   Respiratory: Positive for shortness of breath and wheezing.    Cardiovascular: Negative.  Negative for chest pain, palpitations and leg swelling.       The following portions of the patient's history were reviewed and updated as appropriate: allergies, current medications, past family history, past medical history, past social history, past surgical history and problem list.  Health maintenance tab was reviewed and updated with the patient.       Patient Active Problem List    Diagnosis Date Noted   • LBBB (left bundle branch block) 11/08/2019   • Trochanteric bursitis of both hips 05/29/2019   • Lumbar facet arthropathy 11/06/2018   • Family history of colon cancer 06/19/2018     Note Last Updated:  6/19/2018     Added automatically from request for surgery [id number]     • Class 2 severe obesity due to excess calories with serious comorbidity and body mass index (BMI) of 38.0 to 38.9 in adult (CMS/HCC) 03/27/2018   • Chronic pain 07/13/2017   • Bulge of lumbar disc without myelopathy 07/13/2017   • Sacroiliac inflammation (CMS/HCC) 07/13/2017   • Sacroiliac pain 07/13/2017   • Dilation of thoracic aorta (CMS/HCC) 06/23/2017   • Chronic anticoagulation 05/15/2017   • Diverticulitis of large intestine without perforation or abscess without bleeding 01/03/2017   • ANY on CPAP + 10 - Dr Lu 10/15/2016   • History of melanoma excision 08/05/2016   • GERD (gastroesophageal reflux disease) 08/05/2016   • History of kidney stones 08/05/2016   • Sarcoidosis 04/18/2016   • 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       • Dyspnea on exertion 04/18/2016   • Chronic osteoarthritis 04/18/2016   • History of DVT (deep vein thrombosis) 04/18/2016     Note Last Updated: 4/18/2016     Description: developed unprovoked on Xarelto.     • Atrial fibrillation (CMS/HCC) 04/18/2016       Past Medical History:   Diagnosis Date   • Abnormal ECG    • Allergic rhinitis    • Atrial fibrillation (CMS/HCC)    • Bleeds easily (CMS/HCC)     warfarin   • Breast screening    • Broken bones    • Calf DVT (deep venous thrombosis) (CMS/HCC)    • Chronic anticoagulation    • Colon polyp    • CTS (carpal tunnel syndrome)    • Deep vein thrombosis (CMS/HCC)     left calf   • Diverticulitis of colon    • AHN (dyspnea on exertion)    • Fatigue    • H/O malignant melanoma of skin    • Hematuria, gross    • History of colon polyps    • History of kidney stones     • Hyperlipidemia    • Hypertension    • Hyperthyroidism    • IFG (impaired fasting glucose)    • Insomnia    • LBBB (left bundle branch block)    • Left leg swelling    • LLQ abdominal pain    • Long term current use of systemic steroids    • Low back pain    • Malignant melanoma of skin (CMS/HCC)    • Neck pain    • Obesity     class 2 obesity due to excess calories with BMI of 35.0 to 35.9 in adult   • Osteoarthritis    • Osteopenia    • Paralysis, diaphragm    • Paroxysmal atrial fibrillation (CMS/HCC)    • 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 Lu   • Sleep apnea     USES CPAP   • UTI (urinary tract infection)    • Visit for screening mammogram        Past Surgical History:   Procedure Laterality Date   • CARDIAC CATHETERIZATION N/A 6/29/2020    Procedure: Coronary angiography, [place] [facility];  Surgeon: Noe Reynoso MD;  Location:  [facility];  Service: Cardiovascular;  Laterality: N/A;   • CARDIAC CATHETERIZATION N/A 6/29/2020    Procedure: Left ventriculography;  Surgeon: Noe Reynoso MD;  Location:  [facility];  Service: Cardiovascular;  Laterality: N/A;   • CARDIAC CATHETERIZATION N/A 6/29/2020    Procedure: Right and Left Heart Cath;  Surgeon: Noe Reynoso MD;  Location:  [facility];  Service: Cardiovascular;  Laterality: N/A;   • CARDIAC CATHETERIZATION     • CARPAL TUNNEL RELEASE Bilateral 1980'S    [facility]   • CATARACT EXTRACTION Bilateral 2001    Dr. Nic Hidalgo   • COLONOSCOPY N/A 06/26/2013    MILD SIGMOID DIVERTICULOSIS, repeat 5 years based on sister w/ colona cancer-DR. TANISHA THORNTON   • COLONOSCOPY N/A 10/19/2010    SIGMOID DIVERTICULOSIS & INTERNAL HEMORRHOIDS, HEMORRHOIDAL BANDING X4, DR. TANISHA THORNTON   • COLONOSCOPY N/A 8/6/2018    Procedure:  COLONOSCOPY TO CECUM AND INTO TERMINAL ILEUM;  Surgeon: Nasra Castro MD;  Location:  [facility];  Service: Gastroenterology   • COLONOSCOPY W/ BIOPSIES N/A 05/12/2008    RECTUM BIOPSY: COLONIC MUCOSA W/ FOCAL INCREASE OF INFLAMMATORY CELLS IN THE MUSCULARIS MUCOSA INCLUDING EOSINOPHILS, SIGMOID DIVERTICULOSIS, POSSIBLE PROCTITIS, DR. NASRA CASTRO   • CYSTOSCOPY, RETROGRADE PYELOGRAM AND STENT INSERTION Bilateral 10/10/2014    w/ Right ureteral pyeloscopy & laser lithotripsy, Right Double-J stent placement-Dr. Julio Tai   • ENDOSCOPY N/A 11/11/2020    Procedure: ESOPHAGOGASTRODUODENOSCOPY with biopsies and dilation 54fr fernández;  Surgeon: Garret Piedra MD;  Location:  [facility];  Service: Gastroenterology;  Laterality: N/A;  pre- dysphagia  post-- gastritis, dilation of esophageal ring, watermelon stomach    • EXTRACORPOREAL SHOCK WAVE LITHOTRIPSY (ESWL) Right 09/10/2009    DR. JULIO TAI   • HAMMER TOE REPAIR     • LAPAROSCOPIC CHOLECYSTECTOMY N/A 07/19/2012    DR. NASRA CASTRO   • LUMBAR EPIDURAL INJECTION N/A 04/23/2015    LUMBAR EPIDURAL STEROID INJECTION X3   • PARATHYROIDECTOMY N/A 2001   • SKIN CANCER EXCISION     • THUMB ARTHROSCOPY Right 1982   • TOTAL KNEE ARTHROPLASTY Right 03/25/2014    [employer] [facility] total knee, 55 femoral component, 71 tibial tray w/ a 40 mm stem, 34 mm x 7.8 mm three-peg patella, and a 14 standard poly-Dr. Ty Canchola       Family History   Problem Relation Age of Onset   • Heart disease Mother    • Diabetes type II Mother    • Heart disease Father    • Cancer Sister    • Colon cancer Sister    • Colon polyps Sister    • Lung cancer Sister    • Heart disease Brother         CABG   • Diabetes type II Brother    • Colon cancer Brother    • Colon polyps Brother    • Arrhythmia Brother    • Heart disease Sister    • Diabetes type II Sister    • Aortic aneurysm Sister    • COPD Sister    • Arrhythmia Sister    • Cerebral aneurysm Sister    • COPD Sister     • Lupus Sister    • Asthma Maternal Aunt    • Aortic aneurysm Brother        Social History     Socioeconomic History   • Marital status:      Spouse name: EDY   • Number of children: 4   • Years of education: 14yrs   • Highest education level: Not on file   Occupational History   • Occupation: RETIRED   Tobacco Use   • Smoking status: Never Smoker   • Smokeless tobacco: Never Used   Substance and Sexual Activity   • Alcohol use: Yes     Comment: social drinker/caffeine use    • Drug use: No   • Sexual activity: Defer       Current Outpatient Medications on File Prior to Visit   Medication Sig Dispense Refill   • Biotin w/ Vitamins C & E (HAIR SKIN & NAILS GUMMIES PO)      • Calcium-Phosphorus-Vitamin D (CITRACAL +D3 PO) Take 1 tablet by mouth Daily.     • ezetimibe (ZETIA) 10 MG tablet TAKE ONE TABLET BY MOUTH DAILY (Patient taking differently: Take 10 mg by mouth Daily.) 90 tablet 3   • famotidine (PEPCID) 40 MG tablet      • fexofenadine (ALLEGRA) 180 MG tablet Take 180 mg by mouth Daily.     • fluorouracil (EFUDEX) 5 % cream Apply  topically to the appropriate area as directed Daily.     • levothyroxine (SYNTHROID, LEVOTHROID) 25 MCG tablet TAKE ONE TABLET BY MOUTH DAILY 90 tablet 0   • losartan (COZAAR) 100 MG tablet Take 1 tablet by mouth Daily. 90 tablet 3   • melatonin 5 MG sublingual tablet sublingual tablet Take 5 mg by mouth Every Night.     • Multiple Vitamin (MULTI-DAY VITAMINS) tablet Take 1 tablet by mouth daily.     • omeprazole (priLOSEC) 40 MG capsule      • spironolactone (ALDACTONE) 25 MG tablet TAKE ONE TABLET BY MOUTH DAILY 60 tablet 2   • Triamcinolone Acetonide (NASACORT) 55 MCG/ACT nasal inhaler 2 sprays into the nostril(s) as directed by provider Daily.     • warfarin (COUMADIN) 3 MG tablet TAKE ONE TABLET BY MOUTH DAILY 90 tablet 0   • [DISCONTINUED] methocarbamol (ROBAXIN) 750 MG tablet Take 750 mg by mouth Every Night.     • [DISCONTINUED] meloxicam (MOBIC) 15 MG tablet Take 1  "tablet by mouth Daily. 30 tablet 11     No current facility-administered medications on file prior to visit.        Allergies   Allergen Reactions   • Iodinated Diagnostic Agents Anaphylaxis, Swelling and Other (See Comments)     PAINS ACROSS CHEST, Facial swelling   • Fenofibrate Myalgia   • Livalo [Pitavastatin] Myalgia     Joint Pain, Muscle tightness   • Plaquenil [Hydroxychloroquine Sulfate] Myalgia     Joint Pain, Muscle Tightness   • Statins Myalgia     Joint pain, Muscle Tightness       Immunization History   Administered Date(s) Administered   • Flu Mist 10/30/2015   • Fluad Quad 65+ 10/04/2019, 09/18/2020   • Fluzone High Dose =>65 Years ([employer] ONLY) 09/15/2017, 09/14/2018, 10/04/2019   • Pneumococcal Conjugate 13-Valent (PCV13) 07/23/2015   • Pneumococcal Polysaccharide (PPSV23) 08/05/2016   • Tdap 09/15/2006, 08/05/2016   • Zostavax 09/15/2007       Objective     /82   Pulse 74   Ht 153.7 cm (60.51\")   Wt 96.6 kg (213 lb)   SpO2 98%   BMI 40.90 kg/m²     Physical Exam  Constitutional:       Appearance: She is well-developed.   HENT:      Head: Normocephalic and atraumatic.      Right Ear: Hearing, tympanic membrane and external ear normal.      Left Ear: Hearing, tympanic membrane and external ear normal.      Nose: Nose normal.   Neck:      Musculoskeletal: Neck supple.      Thyroid: No thyromegaly.   Cardiovascular:      Rate and Rhythm: Normal rate and regular rhythm.      Heart sounds: Normal heart sounds. No murmur.   Pulmonary:      Effort: Pulmonary effort is normal.      Breath sounds: Normal breath sounds.   Chest:      Breasts:         Right: No mass.         Left: No mass.   Abdominal:      General: There is no distension.      Palpations: Abdomen is soft.      Tenderness: There is no abdominal tenderness.   Lymphadenopathy:      Cervical: No cervical adenopathy.   Skin:     General: Skin is warm and dry.   Neurological:      Mental Status: She is alert and oriented to person, " place, and time.   Psychiatric:         Speech: Speech normal.         Behavior: Behavior normal.         Thought Content: Thought content normal.         Judgment: Judgment normal.         Assessment/Plan   Diagnoses and all orders for this visit:    1. Medicare annual wellness visit, subsequent (Primary)    2. Essential hypertension    3. Hyperlipidemia, unspecified hyperlipidemia type    4. Acquired hypothyroidism    5. Paroxysmal atrial fibrillation (CMS/HCC)  -     POC INR    6. Diaphragmatic paralysis  -     Ambulatory Referral to Pulmonology    7. Dyspnea on exertion  -     Ambulatory Referral to Pulmonology    8. Sarcoidosis  -     Ambulatory Referral to Pulmonology    9. Dilation of thoracic aorta (CMS/HCC)    Other orders  -     umeclidinium-vilanterol (Anoro Ellipta) 62.5-25 MCG/INH aerosol powder  inhaler; Inhale 1 puff Daily.  Dispense: 1 each; Refill: 11        Discussion    AWV.      Medicare Risks and Personalized Health Plan  CMS Preventative Services Quick Reference  Breast Cancer/Mammogram Screening  Colon Cancer Screening    Direct observation of cognitive abilities:  The patient does not exhibit any impairment in cognitive abilities upon direct observation at today's visit.    Htn.  The patient will continue current regimen.      Hld.  The patient will continue current regimen.      Hypothyroidism.  The patient will continue current regimen.      AHN/diaphragmatic paralysis/sarcoidosis/decrease diffusion capacity.  Trial of Anoro.  Refer to pulmonary for second opinion.   Afib.  INR today.        Depression Screen:    PHQ-2/PHQ-9 Depression Screening 11/23/2020   Little interest or pleasure in doing things 0   Feeling down, depressed, or hopeless 0   Trouble falling or staying asleep, or sleeping too much -   Feeling tired or having little energy -   Poor appetite or overeating -   Feeling bad about yourself - or that you are a failure or have let yourself or your family down -   Trouble  concentrating on things, such as reading the newspaper or watching television -   Moving or speaking so slowly that other people could have noticed. Or the opposite - being so fidgety or restless that you have been moving around a lot more than usual -   Thoughts that you would be better off dead, or of hurting yourself in some way -   Total Score 0   If you checked off any problems, how difficult have these problems made it for you to do your work, take care of things at home, or get along with other people? -       Fall Risk Screen:  QUITAADI Fall Risk Assessment was completed, and patient is at LOW risk for falls.Assessment completed on:11/23/2020    Health Habits and Functional/Cognitive screen:  Functional & Cognitive Status 11/23/2020   Do you have difficulty preparing food and eating? No   Do you have difficulty bathing yourself, getting dressed or grooming yourself? No   Do you have difficulty using the toilet? No   Do you have difficulty moving around from place to place? No   Do you have trouble with steps or getting out of a bed or a chair? No   Current Diet Well Balanced Diet   Dental Exam Up to date   Eye Exam Up to date   Exercise (times per week) 2 times per week   Current Exercise Activities Include Housecleaning   Do you need help using the phone?  No   Are you deaf or do you have serious difficulty hearing?  No   Do you need help with transportation? No   Do you need help shopping? No   Do you need help preparing meals?  No   Do you need help with housework?  No   Do you need help with laundry? No   Do you need help taking your medications? No   Do you need help managing money? No   Do you ever drive or ride in a car without wearing a seat belt? No   Have you felt unusual stress, anger or loneliness in the last month? No   Who do you live with? Sibling   If you need help, do you have trouble finding someone available to you? No   Have you been bothered in the last four weeks by sexual problems? No    Do you have difficulty concentrating, remembering or making decisions? No             Health Maintenance   Topic Date Due   • ZOSTER VACCINE (2 of 3) 11/10/2007   • HEPATITIS C SCREENING  01/27/2016   • DXA SCAN  01/30/2019   • ANNUAL WELLNESS VISIT  01/11/2020   • COLONOSCOPY  08/06/2021   • LIPID PANEL  11/16/2021   • MAMMOGRAM  12/02/2021   • TDAP/TD VACCINES (3 - Td) 08/05/2026   • INFLUENZA VACCINE  Completed   • Pneumococcal Vaccine 65+  Completed            Future Appointments   Date Time Provider Department Center   12/9/2020  8:40 AM Demar Stone MD [facility] None   10/13/2021 11:30 AM Jewel Lu MD [facility] None

## 2020-11-23 NOTE — PATIENT INSTRUCTIONS
Medicare Wellness  Personal Prevention Plan of Service     Date of Office Visit:  2020  Encounter Provider:  Gisele Dockery MD  Place of Service:  [facility]  Patient Name: Inessa Peoples  :  1940    As part of the Medicare Wellness portion of your visit today, we are providing you with this personalized preventive plan of services (PPPS). This plan is based upon recommendations of the [place] Preventive Services Task Force (USPSTF) and the Advisory Committee on Immunization Practices (ACIP).    This lists the preventive care services that should be considered, and provides dates of when you are due. Items listed as completed are up-to-date and do not require any further intervention.    Health Maintenance   Topic Date Due   • ZOSTER VACCINE (2 of 3) 11/10/2007   • HEPATITIS C SCREENING  2016   • DXA SCAN  2019   • ANNUAL WELLNESS VISIT  2020   • COLONOSCOPY  2021   • LIPID PANEL  2021   • MAMMOGRAM  2021   • TDAP/TD VACCINES (3 - Td) 2026   • INFLUENZA VACCINE  Completed   • Pneumococcal Vaccine 65+  Completed       Orders Placed This Encounter   Procedures   • Ambulatory Referral to Pulmonology     Referral Priority:   Routine     Referral Type:   Consultation     Referral Reason:   Specialty Services Required     Referred to Provider:   Guille Dsouza MD     Requested Specialty:   Pulmonary Disease     Number of Visits Requested:   1   • POC INR       Return in about 6 months (around 2021) for Recheck.

## 2020-12-01 ENCOUNTER — ANTICOAGULATION VISIT (OUTPATIENT)
Dept: INTERNAL MEDICINE | Facility: CLINIC | Age: 80
End: 2020-12-01

## 2020-12-01 DIAGNOSIS — I48.0 PAROXYSMAL ATRIAL FIBRILLATION (HCC): ICD-10-CM

## 2020-12-01 LAB — INR PPP: 2.6 (ref 0.9–1.1)

## 2020-12-01 PROCEDURE — 85610 PROTHROMBIN TIME: CPT | Performed by: INTERNAL MEDICINE

## 2020-12-01 PROCEDURE — 36416 COLLJ CAPILLARY BLOOD SPEC: CPT | Performed by: INTERNAL MEDICINE

## 2020-12-04 RX ORDER — SPIRONOLACTONE 25 MG/1
25 TABLET ORAL DAILY
Qty: 60 TABLET | Refills: 2 | Status: SHIPPED | OUTPATIENT
Start: 2020-12-04 | End: 2020-12-30 | Stop reason: SDUPTHER

## 2020-12-07 RX ORDER — LOSARTAN POTASSIUM 100 MG/1
100 TABLET ORAL DAILY
Qty: 90 TABLET | Refills: 3 | Status: CANCELLED | OUTPATIENT
Start: 2020-12-07

## 2020-12-07 RX ORDER — EZETIMIBE 10 MG/1
10 TABLET ORAL DAILY
Qty: 90 TABLET | Refills: 3 | Status: SHIPPED | OUTPATIENT
Start: 2020-12-07 | End: 2020-12-15 | Stop reason: SDUPTHER

## 2020-12-07 RX ORDER — LEVOTHYROXINE SODIUM 0.03 MG/1
25 TABLET ORAL DAILY
Qty: 90 TABLET | Refills: 1 | Status: SHIPPED | OUTPATIENT
Start: 2020-12-07 | End: 2020-12-15 | Stop reason: SDUPTHER

## 2020-12-07 RX ORDER — WARFARIN SODIUM 3 MG/1
3 TABLET ORAL DAILY
Qty: 90 TABLET | Refills: 0 | Status: SHIPPED | OUTPATIENT
Start: 2020-12-07 | End: 2020-12-15 | Stop reason: SDUPTHER

## 2020-12-09 ENCOUNTER — TRANSCRIBE ORDERS (OUTPATIENT)
Dept: CARDIOLOGY | Facility: CLINIC | Age: 80
End: 2020-12-09

## 2020-12-09 ENCOUNTER — OFFICE VISIT (OUTPATIENT)
Dept: CARDIOLOGY | Facility: CLINIC | Age: 80
End: 2020-12-09

## 2020-12-09 VITALS
WEIGHT: 210 LBS | BODY MASS INDEX: 37.21 KG/M2 | HEART RATE: 78 BPM | SYSTOLIC BLOOD PRESSURE: 126 MMHG | DIASTOLIC BLOOD PRESSURE: 82 MMHG | HEIGHT: 63 IN

## 2020-12-09 DIAGNOSIS — I48.0 PAROXYSMAL ATRIAL FIBRILLATION (HCC): ICD-10-CM

## 2020-12-09 DIAGNOSIS — I44.7 LBBB (LEFT BUNDLE BRANCH BLOCK): Primary | ICD-10-CM

## 2020-12-09 DIAGNOSIS — I10 ESSENTIAL HYPERTENSION: ICD-10-CM

## 2020-12-09 DIAGNOSIS — Z13.6 SCREENING FOR CARDIOVASCULAR CONDITION: Primary | ICD-10-CM

## 2020-12-09 DIAGNOSIS — Z01.810 PREPROCEDURAL CARDIOVASCULAR EXAMINATION: ICD-10-CM

## 2020-12-09 PROCEDURE — 93000 ELECTROCARDIOGRAM COMPLETE: CPT | Performed by: INTERNAL MEDICINE

## 2020-12-09 PROCEDURE — 99214 OFFICE O/P EST MOD 30 MIN: CPT | Performed by: INTERNAL MEDICINE

## 2020-12-09 RX ORDER — CELECOXIB 200 MG/1
CAPSULE ORAL
COMMUNITY
Start: 2020-11-28 | End: 2020-12-15 | Stop reason: SDUPTHER

## 2020-12-11 ENCOUNTER — APPOINTMENT (OUTPATIENT)
Dept: WOMENS IMAGING | Facility: HOSPITAL | Age: 80
End: 2020-12-11

## 2020-12-11 PROCEDURE — 77067 SCR MAMMO BI INCL CAD: CPT | Performed by: RADIOLOGY

## 2020-12-11 PROCEDURE — 77063 BREAST TOMOSYNTHESIS BI: CPT | Performed by: RADIOLOGY

## 2020-12-14 ENCOUNTER — TRANSCRIBE ORDERS (OUTPATIENT)
Dept: SLEEP MEDICINE | Facility: HOSPITAL | Age: 80
End: 2020-12-14

## 2020-12-14 DIAGNOSIS — Z01.818 OTHER SPECIFIED PRE-OPERATIVE EXAMINATION: Primary | ICD-10-CM

## 2020-12-15 RX ORDER — WARFARIN SODIUM 3 MG/1
3 TABLET ORAL DAILY
Qty: 90 TABLET | Refills: 1 | Status: SHIPPED | OUTPATIENT
Start: 2020-12-15 | End: 2020-12-30 | Stop reason: SDUPTHER

## 2020-12-15 RX ORDER — CELECOXIB 200 MG/1
200 CAPSULE ORAL DAILY
Qty: 90 CAPSULE | Refills: 1 | Status: SHIPPED | OUTPATIENT
Start: 2020-12-15 | End: 2020-12-30 | Stop reason: SDUPTHER

## 2020-12-15 RX ORDER — LOSARTAN POTASSIUM 100 MG/1
100 TABLET ORAL DAILY
Qty: 90 TABLET | Refills: 1 | Status: SHIPPED | OUTPATIENT
Start: 2020-12-15 | End: 2020-12-30 | Stop reason: SDUPTHER

## 2020-12-15 RX ORDER — LEVOTHYROXINE SODIUM 0.03 MG/1
25 TABLET ORAL DAILY
Qty: 90 TABLET | Refills: 1 | Status: SHIPPED | OUTPATIENT
Start: 2020-12-15 | End: 2020-12-30 | Stop reason: SDUPTHER

## 2020-12-15 RX ORDER — EZETIMIBE 10 MG/1
10 TABLET ORAL DAILY
Qty: 90 TABLET | Refills: 1 | Status: SHIPPED | OUTPATIENT
Start: 2020-12-15 | End: 2020-12-30 | Stop reason: SDUPTHER

## 2020-12-22 DIAGNOSIS — R92.8 ABNORMAL MAMMOGRAM OF BOTH BREASTS: Primary | ICD-10-CM

## 2020-12-30 ENCOUNTER — LAB (OUTPATIENT)
Dept: LAB | Facility: HOSPITAL | Age: 80
End: 2020-12-30

## 2020-12-30 ENCOUNTER — ANTICOAGULATION VISIT (OUTPATIENT)
Dept: INTERNAL MEDICINE | Facility: CLINIC | Age: 80
End: 2020-12-30

## 2020-12-30 DIAGNOSIS — Z01.810 PREPROCEDURAL CARDIOVASCULAR EXAMINATION: ICD-10-CM

## 2020-12-30 DIAGNOSIS — I48.0 PAROXYSMAL ATRIAL FIBRILLATION (HCC): ICD-10-CM

## 2020-12-30 DIAGNOSIS — Z13.6 SCREENING FOR CARDIOVASCULAR CONDITION: ICD-10-CM

## 2020-12-30 LAB
ANION GAP SERPL CALCULATED.3IONS-SCNC: 10 MMOL/L (ref 5–15)
BASOPHILS # BLD AUTO: 0.04 10*3/MM3 (ref 0–0.2)
BASOPHILS NFR BLD AUTO: 0.5 % (ref 0–1.5)
BUN SERPL-MCNC: 22 MG/DL (ref 8–23)
BUN/CREAT SERPL: 18.5 (ref 7–25)
CALCIUM SPEC-SCNC: 9.7 MG/DL (ref 8.6–10.5)
CHLORIDE SERPL-SCNC: 104 MMOL/L (ref 98–107)
CO2 SERPL-SCNC: 23 MMOL/L (ref 22–29)
CREAT SERPL-MCNC: 1.19 MG/DL (ref 0.57–1)
DEPRECATED RDW RBC AUTO: 42.7 FL (ref 37–54)
EOSINOPHIL # BLD AUTO: 0.22 10*3/MM3 (ref 0–0.4)
EOSINOPHIL NFR BLD AUTO: 2.8 % (ref 0.3–6.2)
ERYTHROCYTE [DISTWIDTH] IN BLOOD BY AUTOMATED COUNT: 12.7 % (ref 12.3–15.4)
GFR SERPL CREATININE-BSD FRML MDRD: 44 ML/MIN/1.73
GLUCOSE SERPL-MCNC: 76 MG/DL (ref 65–99)
HCT VFR BLD AUTO: 42.1 % (ref 34–46.6)
HGB BLD-MCNC: 14.7 G/DL (ref 12–15.9)
IMM GRANULOCYTES # BLD AUTO: 0.02 10*3/MM3 (ref 0–0.05)
IMM GRANULOCYTES NFR BLD AUTO: 0.3 % (ref 0–0.5)
INR PPP: 2.36 (ref 0.9–1.1)
INR PPP: 2.4 (ref 0.9–1.1)
LYMPHOCYTES # BLD AUTO: 2.78 10*3/MM3 (ref 0.7–3.1)
LYMPHOCYTES NFR BLD AUTO: 35.8 % (ref 19.6–45.3)
MCH RBC QN AUTO: 32.1 PG (ref 26.6–33)
MCHC RBC AUTO-ENTMCNC: 34.9 G/DL (ref 31.5–35.7)
MCV RBC AUTO: 91.9 FL (ref 79–97)
MONOCYTES # BLD AUTO: 0.85 10*3/MM3 (ref 0.1–0.9)
MONOCYTES NFR BLD AUTO: 11 % (ref 5–12)
NEUTROPHILS NFR BLD AUTO: 3.85 10*3/MM3 (ref 1.7–7)
NEUTROPHILS NFR BLD AUTO: 49.6 % (ref 42.7–76)
NRBC BLD AUTO-RTO: 0 /100 WBC (ref 0–0.2)
PLATELET # BLD AUTO: 248 10*3/MM3 (ref 140–450)
PMV BLD AUTO: 10.8 FL (ref 6–12)
POTASSIUM SERPL-SCNC: 4 MMOL/L (ref 3.5–5.2)
PROTHROMBIN TIME: 25.6 SECONDS (ref 11.7–14.2)
RBC # BLD AUTO: 4.58 10*6/MM3 (ref 3.77–5.28)
SODIUM SERPL-SCNC: 137 MMOL/L (ref 136–145)
WBC # BLD AUTO: 7.76 10*3/MM3 (ref 3.4–10.8)

## 2020-12-30 PROCEDURE — 80048 BASIC METABOLIC PNL TOTAL CA: CPT

## 2020-12-30 PROCEDURE — 36415 COLL VENOUS BLD VENIPUNCTURE: CPT

## 2020-12-30 PROCEDURE — 85610 PROTHROMBIN TIME: CPT

## 2020-12-30 PROCEDURE — 85025 COMPLETE CBC W/AUTO DIFF WBC: CPT

## 2020-12-30 PROCEDURE — 85610 PROTHROMBIN TIME: CPT | Performed by: NURSE PRACTITIONER

## 2020-12-30 PROCEDURE — 36416 COLLJ CAPILLARY BLOOD SPEC: CPT | Performed by: NURSE PRACTITIONER

## 2020-12-30 RX ORDER — WARFARIN SODIUM 3 MG/1
3 TABLET ORAL DAILY
Qty: 90 TABLET | Refills: 0 | Status: SHIPPED | OUTPATIENT
Start: 2020-12-30 | End: 2021-01-07 | Stop reason: SDUPTHER

## 2020-12-30 RX ORDER — EZETIMIBE 10 MG/1
10 TABLET ORAL DAILY
Qty: 90 TABLET | Refills: 0 | Status: SHIPPED | OUTPATIENT
Start: 2020-12-30 | End: 2022-01-13 | Stop reason: SDUPTHER

## 2020-12-30 RX ORDER — LEVOTHYROXINE SODIUM 0.03 MG/1
25 TABLET ORAL DAILY
Qty: 90 TABLET | Refills: 0 | Status: SHIPPED | OUTPATIENT
Start: 2020-12-30 | End: 2021-01-07

## 2020-12-30 RX ORDER — LOSARTAN POTASSIUM 100 MG/1
100 TABLET ORAL DAILY
Qty: 90 TABLET | Refills: 0 | Status: SHIPPED | OUTPATIENT
Start: 2020-12-30 | End: 2021-03-31

## 2020-12-30 RX ORDER — SPIRONOLACTONE 25 MG/1
25 TABLET ORAL DAILY
Qty: 60 TABLET | Refills: 0 | Status: SHIPPED | OUTPATIENT
Start: 2020-12-30 | End: 2021-05-04 | Stop reason: DRUGHIGH

## 2020-12-30 RX ORDER — CELECOXIB 200 MG/1
200 CAPSULE ORAL DAILY
Qty: 90 CAPSULE | Refills: 0 | Status: SHIPPED | OUTPATIENT
Start: 2020-12-30 | End: 2021-03-31

## 2021-01-02 ENCOUNTER — LAB (OUTPATIENT)
Dept: LAB | Facility: HOSPITAL | Age: 81
End: 2021-01-02

## 2021-01-02 DIAGNOSIS — Z01.818 OTHER SPECIFIED PRE-OPERATIVE EXAMINATION: ICD-10-CM

## 2021-01-02 PROCEDURE — C9803 HOPD COVID-19 SPEC COLLECT: HCPCS

## 2021-01-02 PROCEDURE — U0004 COV-19 TEST NON-CDC HGH THRU: HCPCS

## 2021-01-04 LAB — SARS-COV-2 RNA RESP QL NAA+PROBE: NOT DETECTED

## 2021-01-05 ENCOUNTER — HOSPITAL ENCOUNTER (OUTPATIENT)
Facility: HOSPITAL | Age: 81
Discharge: HOME | End: 2021-01-06
Attending: INTERNAL MEDICINE | Admitting: INTERNAL MEDICINE

## 2021-01-05 ENCOUNTER — APPOINTMENT (OUTPATIENT)
Dept: GENERAL RADIOLOGY | Facility: HOSPITAL | Age: 81
End: 2021-01-05

## 2021-01-05 DIAGNOSIS — I48.0 PAROXYSMAL ATRIAL FIBRILLATION (HCC): ICD-10-CM

## 2021-01-05 DIAGNOSIS — I44.7 LBBB (LEFT BUNDLE BRANCH BLOCK): ICD-10-CM

## 2021-01-05 LAB
INR PPP: 1.27 (ref 0.9–1.1)
INR PPP: 1.4 (ref 0.8–1.2)
INR PPP: 1.4 (ref 0.9–1.1)
PROTHROMBIN TIME: 15.7 SECONDS (ref 11.7–14.2)
PROTHROMBIN TIME: 16.7 SECONDS
PROTHROMBIN TIME: 16.7 SECONDS (ref 12.8–15.2)
QT INTERVAL: 431 MS
QT INTERVAL: 432 MS

## 2021-01-05 PROCEDURE — 93005 ELECTROCARDIOGRAM TRACING: CPT | Performed by: INTERNAL MEDICINE

## 2021-01-05 PROCEDURE — C1898 LEAD, PMKR, OTHER THAN TRANS: HCPCS | Performed by: INTERNAL MEDICINE

## 2021-01-05 PROCEDURE — 85610 PROTHROMBIN TIME: CPT | Performed by: INTERNAL MEDICINE

## 2021-01-05 PROCEDURE — C1894 INTRO/SHEATH, NON-LASER: HCPCS | Performed by: INTERNAL MEDICINE

## 2021-01-05 PROCEDURE — G0378 HOSPITAL OBSERVATION PER HR: HCPCS

## 2021-01-05 PROCEDURE — 99153 MOD SED SAME PHYS/QHP EA: CPT | Performed by: INTERNAL MEDICINE

## 2021-01-05 PROCEDURE — 25010000002 METHYLPREDNISOLONE PER 125 MG: Performed by: INTERNAL MEDICINE

## 2021-01-05 PROCEDURE — 33208 INSRT HEART PM ATRIAL & VENT: CPT | Performed by: INTERNAL MEDICINE

## 2021-01-05 PROCEDURE — 33225 L VENTRIC PACING LEAD ADD-ON: CPT | Performed by: INTERNAL MEDICINE

## 2021-01-05 PROCEDURE — 99152 MOD SED SAME PHYS/QHP 5/>YRS: CPT | Performed by: INTERNAL MEDICINE

## 2021-01-05 PROCEDURE — 25010000002 MIDAZOLAM PER 1 MG: Performed by: INTERNAL MEDICINE

## 2021-01-05 PROCEDURE — 93010 ELECTROCARDIOGRAM REPORT: CPT | Performed by: INTERNAL MEDICINE

## 2021-01-05 PROCEDURE — C1887 CATHETER, GUIDING: HCPCS | Performed by: INTERNAL MEDICINE

## 2021-01-05 PROCEDURE — C1731 CATH, EP, 20 OR MORE ELEC: HCPCS | Performed by: INTERNAL MEDICINE

## 2021-01-05 PROCEDURE — 25010000002 FENTANYL CITRATE (PF) 100 MCG/2ML SOLUTION: Performed by: INTERNAL MEDICINE

## 2021-01-05 PROCEDURE — C2621 PMKR, OTHER THAN SING/DUAL: HCPCS | Performed by: INTERNAL MEDICINE

## 2021-01-05 PROCEDURE — C1769 GUIDE WIRE: HCPCS | Performed by: INTERNAL MEDICINE

## 2021-01-05 PROCEDURE — 71046 X-RAY EXAM CHEST 2 VIEWS: CPT

## 2021-01-05 PROCEDURE — 0 IOPAMIDOL PER 1 ML: Performed by: INTERNAL MEDICINE

## 2021-01-05 PROCEDURE — 25010000002 VANCOMYCIN PER 500 MG: Performed by: INTERNAL MEDICINE

## 2021-01-05 PROCEDURE — C1900 LEAD, CORONARY VENOUS: HCPCS | Performed by: INTERNAL MEDICINE

## 2021-01-05 PROCEDURE — 85610 PROTHROMBIN TIME: CPT

## 2021-01-05 DEVICE — LD ATTAIN PERFORMA S SHAPE LV 4598 88CM: Type: IMPLANTABLE DEVICE | Status: FUNCTIONAL

## 2021-01-05 DEVICE — LD PM CAPSUREFIX NOVUS5076 52CM: Type: IMPLANTABLE DEVICE | Status: FUNCTIONAL

## 2021-01-05 DEVICE — GEN PM PERCEPTA MRI QUAD CRTP: Type: IMPLANTABLE DEVICE | Status: FUNCTIONAL

## 2021-01-05 DEVICE — LD PM CAPSUREFIX NOVUS5076 58CM: Type: IMPLANTABLE DEVICE | Status: FUNCTIONAL

## 2021-01-05 RX ORDER — LIDOCAINE HYDROCHLORIDE AND EPINEPHRINE 10; 10 MG/ML; UG/ML
INJECTION, SOLUTION INFILTRATION; PERINEURAL AS NEEDED
Status: DISCONTINUED | OUTPATIENT
Start: 2021-01-05 | End: 2021-01-05 | Stop reason: HOSPADM

## 2021-01-05 RX ORDER — METHYLPREDNISOLONE SODIUM SUCCINATE 125 MG/2ML
INJECTION, POWDER, LYOPHILIZED, FOR SOLUTION INTRAMUSCULAR; INTRAVENOUS AS NEEDED
Status: DISCONTINUED | OUTPATIENT
Start: 2021-01-05 | End: 2021-01-05 | Stop reason: HOSPADM

## 2021-01-05 RX ORDER — WARFARIN SODIUM 3 MG/1
3 TABLET ORAL DAILY
Status: DISCONTINUED | OUTPATIENT
Start: 2021-01-05 | End: 2021-01-05

## 2021-01-05 RX ORDER — SODIUM CHLORIDE 0.9 % (FLUSH) 0.9 %
3 SYRINGE (ML) INJECTION EVERY 12 HOURS SCHEDULED
Status: DISCONTINUED | OUTPATIENT
Start: 2021-01-05 | End: 2021-01-05

## 2021-01-05 RX ORDER — LEVOTHYROXINE SODIUM 0.03 MG/1
25 TABLET ORAL DAILY
Status: DISCONTINUED | OUTPATIENT
Start: 2021-01-06 | End: 2021-01-06 | Stop reason: HOSPADM

## 2021-01-05 RX ORDER — LIDOCAINE HYDROCHLORIDE 10 MG/ML
0.1 INJECTION, SOLUTION EPIDURAL; INFILTRATION; INTRACAUDAL; PERINEURAL ONCE AS NEEDED
Status: DISCONTINUED | OUTPATIENT
Start: 2021-01-05 | End: 2021-01-05

## 2021-01-05 RX ORDER — SENNOSIDES 8.6 MG
650 CAPSULE ORAL EVERY 8 HOURS PRN
COMMUNITY

## 2021-01-05 RX ORDER — FENTANYL CITRATE 50 UG/ML
INJECTION, SOLUTION INTRAMUSCULAR; INTRAVENOUS AS NEEDED
Status: DISCONTINUED | OUTPATIENT
Start: 2021-01-05 | End: 2021-01-05 | Stop reason: HOSPADM

## 2021-01-05 RX ORDER — MIDAZOLAM HYDROCHLORIDE 1 MG/ML
INJECTION INTRAMUSCULAR; INTRAVENOUS AS NEEDED
Status: DISCONTINUED | OUTPATIENT
Start: 2021-01-05 | End: 2021-01-05 | Stop reason: HOSPADM

## 2021-01-05 RX ORDER — METOPROLOL TARTRATE 5 MG/5ML
INJECTION INTRAVENOUS AS NEEDED
Status: DISCONTINUED | OUTPATIENT
Start: 2021-01-05 | End: 2021-01-05 | Stop reason: HOSPADM

## 2021-01-05 RX ORDER — VANCOMYCIN HYDROCHLORIDE 1 G/200ML
INJECTION, SOLUTION INTRAVENOUS CONTINUOUS PRN
Status: COMPLETED | OUTPATIENT
Start: 2021-01-05 | End: 2021-01-05

## 2021-01-05 RX ORDER — LOSARTAN POTASSIUM 100 MG/1
100 TABLET ORAL DAILY
Status: DISCONTINUED | OUTPATIENT
Start: 2021-01-06 | End: 2021-01-06 | Stop reason: HOSPADM

## 2021-01-05 RX ORDER — SODIUM CHLORIDE 9 MG/ML
75 INJECTION, SOLUTION INTRAVENOUS CONTINUOUS
Status: DISCONTINUED | OUTPATIENT
Start: 2021-01-05 | End: 2021-01-05

## 2021-01-05 RX ORDER — ACETAMINOPHEN 325 MG/1
650 TABLET ORAL EVERY 4 HOURS PRN
Status: DISCONTINUED | OUTPATIENT
Start: 2021-01-05 | End: 2021-01-06 | Stop reason: HOSPADM

## 2021-01-05 RX ORDER — SODIUM CHLORIDE 0.9 % (FLUSH) 0.9 %
10 SYRINGE (ML) INJECTION AS NEEDED
Status: DISCONTINUED | OUTPATIENT
Start: 2021-01-05 | End: 2021-01-05

## 2021-01-05 RX ORDER — SPIRONOLACTONE 25 MG/1
25 TABLET ORAL DAILY
Status: DISCONTINUED | OUTPATIENT
Start: 2021-01-05 | End: 2021-01-06 | Stop reason: HOSPADM

## 2021-01-05 RX ORDER — HYDROCODONE BITARTRATE AND ACETAMINOPHEN 5; 325 MG/1; MG/1
1 TABLET ORAL EVERY 4 HOURS PRN
Status: DISCONTINUED | OUTPATIENT
Start: 2021-01-05 | End: 2021-01-06 | Stop reason: HOSPADM

## 2021-01-05 RX ORDER — CELECOXIB 200 MG/1
200 CAPSULE ORAL DAILY
Status: DISCONTINUED | OUTPATIENT
Start: 2021-01-06 | End: 2021-01-06 | Stop reason: HOSPADM

## 2021-01-05 RX ORDER — WARFARIN SODIUM 5 MG/1
5 TABLET ORAL
Status: COMPLETED | OUTPATIENT
Start: 2021-01-05 | End: 2021-01-05

## 2021-01-05 RX ADMIN — SPIRONOLACTONE 25 MG: 25 TABLET ORAL at 18:04

## 2021-01-05 RX ADMIN — ACETAMINOPHEN 650 MG: 325 TABLET, FILM COATED ORAL at 14:57

## 2021-01-05 RX ADMIN — WARFARIN 5 MG: 5 TABLET ORAL at 18:03

## 2021-01-05 RX ADMIN — HYDROCODONE BITARTRATE AND ACETAMINOPHEN 1 TABLET: 5; 325 TABLET ORAL at 21:03

## 2021-01-05 RX ADMIN — SODIUM CHLORIDE 75 ML/HR: 9 INJECTION, SOLUTION INTRAVENOUS at 08:04

## 2021-01-05 NOTE — PLAN OF CARE
Goal Outcome Evaluation:  Plan of Care Reviewed With: patient        Problem: Adult Inpatient Plan of Care  Goal: Plan of Care Review  Outcome: Ongoing, Progressing  Goal: Patient-Specific Goal (Individualized)  Outcome: Ongoing, Progressing  Goal: Absence of Hospital-Acquired Illness or Injury  Outcome: Ongoing, Progressing  Intervention: Identify and Manage Fall Risk  Recent Flowsheet Documentation  Taken 1/5/2021 1646 by Luz Maria Tai RN  Safety Promotion/Fall Prevention:   activity supervised   assistive device/personal items within reach   clutter free environment maintained   nonskid shoes/slippers when out of bed   room organization consistent   safety round/check completed  Intervention: Prevent and Manage VTE (venous thromboembolism) Risk  Recent Flowsheet Documentation  Taken 1/5/2021 1646 by Luz Maria Tai RN  VTE Prevention/Management:   bilateral   dorsiflexion/plantar flexion performed  Goal: Optimal Comfort and Wellbeing  Outcome: Ongoing, Progressing  Goal: Readiness for Transition of Care  Outcome: Ongoing, Progressing  Intervention: Mutually Develop Transition Plan  Recent Flowsheet Documentation  Taken 1/5/2021 1756 by Luz Maria Tai RN  Transportation Anticipated: family or friend will provide  Patient/Family Anticipated Services at Transition: none  Patient/Family Anticipates Transition to: home  Taken 1/5/2021 1754 by Luz Maria Tai, RN  Equipment Currently Used at Home: none

## 2021-01-05 NOTE — PROGRESS NOTES
Pharmacy Consult: Warfarin Dosing/ Monitoring    Inessa Peoples is a 80 y.o. female, estimated creatinine clearance is 41.7 mL/min (A) (by C-G formula based on SCr of 1.19 mg/dL (H)). weighing 94.3 kg (208 lb).    Social History     Tobacco Use   • Smoking status: Never Smoker   • Smokeless tobacco: Never Used   Substance Use Topics   • Alcohol use: Yes     Comment: social drinker/caffeine use    • Drug use: No       Results from last 7 days   Lab Units 01/05/21  0806 01/05/21  0804 12/30/20  1440 12/30/20   INR  1.4* 1.4* 2.36* 2.40*   HEMOGLOBIN g/dL  --   --  14.7  --    HEMATOCRIT %  --   --  42.1  --    PLATELETS 10*3/mm3  --   --  248  --      Results from last 7 days   Lab Units 12/30/20  1440   SODIUM mmol/L 137   POTASSIUM mmol/L 4.0   CHLORIDE mmol/L 104   CO2 mmol/L 23.0   BUN mg/dL 22   CREATININE mg/dL 1.19*   CALCIUM mg/dL 9.7   GLUCOSE mg/dL 76     Anticoagulation history: Report from anitcoagluation visit with [facility] on 12/30/20 states that patient takes warfarin 3 mg daily.     Hospital Anticoagulation:  Consulting provider: Dr. Stone  Start date: 1/5/21  Indication: artrial fibrillation-requiring full anticoagulation  Target INR: 2-3  Expected duration: indefinite    Bridge Therapy: No              Date 1/5            INR 1.4            Warfarin dose 5 mg              Potential drug interactions:   -acetaminophen: C- may enhance the anticoagulant effect of warfarin. Most likely with acetaminophen doses exceeding 1.3 or 2 g/day for multiple consecutive days.  -celecoxib: C- may enhance the anticoagulant effect of warfarin  -levothyroxine: C- may enhance the anticoagulant effect of warfarin    Relevant nutrition status: regular diet    Other:   Age > 65    Education complete?/ Date: TBD    Assessment/Plan:  Dose: 5 mg boost x once today to help achieve goal INR; will continue home dose of 3 mg daily once INR is therapeutic  Monitor: signs and symptoms of  bleeding  Follow up: INR daily    Pharmacy will continue to follow until discharge or discontinuation of warfarin.   Erma Vargas RPH  1/5/2021

## 2021-01-05 NOTE — DISCHARGE INSTRUCTIONS
"[facility]   [phone]    Post Pacemaker / Defibrillator Implant Instructions      1.  The dressing may be removed the next day.    2. If steri-strips were used, they should not be removed. Allow them to \"fall off\".      3. You may shower after the dressing is removed. Do not allow shower water to hit directly on incision.    4. No lotion/powder/ointment/cream on incision until it is healed.    5. Gently wash incision daily with soap and water and pat dry.    6. You may reapply a dressing if there is drainage, otherwise leave your incision open to air. If you reapply a dressing, please notify the pacemaker clinic.    7. No heavy lifting, pulling, or pushing.    8. Do not raise the affected arm over your head for a minimum of 1 month.    9. The pacemaker clinic will contact you (usually within 1 business day) to schedule a pacemaker/incision check. The check is usually done 7-10 days post-implant. If you have not heard from the pacemaker clinic within 3 days, please call the office.    10. Please call the office if you experience any of the following:   bleeding or drainage from your incision   swelling, redness, or opening of your incision   fever or chills   pain not relieved with medication   chest pain or difficulty breathing   lightheadness    11. For defibrillator patients only: If you receive a shock from your device, please call the office. If you receive 2 or more shocks within a 24 hour period OR if you receive 1 shock and feel poorly, you should be evaluated in the emergency room. Please DO NOT DRIVE if you have received a shock until your device has been checked.  "

## 2021-01-06 ENCOUNTER — APPOINTMENT (OUTPATIENT)
Dept: CARDIOLOGY | Facility: HOSPITAL | Age: 81
End: 2021-01-06

## 2021-01-06 ENCOUNTER — READMISSION MANAGEMENT (OUTPATIENT)
Dept: CALL CENTER | Facility: HOSPITAL | Age: 81
End: 2021-01-06

## 2021-01-06 VITALS
BODY MASS INDEX: 38.46 KG/M2 | WEIGHT: 209 LBS | SYSTOLIC BLOOD PRESSURE: 154 MMHG | OXYGEN SATURATION: 93 % | RESPIRATION RATE: 16 BRPM | HEART RATE: 91 BPM | HEIGHT: 62 IN | TEMPERATURE: 98.2 F | DIASTOLIC BLOOD PRESSURE: 95 MMHG

## 2021-01-06 LAB
BH CV ECHO MEAS - ASC AORTA: 3 CM
BH CV ECHO MEAS - BSA(HAYCOCK): 2.1 M^2
BH CV ECHO MEAS - BSA: 1.9 M^2
BH CV ECHO MEAS - BZI_BMI: 38.2 KILOGRAMS/M^2
BH CV ECHO MEAS - BZI_METRIC_HEIGHT: 157.5 CM
BH CV ECHO MEAS - BZI_METRIC_WEIGHT: 94.8 KG
BH CV ECHO MEAS - CONTRAST EF 4CH: 70.1 CM2
BH CV ECHO MEAS - EDV(CUBED): 35.9 ML
BH CV ECHO MEAS - EDV(MOD-SP2): 66 ML
BH CV ECHO MEAS - EDV(MOD-SP4): 42 ML
BH CV ECHO MEAS - EDV(TEICH): 44.1 ML
BH CV ECHO MEAS - EF(CUBED): 74.2 %
BH CV ECHO MEAS - EF(MOD-BP): 70.1 %
BH CV ECHO MEAS - EF(MOD-SP2): 56.1 %
BH CV ECHO MEAS - EF(MOD-SP4): 76.2 %
BH CV ECHO MEAS - EF(TEICH): 67.4 %
BH CV ECHO MEAS - ESV(CUBED): 9.3 ML
BH CV ECHO MEAS - ESV(MOD-SP2): 29 ML
BH CV ECHO MEAS - ESV(MOD-SP4): 10 ML
BH CV ECHO MEAS - ESV(TEICH): 14.4 ML
BH CV ECHO MEAS - FS: 36.4 %
BH CV ECHO MEAS - IVS/LVPW: 0.89
BH CV ECHO MEAS - IVSD: 0.8 CM
BH CV ECHO MEAS - LAT PEAK E' VEL: 5.1 CM/SEC
BH CV ECHO MEAS - LV DIASTOLIC VOL/BSA (35-75): 21.6 ML/M^2
BH CV ECHO MEAS - LV MASS(C)D: 74.7 GRAMS
BH CV ECHO MEAS - LV MASS(C)DI: 38.4 GRAMS/M^2
BH CV ECHO MEAS - LV SYSTOLIC VOL/BSA (12-30): 5.1 ML/M^2
BH CV ECHO MEAS - LVIDD: 3.3 CM
BH CV ECHO MEAS - LVIDS: 2.1 CM
BH CV ECHO MEAS - LVLD AP2: 7.6 CM
BH CV ECHO MEAS - LVLD AP4: 6.2 CM
BH CV ECHO MEAS - LVLS AP2: 6.3 CM
BH CV ECHO MEAS - LVLS AP4: 5.6 CM
BH CV ECHO MEAS - LVOT AREA (M): 3.5 CM^2
BH CV ECHO MEAS - LVOT AREA: 3.5 CM^2
BH CV ECHO MEAS - LVOT DIAM: 2.1 CM
BH CV ECHO MEAS - LVPWD: 0.9 CM
BH CV ECHO MEAS - MED PEAK E' VEL: 7.4 CM/SEC
BH CV ECHO MEAS - SI(CUBED): 13.7 ML/M^2
BH CV ECHO MEAS - SI(MOD-SP2): 19 ML/M^2
BH CV ECHO MEAS - SI(MOD-SP4): 16.4 ML/M^2
BH CV ECHO MEAS - SI(TEICH): 15.3 ML/M^2
BH CV ECHO MEAS - SV(CUBED): 26.7 ML
BH CV ECHO MEAS - SV(MOD-SP2): 37 ML
BH CV ECHO MEAS - SV(MOD-SP4): 32 ML
BH CV ECHO MEAS - SV(TEICH): 29.7 ML
INR PPP: 1.27 (ref 0.9–1.1)
LEFT ATRIUM VOLUME INDEX: 17.9 ML/M2
LV EF 2D ECHO EST: 70 %
MAXIMAL PREDICTED HEART RATE: 142 BPM
PROTHROMBIN TIME: 15.7 SECONDS (ref 11.7–14.2)
QT INTERVAL: 429 MS
STRESS TARGET HR: 121 BPM

## 2021-01-06 PROCEDURE — G0378 HOSPITAL OBSERVATION PER HR: HCPCS

## 2021-01-06 PROCEDURE — 93325 DOPPLER ECHO COLOR FLOW MAPG: CPT | Performed by: INTERNAL MEDICINE

## 2021-01-06 PROCEDURE — 93325 DOPPLER ECHO COLOR FLOW MAPG: CPT

## 2021-01-06 PROCEDURE — 99024 POSTOP FOLLOW-UP VISIT: CPT | Performed by: NURSE PRACTITIONER

## 2021-01-06 PROCEDURE — 93308 TTE F-UP OR LMTD: CPT | Performed by: INTERNAL MEDICINE

## 2021-01-06 PROCEDURE — 93010 ELECTROCARDIOGRAM REPORT: CPT | Performed by: INTERNAL MEDICINE

## 2021-01-06 PROCEDURE — 85610 PROTHROMBIN TIME: CPT | Performed by: INTERNAL MEDICINE

## 2021-01-06 PROCEDURE — 25010000002 PERFLUTREN (DEFINITY) 8.476 MG IN SODIUM CHLORIDE 0.9 % 10 ML INJECTION: Performed by: NURSE PRACTITIONER

## 2021-01-06 PROCEDURE — 93308 TTE F-UP OR LMTD: CPT

## 2021-01-06 PROCEDURE — 93005 ELECTROCARDIOGRAM TRACING: CPT | Performed by: INTERNAL MEDICINE

## 2021-01-06 RX ORDER — WARFARIN SODIUM 5 MG/1
5 TABLET ORAL
Status: DISCONTINUED | OUTPATIENT
Start: 2021-01-06 | End: 2021-01-06 | Stop reason: HOSPADM

## 2021-01-06 RX ADMIN — PERFLUTREN 2 ML: 6.52 INJECTION, SUSPENSION INTRAVENOUS at 09:15

## 2021-01-06 RX ADMIN — SPIRONOLACTONE 25 MG: 25 TABLET ORAL at 09:15

## 2021-01-06 RX ADMIN — LOSARTAN POTASSIUM 100 MG: 100 TABLET, FILM COATED ORAL at 09:15

## 2021-01-06 RX ADMIN — LEVOTHYROXINE SODIUM 25 MCG: 25 TABLET ORAL at 09:15

## 2021-01-06 RX ADMIN — CELECOXIB 200 MG: 200 CAPSULE ORAL at 09:15

## 2021-01-06 NOTE — DISCHARGE SUMMARY
DISCHARGE NOTE    Patient Name: Inessa Peoples  Age/Sex: 80 y.o. female  : 1940  MRN: [id number]    Date of Discharge:  2021   Date of Admit: 2021  Encounter Provider: AGA Bhakta  Place of Service: [facility] CARDIOLOGY  Patient Care Team:  Gisele Dockery MD as PCP - General (Internal Medicine)    Subjective:     Discharge Diagnosis:    LBBB (left bundle branch block)    Paroxysmal atrial fibrillation (CMS/HCC)      Hospital Course:     80 yr old female patient of Dr. Hamilton's and Dr. Stone's. She had PAF, LBBB, obesity and sarcoid. She has been on sotalol for PAF---she saw Dr. Stone in early December with complaints of shortness of breath and dyspnea with exertion.     She had not had any episode sof PAF but had a stress echo in  which showed severe LV dysfunction under stress---normal at rest, VO2 max in the range of severe class III HR and with LBBB>>dysynchrony.     Discussed treatment options and felt CRT may help.     She underwent implantation of CRT-P yesterday, tolerated procedure well but had some diaphragm stimulation. CXR ok. Did a limited echo this am that looked good, normal LV function.     She had some diaphragm stim when the rep checked her this morning with higher outputs with testing but none since then at current settings.     Dc home today, resume warfarin, INR on Friday at Dr. Dockery's office.     Incision check in 1 week.     Vital Signs  Temp:  [98 °F (36.7 °C)-98.5 °F (36.9 °C)] 98.2 °F (36.8 °C)  Heart Rate:  [80-91] 91  Resp:  [16] 16  BP: (108-156)/(76-95) 154/95    Intake/Output Summary (Last 24 hours) at 2021 1605  Last data filed at 2021 1145  Gross per 24 hour   Intake 480 ml   Output --   Net 480 ml       Physical Exam:    General Appearance: No acute distress, well developed and well nourished.   Eyes:  Conjunctiva and lids: No erythema, swelling, or discharge. Sclera non-icteric.   HENT: Atraumatic, normocephalic. External eyes, ears, and nose normal.   Respiratory: No signs of respiratory distress. Respiration rhythm and depth normal.   Clear to auscultation. No rales, crackles, rhonchi, or wheezing auscultated.   Cardiovascular:  Jugular Venous Pressure: Normal  Heart Rate and Rhythm: Normal, Heart Sounds: Normal S1 and S2. No S3 or S4 noted.  Murmurs: No murmurs noted. No rubs, thrills, or gallops.   Arterial Pulses: Posterior tibialis and dorsalis pedis pulses normal.   Lower Extremities: No edema noted.  Gastrointestinal:  Abdomen soft, non-distended, non-tender.  Musculoskeletal: Normal movement of extremities  Skin: Warm and dry.   Psychiatric: Patient alert and oriented to person, place, and time. Speech and behavior appropriate. Normal mood and affect.    Labs:               Results from last 7 days   Lab Units 01/06/21  0428 01/05/21  1418 01/05/21  0806 01/05/21  0804   INR  1.27* 1.27* 1.4* 1.4*               Discharge Diet:    Dietary Orders (From admission, onward)     Start     Ordered    01/05/21 1122  Diet Regular  Diet Effective Now     Question:  Diet Texture / Consistency  Answer:  Regular    01/05/21 1122              Activity at Discharge:  Instructions given to patient    Discharge Medications     Discharge Medications      Continue These Medications      Instructions Start Date   acetaminophen 650 MG 8 hr tablet  Commonly known as: TYLENOL   650 mg, Oral, Every 8 Hours PRN      Anoro Ellipta 62.5-25 MCG/INH aerosol powder  inhaler  Generic drug: umeclidinium-vilanterol   1 puff, Inhalation, Daily      celecoxib 200 MG capsule  Commonly known as: CeleBREX   200 mg, Oral, Daily      CITRACAL +D3 PO   1 tablet, Oral, Daily      ezetimibe 10 MG tablet  Commonly known as: ZETIA   10 mg, Oral, Daily      famotidine 40 MG tablet  Commonly known as: PEPCID   No dose, route, or frequency recorded.       fexofenadine 180 MG tablet  Commonly known as: ALLEGRA   180 mg, Oral, Daily      HAIR SKIN & NAILS GUMMIES PO   No dose, route, or frequency recorded.      levothyroxine 25 MCG tablet  Commonly known as: SYNTHROID, LEVOTHROID   25 mcg, Oral, Daily      losartan 100 MG tablet  Commonly known as: COZAAR   100 mg, Oral, Daily      melatonin 5 MG sublingual tablet sublingual tablet   5 mg, Oral, Nightly      Multi-Day Vitamins tablet tablet  Generic drug: multivitamin   1 tablet, Oral, Daily      omeprazole 40 MG capsule  Commonly known as: priLOSEC   No dose, route, or frequency recorded.      spironolactone 25 MG tablet  Commonly known as: ALDACTONE   25 mg, Oral, Daily      Triamcinolone Acetonide 55 MCG/ACT nasal inhaler  Commonly known as: NASACORT   2 sprays, Nasal, Daily      warfarin 3 MG tablet  Commonly known as: COUMADIN   3 mg, Oral, Daily             Discharge disposition: home    Follow-up Appointments  Additional Instructions for the Follow-ups that You Need to Schedule     Protime-INR   Jan 08, 2021      She does them at Dr. Dockery's office    Order Comments: She does them at Dr. Dockery's office     Is Patient on anti-coag: Yes           Follow-up Information     [facility] Follow up in 1 week(s).    Why: Incision check   Contact information:  [phone]           Mirela Ríos APRN Follow up in 1 month(s).    Specialty: Cardiology  Contact information:  [address] [facility] [address]  [place] [id number] [phone]             Demar Stone MD Follow up in 3 month(s).    Specialties: Cardiology, Cardiac Electrophysiology  Contact information:  [address] [facility] [address]  [place] [id number] [phone]             Gisele Dockery MD .    Specialty: Internal Medicine  Contact information:  [address] [facility] [address]  [place] [address]  [phone]                 Future Appointments   Date Time Provider Department Center   1/11/2021  3:00 PM Micheline Castillo APRN [facility]  PM [facility] [place]   6/14/2021  8:45 AM Mirela Ríos APRN [facility] CD [facility] None   10/13/2021 11:30 AM Jewel Lu MD [facility] None         AGA Bhakta  01/06/21  16:05 EST

## 2021-01-06 NOTE — PROGRESS NOTES
Pharmacy Consult: Warfarin Dosing/ Monitoring    Inessa Peoples is a 80 y.o. female, estimated creatinine clearance is 41.8 mL/min (A) (by C-G formula based on SCr of 1.19 mg/dL (H)). weighing 94.8 kg (209 lb).    Social History     Tobacco Use   • Smoking status: Never Smoker   • Smokeless tobacco: Never Used   Substance Use Topics   • Alcohol use: Yes     Comment: social drinker/caffeine use    • Drug use: No       Results from last 7 days   Lab Units 01/06/21  0428 01/05/21  1418 01/05/21  0806 01/05/21  0804 12/30/20  1440   INR  1.27* 1.27* 1.4* 1.4* 2.36*   HEMOGLOBIN g/dL  --   --   --   --  14.7   HEMATOCRIT %  --   --   --   --  42.1   PLATELETS 10*3/mm3  --   --   --   --  248     Results from last 7 days   Lab Units 12/30/20  1440   SODIUM mmol/L 137   POTASSIUM mmol/L 4.0   CHLORIDE mmol/L 104   CO2 mmol/L 23.0   BUN mg/dL 22   CREATININE mg/dL 1.19*   CALCIUM mg/dL 9.7   GLUCOSE mg/dL 76     Anticoagulation history: Report from anitcoagluation visit with [facility] on 12/30/20 states that patient takes warfarin 3 mg daily.     Hospital Anticoagulation:  Consulting provider: Dr. Stone  Start date: 1/5/21  Indication: artrial fibrillation-requiring full anticoagulation  Target INR: 2-3  Expected duration: indefinite    Bridge Therapy: No                Date 1/5 1/6           INR 1.4 1.27           Warfarin dose 5 mg 5 mg             Potential drug interactions:   -acetaminophen: C- may enhance the anticoagulant effect of warfarin. Most likely with acetaminophen doses exceeding 1.3 or 2 g/day for multiple consecutive days.  -celecoxib: C- may enhance the anticoagulant effect of warfarin  -levothyroxine: C- may enhance the anticoagulant effect of warfarin    Relevant nutrition status: Regular diet; 100 % of breakfast    Other:   Age >65    Assessment/Plan:  Based on previous notes from [facility], patient was previously stable on  warfarin 3 mg daily. Therefore, will plan to continue that regimen once INR is therapeutic.     Dose: 5 mg x once today to help achieve INR goal  Monitor: signs and symptoms of bleeding  Follow up: INR daily    Pharmacy will continue to follow until discharge or discontinuation of warfarin.     Erma Vargas RPH  1/6/2021

## 2021-01-06 NOTE — OUTREACH NOTE
Prep Survey      Responses   [facility] patient discharged from?  [place]   Is LACE score < 7 ?  Yes   Emergency Room discharge w/ pulse ox?  No   Eligibility  [facility]   Date of Admission  01/05/21   Date of Discharge  01/06/21   Discharge Disposition  Home or Self Care   Discharge diagnosis   implantation of CRT-P   Does the patient have one of the following disease processes/diagnoses(primary or secondary)?  General Surgery   Does the patient have Home health ordered?  No   Is there a DME ordered?  No   Prep survey completed?  Yes          La Stone RN

## 2021-01-07 ENCOUNTER — TRANSITIONAL CARE MANAGEMENT TELEPHONE ENCOUNTER (OUTPATIENT)
Dept: CALL CENTER | Facility: HOSPITAL | Age: 81
End: 2021-01-07

## 2021-01-07 RX ORDER — WARFARIN SODIUM 3 MG/1
3 TABLET ORAL DAILY
Qty: 90 TABLET | Refills: 0 | Status: ON HOLD | OUTPATIENT
Start: 2021-01-07 | End: 2021-10-04 | Stop reason: SDUPTHER

## 2021-01-07 RX ORDER — LEVOTHYROXINE SODIUM 0.03 MG/1
TABLET ORAL
Qty: 90 TABLET | Refills: 0 | Status: SHIPPED | OUTPATIENT
Start: 2021-01-07 | End: 2021-01-07 | Stop reason: SDUPTHER

## 2021-01-07 RX ORDER — LEVOTHYROXINE SODIUM 0.03 MG/1
25 TABLET ORAL DAILY
Qty: 90 TABLET | Refills: 0 | Status: SHIPPED | OUTPATIENT
Start: 2021-01-07 | End: 2022-01-13 | Stop reason: SDUPTHER

## 2021-01-07 NOTE — OUTREACH NOTE
Call Center TCM Note      Responses   [facility] patient discharged from?  [place]   Does the patient have one of the following disease processes/diagnoses(primary or secondary)?  General Surgery   TCM attempt successful?  No   Unsuccessful attempts  Attempt 1          Lesvia Nevarez LPN    1/7/2021, 08:47 EST

## 2021-01-07 NOTE — OUTREACH NOTE
Call Center TCM Note      Responses   [facility] patient discharged from?  [place]   Does the patient have one of the following disease processes/diagnoses(primary or secondary)?  General Surgery   TCM attempt successful?  Yes   Call start time  1630   Call end time  1633   Meds reviewed with patient/caregiver?  Yes   Does the patient have all medications related to this admission filled (includes all antibiotics, pain medications, etc.)  N/A   Is the patient taking all medications as directed (includes completed medication regime)?  Yes   Does the patient have a follow up appointment scheduled with their surgeon?  Yes   Has the patient kept scheduled appointments due by today?  N/A   Has home health visited the patient within 72 hours of discharge?  N/A   Did the patient receive a copy of their discharge instructions?  Yes   Nursing interventions  Reviewed instructions with patient   What is the patient's perception of their health status since discharge?  Improving   Nursing interventions  Nurse provided patient education   Is the patient /caregiver able to teach back basic post-op care?  Drive as instructed by MD in discharge instructions, Take showers only when approved by MD-sponge bathe until then, No tub bath, swimming, or hot tub until instructed by MD, Keep incision areas clean,dry and protected, Do not remove steri-strips, Lifting as instructed by MD in discharge instructions   Is the patient/caregiver able to teach back signs and symptoms of incisional infection?  Increased redness, swelling or pain at the incisonal site, Increased drainage or bleeding, Incisional warmth, Pus or odor from incision, Fever   Is the patient/caregiver able to teach back steps to recovery at home?  Set small, achievable goals for return to baseline health, Rest and rebuild strength, gradually increase activity, Make a list of questions for surgeon's appointment   If the patient is a current smoker, are they able to  teach back resources for cessation?  Not a smoker   Is the patient/caregiver able to teach back the hierarchy of who to call/visit for symptoms/problems? PCP, Specialist, Home health nurse, Urgent Care, ED, 911  Yes   TCM call completed?  Yes          Lesvia Nevarez LPN    1/7/2021, 16:35 EST

## 2021-01-07 NOTE — TELEPHONE ENCOUNTER
Caller: Inessa Peoples    Relationship: Self    Best call back number: [phone]    Medication needed:   Requested Prescriptions     Pending Prescriptions Disp Refills   • warfarin (COUMADIN) 3 MG tablet 90 tablet 0     Sig: Take 1 tablet by mouth Daily.       When do you need the refill by: 01/07/21    What details did the patient provide when requesting the medication:  [phone] STATED THIS IS THE NUMBER TO CALL FOR THE REFILL    Does the patient have less than a 3 day supply:  [x] Yes  [] No    What is the patient's preferred pharmacy: [facility] - [facility] [address] [facility] - [phone]  - [phone] FX

## 2021-01-07 NOTE — TELEPHONE ENCOUNTER
DELETE AFTER REVIEWING: Telephone encounter to be sent to the clinical pool.  If patient has less than a 3 day supply left, send the encounter HIGH Priority.    Caller: Inessa Peoples GUDELIA    Relationship: Self    Best call back number: [phone]     Medication needed:   Requested Prescriptions     Pending Prescriptions Disp Refills   • warfarin (COUMADIN) 3 MG tablet 90 tablet 0     Sig: Take 1 tablet by mouth Daily.   • levothyroxine (SYNTHROID, LEVOTHROID) 25 MCG tablet 90 tablet 0     Sig: Take 1 tablet by mouth Daily.       When do you need the refill by: 01/07/21    What details did the patient provide when requesting the medication: PATIENT STATED THAT [facility] NEEDS A PRIOR AUTHORIZATION FOR THE LEVOTHYROXINE.     WARFARIN GOES TO [facility]    Does the patient have less than a 3 day supply:  [x] Yes  [] No    What is the patient's preferred pharmacy: [facility] [address] [facility] - [phone] [facility] [phone] FX

## 2021-01-11 ENCOUNTER — OFFICE VISIT (OUTPATIENT)
Dept: PAIN MEDICINE | Facility: CLINIC | Age: 81
End: 2021-01-11

## 2021-01-11 VITALS
RESPIRATION RATE: 20 BRPM | SYSTOLIC BLOOD PRESSURE: 124 MMHG | HEART RATE: 87 BPM | HEIGHT: 62 IN | TEMPERATURE: 96.8 F | OXYGEN SATURATION: 96 % | BODY MASS INDEX: 38.83 KG/M2 | DIASTOLIC BLOOD PRESSURE: 76 MMHG | WEIGHT: 211 LBS

## 2021-01-11 DIAGNOSIS — M54.50 LOW BACK PAIN, UNSPECIFIED BACK PAIN LATERALITY, UNSPECIFIED CHRONICITY, UNSPECIFIED WHETHER SCIATICA PRESENT: Primary | ICD-10-CM

## 2021-01-11 DIAGNOSIS — M46.1 SACROILIAC INFLAMMATION (HCC): ICD-10-CM

## 2021-01-11 DIAGNOSIS — M70.61 TROCHANTERIC BURSITIS OF BOTH HIPS: ICD-10-CM

## 2021-01-11 DIAGNOSIS — M70.62 TROCHANTERIC BURSITIS OF BOTH HIPS: ICD-10-CM

## 2021-01-11 DIAGNOSIS — M47.816 LUMBAR FACET ARTHROPATHY: ICD-10-CM

## 2021-01-11 DIAGNOSIS — R92.8 ABNORMAL MAMMOGRAM OF BOTH BREASTS: Primary | ICD-10-CM

## 2021-01-11 PROCEDURE — 99214 OFFICE O/P EST MOD 30 MIN: CPT | Performed by: NURSE PRACTITIONER

## 2021-01-11 NOTE — PROGRESS NOTES
CHIEF COMPLAINT  F/u back and bilat hip pain. Pt sts back pain is the same, hip pain is better since last ov.     Subjective   Inessa Peoples is a 80 y.o. female  who presents for follow-up.  She has a history of chronic back and hip pain. Reports back pain is unchanged but hip pain is improved since last evaluation.    Had pacemaker placed 1-5-21.     Complains of pain in her low back. Today her pain is 1/10VAS.  Describes the pain as intermittent pressure. Pain increases with walking, standing, activity, being on feet, household chores; pain decreases with sitting, laying, being off feet. ADL's by self. Denies any bowel or bladder incontinence.     Patient had a left SI +GTB injection performed by Dr. SPARKS on 6-16-20 for management of LOW BACK PAIN. Patient reports 75% relief from the procedure for approximately 2 weeks.    Patient had a bilateral L2-L5 MBB performed by Dr. SPARKS on 6-23-19 for management of LOW BACK PAIN. Patient reports 90% relief from the procedure which lasted a few weeks.  Patient had a bilateral greater trochanteric bursa injection performed by Dr. SPARKS on 6-23-19 for management of hip pain. Patient reports 90% ONGOING relief from the procedure.  She completed a Bilateral L2-5 Lumbar Medial Branch Blockade   on  11/15/18 performed by Dr. Sparks for management of LOW BACK PAIN. Patient reports SIGNIFICANT relief from the procedure. This started wearing off over the past few days.   Was to have second LMBB on 12-6-18 but was still having significant relief. The procedure was cancelled at that time.     She completed a LEFT Sacroiliac Joint Injection   on  4/26/18 performed by Dr. Sparks for management of low back pain/sacroiliac joint pain. Reported significant relief previously.  She completed a right SI joint injection   on  9-26-17 performed by Dr. Sparks for management of low back pain.    She reports she was told she could temporarily interrupt COumadin for  treatments.        Patient remained masked during entire encounter. No cough present. I donned a mask and eye protection throughout entire visit. Prior to donning mask and eye protection, hand hygiene was performed, as well as when it was doffed.  I was closer than 6 feet, but not for an extended period of time. No obvious exposure to any bodily fluids.    Back Pain  This is a chronic problem. The current episode started more than 1 year ago. The problem occurs intermittently. The pain is present in the lumbar spine and sacro-iliac. The quality of the pain is described as aching. The pain does not radiate (left hip, right calf and foot). The pain is at a severity of 1/10. The pain is moderate. The pain is worse during the day. The symptoms are aggravated by bending, position, sitting, standing and twisting (housework). Associated symptoms include bladder incontinence and chest pain (hx recent pacemaker implant 1/3/21). Pertinent negatives include no abdominal pain, bowel incontinence, dysuria, fever, headaches, numbness or weakness. Risk factors include obesity, menopause, history of cancer and lack of exercise (history of melanoma--no Chemo or RAD). She has tried analgesics and heat (left SI joint injection--significant ongoing relief) for the symptoms. The treatment provided moderate relief.   Hip Pain   The incident occurred more than 1 week ago. There was no injury mechanism. The pain is present in the left hip and right hip. The pain is moderate. The pain has been improving since onset. Associated symptoms include an inability to bear weight and a loss of motion. Pertinent negatives include no numbness. She has tried NSAIDs for the symptoms.      PEG Assessment   What number best describes your pain on average in the past week?1  What number best describes how, during the past week, pain has interfered with your enjoyment of life?1  What number best describes how, during the past week, pain has interfered with  "your general activity?  1    The following portions of the patient's history were reviewed and updated as appropriate: allergies, current medications, past family history, past medical history, past social history, past surgical history and problem list.    Review of Systems   Constitutional: Positive for activity change (dec) and fatigue (occ). Negative for fever.   HENT: Negative for congestion.    Eyes: Negative for visual disturbance.   Respiratory: Positive for shortness of breath (occ pacemaker). Negative for cough.    Cardiovascular: Positive for chest pain (hx recent pacemaker implant 1/3/21).   Gastrointestinal: Negative for abdominal pain, bowel incontinence, constipation and diarrhea.   Genitourinary: Positive for bladder incontinence. Negative for difficulty urinating and dysuria.   Musculoskeletal: Positive for arthralgias (bilat hip) and back pain. Negative for gait problem.   Allergic/Immunologic: Negative for immunocompromised state.   Neurological: Negative for dizziness, weakness, light-headedness, numbness and headaches.   Psychiatric/Behavioral: Negative for agitation, sleep disturbance and suicidal ideas. The patient is not nervous/anxious.      I have reviewed and confirmed the accuracy of the ROS as documented by the MA/LPN/RN Micheline Castillo, APRN      Vitals:    01/11/21 1506   BP: 124/76   Pulse: 87   Resp: 20   Temp: 96.8 °F (36 °C)   SpO2: 96%   Weight: 95.7 kg (211 lb)   Height: 157.5 cm (62\")   PainSc:   1   PainLoc: Back     Objective   Physical Exam  Vitals signs and nursing note reviewed.   Constitutional:       Appearance: Normal appearance. She is well-developed.   HENT:      Head: Normocephalic and atraumatic.   Cardiovascular:      Rate and Rhythm: Normal rate.   Musculoskeletal:      Lumbar back: She exhibits decreased range of motion, tenderness and bony tenderness (mdoerate bilateral L2-L5 facet tenderness; + loading manuever).   Skin:     General: Skin is warm and dry. "   Neurological:      Mental Status: She is alert.      Gait: Gait abnormal.   Psychiatric:         Speech: Speech normal.         Behavior: Behavior normal. Behavior is cooperative.         Thought Content: Thought content normal.         Judgment: Judgment normal.         Assessment/Plan   Diagnoses and all orders for this visit:    1. Low back pain, unspecified back pain laterality, unspecified chronicity, unspecified whether sciatica present (Primary)    2. Lumbar facet arthropathy  -     Case Request    3. Sacroiliac inflammation (CMS/HCC)    4. Trochanteric bursitis of both hips      --- Bilateral L2- L5 MBB. Stop Coumadin 5 days prior to procedure. Reviewed the procedure at length with the patient.  Included in the review was expectations, complications, risk and benefits.The procedure was described in detail and the risks, benefits and alternatives were discussed with the patient (including but not limited to: bleeding, infection, nerve damage, worsening of pain, inability to perform injection, paralysis, seizures, and death) who agreed to proceed.  Discussed the potential for sedation if warranted/wanted.  The procedure will plan to be performed at [facility] with fluoroscopic guidance(unless ultrasound is indicated) and could potentially have steroids and contrast dye used. Questions were answered and in a way the patient could understand.  Patient verbalized understanding and wishes to proceed.  This intervention will be ordered.  Discussed with patient that all procedures are part of a multimodal plan of care and include either formal PT or a home exercise program.  Patient has no evidence of coagulopathy or current infection.    --- Follow-up after procedure or sooner if needed.    -------  Education about Medial Branch Blockade and RF Therapy:    This medial branch blockade (MBB) suggested is intended for diagnostic purposes, with the intent of offering the patient Radiofrequency  "thermal rhizotomy (RF) if the MBB is diagnostically effective.  The diagnostic blockade is necessary to determine the likelihood that RF therapy could be efficacious in providing long term relief to the patient.    Medial branches are sensory nerve branches that connect to a facet joint and transmit sensations & pain signals from that joint.  Facet is a term for the type of joints found in the spine.  Medial branches are the nerves that go to a facet, and therefore are also sometimes called \"facet joint nerves\" (FJNs).      In a medial branch blockade procedure, xray fluoroscopy is used to verify the locations of the outside of the joint lines which are being targeted.  Under xray guidance, needles are placed to these areas.  Contrast dye is injected to confirm proper placement, with dye flowing over the joint area, and to ensure that the dye does not flow into unintended areas such as a vein.  When this is confirmed, local anesthetic is injected to block the medial branch at that joint level.      If MBBs are diagnostically successful in blocking pain, then the patient is most likely a great candidate for Radiofrequency of those facet joint nerves.  In the RF procedure, needles are placed to the joint lines in the same fashion, and after testing, the needle tips are heated to thermally treat the nerves, blocking the nerves by in essence damaging the nerves with the heat treatment.       Medically, a successful RF procedure should provide a patient with 50% pain relief or more for at least 6 months.  Clinical experience suggests that successful patients receive relief more in the range of 12 months on average.  We also discussed that a fortunate minority of patients receive therapeutic success from the MBB, and may not require RF ablation.  If a patient receives more than 8 weeks of relief from MBB, then occasional repeat MBB for therapeutic purposes is a very reasonable alternative therapy.  This course of therapy " is consistent with our LCDs according to our CMS  in the area, and therefore other insurance providers should follow accordingly.  We will monitor our patients to screen for these therapeutic responders and will offer RF therapy only when necessary.        We discussed that MBB & RF are not without risks.  Guidelines regarding anticoagulant use & neuraxial procedures will be respected.  Patients that are ill or otherwise may be at risk for sepsis will not have their spines accessed by neuraxial injections of any type.  This patient will not be offered these therapies if there is an increased risk.   We discussed that there is a risk of postprocedural pain and also a risk of worsening of clinical picture with these procedures as with any neuraxial procedure.    -------      --------    Anticoagulation risks for procedures....   - there are risks to discontinuation of anticoagulants for neuraxial procedures and surgery.  Risks include but are not limited to deep vein thromboses, pulmonary emboli, myocardial infarction, and stroke    - Neuraxial access with a needle is relatively contraindicated while anticoagulated because of the risk of hemorrhage and/or epidural hematoma, which can be a neurosurgical emergency to evacuate bleeding.  Left unchecked, bleeding can cause nerve damage leading to paralysis and/or death.  --------         LUIS REPORT  LUIS report has been reviewed and scanned into the patient's chart.    As the clinician, I personally reviewed the LUIS from 1-11-21 while the patient was in the office today.            EMR Dragon/Transcription disclaimer:   Much of this encounter note is an electronic transcription/translation of spoken language to printed text. The electronic translation of spoken language may permit erroneous, or at times, nonsensical words or phrases to be inadvertently transcribed; Although I have reviewed the note for such errors, some may still exist.

## 2021-01-12 ENCOUNTER — ANTICOAGULATION VISIT (OUTPATIENT)
Dept: INTERNAL MEDICINE | Facility: CLINIC | Age: 81
End: 2021-01-12

## 2021-01-12 ENCOUNTER — APPOINTMENT (OUTPATIENT)
Dept: WOMENS IMAGING | Facility: HOSPITAL | Age: 81
End: 2021-01-12

## 2021-01-12 DIAGNOSIS — I48.0 PAROXYSMAL ATRIAL FIBRILLATION (HCC): ICD-10-CM

## 2021-01-12 LAB — INR PPP: 1.4 (ref 0.9–1.1)

## 2021-01-12 PROCEDURE — 76642 ULTRASOUND BREAST LIMITED: CPT | Performed by: RADIOLOGY

## 2021-01-12 PROCEDURE — G0279 TOMOSYNTHESIS, MAMMO: HCPCS | Performed by: RADIOLOGY

## 2021-01-12 PROCEDURE — 85610 PROTHROMBIN TIME: CPT | Performed by: INTERNAL MEDICINE

## 2021-01-12 PROCEDURE — 77066 DX MAMMO INCL CAD BI: CPT | Performed by: RADIOLOGY

## 2021-01-12 PROCEDURE — 36416 COLLJ CAPILLARY BLOOD SPEC: CPT | Performed by: INTERNAL MEDICINE

## 2021-01-13 ENCOUNTER — CLINICAL SUPPORT NO REQUIREMENTS (OUTPATIENT)
Dept: CARDIOLOGY | Facility: CLINIC | Age: 81
End: 2021-01-13

## 2021-01-13 DIAGNOSIS — I48.0 PAF (PAROXYSMAL ATRIAL FIBRILLATION) (HCC): Primary | ICD-10-CM

## 2021-01-13 PROCEDURE — 93281 PM DEVICE PROGR EVAL MULTI: CPT | Performed by: INTERNAL MEDICINE

## 2021-01-18 ENCOUNTER — LAB REQUISITION (OUTPATIENT)
Dept: LAB | Facility: HOSPITAL | Age: 81
End: 2021-01-18

## 2021-01-18 DIAGNOSIS — Z00.00 ENCOUNTER FOR GENERAL ADULT MEDICAL EXAMINATION WITHOUT ABNORMAL FINDINGS: ICD-10-CM

## 2021-01-18 DIAGNOSIS — R92.8 ABNORMAL MAMMOGRAM OF BOTH BREASTS: Primary | ICD-10-CM

## 2021-01-19 PROCEDURE — U0004 COV-19 TEST NON-CDC HGH THRU: HCPCS | Performed by: ANESTHESIOLOGY

## 2021-01-20 LAB — SARS-COV-2 RNA RESP QL NAA+PROBE: NOT DETECTED

## 2021-01-21 ENCOUNTER — DOCUMENTATION (OUTPATIENT)
Dept: PAIN MEDICINE | Facility: CLINIC | Age: 81
End: 2021-01-21

## 2021-01-21 ENCOUNTER — OUTSIDE FACILITY SERVICE (OUTPATIENT)
Dept: PAIN MEDICINE | Facility: CLINIC | Age: 81
End: 2021-01-21

## 2021-01-21 PROCEDURE — 64495 INJ PARAVERT F JNT L/S 3 LEV: CPT | Performed by: ANESTHESIOLOGY

## 2021-01-21 PROCEDURE — 64494 INJ PARAVERT F JNT L/S 2 LEV: CPT | Performed by: ANESTHESIOLOGY

## 2021-01-21 PROCEDURE — 64493 INJ PARAVERT F JNT L/S 1 LEV: CPT | Performed by: ANESTHESIOLOGY

## 2021-01-21 NOTE — PROGRESS NOTES
Bilateral L2-5 Lumbar Medial Branch Blockade  [facility]    PREOPERATIVE DIAGNOSIS:  Lumbar spondylosis without myelopathy    POSTOPERATIVE DIAGNOSIS:  Lumbar spondylosis without myelopathy    PROCEDURE:   Diagnostic Bilateral Lumbar Medial Branch Nerve Blockades, with fluoroscopy:  L2, L3, L4, and L5 nerves (at the L3, L4, L5 transverse processes and the sacral alar groove) to block facet joints L3-4, L4-5, and L5-S1  1. 46639-78 -- Bilateral Lumbar Facet blocks, 1st Level  2. 78576-54 -- Bilateral Lumbar Facet blocks, 2nd  Level  3. [id number] -- Bilateral Lumbar Facet blocks, 3rd Level    PRE-PROCEDURE DISCUSSION WITH PATIENT:    Risks and complications were discussed with the patient prior to starting the procedure and informed consent was obtained.      SURGEON:  Wolfgang Means MD    REASON FOR PROCEDURE:   The patient complains of pain that seems to have a significant axial component    Preop INR = 1.1    SEDATION:  Versed 4mg IV  ANESTHETIC:  Marcaine 0.5%  STEROID:  Methylprednisolone (DEPO MEDROL) 80mg/ml  TOTAL VOLUME OF SOLUTION: 8ml    DESCRIPTON OF PROCEDURE:  After obtaining informed consent, IV access was obtained in the preoperative area.   The patient was taken to the operating room.  The patient was placed in the prone position with a pillow under the abdomen. All pressure points were well padded.  EKG, blood pressure, and pulse oximeter were monitored.  The patient was monitored and sedated by the RN under my direction. The lumbosacral area was prepped with Chloraprep and draped in a sterile fashion. Under fluoroscopic guidance the transverse processes of the L3, L4, and L5 vertebrae at the junctions of the superior articular processes were identified on the right. Also identified was the groove between the ala and the superior articular process of the sacrum on the ipsilateral side.  Skin and subcutaneous tissue were anesthetized with 1% lidocaine above each of these points. A  22-gauge spinal needle was introduced under fluoroscopic guidance at the above junctions. Aspiration was negative for blood and CSF.  After confirming the position of the needle with fluoroscope in all views, 0.25 mL of Omnipaque was injected, and after seeing the proper spread a total of 1 mL of the anesthetic solution noted above was injected at each of these points.  Needles were removed intact from each of the areas.  A similar procedure was repeated to block the L2, L3, L4, and L5 nerves on the contralateral side.   Onset of analgesia was noted.  Vital signs remained stable throughout.      ESTIMATED BLOOD LOSS:  <5 mL  SPECIMENS:  none    COMPLICATIONS:   No complications were noted., There was no indication of vascular uptake on live injection of contrast dye., There was no indication of intrathecal uptake on live injection of contrast dye., There was not any evidence of dural puncture.   and The patient did not have any signs of postprocedure numbness nor weakness.    TOLERANCE & DISCHARGE CONDITION:    The patient tolerated the procedure well.  The patient was transported to the recovery area without difficulties.  The patient was discharged to home under the care of family in stable and satisfactory condition.    PLAN OF CARE:  1. The patient was given our standard instruction sheet.  2. We discussed that Lumbar Medial Branch Blockade is a diagnostic procedure in consideration for radiofrequency ablation if two diagnostic procedures prove to be positive for significant benefit.  If sustained relief of 6 to eight weeks is obtained, then an alternative plan could be therapeutic lumbar branch blockades.  3. The patient is asked to keep a pain log each hour for 8 hours after the procedure today.  4. The patient will  Return to clinic 1-2 wks.  5. The patient will resume all medications as per the medication reconciliation sheet.    \

## 2021-02-03 ENCOUNTER — OFFICE VISIT (OUTPATIENT)
Dept: PAIN MEDICINE | Facility: CLINIC | Age: 81
End: 2021-02-03

## 2021-02-03 VITALS
SYSTOLIC BLOOD PRESSURE: 133 MMHG | TEMPERATURE: 97.5 F | RESPIRATION RATE: 20 BRPM | WEIGHT: 209.4 LBS | BODY MASS INDEX: 38.53 KG/M2 | DIASTOLIC BLOOD PRESSURE: 79 MMHG | HEIGHT: 62 IN | OXYGEN SATURATION: 97 % | HEART RATE: 87 BPM

## 2021-02-03 DIAGNOSIS — G89.29 OTHER CHRONIC PAIN: Primary | ICD-10-CM

## 2021-02-03 DIAGNOSIS — M47.816 LUMBAR FACET ARTHROPATHY: ICD-10-CM

## 2021-02-03 DIAGNOSIS — M51.36 BULGE OF LUMBAR DISC WITHOUT MYELOPATHY: ICD-10-CM

## 2021-02-03 PROCEDURE — 99212 OFFICE O/P EST SF 10 MIN: CPT | Performed by: NURSE PRACTITIONER

## 2021-02-03 NOTE — PROGRESS NOTES
"CHIEF COMPLAINT  F/u back and bilat hip pain. Pt had Bilateral L2-5 Lumbar Medial Branch Blockade and sts receiving 90% relief x 2 weeks and is still getting relief.     Subjective   Inessa Peoples is a 81 y.o. female  who presents for follow-up.  She has a history of chronic back pain. Reports this is improved since last evaluation. \"i'm so happy with these results.\" \"everything has been doing very well.\" Notes improvement in activity as well.     Patient presents for follow-up of PROCEDURE. Patient had a bilateral L2-L5 MBB performed by Dr. SPARKS on 1-21-21 for management of LOW BACK PAIN. Patient reports 90% ONGOING relief from the procedure.    Complains of pain in her low back and hips. Today her pain is 0/10VAS. Describes her pain as intermittent aching. Pain worsens as day progresses. ADL's by self.    Patient had a left SI +GTB injection performed by Dr. SPARKS on 6-16-20 for management of LOW BACK PAIN. Patient reports 75% relief from the procedure for approximately 2 weeks.     Patient had a bilateral L2-L5 MBB performed by Dr. SPARKS on 6-23-19 for management of LOW BACK PAIN. Patient reports 90% relief from the procedure which lasted a few weeks.    Patient had a bilateral greater trochanteric bursa injection performed by Dr. SPARKS on 6-23-19 for management of hip pain. Patient reports 90% ONGOING relief from the procedure.  She completed a Bilateral L2-5 Lumbar Medial Branch Blockade   on  11/15/18 performed by Dr. Sparks for management of LOW BACK PAIN. Patient reports SIGNIFICANT relief from the procedure. This started wearing off over the past few days.   Was to have second LMBB on 12-6-18 but was still having significant relief. The procedure was cancelled at that time.     She completed a LEFT Sacroiliac Joint Injection   on  4/26/18 performed by Dr. Sparks for management of low back pain/sacroiliac joint pain. Reported significant relief previously.  She completed a right SI joint " injection   on  9-26-17 performed by Dr. Means for management of low back pain.     She reports she was told she could temporarily interrupt COumadin for treatments.     Patient remained masked during entire encounter. No cough present. I donned a mask and eye protection throughout entire visit. Prior to donning mask and eye protection, hand hygiene was performed, as well as when it was doffed.  I was closer than 6 feet, but not for an extended period of time. No obvious exposure to any bodily fluids.    Back Pain  This is a chronic problem. The current episode started more than 1 year ago. The problem occurs intermittently. Progression since onset: improved since last evaluation. The pain is present in the lumbar spine and sacro-iliac. The pain is at a severity of 0/10. The pain is mild. The pain is worse during the day (worsens as day progresses). The symptoms are aggravated by bending, position, sitting, standing and twisting (housework). Associated symptoms include bladder incontinence. Pertinent negatives include no abdominal pain, bowel incontinence, chest pain, dysuria, fever, headaches, numbness or weakness. Risk factors include obesity, menopause, history of cancer and lack of exercise (history of melanoma--no Chemo or RAD). She has tried analgesics and heat for the symptoms. The treatment provided moderate relief.        PEG Assessment   What number best describes your pain on average in the past week?0  What number best describes how, during the past week, pain has interfered with your enjoyment of life?0  What number best describes how, during the past week, pain has interfered with your general activity?  0    The following portions of the patient's history were reviewed and updated as appropriate: allergies, current medications, past family history, past medical history, past social history, past surgical history and problem list.    Review of Systems   Constitutional: Positive for activity change  "(improved). Negative for fatigue and fever.   HENT: Negative for congestion.    Eyes: Negative for visual disturbance.   Respiratory: Negative for cough and shortness of breath.    Cardiovascular: Negative for chest pain.   Gastrointestinal: Negative for abdominal pain, bowel incontinence, constipation and diarrhea.   Endocrine: Negative for polyuria.   Genitourinary: Positive for bladder incontinence. Negative for difficulty urinating and dysuria.   Musculoskeletal: Positive for arthralgias (bilat hips) and back pain.   Allergic/Immunologic: Negative for immunocompromised state.   Neurological: Negative for dizziness, weakness, light-headedness, numbness and headaches.   Psychiatric/Behavioral: Negative for agitation, sleep disturbance and suicidal ideas. The patient is not nervous/anxious.      I have reviewed and confirmed the accuracy of the ROS as documented by the MA/LPN/RN AGA Katz      Vitals:    02/03/21 1245   BP: 133/79   Pulse: 87   Resp: 20   Temp: 97.5 °F (36.4 °C)   SpO2: 97%   Weight: 95 kg (209 lb 6.4 oz)   Height: 157.5 cm (62\")   PainSc: 0-No pain   PainLoc: Back  Comment: and bilat hips     Objective   Physical Exam  Vitals signs and nursing note reviewed.   Constitutional:       Appearance: Normal appearance. She is well-developed.   HENT:      Head: Normocephalic and atraumatic.   Cardiovascular:      Rate and Rhythm: Normal rate.   Musculoskeletal:      Lumbar back: She exhibits decreased range of motion, tenderness and bony tenderness.   Skin:     General: Skin is warm and dry.   Neurological:      Mental Status: She is alert.      Gait: Gait abnormal.   Psychiatric:         Speech: Speech normal.         Behavior: Behavior normal. Behavior is cooperative.         Thought Content: Thought content normal.         Judgment: Judgment normal.         Assessment/Plan   Diagnoses and all orders for this visit:    1. Other chronic pain (Primary)    2. 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 Lumbar facet arthropathy      --- --- The previous MBB is showing some preliminary evidence of therapeutic success.  Will bring in for follow up in 4 weeks, if therapeutic benefit from medial branch block (MBB) is ongoing at that time we can repeat the blocks on an as needed basis.  We once again discussed the diagnostic nature of MBB and the role of MBB in determining likelihood of therapeutic success of RF Lesioning.  If the therapeutic effects have started to wane at that time will seek authorization for radiofrequency lesioning (RFL) of the same levels; also the patient will call if pain begins to re-emerge before follow up and the plan will be to move forward with RFL.  We discussed risks of RFL procedure including but not limited to bleeding & infection & neural injury & postprocedural pain & risk of a lack of pain relief.  Patient verbalizes informed consent to proceed with RFL if needed.    -- consider RFL as needed  --- Follow-up 3-6 months or sooner if needed    -------  Education about Medial Branch Blockade and RF Therapy:    This medial branch blockade (MBB) suggested is intended for diagnostic purposes, with the intent of offering the patient Radiofrequency thermal rhizotomy (RF) if the MBB is diagnostically effective.  The diagnostic blockade is necessary to determine the likelihood that RF therapy could be efficacious in providing long term relief to the patient.    Medial branches are sensory nerve branches that connect to a facet joint and transmit sensations & pain signals from that joint.  Facet is a term for the type of joints found in the spine.  Medial branches are the nerves that go to a facet, and therefore are also sometimes called \"facet joint nerves\" (FJNs).      In a medial branch blockade procedure, xray fluoroscopy is used to verify the locations of the outside of the joint lines which are being targeted.  Under xray guidance, needles are placed to these areas.  " Contrast dye is injected to confirm proper placement, with dye flowing over the joint area, and to ensure that the dye does not flow into unintended areas such as a vein.  When this is confirmed, local anesthetic is injected to block the medial branch at that joint level.      If MBBs are diagnostically successful in blocking pain, then the patient is most likely a great candidate for Radiofrequency of those facet joint nerves.  In the RF procedure, needles are placed to the joint lines in the same fashion, and after testing, the needle tips are heated to thermally treat the nerves, blocking the nerves by in essence damaging the nerves with the heat treatment.       Medically, a successful RF procedure should provide a patient with 50% pain relief or more for at least 6 months.  Clinical experience suggests that successful patients receive relief more in the range of 12 months on average.  We also discussed that a fortunate minority of patients receive therapeutic success from the MBB, and may not require RF ablation.  If a patient receives more than 8 weeks of relief from MBB, then occasional repeat MBB for therapeutic purposes is a very reasonable alternative therapy.  This course of therapy is consistent with our LCDs according to our CMS  in the area, and therefore other insurance providers should follow accordingly.  We will monitor our patients to screen for these therapeutic responders and will offer RF therapy only when necessary.        We discussed that MBB & RF are not without risks.  Guidelines regarding anticoagulant use & neuraxial procedures will be respected.  Patients that are ill or otherwise may be at risk for sepsis will not have their spines accessed by neuraxial injections of any type.  This patient will not be offered these therapies if there is an increased risk.   We discussed that there is a risk of postprocedural pain and also a risk of worsening of clinical picture with these  procedures as with any neuraxial procedure.    -------             LUIS REPORT    LUIS report has been reviewed and scanned into the patient's chart.    As the clinician, I personally reviewed the LUIS from 2-3-21 while the patient was in the office today.            EMR Dragon/Transcription disclaimer:   Much of this encounter note is an electronic transcription/translation of spoken language to printed text. The electronic translation of spoken language may permit erroneous, or at times, nonsensical words or phrases to be inadvertently transcribed; Although I have reviewed the note for such errors, some may still exist.

## 2021-02-09 ENCOUNTER — APPOINTMENT (OUTPATIENT)
Dept: WOMENS IMAGING | Facility: HOSPITAL | Age: 81
End: 2021-02-09

## 2021-02-09 PROCEDURE — 19081 BX BREAST 1ST LESION STRTCTC: CPT | Performed by: RADIOLOGY

## 2021-02-09 PROCEDURE — 19083 BX BREAST 1ST LESION US IMAG: CPT | Performed by: RADIOLOGY

## 2021-02-09 PROCEDURE — 88305 TISSUE EXAM BY PATHOLOGIST: CPT

## 2021-02-11 ENCOUNTER — ANTICOAGULATION VISIT (OUTPATIENT)
Dept: INTERNAL MEDICINE | Facility: CLINIC | Age: 81
End: 2021-02-11

## 2021-02-11 ENCOUNTER — CLINICAL SUPPORT NO REQUIREMENTS (OUTPATIENT)
Dept: CARDIOLOGY | Facility: CLINIC | Age: 81
End: 2021-02-11

## 2021-02-11 ENCOUNTER — OFFICE VISIT (OUTPATIENT)
Dept: CARDIOLOGY | Facility: CLINIC | Age: 81
End: 2021-02-11

## 2021-02-11 ENCOUNTER — TELEPHONE (OUTPATIENT)
Dept: CARDIOLOGY | Facility: CLINIC | Age: 81
End: 2021-02-11

## 2021-02-11 VITALS
HEIGHT: 63 IN | DIASTOLIC BLOOD PRESSURE: 98 MMHG | HEART RATE: 76 BPM | SYSTOLIC BLOOD PRESSURE: 142 MMHG | WEIGHT: 207 LBS | BODY MASS INDEX: 36.68 KG/M2

## 2021-02-11 DIAGNOSIS — E66.01 CLASS 2 SEVERE OBESITY DUE TO EXCESS CALORIES WITH SERIOUS COMORBIDITY AND BODY MASS INDEX (BMI) OF 38.0 TO 38.9 IN ADULT (HCC): ICD-10-CM

## 2021-02-11 DIAGNOSIS — D86.9 SARCOIDOSIS: ICD-10-CM

## 2021-02-11 DIAGNOSIS — Z95.0 PRESENCE OF BIVENTRICULAR CARDIAC PACEMAKER: ICD-10-CM

## 2021-02-11 DIAGNOSIS — I10 ESSENTIAL HYPERTENSION: ICD-10-CM

## 2021-02-11 DIAGNOSIS — G47.33 OSA ON CPAP: Chronic | ICD-10-CM

## 2021-02-11 DIAGNOSIS — I48.0 PAROXYSMAL ATRIAL FIBRILLATION (HCC): ICD-10-CM

## 2021-02-11 DIAGNOSIS — I50.30 HEART FAILURE WITH PRESERVED EJECTION FRACTION, NYHA CLASS II (HCC): ICD-10-CM

## 2021-02-11 DIAGNOSIS — Z99.89 OSA ON CPAP: Chronic | ICD-10-CM

## 2021-02-11 DIAGNOSIS — I48.0 PAF (PAROXYSMAL ATRIAL FIBRILLATION) (HCC): Primary | ICD-10-CM

## 2021-02-11 DIAGNOSIS — I44.7 LBBB (LEFT BUNDLE BRANCH BLOCK): ICD-10-CM

## 2021-02-11 LAB — INR PPP: 1.5 (ref 0.9–1.1)

## 2021-02-11 PROCEDURE — 93793 ANTICOAG MGMT PT WARFARIN: CPT | Performed by: INTERNAL MEDICINE

## 2021-02-11 PROCEDURE — 36416 COLLJ CAPILLARY BLOOD SPEC: CPT | Performed by: INTERNAL MEDICINE

## 2021-02-11 PROCEDURE — 85610 PROTHROMBIN TIME: CPT | Performed by: INTERNAL MEDICINE

## 2021-02-11 PROCEDURE — 99024 POSTOP FOLLOW-UP VISIT: CPT | Performed by: NURSE PRACTITIONER

## 2021-02-11 PROCEDURE — 93000 ELECTROCARDIOGRAM COMPLETE: CPT | Performed by: NURSE PRACTITIONER

## 2021-02-11 NOTE — PROGRESS NOTES
Date of Office Visit: 2021  Encounter Provider: AGA Bhakta  Place of Service: [facility] CARDIOLOGY  Patient Name: Inessa Peoples  :1940    Chief Complaint   Patient presents with   • Atrial Fibrillation     1 mnth follow up   • Pacemaker Check   :     HPI: Inessa Peoples is a 81 y.o. female who is a patient followed by Dr. Hamilton and Dr. Stone.  She has a history of PAF, LBBB, obesity and sarcoid.  She has been been on sotalol for PAF which has been well controlled, she saw Dr. Stone and early 2020 with complaints of shortness of breath and dyspnea with exertion.    He had not had any PAF but had a stress echo in  which showed severe LV dysfunction under stress but normal at rest, VO2 max in the range of severe class III heart failure with LBBB and noted dyssynchrony.    It was felt that a CRT may help her.    She underwent implantation of a CRT-P on 2021.  She tolerated procedure well she did have some diaphragm stem, a chest x-ray was done post implant which was okay and also had a limited echo was also looked okay.  The morning following implant when the representative checked her device she had some stem with higher outputs but none at her settings that she was discharged home on.    She presents today for follow-up.    She says she does not feel any better---still with dyspnea with exertion---really no different than before the device.     She has no chest pain, PND or orthopnea----she has some intermittent palpitations but when we discuss it actually sounds like diaphragm stimulation---she has it daily---predominantly when she is doing her makeup---she spoke to someone who told her that they thought her pacemaker was interacting with her hair care appliances (hair dryer and curling iron)---they told her to move away and it should go away and it does----it happens particularly when she leans over to do her eye makeup.     Device  interrogation shows normal testing and function---we are able to reproduce the diaphragm stim with mildly increasing the output---we were able to test and change from LV1 >>LV2 to LV1 >>can and this eliminated the diaphragm stim---repeated 12 lead EKG still with decent Biv pacing so will leave and see how she does.     She reportedly has been on sotalol for PAF but it is no longer listed on her meds and she does not have it written on her list but she thinks she is taking it---she does not remember it being stopped---she is going to go home and check her meds and call and let me know whether she is or is not taking it.     Device interrogation shows <0.1% AF burden---total time 5 seconds. Effective CRT pacing 98%.    She is on warfarin for AC, No bleeding issues.    She has not lost any weight but is working on diet and increasing activity.      Past Medical History:   Diagnosis Date   • Abnormal ECG    • Allergic rhinitis    • Atrial fibrillation (CMS/HCC)    • Bleeds easily (CMS/HCC)     warfarin   • Breast screening    • Broken bones    • Calf DVT (deep venous thrombosis) (CMS/HCC)    • Chronic anticoagulation    • Class 2 severe obesity due to excess calories with serious comorbidity and body mass index (BMI) of 37.0 to 37.9 in adult (CMS/HCC)    • Colon polyp    • CTS (carpal tunnel syndrome)    • Deep vein thrombosis (CMS/HCC)     left calf   • Diverticulitis of colon    • AHN (dyspnea on exertion)    • Fatigue    • H/O malignant melanoma of skin    • Hematuria, gross    • History of colon polyps    • History of kidney stones    • Hyperlipidemia    • Hypertension    • Hyperthyroidism    • IFG (impaired fasting glucose)    • Insomnia    • LBBB (left bundle branch block)    • Left leg swelling    • LLQ abdominal pain    • Long term current use of systemic steroids    • Low back pain    • Malignant melanoma of skin (CMS/HCC)    • Neck pain    • Obesity     class 2 obesity due to excess calories with BMI of 35.0  to 35.9 in adult   • Osteoarthritis    • Osteopenia    • Paralysis, diaphragm    • Paroxysmal atrial fibrillation (CMS/HCC)    • 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 Lu   • Sleep apnea     USES CPAP   • UTI (urinary tract infection)    • Visit for screening mammogram        Past Surgical History:   Procedure Laterality Date   • CARDIAC CATHETERIZATION N/A 6/29/2020    Procedure: Coronary angiography, Stafford L;  Surgeon: Noe Reynoso MD;  Location:  [facility]VASIVE [facility];  Service: Cardiovascular;  Laterality: N/A;   • CARDIAC CATHETERIZATION N/A 6/29/2020    Procedure: Left ventriculography;  Surgeon: Noe Reynoso MD;  Location:  [facility]VAS[facility];  Service: Cardiovascular;  Laterality: N/A;   • CARDIAC CATHETERIZATION N/A 6/29/2020    Procedure: Right and Left Heart Cath;  Surgeon: Noe Reynoso MD;  Location: [facility] CATH INVASIVE LOCATION;  Service: Cardiovascular;  Laterality: N/A;   • CARDIAC CATHETERIZATION     • CARDIAC ELECTROPHYSIOLOGY PROCEDURE N/A 1/5/2021    Procedure: Biventricular Device Insertion -- CRT-p ([employer]);  Surgeon: Demar Stone MD;  Location: [facility] CATH INVASIVE LOCATION;  Service: Cardiology;  Laterality: N/A;   • CARPAL TUNNEL RELEASE Bilateral 1980'S    [facility]   • CATARACT EXTRACTION Bilateral 2001    Dr. Nic Hidalgo   • COLONOSCOPY N/A 06/26/2013    MILD SIGMOID DIVERTICULOSIS, repeat 5 years based on sister w/ colona cancer-DR. NASRA CASTRO   • COLONOSCOPY N/A 10/19/2010    SIGMOID DIVERTICULOSIS & INTERNAL HEMORRHOIDS, HEMORRHOIDAL BANDING X4, DR. NASRA CASTRO   • COLONOSCOPY N/A 8/6/2018    Procedure: COLONOSCOPY TO CECUM AND INTO TERMINAL ILEUM;  Surgeon: Nasra Castro MD;  Location: [facility] ENDOSCOPY;  Service: Gastroenterology   • COLONOSCOPY W/ BIOPSIES  N/A 05/12/2008    RECTUM BIOPSY: COLONIC MUCOSA W/ FOCAL INCREASE OF INFLAMMATORY CELLS IN THE MUSCULARIS MUCOSA INCLUDING EOSINOPHILS, SIGMOID DIVERTICULOSIS, POSSIBLE PROCTITIS, DR. TANISHA THORNTON   • CYSTOSCOPY, RETROGRADE PYELOGRAM AND STENT INSERTION Bilateral 10/10/2014    w/ Right ureteral pyeloscopy & laser lithotripsy, Right Double-J stent placement-Dr. Julio Tai   • ENDOSCOPY N/A 11/11/2020    Procedure: ESOPHAGOGASTRODUODENOSCOPY with biopsies and dilation 54fr fernández;  Surgeon: Garret Piedra MD;  Location: [facility] ENDOSCOPY;  Service: Gastroenterology;  Laterality: N/A;  pre- dysphagia  post-- gastritis, dilation of esophageal ring, watermelon stomach    • EXTRACORPOREAL SHOCK WAVE LITHOTRIPSY (ESWL) Right 09/10/2009    DR. JULIO TAI   • HAMMER TOE REPAIR     • LAPAROSCOPIC CHOLECYSTECTOMY N/A 07/19/2012    DR. TANISHA THORNTON   • LUMBAR EPIDURAL INJECTION N/A 04/23/2015    LUMBAR EPIDURAL STEROID INJECTION X3   • PARATHYROIDECTOMY N/A 2001   • SKIN CANCER EXCISION     • THUMB ARTHROSCOPY Right 1982   • TOTAL KNEE ARTHROPLASTY Right 03/25/2014    [employer] total knee, 55 femoral component, 71 tibial tray w/ a 40 mm stem, 34 mm x 7.8 mm three-peg patella, and a 14 standard poly-Dr. Ty Canchola       Social History     Socioeconomic History   • Marital status:      Spouse name: EDY   • Number of children: 4   • Years of education: 14yrs   • Highest education level: Not on file   Occupational History   • Occupation: RETIRED   Tobacco Use   • Smoking status: Never Smoker   • Smokeless tobacco: Never Used   Substance and Sexual Activity   • Alcohol use: Yes     Comment: social drinker/caffeine use    • Drug use: No   • Sexual activity: Defer       Family History   Problem Relation Age of Onset   • Heart disease Mother    • Diabetes type II Mother    • Heart disease Father    • Cancer Sister    • Colon cancer Sister    • Colon polyps Sister    • Lung cancer Sister    • Heart  disease Brother         CABG   • Diabetes type II Brother    • Colon cancer Brother    • Colon polyps Brother    • Arrhythmia Brother    • Heart disease Sister    • Diabetes type II Sister    • Aortic aneurysm Sister    • COPD Sister    • Arrhythmia Sister    • Cerebral aneurysm Sister    • COPD Sister    • Lupus Sister    • Asthma Maternal Aunt    • Aortic aneurysm Brother        Review of Systems   Constitution: Positive for malaise/fatigue. Negative for chills and fever.   Cardiovascular: Positive for dyspnea on exertion and palpitations. Negative for chest pain, leg swelling, near-syncope, orthopnea, paroxysmal nocturnal dyspnea and syncope.   Respiratory: Positive for shortness of breath. Negative for cough.    Hematologic/Lymphatic: Negative.    Musculoskeletal: Negative for joint pain, joint swelling and myalgias.   Gastrointestinal: Negative for abdominal pain, diarrhea, melena, nausea and vomiting.   Genitourinary: Negative for frequency and hematuria.   Neurological: Negative for light-headedness, numbness, paresthesias and seizures.   Allergic/Immunologic: Negative.    All other systems reviewed and are negative.      Allergies   Allergen Reactions   • Iodinated Diagnostic Agents Anaphylaxis, Swelling and Other (See Comments)     PAINS ACROSS CHEST, Facial swelling   • Fenofibrate Myalgia   • Livalo [Pitavastatin] Myalgia     Joint Pain, Muscle tightness   • Plaquenil [Hydroxychloroquine Sulfate] Myalgia     Joint Pain, Muscle Tightness   • Statins Myalgia     Joint pain, Muscle Tightness         Current Outpatient Medications:   •  acetaminophen (TYLENOL) 650 MG 8 hr tablet, Take 650 mg by mouth Every 8 (Eight) Hours As Needed for Mild Pain ., Disp: , Rfl:   •  Biotin w/ Vitamins C & E (HAIR SKIN & NAILS GUMMIES PO), , Disp: , Rfl:   •  Calcium-Phosphorus-Vitamin D (CITRACAL +D3 PO), Take 1 tablet by mouth Daily., Disp: , Rfl:   •  celecoxib (CeleBREX) 200 MG capsule, Take 1 capsule by mouth Daily.,  "Disp: 90 capsule, Rfl: 0  •  ezetimibe (ZETIA) 10 MG tablet, Take 1 tablet by mouth Daily., Disp: 90 tablet, Rfl: 0  •  famotidine (PEPCID) 40 MG tablet, , Disp: , Rfl:   •  fexofenadine (ALLEGRA) 180 MG tablet, Take 180 mg by mouth Daily., Disp: , Rfl:   •  levothyroxine (SYNTHROID, LEVOTHROID) 25 MCG tablet, Take 1 tablet by mouth Daily., Disp: 90 tablet, Rfl: 0  •  losartan (COZAAR) 100 MG tablet, Take 1 tablet by mouth Daily., Disp: 90 tablet, Rfl: 0  •  melatonin 5 MG sublingual tablet sublingual tablet, Take 5 mg by mouth Every Night., Disp: , Rfl:   •  Multiple Vitamin (MULTI-DAY VITAMINS) tablet, Take 1 tablet by mouth daily., Disp: , Rfl:   •  omeprazole (priLOSEC) 40 MG capsule, , Disp: , Rfl:   •  spironolactone (ALDACTONE) 25 MG tablet, Take 1 tablet by mouth Daily., Disp: 60 tablet, Rfl: 0  •  warfarin (COUMADIN) 3 MG tablet, Take 1 tablet by mouth Daily., Disp: 90 tablet, Rfl: 0  •  Triamcinolone Acetonide (NASACORT) 55 MCG/ACT nasal inhaler, 2 sprays into the nostril(s) as directed by provider Daily., Disp: , Rfl:   •  umeclidinium-vilanterol (Anoro Ellipta) 62.5-25 MCG/INH aerosol powder  inhaler, Inhale 1 puff Daily., Disp: 1 each, Rfl: 11      Objective:     Vitals:    02/11/21 0932   BP: 142/98   Pulse: 76   Weight: 93.9 kg (207 lb)   Height: 158.8 cm (62.5\")     Body mass index is 37.26 kg/m².    PHYSICAL EXAM:    Vitals Reviewed.   General Appearance: No acute distress, obese.   Eyes: Conjunctiva and lids: No erythema, swelling, or discharge. Sclera non-icteric.   HENT: Atraumatic, normocephalic. External eyes, ears, and nose normal.   Respiratory: No signs of respiratory distress. Respiration rhythm and depth normal.   Clear to auscultation. No rales, crackles, rhonchi, or wheezing auscultated.   Cardiovascular:  Heart Rate and Rhythm: Normal, Heart Sounds: Normal S1 and S2. No S3 or S4 noted.  Murmurs: No murmurs noted. No rubs, thrills, or gallops.   Arterial Pulses:  Posterior tibialis and " dorsalis pedis pulses normal.   Lower Extremities: No edema noted.  Gastrointestinal:  Abdomen obese, non-tender.    Musculoskeletal: Normal movement of extremities  Skin and Nails: General appearance normal. No pallor, cyanosis, diaphoresis. Skin temperature normal. No clubbing of fingernails.   Psychiatric: Patient alert and oriented to person, place, and time. Speech and behavior appropriate. Normal mood and affect.       ECG 12 Lead    Date/Time: 2/11/2021 12:59 PM  Performed by: Mirela Ríos APRN  Authorized by: Mirela Ríos APRN   Comparison: compared with previous ECG   Similar to previous ECG  Rhythm: paced  BPM: 78  Pacing: atrial sensed rhythm and ventricular paced rhythm            Assessment:       Diagnosis Plan   1. Heart failure with preserved ejection fraction, NYHA class II (CMS/[facility])II-III---decreased EF with stress     2. LBBB (left bundle branch block)     3. Presence of biventricular cardiac pacemaker     4. Essential hypertension     5. Class 2 severe obesity due to excess calories with serious comorbidity and body mass index (BMI) of 38.0 to 38.9 in adult (CMS/[facility])     6. ANY on CPAP + 10 - Dr Lu     7. Sarcoidosis     8. Paroxysmal atrial fibrillation (CMS/[facility])            Plan:       1.-3. Class III HF w/preserved EF at rest, decrease with stress, LBBB/dyssynchrony--s/p CRT-P. She does not feel any different. Still with dyspnea with exertion. We did make some changes to her device to hopefully eliminate diaphragm stim---will see if this makes any difference. I have encourage her to try and increase activity and work on weight loss.     4. HTN, controlled.     5. For the problem of overweight/obesity, we discussed the importance of lifestyle measures and strategies for weight loss, such as improved nutrition, regular exercise and sleep hygiene.      6. ANY compliant with CPAP.     7. Sarcoid---is trying to get into see Dr. Dsouza--pulmonary--she saw Dr. Lu but he is now only  doing sleep.     8. PAF--device shows no AF episodes---she is unsure whether she is currently taking sotalol---she is going to check when she gets home and call me. Warfarin for AC.     Has follow up with Dr. Stone in April as scheduled.     As always, it has been a pleasure to participate in your patient's care.      Sincerely,         AGA Hartman    Addendum: She checked he home meds and called back ---she is not currently taking sotalol---not sure if she stopped it or it got stopped at some point---we will leave her off of it for now and monitor her PAF via her device.

## 2021-03-11 ENCOUNTER — ANTICOAGULATION VISIT (OUTPATIENT)
Dept: INTERNAL MEDICINE | Facility: CLINIC | Age: 81
End: 2021-03-11

## 2021-03-11 DIAGNOSIS — I48.0 PAROXYSMAL ATRIAL FIBRILLATION (HCC): ICD-10-CM

## 2021-03-11 LAB — INR PPP: 2.1 (ref 0.9–1.1)

## 2021-03-11 PROCEDURE — 85610 PROTHROMBIN TIME: CPT | Performed by: INTERNAL MEDICINE

## 2021-03-11 PROCEDURE — 36416 COLLJ CAPILLARY BLOOD SPEC: CPT | Performed by: INTERNAL MEDICINE

## 2021-03-31 RX ORDER — LOSARTAN POTASSIUM 100 MG/1
TABLET ORAL
Qty: 90 TABLET | Refills: 0 | Status: SHIPPED | OUTPATIENT
Start: 2021-03-31 | End: 2021-05-04 | Stop reason: DRUGHIGH

## 2021-03-31 RX ORDER — CELECOXIB 200 MG/1
CAPSULE ORAL
Qty: 90 CAPSULE | Refills: 0 | Status: SHIPPED | OUTPATIENT
Start: 2021-03-31 | End: 2021-12-06

## 2021-04-09 ENCOUNTER — OFFICE VISIT (OUTPATIENT)
Dept: CARDIOLOGY | Facility: CLINIC | Age: 81
End: 2021-04-09

## 2021-04-09 ENCOUNTER — CLINICAL SUPPORT NO REQUIREMENTS (OUTPATIENT)
Dept: CARDIOLOGY | Facility: CLINIC | Age: 81
End: 2021-04-09

## 2021-04-09 VITALS
SYSTOLIC BLOOD PRESSURE: 144 MMHG | HEIGHT: 63 IN | WEIGHT: 204 LBS | HEART RATE: 98 BPM | BODY MASS INDEX: 36.14 KG/M2 | DIASTOLIC BLOOD PRESSURE: 86 MMHG

## 2021-04-09 DIAGNOSIS — I48.11 LONGSTANDING PERSISTENT ATRIAL FIBRILLATION (HCC): Primary | ICD-10-CM

## 2021-04-09 DIAGNOSIS — I10 ESSENTIAL HYPERTENSION: ICD-10-CM

## 2021-04-09 DIAGNOSIS — I50.30 HEART FAILURE WITH PRESERVED EJECTION FRACTION, NYHA CLASS II (HCC): ICD-10-CM

## 2021-04-09 DIAGNOSIS — Z95.0 PRESENCE OF BIVENTRICULAR CARDIAC PACEMAKER: ICD-10-CM

## 2021-04-09 DIAGNOSIS — I44.7 LBBB (LEFT BUNDLE BRANCH BLOCK): Primary | ICD-10-CM

## 2021-04-09 PROCEDURE — 93281 PM DEVICE PROGR EVAL MULTI: CPT | Performed by: INTERNAL MEDICINE

## 2021-04-09 PROCEDURE — 99214 OFFICE O/P EST MOD 30 MIN: CPT | Performed by: INTERNAL MEDICINE

## 2021-04-09 PROCEDURE — 93000 ELECTROCARDIOGRAM COMPLETE: CPT | Performed by: INTERNAL MEDICINE

## 2021-04-09 RX ORDER — CARVEDILOL 12.5 MG/1
12.5 TABLET ORAL 2 TIMES DAILY WITH MEALS
Qty: 60 TABLET | Refills: 1 | Status: SHIPPED | OUTPATIENT
Start: 2021-04-09 | End: 2021-05-05

## 2021-04-09 NOTE — PROGRESS NOTES
Date of Office Visit: 2021  Encounter Provider: Demar Stone MD  Place of Service: [facility] CARDIOLOGY  Patient Name: Inessa Peoples  : 1940    Subjective:     Encounter Date:2021      Patient ID: Inessa Peoples is a 81 y.o. female who has a cc of  Sarcoid and LBBB and recent CRT-P     Her breathing is about the same. She still has exertional Willis.     She walks for about 15 minutes and then she has to rest. Due to dyspnea.     No CP and no edema.      Her EF post CRT is 70%    She has no CAD.   There have been no hospital admission since the last visit.     There have been no bleeding events.       Past Medical History:   Diagnosis Date   • Abnormal ECG    • Allergic rhinitis    • Atrial fibrillation (CMS/HCC)    • Bleeds easily (CMS/HCC)     warfarin   • Breast screening    • Broken bones    • Calf DVT (deep venous thrombosis) (CMS/HCC)    • Chronic anticoagulation    • Class 2 severe obesity due to excess calories with serious comorbidity and body mass index (BMI) of 37.0 to 37.9 in adult (CMS/HCC)    • Colon polyp    • CTS (carpal tunnel syndrome)    • Deep vein thrombosis (CMS/HCC)     left calf   • Diverticulitis of colon    • WILLIS (dyspnea on exertion)    • Fatigue    • H/O malignant melanoma of skin    • Hematuria, gross    • History of colon polyps    • History of kidney stones    • Hyperlipidemia    • Hypertension    • Hyperthyroidism    • IFG (impaired fasting glucose)    • Insomnia    • LBBB (left bundle branch block)    • Left leg swelling    • LLQ abdominal pain    • Long term current use of systemic steroids    • Low back pain    • Malignant melanoma of skin (CMS/HCC)    • Neck pain    • Obesity     class 2 obesity due to excess calories with BMI of 35.0 to 35.9 in adult   • Osteoarthritis    • Osteopenia    • Paralysis, diaphragm    • Paroxysmal atrial fibrillation (CMS/HCC)    • 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 for tuberculosis    • Rash    • RBBB (right bundle branch  block)    • Renal calculi    • Sarcoid    • Sigmoid diverticulosis 06/26/2013   • Sleep apnea     on CPAP, +10- Dr. Lu   • Sleep apnea     USES CPAP   • UTI (urinary tract infection)    • Visit for screening mammogram        Social History     Socioeconomic History   • Marital status:      Spouse name: EDY   • Number of children: 4   • Years of education: 14yrs   • Highest education level: Not on file   Tobacco Use   • Smoking status: Never Smoker   • Smokeless tobacco: Never Used   Vaping Use   • Vaping Use: Never used   Substance and Sexual Activity   • Alcohol use: Yes     Comment: social drinker/caffeine use    • Drug use: No   • Sexual activity: Defer       Family History   Problem Relation Age of Onset   • Heart disease Mother    • Diabetes type II Mother    • Heart disease Father    • Cancer Sister    • Colon cancer Sister    • Colon polyps Sister    • Lung cancer Sister    • Heart disease Brother         CABG   • Diabetes type II Brother    • Colon cancer Brother    • Colon polyps Brother    • Arrhythmia Brother    • Heart disease Sister    • Diabetes type II Sister    • Aortic aneurysm Sister    • COPD Sister    • Arrhythmia Sister    • Cerebral aneurysm Sister    • COPD Sister    • Lupus Sister    • Asthma Maternal Aunt    • Aortic aneurysm Brother        Review of Systems   Constitutional: Negative for fever and night sweats.   HENT: Negative for ear pain and stridor.    Eyes: Negative for discharge and visual halos.   Cardiovascular: Negative for cyanosis.   Respiratory: Negative for hemoptysis and sputum production.    Hematologic/Lymphatic: Negative for adenopathy.   Skin: Negative for nail changes and unusual hair distribution.   Musculoskeletal: Negative for gout and joint swelling.   Gastrointestinal: Negative for bowel incontinence and flatus.   Genitourinary: Negative for dysuria and flank pain.   Neurological: Negative for seizures and tremors.   Psychiatric/Behavioral: Negative  "for altered mental status. The patient is not nervous/anxious.             Objective:     Vitals:    04/09/21 0859   BP: 144/86   Pulse: 98   Weight: 92.5 kg (204 lb)   Height: 158.8 cm (62.5\")         Eyes:      General:         Right eye: No discharge.         Left eye: No discharge.   HENT:      Head: Normocephalic and atraumatic.   Neck:      Thyroid: No thyromegaly.      Vascular: No JVD.   Pulmonary:      Effort: Pulmonary effort is normal.      Breath sounds: Normal breath sounds. No rales.   Cardiovascular:      Normal rate. Regular rhythm.      No gallop.   Edema:     Peripheral edema absent.   Abdominal:      General: Bowel sounds are normal.      Palpations: Abdomen is soft.      Tenderness: There is no abdominal tenderness.   Musculoskeletal: Normal range of motion.         General: No deformity. Skin:     General: Skin is warm and dry.      Findings: No erythema.   Neurological:      Mental Status: Alert and oriented to person, place, and time.      Motor: Normal muscle tone.   Psychiatric:         Behavior: Behavior normal.         Thought Content: Thought content normal.           ECG 12 Lead    Date/Time: 4/9/2021 9:24 AM  Performed by: Demar Stone MD  Authorized by: Demar Stone MD   Comparison: compared with previous ECG   Rhythm: sinus rhythm and paced    Clinical impression: abnormal EKG            Lab Review:       Assessment:          Diagnosis Plan   1. LBBB (left bundle branch block)     2. Heart failure with preserved ejection fraction, NYHA class II (CMS/[facility])II-III---decreased EF with stress     3. Presence of biventricular cardiac pacemaker     4. Essential hypertension            Plan:       I think her LV lead is not in an ideal vein. A revision of the CRT might help    But I think we should give carvedilol a chance to work.     Her symptoms mostly occur when adrenaline levels are high and I notice that her Heart rate is higher now.     I reviewed the pacemaker/ICD tracings and " the pacing and sensing parameters are normal.  AF burden is 0     AF -- on warfarin     HTN -- add bb    Obesity Body mass index is 36.72 kg/m². we disc weight loss.

## 2021-04-29 DIAGNOSIS — I50.30 HEART FAILURE WITH PRESERVED EJECTION FRACTION, NYHA CLASS II (HCC): Primary | ICD-10-CM

## 2021-04-30 ENCOUNTER — TELEPHONE (OUTPATIENT)
Dept: CARDIOLOGY | Facility: HOSPITAL | Age: 81
End: 2021-04-30

## 2021-04-30 NOTE — TELEPHONE ENCOUNTER
Spoke with Pt this AM about HF Clinic referral and scheduling appt.  Appt. scheduled Tues, May 4, 2021 at 2:00 PM.  Provided Pt with directions to the clinic and office telephone number.  All questions answered.  We will see her at her appt. next week.      Helena Moyer RN  [facility]

## 2021-05-04 ENCOUNTER — HOSPITAL ENCOUNTER (OUTPATIENT)
Dept: CARDIOLOGY | Facility: HOSPITAL | Age: 81
Discharge: HOME | End: 2021-05-04
Admitting: NURSE PRACTITIONER

## 2021-05-04 VITALS
WEIGHT: 208 LBS | RESPIRATION RATE: 20 BRPM | OXYGEN SATURATION: 96 % | HEART RATE: 68 BPM | DIASTOLIC BLOOD PRESSURE: 72 MMHG | SYSTOLIC BLOOD PRESSURE: 118 MMHG | BODY MASS INDEX: 37.44 KG/M2

## 2021-05-04 DIAGNOSIS — Z95.0 PRESENCE OF BIVENTRICULAR CARDIAC PACEMAKER: ICD-10-CM

## 2021-05-04 DIAGNOSIS — I10 ESSENTIAL HYPERTENSION: ICD-10-CM

## 2021-05-04 DIAGNOSIS — R73.01 IMPAIRED FASTING GLUCOSE: ICD-10-CM

## 2021-05-04 DIAGNOSIS — Z99.89 OSA ON CPAP: Chronic | ICD-10-CM

## 2021-05-04 DIAGNOSIS — I44.7 LBBB (LEFT BUNDLE BRANCH BLOCK): ICD-10-CM

## 2021-05-04 DIAGNOSIS — I50.30 HEART FAILURE WITH PRESERVED EJECTION FRACTION, NYHA CLASS II (HCC): Primary | ICD-10-CM

## 2021-05-04 DIAGNOSIS — G47.33 OSA ON CPAP: Chronic | ICD-10-CM

## 2021-05-04 DIAGNOSIS — R06.09 DYSPNEA ON EXERTION: ICD-10-CM

## 2021-05-04 DIAGNOSIS — I48.0 PAROXYSMAL ATRIAL FIBRILLATION (HCC): ICD-10-CM

## 2021-05-04 PROBLEM — M53.3 SACROILIAC PAIN: Status: RESOLVED | Noted: 2017-07-13 | Resolved: 2021-05-04

## 2021-05-04 PROBLEM — M46.1 SACROILIAC INFLAMMATION: Status: RESOLVED | Noted: 2017-07-13 | Resolved: 2021-05-04

## 2021-05-04 LAB
ANION GAP SERPL CALCULATED.3IONS-SCNC: 12.9 MMOL/L (ref 5–15)
BUN SERPL-MCNC: 23 MG/DL (ref 8–23)
BUN/CREAT SERPL: 22.1 (ref 7–25)
CALCIUM SPEC-SCNC: 9.3 MG/DL (ref 8.6–10.5)
CHLORIDE SERPL-SCNC: 107 MMOL/L (ref 98–107)
CHOLEST SERPL-MCNC: 217 MG/DL (ref 0–200)
CO2 SERPL-SCNC: 19.1 MMOL/L (ref 22–29)
CREAT SERPL-MCNC: 1.04 MG/DL (ref 0.57–1)
GFR SERPL CREATININE-BSD FRML MDRD: 51 ML/MIN/1.73
GLUCOSE SERPL-MCNC: 96 MG/DL (ref 65–99)
HBA1C MFR BLD: 5.5 % (ref 4.8–5.6)
HDLC SERPL-MCNC: 57 MG/DL (ref 40–60)
LDLC SERPL CALC-MCNC: 126 MG/DL (ref 0–100)
LDLC/HDLC SERPL: 2.13 {RATIO}
NT-PROBNP SERPL-MCNC: 57.4 PG/ML (ref 0–1800)
POTASSIUM SERPL-SCNC: 4.5 MMOL/L (ref 3.5–5.2)
SODIUM SERPL-SCNC: 139 MMOL/L (ref 136–145)
TRIGL SERPL-MCNC: 192 MG/DL (ref 0–150)
VLDLC SERPL-MCNC: 34 MG/DL (ref 5–40)

## 2021-05-04 PROCEDURE — 99215 OFFICE O/P EST HI 40 MIN: CPT | Performed by: NURSE PRACTITIONER

## 2021-05-04 PROCEDURE — 80048 BASIC METABOLIC PNL TOTAL CA: CPT

## 2021-05-04 PROCEDURE — 83880 ASSAY OF NATRIURETIC PEPTIDE: CPT

## 2021-05-04 PROCEDURE — G0463 HOSPITAL OUTPT CLINIC VISIT: HCPCS

## 2021-05-04 PROCEDURE — 80061 LIPID PANEL: CPT

## 2021-05-04 PROCEDURE — 83036 HEMOGLOBIN GLYCOSYLATED A1C: CPT

## 2021-05-04 RX ORDER — LOSARTAN POTASSIUM 100 MG/1
50 TABLET ORAL DAILY
COMMUNITY
End: 2021-05-14

## 2021-05-04 RX ORDER — FUROSEMIDE 20 MG/1
20 TABLET ORAL DAILY
Qty: 30 TABLET | Refills: 0 | Status: SHIPPED | OUTPATIENT
Start: 2021-05-04 | End: 2021-06-02

## 2021-05-04 RX ORDER — SPIRONOLACTONE 25 MG/1
25 TABLET ORAL DAILY
COMMUNITY
End: 2021-08-26

## 2021-05-04 NOTE — PROGRESS NOTES
[facility] HEART FAILURE      Patient Name: Inessa Peoples  :1940  Age: 81 y.o.  Sex: female  Referring Provider: Demar Stone MD   Primary Cardiologist: Demar Stone MD  Encounter Provider:  AGA Tolbert      Chief Complaint:   Chief Complaint   Patient presents with   • Congestive Heart Failure         History of Present Illness this 81-year-old female, new to this provider, comes to us today for further evaluation regarding her chronic diastolic heart failure.  Current diagnoses to include paroxysmal atrial fibrillation, prior DVT, obesity, hypertension, hyperlipidemia, hyperthyroidism, left bundle branch block status post placement of CRT, sarcoid lung disease, sleep apnea compliant with CPAP therapy.    Historically she has been anticoagulated with Xarelto for her atrial fibrillation, however after developing a DVT she was transitioned to warfarin.  Echocardiogram in 2015 revealed an EF of 64% with grade 1 LV diastolic dysfunction and mild valvular heart disease.    Stress echocardiogram 2020 yielded LVEF of 63%, septal wall motion abnormality consistent with bundle branch block, normal LV diastolic function, post exercise ejection fraction 40%, global hypokinesis of LV post exercise, O2 sats diminished to 80% with exercise.  Subsequent cardiac catheterization 2020 yielded right dominant system with no significant CAD, LVEF 50 to 55%.  Given severe reduction of LVEF under stress she underwent CRT pacemaker placement.    AF burden on device check in 2021 was 0.    2021 message was received from the patient stating that she was having some hypotension with blood pressure as low as 90 systolically as well as some lower extremity edema.  Dr. Stone reduced her losartan by half and increased her spironolactone to 50 mg daily.    She is currently up 4 pounds, still complains of some shortness of breath, and remains with lower extremity  edema.      The following portions of the patient's history were reviewed and updated as appropriate: allergies, current medications, past family history, past medical history, past social history, past surgical history and problem list.    Current Outpatient Medications   Medication Sig Dispense Refill   • acetaminophen (TYLENOL) 650 MG 8 hr tablet Take 650 mg by mouth Every 8 (Eight) Hours As Needed for Mild Pain .     • Biotin w/ Vitamins C & E (HAIR SKIN & NAILS GUMMIES PO)      • Calcium-Phosphorus-Vitamin D (CITRACAL +D3 PO) Take 1 tablet by mouth Daily.     • carvedilol (COREG) 12.5 MG tablet Take 1 tablet by mouth 2 (Two) Times a Day With Meals. 60 tablet 1   • celecoxib (CeleBREX) 200 MG capsule TAKE 1 CAPSULE EVERY DAY 90 capsule 0   • ezetimibe (ZETIA) 10 MG tablet Take 1 tablet by mouth Daily. 90 tablet 0   • famotidine (PEPCID) 40 MG tablet Take 40 mg by mouth Daily.     • fexofenadine (ALLEGRA) 180 MG tablet Take 180 mg by mouth Daily.     • levothyroxine (SYNTHROID, LEVOTHROID) 25 MCG tablet Take 1 tablet by mouth Daily. 90 tablet 0   • losartan (COZAAR) 100 MG tablet Take 50 mg by mouth Daily.     • melatonin 5 MG sublingual tablet sublingual tablet Take 5 mg by mouth Every Night.     • Multiple Vitamin (MULTI-DAY VITAMINS) tablet Take 1 tablet by mouth daily.     • omeprazole (priLOSEC) 40 MG capsule Take 40 mg by mouth Daily.     • spironolactone (ALDACTONE) 25 MG tablet Take 50 mg by mouth Daily.     • Triamcinolone Acetonide (NASACORT) 55 MCG/ACT nasal inhaler 2 sprays into the nostril(s) as directed by provider Daily.     • warfarin (COUMADIN) 3 MG tablet Take 1 tablet by mouth Daily. 90 tablet 0   • furosemide (Lasix) 20 MG tablet Take 1 tablet by mouth Daily. 30 tablet 0     No current facility-administered medications for this encounter.       Past Medical History:   Diagnosis Date   • Abnormal ECG    • Allergic rhinitis    • Atrial fibrillation (CMS/HCC)    • Bleeds easily (CMS/HCC)      warfarin   • Breast screening    • Broken bones    • Calf DVT (deep venous thrombosis) (CMS/HCC)    • Chronic anticoagulation    • Class 2 severe obesity due to excess calories with serious comorbidity and body mass index (BMI) of 37.0 to 37.9 in adult (CMS/HCC)    • Colon polyp    • CTS (carpal tunnel syndrome)    • Deep vein thrombosis (CMS/HCC)     left calf   • Diverticulitis of colon    • AHN (dyspnea on exertion)    • Fatigue    • H/O malignant melanoma of skin    • Hematuria, gross    • History of colon polyps    • History of kidney stones    • Hyperlipidemia    • Hypertension    • Hyperthyroidism    • IFG (impaired fasting glucose)    • Insomnia    • LBBB (left bundle branch block)    • Left leg swelling    • LLQ abdominal pain    • Long term current use of systemic steroids    • Low back pain    • Malignant melanoma of skin (CMS/HCC)    • Neck pain    • Obesity     class 2 obesity due to excess calories with BMI of 35.0 to 35.9 in adult   • Osteoarthritis    • Osteopenia    • Paralysis, diaphragm    • Paroxysmal atrial fibrillation (CMS/HCC)    • 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 Lu   • Sleep apnea     USES CPAP   • UTI (urinary tract infection)    • Visit for screening mammogram        Past Surgical History:   Procedure Laterality Date   • CARDIAC CATHETERIZATION N/A 6/29/2020    Procedure: Coronary angiography, Starr L;  Surgeon: Noe Reynoso MD;  Location:  [facility];  Service: Cardiovascular;  Laterality: N/A;   • CARDIAC CATHETERIZATION N/A 6/29/2020    Procedure: Left ventriculography;  Surgeon: Noe Reynoso MD;  Location:  [facility];  Service: Cardiovascular;  Laterality: N/A;   • CARDIAC CATHETERIZATION N/A 6/29/2020    Procedure: Right and Left Heart Cath;  Surgeon: Noe Reynoso  MD TAMI;  Location: [facility] CATH INVASIVE LOCATION;  Service: Cardiovascular;  Laterality: N/A;   • CARDIAC CATHETERIZATION     • CARDIAC ELECTROPHYSIOLOGY PROCEDURE N/A 1/5/2021    Procedure: Biventricular Device Insertion -- CRT-p (Medtronic);  Surgeon: Demar Stone MD;  Location: [facility] CATH INVASIVE LOCATION;  Service: Cardiology;  Laterality: N/A;   • CARPAL TUNNEL RELEASE Bilateral 1980'S    [facility]   • CATARACT EXTRACTION Bilateral 2001    Dr. Nic Hidalgo   • COLONOSCOPY N/A 06/26/2013    MILD SIGMOID DIVERTICULOSIS, repeat 5 years based on sister w/ colona cancer-DR. NASRA CASTRO   • COLONOSCOPY N/A 10/19/2010    SIGMOID DIVERTICULOSIS & INTERNAL HEMORRHOIDS, HEMORRHOIDAL BANDING X4, DR. NASRA CASTRO   • COLONOSCOPY N/A 8/6/2018    Procedure: COLONOSCOPY TO CECUM AND INTO TERMINAL ILEUM;  Surgeon: Nasra Castro MD;  Location: [facility] ENDOSCOPY;  Service: Gastroenterology   • COLONOSCOPY W/ BIOPSIES N/A 05/12/2008    RECTUM BIOPSY: COLONIC MUCOSA W/ FOCAL INCREASE OF INFLAMMATORY CELLS IN THE MUSCULARIS MUCOSA INCLUDING EOSINOPHILS, SIGMOID DIVERTICULOSIS, POSSIBLE PROCTITIS, DR. NASRA CASTRO   • CYSTOSCOPY, RETROGRADE PYELOGRAM AND STENT INSERTION Bilateral 10/10/2014    w/ Right ureteral pyeloscopy & laser lithotripsy, Right Double-J stent placement-Dr. Julio Tai   • ENDOSCOPY N/A 11/11/2020    Procedure: ESOPHAGOGASTRODUODENOSCOPY with biopsies and dilation 54fr fernández;  Surgeon: Garret Piedra MD;  Location: [facility] ENDOSCOPY;  Service: Gastroenterology;  Laterality: N/A;  pre- dysphagia  post-- gastritis, dilation of esophageal ring, watermelon stomach    • EXTRACORPOREAL SHOCK WAVE LITHOTRIPSY (ESWL) Right 09/10/2009    DR. JULIO TAI   • HAMMER TOE REPAIR     • LAPAROSCOPIC CHOLECYSTECTOMY N/A 07/19/2012    DR. NASRA CASTRO   • LUMBAR EPIDURAL INJECTION N/A 04/23/2015    LUMBAR EPIDURAL STEROID INJECTION X3   • PARATHYROIDECTOMY N/A 2001   • SKIN CANCER EXCISION      • THUMB ARTHROSCOPY Right 1982   • TOTAL KNEE ARTHROPLASTY Right 03/25/2014    [employer] Vanguard total knee, 55 femoral component, 71 tibial tray w/ a 40 mm stem, 34 mm x 7.8 mm three-peg patella, and a 14 standard poly-Dr. Ty Canchola       Physical Exam  Vitals and nursing note reviewed.   Constitutional:       General: She is not in acute distress.     Appearance: She is well-developed. She is obese. She is not ill-appearing.   HENT:      Head: Normocephalic and atraumatic.   Eyes:      Conjunctiva/sclera: Conjunctivae normal.      Pupils: Pupils are equal, round, and reactive to light.   Neck:      Vascular: No JVD.   Cardiovascular:      Rate and Rhythm: Normal rate and regular rhythm.      Heart sounds: Normal heart sounds. No murmur heard.   No friction rub. No gallop.    Pulmonary:      Effort: Pulmonary effort is normal. No respiratory distress.      Breath sounds: Normal breath sounds.   Abdominal:      General: Bowel sounds are normal. There is no distension.      Palpations: Abdomen is soft.   Musculoskeletal:         General: Swelling present. No deformity.      Comments: 1+ pitting BLE edema   Skin:     General: Skin is warm and dry.      Capillary Refill: Capillary refill takes less than 2 seconds.   Neurological:      Mental Status: She is alert and oriented to person, place, and time. Mental status is at baseline.   Psychiatric:         Mood and Affect: Mood normal.         Behavior: Behavior normal.          Review of Systems   Constitutional: Positive for unexpected weight change. Negative for fatigue.   HENT: Negative for congestion and nosebleeds.    Eyes: Negative for photophobia and visual disturbance.   Respiratory: Positive for shortness of breath. Negative for cough and chest tightness.    Cardiovascular: Positive for leg swelling. Negative for chest pain and palpitations.   Gastrointestinal: Negative for abdominal distention and blood in stool.   Endocrine: Negative for polyphagia and  polyuria.   Genitourinary: Negative for frequency and urgency.   Musculoskeletal: Negative for joint swelling and myalgias.   Skin: Negative for pallor and rash.   Neurological: Negative for dizziness, syncope, weakness, light-headedness, numbness and headaches.   Hematological: Does not bruise/bleed easily.   Psychiatric/Behavioral: Negative for confusion and sleep disturbance.        OBJECTIVE:  /72 (BP Location: Left arm, Patient Position: Sitting)   Pulse 68   Resp 20   Wt 94.3 kg (208 lb)   SpO2 96%   BMI 37.44 kg/m²      Body mass index is 37.44 kg/m².  Wt Readings from Last 1 Encounters:   05/04/21 94.3 kg (208 lb)       Lab Review:  Renal Function: CrCl cannot be calculated (Patient's most recent lab result is older than the maximum 30 days allowed.).    Lab Results   Component Value Date    PROBNP 116 06/01/2020       Results for orders placed during the hospital encounter of 01/05/21    Adult Transthoracic Echo Limited W/ Cont if Necessary Per Protocol    Interpretation Summary  · Limited echo requested to assess LV function  · Estimated left ventricular EF = 70% Left ventricular systolic function is normal.      Procedures      6 MINUTE WALK  Distance: 350 ft  Device Used: No     Supplemental O2 Used?: No     6 Minute Walk Comments: Pt stopped after approx 2 minutes due to dyspnea, Prewalk RPE 0, post walk 6, prewalk RPD 0, post walk 4, post walk vitals, /80; HR 93; R 20, O2 Sat 93% (Pt attempted 6 Min walk test)    Cardiac Procedures:  1. 6/29/2020 Cardiac catheterization   Hemodynamics:  1.  Cardiac output (Trino): 3.28 L/min  2.  Cardiac index (Trino): 1.7 L/min/m²  3.  Right atrium: A wave 9, V wave 7, mean 6  4.  Right ventricle: 28/4  5.  Pulmonary artery, 28/12, mean 18  6.  PCW: A wave 10, V wave 11, mean is 9  7.  Aorta: 140/65, mean 91  8.  Left ventricle: 140/8     Cineangiography:  1.  Left main: The left main coronary artery is virtually nonexistent.  There is almost a common  ostium of the LAD and left circumflex.  2.  LAD: The left anterior descending coronary artery is a tortuous vessel.  The artery appears to have minor plaquing in the proximal and mid segments but otherwise no significant stenosis.  The distal portion is tortuous.  The diagonal branches are small but normal.  3.  LCx: The left circumflex coronary artery is a large vessel.  The proximal portion of the circumflex appears to be normal.  The mid and distal segments are normal as well.  There is a major branching marginal system arising from the mid segment that appears to be normal.  The distal marginal is small and normal.  4.  RCA: The right coronary artery is a dominant vessel.  The right coronary artery has minimal plaquing in the proximal and mid segments.  The distal segment is normal.  The posterior descending branch appears to be tortuous but normal.     Left ventriculography: Overall size of the ventricle is normal.  The global contractility of the ventricle is within normal limits with an estimated ejection fraction of 50 to 55%.     Assessment:  1.  Normal coronary arteries  2.  Normal intracardiac pressures  3.  Normal left ventricular function  4.  Mildly reduced cardiac output and cardiac index.       Previously trialed diuretics  Spironolactone      Previously trialed GDMT:    Spironolactone  Carvedilol  Losartan      ASSESSMENT:     Diagnosis Plan   1. Heart failure with preserved ejection fraction, NYHA class II (CMS/HCC)II-III---decreased EF with stress  Basic Metabolic Panel    Lipid Panel    BNP   2. Impaired fasting glucose  Hemoglobin A1c   3. Presence of biventricular cardiac pacemaker     4. Paroxysmal atrial fibrillation (CMS/[facility])     5. LBBB (left bundle branch block)     6. Dyspnea on exertion     7. Essential hypertension     8. ANY on CPAP + 10 - Dr Lu           PLAN OF CARE:  1.  HFpEF-NYHA class II.  Most recent ejection fraction is greater than 70% per echocardiogram.  On stress echo  however, patient is noted to have a significantly reduced LVEF under stress of 40%.  Current guideline directed medical therapy to include carvedilol, spironolactone, and losartan.    Losartan was recently reduced by half and spironolactone was increased to 50 mg a day.  I would like to go back to her original dosing of spironolactone as she remains with lower extremity edema and dyspnea on exertion that is greater than her baseline and have her start Lasix 20 mg daily.  BMP, BNP, hemoglobin A1c, and lipid panel to be drawn today.  In future state I think that Entresto could be of some benefit to her given that under duress her EF is below 57%.  I also think that as she complains of significant fatigue Jardiance could be of some benefit to her as well.  We briefly touched on diet and exercise and she was advised of a sodium goal of 2000 mg daily and six 8 ounce cups of fluid daily.  We will delve further into diet once we optimize her medical therapy.  Presence of CRT-P noted.  EF improved from 54% to greater than 70% after placement.    Directions for when to call the clinic reviewed with the patient to include weight gain of 2 to 3 pounds in 24 hours, weight gain of 5 to 10 pounds within 7 days; worsening shortness of breath; worsening lower extremity edema or abdominal distention.    2.  Hypertension-currently stable and controlled.  She states that since starting carvedilol she does at times have a systolic in the 80s.  I would like to reduce her carvedilol by half and have her monitor her blood pressure closely while we are diuresing her.  Instructions as below.    3.  Paroxysmal atrial fibrillation-rate and rhythm controlled on beta-blockade, anticoagulated on warfarin.  Currently stable and controlled.  Managed by Dr. Stone.      4.  Obstructive sleep apnea-compliant with CPAP therapy.     5.  Left bundle branch block-presence of CRT-P noted given symptomatology.      BMP/BNP/hemoglobin A1c/lipid panel;  reduce spironolactone to 25 mg daily; reduce carvedilol to 6.25 mg twice daily; keep blood pressure log; start Lasix 20 mg daily; follow-up in 1 week or sooner if needed      Thank you for allowing me to participate in the care of your patient,    Written directions provided to patient  • Reduce spironolactone to 25 mg daily.    • Reduce carvedilol to 6.25 mg twice daily.        • Start Lasix 20 mg daily.     • Keep a log of your blood pressure.  If systolic (top number) is running below 100, call the heart failure clinic and let us know.     • Weigh yourself each morning, as soon as you wake up, in the same amount of clothing each time.  Write these weights down and do not weigh at any other point during the day.  If you have gained 2 pounds within 24 hours, or 5 or more pounds within one week or less, please call the heart failure clinic right away.     • If you develop any worsening shortness of breath, swelling in your legs or abdomen, develop a dry cough or need more pillows to sleep on at night in order to breathe better, please let the heart failure clinic know as soon as possible.     Time spent in direct contact with patient: 45 minutes  Time spent on chart review prior to seeing patient: 30 minutes      AGA Tolbert  [facility] HEART FAILURE  05/04/21  13:56 EDT      **Krishna Disclaimer:**  Much of this encounter note is an electronic transcription/translation of spoken language to printed text. The electronic translation of spoken language may permit erroneous, or at times, nonsensical words or phrases to be inadvertently transcribed. Although I have reviewed the note for such errors, some may still exist.

## 2021-05-04 NOTE — PROGRESS NOTES
[facility] HEART FAILURE      Patient Name: Inessa Peoples  :1940  Age: 81 y.o.  Sex: female  Referring Provider: Demar Stone MD   Primary Cardiologist: Dr. Stone  Encounter Provider: Tameka Howe, PharmD, BCACP      Chief Complaint:   Chief Complaint   Patient presents with   • Congestive Heart Failure       HPI:  Patient presents with her  for her initial visit in the [facility]. PMH is significant for HFpEF (EF 70%), paroxysmal afib, prior DVT, obesity, HTN, HLD, hyperthyroidism, left bundle branch block s/p placement of CRT, sarcoid lung disease, and ANY. We spent a decent amount of time reviewing heart failure as a disease process as patient states she has not been told she has heart failure before. She does have some lower extremity edema and a dry cough. She states she gets slightly short of breath only on exertion. She had messaged Dr. Stone on 21 and was instructed to cut losartan in half and take 2 tablets of spironolactone. She states she has only done this for a couple of days. She has had some issues with hypotension in the evenings. She checks her BP in the morning and evening about 1 hour after taking her medications. She states it has been as low as 80/45 in the evening time.        Current Regimen:  Heart Failure  Carvedilol 12.5 mg bid  Losartan 100 mg (0.5 tablet) daily  Spironolactone 25 mg (2 tablets) daily      Other CV Meds  Ezetimibe 10 mg daily  Warfarin as directed by PCP      Medication Use:  Adherence: great; uses pillbox and medication list  Past hx of medication use/intolerance: none  Affordability: Traditional Medicare + supplement; no issues with cost      Diet:  Defer in-depth discussion to future visit      Social History:  Tobacco use: none  EtOH use: none  Illicit drug use: none  Exercise: used to walk around neighborhood but has been limited due to SOA on exertion      Immunization Status:  Pneumococcal: PCV13 (2015); PPSV23 (2016)  Influenza:  obtains annually      OBJECTIVE:    /72 (BP Location: Left arm, Patient Position: Sitting)   Pulse 68   Resp 20   Wt 94.3 kg (208 lb)   SpO2 96%   BMI 37.44 kg/m²     Body mass index is 37.44 kg/m².  Wt Readings from Last 1 Encounters:   05/04/21 94.3 kg (208 lb)       Lab Review:  Renal Function: CrCl cannot be calculated (Patient's most recent lab result is older than the maximum 30 days allowed.).    Lab Results   Component Value Date    PROBNP 116 06/01/2020       Results for orders placed during the hospital encounter of 01/05/21    Adult Transthoracic Echo Limited W/ Cont if Necessary Per Protocol    Interpretation Summary  · Limited echo requested to assess LV function  · Estimated left ventricular EF = 70% Left ventricular systolic function is normal.        ASSESSMENT/PLAN OF CARE:    1. HFpEF (EF 70%)  - Patient has some lower extremity edema and a dry cough. She states she gets short of breath on exertion  - Reduce spironolactone back to 25 mg daily  - Start Lasix 20 mg daily  - Given patient's continued hypotension, reduce carvedilol to 12.5 mg (0.5 tablet) twice daily. Will continue to assess home BP readings and increase again as able  - Continue losartan 100 mg (0.5 tablet) daily  - May trial Entresto in the future given recent data/indication in HFpEF. Most benefit was seen in women and with an EF < 57%. Given patient's EF dropped to 40% on stress echo, this could be beneficial  - We are hopeful for positive data to come out soon regarding SGLT2 inhibitors in HFpEF. This could be another good option to try for this patient depending on what this looks like  - Defer in-depth diet and exercise discussion to future visit  - Advised patient to call clinic with 2-3 lb weight gain in 24 hrs or >5 lb weight gain in 1 week     30 min spent in direct patient care      Thank you for allowing me to participate in the care of your patient,         Tameka Howe, [facility]  FAILURE  05/04/21  14:14 EDT

## 2021-05-05 ENCOUNTER — TELEPHONE (OUTPATIENT)
Dept: CARDIOLOGY | Facility: CLINIC | Age: 81
End: 2021-05-05

## 2021-05-05 RX ORDER — CARVEDILOL 12.5 MG/1
6.25 TABLET ORAL 2 TIMES DAILY WITH MEALS
Qty: 60 TABLET | Refills: 1
Start: 2021-05-05 | End: 2021-05-14

## 2021-05-05 NOTE — TELEPHONE ENCOUNTER
Lab results reviewed with the patient.  I let her know that her cholesterol is still too high.  I asked that she please talk with her PCP about it as she is allergic to Livalo, statins, and TriCor.  I suggested that perhaps the PCSK9 inhibitor could be helpful.  All questions and concerns addressed at this time.  Understanding and agreement verbalized.    AGA Tolbert

## 2021-05-10 DIAGNOSIS — I10 ESSENTIAL HYPERTENSION: Primary | ICD-10-CM

## 2021-05-10 DIAGNOSIS — E78.5 HYPERLIPIDEMIA, UNSPECIFIED HYPERLIPIDEMIA TYPE: ICD-10-CM

## 2021-05-10 DIAGNOSIS — E03.9 ACQUIRED HYPOTHYROIDISM: ICD-10-CM

## 2021-05-11 LAB
ALBUMIN SERPL-MCNC: 4.6 G/DL (ref 3.5–5.2)
ALBUMIN/GLOB SERPL: 1.8 G/DL
ALP SERPL-CCNC: 83 U/L (ref 39–117)
ALT SERPL-CCNC: 22 U/L (ref 1–33)
AST SERPL-CCNC: 18 U/L (ref 1–32)
BASOPHILS # BLD AUTO: 0.04 10*3/MM3 (ref 0–0.2)
BASOPHILS NFR BLD AUTO: 0.8 % (ref 0–1.5)
BILIRUB SERPL-MCNC: 0.9 MG/DL (ref 0–1.2)
BUN SERPL-MCNC: 30 MG/DL (ref 8–23)
BUN/CREAT SERPL: 24.4 (ref 7–25)
CALCIUM SERPL-MCNC: 10.6 MG/DL (ref 8.6–10.5)
CHLORIDE SERPL-SCNC: 99 MMOL/L (ref 98–107)
CHOLEST SERPL-MCNC: 255 MG/DL (ref 0–200)
CO2 SERPL-SCNC: 26.4 MMOL/L (ref 22–29)
CREAT SERPL-MCNC: 1.23 MG/DL (ref 0.57–1)
EOSINOPHIL # BLD AUTO: 0.18 10*3/MM3 (ref 0–0.4)
EOSINOPHIL NFR BLD AUTO: 3.6 % (ref 0.3–6.2)
ERYTHROCYTE [DISTWIDTH] IN BLOOD BY AUTOMATED COUNT: 12.3 % (ref 12.3–15.4)
GLOBULIN SER CALC-MCNC: 2.5 GM/DL
GLUCOSE SERPL-MCNC: 110 MG/DL (ref 65–99)
HCT VFR BLD AUTO: 44.4 % (ref 34–46.6)
HDLC SERPL-MCNC: 61 MG/DL (ref 40–60)
HGB BLD-MCNC: 15.1 G/DL (ref 12–15.9)
IMM GRANULOCYTES # BLD AUTO: 0.02 10*3/MM3 (ref 0–0.05)
IMM GRANULOCYTES NFR BLD AUTO: 0.4 % (ref 0–0.5)
LDLC SERPL CALC-MCNC: 162 MG/DL (ref 0–100)
LDLC/HDLC SERPL: 2.59 {RATIO}
LYMPHOCYTES # BLD AUTO: 2.02 10*3/MM3 (ref 0.7–3.1)
LYMPHOCYTES NFR BLD AUTO: 39.9 % (ref 19.6–45.3)
MCH RBC QN AUTO: 32.1 PG (ref 26.6–33)
MCHC RBC AUTO-ENTMCNC: 34 G/DL (ref 31.5–35.7)
MCV RBC AUTO: 94.5 FL (ref 79–97)
MONOCYTES # BLD AUTO: 0.48 10*3/MM3 (ref 0.1–0.9)
MONOCYTES NFR BLD AUTO: 9.5 % (ref 5–12)
NEUTROPHILS # BLD AUTO: 2.32 10*3/MM3 (ref 1.7–7)
NEUTROPHILS NFR BLD AUTO: 45.8 % (ref 42.7–76)
NRBC BLD AUTO-RTO: 0 /100 WBC (ref 0–0.2)
PLATELET # BLD AUTO: 255 10*3/MM3 (ref 140–450)
POTASSIUM SERPL-SCNC: 4.9 MMOL/L (ref 3.5–5.2)
PROT SERPL-MCNC: 7.1 G/DL (ref 6–8.5)
RBC # BLD AUTO: 4.7 10*6/MM3 (ref 3.77–5.28)
SODIUM SERPL-SCNC: 136 MMOL/L (ref 136–145)
TRIGL SERPL-MCNC: 179 MG/DL (ref 0–150)
TSH SERPL DL<=0.005 MIU/L-ACNC: 2.28 UIU/ML (ref 0.27–4.2)
VLDLC SERPL CALC-MCNC: 32 MG/DL (ref 5–40)
WBC # BLD AUTO: 5.06 10*3/MM3 (ref 3.4–10.8)

## 2021-05-14 ENCOUNTER — LAB (OUTPATIENT)
Dept: LAB | Facility: HOSPITAL | Age: 81
End: 2021-05-14

## 2021-05-14 ENCOUNTER — HOSPITAL ENCOUNTER (OUTPATIENT)
Dept: CARDIOLOGY | Facility: HOSPITAL | Age: 81
Discharge: HOME | End: 2021-05-14

## 2021-05-14 VITALS
WEIGHT: 201.6 LBS | BODY MASS INDEX: 35.72 KG/M2 | DIASTOLIC BLOOD PRESSURE: 78 MMHG | SYSTOLIC BLOOD PRESSURE: 126 MMHG | OXYGEN SATURATION: 95 % | HEIGHT: 63 IN | HEART RATE: 78 BPM

## 2021-05-14 DIAGNOSIS — I44.7 LBBB (LEFT BUNDLE BRANCH BLOCK): ICD-10-CM

## 2021-05-14 DIAGNOSIS — I50.30 HEART FAILURE WITH PRESERVED EJECTION FRACTION, NYHA CLASS II (HCC): ICD-10-CM

## 2021-05-14 DIAGNOSIS — G47.33 OSA ON CPAP: Chronic | ICD-10-CM

## 2021-05-14 DIAGNOSIS — E78.5 HYPERLIPIDEMIA, UNSPECIFIED HYPERLIPIDEMIA TYPE: ICD-10-CM

## 2021-05-14 DIAGNOSIS — I10 ESSENTIAL HYPERTENSION: ICD-10-CM

## 2021-05-14 DIAGNOSIS — I48.0 PAROXYSMAL ATRIAL FIBRILLATION (HCC): ICD-10-CM

## 2021-05-14 DIAGNOSIS — Z99.89 OSA ON CPAP: Chronic | ICD-10-CM

## 2021-05-14 DIAGNOSIS — I50.30 HEART FAILURE WITH PRESERVED EJECTION FRACTION, NYHA CLASS II (HCC): Primary | ICD-10-CM

## 2021-05-14 LAB
ANION GAP SERPL CALCULATED.3IONS-SCNC: 12.1 MMOL/L (ref 5–15)
BUN SERPL-MCNC: 30 MG/DL (ref 8–23)
BUN/CREAT SERPL: 21.7 (ref 7–25)
CALCIUM SPEC-SCNC: 9.6 MG/DL (ref 8.6–10.5)
CHLORIDE SERPL-SCNC: 103 MMOL/L (ref 98–107)
CO2 SERPL-SCNC: 22.9 MMOL/L (ref 22–29)
CREAT SERPL-MCNC: 1.38 MG/DL (ref 0.57–1)
GFR SERPL CREATININE-BSD FRML MDRD: 37 ML/MIN/1.73
GLUCOSE SERPL-MCNC: 105 MG/DL (ref 65–99)
NT-PROBNP SERPL-MCNC: 59.1 PG/ML (ref 0–1800)
POTASSIUM SERPL-SCNC: 3.7 MMOL/L (ref 3.5–5.2)
SODIUM SERPL-SCNC: 138 MMOL/L (ref 136–145)

## 2021-05-14 PROCEDURE — 80048 BASIC METABOLIC PNL TOTAL CA: CPT

## 2021-05-14 PROCEDURE — 83880 ASSAY OF NATRIURETIC PEPTIDE: CPT

## 2021-05-14 PROCEDURE — 36415 COLL VENOUS BLD VENIPUNCTURE: CPT

## 2021-05-14 PROCEDURE — G0463 HOSPITAL OUTPT CLINIC VISIT: HCPCS

## 2021-05-14 PROCEDURE — 99214 OFFICE O/P EST MOD 30 MIN: CPT | Performed by: NURSE PRACTITIONER

## 2021-05-14 RX ORDER — LOSARTAN POTASSIUM 50 MG/1
50 TABLET ORAL DAILY
COMMUNITY
End: 2021-05-14 | Stop reason: ALTCHOICE

## 2021-05-14 RX ORDER — CARVEDILOL 6.25 MG/1
6.25 TABLET ORAL 2 TIMES DAILY WITH MEALS
COMMUNITY
End: 2021-05-14

## 2021-05-14 RX ORDER — CARVEDILOL 12.5 MG/1
6.25 TABLET ORAL 2 TIMES DAILY WITH MEALS
COMMUNITY
End: 2021-06-02 | Stop reason: ALTCHOICE

## 2021-05-14 NOTE — PROGRESS NOTES
\[facility]\"" HEART FAILURE      Patient Name: Inessa Peoples  :1940  Age: 81 y.o.  Sex: female  Referring Provider: Demar Stone MD   Primary Cardiologist: Demar Stone MD  Encounter Provider:  AGA Tolbert      Chief Complaint:   Chronic diastolic CHF      History of Present Illness this 81-year-old female, known to this provider, comes to us today for further evaluation regarding her chronic diastolic heart failure.  Current diagnoses to include paroxysmal atrial fibrillation, prior DVT, obesity, hypertension, hyperlipidemia, hyperthyroidism, left bundle branch block status post placement of CRT, sarcoid lung disease, sleep apnea compliant with CPAP therapy.    Historically she has been anticoagulated with Xarelto for her atrial fibrillation, however after developing a DVT she was transitioned to warfarin.  Echocardiogram in 2015 revealed an EF of 64% with grade 1 LV diastolic dysfunction and mild valvular heart disease.    Stress echocardiogram 2020 yielded LVEF of 63%, septal wall motion abnormality consistent with bundle branch block, normal LV diastolic function, post exercise ejection fraction 40%, global hypokinesis of LV post exercise, O2 sats diminished to 80% with exercise.  Subsequent cardiac catheterization 2020 yielded right dominant system with no significant CAD, LVEF 50 to 55%.  Given severe reduction of LVEF under stress she underwent CRT pacemaker placement.    AF burden on device check in 2021 was 0.    2021 message was received from the patient stating that she was having some hypotension with blood pressure as low as 90 systolically as well as some lower extremity edema.  Dr. Stone reduced her losartan by half and increased her spironolactone to 50 mg daily.    She was started on Lasix 20 mg daily 10 days ago, as her weight was up 4 pounds and she is very short of breath at that time.  Her weight is now reduced 8 pounds, and  she feels much better but still complains of fatigue.      The following portions of the patient's history were reviewed and updated as appropriate: allergies, current medications, past family history, past medical history, past social history, past surgical history and problem list.    Current Outpatient Medications   Medication Sig Dispense Refill   • acetaminophen (TYLENOL) 650 MG 8 hr tablet Take 650 mg by mouth Every 8 (Eight) Hours As Needed for Mild Pain .     • Biotin w/ Vitamins C & E (HAIR SKIN & NAILS GUMMIES PO)      • Calcium-Phosphorus-Vitamin D (CITRACAL +D3 PO) Take 1 tablet by mouth Daily.     • carvedilol (COREG) 12.5 MG tablet Take 6.25 mg by mouth 2 (Two) Times a Day With Meals.     • celecoxib (CeleBREX) 200 MG capsule TAKE 1 CAPSULE EVERY DAY 90 capsule 0   • ezetimibe (ZETIA) 10 MG tablet Take 1 tablet by mouth Daily. 90 tablet 0   • famotidine (PEPCID) 40 MG tablet Take 40 mg by mouth Daily.     • fexofenadine (ALLEGRA) 180 MG tablet Take 180 mg by mouth Daily.     • furosemide (Lasix) 20 MG tablet Take 1 tablet by mouth Daily. 30 tablet 0   • levothyroxine (SYNTHROID, LEVOTHROID) 25 MCG tablet Take 1 tablet by mouth Daily. 90 tablet 0   • melatonin 5 MG sublingual tablet sublingual tablet Take 5 mg by mouth Every Night.     • Multiple Vitamin (MULTI-DAY VITAMINS) tablet Take 1 tablet by mouth daily.     • omeprazole (priLOSEC) 40 MG capsule Take 40 mg by mouth Daily.     • spironolactone (ALDACTONE) 25 MG tablet Take 25 mg by mouth Daily.     • Triamcinolone Acetonide (NASACORT) 55 MCG/ACT nasal inhaler 2 sprays into the nostril(s) as directed by provider Daily.     • warfarin (COUMADIN) 3 MG tablet Take 1 tablet by mouth Daily. 90 tablet 0   • sacubitril-valsartan (ENTRESTO) 24-26 MG tablet Take 1 tablet by mouth 2 (Two) Times a Day. 60 tablet 5     No current facility-administered medications for this encounter.       Past Medical History:   Diagnosis Date   • Abnormal ECG    • Allergic  rhinitis    • Atrial fibrillation (CMS/HCC)    • Bleeds easily (CMS/HCC)     warfarin   • Breast screening    • Broken bones    • Calf DVT (deep venous thrombosis) (CMS/HCC)    • Chronic anticoagulation    • Class 2 severe obesity due to excess calories with serious comorbidity and body mass index (BMI) of 37.0 to 37.9 in adult (CMS/HCC)    • Colon polyp    • CTS (carpal tunnel syndrome)    • Deep vein thrombosis (CMS/HCC)     left calf   • Diverticulitis of colon    • AHN (dyspnea on exertion)    • Fatigue    • H/O malignant melanoma of skin    • Hematuria, gross    • History of colon polyps    • History of kidney stones    • Hyperlipidemia    • Hypertension    • Hyperthyroidism    • IFG (impaired fasting glucose)    • Insomnia    • LBBB (left bundle branch block)    • Left leg swelling    • LLQ abdominal pain    • Long term current use of systemic steroids    • Low back pain    • Malignant melanoma of skin (CMS/HCC)    • Neck pain    • Obesity     class 2 obesity due to excess calories with BMI of 35.0 to 35.9 in adult   • Osteoarthritis    • Osteopenia    • Paralysis, diaphragm    • Paroxysmal atrial fibrillation (CMS/HCC)    • 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 Lu   • Sleep apnea     USES CPAP   • UTI (urinary tract infection)    • Visit for screening mammogram        Past Surgical History:   Procedure Laterality Date   • CARDIAC CATHETERIZATION N/A 6/29/2020    Procedure: Coronary angiography, Corbett L;  Surgeon: Noe Reynoso MD;  Location: [facility] CATH INVASIVE LOCATION;  Service: Cardiovascular;  Laterality: N/A;   • CARDIAC CATHETERIZATION N/A 6/29/2020    Procedure: Left ventriculography;  Surgeon: Noe Reynoso MD;  Location:  [facility] CATH INVASIVE LOCATION;  Service: Cardiovascular;  Laterality: N/A;   • CARDIAC CATHETERIZATION N/A  6/29/2020    Procedure: Right and Left Heart Cath;  Surgeon: Noe Reynoso MD;  Location: [facility] CATH INVASIVE LOCATION;  Service: Cardiovascular;  Laterality: N/A;   • CARDIAC CATHETERIZATION     • CARDIAC ELECTROPHYSIOLOGY PROCEDURE N/A 1/5/2021    Procedure: Biventricular Device Insertion -- CRT-p ([employer]tronic);  Surgeon: Demar Stone MD;  Location: [facility] CATH INVASIVE LOCATION;  Service: Cardiology;  Laterality: N/A;   • CARPAL TUNNEL RELEASE Bilateral 1980'S    [facility] SURGEONS   • CATARACT EXTRACTION Bilateral 2001    Dr. Nic Hidalgo   • COLONOSCOPY N/A 06/26/2013    MILD SIGMOID DIVERTICULOSIS, repeat 5 years based on sister w/ colona cancer-DR. NASRA CASTRO   • COLONOSCOPY N/A 10/19/2010    SIGMOID DIVERTICULOSIS & INTERNAL HEMORRHOIDS, HEMORRHOIDAL BANDING X4, DR. NASRA CASTRO   • COLONOSCOPY N/A 8/6/2018    Procedure: COLONOSCOPY TO CECUM AND INTO TERMINAL ILEUM;  Surgeon: Nasra Castro MD;  Location: [facility] ENDOSCOPY;  Service: Gastroenterology   • COLONOSCOPY W/ BIOPSIES N/A 05/12/2008    RECTUM BIOPSY: COLONIC MUCOSA W/ FOCAL INCREASE OF INFLAMMATORY CELLS IN THE MUSCULARIS MUCOSA INCLUDING EOSINOPHILS, SIGMOID DIVERTICULOSIS, POSSIBLE PROCTITIS, DR. NASRA CASTRO   • CYSTOSCOPY, RETROGRADE PYELOGRAM AND STENT INSERTION Bilateral 10/10/2014    w/ Right ureteral pyeloscopy & laser lithotripsy, Right Double-J stent placement-Dr. Julio Tai   • ENDOSCOPY N/A 11/11/2020    Procedure: ESOPHAGOGASTRODUODENOSCOPY with biopsies and dilation 54fr fernández;  Surgeon: Garret Piedra MD;  Location: [facility] ENDOSCOPY;  Service: Gastroenterology;  Laterality: N/A;  pre- dysphagia  post-- gastritis, dilation of esophageal ring, watermelon stomach    • EXTRACORPOREAL SHOCK WAVE LITHOTRIPSY (ESWL) Right 09/10/2009    DR. JULIO TAI   • HAMMER TOE REPAIR     • LAPAROSCOPIC CHOLECYSTECTOMY N/A 07/19/2012    DR. NASRA CASTRO   • LUMBAR EPIDURAL INJECTION N/A 04/23/2015    LUMBAR  EPIDURAL STEROID INJECTION X3   • PARATHYROIDECTOMY N/A 2001   • SKIN CANCER EXCISION     • THUMB ARTHROSCOPY Right 1982   • TOTAL KNEE ARTHROPLASTY Right 03/25/2014    [employer] V[employer] total knee, 55 femoral component, 71 tibial tray w/ a 40 mm stem, 34 mm x 7.8 mm three-peg patella, and a 14 standard poly-Dr. Ty Canchola       Physical Exam  Vitals and nursing note reviewed.   Constitutional:       General: She is not in acute distress.     Appearance: She is well-developed. She is obese. She is not ill-appearing.   HENT:      Head: Normocephalic and atraumatic.   Eyes:      Conjunctiva/sclera: Conjunctivae normal.      Pupils: Pupils are equal, round, and reactive to light.   Neck:      Vascular: No JVD.   Cardiovascular:      Rate and Rhythm: Normal rate and regular rhythm.      Heart sounds: Normal heart sounds. No murmur heard.   No friction rub. No gallop.    Pulmonary:      Effort: Pulmonary effort is normal. No respiratory distress.      Breath sounds: Normal breath sounds.   Abdominal:      General: Bowel sounds are normal. There is no distension.      Palpations: Abdomen is soft.   Musculoskeletal:         General: No swelling or deformity.   Skin:     General: Skin is warm and dry.      Capillary Refill: Capillary refill takes less than 2 seconds.   Neurological:      Mental Status: She is alert and oriented to person, place, and time. Mental status is at baseline.   Psychiatric:         Mood and Affect: Mood normal.         Behavior: Behavior normal.          Review of Systems   Constitutional: Positive for fatigue. Negative for unexpected weight change.   HENT: Negative for congestion and nosebleeds.    Eyes: Negative for photophobia and visual disturbance.   Respiratory: Negative for cough, chest tightness and shortness of breath.    Cardiovascular: Negative for chest pain, palpitations and leg swelling.   Gastrointestinal: Negative for abdominal distention and blood in stool.   Endocrine: Negative  "for polyphagia and polyuria.   Genitourinary: Negative for frequency and urgency.   Musculoskeletal: Negative for joint swelling and myalgias.   Skin: Negative for pallor and rash.   Neurological: Negative for dizziness, syncope, weakness, light-headedness, numbness and headaches.   Hematological: Does not bruise/bleed easily.   Psychiatric/Behavioral: Negative for confusion and sleep disturbance.        OBJECTIVE:  /78   Pulse 78   Ht 158.8 cm (62.5\")   Wt 91.4 kg (201 lb 9.6 oz)   SpO2 95%   BMI 36.29 kg/m²      Body mass index is 36.29 kg/m².  Wt Readings from Last 1 Encounters:   05/14/21 91.4 kg (201 lb 9.6 oz)       Lab Review:  Renal Function: Estimated Creatinine Clearance: 39.4 mL/min (A) (by C-G formula based on SCr of 1.23 mg/dL (H)).    Lab Results   Component Value Date    PROBNP 57.4 05/04/2021       Results for orders placed during the hospital encounter of 01/05/21    Adult Transthoracic Echo Limited W/ Cont if Necessary Per Protocol    Interpretation Summary  · Limited echo requested to assess LV function  · Estimated left ventricular EF = 70% Left ventricular systolic function is normal.      Procedures      6 MINUTE WALK                      Cardiac Procedures:  1. 6/29/2020 Cardiac catheterization   Hemodynamics:  1.  Cardiac output (Trino): 3.28 L/min  2.  Cardiac index (Trino): 1.7 L/min/m²  3.  Right atrium: A wave 9, V wave 7, mean 6  4.  Right ventricle: 28/4  5.  Pulmonary artery, 28/12, mean 18  6.  PCW: A wave 10, V wave 11, mean is 9  7.  Aorta: 140/65, mean 91  8.  Left ventricle: 140/8     Cineangiography:  1.  Left main: The left main coronary artery is virtually nonexistent.  There is almost a common ostium of the LAD and left circumflex.  2.  LAD: The left anterior descending coronary artery is a tortuous vessel.  The artery appears to have minor plaquing in the proximal and mid segments but otherwise no significant stenosis.  The distal portion is tortuous.  The diagonal " branches are small but normal.  3.  LCx: The left circumflex coronary artery is a large vessel.  The proximal portion of the circumflex appears to be normal.  The mid and distal segments are normal as well.  There is a major branching marginal system arising from the mid segment that appears to be normal.  The distal marginal is small and normal.  4.  RCA: The right coronary artery is a dominant vessel.  The right coronary artery has minimal plaquing in the proximal and mid segments.  The distal segment is normal.  The posterior descending branch appears to be tortuous but normal.     Left ventriculography: Overall size of the ventricle is normal.  The global contractility of the ventricle is within normal limits with an estimated ejection fraction of 50 to 55%.     Assessment:  1.  Normal coronary arteries  2.  Normal intracardiac pressures  3.  Normal left ventricular function  4.  Mildly reduced cardiac output and cardiac index.       Previously trialed diuretics  Spironolactone      Previously trialed GDMT:    Spironolactone  Carvedilol  Losartan      ASSESSMENT:     Diagnosis Plan   1. Heart failure with preserved ejection fraction, NYHA class II (CMS/HCC)II-III---decreased EF with stress  Basic Metabolic Panel    BNP   2. Paroxysmal atrial fibrillation (CMS/[facility])     3. LBBB (left bundle branch block)     4. Essential hypertension     5. Hyperlipidemia, unspecified hyperlipidemia type     6. ANY on CPAP + 10 - Dr Lu           PLAN OF CARE:  1.  HFpEF-NYHA class II.  Most recent ejection fraction is greater than 70% per echocardiogram.  On stress echo however, patient is noted to have a significantly reduced LVEF under stress of 40%.  Current guideline directed medical therapy to include carvedilol, spironolactone, and losartan.    She now appears euvolemic on exam.  I would like to hold off on her Lasix to use as needed for weight gain of 3 pounds in 1 day or lower extremity edema/worsening shortness of  breath.  I would also like to stop her losartan and start Entresto as I feel this may help her most with her fatigue which she mostly experiences on exertion as her ejection fraction dropped down to 40% under duress.  Given recent data that has come out I suspect that this may be beneficial to her.  I also think that she could maybe see some benefit from use of an SGLT2 inhibitor, we will consider this further along in her care.  As her blood pressure drops into the 90s to low 100s at home, I would like to start her on low-dose Entresto.    Directions for when to call the clinic reviewed with the patient to include weight gain of 2 to 3 pounds in 24 hours, weight gain of 5 to 10 pounds within 7 days; worsening shortness of breath; worsening lower extremity edema or abdominal distention.    2.  Hypertension-stable and controlled.     3.  Paroxysmal atrial fibrillation-rate and rhythm controlled on beta-blockade, anticoagulated on warfarin.  Stable.  Managed by Dr. Stone.      4.  Obstructive sleep apnea-compliant with CPAP therapy.     5.  Left bundle branch block-presence of CRT-P noted given symptomatology.    6.  Hyperlipidemia-LDL poorly controlled.  She is intolerant to statin therapy and fenofibrate.  Currently on Zetia with poor results.  I think she could benefit from PCSK9 inhibitor, she is following up with her PCP next week.      BMP and BNP today; use Lasix as needed with directions as above; stop losartan; start Entresto 24-26 mg twice daily tomorrow; follow-up in 2 weeks or sooner if needed with repeat BMP      Thank you for allowing me to participate in the care of your patient,      AGA Tolbert  [facility] HEART FAILURE  05/14/21  13:56 EDT      **Krishna Disclaimer:**  Much of this encounter note is an electronic transcription/translation of spoken language to printed text. The electronic translation of spoken language may permit erroneous, or at times, nonsensical words or phrases to  be inadvertently transcribed. Although I have reviewed the note for such errors, some may still exist.

## 2021-05-14 NOTE — PROGRESS NOTES
"[facility] HEART FAILURE      Patient Name: Inessa Peoples  :1940  Age: 81 y.o.  Sex: female  Referring Provider: Demar Stone MD   Primary Cardiologist: Dr. Stone  Encounter Provider: Tameka Howe, PharmD, BCACP      Chief Complaint:   No chief complaint on file.      HPI:  Patient presents with her  for a follow-up visit in the [facility]. PMH is significant for HFpEF (EF 70%), paroxysmal afib, prior DVT, obesity, HTN, HLD, hyperthyroidism, left bundle branch block s/p placement of CRT, sarcoid lung disease, and ANY. Patient has been doing well since last visit. Her weight is down about 3 kg since last visit. Her main concern today is in relation to fatigue. Of note, she has had some issues with hypotension in the evening time. She checks her BP twice daily with the second time being 1-2 hours after she takes her evening medications. She states occasionally her systolic number will drop into the 90s.      Current Regimen:  Heart Failure  Carvedilol 12.5 mg (0.5 tablet) bid  Losartan 100 mg (0.5 tablet) daily  Spironolactone 25 mg daily  Furosemide 20 mg daily      Other CV Meds  Ezetimibe 10 mg daily  Warfarin as directed by PCP      Medication Use:  Adherence: great; uses pillbox and medication list  Past hx of medication use/intolerance: none  Affordability: Traditional Medicare + supplement; no issues with cost      Diet:  Defer in-depth discussion to future visit      Social History:  Tobacco use: none  EtOH use: none  Illicit drug use: none  Exercise: used to walk around neighborhood but has been limited due to SOA on exertion      Immunization Status:  Pneumococcal: PCV13 (2015); PPSV23 (2016)  Influenza: obtains annually      OBJECTIVE:    /78   Pulse 78   Ht 158.8 cm (62.5\")   Wt 91.4 kg (201 lb 9.6 oz)   SpO2 95%   BMI 36.29 kg/m²     Body mass index is 36.29 kg/m².  Wt Readings from Last 1 Encounters:   21 91.4 kg (201 lb 9.6 oz)       Lab Review:  Renal Function: " Estimated Creatinine Clearance: 39.4 mL/min (A) (by C-G formula based on SCr of 1.23 mg/dL (H)).    Lab Results   Component Value Date    PROBNP 57.4 05/04/2021       Results for orders placed during the hospital encounter of 01/05/21    Adult Transthoracic Echo Limited W/ Cont if Necessary Per Protocol    Interpretation Summary  · Limited echo requested to assess LV function  · Estimated left ventricular EF = 70% Left ventricular systolic function is normal.        ASSESSMENT/PLAN OF CARE:    1. HFpEF (EF 70%)  - Patient's weight is down about 3 kg and her main concern is in relation to fatigue  - Stop losartan  - Recent data for Entresto in HFpEF showed some benefit in females and those with an EF < 57%. Given patient's EF drops with stress to 40%, I think this could be of benefit to her  - Start Entresto 24/26 mg twice daily. Reviewed MOA/dosing/side effects. Provided patient with free 30-day trial card  - Reduce Lasix to prn use  - Continue spironolactone back to 25 mg daily  - Continue carvedilol 12.5 mg (0.5 tablet) twice daily  - We are hopeful for positive data to come out soon regarding SGLT2 inhibitors in HFpEF. This could be another good option to try for this patient depending on what this looks like  - Defer in-depth diet and exercise discussion to future visit  - Advised patient to call clinic with 2-3 lb weight gain in 24 hrs or >5 lb weight gain in 1 week     15 min spent in direct patient care      Thank you for allowing me to participate in the care of your patient,         Tameka Howe [facility] HEART FAILURE  05/14/21  14:14 EDT

## 2021-05-17 ENCOUNTER — TELEPHONE (OUTPATIENT)
Dept: CARDIOLOGY | Facility: CLINIC | Age: 81
End: 2021-05-17

## 2021-05-17 NOTE — TELEPHONE ENCOUNTER
BERTRAMM requesting return call to review lab results. I suspect the increase in her creatinine is due to the Lasix that we had started and have since reduced to as needed. We'll recheck her labs in 2 weeks at her follow-up appointment. No change to current plan of care. Will await return call.    Miriam Gabriel, APRN

## 2021-05-18 ENCOUNTER — TELEPHONE (OUTPATIENT)
Dept: CARDIOLOGY | Facility: HOSPITAL | Age: 81
End: 2021-05-18

## 2021-05-18 NOTE — TELEPHONE ENCOUNTER
Returned Pt call regarding lab results.  She tried to call us back yesterday and left vm message.  Per AGA Torres, I advised Pt that there is no change in her current plan of care.  Her creatinine had increased, but her Lasix has since been reduced so we will recheck her labs again in 2 weeks at her follow-up visit.  Pt said that she saw in [facility] that her BNP was normal.  She asked me if this means she no longer has heart failure.  I explained that the BNP can be an indicator of acute or worsening heart  failure when it is elevated and helps to determine if symptoms, such as shortness of breath may be due to her heart failure. However, a normal BNP level does not mean that she no longer has heart failure, but can tell us how well her current treatments are managing her symptoms.  Understanding and agreement verbalized.      Helena Moyer RN  [facility]

## 2021-05-19 ENCOUNTER — OFFICE VISIT (OUTPATIENT)
Dept: INTERNAL MEDICINE | Facility: CLINIC | Age: 81
End: 2021-05-19

## 2021-05-19 VITALS
DIASTOLIC BLOOD PRESSURE: 74 MMHG | WEIGHT: 194 LBS | HEIGHT: 63 IN | BODY MASS INDEX: 34.38 KG/M2 | SYSTOLIC BLOOD PRESSURE: 124 MMHG | OXYGEN SATURATION: 94 % | HEART RATE: 71 BPM

## 2021-05-19 DIAGNOSIS — R06.09 DYSPNEA ON EXERTION: ICD-10-CM

## 2021-05-19 DIAGNOSIS — E78.5 HYPERLIPIDEMIA, UNSPECIFIED HYPERLIPIDEMIA TYPE: ICD-10-CM

## 2021-05-19 DIAGNOSIS — D86.9 SARCOIDOSIS: ICD-10-CM

## 2021-05-19 DIAGNOSIS — I10 ESSENTIAL HYPERTENSION: Primary | ICD-10-CM

## 2021-05-19 DIAGNOSIS — I48.0 PAROXYSMAL ATRIAL FIBRILLATION (HCC): ICD-10-CM

## 2021-05-19 DIAGNOSIS — J98.6 DIAPHRAGMATIC PARALYSIS: ICD-10-CM

## 2021-05-19 DIAGNOSIS — E03.9 ACQUIRED HYPOTHYROIDISM: ICD-10-CM

## 2021-05-19 LAB — INR PPP: 3.1 (ref 0.9–1.1)

## 2021-05-19 PROCEDURE — 85610 PROTHROMBIN TIME: CPT | Performed by: INTERNAL MEDICINE

## 2021-05-19 PROCEDURE — 99214 OFFICE O/P EST MOD 30 MIN: CPT | Performed by: INTERNAL MEDICINE

## 2021-05-19 PROCEDURE — 36416 COLLJ CAPILLARY BLOOD SPEC: CPT | Performed by: INTERNAL MEDICINE

## 2021-05-19 NOTE — PROGRESS NOTES
Subjective     Inessa Peoples is a 81 y.o. female who presents with   Chief Complaint   Patient presents with   • Hypertension   • Hyperlipidemia   • Hypothyroidism       History of Present Illness     The following data was reviewed by: Gisele Dockery MD on 05/19/2021:  Common labs    Common Labsle 5/4/21 5/4/21 5/4/21 5/10/21 5/10/21 5/10/21 5/14/21    1450 1450 1450 1317 1317 1317    Glucose  96     105 (A)   Glucose     110 (A)     BUN  23   30 (A)  30 (A)   Creatinine  1.04 (A)   1.23 (A)  1.38 (A)   eGFR Non  Am  51 (A)   42 (A)  37 (A)   eGFR  Am     51 (A)     Sodium  139   136  138   Potassium  4.5   4.9  3.7   Chloride  107   99  103   Calcium  9.3   10.6 (A)  9.6   Total Protein     7.1     Albumin     4.60     Total Bilirubin     0.9     Alkaline Phosphatase     83     AST (SGOT)     18     ALT (SGPT)     22     WBC    5.06      Hemoglobin    15.1      Hematocrit    44.4      Platelets    255      Total Cholesterol   217 (A)       Total Cholesterol      255 (A)    Triglycerides   192 (A)   179 (A)    HDL Cholesterol   57   61 (A)    LDL Cholesterol    126 (A)   162 (A)    Hemoglobin A1C 5.50         (A) Abnormal value       Comments are available for some flowsheets but are not being displayed.           HTN.  Control is good.  HLD.  Control is marginal on Zetia only.  She is statin intolerant.   Hypothyroidism.  Good control of tsh at 2.280.     Her biggest issue continues to be SOA.  She has had a full cardiopulmonary work up.  She has seen pulmonary in the past and would like to go back.  She does have sarcoidosis, elevated hemidiaphragm and mild restrictive disease with a moderate diffusion deficit.      Review of Systems   Respiratory: Negative.    Cardiovascular: Negative.        The following portions of the patient's history were reviewed and updated as appropriate: allergies, current medications and problem list.    Patient Active Problem List    Diagnosis Date Noted   • Heart  failure with preserved ejection fraction, NYHA class II (CMS/HCC)II-III---decreased EF with stress 02/11/2021   • Presence of biventricular cardiac pacemaker 02/11/2021   • Paroxysmal atrial fibrillation (CMS/HCC) 12/09/2020     Note Last Updated: 12/9/2020     Added automatically from request for surgery [id number]     • LBBB (left bundle branch block) 11/08/2019   • Trochanteric bursitis of both hips 05/29/2019   • Lumbar facet arthropathy 11/06/2018   • Family history of colon cancer 06/19/2018     Note Last Updated: 6/19/2018     Added automatically from request for surgery [id number]     • Class 2 severe obesity due to excess calories with serious comorbidity and body mass index (BMI) of 38.0 to 38.9 in adult (CMS/HCC) 03/27/2018   • Chronic pain 07/13/2017   • Bulge of lumbar disc without myelopathy 07/13/2017   • Dilation of thoracic aorta (CMS/HCC) 06/23/2017   • Chronic anticoagulation 05/15/2017   • Diverticulitis of large intestine without perforation or abscess without bleeding 01/03/2017   • ANY on CPAP + 10 - Dr Lu 10/15/2016   • History of melanoma excision 08/05/2016   • GERD (gastroesophageal reflux disease) 08/05/2016   • History of kidney stones 08/05/2016   • Sarcoidosis 04/18/2016   • 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       • Chronic osteoarthritis 04/18/2016   • History of DVT (deep vein thrombosis) 04/18/2016     Note Last Updated: 4/18/2016     Description: developed unprovoked on Xarelto.         Current Outpatient Medications on File Prior to Visit   Medication Sig Dispense Refill   • acetaminophen (TYLENOL) 650 MG 8 hr tablet Take 650 mg by mouth Every 8 (Eight) Hours As Needed for Mild Pain .     • Biotin w/ Vitamins C & E (HAIR SKIN & NAILS  "GUMMIES PO)      • Calcium-Phosphorus-Vitamin D (CITRACAL +D3 PO) Take 1 tablet by mouth Daily.     • carvedilol (COREG) 12.5 MG tablet Take 6.25 mg by mouth 2 (Two) Times a Day With Meals.     • celecoxib (CeleBREX) 200 MG capsule TAKE 1 CAPSULE EVERY DAY 90 capsule 0   • ezetimibe (ZETIA) 10 MG tablet Take 1 tablet by mouth Daily. 90 tablet 0   • famotidine (PEPCID) 40 MG tablet Take 40 mg by mouth Daily.     • fexofenadine (ALLEGRA) 180 MG tablet Take 180 mg by mouth Daily.     • furosemide (Lasix) 20 MG tablet Take 1 tablet by mouth Daily. 30 tablet 0   • levothyroxine (SYNTHROID, LEVOTHROID) 25 MCG tablet Take 1 tablet by mouth Daily. 90 tablet 0   • melatonin 5 MG sublingual tablet sublingual tablet Take 5 mg by mouth Every Night.     • Multiple Vitamin (MULTI-DAY VITAMINS) tablet Take 1 tablet by mouth daily.     • omeprazole (priLOSEC) 40 MG capsule Take 40 mg by mouth Daily.     • sacubitril-valsartan (ENTRESTO) 24-26 MG tablet Take 1 tablet by mouth 2 (Two) Times a Day. 60 tablet 5   • spironolactone (ALDACTONE) 25 MG tablet Take 25 mg by mouth Daily.     • Triamcinolone Acetonide (NASACORT) 55 MCG/ACT nasal inhaler 2 sprays into the nostril(s) as directed by provider Daily.     • warfarin (COUMADIN) 3 MG tablet Take 1 tablet by mouth Daily. 90 tablet 0     No current facility-administered medications on file prior to visit.       Objective     /74   Pulse 71   Ht 158.8 cm (62.52\")   Wt 88 kg (194 lb)   SpO2 94%   BMI 34.90 kg/m²     Physical Exam  Constitutional:       Appearance: She is well-developed.   HENT:      Head: Normocephalic and atraumatic.   Cardiovascular:      Rate and Rhythm: Normal rate and regular rhythm.      Heart sounds: Normal heart sounds.   Pulmonary:      Effort: Pulmonary effort is normal.      Breath sounds: Normal breath sounds.   Skin:     General: Skin is warm and dry.   Neurological:      Mental Status: She is alert and oriented to person, place, and time. "   Psychiatric:         Behavior: Behavior normal.         Assessment/Plan   Diagnoses and all orders for this visit:    1. Essential hypertension (Primary)    2. Hyperlipidemia, unspecified hyperlipidemia type    3. Acquired hypothyroidism    4. Sarcoidosis  -     Ambulatory Referral to Pulmonology    5. Dyspnea on exertion  -     Ambulatory Referral to Pulmonology    6. Diaphragmatic paralysis  -     Ambulatory Referral to Pulmonology    7. Paroxysmal atrial fibrillation (CMS/HCC)  -     POCT INR        Discussion    HTN.  The patient is advised to continue current dosage of entresto.  HLD.  The patient is advised to continue current dosage of Zetia.  Hypothyroidism.  The patient is advised to continue current dosage of levothyroxine.    AHN/sarcoidosis/diaphragmatic paralysis/moderate obstructive defect.  Refer to pulmonary.             Future Appointments   Date Time Provider Department Center   6/2/2021  2:00 PM [facility]   7/14/2021 10:15 AM Mirela Ríos APRN [facility]   10/13/2021 11:30 AM Jewel Lu MD [facility] None

## 2021-05-20 ENCOUNTER — ANTICOAGULATION VISIT (OUTPATIENT)
Dept: INTERNAL MEDICINE | Facility: CLINIC | Age: 81
End: 2021-05-20

## 2021-06-02 ENCOUNTER — HOSPITAL ENCOUNTER (OUTPATIENT)
Dept: CARDIOLOGY | Facility: HOSPITAL | Age: 81
Discharge: HOME | End: 2021-06-02
Admitting: NURSE PRACTITIONER

## 2021-06-02 ENCOUNTER — PREP FOR SURGERY (OUTPATIENT)
Dept: SURGERY | Facility: SURGERY CENTER | Age: 81
End: 2021-06-02

## 2021-06-02 ENCOUNTER — OFFICE VISIT (OUTPATIENT)
Dept: PAIN MEDICINE | Facility: CLINIC | Age: 81
End: 2021-06-02

## 2021-06-02 VITALS
HEART RATE: 62 BPM | SYSTOLIC BLOOD PRESSURE: 124 MMHG | HEIGHT: 63 IN | OXYGEN SATURATION: 97 % | BODY MASS INDEX: 36.14 KG/M2 | TEMPERATURE: 96.6 F | WEIGHT: 204 LBS | DIASTOLIC BLOOD PRESSURE: 76 MMHG | RESPIRATION RATE: 20 BRPM

## 2021-06-02 VITALS
DIASTOLIC BLOOD PRESSURE: 76 MMHG | HEIGHT: 63 IN | WEIGHT: 205 LBS | OXYGEN SATURATION: 96 % | BODY MASS INDEX: 36.32 KG/M2 | RESPIRATION RATE: 20 BRPM | HEART RATE: 68 BPM | SYSTOLIC BLOOD PRESSURE: 120 MMHG

## 2021-06-02 DIAGNOSIS — I44.7 LBBB (LEFT BUNDLE BRANCH BLOCK): ICD-10-CM

## 2021-06-02 DIAGNOSIS — M47.816 LUMBAR FACET ARTHROPATHY: Primary | ICD-10-CM

## 2021-06-02 DIAGNOSIS — I10 ESSENTIAL HYPERTENSION: ICD-10-CM

## 2021-06-02 DIAGNOSIS — E78.5 HYPERLIPIDEMIA, UNSPECIFIED HYPERLIPIDEMIA TYPE: ICD-10-CM

## 2021-06-02 DIAGNOSIS — I48.0 PAROXYSMAL ATRIAL FIBRILLATION (HCC): ICD-10-CM

## 2021-06-02 DIAGNOSIS — I50.30 HEART FAILURE WITH PRESERVED EJECTION FRACTION, NYHA CLASS II (HCC): Primary | ICD-10-CM

## 2021-06-02 DIAGNOSIS — M70.61 TROCHANTERIC BURSITIS OF BOTH HIPS: ICD-10-CM

## 2021-06-02 DIAGNOSIS — G89.29 OTHER CHRONIC PAIN: Primary | ICD-10-CM

## 2021-06-02 DIAGNOSIS — M51.36 BULGE OF LUMBAR DISC WITHOUT MYELOPATHY: ICD-10-CM

## 2021-06-02 DIAGNOSIS — M47.816 LUMBAR FACET ARTHROPATHY: ICD-10-CM

## 2021-06-02 DIAGNOSIS — M46.1 SACROILIAC INFLAMMATION (HCC): ICD-10-CM

## 2021-06-02 DIAGNOSIS — M70.62 TROCHANTERIC BURSITIS OF BOTH HIPS: ICD-10-CM

## 2021-06-02 LAB
ANION GAP SERPL CALCULATED.3IONS-SCNC: 8 MMOL/L (ref 5–15)
BUN SERPL-MCNC: 22 MG/DL (ref 8–23)
BUN/CREAT SERPL: 19.8 (ref 7–25)
CALCIUM SPEC-SCNC: 9.7 MG/DL (ref 8.6–10.5)
CHLORIDE SERPL-SCNC: 104 MMOL/L (ref 98–107)
CO2 SERPL-SCNC: 24 MMOL/L (ref 22–29)
CREAT SERPL-MCNC: 1.11 MG/DL (ref 0.57–1)
GFR SERPL CREATININE-BSD FRML MDRD: 47 ML/MIN/1.73
GLUCOSE SERPL-MCNC: 99 MG/DL (ref 65–99)
POTASSIUM SERPL-SCNC: 4.8 MMOL/L (ref 3.5–5.2)
SODIUM SERPL-SCNC: 136 MMOL/L (ref 136–145)

## 2021-06-02 PROCEDURE — G0463 HOSPITAL OUTPT CLINIC VISIT: HCPCS

## 2021-06-02 PROCEDURE — 99214 OFFICE O/P EST MOD 30 MIN: CPT | Performed by: NURSE PRACTITIONER

## 2021-06-02 PROCEDURE — 80048 BASIC METABOLIC PNL TOTAL CA: CPT

## 2021-06-02 RX ORDER — SODIUM CHLORIDE 0.9 % (FLUSH) 0.9 %
10 SYRINGE (ML) INJECTION EVERY 12 HOURS SCHEDULED
Status: CANCELLED | OUTPATIENT
Start: 2021-06-02

## 2021-06-02 RX ORDER — SODIUM CHLORIDE 0.9 % (FLUSH) 0.9 %
10 SYRINGE (ML) INJECTION AS NEEDED
Status: CANCELLED | OUTPATIENT
Start: 2021-06-02

## 2021-06-02 RX ORDER — METOPROLOL SUCCINATE 25 MG/1
12.5 TABLET, EXTENDED RELEASE ORAL DAILY
Qty: 45 TABLET | Refills: 3 | Status: SHIPPED | OUTPATIENT
Start: 2021-06-02 | End: 2022-05-23 | Stop reason: SDUPTHER

## 2021-06-02 RX ORDER — FUROSEMIDE 20 MG/1
20 TABLET ORAL AS NEEDED
COMMUNITY
End: 2021-07-14 | Stop reason: SDUPTHER

## 2021-06-02 NOTE — PROGRESS NOTES
CHIEF COMPLAINT  F/u back pain. Pt sts pain has worsened since last ov.     Subjective   Inessa Peoples is a 81 y.o. female  who presents for follow-up.  She has a history of chronic back pain. Reports her pain is worse since last evaluation.    Complains of pain in her low back and left hip. Today her pain is 5/10VAS.  Describes her pain as continuous sharp pain with activity. Pain increases with housework, laying on left hip, bending/lifting/twisting, being on feet; pain decreases with rest, medication and procedures. Continues with Celebrex daily. ADL's by self. Denies any bowel or bladder changes.      She reports she was told she could temporarily interrupt Coumadin for treatments.    Has completed COVID vaccines.    Patient remained masked during entire encounter. No cough present. I donned a mask and eye protection throughout entire visit. Prior to donning mask and eye protection, hand hygiene was performed, as well as when it was doffed.  I was closer than 6 feet, but not for an extended period of time. No obvious exposure to any bodily fluids.    Back Pain  This is a chronic problem. The current episode started more than 1 year ago. The problem occurs intermittently. The pain is present in the lumbar spine and sacro-iliac. The pain is at a severity of 5/10. The pain is mild. The pain is worse during the day (worsens as day progresses). The symptoms are aggravated by bending, position, sitting, standing and twisting (housework). Associated symptoms include bladder incontinence. Pertinent negatives include no abdominal pain, bowel incontinence, chest pain, dysuria, fever, headaches, numbness or weakness. Risk factors include obesity, menopause, history of cancer and lack of exercise (history of melanoma--no Chemo or RAD). She has tried analgesics and heat for the symptoms. The treatment provided moderate relief.      Procedure List  1-21-21-- bilateral L2-L5 MBB-- 90% relief for several weeks  6-16-20--  left SI +GTB-- 75% for 2 weeks  6-23-19-- bilateral L2-L5 MBB-- 90% for a few weeks  11-15-18--- bilateral L2-L5 MBB-- significant relief for several days  4-26-28-- LEFT SI-- significant relief  9-26-17-- RIGHT SI-- significant.     PEG Assessment   What number best describes your pain on average in the past week?5  What number best describes how, during the past week, pain has interfered with your enjoyment of life?5  What number best describes how, during the past week, pain has interfered with your general activity?  5    The following portions of the patient's history were reviewed and updated as appropriate: allergies, current medications, past family history, past medical history, past social history, past surgical history and problem list.    Review of Systems   Constitutional: Positive for activity change (dec) and fatigue (occ). Negative for fever.   HENT: Negative for congestion.    Eyes: Negative for visual disturbance.   Respiratory: Positive for shortness of breath (occ w exertion). Negative for cough.    Cardiovascular: Negative for chest pain.   Gastrointestinal: Negative for abdominal pain, bowel incontinence, constipation and diarrhea.   Endocrine: Negative for polyuria.   Genitourinary: Positive for bladder incontinence. Negative for difficulty urinating and dysuria.   Musculoskeletal: Positive for back pain. Negative for gait problem.   Allergic/Immunologic: Negative for immunocompromised state.   Neurological: Negative for dizziness, weakness, light-headedness, numbness and headaches.   Psychiatric/Behavioral: Negative for agitation, sleep disturbance and suicidal ideas. The patient is not nervous/anxious.      I have reviewed and confirmed the accuracy of the ROS as documented by the MA/LPN/RN Michelnie Castillo, APRN    Vitals:    06/02/21 1544   BP: 124/76  Comment: pt sts had bp med today   Pulse: 62   Resp: 20   Temp: 96.6 °F (35.9 °C)   SpO2: 97%   Weight: 92.5 kg (204 lb)   Height: 158.8  "cm (62.52\")   PainSc:   5   PainLoc: Back     Objective   Physical Exam  Vitals and nursing note reviewed.   Constitutional:       Appearance: Normal appearance. She is well-developed.   HENT:      Head: Normocephalic and atraumatic.   Musculoskeletal:      Lumbar back: Tenderness and bony tenderness present. Decreased range of motion. Negative right straight leg raise test and negative left straight leg raise test.      Comments: Exquisite tenderness of left SI; none of right  Exquisite tenderness of left  Hip and none of right     Skin:     General: Skin is warm and dry.   Neurological:      Mental Status: She is alert.      Gait: Gait abnormal.      Deep Tendon Reflexes:      Reflex Scores:       Patellar reflexes are 1+ on the right side and 1+ on the left side.  Psychiatric:         Speech: Speech normal.         Behavior: Behavior normal. Behavior is cooperative.         Thought Content: Thought content normal.         Judgment: Judgment normal.         Assessment/Plan   Diagnoses and all orders for this visit:    1. Other chronic pain (Primary)    2. 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 Lumbar facet arthropathy    4. Trochanteric bursitis of both hips      --- Left SI + left GTB injection. No blood thinners. Reviewed the procedure at length with the patient.  Included in the review was expectations, complications, risk and benefits.The procedure was described in detail and the risks, benefits and alternatives were discussed with the patient (including but not limited to: bleeding, infection, nerve damage, worsening of pain, inability to perform injection, paralysis, seizures, and death) who agreed to proceed.  Discussed the potential for sedation if warranted/wanted.  The procedure will plan to be performed at [facility] with fluoroscopic guidance(unless ultrasound is indicated) and could potentially have steroids and contrast dye used. Questions were answered and in a way the " patient could understand.  Patient verbalized understanding and wishes to proceed.  This intervention will be ordered.  Discussed with patient that all procedures are part of a multimodal plan of care and include either formal PT or a home exercise program.  Patient has no evidence of coagulopathy or current infection.  --- Follow-up after procedure or sooner if needed.    ----------  Education about Sacroiliac joint injections:  This Sacroiliac joint injection (blockade) we have suggested is intended for diagnostic purposes, with the intent of offering the patient Radiofrequency thermal rhizotomy (RF) of the sensory branches to the joint if the block is diagnostically effective.  The diagnostic blockade is necessary to determine the likelihood that RF therapy could be efficacious in providing long term relief to the patient.    In this procedure, the sacroiliac joint is aligned with imaging, and under image guidance a needle is placed with the needle tip into the joint.  The needle position is confirmed to be appropriately in the joint before injection of medication into the joint.  When xray fluoroscopy is used, contrast dye is used to confirm a proper arthrogram (i.e., outline of the joint).  When ultrasound is used, IV fluid (normal saline) is injected to see the flow of the fluid into the joint.  Once confirmed, then the medication can be injected into the joint.  Oftentimes this medication is a combination of local anesthetics (for diagnostic purposes) and also a steroid (to decrease irritation & inflammation in the joint, also known as sacroilitis).      Medically, a successful RF procedure should provide a patient with 50% pain relief or more for at least 6 months.  Clinical experience suggests that successful patients receive relief more in the range of 12 months on average.  We also discussed that many patients receive therapeutic success from the intraarticular joint injection, and may not require RF  ablation.  If a patient receives more than 8 weeks of relief from joint injection, then occasional repeat joint blocks for therapeutic purposes is a very reasonable alternative therapy.  This course of therapy is consistent with our LCDs according to our CMS  in the area, and therefore other insurance providers should follow accordingly.  We will monitor our patients to screen for these therapeutic responders and will offer RF therapy only when necessary.      We discussed that joint injections & also RF procedures are not without risks.  Best practices regarding anticoagulant use & neuraxial procedures will be respected.  Oftentimes a patient on an anticoagulant can be offered a joint injection safely, but again this is not risk-free, and such patients give consent with regards to this increased bleeding risk, which could cause problems including but not limited to worsening of pain, nerve damage, or muscle damage.  Patients that are ill or otherwise may be at risk for sepsis will not have their spines accessed by neuraxial injections of any type.  This patient will not be offered these therapies if there is an increased risk.   We discussed that there is a risk of postprocedural pain and also a risk of worsening of clinical picture with these procedures as with any neuraxial procedure.    ----------       LUIS REPORT  LUIS report has been reviewed and scanned into the patient's chart.    As the clinician, I personally reviewed the LUIS from 6-2-21 while the patient was in the office today.            EMR Dragon/Transcription disclaimer:   Much of this encounter note is an electronic transcription/translation of spoken language to printed text. The electronic translation of spoken language may permit erroneous, or at times, nonsensical words or phrases to be inadvertently transcribed; Although I have reviewed the note for such errors, some may still exist.

## 2021-06-02 NOTE — ADDENDUM NOTE
Encounter addended by: Helena Moyer RN on: 6/2/2021 3:27 PM   Actions taken: Charge Capture section accepted

## 2021-06-02 NOTE — H&P (VIEW-ONLY)
CHIEF COMPLAINT  F/u back pain. Pt sts pain has worsened since last ov.     Subjective   Inessa Peoples is a 81 y.o. female  who presents for follow-up.  She has a history of chronic back pain. Reports her pain is worse since last evaluation.    Complains of pain in her low back and left hip. Today her pain is 5/10VAS.  Describes her pain as continuous sharp pain with activity. Pain increases with housework, laying on left hip, bending/lifting/twisting, being on feet; pain decreases with rest, medication and procedures. Continues with Celebrex daily. ADL's by self. Denies any bowel or bladder changes.      She reports she was told she could temporarily interrupt Coumadin for treatments.    Has completed COVID vaccines.    Patient remained masked during entire encounter. No cough present. I donned a mask and eye protection throughout entire visit. Prior to donning mask and eye protection, hand hygiene was performed, as well as when it was doffed.  I was closer than 6 feet, but not for an extended period of time. No obvious exposure to any bodily fluids.    Back Pain  This is a chronic problem. The current episode started more than 1 year ago. The problem occurs intermittently. The pain is present in the lumbar spine and sacro-iliac. The pain is at a severity of 5/10. The pain is mild. The pain is worse during the day (worsens as day progresses). The symptoms are aggravated by bending, position, sitting, standing and twisting (housework). Associated symptoms include bladder incontinence. Pertinent negatives include no abdominal pain, bowel incontinence, chest pain, dysuria, fever, headaches, numbness or weakness. Risk factors include obesity, menopause, history of cancer and lack of exercise (history of melanoma--no Chemo or RAD). She has tried analgesics and heat for the symptoms. The treatment provided moderate relief.      Procedure List  1-21-21-- bilateral L2-L5 MBB-- 90% relief for several weeks  6-16-20--  left SI +GTB-- 75% for 2 weeks  6-23-19-- bilateral L2-L5 MBB-- 90% for a few weeks  11-15-18--- bilateral L2-L5 MBB-- significant relief for several days  4-26-28-- LEFT SI-- significant relief  9-26-17-- RIGHT SI-- significant.     PEG Assessment   What number best describes your pain on average in the past week?5  What number best describes how, during the past week, pain has interfered with your enjoyment of life?5  What number best describes how, during the past week, pain has interfered with your general activity?  5    The following portions of the patient's history were reviewed and updated as appropriate: allergies, current medications, past family history, past medical history, past social history, past surgical history and problem list.    Review of Systems   Constitutional: Positive for activity change (dec) and fatigue (occ). Negative for fever.   HENT: Negative for congestion.    Eyes: Negative for visual disturbance.   Respiratory: Positive for shortness of breath (occ w exertion). Negative for cough.    Cardiovascular: Negative for chest pain.   Gastrointestinal: Negative for abdominal pain, bowel incontinence, constipation and diarrhea.   Endocrine: Negative for polyuria.   Genitourinary: Positive for bladder incontinence. Negative for difficulty urinating and dysuria.   Musculoskeletal: Positive for back pain. Negative for gait problem.   Allergic/Immunologic: Negative for immunocompromised state.   Neurological: Negative for dizziness, weakness, light-headedness, numbness and headaches.   Psychiatric/Behavioral: Negative for agitation, sleep disturbance and suicidal ideas. The patient is not nervous/anxious.      I have reviewed and confirmed the accuracy of the ROS as documented by the MA/LPN/RN Micheline Castillo, APRN    Vitals:    06/02/21 1544   BP: 124/76  Comment: pt sts had bp med today   Pulse: 62   Resp: 20   Temp: 96.6 °F (35.9 °C)   SpO2: 97%   Weight: 92.5 kg (204 lb)   Height: 158.8  "cm (62.52\")   PainSc:   5   PainLoc: Back     Objective   Physical Exam  Vitals and nursing note reviewed.   Constitutional:       Appearance: Normal appearance. She is well-developed.   HENT:      Head: Normocephalic and atraumatic.   Musculoskeletal:      Lumbar back: Tenderness and bony tenderness present. Decreased range of motion. Negative right straight leg raise test and negative left straight leg raise test.      Comments: Exquisite tenderness of left SI; none of right  Exquisite tenderness of left  Hip and none of right     Skin:     General: Skin is warm and dry.   Neurological:      Mental Status: She is alert.      Gait: Gait abnormal.      Deep Tendon Reflexes:      Reflex Scores:       Patellar reflexes are 1+ on the right side and 1+ on the left side.  Psychiatric:         Speech: Speech normal.         Behavior: Behavior normal. Behavior is cooperative.         Thought Content: Thought content normal.         Judgment: Judgment normal.         Assessment/Plan   Diagnoses and all orders for this visit:    1. Other chronic pain (Primary)    2. 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 Lumbar facet arthropathy    4. Trochanteric bursitis of both hips      --- Left SI + left GTB injection. No blood thinners. Reviewed the procedure at length with the patient.  Included in the review was expectations, complications, risk and benefits.The procedure was described in detail and the risks, benefits and alternatives were discussed with the patient (including but not limited to: bleeding, infection, nerve damage, worsening of pain, inability to perform injection, paralysis, seizures, and death) who agreed to proceed.  Discussed the potential for sedation if warranted/wanted.  The procedure will plan to be performed at [facility] with fluoroscopic guidance(unless ultrasound is indicated) and could potentially have steroids and contrast dye used. Questions were answered and in a way the " patient could understand.  Patient verbalized understanding and wishes to proceed.  This intervention will be ordered.  Discussed with patient that all procedures are part of a multimodal plan of care and include either formal PT or a home exercise program.  Patient has no evidence of coagulopathy or current infection.  --- Follow-up after procedure or sooner if needed.    ----------  Education about Sacroiliac joint injections:  This Sacroiliac joint injection (blockade) we have suggested is intended for diagnostic purposes, with the intent of offering the patient Radiofrequency thermal rhizotomy (RF) of the sensory branches to the joint if the block is diagnostically effective.  The diagnostic blockade is necessary to determine the likelihood that RF therapy could be efficacious in providing long term relief to the patient.    In this procedure, the sacroiliac joint is aligned with imaging, and under image guidance a needle is placed with the needle tip into the joint.  The needle position is confirmed to be appropriately in the joint before injection of medication into the joint.  When xray fluoroscopy is used, contrast dye is used to confirm a proper arthrogram (i.e., outline of the joint).  When ultrasound is used, IV fluid (normal saline) is injected to see the flow of the fluid into the joint.  Once confirmed, then the medication can be injected into the joint.  Oftentimes this medication is a combination of local anesthetics (for diagnostic purposes) and also a steroid (to decrease irritation & inflammation in the joint, also known as sacroilitis).      Medically, a successful RF procedure should provide a patient with 50% pain relief or more for at least 6 months.  Clinical experience suggests that successful patients receive relief more in the range of 12 months on average.  We also discussed that many patients receive therapeutic success from the intraarticular joint injection, and may not require RF  ablation.  If a patient receives more than 8 weeks of relief from joint injection, then occasional repeat joint blocks for therapeutic purposes is a very reasonable alternative therapy.  This course of therapy is consistent with our LCDs according to our CMS  in the area, and therefore other insurance providers should follow accordingly.  We will monitor our patients to screen for these therapeutic responders and will offer RF therapy only when necessary.      We discussed that joint injections & also RF procedures are not without risks.  Best practices regarding anticoagulant use & neuraxial procedures will be respected.  Oftentimes a patient on an anticoagulant can be offered a joint injection safely, but again this is not risk-free, and such patients give consent with regards to this increased bleeding risk, which could cause problems including but not limited to worsening of pain, nerve damage, or muscle damage.  Patients that are ill or otherwise may be at risk for sepsis will not have their spines accessed by neuraxial injections of any type.  This patient will not be offered these therapies if there is an increased risk.   We discussed that there is a risk of postprocedural pain and also a risk of worsening of clinical picture with these procedures as with any neuraxial procedure.    ----------       LUIS REPORT  LUIS report has been reviewed and scanned into the patient's chart.    As the clinician, I personally reviewed the LUIS from 6-2-21 while the patient was in the office today.            EMR Dragon/Transcription disclaimer:   Much of this encounter note is an electronic transcription/translation of spoken language to printed text. The electronic translation of spoken language may permit erroneous, or at times, nonsensical words or phrases to be inadvertently transcribed; Although I have reviewed the note for such errors, some may still exist.

## 2021-06-02 NOTE — PROGRESS NOTES
[facility] HEART FAILURE      Patient Name: Inessa Peoples  :1940  Age: 81 y.o.  Sex: female  Referring Provider: Demar Stone MD   Primary Cardiologist: Demar Stone MD  Encounter Provider:  AGA Tolbert      Chief Complaint:   Chronic diastolic CHF      Congestive Heart Failure  Associated symptoms include fatigue. Pertinent negatives include no chest pain, palpitations, shortness of breath or unexpected weight change.    this 81-year-old female, known to this provider, comes to us today for further evaluation regarding her chronic diastolic heart failure.  Current diagnoses to include paroxysmal atrial fibrillation, prior DVT, obesity, hypertension, hyperlipidemia, hyperthyroidism, left bundle branch block status post placement of CRT, sarcoid lung disease, sleep apnea compliant with CPAP therapy.    Historically she has been anticoagulated with Xarelto for her atrial fibrillation, however after developing a DVT she was transitioned to warfarin.  Echocardiogram in 2015 revealed an EF of 64% with grade 1 LV diastolic dysfunction and mild valvular heart disease.    Stress echocardiogram 2020 yielded LVEF of 63%, septal wall motion abnormality consistent with bundle branch block, normal LV diastolic function, post exercise ejection fraction 40%, global hypokinesis of LV post exercise, O2 sats diminished to 80% with exercise.  Subsequent cardiac catheterization 2020 yielded right dominant system with no significant CAD, LVEF 50 to 55%.  Given severe reduction of LVEF under stress she underwent CRT pacemaker placement.    AF burden on device check in 2021 was 0.    2021 message was received from the patient stating that she was having some hypotension with blood pressure as low as 90 systolically as well as some lower extremity edema.  Dr. Stone reduced her losartan by half and increased her spironolactone to 50 mg daily.    Since transitioning to  using Lasix as needed her weight at home has been stable.  She does state that she still feels very fatigued and lethargic since starting carvedilol.  Entresto was started 5/14/2021, blood pressure is tolerating this well.  She is currently complaining of significant fatigue, but otherwise has no active complaints.      The following portions of the patient's history were reviewed and updated as appropriate: allergies, current medications, past family history, past medical history, past social history, past surgical history and problem list.    Current Outpatient Medications   Medication Sig Dispense Refill   • acetaminophen (TYLENOL) 650 MG 8 hr tablet Take 650 mg by mouth Every 8 (Eight) Hours As Needed for Mild Pain .     • Biotin w/ Vitamins C & E (HAIR SKIN & NAILS GUMMIES PO)      • Calcium-Phosphorus-Vitamin D (CITRACAL +D3 PO) Take 1 tablet by mouth Daily.     • celecoxib (CeleBREX) 200 MG capsule TAKE 1 CAPSULE EVERY DAY 90 capsule 0   • ezetimibe (ZETIA) 10 MG tablet Take 1 tablet by mouth Daily. 90 tablet 0   • famotidine (PEPCID) 40 MG tablet Take 40 mg by mouth Daily.     • fexofenadine (ALLEGRA) 180 MG tablet Take 180 mg by mouth Daily.     • furosemide (Lasix) 20 MG tablet Take 1 tablet by mouth Daily. 30 tablet 0   • levothyroxine (SYNTHROID, LEVOTHROID) 25 MCG tablet Take 1 tablet by mouth Daily. 90 tablet 0   • melatonin 5 MG sublingual tablet sublingual tablet Take 5 mg by mouth Every Night.     • Multiple Vitamin (MULTI-DAY VITAMINS) tablet Take 1 tablet by mouth daily.     • omeprazole (priLOSEC) 40 MG capsule Take 40 mg by mouth Daily.     • sacubitril-valsartan (ENTRESTO) 24-26 MG tablet Take 1 tablet by mouth 2 (Two) Times a Day. 60 tablet 5   • spironolactone (ALDACTONE) 25 MG tablet Take 25 mg by mouth Daily.     • Triamcinolone Acetonide (NASACORT) 55 MCG/ACT nasal inhaler 2 sprays into the nostril(s) as directed by provider Daily.     • warfarin (COUMADIN) 3 MG tablet Take 1 tablet by  mouth Daily. 90 tablet 0   • metoprolol succinate XL (TOPROL-XL) 25 MG 24 hr tablet Take 0.5 tablets by mouth Daily. 45 tablet 3     No current facility-administered medications for this encounter.       Past Medical History:   Diagnosis Date   • Abnormal ECG    • Allergic rhinitis    • Atrial fibrillation (CMS/HCC)    • Bleeds easily (CMS/HCC)     warfarin   • Breast screening    • Broken bones    • Calf DVT (deep venous thrombosis) (CMS/HCC)    • Chronic anticoagulation    • Class 2 severe obesity due to excess calories with serious comorbidity and body mass index (BMI) of 37.0 to 37.9 in adult (CMS/HCC)    • Colon polyp    • CTS (carpal tunnel syndrome)    • Deep vein thrombosis (CMS/HCC)     left calf   • Diverticulitis of colon    • AHN (dyspnea on exertion)    • Fatigue    • H/O malignant melanoma of skin    • Hematuria, gross    • History of colon polyps    • History of kidney stones    • Hyperlipidemia    • Hypertension    • Hyperthyroidism    • IFG (impaired fasting glucose)    • Insomnia    • LBBB (left bundle branch block)    • Left leg swelling    • LLQ abdominal pain    • Long term current use of systemic steroids    • Low back pain    • Malignant melanoma of skin (CMS/HCC)    • Neck pain    • Obesity     class 2 obesity due to excess calories with BMI of 35.0 to 35.9 in adult   • Osteoarthritis    • Osteopenia    • Paralysis, diaphragm    • Paroxysmal atrial fibrillation (CMS/HCC)    • 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 Lu   • Sleep apnea     USES CPAP   • UTI (urinary tract infection)    • Visit for screening mammogram        Past Surgical History:   Procedure Laterality Date   • CARDIAC CATHETERIZATION N/A 6/29/2020    Procedure: Coronary angiography, Chamberino CEDRICK;  Surgeon: Noe Reynoso MD;  Location: [facility] INVASIVE LOCATION;   Service: Cardiovascular;  Laterality: N/A;   • CARDIAC CATHETERIZATION N/A 6/29/2020    Procedure: Left ventriculography;  Surgeon: Noe Reynoso MD;  Location: [facility] CATH INVASIVE LOCATION;  Service: Cardiovascular;  Laterality: N/A;   • CARDIAC CATHETERIZATION N/A 6/29/2020    Procedure: Right and Left Heart Cath;  Surgeon: Noe Reynoso MD;  Location: [facility] CATH INVASIVE LOCATION;  Service: Cardiovascular;  Laterality: N/A;   • CARDIAC CATHETERIZATION     • CARDIAC ELECTROPHYSIOLOGY PROCEDURE N/A 1/5/2021    Procedure: Biventricular Device Insertion -- CRT-p ([employer]tronic);  Surgeon: Demar Stone MD;  Location: [facility] CATH INVASIVE LOCATION;  Service: Cardiology;  Laterality: N/A;   • CARPAL TUNNEL RELEASE Bilateral 1980'S    [facility] SURGEONS   • CATARACT EXTRACTION Bilateral 2001    Dr. Nic Hidalgo   • COLONOSCOPY N/A 06/26/2013    MILD SIGMOID DIVERTICULOSIS, repeat 5 years based on sister w/ colona cancer-DR. NASRA CASTRO   • COLONOSCOPY N/A 10/19/2010    SIGMOID DIVERTICULOSIS & INTERNAL HEMORRHOIDS, HEMORRHOIDAL BANDING X4, DR. NASRA CASTRO   • COLONOSCOPY N/A 8/6/2018    Procedure: COLONOSCOPY TO CECUM AND INTO TERMINAL ILEUM;  Surgeon: Nasra Castro MD;  Location: [facility] ENDOSCOPY;  Service: Gastroenterology   • COLONOSCOPY W/ BIOPSIES N/A 05/12/2008    RECTUM BIOPSY: COLONIC MUCOSA W/ FOCAL INCREASE OF INFLAMMATORY CELLS IN THE MUSCULARIS MUCOSA INCLUDING EOSINOPHILS, SIGMOID DIVERTICULOSIS, POSSIBLE PROCTITIS, DR. NASRA CASTRO   • CYSTOSCOPY, RETROGRADE PYELOGRAM AND STENT INSERTION Bilateral 10/10/2014    w/ Right ureteral pyeloscopy & laser lithotripsy, Right Double-J stent placement-Dr. Julio Tai   • ENDOSCOPY N/A 11/11/2020    Procedure: ESOPHAGOGASTRODUODENOSCOPY with biopsies and dilation 54fr fernández;  Surgeon: Garret Piedra MD;  Location: [facility] ENDOSCOPY;  Service: Gastroenterology;  Laterality: N/A;  pre- dysphagia  post-- gastritis, dilation of  esophageal ring, watermelon stomach    • EXTRACORPOREAL SHOCK WAVE LITHOTRIPSY (ESWL) Right 09/10/2009    DR. CLAIRE ROCK   • HAMMER TOE REPAIR     • LAPAROSCOPIC CHOLECYSTECTOMY N/A 07/19/2012    DR. TANISHA THORNTON   • LUMBAR EPIDURAL INJECTION N/A 04/23/2015    LUMBAR EPIDURAL STEROID INJECTION X3   • PACEMAKER IMPLANTATION     • PARATHYROIDECTOMY N/A 2001   • SKIN CANCER EXCISION     • THUMB ARTHROSCOPY Right 1982   • TOTAL KNEE ARTHROPLASTY Right 03/25/2014    [employer] total knee, 55 femoral component, 71 tibial tray w/ a 40 mm stem, 34 mm x 7.8 mm three-peg patella, and a 14 standard poly-Dr. Ty Canchola       Physical Exam  Vitals and nursing note reviewed.   Constitutional:       General: She is not in acute distress.     Appearance: She is well-developed. She is obese. She is not ill-appearing.   HENT:      Head: Normocephalic and atraumatic.   Eyes:      Conjunctiva/sclera: Conjunctivae normal.      Pupils: Pupils are equal, round, and reactive to light.   Neck:      Vascular: No JVD.   Cardiovascular:      Rate and Rhythm: Normal rate and regular rhythm.      Heart sounds: Normal heart sounds. No murmur heard.   No friction rub. No gallop.    Pulmonary:      Effort: Pulmonary effort is normal. No respiratory distress.      Breath sounds: Normal breath sounds.   Abdominal:      General: Bowel sounds are normal. There is no distension.      Palpations: Abdomen is soft.   Musculoskeletal:         General: No swelling or deformity.   Skin:     General: Skin is warm and dry.      Capillary Refill: Capillary refill takes less than 2 seconds.   Neurological:      Mental Status: She is alert and oriented to person, place, and time. Mental status is at baseline.   Psychiatric:         Mood and Affect: Mood normal.         Behavior: Behavior normal.          Review of Systems   Constitutional: Positive for fatigue. Negative for unexpected weight change.   HENT: Negative for congestion and nosebleeds.   "  Eyes: Negative for photophobia and visual disturbance.   Respiratory: Negative for cough, chest tightness and shortness of breath.    Cardiovascular: Negative for chest pain, palpitations and leg swelling.   Gastrointestinal: Negative for abdominal distention and blood in stool.   Endocrine: Negative for polyphagia and polyuria.   Genitourinary: Negative for frequency and urgency.   Musculoskeletal: Negative for joint swelling and myalgias.   Skin: Negative for pallor and rash.   Neurological: Negative for dizziness, syncope, weakness, light-headedness, numbness and headaches.   Hematological: Does not bruise/bleed easily.   Psychiatric/Behavioral: Negative for confusion and sleep disturbance.        OBJECTIVE:  /76 (BP Location: Right arm, Patient Position: Sitting)   Pulse 68   Resp 20   Ht 158.8 cm (62.52\")   Wt 93 kg (205 lb)   SpO2 96%   BMI 36.87 kg/m²      Body mass index is 36.87 kg/m².  Wt Readings from Last 1 Encounters:   06/02/21 93 kg (205 lb)       Lab Review:  Renal Function: Estimated Creatinine Clearance: 35.5 mL/min (A) (by C-G formula based on SCr of 1.38 mg/dL (H)).    Lab Results   Component Value Date    PROBNP 59.1 05/14/2021       Results for orders placed during the hospital encounter of 01/05/21    Adult Transthoracic Echo Limited W/ Cont if Necessary Per Protocol    Interpretation Summary  · Limited echo requested to assess LV function  · Estimated left ventricular EF = 70% Left ventricular systolic function is normal.      Procedures      6 MINUTE WALK                      Cardiac Procedures:  1. 6/29/2020 Cardiac catheterization   Hemodynamics:  1.  Cardiac output (Trino): 3.28 L/min  2.  Cardiac index (Trino): 1.7 L/min/m²  3.  Right atrium: A wave 9, V wave 7, mean 6  4.  Right ventricle: 28/4  5.  Pulmonary artery, 28/12, mean 18  6.  PCW: A wave 10, V wave 11, mean is 9  7.  Aorta: 140/65, mean 91  8.  Left ventricle: 140/8     Cineangiography:  1.  Left main: The left " main coronary artery is virtually nonexistent.  There is almost a common ostium of the LAD and left circumflex.  2.  LAD: The left anterior descending coronary artery is a tortuous vessel.  The artery appears to have minor plaquing in the proximal and mid segments but otherwise no significant stenosis.  The distal portion is tortuous.  The diagonal branches are small but normal.  3.  LCx: The left circumflex coronary artery is a large vessel.  The proximal portion of the circumflex appears to be normal.  The mid and distal segments are normal as well.  There is a major branching marginal system arising from the mid segment that appears to be normal.  The distal marginal is small and normal.  4.  RCA: The right coronary artery is a dominant vessel.  The right coronary artery has minimal plaquing in the proximal and mid segments.  The distal segment is normal.  The posterior descending branch appears to be tortuous but normal.     Left ventriculography: Overall size of the ventricle is normal.  The global contractility of the ventricle is within normal limits with an estimated ejection fraction of 50 to 55%.     Assessment:  1.  Normal coronary arteries  2.  Normal intracardiac pressures  3.  Normal left ventricular function  4.  Mildly reduced cardiac output and cardiac index.       Previously trialed diuretics  Spironolactone      Previously trialed GDMT:    Spironolactone  Carvedilol  Losartan  Entresto      ASSESSMENT:     Diagnosis Plan   1. Heart failure with preserved ejection fraction, NYHA class II (CMS/HCC)II-III---decreased EF with stress  Basic Metabolic Panel   2. Paroxysmal atrial fibrillation (CMS/HCC)     3. Essential hypertension     4. LBBB (left bundle branch block)     5. Hyperlipidemia, unspecified hyperlipidemia type           PLAN OF CARE:  1.  HFpEF-NYHA class II.  Most recent ejection fraction is greater than 70% per echocardiogram.  On stress echo however, patient is noted to have a  significantly reduced LVEF under stress of 40%.  Current guideline directed medical therapy to include carvedilol, spironolactone, and Entresto.    Blood pressure is tolerating Entresto well.  She remains euvolemic on exam.  She is doing well on as needed dosing of Lasix.  In the last 2 weeks she has only required 1 dose for lower extremity edema and weight gain.  She does continue to have complaints of significant fatigue and lethargy since starting carvedilol.  I will stop carvedilol and transition her to a low-dose of metoprolol succinate.  Blood pressure parameters provided as below.  I would like to see her back in 2 weeks to make sure she is tolerating this well, at that point we will begin to work on lifestyle modification.    Directions for when to call the clinic reviewed with the patient to include weight gain of 2 to 3 pounds in 24 hours, weight gain of 5 to 10 pounds within 7 days; worsening shortness of breath; worsening lower extremity edema or abdominal distention.    2.  Hypertension-stable and controlled.     3.  Paroxysmal atrial fibrillation-rate and rhythm controlled on beta-blockade, anticoagulated on warfarin.  Stable.  Managed by Dr. Stone.      4.  Obstructive sleep apnea-compliant with CPAP therapy.     5.  Left bundle branch block-presence of CRT-P noted given symptomatology.    6.  Hyperlipidemia-LDL poorly controlled.  She is intolerant to statin therapy and fenofibrate.  Currently on Zetia with poor results.  I think she could benefit from PCSK9 inhibitor, will defer this to her PCP.      Carvedilol; start metoprolol succinate; BMP today; follow-up in 2 weeks or sooner if needed      Thank you for allowing me to participate in the care of your patient,    Addendum: Lab results reviewed the patient, no change to current plan of care.    AGA Tolbert  [facility] HEART FAILURE  06/02/21  13:56 EDT      **Krishna Disclaimer:**  Much of this encounter note is an electronic  transcription/translation of spoken language to printed text. The electronic translation of spoken language may permit erroneous, or at times, nonsensical words or phrases to be inadvertently transcribed. Although I have reviewed the note for such errors, some may still exist.

## 2021-06-02 NOTE — PROGRESS NOTES
"[facility] HEART FAILURE      Patient Name: Inessa Peoples  :1940  Age: 81 y.o.  Sex: female  Referring Provider: Demar Stone MD   Primary Cardiologist: Dr. Stone  Encounter Provider: Tameka Howe, PharmD, BCACP      Chief Complaint:   Chief Complaint   Patient presents with   • Congestive Heart Failure       HPI:  Patient presents with her  for a follow-up visit in the [facility]. PMH is significant for HFpEF (EF 70%), paroxysmal afib, prior DVT, obesity, HTN, HLD, hyperthyroidism, left bundle branch block s/p placement of CRT, sarcoid lung disease, and ANY. She has been doing well since last visit and transitioning to Lasix prn. She states she has used this maybe once. Her weights have been stable at home between 199-201 lbs. She does continue to feel very fatigued and lethargic, and attributes this when her BP drops at home. Her BP is unchanged since starting Entresto. Her systolics typically run between 100-110 at home. Of note, her Entresto copay is $440 for a 30-day supply.       Current Regimen:  Heart Failure  Carvedilol 12.5 mg (0.5 tablet) bid  Entresto 24/26 mg twice daily  Spironolactone 25 mg daily  Furosemide 20 mg prn      Other CV Meds  Ezetimibe 10 mg daily  Warfarin as directed by PCP      Medication Use:  Adherence: great; uses pillbox and medication list  Past hx of medication use/intolerance: none  Affordability: Traditional Medicare + supplement; Entresto copay $440 for 30-day supply      Diet:  Defer in-depth discussion to future visit      Social History:  Tobacco use: none  EtOH use: none  Illicit drug use: none  Exercise: used to walk around neighborhood but has been limited due to SOA on exertion      Immunization Status:  Pneumococcal: PCV13 (2015); PPSV23 (2016)  Influenza: obtains annually      OBJECTIVE:    /76 (BP Location: Right arm, Patient Position: Sitting)   Pulse 68   Resp 20   Ht 158.8 cm (62.52\")   Wt 93 kg (205 lb)   SpO2 96%   BMI 36.87 " kg/m²     Body mass index is 36.87 kg/m².  Wt Readings from Last 1 Encounters:   06/02/21 93 kg (205 lb)       Lab Review:  Renal Function: Estimated Creatinine Clearance: 35.5 mL/min (A) (by C-G formula based on SCr of 1.38 mg/dL (H)).    Lab Results   Component Value Date    PROBNP 59.1 05/14/2021       Results for orders placed during the hospital encounter of 01/05/21    Adult Transthoracic Echo Limited W/ Cont if Necessary Per Protocol    Interpretation Summary  · Limited echo requested to assess LV function  · Estimated left ventricular EF = 70% Left ventricular systolic function is normal.        ASSESSMENT/PLAN OF CARE:    1. HFpEF (EF 70%)  - Patient has continued to struggle with fatigue which may be associated with a decrease in BP after she takes her medications  - Stop carvedilol  - Start metoprolol succinate 25 mg (0.5 tablet) daily. Switch to this due to lesser effect on BP to see if it helps improve patient's symptoms  - Continue Entresto 24/26 mg twice daily. Patient's copay is $440 per 30-day supply. I do not think patient will be approved for patient assistance, but will submit an application. If denied, will provide patient with samples  - Continue Lasix prn  - Continue spironolactone 25 mg daily  - We are hopeful for positive data to come out soon regarding SGLT2 inhibitors in HFpEF. This could be another good option to try for this patient depending on what this looks like  - Advised patient to call clinic with 2-3 lb weight gain in 24 hrs or >5 lb weight gain in 1 week       15 min spent in direct patient care      Thank you for allowing me to participate in the care of your patient,         Tameka Howe, [facility] HEART FAILURE  06/02/21  14:14 EDT

## 2021-06-03 ENCOUNTER — TELEPHONE (OUTPATIENT)
Dept: CARDIOLOGY | Facility: HOSPITAL | Age: 81
End: 2021-06-03

## 2021-06-03 NOTE — TELEPHONE ENCOUNTER
Patient had called wanting to clarify a couple medication changes. She asked about the losartan and carvedilol. Advised her we stopped both medications and switched her to Entresto and metoprolol succinate. Patient has been doing everything correctly at home but got a little confused. She verbalized understanding of the above changes and has no further questions at this time.      Tameka Howe, PharmD, BCACP  [facility]  Phone: [phone]

## 2021-06-04 ENCOUNTER — TRANSCRIBE ORDERS (OUTPATIENT)
Dept: SURGERY | Facility: SURGERY CENTER | Age: 81
End: 2021-06-04

## 2021-06-04 DIAGNOSIS — Z41.9 SURGERY, ELECTIVE: Primary | ICD-10-CM

## 2021-06-04 PROBLEM — M46.1 SACROILIAC INFLAMMATION (HCC): Status: ACTIVE | Noted: 2021-06-04

## 2021-06-07 ENCOUNTER — TELEPHONE (OUTPATIENT)
Dept: CARDIOLOGY | Facility: HOSPITAL | Age: 81
End: 2021-06-07

## 2021-06-07 NOTE — TELEPHONE ENCOUNTER
Received notice from [employer] that patient's application for Entresto was approved. Called and LVM for patient to relay above information. She will receive a call from [facility] to set up shipment directly to her home. Asked patient to call back with any questions.      Tameka Howe, PharmD, BCACP  [facility]  Phone: [phone]

## 2021-06-10 NOTE — SIGNIFICANT NOTE
Patient educated on the following :    - If you are receiving Sedation for your procedure Nothing to Eat after     and only clear liquids until       -Your required COVID Test is Scheduled on          Between the Hours of 1044-0971  -You will only be notified if your COVID test Result is POSITIVE  -The importance of reducing your number of contacts by self quarantining after you COVID test until the date of your PROCEDURE  -You will need to have someone drive you home after your PROCEDURE and remain with you for 24 hours after the PROCEDURE  - The date of your PROCEDURE, your ride is welcome to either remain in our parking lot or within 10-15 minutes of [employer]  -You will need to arrive at        1330   on   6/15/21        for your PROCEDURE  -Please contact [employer] [facility] at: [phone] with any questions and/or concerns

## 2021-06-14 ENCOUNTER — TELEPHONE (OUTPATIENT)
Dept: CARDIOLOGY | Facility: HOSPITAL | Age: 81
End: 2021-06-14

## 2021-06-14 NOTE — TELEPHONE ENCOUNTER
Called patient back as she had left a  with a question about her Entresto. Spoke with patient and she states she had figured out her question. She has a couple days left of Entresto and then will run out. She was approved for patient assistance through [employer] but they have not reached out yet to set up shipment. I advised patient I could give her a bottle of samples at her appt this week on 6/16 and provide her with the phone number for [employer] to set up shipment. Patient verbalized understanding and had no further questions at this time.

## 2021-06-15 ENCOUNTER — HOSPITAL ENCOUNTER (OUTPATIENT)
Dept: GENERAL RADIOLOGY | Facility: SURGERY CENTER | Age: 81
Setting detail: HOSPITAL OUTPATIENT SURGERY
End: 2021-06-15

## 2021-06-15 ENCOUNTER — HOSPITAL ENCOUNTER (OUTPATIENT)
Facility: SURGERY CENTER | Age: 81
Setting detail: HOSPITAL OUTPATIENT SURGERY
Discharge: HOME | End: 2021-06-15
Attending: ANESTHESIOLOGY | Admitting: ANESTHESIOLOGY

## 2021-06-15 VITALS
SYSTOLIC BLOOD PRESSURE: 137 MMHG | HEART RATE: 70 BPM | DIASTOLIC BLOOD PRESSURE: 82 MMHG | HEIGHT: 63 IN | TEMPERATURE: 98 F | RESPIRATION RATE: 16 BRPM | OXYGEN SATURATION: 97 % | BODY MASS INDEX: 35.08 KG/M2 | WEIGHT: 198 LBS

## 2021-06-15 DIAGNOSIS — M46.1 SACROILIAC INFLAMMATION (HCC): ICD-10-CM

## 2021-06-15 DIAGNOSIS — Z41.9 SURGERY, ELECTIVE: ICD-10-CM

## 2021-06-15 DIAGNOSIS — M47.816 LUMBAR FACET ARTHROPATHY: ICD-10-CM

## 2021-06-15 PROCEDURE — 20610 DRAIN/INJ JOINT/BURSA W/O US: CPT | Performed by: ANESTHESIOLOGY

## 2021-06-15 PROCEDURE — 25010000002 METHYLPREDNISOLONE PER 80 MG: Performed by: ANESTHESIOLOGY

## 2021-06-15 PROCEDURE — 27096 INJECT SACROILIAC JOINT: CPT | Performed by: ANESTHESIOLOGY

## 2021-06-15 PROCEDURE — BW1CZZZ FLUOROSCOPY OF LOWER EXTREMITY: ICD-10-PCS | Performed by: ANESTHESIOLOGY

## 2021-06-15 PROCEDURE — G0260 INJ FOR SACROILIAC JT ANESTH: HCPCS | Performed by: ANESTHESIOLOGY

## 2021-06-15 PROCEDURE — 0 IOHEXOL 300 MG/ML SOLUTION 10 ML VIAL: Performed by: ANESTHESIOLOGY

## 2021-06-15 PROCEDURE — 3E0U33Z INTRODUCTION OF ANTI-INFLAMMATORY INTO JOINTS, PERCUTANEOUS APPROACH: ICD-10-PCS | Performed by: ANESTHESIOLOGY

## 2021-06-15 PROCEDURE — 3E0U3BZ INTRODUCTION OF ANESTHETIC AGENT INTO JOINTS, PERCUTANEOUS APPROACH: ICD-10-PCS | Performed by: ANESTHESIOLOGY

## 2021-06-15 RX ORDER — SODIUM CHLORIDE 0.9 % (FLUSH) 0.9 %
10 SYRINGE (ML) INJECTION EVERY 12 HOURS SCHEDULED
Status: DISCONTINUED | OUTPATIENT
Start: 2021-06-15 | End: 2021-06-15 | Stop reason: HOSPADM

## 2021-06-15 RX ORDER — SODIUM CHLORIDE 0.9 % (FLUSH) 0.9 %
10 SYRINGE (ML) INJECTION AS NEEDED
Status: DISCONTINUED | OUTPATIENT
Start: 2021-06-15 | End: 2021-06-15 | Stop reason: HOSPADM

## 2021-06-15 NOTE — DISCHARGE INSTRUCTIONS
[facility] - Post-procedure Instructions          --  While there are no absolute restrictions, it is recommended that you do not perform strenuous activity today. In the morning, you may resume your level of activity as before your block.    --  If you have a band-aid at your injection site, please remove it later today. Observe the area for any redness, swelling, pus-like drainage, or a temperature over 101°. If any of these symptoms occur, please call your doctor at [phone]. If after office hours, leave a message and the on-call provider will return your call.    --  Ice may be applied to your injection site. It is recommended you avoid direct heat (heating pad; hot tub) for 1-2 days.    --  Call [facility] at [phone] if you experience persistent headache, persistent bleeding from the injection site, or severe pain not relieved by heat or oral medication.    --  Do not make important decisions today.    --  Due to the effects of the block and/or the I.V. Sedation, DO NOT drive or operate hazardous machinery for 12 hours.    --  Do not drink alcohol for 12 hours.    -- You may return to work tomorrow, or as directed by your referring doctor.    --  Occasionally you may notice a slight increase in your pain after the procedure. This should start to improve within the next 24-48 hours. Radiofrequency ablation procedure pain may last 3-4 weeks.    --  It may take as long as 3-4 days before you notice a gradual improvement in your pain and/or other symptoms.    -- You may continue to take your prescribed pain medication as needed.    --  Some normal possible side effects of steroid use could include fluid retention, increased blood sugar, dull headache, increased sweating, increased appetite, mood swings and flushing.    --  Diabetics are recommended to watch their blood glucose level closely for 24-48 hours after the injection.    --  Must stay in PACU for 20 min upon arrival and  prove no leg weakness before being discharged.    --  IN THE EVENT OF A LIFE THREATENING EMERGENCY, (CHEST PAIN, BREATHING DIFFICULTIES, PARALYSIS…) YOU SHOULD GO TO YOUR NEAREST EMERGENCY ROOM.    --  You should be contacted by our office within 2-3 days to schedule follow up or next appointment date.  If not contacted within 7 days, please call the office at [phone]

## 2021-06-15 NOTE — OP NOTE
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  We discussed various topics including but not limited to bleeding, infection, injury, postprocedural site soreness, painful flareup, worsening of clinical picture, paralysis, coma, and death.      SURGEON:  Wolfgang Means MD     REASON FOR PROCEDURE:    Patient has pain consistent with SI pathology on history and physical exam. Positive sacroiliac provocation maneuvers noted.   Hip bursitis is flared up also.     SEDATION:  Patient declined administration of moderate sedation    ANESTHETIC AGENT:  Marcaine 0.5%  STEROID AGENT:  80mg DepoMedrol split between the SI joint injection and hip bursa     DESCRIPTON OF PROCEDURE:  After obtaining informed consent, IV access was obtained in the preoperative area.  The patient was transported to the operative suite and placed in the prone position with a pillow under the pelvic area. EKG, blood pressure, and pulse oximeter were monitored.     A solution was prepared of 5ml of Marcaine 0.5% and 80mg Depomedrol, for a total of 6ml.    The lumbosacral area was prepped with Chloraprep and draped in a sterile fashion. Under fluoroscopic guidance the inferior most portion of the aforementioned SI joint was identified. The overlying skin and subcutaneous tissue was anesthetized with 1% lidocaine. A 22-gauge spinal needle  was introduced from the inferior most portion of the joint into the SI joint under fluoroscopic guidance in the AP dimension with slight oblique rotation to the contralateral side.  Aspiration was negative.  After confirming the position of the needle with fluoroscopy, 1 mL of Omnipaque was injected and after seeing appropriate spread into the joint a total of 3mL of 0.5% Marcaine, with approximately 40 mg of DepoMedrol, was injected very slowly.  Needle was removed intact.  Vital signs remained stable.  The onset of analgesia was noted.      Area over the greater trochanter on the aforementioned side was palpated and marked on the skin and then cleansed with Hibiclens ×2. A sterile needle was inserted about 1 & 1/2 inches in to the skin and into the bursa, under fluoro guidance, with periosteum contact over the greater trochanter and the needle was withdrawn about 2 mm into the bursa. Aspiration was negative and slowly after confirming bursa spread with omnipaque on each side, the rest of the injectate syringe was injected into the hip bursa.   The needle was removed intact on each side and bleeding was minimal. The insertion site was dressed with a Band-Aid.  There were no apparent complications.       ESTIMATED BLOOD LOSS:  None  SPECIMENS:  None  COMPLICATIONS:   No complications were noted.     TOLERANCE & DISCHARGE CONDITION:    The patient tolerated the procedure well.  The patient was transported to the recovery area without difficulties.  The patient was discharged to home under the care of family in stable and satisfactory condition.     PLAN OF CARE:  1. The patient was given our standard instruction sheet and will resume all medications as per the medication reconciliation sheet.  2. The patient will Plan for follow up in 3 months or sooner PRN  3. The patient is instructed to keep a pain log hourly for 8 hours after the procedure.

## 2021-06-16 ENCOUNTER — HOSPITAL ENCOUNTER (OUTPATIENT)
Dept: CARDIOLOGY | Facility: HOSPITAL | Age: 81
Discharge: HOME | End: 2021-06-16
Admitting: NURSE PRACTITIONER

## 2021-06-16 VITALS
RESPIRATION RATE: 20 BRPM | SYSTOLIC BLOOD PRESSURE: 126 MMHG | BODY MASS INDEX: 35.79 KG/M2 | HEIGHT: 63 IN | WEIGHT: 202 LBS | DIASTOLIC BLOOD PRESSURE: 80 MMHG | HEART RATE: 86 BPM | OXYGEN SATURATION: 97 %

## 2021-06-16 DIAGNOSIS — Z99.89 OSA ON CPAP: Chronic | ICD-10-CM

## 2021-06-16 DIAGNOSIS — I44.7 LBBB (LEFT BUNDLE BRANCH BLOCK): Primary | ICD-10-CM

## 2021-06-16 DIAGNOSIS — I10 ESSENTIAL HYPERTENSION: ICD-10-CM

## 2021-06-16 DIAGNOSIS — I48.0 PAROXYSMAL ATRIAL FIBRILLATION (HCC): ICD-10-CM

## 2021-06-16 DIAGNOSIS — G47.33 OSA ON CPAP: Chronic | ICD-10-CM

## 2021-06-16 DIAGNOSIS — E78.5 HYPERLIPIDEMIA, UNSPECIFIED HYPERLIPIDEMIA TYPE: ICD-10-CM

## 2021-06-16 DIAGNOSIS — I50.30 HEART FAILURE WITH PRESERVED EJECTION FRACTION, NYHA CLASS II (HCC): ICD-10-CM

## 2021-06-16 PROCEDURE — G0463 HOSPITAL OUTPT CLINIC VISIT: HCPCS

## 2021-06-16 PROCEDURE — 99214 OFFICE O/P EST MOD 30 MIN: CPT | Performed by: NURSE PRACTITIONER

## 2021-06-16 NOTE — PROGRESS NOTES
[facility] HEART FAILURE      Patient Name: Inessa Peoples  :1940  Age: 81 y.o.  Sex: female  Referring Provider: Demar Stone MD   Primary Cardiologist: Demar Stone MD  Encounter Provider:  AGA Tolbert      Chief Complaint:   Chronic diastolic CHF      Congestive Heart Failure  Pertinent negatives include no chest pain, fatigue, palpitations, shortness of breath or unexpected weight change.    this 81-year-old female, known to this provider, comes to us today for further evaluation regarding her chronic diastolic heart failure.  Current diagnoses to include paroxysmal atrial fibrillation, prior DVT, obesity, hypertension, hyperlipidemia, hyperthyroidism, left bundle branch block status post placement of CRT, sarcoid lung disease, sleep apnea compliant with CPAP therapy.    Historically she has been anticoagulated with Xarelto for her atrial fibrillation, however after developing a DVT she was transitioned to warfarin.  Echocardiogram in 2015 revealed an EF of 64% with grade 1 LV diastolic dysfunction and mild valvular heart disease.    Stress echocardiogram 2020 yielded LVEF of 63%, septal wall motion abnormality consistent with bundle branch block, normal LV diastolic function, post exercise ejection fraction 40%, global hypokinesis of LV post exercise, O2 sats diminished to 80% with exercise.  Subsequent cardiac catheterization 2020 yielded right dominant system with no significant CAD, LVEF 50 to 55%.  Given severe reduction of LVEF under stress she underwent CRT pacemaker placement.    AF burden on device check in 2021 was 0.    2021 message was received from the patient stating that she was having some hypotension with blood pressure as low as 90 systolically as well as some lower extremity edema.  Dr. Stone reduced her losartan by half and increased her spironolactone to 50 mg daily.    She was transitioned to metoprolol early   2021.    She is tolerating this well, her hypotension is now resolved.  Her weight has been steady, and while she does still have some dyspnea on exertion, overall she is feeling better.      The following portions of the patient's history were reviewed and updated as appropriate: allergies, current medications, past family history, past medical history, past social history, past surgical history and problem list.    Current Outpatient Medications   Medication Sig Dispense Refill   • acetaminophen (TYLENOL) 650 MG 8 hr tablet Take 650 mg by mouth Every 8 (Eight) Hours As Needed for Mild Pain .     • Biotin w/ Vitamins C & E (HAIR SKIN & NAILS GUMMIES PO)      • Calcium-Phosphorus-Vitamin D (CITRACAL +D3 PO) Take 1 tablet by mouth Daily.     • celecoxib (CeleBREX) 200 MG capsule TAKE 1 CAPSULE EVERY DAY 90 capsule 0   • ezetimibe (ZETIA) 10 MG tablet Take 1 tablet by mouth Daily. 90 tablet 0   • famotidine (PEPCID) 40 MG tablet Take 40 mg by mouth Daily.     • fexofenadine (ALLEGRA) 180 MG tablet Take 180 mg by mouth Daily.     • furosemide (LASIX) 20 MG tablet Take 20 mg by mouth As Needed.     • levothyroxine (SYNTHROID, LEVOTHROID) 25 MCG tablet Take 1 tablet by mouth Daily. 90 tablet 0   • melatonin 5 MG sublingual tablet sublingual tablet Take 5 mg by mouth Every Night.     • metoprolol succinate XL (TOPROL-XL) 25 MG 24 hr tablet Take 0.5 tablets by mouth Daily. 45 tablet 3   • Multiple Vitamin (MULTI-DAY VITAMINS) tablet Take 1 tablet by mouth daily.     • omeprazole (priLOSEC) 40 MG capsule Take 40 mg by mouth Daily.     • sacubitril-valsartan (ENTRESTO) 24-26 MG tablet Take 1 tablet by mouth 2 (Two) Times a Day. 60 tablet 5   • spironolactone (ALDACTONE) 25 MG tablet Take 25 mg by mouth Daily.     • Triamcinolone Acetonide (NASACORT) 55 MCG/ACT nasal inhaler 2 sprays into the nostril(s) as directed by provider Daily.     • warfarin (COUMADIN) 3 MG tablet Take 1 tablet by mouth Daily. 90 tablet 0     No  current facility-administered medications for this encounter.       Past Medical History:   Diagnosis Date   • Abnormal ECG    • Allergic rhinitis    • Atrial fibrillation (CMS/HCC)    • Bleeds easily (CMS/HCC)     warfarin   • Breast screening    • Broken bones    • Calf DVT (deep venous thrombosis) (CMS/HCC)    • Chronic anticoagulation    • Class 2 severe obesity due to excess calories with serious comorbidity and body mass index (BMI) of 37.0 to 37.9 in adult (CMS/HCC)    • Colon polyp    • CTS (carpal tunnel syndrome)    • Deep vein thrombosis (CMS/HCC)     left calf   • Diverticulitis of colon    • AHN (dyspnea on exertion)    • Fatigue    • H/O malignant melanoma of skin    • Hematuria, gross    • History of colon polyps    • History of kidney stones    • Hyperlipidemia    • Hypertension    • Hyperthyroidism    • IFG (impaired fasting glucose)    • Insomnia    • LBBB (left bundle branch block)    • Left leg swelling    • LLQ abdominal pain    • Long term current use of systemic steroids    • Low back pain    • Malignant melanoma of skin (CMS/HCC)    • Neck pain    • Obesity     class 2 obesity due to excess calories with BMI of 35.0 to 35.9 in adult   • Osteoarthritis    • Osteopenia    • Paralysis, diaphragm    • Paroxysmal atrial fibrillation (CMS/HCC)    • 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 Lu   • Sleep apnea     USES CPAP   • UTI (urinary tract infection)    • Visit for screening mammogram        Past Surgical History:   Procedure Laterality Date   • CARDIAC CATHETERIZATION N/A 6/29/2020    Procedure: Coronary angiography, [place] CEDRICK;  Surgeon: Noe Reynoso MD;  Location: [facility] INVASIVE LOCATION;  Service: Cardiovascular;  Laterality: N/A;   • CARDIAC CATHETERIZATION N/A 6/29/2020    Procedure: Left ventriculography;  Surgeon:  oNe Reynoso MD;  Location: [facility] CATH INVASIVE LOCATION;  Service: Cardiovascular;  Laterality: N/A;   • CARDIAC CATHETERIZATION N/A 6/29/2020    Procedure: Right and Left Heart Cath;  Surgeon: Noe Reynoso MD;  Location: [facility] CATH INVASIVE LOCATION;  Service: Cardiovascular;  Laterality: N/A;   • CARDIAC CATHETERIZATION     • CARDIAC ELECTROPHYSIOLOGY PROCEDURE N/A 1/5/2021    Procedure: Biventricular Device Insertion -- CRT-p ([employer]);  Surgeon: Demar Stone MD;  Location: [facility] CATH INVASIVE LOCATION;  Service: Cardiology;  Laterality: N/A;   • CARPAL TUNNEL RELEASE Bilateral 1980'S    [facility] SURGEONS   • CATARACT EXTRACTION Bilateral 2001    Dr. Nic Hidalgo   • COLONOSCOPY N/A 06/26/2013    MILD SIGMOID DIVERTICULOSIS, repeat 5 years based on sister w/ colona cancer-DR. NASRA CASTRO   • COLONOSCOPY N/A 10/19/2010    SIGMOID DIVERTICULOSIS & INTERNAL HEMORRHOIDS, HEMORRHOIDAL BANDING X4, DR. NASRA CASTRO   • COLONOSCOPY N/A 8/6/2018    Procedure: COLONOSCOPY TO CECUM AND INTO TERMINAL ILEUM;  Surgeon: Nasra Castro MD;  Location: [facility] ENDOSCOPY;  Service: Gastroenterology   • COLONOSCOPY W/ BIOPSIES N/A 05/12/2008    RECTUM BIOPSY: COLONIC MUCOSA W/ FOCAL INCREASE OF INFLAMMATORY CELLS IN THE MUSCULARIS MUCOSA INCLUDING EOSINOPHILS, SIGMOID DIVERTICULOSIS, POSSIBLE PROCTITIS, DR. NASRA CASTRO   • CYSTOSCOPY, RETROGRADE PYELOGRAM AND STENT INSERTION Bilateral 10/10/2014    w/ Right ureteral pyeloscopy & laser lithotripsy, Right Double-J stent placement-Dr. Julio Tai   • ENDOSCOPY N/A 11/11/2020    Procedure: ESOPHAGOGASTRODUODENOSCOPY with biopsies and dilation 54fr fernández;  Surgeon: Garret Piedra MD;  Location: [facility] ENDOSCOPY;  Service: Gastroenterology;  Laterality: N/A;  pre- dysphagia  post-- gastritis, dilation of esophageal ring, watermelon stomach    • EXTRACORPOREAL SHOCK WAVE LITHOTRIPSY (ESWL) Right 09/10/2009    DR. JULIO TAI   • SATYA  TOE REPAIR     • LAPAROSCOPIC CHOLECYSTECTOMY N/A 07/19/2012    DR. TANISHA THORNTON   • LUMBAR EPIDURAL INJECTION N/A 04/23/2015    LUMBAR EPIDURAL STEROID INJECTION X3   • PACEMAKER IMPLANTATION     • PARATHYROIDECTOMY N/A 2001   • SACROILIAC JOINT INJECTION Left 6/15/2021    Procedure: SACROILIAC INJECTION-- left and left greater trochanteric bursa injection;  Surgeon: Wolfgang Means MD;  Location: [facility] MAIN OR;  Service: Pain Management;  Laterality: Left;   • SKIN CANCER EXCISION     • THUMB ARTHROSCOPY Right 1982   • TOTAL KNEE ARTHROPLASTY Right 03/25/2014    [employer] Vanguard total knee, 55 femoral component, 71 tibial tray w/ a 40 mm stem, 34 mm x 7.8 mm three-peg patella, and a 14 standard poly-Dr. Ty Canchola       Physical Exam  Vitals and nursing note reviewed.   Constitutional:       General: She is not in acute distress.     Appearance: She is well-developed. She is obese. She is not ill-appearing.   HENT:      Head: Normocephalic and atraumatic.   Eyes:      Conjunctiva/sclera: Conjunctivae normal.      Pupils: Pupils are equal, round, and reactive to light.   Neck:      Vascular: No JVD.   Cardiovascular:      Rate and Rhythm: Normal rate and regular rhythm.      Heart sounds: Normal heart sounds. No murmur heard.   No friction rub. No gallop.    Pulmonary:      Effort: Pulmonary effort is normal. No respiratory distress.      Breath sounds: Normal breath sounds.   Abdominal:      General: Bowel sounds are normal. There is no distension.      Palpations: Abdomen is soft.   Musculoskeletal:         General: No swelling or deformity.   Skin:     General: Skin is warm and dry.      Capillary Refill: Capillary refill takes less than 2 seconds.   Neurological:      Mental Status: She is alert and oriented to person, place, and time. Mental status is at baseline.   Psychiatric:         Mood and Affect: Mood normal.         Behavior: Behavior normal.          Review of Systems   Constitutional: Negative  "for fatigue and unexpected weight change.   HENT: Negative for congestion and nosebleeds.    Eyes: Negative for photophobia and visual disturbance.   Respiratory: Negative for cough, chest tightness and shortness of breath.    Cardiovascular: Negative for chest pain, palpitations and leg swelling.   Gastrointestinal: Negative for abdominal distention and blood in stool.   Endocrine: Negative for polyphagia and polyuria.   Genitourinary: Negative for frequency and urgency.   Musculoskeletal: Negative for joint swelling and myalgias.   Skin: Negative for pallor and rash.   Neurological: Negative for dizziness, syncope, weakness, light-headedness, numbness and headaches.   Hematological: Does not bruise/bleed easily.   Psychiatric/Behavioral: Negative for confusion and sleep disturbance.        OBJECTIVE:  /80 (BP Location: Right arm, Patient Position: Sitting, Cuff Size: Adult)   Pulse 86   Resp 20   Ht 158.8 cm (62.52\")   Wt 91.6 kg (202 lb)   SpO2 97%   BMI 36.33 kg/m²      Body mass index is 36.33 kg/m².  Wt Readings from Last 1 Encounters:   06/16/21 91.6 kg (202 lb)       Lab Review:  Renal Function: Estimated Creatinine Clearance: 43.7 mL/min (A) (by C-G formula based on SCr of 1.11 mg/dL (H)).    Lab Results   Component Value Date    PROBNP 59.1 05/14/2021       Results for orders placed during the hospital encounter of 01/05/21    Adult Transthoracic Echo Limited W/ Cont if Necessary Per Protocol    Interpretation Summary  · Limited echo requested to assess LV function  · Estimated left ventricular EF = 70% Left ventricular systolic function is normal.      Procedures      6 MINUTE WALK                      Cardiac Procedures:  1. 6/29/2020 Cardiac catheterization   Hemodynamics:  1.  Cardiac output (Trino): 3.28 L/min  2.  Cardiac index (Trino): 1.7 L/min/m²  3.  Right atrium: A wave 9, V wave 7, mean 6  4.  Right ventricle: 28/4  5.  Pulmonary artery, 28/12, mean 18  6.  PCW: A wave 10, V wave 11, " mean is 9  7.  Aorta: 140/65, mean 91  8.  Left ventricle: 140/8     Cineangiography:  1.  Left main: The left main coronary artery is virtually nonexistent.  There is almost a common ostium of the LAD and left circumflex.  2.  LAD: The left anterior descending coronary artery is a tortuous vessel.  The artery appears to have minor plaquing in the proximal and mid segments but otherwise no significant stenosis.  The distal portion is tortuous.  The diagonal branches are small but normal.  3.  LCx: The left circumflex coronary artery is a large vessel.  The proximal portion of the circumflex appears to be normal.  The mid and distal segments are normal as well.  There is a major branching marginal system arising from the mid segment that appears to be normal.  The distal marginal is small and normal.  4.  RCA: The right coronary artery is a dominant vessel.  The right coronary artery has minimal plaquing in the proximal and mid segments.  The distal segment is normal.  The posterior descending branch appears to be tortuous but normal.     Left ventriculography: Overall size of the ventricle is normal.  The global contractility of the ventricle is within normal limits with an estimated ejection fraction of 50 to 55%.     Assessment:  1.  Normal coronary arteries  2.  Normal intracardiac pressures  3.  Normal left ventricular function  4.  Mildly reduced cardiac output and cardiac index.       Previously trialed diuretics  Spironolactone      Previously trialed GDMT:    Spironolactone  Carvedilol  Losartan  Entresto      ASSESSMENT:     Diagnosis Plan   1. LBBB (left bundle branch block)     2. Heart failure with preserved ejection fraction, NYHA class II (CMS/[facility])     3. Essential hypertension     4. ANY on CPAP + 10 - Dr Lu     5. Paroxysmal atrial fibrillation (CMS/[facility])     6. Hyperlipidemia, unspecified hyperlipidemia type           PLAN OF CARE:  1.  HFpEF-NYHA class II.  Most recent ejection fraction is  greater than 70% per echocardiogram.  On stress echo however, patient is noted to have a significantly reduced LVEF under stress of 40%.  Current guideline directed medical therapy to include metoprolol, Jardiance, spironolactone, and Entresto.    She feels better, but does still have some AHN.  She remains euvolemic on exam.  At this point I do not wish to change any further medications.  I would like to follow her every 3 months or sooner if needed.  We reviewed diet and written literature was provided regarding a low-sodium diet as well as low sodium 56 peach cookbook.  We also reviewed the importance of adhering to tracking her fluid intake with a goal of 6-7 8 ounce cups of fluid daily.    Directions for when to call the clinic reviewed with the patient to include weight gain of 2 to 3 pounds in 24 hours, weight gain of 5 to 10 pounds within 7 days; worsening shortness of breath; worsening lower extremity edema or abdominal distention.    2.  Hypertension-stable and controlled.     3.  Paroxysmal atrial fibrillation-and rhythm controlled on beta-blockade, anticoagulated on warfarin.  Able.  Managed by Dr. Stone.      4.  Obstructive sleep apnea-compliant with CPAP therapy.     5.  Left bundle branch block-presence of CRT-P noted given symptomatology.    6.  Hyperlipidemia-LDL poorly controlled.  She is intolerant to statin therapy and fenofibrate.  Currently on Zetia with poor results.  I think she could benefit from PCSK9 inhibitor, will defer this to her PCP.      Continue current GDMT; follow-up in 3 months or sooner if needed with repeat BMP and BNP      Thank you for allowing me to participate in the care of your patient,        AGA Tolbert  \[facility] [place] [facility]\"" HEART FAILURE  06/16/21  13:56 EDT      **Krishna Disclaimer:**  Much of this encounter note is an electronic transcription/translation of spoken language to printed text. The electronic translation of spoken language may permit erroneous, or  at times, nonsensical words or phrases to be inadvertently transcribed. Although I have reviewed the note for such errors, some may still exist.

## 2021-06-16 NOTE — PROGRESS NOTES
"[facility] HEART FAILURE      Patient Name: Inessa Peoples  :1940  Age: 81 y.o.  Sex: female  Referring Provider: Demar Stone MD   Primary Cardiologist: Dr. Stone  Encounter Provider: Tameka Howe, PharmD, BCACP      Chief Complaint:   Chief Complaint   Patient presents with   • Congestive Heart Failure       HPI:  Patient presents with her  for a follow-up visit in the [facility]. PMH is significant for HFpEF (EF 70%), paroxysmal afib, prior DVT, obesity, HTN, HLD, hyperthyroidism, left bundle branch block s/p placement of CRT, sarcoid lung disease, and ANY. Patient has been doing well since last visit and her blood pressures have improved since switching from Coreg to metoprolol. Her weights have been staying steady at home. She was approved for patient assistance for Entresto and will now be receiving this for free from the .      Current Regimen:  Heart Failure  Metoprolol succinate 25 mg (0.5 tablet) daily  Entresto 24/26 mg twice daily  Spironolactone 25 mg daily  Furosemide 20 mg prn      Other CV Meds  Ezetimibe 10 mg daily  Warfarin as directed by PCP      Medication Use:  Adherence: great; uses pillbox and medication list  Past hx of medication use/intolerance: carvedilol (hypotension - switched to Toprol)  Affordability: Traditional Medicare + supplement  receives Entresto through PAP      Diet:  Defer in-depth discussion to future visit      Social History:  Tobacco use: none  EtOH use: none  Illicit drug use: none  Exercise: used to walk around neighborhood but has been limited due to SOA on exertion      Immunization Status:  Pneumococcal: PCV13 (2015); PPSV23 (2016)  Influenza: obtains annually      OBJECTIVE:    /80 (BP Location: Right arm, Patient Position: Sitting, Cuff Size: Adult)   Pulse 86   Resp 20   Ht 158.8 cm (62.52\")   Wt 91.6 kg (202 lb)   SpO2 97%   BMI 36.33 kg/m²     Body mass index is 36.33 kg/m².  Wt Readings from Last 1 Encounters: "   06/16/21 91.6 kg (202 lb)       Lab Review:  Renal Function: Estimated Creatinine Clearance: 43.7 mL/min (A) (by C-G formula based on SCr of 1.11 mg/dL (H)).    Lab Results   Component Value Date    PROBNP 59.1 05/14/2021       Results for orders placed during the hospital encounter of 01/05/21    Adult Transthoracic Echo Limited W/ Cont if Necessary Per Protocol    Interpretation Summary  · Limited echo requested to assess LV function  · Estimated left ventricular EF = 70% Left ventricular systolic function is normal.        ASSESSMENT/PLAN OF CARE:    1. HFpEF (EF 70%)  - Patient has been doing much better since switching from carvedilol to metoprolol  - Continue metoprolol succinate 25 mg (0.5 tablet) daily   - Continue Entresto 24/26 mg twice daily. Patient will now receive through patient assistance  - Continue Lasix prn  - Continue spironolactone 25 mg daily  - Discussed diet recommendations in detail including limiting sodium intake to <2000 mg/day. Provided patient with HFSA low-salt booklet as well as a low-sodium cookbook. Patient plans to begin monitoring her daily intake  - Advised patient to call clinic with 2-3 lb weight gain in 24 hrs or >5 lb weight gain in 1 week       10 min spent in direct patient care      Thank you for allowing me to participate in the care of your patient,         Tameka Howe [facility] HEART FAILURE  06/16/21  14:14 EDT

## 2021-06-17 ENCOUNTER — PREP FOR SURGERY (OUTPATIENT)
Dept: OTHER | Facility: HOSPITAL | Age: 81
End: 2021-06-17

## 2021-06-17 DIAGNOSIS — Z80.0 FAMILY HISTORY OF COLON CANCER: Primary | ICD-10-CM

## 2021-06-25 ENCOUNTER — ANTICOAGULATION VISIT (OUTPATIENT)
Dept: INTERNAL MEDICINE | Facility: CLINIC | Age: 81
End: 2021-06-25

## 2021-06-25 LAB — INR PPP: 2.1 (ref 0.9–1.1)

## 2021-06-25 PROCEDURE — 85610 PROTHROMBIN TIME: CPT | Performed by: INTERNAL MEDICINE

## 2021-06-25 PROCEDURE — 93793 ANTICOAG MGMT PT WARFARIN: CPT | Performed by: INTERNAL MEDICINE

## 2021-06-25 PROCEDURE — 36416 COLLJ CAPILLARY BLOOD SPEC: CPT | Performed by: INTERNAL MEDICINE

## 2021-07-12 PROCEDURE — 93296 REM INTERROG EVL PM/IDS: CPT | Performed by: INTERNAL MEDICINE

## 2021-07-12 PROCEDURE — 93294 REM INTERROG EVL PM/LDLS PM: CPT | Performed by: INTERNAL MEDICINE

## 2021-07-14 ENCOUNTER — OFFICE VISIT (OUTPATIENT)
Dept: CARDIOLOGY | Facility: CLINIC | Age: 81
End: 2021-07-14

## 2021-07-14 ENCOUNTER — CLINICAL SUPPORT NO REQUIREMENTS (OUTPATIENT)
Dept: CARDIOLOGY | Facility: CLINIC | Age: 81
End: 2021-07-14

## 2021-07-14 VITALS
WEIGHT: 200 LBS | SYSTOLIC BLOOD PRESSURE: 124 MMHG | DIASTOLIC BLOOD PRESSURE: 72 MMHG | BODY MASS INDEX: 35.44 KG/M2 | HEIGHT: 63 IN | HEART RATE: 77 BPM

## 2021-07-14 DIAGNOSIS — Z99.89 OSA ON CPAP: Chronic | ICD-10-CM

## 2021-07-14 DIAGNOSIS — Z95.0 PRESENCE OF BIVENTRICULAR CARDIAC PACEMAKER: ICD-10-CM

## 2021-07-14 DIAGNOSIS — I48.0 PAROXYSMAL ATRIAL FIBRILLATION (HCC): ICD-10-CM

## 2021-07-14 DIAGNOSIS — D86.9 SARCOIDOSIS: ICD-10-CM

## 2021-07-14 DIAGNOSIS — I44.7 LBBB (LEFT BUNDLE BRANCH BLOCK): Primary | ICD-10-CM

## 2021-07-14 DIAGNOSIS — I44.7 LBBB (LEFT BUNDLE BRANCH BLOCK): ICD-10-CM

## 2021-07-14 DIAGNOSIS — I10 ESSENTIAL HYPERTENSION: ICD-10-CM

## 2021-07-14 DIAGNOSIS — I50.30 HEART FAILURE WITH PRESERVED EJECTION FRACTION, NYHA CLASS II (HCC): Primary | ICD-10-CM

## 2021-07-14 DIAGNOSIS — E66.01 CLASS 2 SEVERE OBESITY DUE TO EXCESS CALORIES WITH SERIOUS COMORBIDITY AND BODY MASS INDEX (BMI) OF 35.0 TO 35.9 IN ADULT (HCC): ICD-10-CM

## 2021-07-14 DIAGNOSIS — G47.33 OSA ON CPAP: Chronic | ICD-10-CM

## 2021-07-14 PROCEDURE — 93281 PM DEVICE PROGR EVAL MULTI: CPT | Performed by: INTERNAL MEDICINE

## 2021-07-14 PROCEDURE — 99213 OFFICE O/P EST LOW 20 MIN: CPT | Performed by: NURSE PRACTITIONER

## 2021-07-14 PROCEDURE — 93000 ELECTROCARDIOGRAM COMPLETE: CPT | Performed by: NURSE PRACTITIONER

## 2021-07-14 RX ORDER — FUROSEMIDE 20 MG/1
20 TABLET ORAL DAILY
Qty: 90 TABLET | Refills: 3 | Status: SHIPPED | OUTPATIENT
Start: 2021-07-14 | End: 2021-12-01

## 2021-07-14 NOTE — PROGRESS NOTES
Date of Office Visit: 2021  Encounter Provider: AGA Bhakta  Place of Service: [facility] CARDIOLOGY  Patient Name: Inessa Peoples  :1940    Chief Complaint   Patient presents with   • paroxysmal AFIB     3 month f/u   • LBBB   • Pacemaker Check   :     HPI: Inessa Peoples is a 81 y.o. female who has followed with Dr. Hamilton and Dr. Stone.  History of PAF, LBBB, obesity and sarcoid.    She had been on sotalol for her PAF and it had been well controlled (she is currently off of this and has not had any recurrent AF), in 2020 she complained of shortness of breath and dyspnea with exertion.  She had not had any PAF but a stress echo in  had showed severe LV dysfunction under stress but normal at rest, VO2 max in the range of severe class III heart failure with LBBB and noted dyssynchrony.  It was felt that CRT would probably help her, she underwent implantation of a CRT-P on 2021.    I saw her in February post procedure and Dr. Stone saw her in April post procedure and she reported that her dyspnea was really not any better.  In April when he saw her he put in the note that the CS lead was not in a great position but he wanted to try to adjust medications.    In April Dr. Stone started carvedilol---per the notes it says she had fatigue and lethargy with this---she says it made her ankles swell----she was switched to metoprolol succinate and says she does tolerate this better---he had increased spironolactone to 50 mg daily, a week late she saw HF clinic and reported no better so decreased back to 25 mg daily and added furosemide. It helped the LE swelling and weight went down 8 lbs but no improvement in AHN.    She has been following with the heart failure clinic and just saw AGA Tay on  and still had complaints of dyspnea with exertion---per the notes reported that dyspnea had improved but she reports today that her dyspnea  "with exertion has never improved since device.      She was switched from losartan to sacubitril-valsartan---tolerates it fine but no change in AHN. There is discussion about starting SGLT2 but has not been started.     She presents today for follow-up.    She reports that she is the same. No chest pain or shortness of breath at rest but still with dyspnea on exertion. No PND, orthopnea, dizziness or palpitations.     She has intermittent LE edema--she takes furosemide as needed---takes it about every 8-9 days. Per the HF notes she took it daily initially but she does not ever remember doing this--she thinks she has only taken it as needed for swelling. She is unsure whether it has improved her AHN.     Device interrogation showed dusting and function.  Her AT/AF burden was less than 1% with a total of 4 minutes, there were no EGM's.  She had 1 brief nonsustained VT episode, asymptomatic this was in April.    She remains on warfarin for anticoagulation with no bleeding issues.    She says that she walks on the treadmill 5 days a week for 15 minutes.  She walks flat at about 2.5 mph.  She says that she has tried to increase the miles per hour but is unable to secondary to dyspnea.  She has never tried to increase her time.    She gets really anxious when she feels this dyspnea and it makes her feel like she might \"lose control.\"    She was diagnosed with sarcoid years ago, she has an upcoming appointment in August with Dr. Dsouza.    She has been working on some weight loss.     Past Medical History:   Diagnosis Date   • Abnormal ECG    • Allergic rhinitis    • Atrial fibrillation (CMS/HCC)    • Bleeds easily (CMS/HCC)     warfarin   • Breast screening    • Broken bones    • Calf DVT (deep venous thrombosis) (CMS/HCC)    • Chronic anticoagulation    • Class 2 severe obesity due to excess calories with serious comorbidity and body mass index (BMI) of 37.0 to 37.9 in adult (CMS/HCC)    • Colon polyp    • CTS (carpal " tunnel syndrome)    • Deep vein thrombosis (CMS/HCC)     left calf   • Diverticulitis of colon    • AHN (dyspnea on exertion)    • Fatigue    • H/O malignant melanoma of skin    • Heart failure with preserved ejection fraction, NYHA class II (CMS/HCC)     II-III---decreased EF with stress   • Hematuria, gross    • History of colon polyps    • History of kidney stones    • Hyperlipidemia    • Hypertension    • Hyperthyroidism    • IFG (impaired fasting glucose)    • Insomnia    • LBBB (left bundle branch block)    • Left leg swelling    • LLQ abdominal pain    • Long term current use of systemic steroids    • Low back pain    • Malignant melanoma of skin (CMS/HCC)    • Neck pain    • Obesity     class 2 obesity due to excess calories with BMI of 35.0 to 35.9 in adult   • Osteoarthritis    • Osteopenia    • Paralysis, diaphragm    • Paroxysmal atrial fibrillation (CMS/HCC)    • 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 Lu   • Sleep apnea     USES CPAP   • UTI (urinary tract infection)    • Visit for screening mammogram        Past Surgical History:   Procedure Laterality Date   • CARDIAC CATHETERIZATION N/A 6/29/2020    Procedure: Coronary angiography, [place];  Surgeon: Noe Reynoso MD;  Location:  [facility];  Service: Cardiovascular;  Laterality: N/A;   • CARDIAC CATHETERIZATION N/A 6/29/2020    Procedure: Left ventriculography;  Surgeon: Noe Reynoso MD;  Location:  [facility];  Service: Cardiovascular;  Laterality: N/A;   • CARDIAC CATHETERIZATION N/A 6/29/2020    Procedure: Right and Left Heart Cath;  Surgeon: Noe Reynoso MD;  Location:  [facility];  Service: Cardiovascular;  Laterality: N/A;   • CARDIAC CATHETERIZATION     • CARDIAC  ELECTROPHYSIOLOGY PROCEDURE N/A 1/5/2021    Procedure: Biventricular Device Insertion -- CRT-p ([employer]);  Surgeon: Demar Stone MD;  Location: [facility] CATH INVASIVE LOCATION;  Service: Cardiology;  Laterality: N/A;   • CARPAL TUNNEL RELEASE Bilateral 1980'S    [facility]   • CATARACT EXTRACTION Bilateral 2001    Dr. Nic Hidaglo   • COLONOSCOPY N/A 06/26/2013    MILD SIGMOID DIVERTICULOSIS, repeat 5 years based on sister w/ colona cancer-DR. NASRA CASTRO   • COLONOSCOPY N/A 10/19/2010    SIGMOID DIVERTICULOSIS & INTERNAL HEMORRHOIDS, HEMORRHOIDAL BANDING X4, DR. NASRA CASTRO   • COLONOSCOPY N/A 8/6/2018    Procedure: COLONOSCOPY TO CECUM AND INTO TERMINAL ILEUM;  Surgeon: Nasra Castro MD;  Location: [facility] ENDOSCOPY;  Service: Gastroenterology   • COLONOSCOPY W/ BIOPSIES N/A 05/12/2008    RECTUM BIOPSY: COLONIC MUCOSA W/ FOCAL INCREASE OF INFLAMMATORY CELLS IN THE MUSCULARIS MUCOSA INCLUDING EOSINOPHILS, SIGMOID DIVERTICULOSIS, POSSIBLE PROCTITIS, DR. NASRA CASTRO   • CYSTOSCOPY, RETROGRADE PYELOGRAM AND STENT INSERTION Bilateral 10/10/2014    w/ Right ureteral pyeloscopy & laser lithotripsy, Right Double-J stent placement-Dr. Julio Tai   • ENDOSCOPY N/A 11/11/2020    Procedure: ESOPHAGOGASTRODUODENOSCOPY with biopsies and dilation 54fr fernández;  Surgeon: Garret Piedra MD;  Location: [facility] ENDOSCOPY;  Service: Gastroenterology;  Laterality: N/A;  pre- dysphagia  post-- gastritis, dilation of esophageal ring, watermelon stomach    • EXTRACORPOREAL SHOCK WAVE LITHOTRIPSY (ESWL) Right 09/10/2009    DR. JULIO TAI   • HAMMER TOE REPAIR     • LAPAROSCOPIC CHOLECYSTECTOMY N/A 07/19/2012    DR. NASRA CASTRO   • LUMBAR EPIDURAL INJECTION N/A 04/23/2015    LUMBAR EPIDURAL STEROID INJECTION X3   • PACEMAKER IMPLANTATION     • PARATHYROIDECTOMY N/A 2001   • SACROILIAC JOINT INJECTION Left 6/15/2021    Procedure: SACROILIAC INJECTION-- left and left greater trochanteric bursa  injection;  Surgeon: Wolfgang Means MD;  Location: [facility] MAIN OR;  Service: Pain Management;  Laterality: Left;   • SKIN CANCER EXCISION     • THUMB ARTHROSCOPY Right 1982   • TOTAL KNEE ARTHROPLASTY Right 03/25/2014    [employer] Vanguard total knee, 55 femoral component, 71 tibial tray w/ a 40 mm stem, 34 mm x 7.8 mm three-peg patella, and a 14 standard poly-Dr. Ty Canchola       Social History     Socioeconomic History   • Marital status:      Spouse name: EDY   • Number of children: 4   • Years of education: 14yrs   • Highest education level: Not on file   Tobacco Use   • Smoking status: Never Smoker   • Smokeless tobacco: Never Used   Vaping Use   • Vaping Use: Never used   Substance and Sexual Activity   • Alcohol use: Yes     Comment: social drinker/caffeine use    • Drug use: No   • Sexual activity: Defer       Family History   Problem Relation Age of Onset   • Heart disease Mother    • Diabetes type II Mother    • Heart disease Father    • Cancer Sister    • Colon cancer Sister    • Colon polyps Sister    • Lung cancer Sister    • Heart disease Brother         CABG   • Diabetes type II Brother    • Colon cancer Brother    • Colon polyps Brother    • Arrhythmia Brother    • Heart disease Sister    • Diabetes type II Sister    • Aortic aneurysm Sister    • COPD Sister    • Arrhythmia Sister    • Cerebral aneurysm Sister    • COPD Sister    • Lupus Sister    • Asthma Maternal Aunt    • Aortic aneurysm Brother        Review of Systems   Constitutional: Negative for chills, fever and malaise/fatigue.   Cardiovascular: Positive for dyspnea on exertion. Negative for chest pain, leg swelling, near-syncope, orthopnea, palpitations, paroxysmal nocturnal dyspnea and syncope.   Respiratory: Negative for cough and shortness of breath.    Hematologic/Lymphatic: Negative.    Musculoskeletal: Negative for joint pain, joint swelling and myalgias.   Gastrointestinal: Negative for abdominal pain, diarrhea, melena,  nausea and vomiting.   Genitourinary: Negative for frequency and hematuria.   Neurological: Negative for light-headedness, numbness, paresthesias and seizures.   Allergic/Immunologic: Negative.    All other systems reviewed and are negative.      Allergies   Allergen Reactions   • Iodinated Diagnostic Agents Anaphylaxis, Swelling and Other (See Comments)     PAINS ACROSS CHEST, Facial swelling   • Contrast Dye Other (See Comments)   • Fenofibrate Myalgia   • Livalo [Pitavastatin] Myalgia     Joint Pain, Muscle tightness   • Plaquenil [Hydroxychloroquine Sulfate] Myalgia     Joint Pain, Muscle Tightness   • Statins Myalgia     Joint pain, Muscle Tightness  Other reaction(s): Muscle cramps         Current Outpatient Medications:   •  acetaminophen (TYLENOL) 650 MG 8 hr tablet, Take 650 mg by mouth Every 8 (Eight) Hours As Needed for Mild Pain ., Disp: , Rfl:   •  Biotin w/ Vitamins C & E (HAIR SKIN & NAILS GUMMIES PO), , Disp: , Rfl:   •  Calcium-Phosphorus-Vitamin D (CITRACAL +D3 PO), Take 1 tablet by mouth Daily., Disp: , Rfl:   •  celecoxib (CeleBREX) 200 MG capsule, TAKE 1 CAPSULE EVERY DAY, Disp: 90 capsule, Rfl: 0  •  ezetimibe (ZETIA) 10 MG tablet, Take 1 tablet by mouth Daily., Disp: 90 tablet, Rfl: 0  •  fexofenadine (ALLEGRA) 180 MG tablet, Take 180 mg by mouth Daily., Disp: , Rfl:   •  furosemide (LASIX) 20 MG tablet, Take 20 mg by mouth As Needed., Disp: , Rfl:   •  levothyroxine (SYNTHROID, LEVOTHROID) 25 MCG tablet, Take 1 tablet by mouth Daily., Disp: 90 tablet, Rfl: 0  •  melatonin 5 MG sublingual tablet sublingual tablet, Take 5 mg by mouth Every Night., Disp: , Rfl:   •  metoprolol succinate XL (TOPROL-XL) 25 MG 24 hr tablet, Take 0.5 tablets by mouth Daily., Disp: 45 tablet, Rfl: 3  •  Multiple Vitamin (MULTI-DAY VITAMINS) tablet, Take 1 tablet by mouth daily., Disp: , Rfl:   •  omeprazole (priLOSEC) 40 MG capsule, Take 40 mg by mouth Daily., Disp: , Rfl:   •  sacubitril-valsartan (ENTRESTO) 24-26  "MG tablet, Take 1 tablet by mouth 2 (Two) Times a Day., Disp: 60 tablet, Rfl: 5  •  spironolactone (ALDACTONE) 25 MG tablet, Take 25 mg by mouth Daily., Disp: , Rfl:   •  Triamcinolone Acetonide (NASACORT) 55 MCG/ACT nasal inhaler, 2 sprays into the nostril(s) as directed by provider Daily., Disp: , Rfl:   •  warfarin (COUMADIN) 3 MG tablet, Take 1 tablet by mouth Daily., Disp: 90 tablet, Rfl: 0      Objective:     Vitals:    07/14/21 1011   BP: 124/72   Pulse: 77   Weight: 90.7 kg (200 lb)   Height: 158.8 cm (62.52\")     Body mass index is 35.97 kg/m².    PHYSICAL EXAM:    Vitals Reviewed.   General Appearance: No acute distress, well developed and well nourished.   Eyes: Conjunctiva and lids: No erythema, swelling, or discharge. Sclera non-icteric.   HENT: Atraumatic, normocephalic. External eyes, ears, and nose normal.   Respiratory: No signs of respiratory distress. Respiration rhythm and depth normal.   Clear to auscultation. No rales, crackles, rhonchi, or wheezing auscultated.   Cardiovascular:  Heart Rate and Rhythm: Normal, Heart Sounds: Normal S1 and S2.   Murmurs: No murmurs noted. No rubs, thrills, or gallops.   Lower Extremities: No edema noted.  Gastrointestinal:  Abdomen obese, nontender.    Musculoskeletal: Normal movement of extremities  Skin and Nails: General appearance normal. No pallor, cyanosis, diaphoresis. Skin temperature normal. No clubbing of fingernails.   Psychiatric: Patient alert and oriented to person, place, and time. Speech and behavior appropriate. Normal mood and affect.       ECG 12 Lead    Date/Time: 7/14/2021 10:28 AM  Performed by: Mirela Ríos APRN  Authorized by: Mirela Ríos APRN   Comparison: compared with previous ECG   Similar to previous ECG  Rhythm: paced  BPM: 77  Pacing: atrial sensed rhythm, ventricular paced rhythm and 100% capture            Assessment:       Diagnosis Plan   1. Heart failure with preserved ejection fraction, NYHA class II " (CMS/[facility])II-III---decreased EF with stress     2. LBBB (left bundle branch block)     3. Presence of biventricular cardiac pacemaker     4. Essential hypertension     5. Class 2 severe obesity due to excess calories with serious comorbidity and body mass index (BMI) of 35.0 to 35.9 in adult (CMS/[facility])     6. ANY on CPAP + 10 - Dr Lu     7. Sarcoidosis     8. Paroxysmal atrial fibrillation (CMS/[facility])            Plan:       1.-3. Class III HF w/preserved EF at rest, decreased w/stress, LBBB/dyssynchrony--s/p CRT-P.  Euvolemic by exam she is on good guideline directed medical therapy which includes metoprolol succinate, sacubitril/valsartan, spironolactone and as needed diuretic.  I have suggested that she try taking the Lasix daily for a week and trying to increase her exercise time and endurance and see if she notices any decrease in her dyspnea with exertion.  Think some of her problems are related to deconditioning.  She also gets very anxious when she starts feeling short of breath with exertion and I think this may exacerbate the situation, we discussed measures to take to try to calm herself when this happens.    4. HTN, controlled.    5. For the problem of overweight/obesity, we discussed the importance of lifestyle measures and strategies for weight loss, such as improved nutrition, regular exercise and sleep hygiene.      6. ANY treatd with CPAP---follows with Dr. Lu    7. Sarcoidosis--she has an upcoming appointment with  in August.    8. PAF--- device shows a less than 0.1 AT/AF burden, says total time of 4 minutes but there are no EGM's.  She remains on warfarin for anticoagulation.    Remote check in 3 months and follow-up with Dr. Stone with an in clinic device check in 6 months.    As always, it has been a pleasure to participate in your patient's care.      Sincerely,         AGA Hartman

## 2021-08-11 ENCOUNTER — APPOINTMENT (OUTPATIENT)
Dept: WOMENS IMAGING | Facility: HOSPITAL | Age: 81
End: 2021-08-11

## 2021-08-11 PROCEDURE — 77066 DX MAMMO INCL CAD BI: CPT | Performed by: RADIOLOGY

## 2021-08-11 PROCEDURE — G0279 TOMOSYNTHESIS, MAMMO: HCPCS | Performed by: RADIOLOGY

## 2021-08-26 RX ORDER — SPIRONOLACTONE 25 MG/1
TABLET ORAL
Qty: 60 TABLET | Refills: 0 | Status: SHIPPED | OUTPATIENT
Start: 2021-08-26 | End: 2021-11-01

## 2021-09-15 ENCOUNTER — OFFICE VISIT (OUTPATIENT)
Dept: PAIN MEDICINE | Facility: CLINIC | Age: 81
End: 2021-09-15

## 2021-09-15 VITALS
TEMPERATURE: 96.6 F | WEIGHT: 197 LBS | RESPIRATION RATE: 20 BRPM | BODY MASS INDEX: 34.91 KG/M2 | OXYGEN SATURATION: 96 % | DIASTOLIC BLOOD PRESSURE: 73 MMHG | HEIGHT: 63 IN | SYSTOLIC BLOOD PRESSURE: 114 MMHG | HEART RATE: 74 BPM

## 2021-09-15 DIAGNOSIS — M70.62 TROCHANTERIC BURSITIS OF BOTH HIPS: ICD-10-CM

## 2021-09-15 DIAGNOSIS — M51.36 BULGE OF LUMBAR DISC WITHOUT MYELOPATHY: ICD-10-CM

## 2021-09-15 DIAGNOSIS — M47.816 LUMBAR FACET ARTHROPATHY: ICD-10-CM

## 2021-09-15 DIAGNOSIS — M70.61 TROCHANTERIC BURSITIS OF BOTH HIPS: ICD-10-CM

## 2021-09-15 DIAGNOSIS — G89.29 OTHER CHRONIC PAIN: Primary | ICD-10-CM

## 2021-09-15 PROCEDURE — 99213 OFFICE O/P EST LOW 20 MIN: CPT | Performed by: NURSE PRACTITIONER

## 2021-09-15 NOTE — PROGRESS NOTES
CHIEF COMPLAINT  F/u back pain. Pt had SACROILIAC INJECTION-- left and left greater trochanteric bursa injection and sts receiving no relief from procedure.     Subjective   Inessa Peoples is a 81 y.o. female  who presents for follow-up.  She has a history of chronic back and hip pain.    Patient presents for follow-up of PROCEDURE. Patient had a LEFT SI AND LEFT GREATER TROCHANTERIC BURSA INJECTION performed by Dr. SPARKS on 6-15-21 for management of HIP AND BACK PAIN. Patient reports 50% relief from the procedure for 1-2 days.    Complains of pain in her left low back, left thigh and left leg. Today her pain is 5/10VAS. Describes the pain as continuous pressure(back) with intermittent burning(left leg). The pain can radiate down left leg and up left leg. Also having left groin pain. Pain increases with walking, standing, household chores; pain decreases with Tylenol OTC, rest, laying down, ice. Continues with Celebrex 200 mg daily. ADl's by self. Denies any bowel or bladder changes. She does have a history of intermittent urinary incontinence but this is unchanged.      Patient remained masked during entire encounter. No cough present. I donned a mask and eye protection throughout entire visit. Prior to donning mask and eye protection, hand hygiene was performed, as well as when it was doffed.  I was closer than 6 feet, but not for an extended period of time. No obvious exposure to any bodily fluids.    Back Pain  This is a chronic problem. The current episode started more than 1 year ago. The problem occurs intermittently. The problem has been gradually worsening since onset. The pain is present in the lumbar spine and sacro-iliac. The pain radiates to the left thigh (posterior left thigh- can go down to foot; also having left groin pain). The pain is at a severity of 5/10. The pain is mild. The pain is worse during the day (worsens as day progresses). The symptoms are aggravated by bending, position, sitting,  standing and twisting (housework). Associated symptoms include bladder incontinence. Pertinent negatives include no abdominal pain, bowel incontinence, chest pain, dysuria, fever, headaches, numbness or weakness. Risk factors include obesity, menopause, history of cancer and lack of exercise (history of melanoma--no Chemo or RAD). She has tried analgesics and heat for the symptoms. The treatment provided moderate relief.        She reports she was told she could temporarily interrupt Coumadin for treatments.    Procedure List  1-21-21-- bilateral L2-L5 MBB-- 90% relief for several weeks  6-16-20-- left SI +GTB-- 75% for 2 weeks  6-23-19-- bilateral L2-L5 MBB-- 90% for a few weeks  11-15-18--- bilateral L2-L5 MBB-- significant relief for several days  4-26-28-- LEFT SI-- significant relief  9-26-17-- RIGHT SI-- significant.      LUMBAR MRI 8-9-16      PEG Assessment   What number best describes your pain on average in the past week?5  What number best describes how, during the past week, pain has interfered with your enjoyment of life?5  What number best describes how, during the past week, pain has interfered with your general activity?  5    The following portions of the patient's history were reviewed and updated as appropriate: allergies, current medications, past family history, past medical history, past social history, past surgical history and problem list.    Review of Systems   Constitutional: Negative for activity change, fatigue and fever.   HENT: Negative for congestion.    Eyes: Negative for visual disturbance.   Respiratory: Negative for cough and shortness of breath.    Cardiovascular: Negative for chest pain.   Gastrointestinal: Negative for abdominal pain, bowel incontinence, constipation and diarrhea.   Endocrine: Negative for polyuria.   Genitourinary: Positive for bladder incontinence. Negative for difficulty urinating and dysuria.   Musculoskeletal: Positive for back pain.  "  Allergic/Immunologic: Negative for immunocompromised state.   Neurological: Negative for dizziness, weakness, light-headedness, numbness and headaches.   Psychiatric/Behavioral: Negative for agitation, sleep disturbance and suicidal ideas. The patient is not nervous/anxious.      I have reviewed and confirmed the accuracy of the ROS as documented by the MA/LPN/RN AGA Katz      Vitals:    09/15/21 1301   BP: 114/73   Pulse: 74   Resp: 20   Temp: 96.6 °F (35.9 °C)   SpO2: 96%   Weight: 89.4 kg (197 lb)   Height: 158.8 cm (62.52\")   PainSc:   5   PainLoc: Back     Objective   Physical Exam  Vitals and nursing note reviewed.   Constitutional:       Appearance: Normal appearance. She is well-developed.   HENT:      Head: Normocephalic and atraumatic.   Musculoskeletal:      Lumbar back: Tenderness and bony tenderness (lumbar facet tenderness-- left worse than right; + loading manuever) present. Decreased range of motion. Negative right straight leg raise test and negative left straight leg raise test.      Comments: moderate tenderness of left SI; none of right  minimal tenderness of left  Hip and none of right     Skin:     General: Skin is warm and dry.   Neurological:      Mental Status: She is alert.      Gait: Gait abnormal.      Deep Tendon Reflexes:      Reflex Scores:       Patellar reflexes are 1+ on the right side and 1+ on the left side.  Psychiatric:         Speech: Speech normal.         Behavior: Behavior normal. Behavior is cooperative.         Thought Content: Thought content normal.         Judgment: Judgment normal.           Assessment/Plan   Diagnoses and all orders for this visit:    1. Other chronic pain (Primary)    2. Bulge of lumbar disc without myelopathy  -     CT Lumbar Spine Without Contrast; Future  -     Ambulatory Referral to Physical Therapy Evaluate and treat    3. Lumbar facet arthropathy  -     CT Lumbar Spine Without Contrast; Future  -     Ambulatory Referral to " Physical Therapy Evaluate and treat    4. Trochanteric bursitis of both hips      ---CT lumbar without contrast-- patient is unsure if she can have MRI due to new pacemaker.  --- Physical therapy--- patient requests [facility].   --- Reviewed RED FLAG SYMPTOMS, including but not limited to-- fever, chills, stiff neck, headache, flu-like symptoms, increased tenderness and redness. Also reviewed loss of bowel and bladder control and saddle anesthesia. If patient notices this, he is to call office immediately. Patient verbalized understanding.  --- minimally discussed consideration for RFA.  --- Follow-up 6 weeks or sooner if needed     LUIS REPORT  As the clinician, I personally reviewed the LUIS from 9-15-21 while the patient was in the office today.      I spent 21 minutes caring for patient on this date of service. This time includes time spent by me in the following activities:reviewing tests, obtaining and/or reviewing a separately obtained history, performing a medically appropriate examination and/or evaluation , counseling and educating the patient/family/caregiver, ordering medications, tests, or procedures and documenting information in the medical record       Dictated utilizing Dragon dictation.

## 2021-09-24 ENCOUNTER — HOSPITAL ENCOUNTER (OUTPATIENT)
Dept: CARDIOLOGY | Facility: HOSPITAL | Age: 81
Discharge: HOME | End: 2021-09-24

## 2021-09-24 ENCOUNTER — LAB (OUTPATIENT)
Dept: LAB | Facility: HOSPITAL | Age: 81
End: 2021-09-24

## 2021-09-24 VITALS
HEART RATE: 72 BPM | HEIGHT: 63 IN | DIASTOLIC BLOOD PRESSURE: 70 MMHG | WEIGHT: 196.8 LBS | BODY MASS INDEX: 34.87 KG/M2 | OXYGEN SATURATION: 95 % | SYSTOLIC BLOOD PRESSURE: 118 MMHG

## 2021-09-24 DIAGNOSIS — I48.0 PAROXYSMAL ATRIAL FIBRILLATION (HCC): ICD-10-CM

## 2021-09-24 DIAGNOSIS — I10 ESSENTIAL HYPERTENSION: ICD-10-CM

## 2021-09-24 DIAGNOSIS — I50.30 HEART FAILURE WITH PRESERVED EJECTION FRACTION, NYHA CLASS II (HCC): Primary | ICD-10-CM

## 2021-09-24 DIAGNOSIS — R79.9 ABNORMAL FINDING OF BLOOD CHEMISTRY, UNSPECIFIED: ICD-10-CM

## 2021-09-24 DIAGNOSIS — I50.30 HEART FAILURE WITH PRESERVED EJECTION FRACTION, NYHA CLASS II (HCC): ICD-10-CM

## 2021-09-24 DIAGNOSIS — I44.7 LBBB (LEFT BUNDLE BRANCH BLOCK): ICD-10-CM

## 2021-09-24 PROBLEM — Z80.0 FAMILY HISTORY OF COLON CANCER: Status: RESOLVED | Noted: 2018-06-19 | Resolved: 2021-09-24

## 2021-09-24 PROBLEM — M46.1 SACROILIAC INFLAMMATION: Status: RESOLVED | Noted: 2021-06-04 | Resolved: 2021-09-24

## 2021-09-24 LAB
ANION GAP SERPL CALCULATED.3IONS-SCNC: 12.2 MMOL/L (ref 5–15)
BUN SERPL-MCNC: 24 MG/DL (ref 8–23)
BUN/CREAT SERPL: 22.9 (ref 7–25)
CALCIUM SPEC-SCNC: 10.3 MG/DL (ref 8.6–10.5)
CHLORIDE SERPL-SCNC: 106 MMOL/L (ref 98–107)
CHOLEST SERPL-MCNC: 224 MG/DL (ref 0–200)
CO2 SERPL-SCNC: 23.8 MMOL/L (ref 22–29)
CREAT SERPL-MCNC: 1.05 MG/DL (ref 0.57–1)
GFR SERPL CREATININE-BSD FRML MDRD: 51 ML/MIN/1.73
GLUCOSE SERPL-MCNC: 92 MG/DL (ref 65–99)
HBA1C MFR BLD: 5.5 % (ref 4.8–5.6)
HDLC SERPL-MCNC: 61 MG/DL (ref 40–60)
LDLC SERPL CALC-MCNC: 126 MG/DL (ref 0–100)
LDLC/HDLC SERPL: 1.97 {RATIO}
NT-PROBNP SERPL-MCNC: 69.2 PG/ML (ref 0–1800)
POTASSIUM SERPL-SCNC: 4.5 MMOL/L (ref 3.5–5.2)
SODIUM SERPL-SCNC: 142 MMOL/L (ref 136–145)
TRIGL SERPL-MCNC: 213 MG/DL (ref 0–150)
VLDLC SERPL-MCNC: 37 MG/DL (ref 5–40)

## 2021-09-24 PROCEDURE — 80048 BASIC METABOLIC PNL TOTAL CA: CPT

## 2021-09-24 PROCEDURE — 99214 OFFICE O/P EST MOD 30 MIN: CPT | Performed by: NURSE PRACTITIONER

## 2021-09-24 PROCEDURE — 83036 HEMOGLOBIN GLYCOSYLATED A1C: CPT

## 2021-09-24 PROCEDURE — 36415 COLL VENOUS BLD VENIPUNCTURE: CPT

## 2021-09-24 PROCEDURE — 83880 ASSAY OF NATRIURETIC PEPTIDE: CPT

## 2021-09-24 PROCEDURE — 80061 LIPID PANEL: CPT

## 2021-09-24 PROCEDURE — G0463 HOSPITAL OUTPT CLINIC VISIT: HCPCS

## 2021-09-24 RX ORDER — LEVALBUTEROL INHALATION SOLUTION 0.31 MG/3ML
1 SOLUTION RESPIRATORY (INHALATION) EVERY 4 HOURS PRN
COMMUNITY
End: 2021-12-01

## 2021-09-24 NOTE — PROGRESS NOTES
[facility] HEART FAILURE      Patient Name: Inessa Peoples  :1940  Age: 81 y.o.  Sex: female  Referring Provider: Demar Stone MD   Primary Cardiologist: Demar Stone MD  Encounter Provider:  AGA Tolbert      Chief Complaint:   Chronic diastolic CHF      Congestive Heart Failure  Associated symptoms include shortness of breath. Pertinent negatives include no chest pain, fatigue, palpitations or unexpected weight change.    this 81-year-old female, known to this provider, comes to us today for further evaluation regarding her chronic diastolic heart failure.  Current diagnoses to include paroxysmal atrial fibrillation, prior DVT, obesity, hypertension, hyperlipidemia, hyperthyroidism, left bundle branch block status post placement of CRT, sarcoid lung disease, sleep apnea compliant with CPAP therapy.    Historically she has been anticoagulated with Xarelto for her atrial fibrillation, however after developing a DVT she was transitioned to warfarin.  Echocardiogram in 2015 revealed an EF of 64% with grade 1 LV diastolic dysfunction and mild valvular heart disease.    Stress echocardiogram 2020 yielded LVEF of 63%, septal wall motion abnormality consistent with bundle branch block, normal LV diastolic function, post exercise ejection fraction 40%, global hypokinesis of LV post exercise, O2 sats diminished to 80% with exercise.  Subsequent cardiac catheterization 2020 yielded right dominant system with no significant CAD, LVEF 50 to 55%.  Given severe reduction of LVEF under stress she underwent CRT pacemaker placement.    AF burden on device check in 2021 was 0.    2021 message was received from the patient stating that she was having some hypotension with blood pressure as low as 90 systolically as well as some lower extremity edema.  Dr. Stone reduced her losartan by half and increased her spironolactone to 50 mg daily.    She was transitioned  to metoprolol early June 2021.    She continues to tolerate Entresto.  She remains with complaints of dyspnea on exertion that are not improved.  Her stamina is poor.  She is stable.      The following portions of the patient's history were reviewed and updated as appropriate: allergies, current medications, past family history, past medical history, past social history, past surgical history and problem list.    Current Outpatient Medications   Medication Sig Dispense Refill   • acetaminophen (TYLENOL) 650 MG 8 hr tablet Take 650 mg by mouth Every 8 (Eight) Hours As Needed for Mild Pain .     • Biotin w/ Vitamins C & E (HAIR SKIN & NAILS GUMMIES PO)      • Calcium-Phosphorus-Vitamin D (CITRACAL +D3 PO) Take 1 tablet by mouth Daily.     • celecoxib (CeleBREX) 200 MG capsule TAKE 1 CAPSULE EVERY DAY 90 capsule 0   • ezetimibe (ZETIA) 10 MG tablet Take 1 tablet by mouth Daily. 90 tablet 0   • fexofenadine (ALLEGRA) 180 MG tablet Take 180 mg by mouth Daily.     • furosemide (LASIX) 20 MG tablet Take 1 tablet by mouth Daily. 90 tablet 3   • levalbuterol (XOPENEX) 0.31 MG/3ML nebulizer solution Take 1 ampule by nebulization Every 4 (Four) Hours As Needed for Wheezing.     • levothyroxine (SYNTHROID, LEVOTHROID) 25 MCG tablet Take 1 tablet by mouth Daily. 90 tablet 0   • melatonin 5 MG sublingual tablet sublingual tablet Take 5 mg by mouth Every Night.     • metoprolol succinate XL (TOPROL-XL) 25 MG 24 hr tablet Take 0.5 tablets by mouth Daily. 45 tablet 3   • Multiple Vitamin (MULTI-DAY VITAMINS) tablet Take 1 tablet by mouth daily.     • omeprazole (priLOSEC) 40 MG capsule Take 40 mg by mouth Daily.     • sacubitril-valsartan (ENTRESTO) 24-26 MG tablet Take 1 tablet by mouth 2 (Two) Times a Day. 60 tablet 5   • spironolactone (ALDACTONE) 25 MG tablet TAKE 1 TABLET EVERY DAY 60 tablet 0   • Triamcinolone Acetonide (NASACORT) 55 MCG/ACT nasal inhaler 2 sprays into the nostril(s) as directed by provider Daily.     •  warfarin (COUMADIN) 3 MG tablet Take 1 tablet by mouth Daily. 90 tablet 0   • empagliflozin (Jardiance) 10 MG tablet tablet Take 1 tablet by mouth Daily. 14 tablet 0     No current facility-administered medications for this encounter.       Past Medical History:   Diagnosis Date   • Abnormal ECG    • Allergic rhinitis    • Atrial fibrillation (CMS/HCC)    • Bleeds easily (CMS/HCC)     warfarin   • Breast screening    • Broken bones    • Calf DVT (deep venous thrombosis) (CMS/HCC)    • Chronic anticoagulation    • Class 2 severe obesity due to excess calories with serious comorbidity and body mass index (BMI) of 37.0 to 37.9 in adult (CMS/HCC)    • Colon polyp    • CTS (carpal tunnel syndrome)    • Deep vein thrombosis (CMS/HCC)     left calf   • Diverticulitis of colon    • AHN (dyspnea on exertion)    • Fatigue    • H/O malignant melanoma of skin    • Heart failure with preserved ejection fraction, NYHA class II (CMS/HCC)     II-III---decreased EF with stress   • Hematuria, gross    • History of colon polyps    • History of kidney stones    • Hyperlipidemia    • Hypertension    • Hyperthyroidism    • IFG (impaired fasting glucose)    • Insomnia    • LBBB (left bundle branch block)    • Left leg swelling    • LLQ abdominal pain    • Long term current use of systemic steroids    • Low back pain    • Malignant melanoma of skin (CMS/HCC)    • Neck pain    • Obesity     class 2 obesity due to excess calories with BMI of 35.0 to 35.9 in adult   • Osteoarthritis    • Osteopenia    • Paralysis, diaphragm    • Paroxysmal atrial fibrillation (CMS/HCC)    • 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 Lu   • Sleep apnea     USES CPAP   • UTI (urinary tract infection)    • Visit for screening mammogram        Past Surgical  History:   Procedure Laterality Date   • CARDIAC CATHETERIZATION N/A 6/29/2020    Procedure: Coronary angiography, [place];  Surgeon: Noe Reynoso MD;  Location: [facility] CATH INVASIVE LOCATION;  Service: Cardiovascular;  Laterality: N/A;   • CARDIAC CATHETERIZATION N/A 6/29/2020    Procedure: Left ventriculography;  Surgeon: Noe Reynoso MD;  Location: [facility] CATH INVASIVE LOCATION;  Service: Cardiovascular;  Laterality: N/A;   • CARDIAC CATHETERIZATION N/A 6/29/2020    Procedure: Right and Left Heart Cath;  Surgeon: Noe Reynoso MD;  Location: [facility] CATH INVASIVE LOCATION;  Service: Cardiovascular;  Laterality: N/A;   • CARDIAC CATHETERIZATION     • CARDIAC ELECTROPHYSIOLOGY PROCEDURE N/A 1/5/2021    Procedure: Biventricular Device Insertion -- CRT-p ([employer]tr[employer]);  Surgeon: Demar Stone MD;  Location: [facility] CATH INVASIVE LOCATION;  Service: Cardiology;  Laterality: N/A;   • CARPAL TUNNEL RELEASE Bilateral 1980'S    [facility] SURGEONS   • CATARACT EXTRACTION Bilateral 2001    Dr. Nic Hidalgo   • COLONOSCOPY N/A 06/26/2013    MILD SIGMOID DIVERTICULOSIS, repeat 5 years based on sister w/ colona cancer-DR. NASRA CASTRO   • COLONOSCOPY N/A 10/19/2010    SIGMOID DIVERTICULOSIS & INTERNAL HEMORRHOIDS, HEMORRHOIDAL BANDING X4, DR. NASRA CASTRO   • COLONOSCOPY N/A 8/6/2018    Procedure: COLONOSCOPY TO CECUM AND INTO TERMINAL ILEUM;  Surgeon: Nasra Castro MD;  Location: [facility] ENDOSCOPY;  Service: Gastroenterology   • COLONOSCOPY W/ BIOPSIES N/A 05/12/2008    RECTUM BIOPSY: COLONIC MUCOSA W/ FOCAL INCREASE OF INFLAMMATORY CELLS IN THE MUSCULARIS MUCOSA INCLUDING EOSINOPHILS, SIGMOID DIVERTICULOSIS, POSSIBLE PROCTITIS, DR. NASRA CASTRO   • CYSTOSCOPY, RETROGRADE PYELOGRAM AND STENT INSERTION Bilateral 10/10/2014    w/ Right ureteral pyeloscopy & laser lithotripsy, Right Double-J stent placement-Dr. Julio Tai   • ENDOSCOPY N/A 11/11/2020    Procedure: ESOPHAGOGASTRODUODENOSCOPY  with biopsies and dilation 54fr fernández;  Surgeon: Garret Piedra MD;  Location: [facility] ENDOSCOPY;  Service: Gastroenterology;  Laterality: N/A;  pre- dysphagia  post-- gastritis, dilation of esophageal ring, watermelon stomach    • EXTRACORPOREAL SHOCK WAVE LITHOTRIPSY (ESWL) Right 09/10/2009    DR. CLAIRE ROCK   • HAMMER TOE REPAIR     • LAPAROSCOPIC CHOLECYSTECTOMY N/A 07/19/2012    DR. TANISHA THORNTON   • LUMBAR EPIDURAL INJECTION N/A 04/23/2015    LUMBAR EPIDURAL STEROID INJECTION X3   • PACEMAKER IMPLANTATION     • PARATHYROIDECTOMY N/A 2001   • SACROILIAC JOINT INJECTION Left 6/15/2021    Procedure: SACROILIAC INJECTION-- left and left greater trochanteric bursa injection;  Surgeon: Wolfgang Means MD;  Location: [facility] OR;  Service: Pain Management;  Laterality: Left;   • SKIN CANCER EXCISION     • THUMB ARTHROSCOPY Right 1982   • TOTAL KNEE ARTHROPLASTY Right 03/25/2014    [employer] Vanguard total knee, 55 femoral component, 71 tibial tray w/ a 40 mm stem, 34 mm x 7.8 mm three-peg patella, and a 14 standard poly-Dr. Ty Canchola       Physical Exam  Vitals and nursing note reviewed.   Constitutional:       General: She is not in acute distress.     Appearance: She is well-developed. She is obese. She is not ill-appearing.   HENT:      Head: Normocephalic and atraumatic.   Eyes:      Conjunctiva/sclera: Conjunctivae normal.      Pupils: Pupils are equal, round, and reactive to light.   Neck:      Vascular: No JVD.   Cardiovascular:      Rate and Rhythm: Normal rate and regular rhythm.      Heart sounds: Normal heart sounds. No murmur heard.   No friction rub. No gallop.    Pulmonary:      Effort: Pulmonary effort is normal. No respiratory distress.      Breath sounds: Normal breath sounds.   Abdominal:      General: Bowel sounds are normal. There is no distension.      Palpations: Abdomen is soft.   Musculoskeletal:         General: No swelling or deformity.   Skin:     General: Skin is warm and  "dry.      Capillary Refill: Capillary refill takes less than 2 seconds.   Neurological:      Mental Status: She is alert and oriented to person, place, and time. Mental status is at baseline.   Psychiatric:         Mood and Affect: Mood normal.         Behavior: Behavior normal.          Review of Systems   Constitutional: Negative for fatigue and unexpected weight change.   HENT: Negative for congestion and nosebleeds.    Eyes: Negative for photophobia and visual disturbance.   Respiratory: Positive for shortness of breath. Negative for cough and chest tightness.         On exertion; stable per baseline   Cardiovascular: Negative for chest pain, palpitations and leg swelling.   Gastrointestinal: Negative for abdominal distention and blood in stool.   Endocrine: Negative for polyphagia and polyuria.   Genitourinary: Negative for frequency and urgency.   Musculoskeletal: Negative for joint swelling and myalgias.   Skin: Negative for pallor and rash.   Neurological: Negative for dizziness, syncope, weakness, light-headedness, numbness and headaches.   Hematological: Does not bruise/bleed easily.   Psychiatric/Behavioral: Negative for confusion and sleep disturbance.        OBJECTIVE:  /70 (BP Location: Right arm, Patient Position: Sitting)   Pulse 72   Ht 158.8 cm (62.52\")   Wt 89.3 kg (196 lb 12.8 oz)   SpO2 95%   BMI 35.40 kg/m²      Body mass index is 35.4 kg/m².  Wt Readings from Last 1 Encounters:   09/24/21 89.3 kg (196 lb 12.8 oz)       Lab Review:  Renal Function: CrCl cannot be calculated (Patient's most recent lab result is older than the maximum 30 days allowed.).    Lab Results   Component Value Date    PROBNP 59.1 05/14/2021       Results for orders placed during the hospital encounter of 01/05/21    Adult Transthoracic Echo Limited W/ Cont if Necessary Per Protocol    Interpretation Summary  · Limited echo requested to assess LV function  · Estimated left ventricular EF = 70% Left ventricular " systolic function is normal.      Procedures      6 MINUTE WALK                      Cardiac Procedures:  1. 6/29/2020 Cardiac catheterization   Hemodynamics:  1.  Cardiac output (Trino): 3.28 L/min  2.  Cardiac index (Trino): 1.7 L/min/m²  3.  Right atrium: A wave 9, V wave 7, mean 6  4.  Right ventricle: 28/4  5.  Pulmonary artery, 28/12, mean 18  6.  PCW: A wave 10, V wave 11, mean is 9  7.  Aorta: 140/65, mean 91  8.  Left ventricle: 140/8     Cineangiography:  1.  Left main: The left main coronary artery is virtually nonexistent.  There is almost a common ostium of the LAD and left circumflex.  2.  LAD: The left anterior descending coronary artery is a tortuous vessel.  The artery appears to have minor plaquing in the proximal and mid segments but otherwise no significant stenosis.  The distal portion is tortuous.  The diagonal branches are small but normal.  3.  LCx: The left circumflex coronary artery is a large vessel.  The proximal portion of the circumflex appears to be normal.  The mid and distal segments are normal as well.  There is a major branching marginal system arising from the mid segment that appears to be normal.  The distal marginal is small and normal.  4.  RCA: The right coronary artery is a dominant vessel.  The right coronary artery has minimal plaquing in the proximal and mid segments.  The distal segment is normal.  The posterior descending branch appears to be tortuous but normal.     Left ventriculography: Overall size of the ventricle is normal.  The global contractility of the ventricle is within normal limits with an estimated ejection fraction of 50 to 55%.     Assessment:  1.  Normal coronary arteries  2.  Normal intracardiac pressures  3.  Normal left ventricular function  4.  Mildly reduced cardiac output and cardiac index.       Previously trialed diuretics  Spironolactone      Previously trialed GDMT:    Spironolactone  Carvedilol  Losartan  Entresto      ASSESSMENT:      Diagnosis Plan   1. Heart failure with preserved ejection fraction, NYHA class II (CMS/[facility])II-III---decreased EF with stress  Basic Metabolic Panel    BNP    Hemoglobin A1c    Lipid Panel   2. Abnormal finding of blood chemistry, unspecified   Hemoglobin A1c   3. Paroxysmal atrial fibrillation (CMS/[facility])     4. Essential hypertension     5. LBBB (left bundle branch block)           PLAN OF CARE:  1.  HFpEF-NYHA class II.  Most recent ejection fraction is greater than 70% per echocardiogram.  On stress echo however, patient is noted to have a significantly reduced LVEF under stress of 40%.  Current guideline directed medical therapy to include metoprolol, spironolactone, and Entresto.    She is euvolemic on exam.  We reviewed her stress induced cardiomyopathy in detail.  Given her continued fatigue and AHN I think trialing Jardiance could be successful.  I would like to start her on 10 mg daily.  I would also like to check labs as below.  She continues to monitor her sodium she is compliant with daily.  SE reviewed with the patient to include UTI/yeast infection and Bryan's gangrene.    Directions for when to call the clinic reviewed with the patient to include weight gain of 2 to 3 pounds in 24 hours, weight gain of 5 to 10 pounds within 7 days; worsening shortness of breath; worsening lower extremity edema or abdominal distention.    2.  Hypertension-stable and controlled.     3.  Paroxysmal atrial fibrillation-rate and rhythm controlledon beta-blockade, anticoagulated on warfarin. Stable.  Managed by Dr. Stone.      4.  Obstructive sleep apnea-compliant with CPAP therapy.     5.  Left bundle branch block-presence of CRT-P noted given symptomatology.    6.  Hyperlipidemia-LDL poorly controlled.  She is intolerant to statin therapy and fenofibrate.  Currently on Zetia with poor results.  I think she could benefit from PCSK9 inhibitor, will defer this to her PCP.      Start Jardiance 10 mg daily;  BMP/BNP/lipid/HgbA1C; follow up in 10 days or sooner if needed      Thank you for allowing me to participate in the care of your patient,        AGA Tolbert  [facility] HEART FAILURE  09/24/21  13:56 EDT      **Krishna Disclaimer:**  Much of this encounter note is an electronic transcription/translation of spoken language to printed text. The electronic translation of spoken language may permit erroneous, or at times, nonsensical words or phrases to be inadvertently transcribed. Although I have reviewed the note for such errors, some may still exist.

## 2021-09-27 ENCOUNTER — TELEPHONE (OUTPATIENT)
Dept: CARDIOLOGY | Facility: CLINIC | Age: 81
End: 2021-09-27

## 2021-09-27 RX ORDER — ICOSAPENT ETHYL 1000 MG/1
2 CAPSULE ORAL 2 TIMES DAILY WITH MEALS
Qty: 120 CAPSULE | Refills: 2 | Status: SHIPPED | OUTPATIENT
Start: 2021-09-27 | End: 2021-10-27

## 2021-09-27 NOTE — TELEPHONE ENCOUNTER
Lab results reviewed with the patient.  Given her statin intolerance I think if we should try Vascepa as she also does not currently have any coronary disease so a PCSK9 inhibitor, being is cost prohibitive as it is, may not be the best choice at this point.  Would like to recheck her lipid panel in 3 months.  All questions and concerns addressed at this time, understanding and agreement verbalized.    Miriam Gabriel, APRN

## 2021-09-28 ENCOUNTER — ANTICOAGULATION VISIT (OUTPATIENT)
Dept: INTERNAL MEDICINE | Facility: CLINIC | Age: 81
End: 2021-09-28

## 2021-09-28 ENCOUNTER — TELEPHONE (OUTPATIENT)
Dept: CARDIOLOGY | Facility: HOSPITAL | Age: 81
End: 2021-09-28

## 2021-09-28 LAB — INR PPP: 2.3 (ref 0.9–1.1)

## 2021-09-28 PROCEDURE — 36416 COLLJ CAPILLARY BLOOD SPEC: CPT | Performed by: INTERNAL MEDICINE

## 2021-09-28 PROCEDURE — 85610 PROTHROMBIN TIME: CPT | Performed by: INTERNAL MEDICINE

## 2021-09-28 RX ORDER — FLUCONAZOLE 150 MG/1
150 TABLET ORAL DAILY
Qty: 2 TABLET | Refills: 0 | Status: SHIPPED | OUTPATIENT
Start: 2021-09-28 | End: 2021-12-01

## 2021-09-28 NOTE — TELEPHONE ENCOUNTER
Called Pt regarding symptoms per AGA request. Per Pt she is not seeing any yeast, but is having tenderness and discomfort when she wipes.   Advised Pt per AGA Tay to take Diflucan 150 mg once today, with repeat dose tomorrow, and to call us if s/s do not resolve.  I let Pt know that we sent in the Diflucan Rx today.  All questions and concerns addressed. Understanding and agreement verbalized.      Helena Moyer RN  [facility]

## 2021-09-29 ENCOUNTER — IMMUNIZATION (OUTPATIENT)
Dept: VACCINE CLINIC | Facility: HOSPITAL | Age: 81
End: 2021-09-29

## 2021-09-29 PROCEDURE — 0003A: CPT | Performed by: INTERNAL MEDICINE

## 2021-09-29 PROCEDURE — 0001A: CPT | Performed by: INTERNAL MEDICINE

## 2021-09-29 PROCEDURE — 91300 HC SARSCOV02 VAC 30MCG/0.3ML IM: CPT | Performed by: INTERNAL MEDICINE

## 2021-10-01 ENCOUNTER — HOSPITAL ENCOUNTER (OUTPATIENT)
Dept: CARDIOLOGY | Facility: HOSPITAL | Age: 81
Discharge: HOME | End: 2021-10-01
Admitting: NURSE PRACTITIONER

## 2021-10-01 VITALS
WEIGHT: 196.4 LBS | BODY MASS INDEX: 34.8 KG/M2 | HEIGHT: 63 IN | HEART RATE: 73 BPM | OXYGEN SATURATION: 96 % | DIASTOLIC BLOOD PRESSURE: 76 MMHG | SYSTOLIC BLOOD PRESSURE: 120 MMHG | RESPIRATION RATE: 20 BRPM

## 2021-10-01 DIAGNOSIS — Z99.89 OSA ON CPAP: Chronic | ICD-10-CM

## 2021-10-01 DIAGNOSIS — I10 PRIMARY HYPERTENSION: ICD-10-CM

## 2021-10-01 DIAGNOSIS — I50.30 HEART FAILURE WITH PRESERVED EJECTION FRACTION, NYHA CLASS II (HCC): ICD-10-CM

## 2021-10-01 DIAGNOSIS — G47.33 OSA ON CPAP: Chronic | ICD-10-CM

## 2021-10-01 DIAGNOSIS — I48.0 PAROXYSMAL ATRIAL FIBRILLATION (HCC): Primary | ICD-10-CM

## 2021-10-01 DIAGNOSIS — I44.7 LBBB (LEFT BUNDLE BRANCH BLOCK): ICD-10-CM

## 2021-10-01 PROBLEM — Z95.0 PRESENCE OF BIVENTRICULAR CARDIAC PACEMAKER: Status: RESOLVED | Noted: 2021-02-11 | Resolved: 2021-10-01

## 2021-10-01 PROCEDURE — 99214 OFFICE O/P EST MOD 30 MIN: CPT | Performed by: NURSE PRACTITIONER

## 2021-10-01 PROCEDURE — G0463 HOSPITAL OUTPT CLINIC VISIT: HCPCS

## 2021-10-01 NOTE — PROGRESS NOTES
[facility] HEART FAILURE      Patient Name: Inessa Peoples  :1940  Age: 81 y.o.  Sex: female  Referring Provider: Demar Stone MD   Primary Cardiologist: Dr. Stone  Encounter Provider: Tameka Howe, PharmD, BCACP      Chief Complaint:   Chief Complaint   Patient presents with   • Congestive Heart Failure       HPI:  Patient presents with her daughter for a follow-up visit in the [facility]. PMH is significant for HFpEF (EF 70%), paroxysmal afib, prior DVT, obesity, HTN, HLD, hyperthyroidism, left bundle branch block s/p placement of CRT, sarcoid lung disease, and ANY. At last visit, patient was started on Jardiance given recent positive data from [employer]ER[employer]ERV[employer] trial. She did have some tenderness and discomfort when she wiped after urination and so she took a dose of Diflucan and her symptoms resolved. Other symptoms geraldo table at this time and she does not have any concerns with medications.      Current Regimen:  Heart Failure  Metoprolol succinate 25 mg (0.5 tablet) daily  Entresto 24/26 mg twice daily  Spironolactone 25 mg daily  Furosemide 20 mg daily  Jardiance 10 mg daily      Other CV Meds  Ezetimibe 10 mg daily  Vascepa 1 g (2 capsules) bid  Warfarin as directed by PCP      Medication Use:  Adherence: great; uses pillbox and medication list  Past hx of medication use/intolerance: carvedilol (hypotension - switched to Toprol)  Affordability: Traditional Medicare + supplement  receives Entresto through PAP      Diet:  Reviewed limiting sodium to <2000 mg/day      Social History:  Tobacco use: none  EtOH use: none  Illicit drug use: none  Exercise: used to walk around neighborhood but has been limited due to SOA on exertion      Immunization Status:  Pneumococcal: PCV13 (2015); PPSV23 (2016)  Influenza: obtains annually  COVID-19: 2/3/21 and 21  booster 21      OBJECTIVE:    /76 (BP Location: Right arm, Patient Position: Sitting)   Pulse 73   Resp 20   Ht 158.8 cm  "(62.52\")   Wt 89.1 kg (196 lb 6.4 oz)   SpO2 96%   BMI 35.33 kg/m²     Body mass index is 35.33 kg/m².  Wt Readings from Last 1 Encounters:   10/01/21 89.1 kg (196 lb 6.4 oz)       Lab Review:  Renal Function: Estimated Creatinine Clearance: 45.5 mL/min (A) (by C-G formula based on SCr of 1.05 mg/dL (H)).    Lab Results   Component Value Date    PROBNP 69.2 09/24/2021       Results for orders placed during the hospital encounter of 01/05/21    Adult Transthoracic Echo Limited W/ Cont if Necessary Per Protocol    Interpretation Summary  · Limited echo requested to assess LV function  · Estimated left ventricular EF = 70% Left ventricular systolic function is normal.        ASSESSMENT/PLAN OF CARE:    1. HFpEF (EF 70%)  - Patient's symptoms are stable, but she does still have dyspnea on exertion  - Continue Jardiance 10 mg daily. Asked patient to call us with any more symptoms of UTI/yeast infection  - Continue metoprolol succinate 25 mg (0.5 tablet) daily   - Continue Entresto 24/26 mg twice daily  - Continue Lasix 20 mg daily for now. Will look to reduce to prn at future visit  - Continue spironolactone 25 mg daily  - Provided patient with handout for [facility]  - Advised patient to call clinic with 2-3 lb weight gain in 24 hrs or >5 lb weight gain in 1 week          Thank you for allowing me to participate in the care of your patient,         Tameka Howe [facility] HEART FAILURE  10/01/21  14:14 EDT    "

## 2021-10-01 NOTE — PROGRESS NOTES
[facility] HEART FAILURE      Patient Name: Inessa Peoples  :1940  Age: 81 y.o.  Sex: female  Referring Provider: Demar Stone MD   Primary Cardiologist: Demar Stone MD  Encounter Provider:  AGA Tolbert      Chief Complaint:   Chronic diastolic CHF      Congestive Heart Failure  Associated symptoms include shortness of breath. Pertinent negatives include no chest pain, fatigue, palpitations or unexpected weight change.    this 81-year-old female, known to this provider, comes to us today for further evaluation regarding her chronic diastolic heart failure.  Current diagnoses to include paroxysmal atrial fibrillation, prior DVT, obesity, hypertension, hyperlipidemia, hyperthyroidism, left bundle branch block status post placement of CRT, sarcoid lung disease, sleep apnea compliant with CPAP therapy.    Historically she has been anticoagulated with Xarelto for her atrial fibrillation, however after developing a DVT she was transitioned to warfarin.  Echocardiogram in 2015 revealed an EF of 64% with grade 1 LV diastolic dysfunction and mild valvular heart disease.    Stress echocardiogram 2020 yielded LVEF of 63%, septal wall motion abnormality consistent with bundle branch block, normal LV diastolic function, post exercise ejection fraction 40%, global hypokinesis of LV post exercise, O2 sats diminished to 80% with exercise.  Subsequent cardiac catheterization 2020 yielded right dominant system with no significant CAD, LVEF 50 to 55%.  Given severe reduction of LVEF under stress she underwent CRT pacemaker placement.    AF burden on device check in 2021 was 0.    2021 message was received from the patient stating that she was having some hypotension with blood pressure as low as 90 systolically as well as some lower extremity edema.  Dr. Stone reduced her losartan by half and increased her spironolactone to 50 mg daily.    She was transitioned  to metoprolol early June 2021.    She did unfortunately develop a yeast infection once starting Jardiance, but was treated with 2 doses of Diflucan medication.  She does remain short of breath on exertion, but otherwise has no active complaints outside of her back pain that she is being treated for elsewhere.      The following portions of the patient's history were reviewed and updated as appropriate: allergies, current medications, past family history, past medical history, past social history, past surgical history and problem list.    Current Outpatient Medications   Medication Sig Dispense Refill   • acetaminophen (TYLENOL) 650 MG 8 hr tablet Take 650 mg by mouth Every 8 (Eight) Hours As Needed for Mild Pain .     • Biotin w/ Vitamins C & E (HAIR SKIN & NAILS GUMMIES PO)      • Calcium-Phosphorus-Vitamin D (CITRACAL +D3 PO) Take 1 tablet by mouth Daily.     • celecoxib (CeleBREX) 200 MG capsule TAKE 1 CAPSULE EVERY DAY 90 capsule 0   • empagliflozin (Jardiance) 10 MG tablet tablet Take 1 tablet by mouth Daily. 14 tablet 0   • ezetimibe (ZETIA) 10 MG tablet Take 1 tablet by mouth Daily. 90 tablet 0   • fexofenadine (ALLEGRA) 180 MG tablet Take 180 mg by mouth Daily.     • fluconazole (Diflucan) 150 MG tablet Take 1 tablet by mouth Daily. 2 tablet 0   • furosemide (LASIX) 20 MG tablet Take 1 tablet by mouth Daily. 90 tablet 3   • icosapent ethyl (Vascepa) 1 g capsule capsule Take 2 g by mouth 2 (Two) Times a Day With Meals. 120 capsule 2   • levalbuterol (XOPENEX) 0.31 MG/3ML nebulizer solution Take 1 ampule by nebulization Every 4 (Four) Hours As Needed for Wheezing.     • levothyroxine (SYNTHROID, LEVOTHROID) 25 MCG tablet Take 1 tablet by mouth Daily. 90 tablet 0   • melatonin 5 MG sublingual tablet sublingual tablet Take 5 mg by mouth Every Night.     • metoprolol succinate XL (TOPROL-XL) 25 MG 24 hr tablet Take 0.5 tablets by mouth Daily. 45 tablet 3   • Multiple Vitamin (MULTI-DAY VITAMINS) tablet Take 1  tablet by mouth daily.     • omeprazole (priLOSEC) 40 MG capsule Take 40 mg by mouth Daily.     • sacubitril-valsartan (ENTRESTO) 24-26 MG tablet Take 1 tablet by mouth 2 (Two) Times a Day. 60 tablet 5   • spironolactone (ALDACTONE) 25 MG tablet TAKE 1 TABLET EVERY DAY 60 tablet 0   • Triamcinolone Acetonide (NASACORT) 55 MCG/ACT nasal inhaler 2 sprays into the nostril(s) as directed by provider Daily.     • warfarin (COUMADIN) 3 MG tablet Take 1 tablet by mouth Daily. 90 tablet 0     No current facility-administered medications for this encounter.       Past Medical History:   Diagnosis Date   • Abnormal ECG    • Allergic rhinitis    • Atrial fibrillation (CMS/HCC)    • Bleeds easily (CMS/HCC)     warfarin   • Breast screening    • Broken bones    • Calf DVT (deep venous thrombosis) (CMS/HCC)    • Chronic anticoagulation    • Class 2 severe obesity due to excess calories with serious comorbidity and body mass index (BMI) of 37.0 to 37.9 in adult (CMS/HCC)    • Colon polyp    • CTS (carpal tunnel syndrome)    • Deep vein thrombosis (CMS/HCC)     left calf   • Diverticulitis of colon    • AHN (dyspnea on exertion)    • Fatigue    • H/O malignant melanoma of skin    • Heart failure with preserved ejection fraction, NYHA class II (CMS/HCC)     II-III---decreased EF with stress   • Hematuria, gross    • History of colon polyps    • History of kidney stones    • Hyperlipidemia    • Hypertension    • Hyperthyroidism    • IFG (impaired fasting glucose)    • Insomnia    • LBBB (left bundle branch block)    • Left leg swelling    • LLQ abdominal pain    • Long term current use of systemic steroids    • Low back pain    • Malignant melanoma of skin (CMS/HCC)    • Neck pain    • Obesity     class 2 obesity due to excess calories with BMI of 35.0 to 35.9 in adult   • Osteoarthritis    • Osteopenia    • Paralysis, diaphragm    • Paroxysmal atrial fibrillation (CMS/HCC)    • 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 Lu   • Sleep apnea     USES CPAP   • UTI (urinary tract infection)    • Visit for screening mammogram        Past Surgical History:   Procedure Laterality Date   • CARDIAC CATHETERIZATION N/A 6/29/2020    Procedure: Coronary angiography, [place] L;  Surgeon: Noe Reynoso MD;  Location:  [facility];  Service: Cardiovascular;  Laterality: N/A;   • CARDIAC CATHETERIZATION N/A 6/29/2020    Procedure: Left ventriculography;  Surgeon: Noe Reynoso MD;  Location:  [facility];  Service: Cardiovascular;  Laterality: N/A;   • CARDIAC CATHETERIZATION N/A 6/29/2020    Procedure: Right and Left Heart Cath;  Surgeon: Noe Reynoso MD;  Location:  [facility];  Service: Cardiovascular;  Laterality: N/A;   • CARDIAC CATHETERIZATION     • CARDIAC ELECTROPHYSIOLOGY PROCEDURE N/A 1/5/2021    Procedure: Biventricular Device Insertion -- CRT-p (Medtronic);  Surgeon: Demar Stone MD;  Location: [facility] CATH INVASIVE LOCATION;  Service: Cardiology;  Laterality: N/A;   • CARPAL TUNNEL RELEASE Bilateral 1980'S    [facility]   • CATARACT EXTRACTION Bilateral 2001    Dr. Nic Hidalgo   • COLONOSCOPY N/A 06/26/2013    MILD SIGMOID DIVERTICULOSIS, repeat 5 years based on sister w/ colona cancer-DR. NASRA CASTRO   • COLONOSCOPY N/A 10/19/2010    SIGMOID DIVERTICULOSIS & INTERNAL HEMORRHOIDS, HEMORRHOIDAL BANDING X4, DR. NASRA CASTRO   • COLONOSCOPY N/A 8/6/2018    Procedure: COLONOSCOPY TO CECUM AND INTO TERMINAL ILEUM;  Surgeon: Nasra Castro MD;  Location: [facility] ENDOSCOPY;  Service: Gastroenterology   • COLONOSCOPY W/ BIOPSIES N/A 05/12/2008    RECTUM BIOPSY: COLONIC MUCOSA W/ FOCAL INCREASE OF INFLAMMATORY CELLS IN THE MUSCULARIS MUCOSA INCLUDING  EOSINOPHILS, SIGMOID DIVERTICULOSIS, POSSIBLE PROCTITIS, DR. TANISHA THORNTON   • CYSTOSCOPY, RETROGRADE PYELOGRAM AND STENT INSERTION Bilateral 10/10/2014    w/ Right ureteral pyeloscopy & laser lithotripsy, Right Double-J stent placement-Dr. Julio Tai   • ENDOSCOPY N/A 11/11/2020    Procedure: ESOPHAGOGASTRODUODENOSCOPY with biopsies and dilation 54fr fernández;  Surgeon: Garret Piedra MD;  Location: [facility] ENDOSCOPY;  Service: Gastroenterology;  Laterality: N/A;  pre- dysphagia  post-- gastritis, dilation of esophageal ring, watermelon stomach    • EXTRACORPOREAL SHOCK WAVE LITHOTRIPSY (ESWL) Right 09/10/2009    DR. JULIO TAI   • HAMMER TOE REPAIR     • LAPAROSCOPIC CHOLECYSTECTOMY N/A 07/19/2012    DR. TANISHA THORNTON   • LUMBAR EPIDURAL INJECTION N/A 04/23/2015    LUMBAR EPIDURAL STEROID INJECTION X3   • PACEMAKER IMPLANTATION     • PARATHYROIDECTOMY N/A 2001   • SACROILIAC JOINT INJECTION Left 6/15/2021    Procedure: SACROILIAC INJECTION-- left and left greater trochanteric bursa injection;  Surgeon: Wolfgang Means MD;  Location: [facility] OR;  Service: Pain Management;  Laterality: Left;   • SKIN CANCER EXCISION     • THUMB ARTHROSCOPY Right 1982   • TOTAL KNEE ARTHROPLASTY Right 03/25/2014    [employer] Vanguard total knee, 55 femoral component, 71 tibial tray w/ a 40 mm stem, 34 mm x 7.8 mm three-peg patella, and a 14 standard poly-Dr. Ty Canchola       Physical Exam  Vitals and nursing note reviewed.   Constitutional:       General: She is not in acute distress.     Appearance: She is well-developed. She is obese. She is not ill-appearing.   HENT:      Head: Normocephalic and atraumatic.   Eyes:      Conjunctiva/sclera: Conjunctivae normal.      Pupils: Pupils are equal, round, and reactive to light.   Neck:      Vascular: No JVD.   Cardiovascular:      Rate and Rhythm: Normal rate and regular rhythm.      Heart sounds: Normal heart sounds. No murmur heard.   No friction rub. No gallop.   "  Pulmonary:      Effort: Pulmonary effort is normal. No respiratory distress.      Breath sounds: Normal breath sounds.   Abdominal:      General: Bowel sounds are normal. There is no distension.      Palpations: Abdomen is soft.   Musculoskeletal:         General: No swelling or deformity.   Skin:     General: Skin is warm and dry.      Capillary Refill: Capillary refill takes less than 2 seconds.   Neurological:      Mental Status: She is alert and oriented to person, place, and time. Mental status is at baseline.   Psychiatric:         Mood and Affect: Mood normal.         Behavior: Behavior normal.          Review of Systems   Constitutional: Negative for fatigue and unexpected weight change.   HENT: Negative for congestion and nosebleeds.    Eyes: Negative for photophobia and visual disturbance.   Respiratory: Positive for shortness of breath. Negative for cough and chest tightness.         On exertion; stable per baseline   Cardiovascular: Negative for chest pain, palpitations and leg swelling.   Gastrointestinal: Negative for abdominal distention and blood in stool.   Endocrine: Negative for polyphagia and polyuria.   Genitourinary: Negative for frequency and urgency.   Musculoskeletal: Negative for joint swelling and myalgias.   Skin: Negative for pallor and rash.   Neurological: Negative for dizziness, syncope, weakness, light-headedness, numbness and headaches.   Hematological: Does not bruise/bleed easily.   Psychiatric/Behavioral: Negative for confusion and sleep disturbance.        OBJECTIVE:  /76 (BP Location: Right arm, Patient Position: Sitting)   Pulse 73   Resp 20   Ht 158.8 cm (62.52\")   Wt 89.1 kg (196 lb 6.4 oz)   SpO2 96%   BMI 35.33 kg/m²      Body mass index is 35.33 kg/m².  Wt Readings from Last 1 Encounters:   10/01/21 89.1 kg (196 lb 6.4 oz)       Lab Review:  Renal Function: Estimated Creatinine Clearance: 45.5 mL/min (A) (by C-G formula based on SCr of 1.05 mg/dL " (H)).    Lab Results   Component Value Date    PROBNP 69.2 09/24/2021       Results for orders placed during the hospital encounter of 01/05/21    Adult Transthoracic Echo Limited W/ Cont if Necessary Per Protocol    Interpretation Summary  · Limited echo requested to assess LV function  · Estimated left ventricular EF = 70% Left ventricular systolic function is normal.      Procedures      6 MINUTE WALK                      Cardiac Procedures:  1. 6/29/2020 Cardiac catheterization   Hemodynamics:  1.  Cardiac output (Trino): 3.28 L/min  2.  Cardiac index (Trino): 1.7 L/min/m²  3.  Right atrium: A wave 9, V wave 7, mean 6  4.  Right ventricle: 28/4  5.  Pulmonary artery, 28/12, mean 18  6.  PCW: A wave 10, V wave 11, mean is 9  7.  Aorta: 140/65, mean 91  8.  Left ventricle: 140/8     Cineangiography:  1.  Left main: The left main coronary artery is virtually nonexistent.  There is almost a common ostium of the LAD and left circumflex.  2.  LAD: The left anterior descending coronary artery is a tortuous vessel.  The artery appears to have minor plaquing in the proximal and mid segments but otherwise no significant stenosis.  The distal portion is tortuous.  The diagonal branches are small but normal.  3.  LCx: The left circumflex coronary artery is a large vessel.  The proximal portion of the circumflex appears to be normal.  The mid and distal segments are normal as well.  There is a major branching marginal system arising from the mid segment that appears to be normal.  The distal marginal is small and normal.  4.  RCA: The right coronary artery is a dominant vessel.  The right coronary artery has minimal plaquing in the proximal and mid segments.  The distal segment is normal.  The posterior descending branch appears to be tortuous but normal.     Left ventriculography: Overall size of the ventricle is normal.  The global contractility of the ventricle is within normal limits with an estimated ejection fraction of  50 to 55%.     Assessment:  1.  Normal coronary arteries  2.  Normal intracardiac pressures  3.  Normal left ventricular function  4.  Mildly reduced cardiac output and cardiac index.       Previously trialed diuretics  Spironolactone      Previously trialed GDMT:    Spironolactone  Carvedilol  Losartan  Entresto      ASSESSMENT:     Diagnosis Plan   1. Paroxysmal atrial fibrillation (HCC)     2. Heart failure with preserved ejection fraction, NYHA class II (HCC)     3. LBBB (left bundle branch block)     4. Primary hypertension     5. ANY on CPAP + 10 - Dr Lu           PLAN OF CARE:  1.  HFpEF-NYHA class II.  Most recent ejection fraction is greater than 70% per echocardiogram.  On stress echo however, patient is noted to have a significantly reduced LVEF under stress of 40%.  Current guideline directed medical therapy to include metoprolol, spironolactone, Jardiance and Entresto.    She remains euvolemic on exam.  She did develop yeast infection shortly after starting Jardiance, this was treated with 2 doses of Diflucan she is doing well.  She is tolerating the medication treatment.  I would like to continue on her current GDMT, and see her back in 4 to 6 weeks.  At that time I would like to trial using her Lasix as needed only.  We have also long discussion with her daughter regarding her disease state, plan of care, and goals of care.  We reviewed the importance of low-sodium diet, fluid restriction and daily weights.    Directions for when to call the clinic reviewed with the patient to include weight gain of 2 to 3 pounds in 24 hours, weight gain of 5 to 10 pounds within 7 days; worsening shortness of breath; worsening lower extremity edema or abdominal distention.    2.  Hypertension-stable and controlled.     3.  Paroxysmal atrial fibrillation-rate controlled on beta-blockade, anticoagulated on warfarin. Stable.  Managed by Dr. Stone.      4.  Obstructive sleep apnea-compliant with CPAP  therapy.     5.  Left bundle branch block-presence of CRT-P noted given symptomatology.    6.  Hyperlipidemia-LDL poorly controlled.  She is intolerant to statin therapy and fenofibrate.  Currently on Zetia with poor results.  I think she could benefit from PCSK9 inhibitor, will defer this to her PCP.      Continue current GDMT; follow up in 4-6 weeks or sooner if needed      Thank you for allowing me to participate in the care of your patient,        Miriam AGA Sargent  [facility] HEART FAILURE  10/01/21  13:56 EDT      **Krishna Disclaimer:**  Much of this encounter note is an electronic transcription/translation of spoken language to printed text. The electronic translation of spoken language may permit erroneous, or at times, nonsensical words or phrases to be inadvertently transcribed. Although I have reviewed the note for such errors, some may still exist.

## 2021-10-04 ENCOUNTER — ANESTHESIA (OUTPATIENT)
Dept: GASTROENTEROLOGY | Facility: HOSPITAL | Age: 81
End: 2021-10-04

## 2021-10-04 ENCOUNTER — HOSPITAL ENCOUNTER (OUTPATIENT)
Facility: HOSPITAL | Age: 81
Setting detail: HOSPITAL OUTPATIENT SURGERY
Discharge: HOME | End: 2021-10-04
Attending: SURGERY | Admitting: SURGERY

## 2021-10-04 ENCOUNTER — ANESTHESIA EVENT (OUTPATIENT)
Dept: GASTROENTEROLOGY | Facility: HOSPITAL | Age: 81
End: 2021-10-04

## 2021-10-04 VITALS
OXYGEN SATURATION: 95 % | RESPIRATION RATE: 16 BRPM | BODY MASS INDEX: 33.81 KG/M2 | SYSTOLIC BLOOD PRESSURE: 100 MMHG | HEIGHT: 63 IN | WEIGHT: 190.8 LBS | TEMPERATURE: 98 F | HEART RATE: 77 BPM | DIASTOLIC BLOOD PRESSURE: 69 MMHG

## 2021-10-04 LAB
GLUCOSE BLDC GLUCOMTR-MCNC: 110 MG/DL (ref 70–130)
INR PPP: 2.53 (ref 0.9–1.1)
PROTHROMBIN TIME: 27 SECONDS (ref 11.7–14.2)

## 2021-10-04 PROCEDURE — 25010000002 PROPOFOL 10 MG/ML EMULSION: Performed by: NURSE ANESTHETIST, CERTIFIED REGISTERED

## 2021-10-04 PROCEDURE — 25010000002 GLUCAGON (RDNA) PER 1 MG: Performed by: SURGERY

## 2021-10-04 PROCEDURE — G0105 COLORECTAL SCRN; HI RISK IND: HCPCS | Performed by: SURGERY

## 2021-10-04 PROCEDURE — 82962 GLUCOSE BLOOD TEST: CPT

## 2021-10-04 PROCEDURE — 85610 PROTHROMBIN TIME: CPT | Performed by: SURGERY

## 2021-10-04 RX ORDER — LIDOCAINE HYDROCHLORIDE 20 MG/ML
INJECTION, SOLUTION INFILTRATION; PERINEURAL AS NEEDED
Status: DISCONTINUED | OUTPATIENT
Start: 2021-10-04 | End: 2021-10-04 | Stop reason: SURG

## 2021-10-04 RX ORDER — SODIUM CHLORIDE, SODIUM LACTATE, POTASSIUM CHLORIDE, CALCIUM CHLORIDE 600; 310; 30; 20 MG/100ML; MG/100ML; MG/100ML; MG/100ML
30 INJECTION, SOLUTION INTRAVENOUS CONTINUOUS PRN
Status: DISCONTINUED | OUTPATIENT
Start: 2021-10-04 | End: 2021-10-04 | Stop reason: HOSPADM

## 2021-10-04 RX ORDER — PROPOFOL 10 MG/ML
VIAL (ML) INTRAVENOUS CONTINUOUS PRN
Status: DISCONTINUED | OUTPATIENT
Start: 2021-10-04 | End: 2021-10-04 | Stop reason: SURG

## 2021-10-04 RX ORDER — PROPOFOL 10 MG/ML
VIAL (ML) INTRAVENOUS AS NEEDED
Status: DISCONTINUED | OUTPATIENT
Start: 2021-10-04 | End: 2021-10-04 | Stop reason: SURG

## 2021-10-04 RX ORDER — WARFARIN SODIUM 3 MG/1
3 TABLET ORAL DAILY
Qty: 90 TABLET | Refills: 0 | Status: SHIPPED | OUTPATIENT
Start: 2021-10-05 | End: 2022-05-04 | Stop reason: SDUPTHER

## 2021-10-04 RX ADMIN — PROPOFOL 70 MG: 10 INJECTION, EMULSION INTRAVENOUS at 10:23

## 2021-10-04 RX ADMIN — LIDOCAINE HYDROCHLORIDE 80 MG: 20 INJECTION, SOLUTION INFILTRATION; PERINEURAL at 10:23

## 2021-10-04 RX ADMIN — SODIUM CHLORIDE, POTASSIUM CHLORIDE, SODIUM LACTATE AND CALCIUM CHLORIDE 30 ML/HR: 600; 310; 30; 20 INJECTION, SOLUTION INTRAVENOUS at 09:39

## 2021-10-04 RX ADMIN — Medication 200 MCG/KG/MIN: at 10:23

## 2021-10-04 NOTE — H&P
Cc: Endoscopy Visit    HPI: 81 y.o. female here for screening, having taken her coumadin 3 days ago as last dose, instead of 5.  Her INR is 2.5, actually up from last week.  She understands that i may not be able to take off a polyp if she has one due to this but does want to go ahead and proceed with the c scope.  She did not recall that her brother had colon cancer until i mentioned, from the chart, what his name was.   We discussed possible use of FFP or cancellation.    Past Medical History:   Diagnosis Date   • Abnormal ECG    • Allergic rhinitis    • Atrial fibrillation (HCC)    • Bleeds easily (HCC)     warfarin   • Breast screening    • Broken bones    • Calf DVT (deep venous thrombosis) (HCC)    • Chronic anticoagulation    • Class 2 severe obesity due to excess calories with serious comorbidity and body mass index (BMI) of 37.0 to 37.9 in adult (HCC)    • Colon polyp    • CTS (carpal tunnel syndrome)    • Deep vein thrombosis (HCC)     left calf   • Diverticulitis of colon    • AHN (dyspnea on exertion)    • Fatigue    • H/O malignant melanoma of skin    • Heart failure with preserved ejection fraction, NYHA class II (HCC)     II-III---decreased EF with stress   • Hematuria, gross    • History of colon polyps    • History of kidney stones    • Hyperlipidemia    • Hypertension    • Hyperthyroidism    • IFG (impaired fasting glucose)    • Insomnia    • LBBB (left bundle branch block)    • Left leg swelling    • LLQ abdominal pain    • Long term current use of systemic steroids    • Low back pain    • Malignant melanoma of skin (HCC)    • Neck pain    • Obesity     class 2 obesity due to excess calories with BMI of 35.0 to 35.9 in adult   • Osteoarthritis    • Osteopenia    • Paralysis, diaphragm    • Paroxysmal atrial fibrillation (HCC)    • 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 for tuberculosis    • Presence of biventricular cardiac pacemaker    • Rash    • RBBB (right bundle branch block)    • Renal calculi    • Sarcoid    •  Sigmoid diverticulosis 06/26/2013   • Sleep apnea     on CPAP, +10- Dr. Lu   • Sleep apnea     USES CPAP   • UTI (urinary tract infection)    • Visit for screening mammogram        Past Surgical History:   Procedure Laterality Date   • CARDIAC CATHETERIZATION N/A 6/29/2020    Procedure: Coronary angiography, [place];  Surgeon: Noe Reynoso MD;  Location: [facility] CATH INVASIVE LOCATION;  Service: Cardiovascular;  Laterality: N/A;   • CARDIAC CATHETERIZATION N/A 6/29/2020    Procedure: Left ventriculography;  Surgeon: Noe Reynoso MD;  Location: [facility] CATH INVASIVE LOCATION;  Service: Cardiovascular;  Laterality: N/A;   • CARDIAC CATHETERIZATION N/A 6/29/2020    Procedure: Right and Left Heart Cath;  Surgeon: Noe Reynoso MD;  [facility] INVASIVE [facility];  Service: Cardiovascular;  Laterality: N/A;   • CARDIAC CATHETERIZATION     • CARDIAC ELECTROPHYSIOLOGY PROCEDURE N/A 1/5/2021    Procedure: Biventricular Device Insertion -- CRT-p ([employer]tronic);  Surgeon: Demar Stone MD;  Location: [facility] CATH INVASIVE LOCATION;  Service: Cardiology;  Laterality: N/A;   • CARPAL TUNNEL RELEASE Bilateral 1980'S    [facility]   • CATARACT EXTRACTION Bilateral 2001    Dr. Nic Hidalgo   • COLONOSCOPY N/A 06/26/2013    MILD SIGMOID DIVERTICULOSIS, repeat 5 years based on sister w/ colona cancer-DR. NASRA CASTRO   • COLONOSCOPY N/A 10/19/2010    SIGMOID DIVERTICULOSIS & INTERNAL HEMORRHOIDS, HEMORRHOIDAL BANDING X4, DR. NASRA CASTRO   • COLONOSCOPY N/A 8/6/2018    Procedure: COLONOSCOPY TO CECUM AND INTO TERMINAL ILEUM;  Surgeon: Nasra Castro MD;  Location: [facility] ENDOSCOPY;  Service: Gastroenterology   • COLONOSCOPY W/ BIOPSIES N/A 05/12/2008    RECTUM BIOPSY: COLONIC MUCOSA W/ FOCAL INCREASE OF INFLAMMATORY CELLS IN THE MUSCULARIS MUCOSA INCLUDING EOSINOPHILS, SIGMOID DIVERTICULOSIS, POSSIBLE PROCTITIS, DR. NASRA CASTRO   • CYSTOSCOPY, RETROGRADE PYELOGRAM AND STENT  INSERTION Bilateral 10/10/2014    w/ Right ureteral pyeloscopy & laser lithotripsy, Right Double-J stent placement-Dr. Julio Tai   • ENDOSCOPY N/A 11/11/2020    Procedure: ESOPHAGOGASTRODUODENOSCOPY with biopsies and dilation 54fr fernández;  Surgeon: Garret Piedra MD;  Location: [facility] ENDOSCOPY;  Service: Gastroenterology;  Laterality: N/A;  pre- dysphagia  post-- gastritis, dilation of esophageal ring, watermelon stomach    • EXTRACORPOREAL SHOCK WAVE LITHOTRIPSY (ESWL) Right 09/10/2009    DR. JULIO TAI   • HAMMER TOE REPAIR     • LAPAROSCOPIC CHOLECYSTECTOMY N/A 07/19/2012    DR. TANISHA THORNTON   • LUMBAR EPIDURAL INJECTION N/A 04/23/2015    LUMBAR EPIDURAL STEROID INJECTION X3   • PACEMAKER IMPLANTATION     • PARATHYROIDECTOMY N/A 2001   • SACROILIAC JOINT INJECTION Left 6/15/2021    Procedure: SACROILIAC INJECTION-- left and left greater trochanteric bursa injection;  Surgeon: Wolfgang Means MD;  Location: [facility] OR;  Service: Pain Management;  Laterality: Left;   • SKIN CANCER EXCISION     • THUMB ARTHROSCOPY Right 1982   • TOTAL KNEE ARTHROPLASTY Right 03/25/2014    [employer] Vanguard total knee, 55 femoral component, 71 tibial tray w/ a 40 mm stem, 34 mm x 7.8 mm three-peg patella, and a 14 standard poly-DrUriel Canchola       is allergic to iodinated diagnostic agents, contrast dye, fenofibrate, livalo [pitavastatin], plaquenil [hydroxychloroquine sulfate], and statins.       Medication List      ASK your doctor about these medications    acetaminophen 650 MG 8 hr tablet  Commonly known as: TYLENOL     celecoxib 200 MG capsule  Commonly known as: CeleBREX  TAKE 1 CAPSULE EVERY DAY     CITRACAL +D3 PO     empagliflozin 10 MG tablet tablet  Commonly known as: Jardiance  Take 1 tablet by mouth Daily.     ezetimibe 10 MG tablet  Commonly known as: ZETIA  Take 1 tablet by mouth Daily.     fexofenadine 180 MG tablet  Commonly known as: ALLEGRA     fluconazole 150 MG tablet  Commonly known  as: Diflucan  Take 1 tablet by mouth Daily.     furosemide 20 MG tablet  Commonly known as: LASIX  Take 1 tablet by mouth Daily.     HAIR SKIN & NAILS GUMMIES PO     icosapent ethyl 1 g capsule capsule  Commonly known as: Vascepa  Take 2 g by mouth 2 (Two) Times a Day With Meals.     levalbuterol 0.31 MG/3ML nebulizer solution  Commonly known as: XOPENEX     levothyroxine 25 MCG tablet  Commonly known as: SYNTHROID, LEVOTHROID  Take 1 tablet by mouth Daily.     melatonin 5 MG sublingual tablet sublingual tablet     metoprolol succinate XL 25 MG 24 hr tablet  Commonly known as: TOPROL-XL  Take 0.5 tablets by mouth Daily.     Multi-Day Vitamins tablet tablet  Generic drug: multivitamin     omeprazole 40 MG capsule  Commonly known as: priLOSEC     sacubitril-valsartan 24-26 MG tablet  Commonly known as: ENTRESTO  Take 1 tablet by mouth 2 (Two) Times a Day.     spironolactone 25 MG tablet  Commonly known as: ALDACTONE  TAKE 1 TABLET EVERY DAY     Triamcinolone Acetonide 55 MCG/ACT nasal inhaler  Commonly known as: NASACORT     warfarin 3 MG tablet  Commonly known as: COUMADIN  Take 1 tablet by mouth Daily.            Family History   Problem Relation Age of Onset   • Heart disease Mother    • Diabetes type II Mother    • Heart disease Father    • Cancer Sister    • Colon polyps Sister    • Lung cancer Sister    • Heart disease Brother         CABG   • Diabetes type II Brother    • Colon polyps Brother    • Arrhythmia Brother    • Colon cancer Brother    • Heart disease Sister    • Diabetes type II Sister    • Aortic aneurysm Sister    • COPD Sister    • Arrhythmia Sister    • Cerebral aneurysm Sister    • COPD Sister    • Lupus Sister    • Asthma Maternal Aunt    • Aortic aneurysm Brother        Social History     Socioeconomic History   • Marital status:      Spouse name: EDY   • Number of children: 4   • Years of education: 14yrs   • Highest education level: Not on file   Tobacco Use   • Smoking status: Never  Smoker   • Smokeless tobacco: Never Used   Vaping Use   • Vaping Use: Never used   Substance and Sexual Activity   • Alcohol use: Yes     Comment: occ drinker/caffeine use    • Drug use: No   • Sexual activity: Defer       Vitals:    10/04/21 0930   BP: 120/70   Pulse: 88   Resp: 16   Temp: 98 °F (36.7 °C)   SpO2: 95%       Body mass index is 34.34 kg/m².    Physical Exam    General: No acute distress  Lungs: No labored breathing, Pulse oximetry on room air is 95%.  Heart/EKG: RRR  Abdomen: no complaints of pain  Mental:  Awake, alert, and oriented    Imp:     · Screening  · Family history of colon cancer.  · Elevated INR     Plan:  · C scope    Nasra Castro MD  10:22 EDT

## 2021-10-04 NOTE — ANESTHESIA POSTPROCEDURE EVALUATION
"Patient: Inessa Peoples    Procedure Summary     Date: 10/04/21 Room / Location: [facility] 4 /  [facility]    Anesthesia Start: 1018 Anesthesia Stop: 1039    Procedure: COLONOSCOPY to CECUM (N/A ) Diagnosis:       Family history of colon cancer      (Family history of colon cancer [Z80.0])    Surgeons: Nasra Castro MD Provider: Kieran Mcconnell MD    Anesthesia Type: MAC ASA Status: 3          Anesthesia Type: MAC    Vitals  Vitals Value Taken Time   /69 10/04/21 1058   Temp     Pulse 77 10/04/21 1058   Resp 16 10/04/21 1058   SpO2 95 % 10/04/21 1058           Post Anesthesia Care and Evaluation    Patient location during evaluation: PACU  Patient participation: complete - patient participated  Level of consciousness: awake  Pain score: 0  Pain management: adequate  Airway patency: patent  Anesthetic complications: No anesthetic complications  PONV Status: none  Cardiovascular status: acceptable  Respiratory status: acceptable  Hydration status: acceptable    Comments: /69 (BP Location: Left arm, Patient Position: Standing)   Pulse 77   Temp 36.7 °C (98 °F) (Oral)   Resp 16   Ht 158.8 cm (62.5\")   Wt 86.5 kg (190 lb 12.8 oz)   SpO2 95%   BMI 34.34 kg/m²       "

## 2021-10-04 NOTE — ANESTHESIA PREPROCEDURE EVALUATION
Anesthesia Evaluation     Patient summary reviewed and Nursing notes reviewed   NPO Solid Status: > 8 hours  NPO Liquid Status: > 2 hours           Airway   Mallampati: I  TM distance: >3 FB  Neck ROM: full  No difficulty expected  Dental - normal exam     Pulmonary - normal exam   (+) shortness of breath, sleep apnea,     ROS comment: Elevated left hemidiaphragm  Cardiovascular - normal exam    (+) pacemaker pacemaker, hypertension, dysrhythmias Atrial Fib, AHN, DVT, hyperlipidemia,       Neuro/Psych  (+) numbness,     GI/Hepatic/Renal/Endo    (+) obesity, morbid obesity, GERD,  renal disease,     Musculoskeletal     (+) neck pain,   Abdominal  - normal exam    Bowel sounds: normal.   Substance History - negative use     OB/GYN negative ob/gyn ROS         Other   arthritis,    history of cancer                  Anesthesia Plan    ASA 3     MAC       Anesthetic plan, all risks, benefits, and alternatives have been provided, discussed and informed consent has been obtained with: patient.

## 2021-10-04 NOTE — OP NOTE
Colonoscopy Procedure Note  Inessa Peoples  1940  Date of Procedure: 10/04/21    Pre-operative Diagnosis:    · Screening  · Family history of colon cancer.  · Elevated INR, 2.5    Post-operative Diagnosis:  · Normal other than few sigmoid diverticula    Procedure: Colonoscopy         Recommendations:   · Colonoscopy in 5 years if in excellent health.   · Keep a copy of the photographs of the procedure given to you today for possible need for reference in the future.      Surgeon: Matthew    Anesthetic: MAC per Kieran Mcconnell MD    Scope Withdrawal Time:  6 minutes  45 seconds    Procedure Details     MAC anesthesia was induced.  The 180 Colonoscopy was inserted blindly into the rectum and advanced to the cecum, with relative ease,  without need for pressure, lift, or turning.    Cecum was identified by the appendiceal orifice and the ileocecal valve and photographed for documentation.      Prep quality was excellent.  A careful inspection was made as the scope was withdrawn, including a retroflexed view of the rectum; there was no suggestion of presence of angiodysplasias, colitis, or polyps but there were a few diverticula, with no interventions.     Retroflexion in the rectum revealed no abnormalities.    Nasra Castro MD  10/04/21

## 2021-10-04 NOTE — DISCHARGE INSTRUCTIONS
For the next 24 hours patient needs to be with a responsible adult.    For 24 hours DO NOT drive, operate machinery, appliances, drink alcohol, make important decisions or sign legal documents.    Start with a light or bland diet if you are feeling sick to your stomach otherwise advance to regular diet as tolerated.    Follow recommendations on procedure report if provided by your doctor.    Call Dr Castro for problems [phone]    Problems may include but not limited to: large amounts of bleeding, trouble breathing, repeated vomiting, severe unrelieved pain, fever or chills.

## 2021-10-11 PROCEDURE — 93294 REM INTERROG EVL PM/LDLS PM: CPT | Performed by: INTERNAL MEDICINE

## 2021-10-11 PROCEDURE — 93296 REM INTERROG EVL PM/IDS: CPT | Performed by: INTERNAL MEDICINE

## 2021-10-14 ENCOUNTER — HOSPITAL ENCOUNTER (OUTPATIENT)
Dept: PHYSICAL THERAPY | Facility: HOSPITAL | Age: 81
Setting detail: THERAPIES SERIES
Discharge: HOME | End: 2021-10-14

## 2021-10-14 DIAGNOSIS — M54.50 LUMBAR PAIN: Primary | ICD-10-CM

## 2021-10-14 DIAGNOSIS — R53.1 WEAKNESS: ICD-10-CM

## 2021-10-14 DIAGNOSIS — M25.552 LEFT HIP PAIN: ICD-10-CM

## 2021-10-14 PROCEDURE — 97161 PT EVAL LOW COMPLEX 20 MIN: CPT

## 2021-10-14 PROCEDURE — 97110 THERAPEUTIC EXERCISES: CPT

## 2021-10-14 NOTE — THERAPY EVALUATION
Outpatient Physical Therapy Ortho Initial Evaluation  [facility]     Patient Name: Inessa Peoples  : 1940  MRN: [id number]  Today's Date: 10/14/2021      Visit Date: 10/14/2021    Patient Active Problem List   Diagnosis   • Sarcoidosis   • Diaphragmatic paralysis   • Osteopenia   • Impaired fasting glucose   • Hypothyroidism   • Hypertension   • Hyperlipidemia   • Chronic osteoarthritis   • GERD (gastroesophageal reflux disease)   • ANY on CPAP + 10 - Dr Lu   • Diverticulitis of large intestine without perforation or abscess without bleeding   • Chronic anticoagulation   • Dilation of thoracic aorta (HCC)   • Chronic pain   • Bulge of lumbar disc without myelopathy   • Class 2 severe obesity due to excess calories with serious comorbidity and body mass index (BMI) of 35.0 to 35.9 in adult (HCC)   • Lumbar facet arthropathy   • Trochanteric bursitis of both hips   • LBBB (left bundle branch block)   • Paroxysmal atrial fibrillation (HCC)   • Heart failure with preserved ejection fraction, NYHA class II (CMS/HCC)II-III---decreased EF with stress        Past Medical History:   Diagnosis Date   • Abnormal ECG    • Allergic rhinitis    • Atrial fibrillation (HCC)    • Bleeds easily (HCC)     warfarin   • Breast screening    • Broken bones    • Calf DVT (deep venous thrombosis) (HCC)    • Chronic anticoagulation    • Class 2 severe obesity due to excess calories with serious comorbidity and body mass index (BMI) of 37.0 to 37.9 in adult (HCC)    • Colon polyp    • CTS (carpal tunnel syndrome)    • Deep vein thrombosis (HCC)     left calf   • Diverticulitis of colon    • AHN (dyspnea on exertion)    • Fatigue    • H/O malignant melanoma of skin    • Heart failure with preserved ejection fraction, NYHA class II (HCC)     II-III---decreased EF with stress   • Hematuria, gross    • History of colon polyps    • History of kidney stones    • Hyperlipidemia    • Hypertension    • Hyperthyroidism    • IFG  (impaired fasting glucose)    • Insomnia    • LBBB (left bundle branch block)    • Left leg swelling    • LLQ abdominal pain    • Long term current use of systemic steroids    • Low back pain    • Malignant melanoma of skin (HCC)    • Neck pain    • Obesity     class 2 obesity due to excess calories with BMI of 35.0 to 35.9 in adult   • Osteoarthritis    • Osteopenia    • Paralysis, diaphragm    • Paroxysmal atrial fibrillation (HCC)    • 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 Lu   • Sleep apnea     USES CPAP   • UTI (urinary tract infection)    • Visit for screening mammogram         Past Surgical History:   Procedure Laterality Date   • CARDIAC CATHETERIZATION N/A 6/29/2020    Procedure: Coronary angiography, Pablo CEDRICK;  Surgeon: Noe Reynoso MD;  Location: [facility] CATH INVASIVE LOCATION;  Service: Cardiovascular;  Laterality: N/A;   • CARDIAC CATHETERIZATION N/A 6/29/2020    Procedure: Left ventriculography;  Surgeon: Noe Reynoso MD;  Location: [facility] CATH INVASIVE LOCATION;  Service: Cardiovascular;  Laterality: N/A;   • CARDIAC CATHETERIZATION N/A 6/29/2020    Procedure: Right and Left Heart Cath;  Surgeon: Noe Reynoso MD;  Location: [facility] CATH INVASIVE LOCATION;  Service: Cardiovascular;  Laterality: N/A;   • CARDIAC CATHETERIZATION     • CARDIAC ELECTROPHYSIOLOGY PROCEDURE N/A 1/5/2021    Procedure: Biventricular Device Insertion -- CRT-p (Medtronic);  Surgeon: Demar Stone MD;  Location: [facility] CATH INVASIVE LOCATION;  Service: Cardiology;  Laterality: N/A;   • CARPAL TUNNEL RELEASE Bilateral 1980'S    [facility]   • CATARACT EXTRACTION Bilateral 2001    Dr. Nic Hidalgo   • COLONOSCOPY N/A 06/26/2013    MILD SIGMOID DIVERTICULOSIS, repeat 5 years based on sister  w/ colona cancer-DR. NASRA CASTRO   • COLONOSCOPY N/A 10/19/2010    SIGMOID DIVERTICULOSIS & INTERNAL HEMORRHOIDS, HEMORRHOIDAL BANDING X4, DR. NASRA CASTRO   • COLONOSCOPY N/A 8/6/2018    Procedure: COLONOSCOPY TO CECUM AND INTO TERMINAL ILEUM;  Surgeon: Nasra Castro MD;  Location: [facility] ENDOSCOPY;  Service: Gastroenterology   • COLONOSCOPY N/A 10/4/2021    Procedure: COLONOSCOPY to CECUM;  Surgeon: Nasra Castro MD;  Location: [facility] ENDOSCOPY;  Service: General;  Laterality: N/A;  FAMILY HX COLON CANCER  --DIVERTICULOSIS    • COLONOSCOPY W/ BIOPSIES N/A 05/12/2008    RECTUM BIOPSY: COLONIC MUCOSA W/ FOCAL INCREASE OF INFLAMMATORY CELLS IN THE MUSCULARIS MUCOSA INCLUDING EOSINOPHILS, SIGMOID DIVERTICULOSIS, POSSIBLE PROCTITIS, DR. NASRA CASTRO   • CYSTOSCOPY, RETROGRADE PYELOGRAM AND STENT INSERTION Bilateral 10/10/2014    w/ Right ureteral pyeloscopy & laser lithotripsy, Right Double-J stent placement-Dr. Julio Tai   • ENDOSCOPY N/A 11/11/2020    Procedure: ESOPHAGOGASTRODUODENOSCOPY with biopsies and dilation 54fr fernández;  Surgeon: Garret Piedra MD;  Location: [facility] ENDOSCOPY;  Service: Gastroenterology;  Laterality: N/A;  pre- dysphagia  post-- gastritis, dilation of esophageal ring, watermelon stomach    • EXTRACORPOREAL SHOCK WAVE LITHOTRIPSY (ESWL) Right 09/10/2009    DR. JULIO TAI   • HAMMER TOE REPAIR     • LAPAROSCOPIC CHOLECYSTECTOMY N/A 07/19/2012    DR. NASRA CASTRO   • LUMBAR EPIDURAL INJECTION N/A 04/23/2015    LUMBAR EPIDURAL STEROID INJECTION X3   • PACEMAKER IMPLANTATION     • PARATHYROIDECTOMY N/A 2001   • SACROILIAC JOINT INJECTION Left 6/15/2021    Procedure: SACROILIAC INJECTION-- left and left greater trochanteric bursa injection;  Surgeon: Wolfgang Means MD;  Location: [facility] OR;  Service: Pain Management;  Laterality: Left;   • SKIN CANCER EXCISION     • THUMB ARTHROSCOPY Right 1982   • TOTAL KNEE ARTHROPLASTY Right 03/25/2014    [employer] total  knee, 55 femoral component, 71 tibial tray w/ a 40 mm stem, 34 mm x 7.8 mm three-peg patella, and a 14 standard poly-Dr. Ty Canchola       Visit Dx:     ICD-10-CM ICD-9-CM   1. Lumbar pain  M54.50 724.2   2. Left hip pain  M25.552 719.45   3. Weakness  R53.1 780.79          Patient History     Row Name 10/14/21 1400 10/13/21 7182          History    Chief Complaint Balance Problems; Cough; Difficulty Walking; Difficulty with daily activities; Fatigue/poor endurance; Joint stiffness; Joint swelling; Muscle tenderness; Muscle weakness; Pain; Problems breathing/shortness of breath  -JOSE ELIAS Balance Problems; Cough; Difficulty Walking; Difficulty with daily activities; Fatigue/poor endurance; Joint stiffness; Joint swelling; Muscle tenderness; Muscle weakness; Pain; Problems breathing/shortness of breath  -JOSE ELIAS (r) patient (t)     Type of Pain Ankle pain; Back pain; Foot pain; Hip pain; Knee pain; Neck pain; Shoulder pain; Wrist pain  -JOSE ELIAS Ankle pain; Back pain; Foot pain; Hip pain; Knee pain; Neck pain; Shoulder pain; Wrist pain  -JOSE ELIAS (r) patient (t)     Date Current Problem(s) Began 10/01/20  -JOSE ELIAS --     Brief Description of Current Complaint Inessa Peoples is a 81 y.o. female who presents to physical therapy today with primary compliant of LBP that began approximately 8 years ago. Patient reports receiving multiple injections that provided mild relief for a few weeks. Her most recent injection was on 6/15/21 and received 50% symptom relief for 1-2 days.  Patient denies N/T into LE and indicates B hip soreness with increased activity.  Inessa Peoples reports difficulty/increased pain with sitting > 15 minutes, bending forward to pick something up, standing > 10 minutes, walking > 5 minutes, and performing household chores. Pain relieving factors include taking tylenol OTC, resting and laying down. PMH includes R TKR in 2011 and awaiting L TKR . Inessa Peoples primary goal for attending skilled physical therapy is to  relieve pain, be able to walk for 30-45 minutes with limited pain and improve her BLE strength.  -JOSE ELIAS Low back pain shortness of breath muscle and joint pain  -JOSE ELIAS (r) patient (t)     Previous treatment for THIS PROBLEM Injections; Rehabilitation; Medication  -JOSE ELIAS --     Patient/Caregiver Goals Relieve pain; Improve mobility; Improve strength  -JOSE ELIAS Relieve pain; Improve mobility; Improve strength  -JOSE ELIAS (r) patient (t)     Patient/Caregiver Goals Comment relieve pain, be able to walk for 30-45 minutes with limited pain and improve her BLE strength  -JOSE ELIAS --     Hand Dominance right-handed  -JOSE ELIAS right-handed  -JOSE ELIAS (r) patient (t)     Occupation/sports/leisure activities Retired no activity  -JOSE ELIAS Retired no activity  -JOSE ELIAS (r) patient (t)     Patient seeing anyone else for problem(s)? Dr. Logan Rachel  -JOSE ELIAS Dr. Logan Rachel  -JOSE ELIAS (r) patient (t)     What clinical tests have you had for this problem? X-ray  -JOSE ELIAS X-ray  -JOSE ELIAS (r) patient (t)     Results of Clinical Tests see epic  -JOSE ELIAS --     Are you or can you be pregnant No  -JOSE ELIAS No  -JOSE ELIAS (r) patient (t)            Pain     Pain Location Back  -JOSE ELIAS --     Pain at Present 5  -JOSE ELIAS --     Pain at Best 2  -JOSE ELIAS --     Pain at Worst 9  -JOSE ELIAS --     Pain Frequency Constant/continuous  -JOSE ELIAS --     Pain Description Pressure; Aching; Tightness  -JOSE ELIAS --     What Performance Factors Make the Current Problem(s) WORSE? sitting > 15 minutes, bending forward to pick something up, standing > 10 minutes, walking > 5 minutes, and performing household chores  -JOSE ELIAS --     What Performance Factors Make the Current Problem(s) BETTER? tylenol OTC, resting and laying down  -JOSE ELIAS --     Tolerance Time- Standing 10 min  -JOSE ELIAS --     Tolerance Time- Sitting 15 min  -JOSE ELIAS --     Tolerance Time- Walking 5 min  -JOSE ELIAS --            Fall Risk Assessment    Any falls in the past year: No  -JOSE ELIAS No  -JOSE ELIAS (r) patient (t)            Services    Prior Rehab/Home Health Experiences Yes  -JOSE ELIAS Yes  -JOSE ELIAS (r) patient (t)     Are you currently  receiving Home Health services No  -JOSE ELIAS No  -JOSE ELIAS (r) patient (t)     Do you plan to receive Home Health services in the near future No  -JOSE ELIAS No  -JOSE ELIAS (r) patient (t)            Daily Activities    Primary Language English  -JOSE ELIAS English  -JOSE ELIAS (r) patient (t)     Are you able to read Yes  -JOSE ELIAS Yes  -JOSE ELIAS (r) patient (t)     Are you able to write Yes  -JOSE ELIAS Yes  -JOSE ELIAS (r) patient (t)     How does patient learn best? Listening; Reading; Demonstration  -JOSE ELIAS Listening; Reading; Demonstration  -JOSE ELIAS (r) patient (t)     Barriers to learning None  -JOSE ELIAS --     Pt Participated in POC and Goals Yes  -JOSE ELIAS --            Safety    Are you being hurt, hit, or frightened by anyone at home or in your life? No  -JOSE ELIAS --     Are you being neglected by a caregiver No  -JOSE ELIAS --     Have you had any of the following issues with N/A  -JOSE ELIAS --           User Key  (r) = Recorded By, (t) = Taken By, (c) = Cosigned By    Initials Name Provider Type    Sharona Xiao, PT Physical Therapist    patient Inessa Peoples --                 PT Ortho     Row Name 10/14/21 1400       Subjective Pain    Able to rate subjective pain? yes  -JOSE ELIAS    Pre-Treatment Pain Level 5  -JOSE ELIAS       Special Tests/Palpation    Special Tests/Palpation Lumbar/SI; Cervical/Thoracic  -JOSE ELIAS       Thoracic Accessory Motions    Thoracic Accessory Motions Tested? Yes  -JOSE ELIAS    Pa glide- Lower thoracic Hypomobile; Left pain  -JOSE ELIAS       Lumbosacral Accessory Motions    Lumbosacral Accessory Motions Tested? Yes  -JOSE ELIAS    PA Glide- L1 Hypomobile; Left pain  -JOSE ELIAS    PA Glide- L2 WNL  -JOSE ELIAS    PA Glide- L3 WNL  -JOSE ELIAS    PA Glide- L4 Hypomobile; Left pain  -JOSE ELIAS    PA Glide- L5 Hypomobile; Left pain  -JOSE ELIAS       Lumbar/SI Special Tests    SI Compression Test (SI Dysfunction) Negative  -JOSE ELIAS    SI Distraction Test (SI Dysfunction) Positive  -JOSE ELIAS    Thigh Thrust/Posterior Shear (SI Dysfunction) Negative  -JOSE ELIAS    LUKE (hip vs. SI Dysfunction) Left:; Positive  -JOSE ELIAS       Lumbosacral Palpation    Lumbosacral Palpation?  Yes  -JOSE ELIAS    Lumbosacral Segment Bilateral:; Tender  -JOSE ELIAS    Thoracolumbar Segment Bilateral:; Tender  -JOSE ELIAS    Piriformis Bilateral:; Tender  -JOSE ELIAS    Erector Spinae (Paraspinals) Bilateral:; Tender  -JOSE ELIAS       General ROM    Head/Neck/Trunk Trunk Extension; Trunk Flexion; Trunk Lt Lateral Flexion; Trunk Rt Lateral Flexion; Trunk Lt Rotation; Trunk Rt Rotation  -JOSE ELIAS       Head/Neck/Trunk    Trunk Extension AROM 25% dec  -JOSE ELIAS    Trunk Flexion AROM 25% dec  -JOSE ELIAS    Trunk Lt Lateral Flexion AROM 50% dec  -JOSE ELIAS    Trunk Rt Lateral Flexion AROM 25% dec  -JOSE ELIAS    Trunk Lt Rotation AROM 50% dec  -JOSE ELIAS    Trunk Rt Rotation AROM 25% dec  -JOSE ELIAS    Head/Neck/Trunk Comments pain noted on L with L rot/SB  -JOSE ELIAS       MMT (Manual Muscle Testing)    Rt Lower Ext Rt Hip Flexion; Rt Hip Extension; Rt Hip ABduction; Rt Knee Extension; Rt Knee Flexion; Rt Ankle Dorsiflexion  -JOSE ELIAS    Lt Lower Ext Lt Hip Flexion; Lt Hip Extension; Lt Hip ABduction; Lt Knee Extension; Lt Knee Flexion; Lt Ankle Dorsiflexion  -JOSE ELIAS       MMT Right Lower Ext    Rt Hip Flexion MMT, Gross Movement (4+/5) good plus  -JOSE ELIAS    Rt Hip Extension MMT, Gross Movement (4/5) good  -JOSE ELIAS    Rt Hip ABduction MMT, Gross Movement (4-/5) good minus  -JOSE ELIAS    Rt Knee Extension MMT, Gross Movement (4+/5) good plus  -JOSE ELIAS    Rt Knee Flexion MMT, Gross Movement (4+/5) good plus  -JOSE ELIAS    Rt Ankle Dorsiflexion MMT, Gross Movement (5/5) normal  -JOSE ELIAS       MMT Left Lower Ext    Lt Hip Flexion MMT, Gross Movement (4/5) good  -JOSE ELIAS    Lt Hip Extension MMT, Gross Movement (4-/5) good minus  -JOSE ELIAS    Lt Hip ABduction MMT, Gross Movement (3+/5) fair plus  -JOSE ELIAS    Lt Knee Extension MMT, Gross Movement (4+/5) good plus  -JOSE ELIAS    Lt Knee Flexion MMT, Gross Movement (4+/5) good plus  -JOSE ELIAS    Lt Ankle Dorsiflexion MMT, Gross Movement (5/5) normal  -JOSE ELIAS       Sensation    Sensation WNL? WNL  -JOSE ELIAS       Flexibility    Flexibility Tested? Lower Extremity  -JOSE ELIAS       Lower Extremity Flexibility    Hamstrings Mildly limited; Moderately  limited  -JOSE ELIAS    Hip Flexors Moderately limited; Severely limited  -JOSE ELIAS    Quadriceps Moderately limited  -JOSE ELIAS    ITB Moderately limited  -JOSE ELIAS    Hip External Rotators Mildly limited; Moderately limited  -JOSE ELIAS    Quadratus Lumborum Mildly limited  -JOSE ELIAS       Balance Skills Training    SLS bilateral < 3 sec  -JOSE ELIAS       Gait/Stairs (Locomotion)    Comment (Gait/Stairs) Patient ambulates without AD and present with reduced stride length/step length bilaterally and limited trunk rotation  -JOSE ELIAS          User Key  (r) = Recorded By, (t) = Taken By, (c) = Cosigned By    Initials Name Provider Type    Sharona Xiao, PT Physical Therapist                            Therapy Education  Education Details: Educated on anatomy/pathology, therapy expectations, evaluation findings, low back/hip relationship, expected outcomes following PT, POC and HEP  Given: HEP, Symptoms/condition management  Program: New  How Provided: Verbal, Demonstration, Written  Provided to: Patient  Level of Understanding: Verbalized, Demonstrated  70349 - PT Self Care/Mgmt Minutes: 5      PT OP Goals     Row Name 10/14/21 1400          PT Short Term Goals    STG Date to Achieve 11/13/21  -JOSE ELIAS     STG 1 Patient will be independent with education for symptom management and initial HEP to decrease LBP pain.  -JOSE ELIAS     STG 1 Progress New  -JOSE ELIAS     STG 2 Pt will improve lumbar ROM to WNL with </=2/10 pain in all cardinal planes for improved mobility and participation in ADLs.  -JOSE ELIAS     STG 2 Progress New  -JOSE ELIAS     STG 3 Patient will improve standing tolerance from 10 min to >30 min without LBP to improve participation in community activities.  -JOSE ELIAS     STG 3 Progress New  -JOSE ELIAS            Long Term Goals    LTG Date to Achieve 12/13/21  -JOSE ELIAS     LTG 1 Patient will be independent with education for symptom management and advanced HEP to decrease LBP pain.  -JOSE ELIAS     LTG 1 Progress New  -JOSE ELIAS     LTG 2 Patient will reduce level of percieved disability as measured by the  Modified Oswestry  from 50% disability to </= 30% disability in order to improve quality of life.  -JOSE ELIAS     LTG 2 Progress New  -JOSE ELIAS     LTG 3 Patient will improve walking tolerance from 5 min to >30 min without LBP to improve participation in community activities.  -JOSE ELIAS     LTG 3 Progress New  -JOSE ELIAS     LTG 4 Pt will improve B LE strength to at least 4+/5 to demonstate adequate strength to complete functional activities within the home and community.  -JOSE ELIAS     LTG 4 Progress New  -JOSE ELIAS            Time Calculation    PT Goal Re-Cert Due Date 01/12/22  -JOSE ELIAS           User Key  (r) = Recorded By, (t) = Taken By, (c) = Cosigned By    Initials Name Provider Type    Sharona Xiao, PT Physical Therapist                 PT Assessment/Plan     Row Name 10/14/21 1600          PT Assessment    Functional Limitations Decreased safety during functional activities; Impaired gait; Limitations in community activities; Limitations in functional capacity and performance; Performance in leisure activities; Impaired locomotion  -     Impairments Balance; Gait; Impaired flexibility; Joint integrity; Joint mobility; Locomotion; Motor function; Muscle strength; Pain; Poor body mechanics; Posture; Range of motion  -     Assessment Comments Inessa Peoples is a 81 y.o. female referred to physical therapy for lumbar pain/L hip pain. She presents with a stable clinical presentation, along with no remarkable comorbidities and personal factors of chronic LBP/L hip pain, and sedentary lifestyle that may impact her progress in the plan of care. Pt presents today with generalized B hip weakness (L>R), reduced lumbar ROM in all cardinal planes greatest with L rotation/L lateral flexion, TTP over L glute medius/piriformis/QL/paraspinals and impaired flexibility of B hip flexors/quadriceps/hamstrings/ITB (L > R). She also demonstrates hypomobility of lower thoracic spine, TLJ, L1-2 and L4-S1 spinal segments. Her signs and symptoms are  consistent with referring diagnosis. The previous impairments limit her ability to sit > 15 minutes, bend forward to pick something up, stand > 10 minutes, walk > 5 minutes, and perform household chores. Patients self-scored modified Owestry was 50% disability where 0% indicated no disability. Pt will benefit from skilled PT to address the previous impairments and return to PLOF.  -JOSE ELIAS     Please refer to paper survey for additional self-reported information Yes  -JOSE ELIAS     Rehab Potential Good  -JOSE ELIAS     Patient/caregiver participated in establishment of treatment plan and goals Yes  -JOSE ELIAS     Patient would benefit from skilled therapy intervention Yes  -JOSE ELIAS            PT Plan    PT Frequency 2x/week  -JOSE ELIAS     Predicted Duration of Therapy Intervention (PT) 12-14 visits  -JOSE ELIAS     Planned CPT's? PT EVAL LOW COMPLEXITY: 89350; PT RE-EVAL: 53786; PT THER PROC EA 15 MIN: 42581; PT THER ACT EA 15 MIN: 23833; PT MANUAL THERAPY EA 15 MIN: 57571; PT NEUROMUSC RE-EDUCATION EA 15 MIN: 75111; PT GAIT TRAINING EA 15 MIN: 09886; PT SELF CARE/HOME MGMT/TRAIN EA 15: 39138; PT TRACTION LUMBAR: 43209; PT ELECTRICAL STIM UNATTEND: ; PT HOT OR COLD PACK TREAT MCARE  -JOSE ELIAS     PT Plan Comments review response to HEP, NuStep as warm up, add SL clam shell, glute bridge, BKFO/hip add with PPT, lateral stepping, AR  -JOSE ELIAS           User Key  (r) = Recorded By, (t) = Taken By, (c) = Cosigned By    Initials Name Provider Type    Sharona Xiao, PT Physical Therapist                   OP Exercises     Row Name 10/14/21 1500 10/14/21 1400          Subjective Pain    Able to rate subjective pain? -- yes  -JOSE ELIAS     Pre-Treatment Pain Level -- 5  -JOSE ELIAS            Total Minutes    17857 - PT Therapeutic Exercise Minutes --  -JOSE ELIAS 10  -JOSE ELIAS            Exercise 2    Exercise Name 2 LTR  -JOSE ELIAS --     Cueing 2 Verbal; Tactile  -JOSE ELIAS --     Sets 2 1  -JOSE ELIAS --     Reps 2 10  -JOSE ELIAS --     Time 2 5s  -JOSE ELIAS --            Exercise 3    Exercise Name 3 PPT  -JOSE ELIAS --      Cueing 3 Verbal; Tactile  -JOSE ELIAS --     Sets 3 1  -JOSE ELIAS --     Reps 3 10  -JOSE ELIAS --     Time 3 5s  -JOSE ELIAS --            Exercise 4    Exercise Name 4 supine piriformis stretch  -JOSE ELIAS --     Cueing 4 Verbal; Tactile  -JOSE ELIAS --     Reps 4 3  -JOSE ELIAS --     Time 4 20s  -JOSE ELIAS --            Exercise 5    Exercise Name 5 SL open book  -JOSE ELIAS --     Cueing 5 Verbal; Tactile  -JOSE ELIAS --     Sets 5 1  -JOSE ELIAS --     Reps 5 10  -JOSE ELIAS --           User Key  (r) = Recorded By, (t) = Taken By, (c) = Cosigned By    Initials Name Provider Type    Sharona Xiao, PT Physical Therapist                              Outcome Measure Options: Modifed Owestry  Modified Oswestry  Modified Oswestry Score/Comments: 50% disability      Time Calculation:     Start Time: 1430  Stop Time: 1515  Time Calculation (min): 45 min  Timed Charges  45827 - PT Therapeutic Exercise Minutes: 10  28588 - PT Self Care/Mgmt Minutes: 5  Untimed Charges  PT Eval/Re-eval Minutes: 30  Total Minutes  Timed Charges Total Minutes: 15  Untimed Charges Total Minutes: 30   Total Minutes: 45     Therapy Charges for Today     Code Description Service Date Service Provider Modifiers Qty    00224658257 HC PT THER PROC EA 15 MIN 10/14/2021 Sharona Huntley, PT GP 1    96485679931 HC PT EVAL LOW COMPLEXITY 2 10/14/2021 Sharona Huntley, PT GP 1          PT G-Codes  Outcome Measure Options: Modifed Owestry  Modified Oswestry Score/Comments: 50% disability         Sharona Huntley, PT  10/14/2021

## 2021-10-18 ENCOUNTER — HOSPITAL ENCOUNTER (OUTPATIENT)
Dept: CT IMAGING | Facility: HOSPITAL | Age: 81
Discharge: HOME | End: 2021-10-18
Admitting: NURSE PRACTITIONER

## 2021-10-18 DIAGNOSIS — M47.816 LUMBAR FACET ARTHROPATHY: ICD-10-CM

## 2021-10-18 DIAGNOSIS — M51.36 BULGE OF LUMBAR DISC WITHOUT MYELOPATHY: ICD-10-CM

## 2021-10-18 PROCEDURE — 72131 CT LUMBAR SPINE W/O DYE: CPT

## 2021-10-19 NOTE — PROGRESS NOTES
Reviewed CT scan. Nothing acute such as fracture. There is moderate to severe narrowing at L3-4 to the left. This could be causing some of her leg pain. Generally for that we recommend a targeted epidural to the area. There are also degenerative changes. There is moderate to severe facet degeneration in her lower lumbar spine, which usually is due to arthritis. For that, we recommend something called a lumbar medial branch block, which she has previously had, had relief with this. It depends on which is bothering her more, to determine which procedure to do. If her leg is bothering more, then epidural. If midline low back pain is worse, than MBB's. Can also always consult neurosurgery if she wishes. Or we can discuss at office visit next week. Let me know. Thanks. MIGUEL

## 2021-10-20 ENCOUNTER — APPOINTMENT (OUTPATIENT)
Dept: SLEEP MEDICINE | Facility: HOSPITAL | Age: 81
End: 2021-10-20

## 2021-10-20 NOTE — PROGRESS NOTES
Called and informed pt of results today. Pt verbalized understanding. Pt sts her lower back pain is worse than her leg pain at this time but would like to wait and discuss MBB with you at upcoming ov. Pt declines neurosurgery consult. Please advise. Thank you.

## 2021-10-24 NOTE — TELEPHONE ENCOUNTER
----- Message from AGA Tolbert sent at 9/28/2021  1:31 PM EDT -----  [place],     Please advise Ms. Peoples to take Diflucan 150 mg once today, with repeat dose tomorrow, and to call us if s/s do not resolve.      Thanks!  Alisa  ----- Message -----  From: Catherine Will RegSched Rep  Sent: 9/28/2021  11:42 AM EDT  To: AGA Tolbert    Patient called stating she is having symptoms of a yeast infection. She believes it is due to a new medication she was started on here.    Please advise.  Thanks,  Catherine Will       Cardizem titrated to 15mg/hr as per orders. Pt tolerating well.

## 2021-10-25 ENCOUNTER — TRANSCRIBE ORDERS (OUTPATIENT)
Dept: ADMINISTRATIVE | Facility: HOSPITAL | Age: 81
End: 2021-10-25

## 2021-10-25 DIAGNOSIS — R91.1 LUNG NODULE: Primary | ICD-10-CM

## 2021-10-27 ENCOUNTER — PREP FOR SURGERY (OUTPATIENT)
Dept: SURGERY | Facility: SURGERY CENTER | Age: 81
End: 2021-10-27

## 2021-10-27 ENCOUNTER — OFFICE VISIT (OUTPATIENT)
Dept: PAIN MEDICINE | Facility: CLINIC | Age: 81
End: 2021-10-27

## 2021-10-27 VITALS
BODY MASS INDEX: 34.38 KG/M2 | TEMPERATURE: 97.2 F | OXYGEN SATURATION: 98 % | SYSTOLIC BLOOD PRESSURE: 104 MMHG | HEART RATE: 69 BPM | RESPIRATION RATE: 20 BRPM | WEIGHT: 194 LBS | HEIGHT: 63 IN | DIASTOLIC BLOOD PRESSURE: 71 MMHG

## 2021-10-27 DIAGNOSIS — G89.29 OTHER CHRONIC PAIN: Primary | ICD-10-CM

## 2021-10-27 DIAGNOSIS — M51.36 BULGE OF LUMBAR DISC WITHOUT MYELOPATHY: ICD-10-CM

## 2021-10-27 DIAGNOSIS — M47.816 LUMBAR FACET ARTHROPATHY: ICD-10-CM

## 2021-10-27 DIAGNOSIS — M47.816 LUMBAR FACET ARTHROPATHY: Primary | ICD-10-CM

## 2021-10-27 PROCEDURE — 99214 OFFICE O/P EST MOD 30 MIN: CPT | Performed by: NURSE PRACTITIONER

## 2021-10-27 RX ORDER — SODIUM CHLORIDE 0.9 % (FLUSH) 0.9 %
10 SYRINGE (ML) INJECTION EVERY 12 HOURS SCHEDULED
Status: CANCELLED | OUTPATIENT
Start: 2021-10-27

## 2021-10-27 RX ORDER — SODIUM CHLORIDE 0.9 % (FLUSH) 0.9 %
10 SYRINGE (ML) INJECTION AS NEEDED
Status: CANCELLED | OUTPATIENT
Start: 2021-10-27

## 2021-10-27 NOTE — PROGRESS NOTES
CHIEF COMPLAINT  F/u back pain. Pt sts pain is the same.     Subjective   Inessa Peoples is a 81 y.o. female  who presents for follow-up.  She has a history of chronic back pain. Reports her pain pattern is unchanged since last evaluation.    Complains of pain in her low back. Today her pain is 2/10VAS. Describes the pain as continuous pressure and pain. Pain increases with walking, standing, bending/lifting/twisting; pain decreases with medication, rest, and procedures.  Continues with Celebrex 200 mg daily. Also takes Tylenol  mg 2 evening. Occasionally takes 2 during the daytime. ADL's by self. Denies any bowel or bladder incontinence.     Reports she needs a left knee replacement but not planning on this currently.     Patient remained masked during entire encounter. No cough present. I donned a mask and eye protection throughout entire visit. Prior to donning mask and eye protection, hand hygiene was performed, as well as when it was doffed.  I was closer than 6 feet, but not for an extended period of time. No obvious exposure to any bodily fluids.    Back Pain  This is a chronic problem. The current episode started more than 1 year ago. The problem occurs intermittently. The problem has been gradually worsening (unchanged since last evaluation) since onset. The pain is present in the lumbar spine and sacro-iliac. The pain radiates to the left thigh (posterior left thigh- can go down to foot; also having left groin pain). The pain is at a severity of 2/10. The pain is mild. The pain is worse during the day (worsens as day progresses). The symptoms are aggravated by bending, position, sitting, standing and twisting (housework). Associated symptoms include bladder incontinence. Pertinent negatives include no abdominal pain, bowel incontinence, chest pain, dysuria, fever, headaches, numbness or weakness. Risk factors include obesity, menopause, history of cancer and lack of exercise (history of  melanoma--no Chemo or RAD). She has tried analgesics and heat for the symptoms. The treatment provided moderate relief.        Narrative & Impression   CT LUMBAR SPINE WITHOUT CONTRAST     HISTORY: Back pain, osteoarthritis.     COMPARISON: No prior CT is available for comparison.     FINDINGS: Mild dextroscoliosis of the lumbar spine is noted with apex at  the level of L3. There is moderate loss of disc height at L3-L4 and  mild-to-moderate degree of loss of disc height at L5-S1. A grade 1  anterolisthesis of L5 upon S1 is noted estimated to be 2 mm.     T12-L1: There is no evidence of disc bulge or herniation.     L1-L2: A mild central disc osteophyte complex is present with no  evidence of herniation.     L2-L3: There is no evidence of disc bulge or herniation. A mild  broad-based disc osteophyte complex is present laterally to the left  resulting in mild bilateral recess narrowing.     L3-L4: Mild-to-moderate facet degenerative disease is present  bilaterally. A broad-based disc osteophyte complex is present which  results in mild flattening of ventral surface of the thecal sac.  Moderate-to-severe foraminal stenosis is present on the left secondary  to loss of disc height and extension of a disc osteophyte complex into  the neural foramen.     L4-L5: Moderate-to-severe facet degenerative disease is present  bilaterally. Mild-to-moderate foraminal stenosis is present bilaterally  secondary to extension of a disc osteophyte complex into the neural  foramen.     L5-S1: Transitional anatomy is appreciated with partial sacralization of  L5 noted. There is moderate-to-severe foraminal stenosis bilaterally  secondary to loss of disc height, anterolisthesis of L4 upon L5 and  extension of a broad-based disc osteophyte complex into the neural  foramen.     IMPRESSION:  Multilevel degenerative disease involving the lumbar spine  is noted as described above including dextroscoliosis, loss of disc  height at L3-L4 and to a  lesser extent L5-S1, grade 1 anterolisthesis of  L5 upon S1, as well as multilevel facet degenerative disease and  foraminal stenosis. No obvious focal disc herniation is identified.  Further evaluation could be performed with a MRI examination of the  lumbar spine as indicated.           Radiation dose reduction techniques were utilized, including automated  exposure control and exposure modulation based on body size.     This report was finalized on 10/19/2021 8:02 AM by Dr. Remi Pressley M.D.          Imaging    CT Lumbar Spine Without Contrast (Order: [id number]) - 10/18/2021    Result History    CT Lumbar Spine Without Contrast (Order #[id number]) on 10/19/2021 - Order Result History Report - Result Edited    Reprint Order Requisition    CT Lumbar Spine Without Contrast (Order #[id number]) on 10/18/21     CT Lumbar Spine Without Contrast: Result Notes     Nathalia Jacobsen RN   10/20/2021  1:47 PM EDT         Called and informed pt of results today. Pt verbalized understanding. Pt sts her lower back pain is worse than her leg pain at this time but would like to wait and discuss MBB with you at upcoming ov. Pt declines neurosurgery consult. Please advise. Thank you.     Micheline Castillo, AGA   10/19/2021  4:56 PM EDT         Reviewed CT scan. Nothing acute such as fracture. There is moderate to severe narrowing at L3-4 to the left. This could be causing some of her leg pain. Generally for that we recommend a targeted epidural to the area. There are also degenerative changes. There is moderate to severe facet degeneration in her lower lumbar spine, which usually is due to arthritis. For that, we recommend something called a lumbar medial branch block, which she has previously had, had relief with this. It depends on which is bothering her more, to determine which procedure to do. If her leg is bothering more, then epidural. If midline low back pain is worse, than MBB's. Can also always consult neurosurgery if  she wishes. Or we can discuss at office visit next week. Let me know. Thanks. BB               She reports she was told she could temporarily interrupt Coumadin for treatments.     Procedure List  6-15-21-- Left SI and Left GTB-- 50% relief for 1-2 days  1-21-21-- bilateral L2-L5 MBB-- 90% relief for several weeks  6-16-20-- left SI +GTB-- 75% for 2 weeks  6-23-19-- bilateral L2-L5 MBB-- 90% for a few weeks  11-15-18--- bilateral L2-L5 MBB-- significant relief for several days  4-26-28-- LEFT SI-- significant relief  9-26-17-- RIGHT SI-- significant.     PEG Assessment   What number best describes your pain on average in the past week?2  What number best describes how, during the past week, pain has interfered with your enjoyment of life?2  What number best describes how, during the past week, pain has interfered with your general activity?  2    The following portions of the patient's history were reviewed and updated as appropriate: allergies, current medications, past family history, past medical history, past social history, past surgical history and problem list.    Review of Systems   Constitutional: Negative for activity change, fatigue and fever.   HENT: Negative for congestion.    Eyes: Negative for visual disturbance.   Respiratory: Negative for cough and shortness of breath.    Cardiovascular: Negative for chest pain.   Gastrointestinal: Negative for abdominal pain, bowel incontinence, constipation and diarrhea.   Genitourinary: Positive for bladder incontinence. Negative for difficulty urinating and dysuria.   Musculoskeletal: Positive for back pain.   Neurological: Negative for dizziness, weakness, light-headedness, numbness and headaches.   Psychiatric/Behavioral: Negative for agitation, sleep disturbance and suicidal ideas. The patient is not nervous/anxious.      I have reviewed and confirmed the accuracy of the ROS as documented by the MA/JANICEN/RN Micheline Castillo, APRN      Vitals:    10/27/21 1302  "  BP: 104/71   Pulse: 69   Resp: 20   Temp: 97.2 °F (36.2 °C)   SpO2: 98%   Weight: 88 kg (194 lb)   Height: 158.8 cm (62.5\")   PainSc:   2   PainLoc: Back       Objective   Physical Exam  Vitals and nursing note reviewed.   Constitutional:       Appearance: Normal appearance. She is well-developed.   HENT:      Head: Normocephalic and atraumatic.   Musculoskeletal:      Lumbar back: Tenderness and bony tenderness (lumbar facet tenderness-- left worse than right; + loading manuever) present. Decreased range of motion. Negative right straight leg raise test and negative left straight leg raise test.      Comments: Minimal tenderness of left SI; none of right  minimal tenderness of left  Hip and none of right     Skin:     General: Skin is warm and dry.   Neurological:      Mental Status: She is alert.      Gait: Gait abnormal.      Deep Tendon Reflexes:      Reflex Scores:       Patellar reflexes are 1+ on the right side and 1+ on the left side.  Psychiatric:         Speech: Speech normal.         Behavior: Behavior normal. Behavior is cooperative.         Thought Content: Thought content normal.         Judgment: Judgment normal.       Assessment/Plan   Diagnoses and all orders for this visit:    1. Other chronic pain (Primary)    2. Lumbar facet arthropathy    3. 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 Stop Coumadin 5 days prior. Reviewed the procedure at length with the patient.  Included in the review was expectations, complications, risk and benefits.The procedure was described in detail and the risks, benefits and alternatives were discussed with the patient (including but not limited to: bleeding, infection, nerve damage, worsening of pain, inability to perform injection, paralysis, seizures, coma, no pain relief and death) who agreed to proceed.  Discussed the potential for sedation if warranted/wanted.  The procedure will plan to be performed at [facility] " with fluoroscopic guidance(unless ultrasound is indicated) and could potentially have steroids and contrast dye used. Questions were answered and in a way the patient could understand.  Patient verbalized understanding and wishes to proceed.  This intervention will be ordered.  Discussed with patient that all procedures are part of a multimodal plan of care and include either formal PT or a home exercise program.  Patient has no evidence of coagulopathy or current infection.  --- reviewed lumbar CT with patient. See above.  --- Declines neurosurgical evaluation.  --- If starts having leg pain, consider LESI. Patient declines at this time.   --- Follow-up after procedure or sooner if needed.    Discussed with the patient that sedation is optional for this procedure.  The sedation offered is called conscious sedation which is different from general anesthesia that is utilized in surgical procedures. The dosing of the sedation is determined by the physician and they will be monitored throughout the procedure. With conscious sedation it is possible to remember parts or all of the procedure, this is normal. They will need to have a  with them as driving is prohibited following conscious sedation.     NPO instructions for conscious sedation:  --- Do not eat 6 hours prior to the procedure.   --- Do not drink any dairy or citrus 4 hours prior to the procedure.   --- Do not drink anything, including clear liquids, 2 hours prior to procedure.     If the NPO instructions are not followed then the procedure may be performed without sedation or the procedure will need to be rescheduled.       --------    Anticoagulation risks for procedures....   - there are risks to discontinuation of anticoagulants for neuraxial procedures and surgery.  Risks include but are not limited to deep vein thromboses, pulmonary emboli, myocardial infarction, and stroke    - Neuraxial access with a needle is relatively contraindicated while  anticoagulated because of the risk of hemorrhage and/or epidural hematoma, which can be a neurosurgical emergency to evacuate bleeding.  Left unchecked, bleeding can cause nerve damage leading to paralysis and/or death.  --------               LUIS REPORT  As part of the patient's treatment plan, I am prescribing controlled substances. The patient has been made aware of appropriate use of such medications, including potential risk of somnolence, limited ability to drive and/or work safely, and the potential for dependence or overdose. It has also bee made clear that these medications are for use by this patient only, without concomitant use of alcohol or other substances unless prescribed.     Patient has completed prescribing agreement detailing terms of continued prescribing of controlled substances, including monitoring LUIS reports, urine drug screening, and pill counts if necessary. The patient is aware that inappropriate use will results in cessation of prescribing such medications.    As the clinician, I personally reviewed the LUIS from 10-27-21 while the patient was in the office today.    History and physical exam exhibit continued safe and appropriate use of controlled substances.       Dictated utilizing Dragon dictation.

## 2021-10-28 ENCOUNTER — HOSPITAL ENCOUNTER (OUTPATIENT)
Dept: PHYSICAL THERAPY | Facility: HOSPITAL | Age: 81
Setting detail: THERAPIES SERIES
Discharge: HOME | End: 2021-10-28

## 2021-10-28 ENCOUNTER — TRANSCRIBE ORDERS (OUTPATIENT)
Dept: SURGERY | Facility: SURGERY CENTER | Age: 81
End: 2021-10-28

## 2021-10-28 DIAGNOSIS — M25.552 LEFT HIP PAIN: ICD-10-CM

## 2021-10-28 DIAGNOSIS — Z41.9 SURGERY, ELECTIVE: Primary | ICD-10-CM

## 2021-10-28 DIAGNOSIS — R53.1 WEAKNESS: ICD-10-CM

## 2021-10-28 DIAGNOSIS — M54.50 LUMBAR PAIN: Primary | ICD-10-CM

## 2021-10-28 PROCEDURE — 97110 THERAPEUTIC EXERCISES: CPT

## 2021-10-28 PROCEDURE — 97140 MANUAL THERAPY 1/> REGIONS: CPT

## 2021-10-28 NOTE — THERAPY TREATMENT NOTE
Outpatient Physical Therapy Ortho Treatment Note  [facility]     Patient Name: Inessa Peoples  : 1940  MRN: [id number]  Today's Date: 10/28/2021      Visit Date: 10/28/2021    Visit Dx:    ICD-10-CM ICD-9-CM   1. Lumbar pain  M54.50 724.2   2. Left hip pain  M25.552 719.45   3. Weakness  R53.1 780.79       Patient Active Problem List   Diagnosis   • Sarcoidosis   • Diaphragmatic paralysis   • Osteopenia   • Impaired fasting glucose   • Hypothyroidism   • Hypertension   • Hyperlipidemia   • Chronic osteoarthritis   • GERD (gastroesophageal reflux disease)   • ANY on CPAP + 10 - Dr Lu   • Diverticulitis of large intestine without perforation or abscess without bleeding   • Chronic anticoagulation   • Dilation of thoracic aorta (HCC)   • Chronic pain   • Bulge of lumbar disc without myelopathy   • Class 2 severe obesity due to excess calories with serious comorbidity and body mass index (BMI) of 35.0 to 35.9 in adult (HCC)   • Lumbar facet arthropathy   • Trochanteric bursitis of both hips   • LBBB (left bundle branch block)   • Paroxysmal atrial fibrillation (HCC)   • Heart failure with preserved ejection fraction, NYHA class II (CMS/HCC)II-III---decreased EF with stress        Past Medical History:   Diagnosis Date   • Abnormal ECG    • Allergic rhinitis    • Atrial fibrillation (HCC)    • Bleeds easily (HCC)     warfarin   • Breast screening    • Broken bones    • Calf DVT (deep venous thrombosis) (HCC)    • Chronic anticoagulation    • Class 2 severe obesity due to excess calories with serious comorbidity and body mass index (BMI) of 37.0 to 37.9 in adult (HCC)    • Colon polyp    • CTS (carpal tunnel syndrome)    • Deep vein thrombosis (HCC)     left calf   • Diverticulitis of colon    • AHN (dyspnea on exertion)    • Fatigue    • H/O malignant melanoma of skin    • Heart failure with preserved ejection fraction, NYHA class II (HCC)     II-III---decreased EF with stress   • Hematuria, gross     • History of colon polyps    • History of kidney stones    • Hyperlipidemia    • Hypertension    • Hyperthyroidism    • IFG (impaired fasting glucose)    • Insomnia    • LBBB (left bundle branch block)    • Left leg swelling    • LLQ abdominal pain    • Long term current use of systemic steroids    • Low back pain    • Malignant melanoma of skin (HCC)    • Neck pain    • Obesity     class 2 obesity due to excess calories with BMI of 35.0 to 35.9 in adult   • Osteoarthritis    • Osteopenia    • Paralysis, diaphragm    • Paroxysmal atrial fibrillation (HCC)    • 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 Lu   • Sleep apnea     USES CPAP   • UTI (urinary tract infection)    • Visit for screening mammogram         Past Surgical History:   Procedure Laterality Date   • CARDIAC CATHETERIZATION N/A 6/29/2020    Procedure: Coronary angiography, P[place] L;  Surgeon: Noe Reynoso MD;  Location: [facility] CATH INVASIVE LOCATION;  Service: Cardiovascular;  Laterality: N/A;   • CARDIAC CATHETERIZATION N/A 6/29/2020    Procedure: Left ventriculography;  Surgeon: Noe Reynoso MD;  Location: [facility] CATH INVASIVE LOCATION;  Service: Cardiovascular;  Laterality: N/A;   • CARDIAC CATHETERIZATION N/A 6/29/2020    Procedure: Right and Left Heart Cath;  Surgeon: Noe Reynoso MD;  Location: [facility] CATH INVASIVE LOCATION;  Service: Cardiovascular;  Laterality: N/A;   • CARDIAC CATHETERIZATION     • CARDIAC ELECTROPHYSIOLOGY PROCEDURE N/A 1/5/2021    Procedure: Biventricular Device Insertion -- CRT-p ([employer]tronic);  Surgeon: Demar Stone MD;  Location: [facility] CATH INVASIVE LOCATION;  Service: Cardiology;  Laterality: N/A;   • CARPAL TUNNEL RELEASE Bilateral 1980'S    [place] [facility]   • CATARACT EXTRACTION  Bilateral 2001    Dr. Nic Hidalgo   • COLONOSCOPY N/A 06/26/2013    MILD SIGMOID DIVERTICULOSIS, repeat 5 years based on sister w/ colona cancer-DR. NASRA CASTRO   • COLONOSCOPY N/A 10/19/2010    SIGMOID DIVERTICULOSIS & INTERNAL HEMORRHOIDS, HEMORRHOIDAL BANDING X4, DR. NASRA CASTRO   • COLONOSCOPY N/A 8/6/2018    Procedure: COLONOSCOPY TO CECUM AND INTO TERMINAL ILEUM;  Surgeon: Nasra Castro MD;  [facility];  Service: Gastroenterology   • COLONOSCOPY N/A 10/4/2021    Procedure: COLONOSCOPY to CECUM;  Surgeon: Nasra Castro MD;  Location: [facility] ENDOSCOPY;  Service: General;  Laterality: N/A;  FAMILY HX COLON CANCER  --DIVERTICULOSIS    • COLONOSCOPY W/ BIOPSIES N/A 05/12/2008    RECTUM BIOPSY: COLONIC MUCOSA W/ FOCAL INCREASE OF INFLAMMATORY CELLS IN THE MUSCULARIS MUCOSA INCLUDING EOSINOPHILS, SIGMOID DIVERTICULOSIS, POSSIBLE PROCTITIS, DR. NASRA CASTRO   • CYSTOSCOPY, RETROGRADE PYELOGRAM AND STENT INSERTION Bilateral 10/10/2014    w/ Right ureteral pyeloscopy & laser lithotripsy, Right Double-J stent placement-Dr. Julio Tai   • ENDOSCOPY N/A 11/11/2020    Procedure: ESOPHAGOGASTRODUODENOSCOPY with biopsies and dilation 54fr fernández;  Surgeon: Garret Piedra MD;  Location: [facility] ENDOSCOPY;  Service: Gastroenterology;  Laterality: N/A;  pre- dysphagia  post-- gastritis, dilation of esophageal ring, watermelon stomach    • EXTRACORPOREAL SHOCK WAVE LITHOTRIPSY (ESWL) Right 09/10/2009    DR. JULIO TAI   • HAMMER TOE REPAIR     • LAPAROSCOPIC CHOLECYSTECTOMY N/A 07/19/2012    DR. NASRA CASTRO   • LUMBAR EPIDURAL INJECTION N/A 04/23/2015    LUMBAR EPIDURAL STEROID INJECTION X3   • PACEMAKER IMPLANTATION     • PARATHYROIDECTOMY N/A 2001   • SACROILIAC JOINT INJECTION Left 6/15/2021    Procedure: SACROILIAC INJECTION-- left and left greater trochanteric bursa injection;  Surgeon: Wolfgang Means MD;  Location: [facility] OR;  Service: Pain Management;  Laterality: Left;  "  • SKIN CANCER EXCISION     • THUMB ARTHROSCOPY Right 1982   • TOTAL KNEE ARTHROPLASTY Right 03/25/2014    [employer] Vanguard total knee, 55 femoral component, 71 tibial tray w/ a 40 mm stem, 34 mm x 7.8 mm three-peg patella, and a 14 standard poly-DrUriel Canchola                        PT Assessment/Plan     Row Name 10/28/21 1500          PT Assessment    Assessment Comments Ms. Peoples returns for first f/u visit today with no real change in s/s, reports  performed HEP \"a few times\". Progressed core/hip strengthening today and added STM/foam rolling to L hip girdle and hamstring. Pt denies change in s/s at end of today's session.  -CA            PT Plan    PT Plan Comments Assess response to manual, repeat if beneficial, add seated HS str  -CA           User Key  (r) = Recorded By, (t) = Taken By, (c) = Cosigned By    Initials Name Provider Type    Norma Arguello, PT Physical Therapist                   OP Exercises     Row Name 10/28/21 1400             Subjective Comments    Subjective Comments I did the HEP some, the piriformis stretch made my legs cramped so I stopped that one  -CA              Subjective Pain    Able to rate subjective pain? yes  -CA      Pre-Treatment Pain Level 3  -CA              Total Minutes    94132 - PT Therapeutic Exercise Minutes 30  -CA      62602 - PT Manual Therapy Minutes 10  -CA              Exercise 1    Exercise Name 1 nu step  -CA      Cueing 1 Verbal  -CA      Time 1 5 min, lvl 5  -CA              Exercise 2    Exercise Name 2 LTR  -CA      Cueing 2 Verbal; Tactile  -CA      Sets 2 1  -CA      Reps 2 10  -CA      Time 2 5s  -CA              Exercise 3    Exercise Name 3 PPT  -CA      Cueing 3 Verbal; Tactile  -CA      Sets 3 1  -CA      Reps 3 10  -CA      Time 3 5s  -CA              Exercise 4    Exercise Name 4 supine piriformis stretch  -CA      Cueing 4 Verbal; Tactile  -CA      Reps 4 3  -CA      Time 4 20s  -CA              Exercise 5    Exercise Name 5 SL open " book  -CA      Cueing 5 Verbal; Tactile  -CA      Sets 5 1  -CA      Reps 5 10  -CA      Time 5 2-3sec  -CA              Exercise 6    Exercise Name 6 bridge  -CA      Cueing 6 Verbal  -CA      Sets 6 2  -CA      Reps 6 10  -CA              Exercise 7    Exercise Name 7 HL hip add  -CA      Cueing 7 Verbal  -CA      Sets 7 1  -CA      Reps 7 10  -CA      Time 7 5 sec  -CA              Exercise 8    Exercise Name 8 HL hip abd  -CA      Cueing 8 Verbal  -CA      Sets 8 2  -CA      Reps 8 10  -CA      Time 8 RTB  -CA            User Key  (r) = Recorded By, (t) = Taken By, (c) = Cosigned By    Initials Name Provider Type    Norma Arguello, PT Physical Therapist                         Manual Rx (last 36 hours)     Manual Treatments     Row Name 10/28/21 1400             Total Minutes    38967 - PT Manual Therapy Minutes 10  -CA              Manual Rx 1    Manual Rx 1 Location L hip girdle and hamstring  -CA      Manual Rx 1 Type STM with pool noodle in R s/l  -CA            User Key  (r) = Recorded By, (t) = Taken By, (c) = Cosigned By    Initials Name Provider Type    Norma Arguello, PT Physical Therapist                 PT OP Goals     Row Name 10/28/21 1400          PT Short Term Goals    STG Date to Achieve 11/13/21  -CA     STG 1 Patient will be independent with education for symptom management and initial HEP to decrease LBP pain.  -CA     STG 1 Progress Ongoing  -CA     STG 2 Pt will improve lumbar ROM to WNL with </=2/10 pain in all cardinal planes for improved mobility and participation in ADLs.  -CA     STG 2 Progress Ongoing  -CA     STG 3 Patient will improve standing tolerance from 10 min to >30 min without LBP to improve participation in community activities.  -CA     STG 3 Progress Ongoing  -CA            Long Term Goals    LTG Date to Achieve 12/13/21  -CA     LTG 1 Patient will be independent with education for symptom management and advanced HEP to decrease LBP pain.  -CA     LTG 1 Progress  Ongoing  -CA     LTG 2 Patient will reduce level of percieved disability as measured by the Modified Oswestry  from 50% disability to </= 30% disability in order to improve quality of life.  -CA     LTG 2 Progress Ongoing  -CA     LTG 3 Patient will improve walking tolerance from 5 min to >30 min without LBP to improve participation in community activities.  -CA     LTG 3 Progress Ongoing  -CA     LTG 4 Pt will improve B LE strength to at least 4+/5 to demonstate adequate strength to complete functional activities within the home and community.  -CA     LTG 4 Progress Ongoing  -CA           User Key  (r) = Recorded By, (t) = Taken By, (c) = Cosigned By    Initials Name Provider Type    Norma Arguello PT Physical Therapist                Therapy Education  Education Details: Access Code [email or web address]  Given: HEP  Program: Reinforced, Progressed  How Provided: Verbal, Demonstration, Written  Provided to: Patient  Level of Understanding: Verbalized, Demonstrated              Time Calculation:   Start Time: 1430  Stop Time: 1510  Time Calculation (min): 40 min  Total Timed Code Minutes- PT: 40 minute(s)  Timed Charges  23499 - PT Therapeutic Exercise Minutes: 30  43095 - PT Manual Therapy Minutes: 10  Total Minutes  Timed Charges Total Minutes: 40   Total Minutes: 40  Therapy Charges for Today     Code Description Service Date Service Provider Modifiers Qty    59590652426 HC PT THER PROC EA 15 MIN 10/28/2021 Norma Murray, PT GP 2    42508523683 HC PT MANUAL THERAPY EA 15 MIN 10/28/2021 Norma Murray, PT GP 1                    Norma Murray PT  10/28/2021

## 2021-10-29 ENCOUNTER — OFFICE VISIT (OUTPATIENT)
Dept: SLEEP MEDICINE | Facility: HOSPITAL | Age: 81
End: 2021-10-29

## 2021-10-29 VITALS — BODY MASS INDEX: 34.55 KG/M2 | HEIGHT: 63 IN | WEIGHT: 195 LBS

## 2021-10-29 DIAGNOSIS — Z99.89 OSA ON CPAP: Primary | Chronic | ICD-10-CM

## 2021-10-29 DIAGNOSIS — G47.33 OSA ON CPAP: Primary | Chronic | ICD-10-CM

## 2021-10-29 DIAGNOSIS — E66.01 CLASS 2 SEVERE OBESITY DUE TO EXCESS CALORIES WITH SERIOUS COMORBIDITY AND BODY MASS INDEX (BMI) OF 35.0 TO 35.9 IN ADULT (HCC): ICD-10-CM

## 2021-10-29 DIAGNOSIS — R06.09 DYSPNEA ON EXERTION: ICD-10-CM

## 2021-10-29 DIAGNOSIS — D86.9 SARCOIDOSIS: ICD-10-CM

## 2021-10-29 PROCEDURE — 99213 OFFICE O/P EST LOW 20 MIN: CPT | Performed by: INTERNAL MEDICINE

## 2021-10-29 PROCEDURE — G0463 HOSPITAL OUTPT CLINIC VISIT: HCPCS

## 2021-10-29 NOTE — PROGRESS NOTES
"Follow Up Sleep Disorders Center Note     Chief Complaint:  ANY     Primary Care Physician: Gisele Dockery MD    Interval History:   The patient is a 81 y.o. female  who I last saw 10/14/2020 and that note was reviewed.  The patient reports she is stable without new complaints.  She goes to bed 11 PM and awakens at 9 AM.  She awakens because she is uncomfortable?    Self-administered Almond Sleepiness Scale test results: 0  0-5 Lower normal daytime sleepiness  6-10 Higher normal daytime sleepiness  11-12 Mild, 13-15 Moderate, & 16-24 Severe excessive daytime sleepiness    Review of Systems:    A complete review of systems was done and all were negative with the exception of some nasal congestion and postnasal drip, occasional cough, and shortness of breath with exertion    Social History:    Social History     Socioeconomic History   • Marital status:      Spouse name: EDY   • Number of children: 4   • Years of education: 14yrs   Tobacco Use   • Smoking status: Never Smoker   • Smokeless tobacco: Never Used   Vaping Use   • Vaping Use: Never used   Substance and Sexual Activity   • Alcohol use: Yes     Comment: occ drinker/caffeine use    • Drug use: No   • Sexual activity: Defer       Allergies:  Iodinated diagnostic agents, Contrast dye, Fenofibrate, Livalo [pitavastatin], Plaquenil [hydroxychloroquine sulfate], and Statins     Medication Review: Her list was reviewed.      Vital Signs:    Vitals:    10/29/21 1300   Weight: 88.5 kg (195 lb)   Height: 158.8 cm (62.52\")     Body mass index is 35.08 kg/m².    Physical Exam:    Constitutional:  Well developed 81 y.o. female that appears in no apparent distress.  Awake & oriented times 3.  Normal mood with normal recent and remote memory and normal judgement.  Eyes:  Conjunctivae normal.  Oropharynx: Previously, moist mucous membranes without exudate and a large tongue and normal uvula and moderate narrowing of the posterior pharyngeal opening, patient is " wearing a facemask.     Downloaded PAP Data Reviewed For Compliance:  DME is [facility] and she uses a N 30i.  Downloads between 7/29 and 10/26/2021 compliance 100%.  Average usage is 9 hours and 2 minutes.  Average AHI is normal without leak.  CPAP pressure is +10.    I have reviewed the above results and compared them with the patient's last downloads and reviewed with the patient.    Impression:   Obstructive sleep apnea adequately treated with CPAP +10. The patient appears to be at goal with good compliance and usage. The patient has no complaints of hypersomnolence.    Plan:  Good sleep hygiene measures should be maintained.  Weight loss would be beneficial in this patient who is obese by BMI.      After evaluating the patient and assessing results available, the patient is benefiting from the treatment being provided.     The patient will continue auto CPAP.  After clinical evaluation and review of downloads, I recommend no changes to the patient's pressures.  A new prescription will be sent to the patient's DME.    Rashmi recall discussed.  The patient needs to register her device.  She does not use an ozone .  The patient may be eligible for a new auto CPAP device.  She will let me know.    I answered all of the patient's questions.  The patient will call for any problems and will follow up in 1 year.      Jewel Lu MD  Sleep Medicine  10/29/21  13:54 EDT

## 2021-11-01 ENCOUNTER — TELEPHONE (OUTPATIENT)
Dept: CARDIOLOGY | Facility: CLINIC | Age: 81
End: 2021-11-01

## 2021-11-01 RX ORDER — SPIRONOLACTONE 25 MG/1
TABLET ORAL
Qty: 60 TABLET | Refills: 0 | Status: SHIPPED | OUTPATIENT
Start: 2021-11-01 | End: 2022-01-13 | Stop reason: SDUPTHER

## 2021-11-01 NOTE — SIGNIFICANT NOTE
Patient educated on the following :    - If you are receiving Sedation for your procedure Nothing to Eat 6 hours and only clear liquids for 2 hours prior to your procedure.    -  -You will need to have someone drive you home after your PROCEDURE and remain with you for 24 hours after the PROCEDURE  - The date of your procedure, your are welcome to have one visitor at bedside or remain within 10-15 minutes of [facility]  -You will need to arrive at  1430 on 11/2 PROCEDURE  -Please contact [facility] at: [phone] with any questions and/or concerns

## 2021-11-01 NOTE — TELEPHONE ENCOUNTER
I advised that the patient continue with antibiotics previously prescribed for her urinary tract infection, and that she stop her Jardiance.  We will talk about trialing Farxiga to see if she is able to tolerate that better at her next appointment.  All questions and concerns addressed at this time, understanding and agreement verbalized.    AGA Tolbert

## 2021-11-02 ENCOUNTER — HOSPITAL ENCOUNTER (OUTPATIENT)
Facility: SURGERY CENTER | Age: 81
Setting detail: HOSPITAL OUTPATIENT SURGERY
Discharge: HOME | End: 2021-11-02
Attending: ANESTHESIOLOGY | Admitting: ANESTHESIOLOGY

## 2021-11-02 ENCOUNTER — HOSPITAL ENCOUNTER (OUTPATIENT)
Dept: GENERAL RADIOLOGY | Facility: SURGERY CENTER | Age: 81
Setting detail: HOSPITAL OUTPATIENT SURGERY
End: 2021-11-02

## 2021-11-02 VITALS
HEART RATE: 76 BPM | WEIGHT: 193 LBS | TEMPERATURE: 97.7 F | SYSTOLIC BLOOD PRESSURE: 131 MMHG | DIASTOLIC BLOOD PRESSURE: 73 MMHG | BODY MASS INDEX: 34.2 KG/M2 | RESPIRATION RATE: 17 BRPM | HEIGHT: 63 IN | OXYGEN SATURATION: 95 %

## 2021-11-02 DIAGNOSIS — Z41.9 SURGERY, ELECTIVE: ICD-10-CM

## 2021-11-02 DIAGNOSIS — M47.816 LUMBAR FACET ARTHROPATHY: ICD-10-CM

## 2021-11-02 LAB
INR PPP: 1 (ref 0.8–1.2)
PROTHROMBIN TIME: 12.2 SECONDS (ref 12.8–15.2)

## 2021-11-02 PROCEDURE — 77002 NEEDLE LOCALIZATION BY XRAY: CPT

## 2021-11-02 PROCEDURE — 64495 INJ PARAVERT F JNT L/S 3 LEV: CPT | Performed by: ANESTHESIOLOGY

## 2021-11-02 PROCEDURE — 3E0T33Z INTRODUCTION OF ANTI-INFLAMMATORY INTO PERIPHERAL NERVES AND PLEXI, PERCUTANEOUS APPROACH: ICD-10-PCS | Performed by: ANESTHESIOLOGY

## 2021-11-02 PROCEDURE — 76000 FLUOROSCOPY <1 HR PHYS/QHP: CPT

## 2021-11-02 PROCEDURE — 85610 PROTHROMBIN TIME: CPT

## 2021-11-02 PROCEDURE — 64494 INJ PARAVERT F JNT L/S 2 LEV: CPT | Performed by: ANESTHESIOLOGY

## 2021-11-02 PROCEDURE — 64493 INJ PARAVERT F JNT L/S 1 LEV: CPT | Performed by: ANESTHESIOLOGY

## 2021-11-02 PROCEDURE — 25010000002 FENTANYL CITRATE (PF) 50 MCG/ML SOLUTION: Performed by: ANESTHESIOLOGY

## 2021-11-02 PROCEDURE — 25010000002 MIDAZOLAM PER 1 MG: Performed by: ANESTHESIOLOGY

## 2021-11-02 PROCEDURE — 3E0T3BZ INTRODUCTION OF ANESTHETIC AGENT INTO PERIPHERAL NERVES AND PLEXI, PERCUTANEOUS APPROACH: ICD-10-PCS | Performed by: ANESTHESIOLOGY

## 2021-11-02 PROCEDURE — 0 IOHEXOL 300 MG/ML SOLUTION 10 ML VIAL: Performed by: ANESTHESIOLOGY

## 2021-11-02 RX ORDER — SODIUM CHLORIDE 0.9 % (FLUSH) 0.9 %
10 SYRINGE (ML) INJECTION EVERY 12 HOURS SCHEDULED
Status: DISCONTINUED | OUTPATIENT
Start: 2021-11-02 | End: 2021-11-02 | Stop reason: HOSPADM

## 2021-11-02 RX ORDER — FENTANYL CITRATE 50 UG/ML
INJECTION, SOLUTION INTRAMUSCULAR; INTRAVENOUS AS NEEDED
Status: DISCONTINUED | OUTPATIENT
Start: 2021-11-02 | End: 2021-11-02 | Stop reason: HOSPADM

## 2021-11-02 RX ORDER — SODIUM CHLORIDE 0.9 % (FLUSH) 0.9 %
10 SYRINGE (ML) INJECTION AS NEEDED
Status: DISCONTINUED | OUTPATIENT
Start: 2021-11-02 | End: 2021-11-02 | Stop reason: HOSPADM

## 2021-11-02 RX ORDER — MIDAZOLAM HYDROCHLORIDE 1 MG/ML
INJECTION INTRAMUSCULAR; INTRAVENOUS AS NEEDED
Status: DISCONTINUED | OUTPATIENT
Start: 2021-11-02 | End: 2021-11-02 | Stop reason: HOSPADM

## 2021-11-02 NOTE — OP NOTE
Bilateral L2-5 Lumbar Medial Branch Blockade  [facility]    PREOPERATIVE DIAGNOSIS:  Lumbar spondylosis without myelopathy    POSTOPERATIVE DIAGNOSIS:  Lumbar spondylosis without myelopathy    PROCEDURE:   Diagnostic Bilateral Lumbar Medial Branch Nerve Blockades, with fluoroscopy:  L2, L3, L4, and L5 nerves (at the L3, L4, L5 transverse processes and the sacral alar groove) to block facet joints L3-4, L4-5, and L5-S1  1. 57597-66 -- Bilateral Lumbar Facet blocks, 1st Level  2. 78229-53 -- Bilateral Lumbar Facet blocks, 2nd  Level  3. 89446-86 -- Bilateral Lumbar Facet blocks, 3rd Level    PRE-PROCEDURE DISCUSSION WITH PATIENT:    Risks and complications were discussed with the patient prior to starting the procedure and informed consent was obtained.      SURGEON:  Wolfgang Means MD    REASON FOR PROCEDURE:   The patient complains of pain that seems to have a significant axial component, Painful area identified on exam under fluoroscopy and Previous tx+ MBB.  Preop INR = 1    SEDATION:  Versed 2mg & Fentanyl 50 mcg IV  ANESTHETIC:  Marcaine 0.5%  STEROID:  Methylprednisolone (DEPO MEDROL) 80mg/ml  TOTAL VOLUME OF SOLUTION: 8ml    DESCRIPTON OF PROCEDURE:  After obtaining informed consent, IV access was obtained in the preoperative area.   The patient was taken to the operating room.  The patient was placed in the prone position with a pillow under the abdomen. All pressure points were well padded.  EKG, blood pressure, and pulse oximeter were monitored.  The patient was monitored and sedated by the RN under my direction. The lumbosacral area was prepped with Chloraprep and draped in a sterile fashion. Under fluoroscopic guidance the transverse processes of the L3, L4, and L5 vertebrae at the junctions of the superior articular processes were identified on the right. Also identified was the groove between the ala and the superior articular process of the sacrum on the ipsilateral side.  Skin  and subcutaneous tissue were anesthetized with 1% lidocaine above each of these points. A 22-gauge spinal needle was introduced under fluoroscopic guidance at the above junctions. Aspiration was negative for blood and CSF.  After confirming the position of the needle with fluoroscope in all views, 0.25 mL of Omnipaque was injected, and after seeing the proper spread a total of 1 mL of the anesthetic solution noted above was injected at each of these points.  Needles were removed intact from each of the areas.  A similar procedure was repeated to block the L2, L3, L4, and L5 nerves on the contralateral side.   Onset of analgesia was noted.  Vital signs remained stable throughout.      ESTIMATED BLOOD LOSS:  <5 mL  SPECIMENS:  none    COMPLICATIONS:   No complications were noted., There was no indication of vascular uptake on live injection of contrast dye., There was no indication of intrathecal uptake on live injection of contrast dye., There was not any evidence of dural puncture.   and The patient did not have any signs of postprocedure numbness nor weakness.    TOLERANCE & DISCHARGE CONDITION:    The patient tolerated the procedure well.  The patient was transported to the recovery area without difficulties.  The patient was discharged to home under the care of family in stable and satisfactory condition.    PLAN OF CARE:  1. The patient was given our standard instruction sheet.  2. We discussed that Lumbar Medial Branch Blockade is a diagnostic procedure in consideration for radiofrequency ablation if two diagnostic procedures prove to be positive for significant benefit.  If sustained relief of 6 to eight weeks is obtained, then an alternative plan could be therapeutic lumbar branch blockades.  3. The patient is asked to keep a pain log each hour for 8 hours after the procedure today.  4. The patient will  Return to clinic 4-6 wks.  5. The patient will resume all medications as per the medication reconciliation  sheet.

## 2021-11-02 NOTE — DISCHARGE INSTRUCTIONS
[facility] - Post-procedure Instructions          --  While there are no absolute restrictions, it is recommended that you do not perform strenuous activity today. In the morning, you may resume your level of activity as before your block.    --  If you have a band-aid at your injection site, please remove it later today. Observe the area for any redness, swelling, pus-like drainage, or a temperature over 101°. If any of these symptoms occur, please call your doctor at [phone]. If after office hours, leave a message and the on-call provider will return your call.    --  Ice may be applied to your injection site. It is recommended you avoid direct heat (heating pad; hot tub) for 1-2 days.    --  Call [facility] at [phone] if you experience persistent headache, persistent bleeding from the injection site, or severe pain not relieved by heat or oral medication.    --  Do not make important decisions today.    --  Due to the effects of the block and/or the I.V. Sedation, DO NOT drive or operate hazardous machinery for 12 hours.  Local anesthetics may cause numbness after procedure and precautions must be taken with regards to operating equipment as well as with walking, even if ambulating with assistance of another person or with an assistive device.    --  Do not drink alcohol for 12 hours.    -- You may return to work tomorrow, or as directed by your referring doctor.    --  Occasionally you may notice a slight increase in your pain after the procedure. This should start to improve within the next 24-48 hours. Radiofrequency ablation procedure pain may last 3-4 weeks.    --  It may take as long as 3-4 days before you notice a gradual improvement in your pain and/or other symptoms.    -- You may continue to take your prescribed pain medication as needed.    --  Some normal possible side effects of steroid use could include fluid retention, increased blood sugar, dull headache,  increased sweating, increased appetite, mood swings and flushing.    --  Diabetics are recommended to watch their blood glucose level closely for 24-48 hours after the injection.    --  Must stay in PACU for 20 min upon arrival and prove no leg weakness before being discharged.    --  IN THE EVENT OF A LIFE THREATENING EMERGENCY, (CHEST PAIN, BREATHING DIFFICULTIES, PARALYSIS…) YOU SHOULD GO TO YOUR NEAREST EMERGENCY ROOM.    --  You should be contacted by our office within 2-3 days to schedule follow up or next appointment date.  If not contacted within 7 days, please call the office at [phone]

## 2021-11-09 ENCOUNTER — HOSPITAL ENCOUNTER (OUTPATIENT)
Dept: PHYSICAL THERAPY | Facility: HOSPITAL | Age: 81
Setting detail: THERAPIES SERIES
Discharge: HOME | End: 2021-11-09

## 2021-11-09 DIAGNOSIS — M54.50 LUMBAR PAIN: Primary | ICD-10-CM

## 2021-11-09 DIAGNOSIS — R53.1 WEAKNESS: ICD-10-CM

## 2021-11-09 DIAGNOSIS — M25.552 LEFT HIP PAIN: ICD-10-CM

## 2021-11-09 PROCEDURE — 97110 THERAPEUTIC EXERCISES: CPT

## 2021-11-09 NOTE — THERAPY TREATMENT NOTE
Outpatient Physical Therapy Ortho Treatment Note  [facility]     Patient Name: Inessa Peoples  : 1940  MRN: [id number]  Today's Date: 2021      Visit Date: 2021    Visit Dx:    ICD-10-CM ICD-9-CM   1. Lumbar pain  M54.50 724.2   2. Left hip pain  M25.552 719.45   3. Weakness  R53.1 780.79       Patient Active Problem List   Diagnosis   • Sarcoidosis   • Diaphragmatic paralysis   • Osteopenia   • Impaired fasting glucose   • Hypothyroidism   • Hypertension   • Hyperlipidemia   • Chronic osteoarthritis   • GERD (gastroesophageal reflux disease)   • ANY on CPAP + 10 - Dr Lu   • Diverticulitis of large intestine without perforation or abscess without bleeding   • Chronic anticoagulation   • Dilation of thoracic aorta (HCC)   • Chronic pain   • Bulge of lumbar disc without myelopathy   • Class 2 severe obesity due to excess calories with serious comorbidity and body mass index (BMI) of 35.0 to 35.9 in adult (HCC)   • Lumbar facet arthropathy   • Trochanteric bursitis of both hips   • LBBB (left bundle branch block)   • Paroxysmal atrial fibrillation (HCC)   • Heart failure with preserved ejection fraction, NYHA class II (CMS/HCC)II-III---decreased EF with stress        Past Medical History:   Diagnosis Date   • Abnormal ECG    • Allergic rhinitis    • Atrial fibrillation (HCC)    • Bleeds easily (HCC)     warfarin   • Breast screening    • Broken bones    • Calf DVT (deep venous thrombosis) (HCC)    • Chronic anticoagulation    • Class 2 severe obesity due to excess calories with serious comorbidity and body mass index (BMI) of 37.0 to 37.9 in adult (HCC)    • Colon polyp    • CTS (carpal tunnel syndrome)    • Deep vein thrombosis (HCC)     left calf   • Diverticulitis of colon    • AHN (dyspnea on exertion)    • Fatigue    • H/O malignant melanoma of skin    • Heart failure with preserved ejection fraction, NYHA class II (HCC)     II-III---decreased EF with stress   • Hematuria, gross     • History of colon polyps    • History of kidney stones    • Hyperlipidemia    • Hypertension    • Hyperthyroidism    • IFG (impaired fasting glucose)    • Insomnia    • LBBB (left bundle branch block)    • Left leg swelling    • LLQ abdominal pain    • Long term current use of systemic steroids    • Low back pain    • Malignant melanoma of skin (HCC)    • Neck pain    • Obesity     class 2 obesity due to excess calories with BMI of 35.0 to 35.9 in adult   • Osteoarthritis    • Osteopenia    • Paralysis, diaphragm    • Paroxysmal atrial fibrillation (HCC)    • 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 Lu   • Sleep apnea     USES CPAP   • UTI (urinary tract infection)    • Visit for screening mammogram         Past Surgical History:   Procedure Laterality Date   • CARDIAC CATHETERIZATION N/A 6/29/2020    Procedure: Coronary angiography, [place] L;  Surgeon: Noe Reynoso MD;  Location: [facility] CATH INVASIVE LOCATION;  Service: Cardiovascular;  Laterality: N/A;   • CARDIAC CATHETERIZATION N/A 6/29/2020    Procedure: Left ventriculography;  Surgeon: Noe Reynoso MD;  Location: [facility] CATH INVASIVE LOCATION;  Service: Cardiovascular;  Laterality: N/A;   • CARDIAC CATHETERIZATION N/A 6/29/2020    Procedure: Right and Left Heart Cath;  Surgeon: Noe Reynoso MD;  Location: [facility] CATH INVASIVE LOCATION;  Service: Cardiovascular;  Laterality: N/A;   • CARDIAC CATHETERIZATION     • CARDIAC ELECTROPHYSIOLOGY PROCEDURE N/A 1/5/2021    Procedure: Biventricular Device Insertion -- CRT-p ([employer]);  Surgeon: Demar Stone MD;  Location: [facility] CATH INVASIVE LOCATION;  Service: Cardiology;  Laterality: N/A;   • CARPAL TUNNEL RELEASE Bilateral 1980'S    [facility]   • CATARACT EXTRACTION  Bilateral 2001    Dr. Nic Hidalgo   • COLONOSCOPY N/A 06/26/2013    MILD SIGMOID DIVERTICULOSIS, repeat 5 years based on sister w/ colona cancer-DR. NASRA CASTRO   • COLONOSCOPY N/A 10/19/2010    SIGMOID DIVERTICULOSIS & INTERNAL HEMORRHOIDS, HEMORRHOIDAL BANDING X4, DR. NASRA CASTRO   • COLONOSCOPY N/A 8/6/2018    Procedure: COLONOSCOPY TO CECUM AND INTO TERMINAL ILEUM;  Surgeon: Nasra Castro MD;  [facility] ENDOS[facility];  Service: Gastroenterology   • COLONOSCOPY N/A 10/4/2021    Procedure: COLONOSCOPY to CECUM;  Surgeon: Nasra Castro MD;  Location: [facility] ENDOSCOPY;  Service: General;  Laterality: N/A;  FAMILY HX COLON CANCER  --DIVERTICULOSIS    • COLONOSCOPY W/ BIOPSIES N/A 05/12/2008    RECTUM BIOPSY: COLONIC MUCOSA W/ FOCAL INCREASE OF INFLAMMATORY CELLS IN THE MUSCULARIS MUCOSA INCLUDING EOSINOPHILS, SIGMOID DIVERTICULOSIS, POSSIBLE PROCTITIS, DR. NASRA CASTRO   • CYSTOSCOPY, RETROGRADE PYELOGRAM AND STENT INSERTION Bilateral 10/10/2014    w/ Right ureteral pyeloscopy & laser lithotripsy, Right Double-J stent placement-Dr. Julio Tai   • ENDOSCOPY N/A 11/11/2020    Procedure: ESOPHAGOGASTRODUODENOSCOPY with biopsies and dilation 54fr fernández;  Surgeon: Garret Piedra MD;  Location: [facility] ENDOSCOPY;  Service: Gastroenterology;  Laterality: N/A;  pre- dysphagia  post-- gastritis, dilation of esophageal ring, watermelon stomach    • EXTRACORPOREAL SHOCK WAVE LITHOTRIPSY (ESWL) Right 09/10/2009    DR. JULIO TAI   • HAMMER TOE REPAIR     • LAPAROSCOPIC CHOLECYSTECTOMY N/A 07/19/2012    DR. NASRA CASTRO   • LUMBAR EPIDURAL INJECTION N/A 04/23/2015    LUMBAR EPIDURAL STEROID INJECTION X3   • MEDIAL BRANCH BLOCK Bilateral 11/2/2021    Procedure: MEDIAL BRANCH BLOCK--bilateral Lumbar2-5;  Surgeon: Wolfgang Means MD;  Location[facility] OR;  Service: Pain Management;  Laterality: Bilateral;   • PACEMAKER IMPLANTATION     • PARATHYROIDECTOMY N/A 2001   • SACROILIAC JOINT INJECTION  Left 6/15/2021    Procedure: SACROILIAC INJECTION-- left and left greater trochanteric bursa injection;  Surgeon: Wolfgang Means MD;  Location: [facility] MAIN OR;  Service: Pain Management;  Laterality: Left;   • SKIN CANCER EXCISION     • THUMB ARTHROSCOPY Right 1982   • TOTAL KNEE ARTHROPLASTY Right 03/25/2014    [employer] Vanguard total knee, 55 femoral component, 71 tibial tray w/ a 40 mm stem, 34 mm x 7.8 mm three-peg patella, and a 14 standard poly-DrUriel Canchola                        PT Assessment/Plan     Row Name 11/09/21 1500          PT Assessment    Assessment Comments Pt returns today with much improved pain levels since nerve block, however has not performed HEP since nerve block. Progressed strengthenign challenges today with no pain. Discussed and encouraged pt to cont to perform HEP despite no pain. Discussed plan to trial HEP at home at least 1 day between now and next visit.  -CC            PT Plan    PT Plan Comments If pain remains low, may modify schedule to 1x per week to solidfy general strengthening progres from long term maintenance of chronic back issues.  -CC           User Key  (r) = Recorded By, (t) = Taken By, (c) = Cosigned By    Initials Name Provider Type    Norma Cullen, PT Physical Therapist                   OP Exercises     Row Name 11/09/21 1400             Subjective Comments    Subjective Comments I got a nerve block, and I feel the best I've felt in a long time. I haven't dont the HEP since I got the block. (11/2 per chart)  -CC              Subjective Pain    Able to rate subjective pain? yes  -CC      Pre-Treatment Pain Level 0  -CC              Total Minutes    36104 - PT Therapeutic Exercise Minutes 40  -CC              Exercise 1    Exercise Name 1 nu step  -CC      Cueing 1 Verbal  -CC      Time 1 5 min, lvl 5  -CC              Exercise 2    Exercise Name 2 LTR  -CC      Cueing 2 Verbal; Tactile  -CC      Sets 2 1  -CC      Reps 2 10  -CC      Time 2 5s   -CC              Exercise 3    Exercise Name 3 PPT  -CC      Cueing 3 Verbal; Tactile  -CC      Sets 3 1  -CC      Reps 3 10  -CC      Time 3 5s  -CC              Exercise 4    Exercise Name 4 supine piriformis stretch  -CC      Cueing 4 Verbal; Tactile  -CC      Reps 4 3b  -CC      Time 4 20s  -CC              Exercise 5    Exercise Name 5 SL open book  -CC      Cueing 5 Verbal; Tactile  -CC      Sets 5 1  -CC      Reps 5 10  -CC      Time 5 2-3sec  -CC              Exercise 6    Exercise Name 6 bridge  -CC      Cueing 6 Verbal  -CC      Sets 6 2  -CC      Reps 6 10  -CC              Exercise 7    Exercise Name 7 HL hip add  -CC      Cueing 7 Verbal  -CC      Sets 7 1  -CC      Reps 7 10  -CC      Time 7 5 sec  -CC              Exercise 8    Exercise Name 8 HL hip abd  -CC      Cueing 8 Verbal  -CC      Sets 8 2  -CC      Reps 8 10  -CC      Time 8 GTB  -CC              Exercise 9    Exercise Name 9 seated HS str  -CC      Cueing 9 Verbal; Demo  -CC      Reps 9 3 b  -CC      Time 9 20 sec  -CC              Exercise 10    Exercise Name 10 STS  -CC      Cueing 10 Verbal  -CC      Sets 10 2  -CC      Reps 10 10  -CC      Time 10 elevated mat, arms crossed  -CC              Exercise 11    Exercise Name 11 tband shldr ext  -CC      Cueing 11 Verbal  -CC      Sets 11 2  -CC      Reps 11 10  -CC      Time 11 GTB  -CC              Exercise 12    Exercise Name 12 row  -CC      Cueing 12 Verbal  -CC      Reps 12 2x10  -CC      Time 12 GTB  -CC              Exercise 13    Exercise Name 13 side steps  -CC      Cueing 13 Verbal  -CC      Reps 13 3 laps at barre  -CC      Additional Comments cues for toes fwd  -CC            User Key  (r) = Recorded By, (t) = Taken By, (c) = Cosigned By    Initials Name Provider Type    CC Norma Lovell, PT Physical Therapist                                                Time Calculation:   Start Time: 1430  Stop Time: 1510  Time Calculation (min): 40 min  Total Timed Code Minutes- PT:  40 minute(s)  Timed Charges  41162 - PT Therapeutic Exercise Minutes: 40  Total Minutes  Timed Charges Total Minutes: 40   Total Minutes: 40  Therapy Charges for Today     Code Description Service Date Service Provider Modifiers Qty    [id number]  PT THER PROC EA 15 MIN 11/9/2021 Norma Lovell, PT GP 3                    Norma Lovell, PT  11/9/2021

## 2021-11-11 ENCOUNTER — HOSPITAL ENCOUNTER (OUTPATIENT)
Dept: CARDIOLOGY | Facility: HOSPITAL | Age: 81
Discharge: HOME | End: 2021-11-11
Admitting: NURSE PRACTITIONER

## 2021-11-11 VITALS
OXYGEN SATURATION: 96 % | SYSTOLIC BLOOD PRESSURE: 110 MMHG | BODY MASS INDEX: 34.48 KG/M2 | WEIGHT: 194.6 LBS | HEIGHT: 63 IN | DIASTOLIC BLOOD PRESSURE: 72 MMHG | HEART RATE: 75 BPM

## 2021-11-11 DIAGNOSIS — I10 PRIMARY HYPERTENSION: ICD-10-CM

## 2021-11-11 DIAGNOSIS — G47.33 OSA ON CPAP: Chronic | ICD-10-CM

## 2021-11-11 DIAGNOSIS — Z99.89 OSA ON CPAP: Chronic | ICD-10-CM

## 2021-11-11 DIAGNOSIS — I48.0 PAROXYSMAL ATRIAL FIBRILLATION (HCC): ICD-10-CM

## 2021-11-11 DIAGNOSIS — I50.30 HEART FAILURE WITH PRESERVED EJECTION FRACTION, NYHA CLASS II (HCC): Primary | ICD-10-CM

## 2021-11-11 DIAGNOSIS — E78.5 HYPERLIPIDEMIA, UNSPECIFIED HYPERLIPIDEMIA TYPE: ICD-10-CM

## 2021-11-11 LAB
ANION GAP SERPL CALCULATED.3IONS-SCNC: 13 MMOL/L (ref 5–15)
BASOPHILS # BLD AUTO: 0.05 10*3/MM3 (ref 0–0.2)
BASOPHILS NFR BLD AUTO: 0.7 % (ref 0–1.5)
BUN SERPL-MCNC: 23 MG/DL (ref 8–23)
BUN/CREAT SERPL: 25.3 (ref 7–25)
CALCIUM SPEC-SCNC: 10 MG/DL (ref 8.6–10.5)
CHLORIDE SERPL-SCNC: 104 MMOL/L (ref 98–107)
CO2 SERPL-SCNC: 22 MMOL/L (ref 22–29)
CREAT SERPL-MCNC: 0.91 MG/DL (ref 0.57–1)
DEPRECATED RDW RBC AUTO: 44.7 FL (ref 37–54)
EOSINOPHIL # BLD AUTO: 0.14 10*3/MM3 (ref 0–0.4)
EOSINOPHIL NFR BLD AUTO: 1.8 % (ref 0.3–6.2)
ERYTHROCYTE [DISTWIDTH] IN BLOOD BY AUTOMATED COUNT: 12.7 % (ref 12.3–15.4)
GFR SERPL CREATININE-BSD FRML MDRD: 60 ML/MIN/1.73
GLUCOSE SERPL-MCNC: 89 MG/DL (ref 65–99)
HCT VFR BLD AUTO: 46.3 % (ref 34–46.6)
HGB BLD-MCNC: 16.1 G/DL (ref 12–15.9)
IMM GRANULOCYTES # BLD AUTO: 0.04 10*3/MM3 (ref 0–0.05)
IMM GRANULOCYTES NFR BLD AUTO: 0.5 % (ref 0–0.5)
LYMPHOCYTES # BLD AUTO: 2.53 10*3/MM3 (ref 0.7–3.1)
LYMPHOCYTES NFR BLD AUTO: 33.2 % (ref 19.6–45.3)
MCH RBC QN AUTO: 33.3 PG (ref 26.6–33)
MCHC RBC AUTO-ENTMCNC: 34.8 G/DL (ref 31.5–35.7)
MCV RBC AUTO: 95.9 FL (ref 79–97)
MONOCYTES # BLD AUTO: 0.67 10*3/MM3 (ref 0.1–0.9)
MONOCYTES NFR BLD AUTO: 8.8 % (ref 5–12)
NEUTROPHILS NFR BLD AUTO: 4.2 10*3/MM3 (ref 1.7–7)
NEUTROPHILS NFR BLD AUTO: 55 % (ref 42.7–76)
NRBC BLD AUTO-RTO: 0 /100 WBC (ref 0–0.2)
NT-PROBNP SERPL-MCNC: 58.2 PG/ML (ref 0–1800)
PLATELET # BLD AUTO: 213 10*3/MM3 (ref 140–450)
PMV BLD AUTO: 10.7 FL (ref 6–12)
POTASSIUM SERPL-SCNC: 4 MMOL/L (ref 3.5–5.2)
RBC # BLD AUTO: 4.83 10*6/MM3 (ref 3.77–5.28)
SODIUM SERPL-SCNC: 139 MMOL/L (ref 136–145)
WBC # BLD AUTO: 7.63 10*3/MM3 (ref 3.4–10.8)

## 2021-11-11 PROCEDURE — 99214 OFFICE O/P EST MOD 30 MIN: CPT | Performed by: NURSE PRACTITIONER

## 2021-11-11 PROCEDURE — 83880 ASSAY OF NATRIURETIC PEPTIDE: CPT

## 2021-11-11 PROCEDURE — 80048 BASIC METABOLIC PNL TOTAL CA: CPT

## 2021-11-11 PROCEDURE — 85025 COMPLETE CBC W/AUTO DIFF WBC: CPT

## 2021-11-11 PROCEDURE — G0463 HOSPITAL OUTPT CLINIC VISIT: HCPCS

## 2021-11-11 NOTE — PROGRESS NOTES
[facility] [place] HEART [facility]      Patient Name: Inessa Peoples  :1940  Age: 81 y.o.  Sex: female  Referring Provider: Demar Stone MD   Primary Cardiologist: Demar Stone MD  Encounter Provider:  AGA Tolbert      Chief Complaint:   Chronic diastolic CHF      Congestive Heart Failure  Associated symptoms include shortness of breath. Pertinent negatives include no chest pain, fatigue, palpitations or unexpected weight change.    this 81-year-old female, known to this provider, comes to us today for further evaluation regarding her chronic diastolic heart failure.  Current diagnoses to include paroxysmal atrial fibrillation, prior DVT, obesity, hypertension, hyperlipidemia, hyperthyroidism, left bundle branch block status post placement of CRT, sarcoid lung disease, sleep apnea.    Historically she has been anticoagulated with Xarelto for her atrial fibrillation, however after developing a DVT she was transitioned to warfarin.  Echocardiogram in 2015 revealed an EF of 64% with grade 1 LV diastolic dysfunction and mild valvular heart disease.    Stress echocardiogram 2020 yielded LVEF of 63%, septal wall motion abnormality consistent with bundle branch block, normal LV diastolic function, post exercise ejection fraction 40%, global hypokinesis of LV post exercise, O2 sats diminished to 80% with exercise.  Subsequent cardiac catheterization 2020 yielded right dominant system with no significant CAD, LVEF 50 to 55%.  Given severe reduction of LVEF under stress she underwent CRT pacemaker placement.    AF burden on device check in 2021 was 0.    2021 message was received from the patient stating that she was having some hypotension with blood pressure as low as 90 systolically as well as some lower extremity edema.  Dr. Stone reduced her losartan by half and increased her spironolactone to 50 mg daily.    She was transitioned to metoprolol early   2021.    She did unfortunately develop a yeast infection once starting Jardiance, as well as a UTI.      She is currently feeling better, per her report.  She did have to discontinue her Jardiance due to her yeast infection and UTI.  Her chief complaint today is intermittent dyspnea on exertion.      The following portions of the patient's history were reviewed and updated as appropriate: allergies, current medications, past family history, past medical history, past social history, past surgical history and problem list.    Current Outpatient Medications   Medication Sig Dispense Refill   • acetaminophen (TYLENOL) 650 MG 8 hr tablet Take 650 mg by mouth Every 8 (Eight) Hours As Needed for Mild Pain .     • Calcium-Phosphorus-Vitamin D (CITRACAL +D3 PO) Take 1 tablet by mouth Daily.     • celecoxib (CeleBREX) 200 MG capsule TAKE 1 CAPSULE EVERY DAY 90 capsule 0   • ezetimibe (ZETIA) 10 MG tablet Take 1 tablet by mouth Daily. 90 tablet 0   • fexofenadine (ALLEGRA) 180 MG tablet Take 180 mg by mouth Daily.     • furosemide (LASIX) 20 MG tablet Take 1 tablet by mouth Daily. (Patient taking differently: Take 20 mg by mouth Every Other Day.) 90 tablet 3   • levalbuterol (XOPENEX) 0.31 MG/3ML nebulizer solution Take 1 ampule by nebulization Every 4 (Four) Hours As Needed for Wheezing.     • levothyroxine (SYNTHROID, LEVOTHROID) 25 MCG tablet Take 1 tablet by mouth Daily. 90 tablet 0   • melatonin 5 MG sublingual tablet sublingual tablet Take 5 mg by mouth Every Night.     • metoprolol succinate XL (TOPROL-XL) 25 MG 24 hr tablet Take 0.5 tablets by mouth Daily. 45 tablet 3   • Multiple Vitamin (MULTI-DAY VITAMINS) tablet Take 1 tablet by mouth daily.     • sacubitril-valsartan (ENTRESTO) 24-26 MG tablet Take 1 tablet by mouth 2 (Two) Times a Day. 60 tablet 5   • spironolactone (ALDACTONE) 25 MG tablet TAKE 1 TABLET EVERY DAY 60 tablet 0   • Triamcinolone Acetonide (NASACORT) 55 MCG/ACT nasal inhaler 2 sprays into the  nostril(s) as directed by provider Daily.     • warfarin (COUMADIN) 3 MG tablet Take 1 tablet by mouth Daily. 90 tablet 0   • empagliflozin (Jardiance) 10 MG tablet tablet Take 1 tablet by mouth Daily. 14 tablet 0   • fluconazole (Diflucan) 150 MG tablet Take 1 tablet by mouth Daily. 2 tablet 0   • omeprazole (priLOSEC) 40 MG capsule Take 40 mg by mouth Daily.       No current facility-administered medications for this encounter.       Past Medical History:   Diagnosis Date   • Abnormal ECG    • Allergic rhinitis    • Atrial fibrillation (HCC)    • Bleeds easily (HCC)     warfarin   • Breast screening    • Broken bones    • Calf DVT (deep venous thrombosis) (HCC)    • Chronic anticoagulation    • Class 2 severe obesity due to excess calories with serious comorbidity and body mass index (BMI) of 37.0 to 37.9 in adult (HCC)    • Colon polyp    • CTS (carpal tunnel syndrome)    • Deep vein thrombosis (HCC)     left calf   • Diverticulitis of colon    • AHN (dyspnea on exertion)    • Fatigue    • H/O malignant melanoma of skin    • Heart failure with preserved ejection fraction, NYHA class II (HCC)     II-III---decreased EF with stress   • Hematuria, gross    • History of colon polyps    • History of kidney stones    • Hyperlipidemia    • Hypertension    • Hyperthyroidism    • IFG (impaired fasting glucose)    • Insomnia    • LBBB (left bundle branch block)    • Left leg swelling    • LLQ abdominal pain    • Long term current use of systemic steroids    • Low back pain    • Malignant melanoma of skin (HCC)    • Neck pain    • Obesity     class 2 obesity due to excess calories with BMI of 35.0 to 35.9 in adult   • Osteoarthritis    • Osteopenia    • Paralysis, diaphragm    • Paroxysmal atrial fibrillation (HCC)    • 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 Lu   • Sleep apnea     USES CPAP   • UTI (urinary tract infection)    • Visit for screening mammogram        Past Surgical History:   Procedure Laterality Date   • CARDIAC CATHETERIZATION N/A 6/29/2020    Procedure: Coronary angiography, [place] L;  Surgeon: Noe Reynoso MD;  [facility];  Service: Cardiovascular;  Laterality: N/A;   • CARDIAC CATHETERIZATION N/A 6/29/2020    Procedure: Left ventriculography;  Surgeon: Noe Reynoso MD;  Location:  [facility] CATH INVASIVE LOCATION;  Service: Cardiovascular;  Laterality: N/A;   • CARDIAC CATHETERIZATION N/A 6/29/2020    Procedure: Right and Left Heart Cath;  Surgeon: Noe Reynoso MD;  [facility];  Service: Cardiovascular;  Laterality: N/A;   • CARDIAC CATHETERIZATION     • CARDIAC ELECTROPHYSIOLOGY PROCEDURE N/A 1/5/2021    Procedure: Biventricular Device Insertion -- CRT-p ([employer]tronic);  Surgeon: Demar Stone MD;  Location: [facility] CATH INVASIVE LOCATION;  Service: Cardiology;  Laterality: N/A;   • CARPAL TUNNEL RELEASE Bilateral 1980'S    [facility] SURGEONS   • CATARACT EXTRACTION Bilateral 2001    Dr. Nic Hidalgo   • COLONOSCOPY N/A 06/26/2013    MILD SIGMOID DIVERTICULOSIS, repeat 5 years based on sister w/ colona cancer-DR. NASRA CASTRO   • COLONOSCOPY N/A 10/19/2010    SIGMOID DIVERTICULOSIS & INTERNAL HEMORRHOIDS, HEMORRHOIDAL BANDING X4, DR. NASRA CASTRO   • COLONOSCOPY N/A 8/6/2018    Procedure: COLONOSCOPY TO CECUM AND INTO TERMINAL ILEUM;  Surgeon: Nasra Castro MD;  Location: [facility] ENDOSCOPY;  Service: Gastroenterology   • COLONOSCOPY N/A 10/4/2021    Procedure: COLONOSCOPY to CECUM;  Surgeon: Nasra Castro MD;  Location: [facility] ENDOSCOPY;  Service: General;  Laterality: N/A;  FAMILY HX COLON CANCER  --DIVERTICULOSIS    • COLONOSCOPY W/ BIOPSIES N/A 05/12/2008    RECTUM BIOPSY: COLONIC MUCOSA W/ FOCAL INCREASE OF INFLAMMATORY  CELLS IN THE MUSCULARIS MUCOSA INCLUDING EOSINOPHILS, SIGMOID DIVERTICULOSIS, POSSIBLE PROCTITIS, DR. TANISHA THORNTON   • CYSTOSCOPY, RETROGRADE PYELOGRAM AND STENT INSERTION Bilateral 10/10/2014    w/ Right ureteral pyeloscopy & laser lithotripsy, Right Double-J stent placement-Dr. Julio Tai   • ENDOSCOPY N/A 11/11/2020    Procedure: ESOPHAGOGASTRODUODENOSCOPY with biopsies and dilation 54fr fernández;  Surgeon: Garret Piedra MD;  Location[facility];  Service: Gastroenterology;  Laterality: N/A;  pre- dysphagia  post-- gastritis, dilation of esophageal ring, watermelon stomach    • EXTRACORPOREAL SHOCK WAVE LITHOTRIPSY (ESWL) Right 09/10/2009    DR. JULIO TAI   • HAMMER TOE REPAIR     • LAPAROSCOPIC CHOLECYSTECTOMY N/A 07/19/2012    DR. TANISHA THORNTON   • LUMBAR EPIDURAL INJECTION N/A 04/23/2015    LUMBAR EPIDURAL STEROID INJECTION X3   • MEDIAL BRANCH BLOCK Bilateral 11/2/2021    Procedure: MEDIAL BRANCH BLOCK--bilateral Lumbar2-5;  Surgeon: Wolfgang Means MD;  Location: [facility] [place] [facility] MAIN OR;  Service: Pain Management;  Laterality: Bilateral;   • PACEMAKER IMPLANTATION     • PARATHYROIDECTOMY N/A 2001   • SACROILIAC JOINT INJECTION Left 6/15/2021    Procedure: SACROILIAC INJECTION-- left and left greater trochanteric bursa injection;  Surgeon: Wolfgang Means MD;  Location: [facility] [place] MAIN OR;  Service: Pain Management;  Laterality: Left;   • SKIN CANCER EXCISION     • THUMB ARTHROSCOPY Right 1982   • TOTAL KNEE ARTHROPLASTY Right 03/25/2014    [employer] Vanguard total knee, 55 femoral component, 71 tibial tray w/ a 40 mm stem, 34 mm x 7.8 mm three-peg patella, and a 14 standard poly-Dr. Ty Canchola       Physical Exam  Vitals and nursing note reviewed.   Constitutional:       General: She is not in acute distress.     Appearance: She is well-developed. She is obese. She is not ill-appearing.   HENT:      Head: Normocephalic and atraumatic.   Eyes:      Conjunctiva/sclera: Conjunctivae normal.       "Pupils: Pupils are equal, round, and reactive to light.   Neck:      Vascular: No JVD.   Cardiovascular:      Rate and Rhythm: Normal rate and regular rhythm.      Heart sounds: Normal heart sounds. No murmur heard.  No friction rub. No gallop.    Pulmonary:      Effort: Pulmonary effort is normal. No respiratory distress.      Breath sounds: Normal breath sounds.   Abdominal:      General: Bowel sounds are normal. There is no distension.      Palpations: Abdomen is soft.   Musculoskeletal:         General: No swelling or deformity.   Skin:     General: Skin is warm and dry.      Capillary Refill: Capillary refill takes less than 2 seconds.   Neurological:      Mental Status: She is alert and oriented to person, place, and time. Mental status is at baseline.   Psychiatric:         Mood and Affect: Mood normal.         Behavior: Behavior normal.          Review of Systems   Constitutional: Negative for fatigue and unexpected weight change.   HENT: Negative for congestion and nosebleeds.    Eyes: Negative for photophobia and visual disturbance.   Respiratory: Positive for shortness of breath. Negative for cough and chest tightness.         On exertion; stable per baseline   Cardiovascular: Negative for chest pain, palpitations and leg swelling.   Gastrointestinal: Negative for abdominal distention and blood in stool.   Endocrine: Negative for polyphagia and polyuria.   Genitourinary: Negative for frequency and urgency.   Musculoskeletal: Negative for joint swelling and myalgias.   Skin: Negative for pallor and rash.   Neurological: Negative for dizziness, syncope, weakness, light-headedness, numbness and headaches.   Hematological: Does not bruise/bleed easily.   Psychiatric/Behavioral: Negative for confusion and sleep disturbance.        OBJECTIVE:  /72 (BP Location: Right arm, Patient Position: Sitting)   Pulse 75   Ht 158.8 cm (62.52\")   Wt 88.3 kg (194 lb 9.6 oz)   SpO2 96%   BMI 35.00 kg/m²      Body " mass index is 35 kg/m².  Wt Readings from Last 1 Encounters:   11/11/21 88.3 kg (194 lb 9.6 oz)       Lab Review:  Renal Function: CrCl cannot be calculated (Patient's most recent lab result is older than the maximum 30 days allowed.).    Lab Results   Component Value Date    PROBNP 69.2 09/24/2021       Results for orders placed during the hospital encounter of 01/05/21    Adult Transthoracic Echo Limited W/ Cont if Necessary Per Protocol    Interpretation Summary  · Limited echo requested to assess LV function  · Estimated left ventricular EF = 70% Left ventricular systolic function is normal.      Procedures      6 MINUTE WALK                      Cardiac Procedures:  1. 6/29/2020 Cardiac catheterization   Hemodynamics:  1.  Cardiac output (Trino): 3.28 L/min  2.  Cardiac index (Trino): 1.7 L/min/m²  3.  Right atrium: A wave 9, V wave 7, mean 6  4.  Right ventricle: 28/4  5.  Pulmonary artery, 28/12, mean 18  6.  PCW: A wave 10, V wave 11, mean is 9  7.  Aorta: 140/65, mean 91  8.  Left ventricle: 140/8     Cineangiography:  1.  Left main: The left main coronary artery is virtually nonexistent.  There is almost a common ostium of the LAD and left circumflex.  2.  LAD: The left anterior descending coronary artery is a tortuous vessel.  The artery appears to have minor plaquing in the proximal and mid segments but otherwise no significant stenosis.  The distal portion is tortuous.  The diagonal branches are small but normal.  3.  LCx: The left circumflex coronary artery is a large vessel.  The proximal portion of the circumflex appears to be normal.  The mid and distal segments are normal as well.  There is a major branching marginal system arising from the mid segment that appears to be normal.  The distal marginal is small and normal.  4.  RCA: The right coronary artery is a dominant vessel.  The right coronary artery has minimal plaquing in the proximal and mid segments.  The distal segment is normal.  The  posterior descending branch appears to be tortuous but normal.     Left ventriculography: Overall size of the ventricle is normal.  The global contractility of the ventricle is within normal limits with an estimated ejection fraction of 50 to 55%.     Assessment:  1.  Normal coronary arteries  2.  Normal intracardiac pressures  3.  Normal left ventricular function  4.  Mildly reduced cardiac output and cardiac index.       Previously trialed diuretics  Spironolactone      Previously trialed GDMT:    Spironolactone  Carvedilol  Losartan  Entresto  Jardiance    ASSESSMENT:     Diagnosis Plan   1. Heart failure with preserved ejection fraction, NYHA class II (CMS/HCC)II-III---decreased EF with stress  CBC & Differential    BNP    Basic Metabolic Panel   2. Paroxysmal atrial fibrillation ([facility])     3. Primary hypertension     4. ANY on CPAP + 10 - Dr Lu     5. Hyperlipidemia, unspecified hyperlipidemia type           PLAN OF CARE:  1.  HFpEF-NYHA class II.  Most recent ejection fraction is greater than 70% per echocardiogram.  On stress echo however, patient is noted to have a significantly reduced LVEF under stress of 40%.  Current guideline directed medical therapy to include metoprolol, spironolactone, and Entresto.    She is currently euvolemic on exam.  I would like to continue her current GDMT.  I will check labs today as below.  She is to continue a low-sodium diet of 2000 mg daily or less, fluid restriction of 1500 mL daily, as well as adherence with daily weights.    Directions for when to call the clinic reviewed with the patient to include weight gain of 2 to 3 pounds in 24 hours, weight gain of 5 to 10 pounds within 7 days; worsening shortness of breath; worsening lower extremity edema or abdominal distention.    2.  Hypertension-stable and controlled.     3.  Paroxysmal atrial fibrillation-stable and rate controlled on beta-blockade, anticoagulated on warfarin.  Managed by Dr. Stone.      4.   Obstructive sleep apnea-compliant with CPAP therapy.     5.  Left bundle branch block-presence of CRT-P noted given symptomatology.    6.  Hyperlipidemia-LDL poorly controlled.  She is intolerant to statin therapy and fenofibrate.  Currently on Zetia with poor results.  I think she could benefit from PCSK9 inhibitor, will defer this to her PCP.      Continue current GDMT; BMP/BNP/CBC today; follow-up in 3 months or sooner if needed      Thank you for allowing me to participate in the care of your patient,        AGA Tolbert  \[facility]\"" HEART FAILURE  11/11/21  13:56 EDT      **Krishna Disclaimer:**  Much of this encounter note is an electronic transcription/translation of spoken language to printed text. The electronic translation of spoken language may permit erroneous, or at times, nonsensical words or phrases to be inadvertently transcribed. Although I have reviewed the note for such errors, some may still exist.

## 2021-11-12 ENCOUNTER — HOSPITAL ENCOUNTER (OUTPATIENT)
Dept: PHYSICAL THERAPY | Facility: HOSPITAL | Age: 81
Setting detail: THERAPIES SERIES
Discharge: HOME | End: 2021-11-12

## 2021-11-12 DIAGNOSIS — M25.552 LEFT HIP PAIN: ICD-10-CM

## 2021-11-12 DIAGNOSIS — M54.50 LUMBAR PAIN: Primary | ICD-10-CM

## 2021-11-12 DIAGNOSIS — R53.1 WEAKNESS: ICD-10-CM

## 2021-11-12 PROCEDURE — 97110 THERAPEUTIC EXERCISES: CPT | Performed by: PHYSICAL THERAPIST

## 2021-11-12 NOTE — THERAPY TREATMENT NOTE
Outpatient Physical Therapy Ortho Treatment Note  [facility]     Patient Name: Inessa Peoples  : 1940  MRN: [id number]  Today's Date: 2021      Visit Date: 2021    Visit Dx:    ICD-10-CM ICD-9-CM   1. Lumbar pain  M54.50 724.2   2. Left hip pain  M25.552 719.45   3. Weakness  R53.1 780.79       Patient Active Problem List   Diagnosis   • Sarcoidosis   • Diaphragmatic paralysis   • Osteopenia   • Impaired fasting glucose   • Hypothyroidism   • Hypertension   • Hyperlipidemia   • Chronic osteoarthritis   • GERD (gastroesophageal reflux disease)   • ANY on CPAP + 10 - Dr Lu   • Diverticulitis of large intestine without perforation or abscess without bleeding   • Chronic anticoagulation   • Dilation of thoracic aorta (HCC)   • Chronic pain   • Bulge of lumbar disc without myelopathy   • Class 2 severe obesity due to excess calories with serious comorbidity and body mass index (BMI) of 35.0 to 35.9 in adult (HCC)   • Lumbar facet arthropathy   • Trochanteric bursitis of both hips   • LBBB (left bundle branch block)   • Paroxysmal atrial fibrillation (HCC)   • Heart failure with preserved ejection fraction, NYHA class II (CMS/HCC)II-III---decreased EF with stress        Past Medical History:   Diagnosis Date   • Abnormal ECG    • Allergic rhinitis    • Atrial fibrillation (HCC)    • Bleeds easily (HCC)     warfarin   • Breast screening    • Broken bones    • Calf DVT (deep venous thrombosis) (HCC)    • Chronic anticoagulation    • Class 2 severe obesity due to excess calories with serious comorbidity and body mass index (BMI) of 37.0 to 37.9 in adult (HCC)    • Colon polyp    • CTS (carpal tunnel syndrome)    • Deep vein thrombosis (HCC)     left calf   • Diverticulitis of colon    • AHN (dyspnea on exertion)    • Fatigue    • H/O malignant melanoma of skin    • Heart failure with preserved ejection fraction, NYHA class II (HCC)     II-III---decreased EF with stress   • Hematuria, gross     • History of colon polyps    • History of kidney stones    • Hyperlipidemia    • Hypertension    • Hyperthyroidism    • IFG (impaired fasting glucose)    • Insomnia    • LBBB (left bundle branch block)    • Left leg swelling    • LLQ abdominal pain    • Long term current use of systemic steroids    • Low back pain    • Malignant melanoma of skin (HCC)    • Neck pain    • Obesity     class 2 obesity due to excess calories with BMI of 35.0 to 35.9 in adult   • Osteoarthritis    • Osteopenia    • Paralysis, diaphragm    • Paroxysmal atrial fibrillation (HCC)    • 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 Lu   • Sleep apnea     USES CPAP   • UTI (urinary tract infection)    • Visit for screening mammogram         Past Surgical History:   Procedure Laterality Date   • CARDIAC CATHETERIZATION N/A 6/29/2020    Procedure: Coronary angiography, Sigel L;  Surgeon: Noe Reynoso MD;  Location: [facility] CATH INVASIVE LOCATION;  Service: Cardiovascular;  Laterality: N/A;   • CARDIAC CATHETERIZATION N/A 6/29/2020    Procedure: Left ventriculography;  Surgeon: Noe Reynoso MD;  Location: [facility] CATH INVASIVE LOCATION;  Service: Cardiovascular;  Laterality: N/A;   • CARDIAC CATHETERIZATION N/A 6/29/2020    Procedure: Right and Left Heart Cath;  Surgeon: Noe Reynoso MD;  Location: [facility] CATH INVASIVE LOCATION;  Service: Cardiovascular;  Laterality: N/A;   • CARDIAC CATHETERIZATION     • CARDIAC ELECTROPHYSIOLOGY PROCEDURE N/A 1/5/2021    Procedure: Biventricular Device Insertion -- CRT-p ([employer]tr[employer]);  Surgeon: Demar Stone MD;  Location: [facility] CATH INVASIVE LOCATION;  Service: Cardiology;  Laterality: N/A;   • CARPAL TUNNEL RELEASE Bilateral 1980'S    [facility]   • CATARACT EXTRACTION  Bilateral 2001    Dr. Nic Hidalgo   • COLONOSCOPY N/A 06/26/2013    MILD SIGMOID DIVERTICULOSIS, repeat 5 years based on sister w/ colona cancer-DR. NASRA CASTRO   • COLONOSCOPY N/A 10/19/2010    SIGMOID DIVERTICULOSIS & INTERNAL HEMORRHOIDS, HEMORRHOIDAL BANDING X4, DR. NASRA CASTRO   • COLONOSCOPY N/A 8/6/2018    Procedure: COLONOSCOPY TO CECUM AND INTO TERMINAL ILEUM;  Surgeon: Nasra Castro MD;  Location: [facility] ENDOSCOPY;  Service: Gastroenterology   • COLONOSCOPY N/A 10/4/2021    Procedure: COLONOSCOPY to CECUM;  Surgeon: Nasra Castro MD;  Location: [facility] ENDOSCOPY;  Service: General;  Laterality: N/A;  FAMILY HX COLON CANCER  --DIVERTICULOSIS    • COLONOSCOPY W/ BIOPSIES N/A 05/12/2008    RECTUM BIOPSY: COLONIC MUCOSA W/ FOCAL INCREASE OF INFLAMMATORY CELLS IN THE MUSCULARIS MUCOSA INCLUDING EOSINOPHILS, SIGMOID DIVERTICULOSIS, POSSIBLE PROCTITIS, DR. NASRA CASTRO   • CYSTOSCOPY, RETROGRADE PYELOGRAM AND STENT INSERTION Bilateral 10/10/2014    w/ Right ureteral pyeloscopy & laser lithotripsy, Right Double-J stent placement-Dr. Julio Tai   • ENDOSCOPY N/A 11/11/2020    Procedure: ESOPHAGOGASTRODUODENOSCOPY with biopsies and dilation 54fr fernández;  Surgeon: Garret Piedra MD;  Location: [facility] ENDOSCOPY;  Service: Gastroenterology;  Laterality: N/A;  pre- dysphagia  post-- gastritis, dilation of esophageal ring, watermelon stomach    • EXTRACORPOREAL SHOCK WAVE LITHOTRIPSY (ESWL) Right 09/10/2009    DR. JULIO TAI   • HAMMER TOE REPAIR     • LAPAROSCOPIC CHOLECYSTECTOMY N/A 07/19/2012    DR. NASRA CASTRO   • LUMBAR EPIDURAL INJECTION N/A 04/23/2015    LUMBAR EPIDURAL STEROID INJECTION X3   • MEDIAL BRANCH BLOCK Bilateral 11/2/2021    Procedure: MEDIAL BRANCH BLOCK--bilateral Lumbar2-5;  Surgeon: Wolfgang Means MD;  Location: [facility] OR;  Service: Pain Management;  Laterality: Bilateral;   • PACEMAKER IMPLANTATION     • PARATHYROIDECTOMY N/A 2001   • SACROILIAC JOINT INJECTION  Left 6/15/2021    Procedure: SACROILIAC INJECTION-- left and left greater trochanteric bursa injection;  Surgeon: Wolfgang Means MD;  [place] [facility] [place];  Service: Pain Management;  Laterality: Left;   • SKIN CANCER EXCISION     • THUMB ARTHROSCOPY Right 1982   • TOTAL KNEE ARTHROPLASTY Right 03/25/2014    [employer] Vanguard total knee, 55 femoral component, 71 tibial tray w/ a 40 mm stem, 34 mm x 7.8 mm three-peg patella, and a 14 standard poly-Dr. Ty Canchola        PT Ortho     Row Name 11/12/21 1500       Head/Neck/Trunk    Trunk Extension AROM WNL  -JS    Trunk Flexion AROM WNL  -JS    Trunk Lt Lateral Flexion AROM WNL with less  discomfort than opp side  -JS    Trunk Rt Lateral Flexion AROM 25% dec with mild discomfort  -JS    Trunk Lt Rotation AROM WNL  -JS    Trunk Rt Rotation AROM WNL  -JS    Row Name 11/12/21 1445       Subjective Comments    Subjective Comments Back pain has improved since injection. Improved ability to walk in the store.  -JS       Subjective Pain    Able to rate subjective pain? yes  -JS    Pre-Treatment Pain Level 0  -JS       Head/Neck/Trunk    Trunk Extension AROM WNL  -JS    Trunk Flexion AROM WNL  -JS    Trunk Lt Lateral Flexion AROM WNL with less discomfort than opp side  -JS    Trunk Rt Lateral Flexion AROM 25% with mild discomfort  -JS    Trunk Lt Rotation AROM WNL  -JS    Trunk Rt Rotation AROM WNL  -JS       MMT Right Lower Ext    Rt Hip Flexion MMT, Gross Movement (4+/5) good plus  -JS    Rt Hip Extension MMT, Gross Movement (4/5) good  -JS    Rt Hip ABduction MMT, Gross Movement (3+/5) fair plus  -JS    Rt Knee Extension MMT, Gross Movement (5/5) normal  -JS    Rt Knee Flexion MMT, Gross Movement (5/5) normal  -JS    Rt Ankle Dorsiflexion MMT, Gross Movement (5/5) normal  -JS       MMT Left Lower Ext    Lt Hip Flexion MMT, Gross Movement (4+/5) good plus  -JS    Lt Hip Extension MMT, Gross Movement (4-/5) good minus  -JS    Lt Hip ABduction MMT, Gross Movement (3+/5)  fair plus  -JS    Lt Knee Extension MMT, Gross Movement (5/5) normal  -JS    Lt Knee Flexion MMT, Gross Movement (5/5) normal  -JS    Lt Ankle Dorsiflexion MMT, Gross Movement (5/5) normal  -JS          User Key  (r) = Recorded By, (t) = Taken By, (c) = Cosigned By    Initials Name Provider Type    JS Sunshine Call, PT Physical Therapist                             PT Assessment/Plan     Row Name 11/12/21 8826          PT Assessment    Assessment Comments Inessa Peoples has been seen for 4 physical therapy sessions for LBP, L hip pain.  Treatment has included therapeutic exercise and patient education with home exercise program . Progress to physical therapy goals is goodwith 2/3 STG's met/partially met and 1/4 LTG's partially met with progress in remaining goals.  She will benefit from continued skilled physical therapy to address remaining impairments and functional limitations.  -JS     Please refer to paper survey for additional self-reported information Yes  -JS     Rehab Potential Good  -JS     Patient/caregiver participated in establishment of treatment plan and goals Yes  -JS     Patient would benefit from skilled therapy intervention Yes  -JS            PT Plan    PT Frequency 1x/week  -JS     Predicted Duration of Therapy Intervention (PT) 4 visits  -JS     PT Plan Comments Continue PT 1x/week to address remaining goals. Pt scheduled 1x next week, with plans to look at remaining schedule to determine which day of week is best for remaining visits & notify office at next appointment.  -JS           User Key  (r) = Recorded By, (t) = Taken By, (c) = Cosigned By    Initials Name Provider Type    Sunshine Gurrola, PT Physical Therapist                   OP Exercises     Row Name 11/12/21 9738             Subjective Comments    Subjective Comments Back pain has improved since injection. Improved ability to walk in the store.  -JS              Subjective Pain    Able to rate subjective pain? yes  -JS       Pre-Treatment Pain Level 0  -JS              Total Minutes    50224 - PT Therapeutic Exercise Minutes 45  -JS              Exercise 1    Exercise Name 1 nu step  -JS      Cueing 1 Verbal  -JS      Time 1 5 min, lvl 5  -JS              Exercise 2    Exercise Name 2 LTR  -JS      Cueing 2 Verbal  -JS      Sets 2 1  -JS      Reps 2 10  -JS      Time 2 5s  -JS              Exercise 3    Exercise Name 3 PPT  -JS      Cueing 3 Verbal; Tactile  -JS      Sets 3 1  -JS      Reps 3 10  -JS      Time 3 5s  -JS              Exercise 4    Exercise Name 4 supine piriformis stretch  -JS      Cueing 4 Verbal; Tactile  -JS      Reps 4 3b  -JS      Time 4 20s  -JS              Exercise 5    Exercise Name 5 SL open book  -JS      Cueing 5 Verbal  -JS      Sets 5 1  -JS      Reps 5 10  -JS      Time 5 2-3sec  -JS              Exercise 6    Exercise Name 6 bridge  -JS      Cueing 6 Verbal  -JS      Sets 6 2  -JS      Reps 6 10  -JS              Exercise 7    Exercise Name 7 HL hip add  -JS      Cueing 7 Verbal  -JS      Sets 7 2  -JS      Reps 7 10  -JS      Time 7 5 sec  -JS              Exercise 8    Exercise Name 8 HL hip abd  -JS      Cueing 8 Verbal  -JS      Sets 8 2  -JS      Reps 8 10  -JS      Time 8 GTB  -JS              Exercise 9    Exercise Name 9 seated HS str  -JS      Cueing 9 Verbal; Demo  -JS      Reps 9 3 b  -JS      Time 9 20 sec  -JS              Exercise 10    Exercise Name 10 STS  -JS      Time 10 resume next visit  -JS              Exercise 11    Exercise Name 11 tband shldr ext  -JS      Cueing 11 Verbal  -JS      Sets 11 2  -JS      Reps 11 10  -JS      Time 11 GTB  -JS              Exercise 12    Exercise Name 12 row  -JS      Cueing 12 Verbal  -JS      Reps 12 2x10  -JS      Time 12 GTB  -JS              Exercise 13    Exercise Name 13 side steps  -JS      Cueing 13 Verbal  -JS      Reps 13 3 laps at barre  -JS      Additional Comments YTB, cues for toes fwd  -JS              Exercise 14    Exercise Name 14  Standing heel raises  -JS      Cueing 14 Verbal; Demo  -JS      Reps 14 15x (double limb)  -JS              Exercise 15    Exercise Name 15 Standing gastroc stretch (runner's position)  -JS      Cueing 15 Verbal; Demo  -JS      Reps 15 3  -JS      Time 15 20 sec  -JS            User Key  (r) = Recorded By, (t) = Taken By, (c) = Cosigned By    Initials Name Provider Type    JS Sunshine Call, PT Physical Therapist                              PT OP Goals     Row Name 11/12/21 1445          PT Short Term Goals    STG Date to Achieve 11/13/21  -JS     STG 1 Patient will be independent with education for symptom management and initial HEP to decrease LBP pain.  -JS     STG 1 Progress Met  -JS     STG 1 Progress Comments Met for initial HEP  -JS     STG 2 Pt will improve lumbar ROM to WNL with </=2/10 pain in all cardinal planes for improved mobility and participation in ADLs.  -JS     STG 2 Progress Progressing; Partially Met  -JS     STG 2 Progress Comments Met for all except R lateral flex (see ortho)  -JS     STG 3 Patient will improve standing tolerance from 10 min to >30 min without LBP to improve participation in community activities.  -JS     STG 3 Progress Ongoing; Progressing  -JS     STG 3 Progress Comments Standing tolerance 15 min per pt  -JS            Long Term Goals    LTG Date to Achieve 12/13/21  -JS     LTG 1 Patient will be independent with education for symptom management and advanced HEP to decrease LBP pain.  -JS     LTG 1 Progress Ongoing  -JS     LTG 2 Patient will reduce level of percieved disability as measured by the Modified Oswestry  from 50% disability to </= 30% disability in order to improve quality of life.  -JS     LTG 2 Progress Ongoing; Progressing  -JS     LTG 2 Progress Comments 34%  -JS     LTG 3 Patient will improve walking tolerance from 5 min to >30 min without LBP to improve participation in community activities.  -JS     LTG 3 Progress Ongoing; Progressing  -JS     LTG 3 Progress  Comments Walking tolerance 15-20 min per pt  -JS     LTG 4 Pt will improve B LE strength to at least 4+/5 to demonstate adequate strength to complete functional activities within the home and community.  -JS     LTG 4 Progress Partially Met; Progressing  -JS     LTG 4 Progress Comments Met except hip strength remains decreased (see ortho)  -JS           User Key  (r) = Recorded By, (t) = Taken By, (c) = Cosigned By    Initials Name Provider Type    Sunshine Gurrola, PT Physical Therapist                     Outcome Measure Options: Analilia Nguyen         Time Calculation:   Start Time: 1445  Stop Time: 1530  Time Calculation (min): 45 min  Timed Charges  76372 - PT Therapeutic Exercise Minutes: 45  Total Minutes  Timed Charges Total Minutes: 45   Total Minutes: 45  Therapy Charges for Today     Code Description Service Date Service Provider Modifiers Qty    [id number] HC PT THER PROC EA 15 MIN 11/12/2021 Sunshine Call, PT GP 3          PT G-Codes  Outcome Measure Options: Analilia Call PT  11/12/2021

## 2021-11-12 NOTE — THERAPY PROGRESS REPORT/RE-CERT
Outpatient Physical Therapy Ortho Progress Note  [facility]     Patient Name: Inessa Peoples  : 1940  MRN: [id number]  Today's Date: 2021      Visit Date: 2021    Visit Dx:    ICD-10-CM ICD-9-CM   1. Lumbar pain  M54.50 724.2   2. Left hip pain  M25.552 719.45   3. Weakness  R53.1 780.79       Patient Active Problem List   Diagnosis   • Sarcoidosis   • Diaphragmatic paralysis   • Osteopenia   • Impaired fasting glucose   • Hypothyroidism   • Hypertension   • Hyperlipidemia   • Chronic osteoarthritis   • GERD (gastroesophageal reflux disease)   • ANY on CPAP + 10 - Dr Lu   • Diverticulitis of large intestine without perforation or abscess without bleeding   • Chronic anticoagulation   • Dilation of thoracic aorta (HCC)   • Chronic pain   • Bulge of lumbar disc without myelopathy   • Class 2 severe obesity due to excess calories with serious comorbidity and body mass index (BMI) of 35.0 to 35.9 in adult (HCC)   • Lumbar facet arthropathy   • Trochanteric bursitis of both hips   • LBBB (left bundle branch block)   • Paroxysmal atrial fibrillation (HCC)   • Heart failure with preserved ejection fraction, NYHA class II (CMS/HCC)II-III---decreased EF with stress        Past Medical History:   Diagnosis Date   • Abnormal ECG    • Allergic rhinitis    • Atrial fibrillation (HCC)    • Bleeds easily (HCC)     warfarin   • Breast screening    • Broken bones    • Calf DVT (deep venous thrombosis) (HCC)    • Chronic anticoagulation    • Class 2 severe obesity due to excess calories with serious comorbidity and body mass index (BMI) of 37.0 to 37.9 in adult (HCC)    • Colon polyp    • CTS (carpal tunnel syndrome)    • Deep vein thrombosis (HCC)     left calf   • Diverticulitis of colon    • AHN (dyspnea on exertion)    • Fatigue    • H/O malignant melanoma of skin    • Heart failure with preserved ejection fraction, NYHA class II (HCC)     II-III---decreased EF with stress   • Hematuria, gross   PATIENT PRESENTS TODAY FOR BP CHECK 142/68 PER BERNA CONTINUE WITH CURRENT MEDICATION DOSE, PATIENT STATES DR Kasia Reddy TOOK HER OFF THE LISINOPRIL  PATIENT WILL MAKE ANOTHER APPOINTMENT NEXT WEEK FOR BP CHECK, AND WILL MONITOR BP AT HOME AND BRING IN LOG PER DR Ksaia Reddy    • History of colon polyps    • History of kidney stones    • Hyperlipidemia    • Hypertension    • Hyperthyroidism    • IFG (impaired fasting glucose)    • Insomnia    • LBBB (left bundle branch block)    • Left leg swelling    • LLQ abdominal pain    • Long term current use of systemic steroids    • Low back pain    • Malignant melanoma of skin (HCC)    • Neck pain    • Obesity     class 2 obesity due to excess calories with BMI of 35.0 to 35.9 in adult   • Osteoarthritis    • Osteopenia    • Paralysis, diaphragm    • Paroxysmal atrial fibrillation (HCC)    • 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 Lu   • Sleep apnea     USES CPAP   • UTI (urinary tract infection)    • Visit for screening mammogram         Past Surgical History:   Procedure Laterality Date   • CARDIAC CATHETERIZATION N/A 6/29/2020    Procedure: Coronary angiography, [place] L;  Surgeon: Noe Reynoso MD;  Location: [facility] CATH INVASIVE LOCATION;  Service: Cardiovascular;  Laterality: N/A;   • CARDIAC CATHETERIZATION N/A 6/29/2020    Procedure: Left ventriculography;  Surgeon: Noe Reynoso MD;  Location: [facility] CATH INVASIVE LOCATION;  Service: Cardiovascular;  Laterality: N/A;   • CARDIAC CATHETERIZATION N/A 6/29/2020    Procedure: Right and Left Heart Cath;  Surgeon: Noe Reynoso MD;  Location: [facility] CATH INVASIVE LOCATION;  Service: Cardiovascular;  Laterality: N/A;   • CARDIAC CATHETERIZATION     • CARDIAC ELECTROPHYSIOLOGY PROCEDURE N/A 1/5/2021    Procedure: Biventricular Device Insertion -- CRT-p ([employer]tr[employer]);  Surgeon: Demar Stone MD;  Location: [facility] CATH INVASIVE LOCATION;  Service: Cardiology;  Laterality: N/A;   • CARPAL TUNNEL RELEASE Bilateral 1980'S    [facility]   • CATARACT EXTRACTION  Bilateral 2001    Dr. Nic Hidalgo   • COLONOSCOPY N/A 06/26/2013    MILD SIGMOID DIVERTICULOSIS, repeat 5 years based on sister w/ colona cancer-DR. NASRA CASTRO   • COLONOSCOPY N/A 10/19/2010    SIGMOID DIVERTICULOSIS & INTERNAL HEMORRHOIDS, HEMORRHOIDAL BANDING X4, DR. NASRA CASTRO   • COLONOSCOPY N/A 8/6/2018    Procedure: COLONOSCOPY TO CECUM AND INTO TERMINAL ILEUM;  Surgeon: Nasra Castro MD;  [facility] END[facility];  Service: Gastroenterology   • COLONOSCOPY N/A 10/4/2021    Procedure: COLONOSCOPY to CECUM;  Surgeon: Nasra Castro MD;  Location: [facility] ENDOSCOPY;  Service: General;  Laterality: N/A;  FAMILY HX COLON CANCER  --DIVERTICULOSIS    • COLONOSCOPY W/ BIOPSIES N/A 05/12/2008    RECTUM BIOPSY: COLONIC MUCOSA W/ FOCAL INCREASE OF INFLAMMATORY CELLS IN THE MUSCULARIS MUCOSA INCLUDING EOSINOPHILS, SIGMOID DIVERTICULOSIS, POSSIBLE PROCTITIS, DR. NASRA CASTRO   • CYSTOSCOPY, RETROGRADE PYELOGRAM AND STENT INSERTION Bilateral 10/10/2014    w/ Right ureteral pyeloscopy & laser lithotripsy, Right Double-J stent placement-Dr. Julio Tai   • ENDOSCOPY N/A 11/11/2020    Procedure: ESOPHAGOGASTRODUODENOSCOPY with biopsies and dilation 54fr fernández;  Surgeon: Garret Piedra MD;  Location: [facility] ENDOSCOPY;  Service: Gastroenterology;  Laterality: N/A;  pre- dysphagia  post-- gastritis, dilation of esophageal ring, watermelon stomach    • EXTRACORPOREAL SHOCK WAVE LITHOTRIPSY (ESWL) Right 09/10/2009    DR. JULIO TAI   • HAMMER TOE REPAIR     • LAPAROSCOPIC CHOLECYSTECTOMY N/A 07/19/2012    DR. NASRA CASTRO   • LUMBAR EPIDURAL INJECTION N/A 04/23/2015    LUMBAR EPIDURAL STEROID INJECTION X3   • MEDIAL BRANCH BLOCK Bilateral 11/2/2021    Procedure: MEDIAL BRANCH BLOCK--bilateral Lumbar2-5;  Surgeon: Wolfgang Means MD;  Location: [facility] OR;  Service: Pain Management;  Laterality: Bilateral;   • PACEMAKER IMPLANTATION     • PARATHYROIDECTOMY N/A 2001   • SACROILIAC JOINT INJECTION  Left 6/15/2021    Procedure: SACROILIAC INJECTION-- left and left greater trochanteric bursa injection;  Surgeon: Wolfgang Means MD;  [place] [facility] [place];  Service: Pain Management;  Laterality: Left;   • SKIN CANCER EXCISION     • THUMB ARTHROSCOPY Right 1982   • TOTAL KNEE ARTHROPLASTY Right 03/25/2014    [employer] Vanguard total knee, 55 femoral component, 71 tibial tray w/ a 40 mm stem, 34 mm x 7.8 mm three-peg patella, and a 14 standard poly-Dr. Ty Canchola        PT Ortho     Row Name 11/12/21 1500       Head/Neck/Trunk    Trunk Extension AROM WNL  -JS    Trunk Flexion AROM WNL  -JS    Trunk Lt Lateral Flexion AROM WNL with less  discomfort than opp side  -JS    Trunk Rt Lateral Flexion AROM 25% dec with mild discomfort  -JS    Trunk Lt Rotation AROM WNL  -JS    Trunk Rt Rotation AROM WNL  -JS    Row Name 11/12/21 1445       Subjective Comments    Subjective Comments Back pain has improved since injection. Improved ability to walk in the store.  -JS       Subjective Pain    Able to rate subjective pain? yes  -JS    Pre-Treatment Pain Level 0  -JS       Head/Neck/Trunk    Trunk Extension AROM WNL  -JS    Trunk Flexion AROM WNL  -JS    Trunk Lt Lateral Flexion AROM WNL with less discomfort than opp side  -JS    Trunk Rt Lateral Flexion AROM 25% with mild discomfort  -JS    Trunk Lt Rotation AROM WNL  -JS    Trunk Rt Rotation AROM WNL  -JS       MMT Right Lower Ext    Rt Hip Flexion MMT, Gross Movement (4+/5) good plus  -JS    Rt Hip Extension MMT, Gross Movement (4/5) good  -JS    Rt Hip ABduction MMT, Gross Movement (3+/5) fair plus  -JS    Rt Knee Extension MMT, Gross Movement (5/5) normal  -JS    Rt Knee Flexion MMT, Gross Movement (5/5) normal  -JS    Rt Ankle Dorsiflexion MMT, Gross Movement (5/5) normal  -JS       MMT Left Lower Ext    Lt Hip Flexion MMT, Gross Movement (4+/5) good plus  -JS    Lt Hip Extension MMT, Gross Movement (4-/5) good minus  -JS    Lt Hip ABduction MMT, Gross Movement (3+/5)  fair plus  -JS    Lt Knee Extension MMT, Gross Movement (5/5) normal  -JS    Lt Knee Flexion MMT, Gross Movement (5/5) normal  -JS    Lt Ankle Dorsiflexion MMT, Gross Movement (5/5) normal  -JS          User Key  (r) = Recorded By, (t) = Taken By, (c) = Cosigned By    Initials Name Provider Type    JS Sunshine Call, PT Physical Therapist                             PT Assessment/Plan     Row Name 11/12/21 6593          PT Assessment    Assessment Comments Inessa Peoples has been seen for 4 physical therapy sessions for LBP, L hip pain.  Treatment has included therapeutic exercise and patient education with home exercise program . Progress to physical therapy goals is goodwith 2/3 STG's met/partially met and 1/4 LTG's partially met with progress in remaining goals.  She will benefit from continued skilled physical therapy to address remaining impairments and functional limitations.  -JS     Please refer to paper survey for additional self-reported information Yes  -JS     Rehab Potential Good  -JS     Patient/caregiver participated in establishment of treatment plan and goals Yes  -JS     Patient would benefit from skilled therapy intervention Yes  -JS            PT Plan    PT Frequency 1x/week  -JS     Predicted Duration of Therapy Intervention (PT) 4 visits  -JS     PT Plan Comments Continue PT 1x/week to address remaining goals. Pt scheduled 1x next week, with plans to look at remaining schedule to determine which day of week is best for remaining visits & notify office at next appointment.  -JS           User Key  (r) = Recorded By, (t) = Taken By, (c) = Cosigned By    Initials Name Provider Type    Sunshine Gurrola, PT Physical Therapist                   OP Exercises     Row Name 11/12/21 4543             Subjective Comments    Subjective Comments Back pain has improved since injection. Improved ability to walk in the store.  -JS              Subjective Pain    Able to rate subjective pain? yes  -JS       Pre-Treatment Pain Level 0  -JS              Total Minutes    21133 - PT Therapeutic Exercise Minutes 45  -JS              Exercise 1    Exercise Name 1 nu step  -JS      Cueing 1 Verbal  -JS      Time 1 5 min, lvl 5  -JS              Exercise 2    Exercise Name 2 LTR  -JS      Cueing 2 Verbal  -JS      Sets 2 1  -JS      Reps 2 10  -JS      Time 2 5s  -JS              Exercise 3    Exercise Name 3 PPT  -JS      Cueing 3 Verbal; Tactile  -JS      Sets 3 1  -JS      Reps 3 10  -JS      Time 3 5s  -JS              Exercise 4    Exercise Name 4 supine piriformis stretch  -JS      Cueing 4 Verbal; Tactile  -JS      Reps 4 3b  -JS      Time 4 20s  -JS              Exercise 5    Exercise Name 5 SL open book  -JS      Cueing 5 Verbal  -JS      Sets 5 1  -JS      Reps 5 10  -JS      Time 5 2-3sec  -JS              Exercise 6    Exercise Name 6 bridge  -JS      Cueing 6 Verbal  -JS      Sets 6 2  -JS      Reps 6 10  -JS              Exercise 7    Exercise Name 7 HL hip add  -JS      Cueing 7 Verbal  -JS      Sets 7 2  -JS      Reps 7 10  -JS      Time 7 5 sec  -JS              Exercise 8    Exercise Name 8 HL hip abd  -JS      Cueing 8 Verbal  -JS      Sets 8 2  -JS      Reps 8 10  -JS      Time 8 GTB  -JS              Exercise 9    Exercise Name 9 seated HS str  -JS      Cueing 9 Verbal; Demo  -JS      Reps 9 3 b  -JS      Time 9 20 sec  -JS              Exercise 10    Exercise Name 10 STS  -JS      Time 10 resume next visit  -JS              Exercise 11    Exercise Name 11 tband shldr ext  -JS      Cueing 11 Verbal  -JS      Sets 11 2  -JS      Reps 11 10  -JS      Time 11 GTB  -JS              Exercise 12    Exercise Name 12 row  -JS      Cueing 12 Verbal  -JS      Reps 12 2x10  -JS      Time 12 GTB  -JS              Exercise 13    Exercise Name 13 side steps  -JS      Cueing 13 Verbal  -JS      Reps 13 3 laps at barre  -JS      Additional Comments YTB, cues for toes fwd  -JS              Exercise 14    Exercise Name 14  Standing heel raises  -JS      Cueing 14 Verbal; Demo  -JS      Reps 14 15x (double limb)  -JS              Exercise 15    Exercise Name 15 Standing gastroc stretch (runner's position)  -JS      Cueing 15 Verbal; Demo  -JS      Reps 15 3  -JS      Time 15 20 sec  -JS            User Key  (r) = Recorded By, (t) = Taken By, (c) = Cosigned By    Initials Name Provider Type    JS Sunshine Call, PT Physical Therapist                              PT OP Goals     Row Name 11/12/21 1445          PT Short Term Goals    STG Date to Achieve 11/13/21  -JS     STG 1 Patient will be independent with education for symptom management and initial HEP to decrease LBP pain.  -JS     STG 1 Progress Met  -JS     STG 1 Progress Comments Met for initial HEP  -JS     STG 2 Pt will improve lumbar ROM to WNL with </=2/10 pain in all cardinal planes for improved mobility and participation in ADLs.  -JS     STG 2 Progress Progressing; Partially Met  -JS     STG 2 Progress Comments Met for all except R lateral flex (see ortho)  -JS     STG 3 Patient will improve standing tolerance from 10 min to >30 min without LBP to improve participation in community activities.  -JS     STG 3 Progress Ongoing; Progressing  -JS     STG 3 Progress Comments Standing tolerance 15 min per pt  -JS            Long Term Goals    LTG Date to Achieve 12/13/21  -JS     LTG 1 Patient will be independent with education for symptom management and advanced HEP to decrease LBP pain.  -JS     LTG 1 Progress Ongoing  -JS     LTG 2 Patient will reduce level of percieved disability as measured by the Modified Oswestry  from 50% disability to </= 30% disability in order to improve quality of life.  -JS     LTG 2 Progress Ongoing; Progressing  -JS     LTG 2 Progress Comments 34%  -JS     LTG 3 Patient will improve walking tolerance from 5 min to >30 min without LBP to improve participation in community activities.  -JS     LTG 3 Progress Ongoing; Progressing  -JS     LTG 3 Progress  Comments Walking tolerance 15-20 min per pt  -JS     LTG 4 Pt will improve B LE strength to at least 4+/5 to demonstate adequate strength to complete functional activities within the home and community.  -JS     LTG 4 Progress Partially Met; Progressing  -JS     LTG 4 Progress Comments Met except hip strength remains decreased (see ortho)  -JS           User Key  (r) = Recorded By, (t) = Taken By, (c) = Cosigned By    Initials Name Provider Type    Sunshine Gurrola, PT Physical Therapist                     Outcome Measure Options: Analilia Nguyen         Time Calculation:   Start Time: 1445  Stop Time: 1530  Time Calculation (min): 45 min  Timed Charges  37669 - PT Therapeutic Exercise Minutes: 45  Total Minutes  Timed Charges Total Minutes: 45   Total Minutes: 45  Therapy Charges for Today     Code Description Service Date Service Provider Modifiers Qty    [id number] HC PT THER PROC EA 15 MIN 11/12/2021 Sunshine Call, PT GP 3          PT G-Codes  Outcome Measure Options: Analilia Call PT  11/12/2021

## 2021-11-15 ENCOUNTER — TELEPHONE (OUTPATIENT)
Dept: CARDIOLOGY | Facility: CLINIC | Age: 81
End: 2021-11-15

## 2021-11-15 NOTE — TELEPHONE ENCOUNTER
----- Message from AGA Tolbert sent at 11/12/2021  1:27 PM EST -----  Carol,Please let the patient know that her labs are stable.  No change to current plan of care.Thanks,Alisa  ----- Message -----  From: Lab, Background User  Sent: 11/11/2021   4:27 PM EST  To: [facility] Clinical Pool

## 2021-11-16 ENCOUNTER — HOSPITAL ENCOUNTER (OUTPATIENT)
Dept: PHYSICAL THERAPY | Facility: HOSPITAL | Age: 81
Setting detail: THERAPIES SERIES
Discharge: HOME | End: 2021-11-16

## 2021-11-16 DIAGNOSIS — M25.552 LEFT HIP PAIN: ICD-10-CM

## 2021-11-16 DIAGNOSIS — M54.50 LUMBAR PAIN: Primary | ICD-10-CM

## 2021-11-16 DIAGNOSIS — R53.1 WEAKNESS: ICD-10-CM

## 2021-11-16 PROCEDURE — 97110 THERAPEUTIC EXERCISES: CPT

## 2021-11-16 NOTE — THERAPY DISCHARGE NOTE
Outpatient Physical Therapy Ortho Treatment Note/Discharge Summary  [facility]     Patient Name: Inessa Peoples  : 1940  MRN: [id number]  Today's Date: 2021      Visit Date: 2021    Visit Dx:    ICD-10-CM ICD-9-CM   1. Lumbar pain  M54.50 724.2   2. Left hip pain  M25.552 719.45   3. Weakness  R53.1 780.79       Patient Active Problem List   Diagnosis   • Sarcoidosis   • Diaphragmatic paralysis   • Osteopenia   • Impaired fasting glucose   • Hypothyroidism   • Hypertension   • Hyperlipidemia   • Chronic osteoarthritis   • GERD (gastroesophageal reflux disease)   • ANY on CPAP + 10 - Dr Lu   • Diverticulitis of large intestine without perforation or abscess without bleeding   • Chronic anticoagulation   • Dilation of thoracic aorta (HCC)   • Chronic pain   • Bulge of lumbar disc without myelopathy   • Class 2 severe obesity due to excess calories with serious comorbidity and body mass index (BMI) of 35.0 to 35.9 in adult (HCC)   • Lumbar facet arthropathy   • Trochanteric bursitis of both hips   • LBBB (left bundle branch block)   • Paroxysmal atrial fibrillation (HCC)   • Heart failure with preserved ejection fraction, NYHA class II (CMS/HCC)II-III---decreased EF with stress        Past Medical History:   Diagnosis Date   • Abnormal ECG    • Allergic rhinitis    • Atrial fibrillation (HCC)    • Bleeds easily ([facility])     warfarin   • Breast screening    • Broken bones    • Calf DVT (deep venous thrombosis) (HCC)    • Chronic anticoagulation    • Class 2 severe obesity due to excess calories with serious comorbidity and body mass index (BMI) of 37.0 to 37.9 in adult (HCC)    • Colon polyp    • CTS (carpal tunnel syndrome)    • Deep vein thrombosis (HCC)     left calf   • Diverticulitis of colon    • AHN (dyspnea on exertion)    • Fatigue    • H/O malignant melanoma of skin    • Heart failure with preserved ejection fraction, NYHA class II (HCC)     II-III---decreased EF with stress    • Hematuria, gross    • History of colon polyps    • History of kidney stones    • Hyperlipidemia    • Hypertension    • Hyperthyroidism    • IFG (impaired fasting glucose)    • Insomnia    • LBBB (left bundle branch block)    • Left leg swelling    • LLQ abdominal pain    • Long term current use of systemic steroids    • Low back pain    • Malignant melanoma of skin (HCC)    • Neck pain    • Obesity     class 2 obesity due to excess calories with BMI of 35.0 to 35.9 in adult   • Osteoarthritis    • Osteopenia    • Paralysis, diaphragm    • Paroxysmal atrial fibrillation (HCC)    • 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 Lu   • Sleep apnea     USES CPAP   • UTI (urinary tract infection)    • Visit for screening mammogram         Past Surgical History:   Procedure Laterality Date   • CARDIAC CATHETERIZATION N/A 6/29/2020    Procedure: Coronary angiography, [place] CEDRICK;  Surgeon: Noe Reynoso MD;  Location: [facility] CATH INVASIVE LOCATION;  Service: Cardiovascular;  Laterality: N/A;   • CARDIAC CATHETERIZATION N/A 6/29/2020    Procedure: Left ventriculography;  Surgeon: Noe Reynoso MD;  Location:  [facility] CATH INVASIVE LOCATION;  Service: Cardiovascular;  Laterality: N/A;   • CARDIAC CATHETERIZATION N/A 6/29/2020    Procedure: Right and Left Heart Cath;  Surgeon: Noe Reynoso MD;  [facility] C[facility]VASIVE LOC[facility];  Service: Cardiovascular;  Laterality: N/A;   • CARDIAC CATHETERIZATION     • CARDIAC ELECTROPHYSIOLOGY PROCEDURE N/A 1/5/2021    Procedure: Biventricular Device Insertion -- CRT-p ([employer]tronic);  Surgeon: Demar Stone MD;  Location: [facility] CATH INVASIVE LOCATION;  Service: Cardiology;  Laterality: N/A;   • CARPAL TUNNEL RELEASE Bilateral 1980'S    [facility] SURGEONS   •  CATARACT EXTRACTION Bilateral 2001    Dr. Nic Hidalgo   • COLONOSCOPY N/A 06/26/2013    MILD SIGMOID DIVERTICULOSIS, repeat 5 years based on sister w/ colona cancer-DR. NASRA CASTRO   • COLONOSCOPY N/A 10/19/2010    SIGMOID DIVERTICULOSIS & INTERNAL HEMORRHOIDS, HEMORRHOIDAL BANDING X4, DR. NASRA CASTRO   • COLONOSCOPY N/A 8/6/2018    Procedure: COLONOSCOPY TO CECUM AND INTO TERMINAL ILEUM;  Surgeon: Nasra Castro MD;  Location: [facility] ENDOSCOPY;  Service: Gastroenterology   • COLONOSCOPY N/A 10/4/2021    Procedure: COLONOSCOPY to CECUM;  Surgeon: Nasra Castro MD;  Location: [facility] ENDOSCOPY;  Service: General;  Laterality: N/A;  FAMILY HX COLON CANCER  --DIVERTICULOSIS    • COLONOSCOPY W/ BIOPSIES N/A 05/12/2008    RECTUM BIOPSY: COLONIC MUCOSA W/ FOCAL INCREASE OF INFLAMMATORY CELLS IN THE MUSCULARIS MUCOSA INCLUDING EOSINOPHILS, SIGMOID DIVERTICULOSIS, POSSIBLE PROCTITIS, DR. NASRA CASTRO   • CYSTOSCOPY, RETROGRADE PYELOGRAM AND STENT INSERTION Bilateral 10/10/2014    w/ Right ureteral pyeloscopy & laser lithotripsy, Right Double-J stent placement-Dr. Julio Tai   • ENDOSCOPY N/A 11/11/2020    Procedure: ESOPHAGOGASTRODUODENOSCOPY with biopsies and dilation 54fr fernández;  Surgeon: Garret Piedra MD;  Location: [facility] ENDOSCOPY;  Service: Gastroenterology;  Laterality: N/A;  pre- dysphagia  post-- gastritis, dilation of esophageal ring, watermelon stomach    • EXTRACORPOREAL SHOCK WAVE LITHOTRIPSY (ESWL) Right 09/10/2009    DR. JULIO TAI   • HAMMER TOE REPAIR     • LAPAROSCOPIC CHOLECYSTECTOMY N/A 07/19/2012    DR. NASRA CASTRO   • LUMBAR EPIDURAL INJECTION N/A 04/23/2015    LUMBAR EPIDURAL STEROID INJECTION X3   • MEDIAL BRANCH BLOCK Bilateral 11/2/2021    Procedure: MEDIAL BRANCH BLOCK--bilateral Lumbar2-5;  Surgeon: Wolfgang Means MD;  Location: [facility] OR;  Service: Pain Management;  Laterality: Bilateral;   • PACEMAKER IMPLANTATION     • PARATHYROIDECTOMY N/A 2001   •  "SACROILIAC JOINT INJECTION Left 6/15/2021    Procedure: SACROILIAC INJECTION-- left and left greater trochanteric bursa injection;  Surgeon: Wolfgang Means MD;  Location: [facility];  Service: Pain Management;  Laterality: Left;   • SKIN CANCER EXCISION     • THUMB ARTHROSCOPY Right 1982   • TOTAL KNEE ARTHROPLASTY Right 03/25/2014    [employer] Vanguard total knee, 55 femoral component, 71 tibial tray w/ a 40 mm stem, 34 mm x 7.8 mm three-peg patella, and a 14 standard poly-DrUriel Canchola                        PT Assessment/Plan     Row Name 11/16/21 1500          PT Assessment    Assessment Comments Inessa Peoples was seen for 5 physical therapy sessions for low back pain.  Treatment included therapeutic exercise, manual therapy and patient education with home exercise program . Pt returns today with 0/10 pain and well managed pain since nerve block. She presents today requesting to DC from therapy as she does not have time to attend.  Progress to physical therapy goals was slow. Pt met 1/3 STG and 0/4 LTG. She partially met 2/3 STG and 2/4 LTG. Encouraged pt to continue HEP at home 2-4 x weekly after DC. She verbed understanding of instructions and denies further questions. She was discharged to an independent HEP and provided patient education to self-manage condition.  -CC            PT Plan    PT Plan Comments DC PT per pt request  -CC           User Key  (r) = Recorded By, (t) = Taken By, (c) = Cosigned By    Initials Name Provider Type    CC Norma Lovell, PT Physical Therapist                     OP Exercises     Row Name 11/16/21 1500             Subjective Comments    Subjective Comments \"I was just wondering if I could quit therapy?\"  -CC              Subjective Pain    Able to rate subjective pain? yes  -CC      Pre-Treatment Pain Level 0  -CC              Total Minutes    33877 - PT Therapeutic Exercise Minutes 11  -CC              Exercise 1    Exercise Name 1 discussed POC, " continuation of HEP after therapy, answer pt questions  -CC      Time 1 11 min  -CC            User Key  (r) = Recorded By, (t) = Taken By, (c) = Cosigned By    Initials Name Provider Type    Norma Cullen, PT Physical Therapist                                PT OP Goals     Row Name 11/16/21 1400          PT Short Term Goals    STG Date to Achieve 11/13/21  -CC     STG 1 Patient will be independent with education for symptom management and initial HEP to decrease LBP pain.  -CC     STG 1 Progress Met  -CC     STG 2 Pt will improve lumbar ROM to WNL with </=2/10 pain in all cardinal planes for improved mobility and participation in ADLs.  -CC     STG 2 Progress Partially Met  -CC     STG 3 Patient will improve standing tolerance from 10 min to >30 min without LBP to improve participation in community activities.  -CC     STG 3 Progress Partially Met  -CC     STG 3 Progress Comments can stand 30 min, but has LBP  -            Long Term Goals    LTG Date to Achieve 12/13/21  -CC     LTG 1 Patient will be independent with education for symptom management and advanced HEP to decrease LBP pain.  -CC     LTG 1 Progress Not Met  -CC     LTG 2 Patient will reduce level of percieved disability as measured by the Modified Oswestry  from 50% disability to </= 30% disability in order to improve quality of life.  -CC     LTG 2 Progress Not Met  -CC     LTG 2 Progress Comments 34%  -CC     LTG 3 Patient will improve walking tolerance from 5 min to >30 min without LBP to improve participation in community activities.  -CC     LTG 3 Progress Partially Met  -CC     LTG 3 Progress Comments Can walk for 30 min without LBP if she has something to hold on to  -CC     LTG 4 Pt will improve B LE strength to at least 4+/5 to demonstate adequate strength to complete functional activities within the home and community.  -CC     LTG 4 Progress Partially Met  -CC           User Key  (r) = Recorded By, (t) = Taken By, (c) = Cosigned  By    Initials Name Provider Type    CC Norma Lovell, PT Physical Therapist                               Time Calculation:   Start Time: 1536  Stop Time: 1547  Time Calculation (min): 11 min  Total Timed Code Minutes- PT: 11 minute(s)  Timed Charges  75397 - PT Therapeutic Exercise Minutes: 11  Total Minutes  Timed Charges Total Minutes: 11   Total Minutes: 11  Therapy Charges for Today     Code Description Service Date Service Provider Modifiers Qty    [id number] HC PT THER PROC EA 15 MIN 11/16/2021 Norma Lovell, PT GP 1                OP PT Discharge Summary  Date of Discharge: 11/16/21  Reason for Discharge: Patient/Caregiver request  Outcomes Achieved: Patient able to partially acheive established goals  Discharge Destination: Home with home program  Discharge Instructions/Additional Comments: Cont HEP 3x per week, call with questions. Educated pt that nerve block may wear off and would be beneficial to cont PT, pt still requesting DC today.      Norma Lovell, NILES  11/16/2021

## 2021-11-24 DIAGNOSIS — E78.5 HYPERLIPIDEMIA, UNSPECIFIED HYPERLIPIDEMIA TYPE: Primary | ICD-10-CM

## 2021-11-24 DIAGNOSIS — I10 PRIMARY HYPERTENSION: ICD-10-CM

## 2021-11-24 DIAGNOSIS — E03.9 ACQUIRED HYPOTHYROIDISM: ICD-10-CM

## 2021-11-25 LAB
ALBUMIN SERPL-MCNC: 4.4 G/DL (ref 3.7–4.7)
ALBUMIN/GLOB SERPL: 2.1 {RATIO} (ref 1.2–2.2)
ALP SERPL-CCNC: 83 IU/L (ref 44–121)
ALT SERPL-CCNC: 24 IU/L (ref 0–32)
AST SERPL-CCNC: 19 IU/L (ref 0–40)
BASOPHILS # BLD AUTO: 0.1 X10E3/UL (ref 0–0.2)
BASOPHILS NFR BLD AUTO: 1 %
BILIRUB SERPL-MCNC: 0.8 MG/DL (ref 0–1.2)
BUN SERPL-MCNC: 21 MG/DL (ref 8–27)
BUN/CREAT SERPL: 21 (ref 12–28)
CALCIUM SERPL-MCNC: 9.9 MG/DL (ref 8.7–10.3)
CHLORIDE SERPL-SCNC: 103 MMOL/L (ref 96–106)
CHOLEST SERPL-MCNC: 225 MG/DL (ref 100–199)
CO2 SERPL-SCNC: 23 MMOL/L (ref 20–29)
CREAT SERPL-MCNC: 1 MG/DL (ref 0.57–1)
EOSINOPHIL # BLD AUTO: 0.2 X10E3/UL (ref 0–0.4)
EOSINOPHIL NFR BLD AUTO: 4 %
ERYTHROCYTE [DISTWIDTH] IN BLOOD BY AUTOMATED COUNT: 12.8 % (ref 11.7–15.4)
GLOBULIN SER CALC-MCNC: 2.1 G/DL (ref 1.5–4.5)
GLUCOSE SERPL-MCNC: 105 MG/DL (ref 65–99)
HCT VFR BLD AUTO: 42.8 % (ref 34–46.6)
HDLC SERPL-MCNC: 62 MG/DL
HGB BLD-MCNC: 14.9 G/DL (ref 11.1–15.9)
IMM GRANULOCYTES # BLD AUTO: 0 X10E3/UL (ref 0–0.1)
IMM GRANULOCYTES NFR BLD AUTO: 0 %
LDLC SERPL CALC-MCNC: 133 MG/DL (ref 0–99)
LYMPHOCYTES # BLD AUTO: 1.9 X10E3/UL (ref 0.7–3.1)
LYMPHOCYTES NFR BLD AUTO: 36 %
MCH RBC QN AUTO: 33.1 PG (ref 26.6–33)
MCHC RBC AUTO-ENTMCNC: 34.8 G/DL (ref 31.5–35.7)
MCV RBC AUTO: 95 FL (ref 79–97)
MONOCYTES # BLD AUTO: 0.5 X10E3/UL (ref 0.1–0.9)
MONOCYTES NFR BLD AUTO: 10 %
NEUTROPHILS # BLD AUTO: 2.5 X10E3/UL (ref 1.4–7)
NEUTROPHILS NFR BLD AUTO: 49 %
PLATELET # BLD AUTO: 205 X10E3/UL (ref 150–450)
POTASSIUM SERPL-SCNC: 4.1 MMOL/L (ref 3.5–5.2)
PROT SERPL-MCNC: 6.5 G/DL (ref 6–8.5)
RBC # BLD AUTO: 4.5 X10E6/UL (ref 3.77–5.28)
SODIUM SERPL-SCNC: 140 MMOL/L (ref 134–144)
TRIGL SERPL-MCNC: 169 MG/DL (ref 0–149)
TSH SERPL DL<=0.005 MIU/L-ACNC: 2.13 UIU/ML (ref 0.45–4.5)
UNABLE TO VOID: NORMAL
VLDLC SERPL CALC-MCNC: 30 MG/DL (ref 5–40)
WBC # BLD AUTO: 5.1 X10E3/UL (ref 3.4–10.8)

## 2021-12-01 ENCOUNTER — OFFICE VISIT (OUTPATIENT)
Dept: INTERNAL MEDICINE | Facility: CLINIC | Age: 81
End: 2021-12-01

## 2021-12-01 VITALS
HEART RATE: 74 BPM | SYSTOLIC BLOOD PRESSURE: 120 MMHG | OXYGEN SATURATION: 98 % | DIASTOLIC BLOOD PRESSURE: 74 MMHG | HEIGHT: 63 IN | BODY MASS INDEX: 34.38 KG/M2 | WEIGHT: 194 LBS

## 2021-12-01 DIAGNOSIS — Z78.0 MENOPAUSE: ICD-10-CM

## 2021-12-01 DIAGNOSIS — E03.9 ACQUIRED HYPOTHYROIDISM: ICD-10-CM

## 2021-12-01 DIAGNOSIS — Z79.01 CHRONIC ANTICOAGULATION: ICD-10-CM

## 2021-12-01 DIAGNOSIS — I10 PRIMARY HYPERTENSION: ICD-10-CM

## 2021-12-01 DIAGNOSIS — R35.0 URINE FREQUENCY: ICD-10-CM

## 2021-12-01 DIAGNOSIS — Z00.00 MEDICARE ANNUAL WELLNESS VISIT, SUBSEQUENT: Primary | ICD-10-CM

## 2021-12-01 DIAGNOSIS — E78.5 HYPERLIPIDEMIA, UNSPECIFIED HYPERLIPIDEMIA TYPE: ICD-10-CM

## 2021-12-01 PROBLEM — K57.32 DIVERTICULITIS OF LARGE INTESTINE WITHOUT PERFORATION OR ABSCESS WITHOUT BLEEDING: Status: RESOLVED | Noted: 2017-01-03 | Resolved: 2021-12-01

## 2021-12-01 PROBLEM — G89.29 CHRONIC PAIN: Status: RESOLVED | Noted: 2017-07-13 | Resolved: 2021-12-01

## 2021-12-01 LAB — INR PPP: 2.3 (ref 0.9–1.1)

## 2021-12-01 PROCEDURE — G0008 ADMIN INFLUENZA VIRUS VAC: HCPCS | Performed by: INTERNAL MEDICINE

## 2021-12-01 PROCEDURE — 1126F AMNT PAIN NOTED NONE PRSNT: CPT | Performed by: INTERNAL MEDICINE

## 2021-12-01 PROCEDURE — 36416 COLLJ CAPILLARY BLOOD SPEC: CPT | Performed by: INTERNAL MEDICINE

## 2021-12-01 PROCEDURE — G0439 PPPS, SUBSEQ VISIT: HCPCS | Performed by: INTERNAL MEDICINE

## 2021-12-01 PROCEDURE — 85610 PROTHROMBIN TIME: CPT | Performed by: INTERNAL MEDICINE

## 2021-12-01 PROCEDURE — 99214 OFFICE O/P EST MOD 30 MIN: CPT | Performed by: INTERNAL MEDICINE

## 2021-12-01 PROCEDURE — 1159F MED LIST DOCD IN RCRD: CPT | Performed by: INTERNAL MEDICINE

## 2021-12-01 PROCEDURE — 1170F FXNL STATUS ASSESSED: CPT | Performed by: INTERNAL MEDICINE

## 2021-12-01 PROCEDURE — 90662 IIV NO PRSV INCREASED AG IM: CPT | Performed by: INTERNAL MEDICINE

## 2021-12-01 RX ORDER — OMEPRAZOLE 40 MG/1
40 CAPSULE, DELAYED RELEASE ORAL DAILY
COMMUNITY
Start: 2021-12-01 | End: 2022-02-16 | Stop reason: ALTCHOICE

## 2021-12-01 RX ORDER — FUROSEMIDE 20 MG/1
20 TABLET ORAL DAILY
COMMUNITY
Start: 2021-12-01 | End: 2022-03-28

## 2021-12-01 NOTE — PROGRESS NOTES
The ABCs of the Annual Wellness Visit  Subsequent Medicare Wellness Visit    Chief Complaint   Patient presents with   • Medicare Wellness-subsequent      Subjective    History of Present Illness:  Inessa Peoples is a 81 y.o. female who presents for a Subsequent Medicare Wellness Visit.    The following data was reviewed by: Gisele Dockery MD on 12/01/2021:  Common labs    Common Labsle 9/24/21 9/24/21 9/24/21 11/11/21 11/11/21 11/24/21 11/24/21 11/24/21    1550 1550 1550 1447 1447 1043 1043 1043   Glucose  92   89  105 (A)    BUN  24 (A)   23  21    Creatinine  1.05 (A)   0.91  1.00    eGFR Non African Am  51 (A)   60 (A)  54 (A)    eGFR African Am       62    Sodium  142   139  140    Potassium  4.5   4.0  4.1    Chloride  106   104  103    Calcium  10.3   10.0  9.9    Total Protein       6.5    Albumin       4.4    Total Bilirubin       0.8    Alkaline Phosphatase       83    AST (SGOT)       19    ALT (SGPT)       24    WBC    7.63  5.1     Hemoglobin    16.1 (A)  14.9     Hematocrit    46.3  42.8     Platelets    213  205     Total Cholesterol   224 (A)        Total Cholesterol        225 (A)   Triglycerides   213 (A)     169 (A)   HDL Cholesterol   61 (A)     62   LDL Cholesterol    126 (A)     133 (A)   Hemoglobin A1C 5.50          (A) Abnormal value       Comments are available for some flowsheets but are not being displayed.           HTN.  Control is good.  HLD.  Control is good.  Hypothyroidism.  Control is good.  HF.  She states breathing is improved with meds.    Afib/h/o DVTs.  On coumadin.  Due INR.  (histroy of clots on Xarelto).     The following portions of the patient's history were reviewed and   updated as appropriate: allergies, current medications, past family history, past medical history, past social history, past surgical history and problem list.    Compared to one year ago, the patient feels her physical   health is the same.    Compared to one year ago, the patient feels her mental    health is the same.    Recent Hospitalizations:  She was not admitted to the hospital during the last year.       Current Medical Providers:  Patient Care Team:  Gisele Dockery MD as PCP - General (Internal Medicine)    Outpatient Medications Prior to Visit   Medication Sig Dispense Refill   • acetaminophen (TYLENOL) 650 MG 8 hr tablet Take 650 mg by mouth Every 8 (Eight) Hours As Needed for Mild Pain .     • Calcium-Phosphorus-Vitamin D (CITRACAL +D3 PO) Take 1 tablet by mouth Daily.     • celecoxib (CeleBREX) 200 MG capsule TAKE 1 CAPSULE EVERY DAY 90 capsule 0   • ezetimibe (ZETIA) 10 MG tablet Take 1 tablet by mouth Daily. 90 tablet 0   • fexofenadine (ALLEGRA) 180 MG tablet Take 180 mg by mouth Daily.     • furosemide (LASIX) 20 MG tablet Take 1 tablet by mouth Daily.     • levothyroxine (SYNTHROID, LEVOTHROID) 25 MCG tablet Take 1 tablet by mouth Daily. 90 tablet 0   • melatonin 5 MG sublingual tablet sublingual tablet Take 5 mg by mouth Every Night.     • metoprolol succinate XL (TOPROL-XL) 25 MG 24 hr tablet Take 0.5 tablets by mouth Daily. 45 tablet 3   • Multiple Vitamin (MULTI-DAY VITAMINS) tablet Take 1 tablet by mouth daily.     • sacubitril-valsartan (ENTRESTO) 24-26 MG tablet Take 1 tablet by mouth 2 (Two) Times a Day. 60 tablet 5   • spironolactone (ALDACTONE) 25 MG tablet TAKE 1 TABLET EVERY DAY 60 tablet 0   • warfarin (COUMADIN) 3 MG tablet Take 1 tablet by mouth Daily. 90 tablet 0   • furosemide (LASIX) 20 MG tablet Take 1 tablet by mouth Daily. (Patient taking differently: Take 20 mg by mouth Every Other Day.) 90 tablet 3   • levalbuterol (XOPENEX) 0.31 MG/3ML nebulizer solution Take 1 ampule by nebulization Every 4 (Four) Hours As Needed for Wheezing.     • omeprazole (priLOSEC) 40 MG capsule Take 1 capsule by mouth Daily.     • empagliflozin (Jardiance) 10 MG tablet tablet Take 1 tablet by mouth Daily. 14 tablet 0   • fluconazole (Diflucan) 150 MG tablet Take 1 tablet by mouth Daily. 2  "tablet 0   • omeprazole (priLOSEC) 40 MG capsule Take 40 mg by mouth Daily.     • Triamcinolone Acetonide (NASACORT) 55 MCG/ACT nasal inhaler 2 sprays into the nostril(s) as directed by provider Daily.       No facility-administered medications prior to visit.       No opioid medication identified on active medication list. I have reviewed chart for other potential  high risk medication/s and harmful drug interactions in the elderly.          Aspirin is not on active medication list.  Aspirin use is not indicated based on review of current medical condition/s. Risk of harm outweighs potential benefits.  .    Patient Active Problem List   Diagnosis   • Sarcoidosis   • Diaphragmatic paralysis   • Osteopenia   • Impaired fasting glucose   • Hypothyroidism   • Hypertension   • Hyperlipidemia   • Chronic osteoarthritis   • GERD (gastroesophageal reflux disease)   • ANY on CPAP + 10 - Dr Lu   • Dilation of thoracic aorta ([facility])   • Bulge of lumbar disc without myelopathy   • Class 2 severe obesity due to excess calories with serious comorbidity and body mass index (BMI) of 35.0 to 35.9 in adult ([facility])   • Lumbar facet arthropathy   • Trochanteric bursitis of both hips   • LBBB (left bundle branch block)   • Paroxysmal atrial fibrillation ([facility])   • Heart failure with preserved ejection fraction, NYHA class II (CMS/[facility])II-III---decreased EF with stress     Advance Care Planning  Advance Directive is on file.  ACP discussion was held with the patient during this visit. Patient has an advance directive in EMR which is still valid.     Review of Systems   Constitutional: Negative for fever.   Respiratory: Positive for shortness of breath. Negative for chest tightness.    Cardiovascular: Negative for chest pain.   Musculoskeletal: Positive for arthralgias.        Objective    Vitals:    12/01/21 1256   BP: 120/74   Pulse: 74   SpO2: 98%   Weight: 88 kg (194 lb)   Height: 158.8 cm (62.52\")   PainSc: 0-No pain     BMI " Readings from Last 1 Encounters:   12/01/21 34.90 kg/m²   BMI is above normal parameters. Recommendations include: exercise counseling and nutrition counseling    Does the patient have evidence of cognitive impairment? No    Physical Exam  Constitutional:       Appearance: She is well-developed.   HENT:      Head: Normocephalic and atraumatic.      Right Ear: Hearing, tympanic membrane and external ear normal.      Left Ear: Hearing, tympanic membrane and external ear normal.      Nose: Nose normal.   Neck:      Thyroid: No thyromegaly.   Cardiovascular:      Rate and Rhythm: Normal rate and regular rhythm.      Heart sounds: Normal heart sounds. No murmur heard.      Pulmonary:      Effort: Pulmonary effort is normal.      Breath sounds: Normal breath sounds.   Chest:   Breasts:      Right: No mass.      Left: No mass.       Abdominal:      General: There is no distension.      Palpations: Abdomen is soft.      Tenderness: There is no abdominal tenderness.   Musculoskeletal:      Cervical back: Neck supple.   Lymphadenopathy:      Cervical: No cervical adenopathy.   Skin:     General: Skin is warm and dry.   Neurological:      Mental Status: She is alert and oriented to person, place, and time.   Psychiatric:         Speech: Speech normal.         Behavior: Behavior normal.         Thought Content: Thought content normal.         Judgment: Judgment normal.       Lab Results   Component Value Date    CHLPL 225 (H) 11/24/2021    TRIG 169 (H) 11/24/2021    TRIG 213 (H) 09/24/2021    HDL 62 11/24/2021    HDL 61 (H) 09/24/2021     (H) 11/24/2021    VLDL 30 11/24/2021    HGBA1C 5.50 09/24/2021            HEALTH RISK ASSESSMENT    Smoking Status:  Social History     Tobacco Use   Smoking Status Never Smoker   Smokeless Tobacco Never Used   Tobacco Comment    No history of tobacco use     Alcohol Consumption:  Social History     Substance and Sexual Activity   Alcohol Use Yes   • Alcohol/week: 2.0 standard drinks    • Types: 2 Glasses of wine per week    Comment: Occasionally     Fall Risk Screen:    JESUS Fall Risk Assessment was completed, and patient is at LOW risk for falls.Assessment completed on:12/1/2021    Depression Screening:  PHQ-2/PHQ-9 Depression Screening 12/1/2021   Little interest or pleasure in doing things 0   Feeling down, depressed, or hopeless 0   Trouble falling or staying asleep, or sleeping too much -   Feeling tired or having little energy -   Poor appetite or overeating -   Feeling bad about yourself - or that you are a failure or have let yourself or your family down -   Trouble concentrating on things, such as reading the newspaper or watching television -   Moving or speaking so slowly that other people could have noticed. Or the opposite - being so fidgety or restless that you have been moving around a lot more than usual -   Thoughts that you would be better off dead, or of hurting yourself in some way -   Total Score 0   If you checked off any problems, how difficult have these problems made it for you to do your work, take care of things at home, or get along with other people? -       Health Habits and Functional and Cognitive Screening:  Functional & Cognitive Status 12/1/2021   Do you have difficulty preparing food and eating? No   Do you have difficulty bathing yourself, getting dressed or grooming yourself? No   Do you have difficulty using the toilet? No   Do you have difficulty moving around from place to place? No   Do you have trouble with steps or getting out of a bed or a chair? No   Current Diet Well Balanced Diet   Dental Exam Up to date   Eye Exam Up to date   Exercise (times per week) 2 times per week   Current Exercises Include House Cleaning   Current Exercise Activities Include -   Do you need help using the phone?  No   Are you deaf or do you have serious difficulty hearing?  No   Do you need help with transportation? No   Do you need help shopping? No   Do you need help  preparing meals?  No   Do you need help with housework?  No   Do you need help with laundry? No   Do you need help taking your medications? No   Do you need help managing money? No   Do you ever drive or ride in a car without wearing a seat belt? No   Have you felt unusual stress, anger or loneliness in the last month? No   Who do you live with? Spouse   If you need help, do you have trouble finding someone available to you? No   Have you been bothered in the last four weeks by sexual problems? No   Do you have difficulty concentrating, remembering or making decisions? No       Age-appropriate Screening Schedule:  Refer to the list below for future screening recommendations based on patient's age, sex and/or medical conditions. Orders for these recommended tests are listed in the plan section. The patient has been provided with a written plan.    Health Maintenance   Topic Date Due   • ZOSTER VACCINE (2 of 3) 11/10/2007   • DXA SCAN  01/30/2019   • LIPID PANEL  11/24/2022   • MAMMOGRAM  08/11/2023   • TDAP/TD VACCINES (3 - Td or Tdap) 08/05/2026   • INFLUENZA VACCINE  Completed              Assessment/Plan   CMS Preventative Services Quick Reference  Risk Factors Identified During Encounter  Immunizations Discussed/Encouraged (specific Immunizations; Shingrix  The above risks/problems have been discussed with the patient.  Follow up actions/plans if indicated are seen below in the Assessment/Plan Section.  Pertinent information has been shared with the patient in the After Visit Summary.    Diagnoses and all orders for this visit:    1. Medicare annual wellness visit, subsequent (Primary)    2. Primary hypertension    3. Hyperlipidemia, unspecified hyperlipidemia type    4. Acquired hypothyroidism    5. Menopause  -     DEXA Bone Density Axial; Future    6. Chronic anticoagulation  -     POC INR    7. Urine frequency  -     UA / M With / Rflx Culture([facility] ONLY) - Urine, Clean Catch    Other orders  -     Fluzone  High-Dose 65+yrs (2293-6877)    HTN.  Control is good.  The patient is advised to continue current dosage of entresto and metoprolol.  HLD.  Fair control.  Continue Zetia.   Hypothyroidism.  Control is good.  The patient is advised to continue current dosage of levothyroxine.   CHF.  Continue current regimen.   She is well compensated.  Afib/DVTs.  INR is therapeutic.  Recheck one month.          Follow Up:   Return in about 6 months (around 6/1/2022) for Recheck.     An After Visit Summary and PPPS were made available to the patient.

## 2021-12-03 LAB
APPEARANCE UR: CLEAR
BACTERIA #/AREA URNS HPF: ABNORMAL /[HPF]
BACTERIA UR CULT: NORMAL
BACTERIA UR CULT: NORMAL
BILIRUB UR QL STRIP: NEGATIVE
CASTS URNS QL MICRO: ABNORMAL /LPF
COLOR UR: YELLOW
EPI CELLS #/AREA URNS HPF: >10 /HPF (ref 0–10)
GLUCOSE UR QL: NEGATIVE
HGB UR QL STRIP: NEGATIVE
KETONES UR QL STRIP: NEGATIVE
LEUKOCYTE ESTERASE UR QL STRIP: ABNORMAL
MICRO URNS: ABNORMAL
NITRITE UR QL STRIP: NEGATIVE
PH UR STRIP: 5.5 [PH] (ref 5–7.5)
PROT UR QL STRIP: NEGATIVE
RBC #/AREA URNS HPF: ABNORMAL /HPF (ref 0–2)
SP GR UR: 1.02 (ref 1–1.03)
URINALYSIS REFLEX: ABNORMAL
UROBILINOGEN UR STRIP-MCNC: 0.2 MG/DL (ref 0.2–1)
WBC #/AREA URNS HPF: ABNORMAL /HPF (ref 0–5)
YEAST #/AREA URNS HPF: PRESENT /[HPF]

## 2021-12-06 RX ORDER — CELECOXIB 200 MG/1
CAPSULE ORAL
Qty: 90 CAPSULE | Refills: 1 | Status: SHIPPED | OUTPATIENT
Start: 2021-12-06 | End: 2022-06-20

## 2021-12-15 ENCOUNTER — OFFICE VISIT (OUTPATIENT)
Dept: PAIN MEDICINE | Facility: CLINIC | Age: 81
End: 2021-12-15

## 2021-12-15 VITALS
TEMPERATURE: 96.9 F | SYSTOLIC BLOOD PRESSURE: 133 MMHG | RESPIRATION RATE: 16 BRPM | DIASTOLIC BLOOD PRESSURE: 69 MMHG | HEIGHT: 63 IN | BODY MASS INDEX: 34.3 KG/M2 | HEART RATE: 84 BPM | OXYGEN SATURATION: 97 % | WEIGHT: 193.6 LBS

## 2021-12-15 DIAGNOSIS — M51.36 BULGE OF LUMBAR DISC WITHOUT MYELOPATHY: ICD-10-CM

## 2021-12-15 DIAGNOSIS — M70.62 TROCHANTERIC BURSITIS OF BOTH HIPS: ICD-10-CM

## 2021-12-15 DIAGNOSIS — G89.29 OTHER CHRONIC PAIN: ICD-10-CM

## 2021-12-15 DIAGNOSIS — M54.50 LOW BACK PAIN, UNSPECIFIED BACK PAIN LATERALITY, UNSPECIFIED CHRONICITY, UNSPECIFIED WHETHER SCIATICA PRESENT: ICD-10-CM

## 2021-12-15 DIAGNOSIS — M47.816 LUMBAR FACET ARTHROPATHY: Primary | ICD-10-CM

## 2021-12-15 DIAGNOSIS — M70.61 TROCHANTERIC BURSITIS OF BOTH HIPS: ICD-10-CM

## 2021-12-15 DIAGNOSIS — M46.1 SACROILIAC INFLAMMATION (HCC): ICD-10-CM

## 2021-12-15 PROCEDURE — 99212 OFFICE O/P EST SF 10 MIN: CPT | Performed by: NURSE PRACTITIONER

## 2021-12-15 NOTE — PROGRESS NOTES
CHIEF COMPLAINT  F/U PROCEDURE,MEDIAL BRANCH BLOCK--bilateral Lumbar2-5.      Subjective   Inessa Peoples is a 81 y.o. female  who presents to the office for follow-up of procedure.  She completed a Bilateral L2-L5 MBB   on  11/2/2021 performed by Dr. Means for management of back pain. Patient reports 100% ONGOING relief from the procedure.     Today her pain is 0/10VAS in severity.     Patient previously told she could interrupt coumadin for injections.      Procedure List:  6-15-21-- Left SI and Left GTB-- 50% relief for 1-2 days  1-21-21-- bilateral L2-L5 MBB-- 90% relief for several weeks  6-16-20-- left SI +GTB-- 75% for 2 weeks  6-23-19-- bilateral L2-L5 MBB-- 90% for a few weeks  11-15-18--- bilateral L2-L5 MBB-- significant relief for several days  4-26-28-- LEFT SI-- significant relief  9-26-17-- RIGHT SI-- significant    Patient remained masked during entire encounter. No cough present. I donned a mask and eye protection throughout entire visit. Prior to donning mask and eye protection, hand hygiene was performed, as well as when it was doffed.  I was closer than 6 feet, but not for an extended period of time. No obvious exposure to any bodily fluids.    Back Pain  This is a chronic problem. The current episode started more than 1 year ago. The problem occurs intermittently. Progression since onset: Improved since last office visit. The pain is present in the lumbar spine and sacro-iliac. The pain radiates to the left thigh (posterior left thigh- can go down to foot; also having left groin pain). The pain is at a severity of 0/10. The pain is mild. The pain is worse during the day (worsens as day progresses). The symptoms are aggravated by bending, position, sitting, standing and twisting (housework). Associated symptoms include bladder incontinence. Pertinent negatives include no abdominal pain, bowel incontinence, chest pain, dysuria, fever, headaches, numbness or weakness. Risk factors include  obesity, menopause, history of cancer and lack of exercise (history of melanoma--no Chemo or RAD). She has tried analgesics and heat for the symptoms. The treatment provided moderate relief.      PEG Assessment   What number best describes your pain on average in the past week?0  What number best describes how, during the past week, pain has interfered with your enjoyment of life?0  What number best describes how, during the past week, pain has interfered with your general activity?  0    The following portions of the patient's history were reviewed and updated as appropriate: allergies, current medications, past family history, past medical history, past social history, past surgical history and problem list.    Review of Systems   Constitutional: Negative for activity change, fatigue and fever.   HENT: Negative for congestion.    Eyes: Negative for visual disturbance.   Respiratory: Negative for cough and chest tightness.    Cardiovascular: Negative for chest pain.   Gastrointestinal: Negative for abdominal pain, bowel incontinence, constipation and diarrhea.   Genitourinary: Positive for bladder incontinence. Negative for difficulty urinating and dysuria.   Musculoskeletal: Positive for back pain.   Neurological: Negative for dizziness, weakness, light-headedness, numbness and headaches.   Psychiatric/Behavioral: Negative for agitation, sleep disturbance and suicidal ideas. The patient is not nervous/anxious.      --  The aforementioned information the Chief Complaint section and above subjective data including any HPI data, and also the Review of Systems data, has been personally reviewed and affirmed.  --    Office visit from 10/27/2021 with AGA Caraballo reviewed.  Patient has a history of back pain.  Her pain is worsened with walking, standing, bending /lifting/twisting.  She continues with Celebrex 200 mg daily as well as OTC Tylenol.  She has previously had relief with bilateral L2-L5 MBB's as well  "as with sacroiliac joint and GT bursa injections.  Lumbar CT reviewed with patient.  Patient declines neuro surgical eval, if she starts to have leg pain consider LESI.  Proceed with bilateral L2-L5 MBB.  Follow-up after procedure     Vitals:    12/15/21 1257   BP: 133/69   Pulse: 84   Resp: 16   Temp: 96.9 °F (36.1 °C)   SpO2: 97%   Weight: 87.8 kg (193 lb 9.6 oz)   Height: 158.8 cm (62.52\")   PainSc: 0-No pain   PainLoc: Back     Objective   Physical Exam  Vitals and nursing note reviewed.   Constitutional:       Appearance: Normal appearance. She is well-developed.   Eyes:      General: Lids are normal.   Cardiovascular:      Rate and Rhythm: Normal rate.   Pulmonary:      Effort: Pulmonary effort is normal.   Musculoskeletal:      Lumbar back: No tenderness.   Neurological:      Mental Status: She is alert and oriented to person, place, and time.   Psychiatric:         Attention and Perception: Attention normal.         Mood and Affect: Mood normal.         Speech: Speech normal.         Behavior: Behavior normal.         Judgment: Judgment normal.       Assessment/Plan   Diagnoses and all orders for this visit:    1. Lumbar facet arthropathy (Primary)    2. 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 Other chronic pain    4. Trochanteric bursitis of both hips    5. Sacroiliac inflammation (HCC)    6. Low back pain, unspecified back pain laterality, unspecified chronicity, unspecified whether sciatica present      --- Discussed that as she has ONGOING 100% relief we will hold off on any other procedure and will leave this open ended.   --- Follow-up if pain fails to improve or worsens.      LUIS REPORT    As the clinician, I personally reviewed the LUIS from 12/15/2021 while the patient was in the office today.    Dictated utilizing Dragon dictation.      This document is intended for medical expert use only. Reading of this document by patients and/or patient's family without participating medical staff " guidance may result in misinterpretation and unintended morbidity.   Any interpretation of such data is the responsibility of the patient and/or family member responsible for the patient in concert with their primary or specialist providers, not to be left for sources of online searches such as [employer], [employer] or similar queries. Relying on these approaches to knowledge may result in misinterpretation, misguided goals of care and even death should patients or family members try recommendations outside of the realm of professional medical care in a supervised way.

## 2021-12-21 ENCOUNTER — HOSPITAL ENCOUNTER (OUTPATIENT)
Dept: CT IMAGING | Facility: HOSPITAL | Age: 81
Discharge: HOME | End: 2021-12-21
Admitting: INTERNAL MEDICINE

## 2021-12-21 DIAGNOSIS — R91.1 LUNG NODULE: ICD-10-CM

## 2021-12-21 PROCEDURE — 71250 CT THORAX DX C-: CPT

## 2022-01-10 PROCEDURE — 93294 REM INTERROG EVL PM/LDLS PM: CPT | Performed by: INTERNAL MEDICINE

## 2022-01-10 PROCEDURE — 93296 REM INTERROG EVL PM/IDS: CPT | Performed by: INTERNAL MEDICINE

## 2022-01-13 RX ORDER — LEVOTHYROXINE SODIUM 0.03 MG/1
25 TABLET ORAL DAILY
Qty: 90 TABLET | Refills: 0 | Status: SHIPPED | OUTPATIENT
Start: 2022-01-13 | End: 2022-01-17

## 2022-01-13 RX ORDER — EZETIMIBE 10 MG/1
10 TABLET ORAL DAILY
Qty: 90 TABLET | Refills: 0 | Status: SHIPPED | OUTPATIENT
Start: 2022-01-13 | End: 2022-01-17

## 2022-01-13 RX ORDER — SPIRONOLACTONE 25 MG/1
25 TABLET ORAL DAILY
Qty: 90 TABLET | Refills: 0 | Status: SHIPPED | OUTPATIENT
Start: 2022-01-13 | End: 2022-01-26 | Stop reason: SDUPTHER

## 2022-01-17 RX ORDER — LEVOTHYROXINE SODIUM 0.03 MG/1
TABLET ORAL
Qty: 90 TABLET | Refills: 1 | Status: SHIPPED | OUTPATIENT
Start: 2022-01-17 | End: 2022-07-18

## 2022-01-17 RX ORDER — EZETIMIBE 10 MG/1
TABLET ORAL
Qty: 90 TABLET | Refills: 1 | Status: SHIPPED | OUTPATIENT
Start: 2022-01-17 | End: 2022-07-25

## 2022-01-19 ENCOUNTER — CLINICAL SUPPORT NO REQUIREMENTS (OUTPATIENT)
Dept: CARDIOLOGY | Facility: CLINIC | Age: 82
End: 2022-01-19

## 2022-01-19 ENCOUNTER — OFFICE VISIT (OUTPATIENT)
Dept: CARDIOLOGY | Facility: CLINIC | Age: 82
End: 2022-01-19

## 2022-01-19 VITALS
BODY MASS INDEX: 34.02 KG/M2 | DIASTOLIC BLOOD PRESSURE: 78 MMHG | HEIGHT: 63 IN | SYSTOLIC BLOOD PRESSURE: 112 MMHG | HEART RATE: 73 BPM | WEIGHT: 192 LBS

## 2022-01-19 DIAGNOSIS — I10 PRIMARY HYPERTENSION: ICD-10-CM

## 2022-01-19 DIAGNOSIS — I50.30 HEART FAILURE WITH PRESERVED EJECTION FRACTION, NYHA CLASS II: ICD-10-CM

## 2022-01-19 DIAGNOSIS — I48.0 PAROXYSMAL ATRIAL FIBRILLATION: Primary | ICD-10-CM

## 2022-01-19 DIAGNOSIS — E66.01 CLASS 2 SEVERE OBESITY DUE TO EXCESS CALORIES WITH SERIOUS COMORBIDITY AND BODY MASS INDEX (BMI) OF 35.0 TO 35.9 IN ADULT: ICD-10-CM

## 2022-01-19 DIAGNOSIS — I44.7 LBBB (LEFT BUNDLE BRANCH BLOCK): ICD-10-CM

## 2022-01-19 PROCEDURE — 93000 ELECTROCARDIOGRAM COMPLETE: CPT | Performed by: INTERNAL MEDICINE

## 2022-01-19 PROCEDURE — 93281 PM DEVICE PROGR EVAL MULTI: CPT | Performed by: INTERNAL MEDICINE

## 2022-01-19 PROCEDURE — 99214 OFFICE O/P EST MOD 30 MIN: CPT | Performed by: INTERNAL MEDICINE

## 2022-01-19 NOTE — PROGRESS NOTES
Date of Office Visit: 2022  Encounter Provider: Demar Stone MD  Place of Service: [facility]ARD[facility]  Patient Name: Inessa Peoples  : 1940    Subjective:     Encounter Date:2022      Patient ID: Inessa Peoples is a 81 y.o. female who has a cc of  HF rEF and LBBB and I put in a CRT-P in  and she feels well. Here for fu of CRT      The patient had a good year.   No anginal chest pain,   No sig ahn,   No soa,   No fainting,  No orthostasis.   No edema.   Exercise tolerance: no formal exercise but she does normal activities.     There have been no hospital admission since the last visit.     There have been no bleeding events.       Past Medical History:   Diagnosis Date   • Abnormal ECG    • Allergic Iodine used in on xrays   • Allergic rhinitis    • Atrial fibrillation (HCC)    • Bleeds easily ([facility])     warfarin   • Breast screening    • Broken bones    • Calf DVT (deep venous thrombosis) ([facility])    • Chronic anticoagulation    • Class 2 severe obesity due to excess calories with serious comorbidity and body mass index (BMI) of 37.0 to 37.9 in adult (HCC)    • Colon polyp    • CTS (carpal tunnel syndrome)    • Deep vein thrombosis (HCC)     left calf   • Diverticulitis of colon    • Diverticulitis of large intestine without perforation or abscess without bleeding 1/3/2017   • AHN (dyspnea on exertion)    • Fatigue    • H/O malignant melanoma of skin    • Heart failure with preserved ejection fraction, NYHA class II (HCC)     II-III---decreased EF with stress   • Hematuria, gross    • History of colon polyps    • History of kidney stones    • Hyperlipidemia    • Hypertension    • Hyperthyroidism    • IFG (impaired fasting glucose)    • Insomnia    • LBBB (left bundle branch block)    • Left leg swelling    • LLQ abdominal pain    • Long term current use of systemic steroids    • Low back pain    • Malignant melanoma of skin (HCC)    • Neck pain    • Obesity     class 2  obesity due to excess calories with BMI of 35.0 to 35.9 in adult   • Osteoarthritis    • Osteopenia    • Paralysis, diaphragm    • Paroxysmal atrial fibrillation (HCC)    • 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 Lu   • Sleep apnea     USES CPAP   • UTI (urinary tract infection)    • Visit for screening mammogram        Social History     Socioeconomic History   • Marital status:      Spouse name: EDY   • Number of children: 4   • Years of education: 14yrs   Tobacco Use   • Smoking status: Never Smoker   • Smokeless tobacco: Never Used   • Tobacco comment: No history of tobacco use   Vaping Use   • Vaping Use: Never used   Substance and Sexual Activity   • Alcohol use: Yes     Alcohol/week: 2.0 standard drinks     Types: 2 Glasses of wine per week     Comment: Occasionally   • Drug use: Never   • Sexual activity: Defer       Family History   Problem Relation Age of Onset   • Heart disease Mother    • Diabetes type II Mother    • Heart disease Father    • Hypertension Father    • Cancer Sister    • Colon polyps Sister    • Lung cancer Sister    • Heart disease Brother         CABG   • Diabetes type II Brother    • Colon polyps Brother    • Arrhythmia Brother    • Colon cancer Brother    • Cancer Brother    • Heart disease Sister    • Diabetes type II Sister    • Aortic aneurysm Sister    • COPD Sister    • Arrhythmia Sister    • Cerebral aneurysm Sister    • Arthritis Sister    • COPD Sister    • Lupus Sister    • Asthma Maternal Aunt    • Aortic aneurysm Brother        Review of Systems   Constitutional: Negative for fever and night sweats.   HENT: Negative for ear pain and stridor.    Eyes: Negative for discharge and visual halos.   Cardiovascular: Negative for cyanosis.   Respiratory: Negative for  "hemoptysis and sputum production.    Hematologic/Lymphatic: Negative for adenopathy.   Skin: Negative for nail changes and unusual hair distribution.   Musculoskeletal: Positive for arthritis, back pain and joint pain. Negative for gout and joint swelling.   Gastrointestinal: Negative for bowel incontinence and flatus.   Genitourinary: Negative for dysuria and flank pain.   Neurological: Negative for seizures and tremors.   Psychiatric/Behavioral: Negative for altered mental status. The patient is not nervous/anxious.             Objective:     Vitals:    01/19/22 1304   BP: 112/78   Pulse: 73   Weight: 87.1 kg (192 lb)   Height: 158.8 cm (62.5\")         Eyes:      General:         Right eye: No discharge.         Left eye: No discharge.   HENT:      Head: Normocephalic and atraumatic.   Neck:      Thyroid: No thyromegaly.      Vascular: No JVD.   Pulmonary:      Effort: Pulmonary effort is normal.      Breath sounds: Normal breath sounds. No rales.   Cardiovascular:      Normal rate. Regular rhythm.      No gallop.   Edema:     Peripheral edema absent.   Abdominal:      General: Bowel sounds are normal.      Palpations: Abdomen is soft.      Tenderness: There is no abdominal tenderness.   Musculoskeletal: Normal range of motion.         General: No deformity. Skin:     General: Skin is warm and dry.      Findings: No erythema.   Neurological:      Mental Status: Alert and oriented to person, place, and time.      Motor: Normal muscle tone.   Psychiatric:         Behavior: Behavior normal.         Thought Content: Thought content normal.           ECG 12 Lead    Date/Time: 1/19/2022 1:30 PM  Performed by: Demar Stone MD  Authorized by: Demar Stone MD   Comparison: compared with previous ECG   Rhythm: sinus rhythm and paced            Lab Review:       Assessment:          Diagnosis Plan   1. Paroxysmal atrial fibrillation (HCC)     2. Primary hypertension     3. LBBB (left bundle branch block)     4. Heart " failure with preserved ejection fraction, NYHA class II (CMS/[facility])II-III---decreased EF with stress     5. Class 2 severe obesity due to excess calories with serious comorbidity and body mass index (BMI) of 35.0 to 35.9 in adult ([facility])            Plan:     Good CRT by ECG.     She had some phrenic w deep breathing     She was programmed 1 to can and I changed to lv2-3 and threshold is 1.75 at 0.4     Set at 2.75 at 0.5     I reviewed the pacemaker/ICD tracings and the pacing and sensing parameters are normal.  AF burden is  ,0.1%     AF on warfarin, really minimal episodes.     HF -- normal EF and on meds.

## 2022-01-26 ENCOUNTER — TELEPHONE (OUTPATIENT)
Dept: INTERNAL MEDICINE | Facility: CLINIC | Age: 82
End: 2022-01-26

## 2022-01-26 RX ORDER — SPIRONOLACTONE 25 MG/1
25 TABLET ORAL DAILY
Qty: 90 TABLET | Refills: 1 | Status: SHIPPED | OUTPATIENT
Start: 2022-01-26 | End: 2022-07-25

## 2022-01-26 NOTE — TELEPHONE ENCOUNTER
Patient request refill of:    spironolactone (ALDACTONE) 25 MG tablet       Pharmacy -     [address] AT [facility] [address] [facility] - [phone] [facility] [phone] FX     This patient has been 3 days without medication. Please send ASAP.

## 2022-01-31 ENCOUNTER — HOSPITAL ENCOUNTER (OUTPATIENT)
Dept: GENERAL RADIOLOGY | Facility: HOSPITAL | Age: 82
Discharge: HOME | End: 2022-01-31
Admitting: NURSE PRACTITIONER

## 2022-01-31 ENCOUNTER — CLINICAL SUPPORT NO REQUIREMENTS (OUTPATIENT)
Dept: CARDIOLOGY | Facility: CLINIC | Age: 82
End: 2022-01-31

## 2022-01-31 DIAGNOSIS — I50.30 HEART FAILURE WITH PRESERVED EJECTION FRACTION, NYHA CLASS II: ICD-10-CM

## 2022-01-31 DIAGNOSIS — I48.0 PAROXYSMAL ATRIAL FIBRILLATION: ICD-10-CM

## 2022-01-31 DIAGNOSIS — I44.7 LBBB (LEFT BUNDLE BRANCH BLOCK): Primary | ICD-10-CM

## 2022-01-31 DIAGNOSIS — I48.0 PAROXYSMAL ATRIAL FIBRILLATION: Primary | ICD-10-CM

## 2022-01-31 PROCEDURE — 71046 X-RAY EXAM CHEST 2 VIEWS: CPT

## 2022-02-02 ENCOUNTER — TELEPHONE (OUTPATIENT)
Dept: CARDIOLOGY | Facility: HOSPITAL | Age: 82
End: 2022-02-02

## 2022-02-02 NOTE — TELEPHONE ENCOUNTER
Called patient to reschedule appointment that she has on 02/11/2022. We will not have a provider in the office that day.   I called her and she said yes that is fine, but she is out and her calendar is at home. She said to go ahead and reschedule for the following week and [facility] and if it needs to be changed that she would call me.    Catherine Will [facility]

## 2022-02-08 ENCOUNTER — TELEPHONE (OUTPATIENT)
Dept: CARDIOLOGY | Facility: CLINIC | Age: 82
End: 2022-02-08

## 2022-02-08 NOTE — TELEPHONE ENCOUNTER
I checked this pt's device and you saw this pt on 1/31- at the time she was having phrenic nerve stimulation and you changed the LV vectors of her CRT device from LV2-LV3 to LV3-LVcan. You had asked that the pt call in and update us in a week as to if she was having improvement with phrenic nerve stim. Pt states she is doing much better and does not feel it nearly as much. She said that sometimes she feels it but not often and it's not very bothersome. She also mentioned that you had repeat Xrays done on 1/31. She was curious to know if everything looked ok on those images. I told her if there was anything else we would reach out to her. Kindly, Angelica Blount RN

## 2022-02-09 NOTE — TELEPHONE ENCOUNTER
I called the pt and spoke with her, giving her the update that CXR looked fine and lead. She is pleased and doing well.

## 2022-02-09 NOTE — MBS/VFSS/FEES
Outpatient Speech Language Pathology   Adult Swallow Initial Evaluation  [facility]     Patient Name: Inessa Peoples  : 1940  MRN: [id number]  Today's Date: 2018         Visit Date: 2018   Patient Active Problem List   Diagnosis   • Sarcoidosis   • Diaphragmatic paralysis   • Osteopenia   • Neck pain   • Low back pain   • Insomnia   • Impaired fasting glucose   • Hypothyroidism   • Hypertension   • Hyperlipidemia   • Dyspnea on exertion   • Chronic osteoarthritis   • History of DVT (deep vein thrombosis)   • Atrial fibrillation   • History of melanoma excision   • GERD (gastroesophageal reflux disease)   • History of kidney stones   • ANY on CPAP + 10 - Dr Lu   • Diverticulitis of large intestine without perforation or abscess without bleeding   • Chronic anticoagulation   • Dilation of thoracic aorta   • Chronic pain   • Bulge of lumbar disc without myelopathy   • Sacroiliac inflammation   • Sacroiliac pain   • Class 2 obesity due to excess calories with body mass index (BMI) of 35.0 to 35.9 in adult        Past Medical History:   Diagnosis Date   • Abnormal ECG    • Allergic rhinitis    • Atrial fibrillation    • Bleeds easily     warfarin   • Breast screening    • Broken bones    • Calf DVT (deep venous thrombosis)    • Chronic anticoagulation    • Colon polyp    • CTS (carpal tunnel syndrome)    • Deep vein thrombosis     left calf   • Diverticulitis of colon    • AHN (dyspnea on exertion)    • Fatigue    • H/O malignant melanoma of skin    • Hematuria, gross    • Hyperlipidemia    • Hypertension    • Hypothyroidism    • IFG (impaired fasting glucose)    • Insomnia    • LBBB (left bundle branch block)    • Left leg swelling    • LLQ abdominal pain    • Long term current use of systemic steroids    • Low back pain    • Malignant melanoma of skin    • Neck pain    • Obesity    • Osteoarthritis    • Osteopenia    • Paralysis, diaphragm    • 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 for tuberculosis    • Rash     • RBBB (right bundle branch block)    • Renal calculi    • Sarcoid    • Sarcoidosis    • Sigmoid diverticulosis 06/26/2013   • Sleep apnea     on CPAP   • UTI (urinary tract infection)    • Visit for screening mammogram         Past Surgical History:   Procedure Laterality Date   • CARPAL TUNNEL RELEASE Bilateral 1980'S    [facility]   • CATARACT EXTRACTION Bilateral    • COLONOSCOPY N/A 06/26/2013    MILD SIGMOID DIVERTICULOSIS, DR. TANISHA THORNTON   • COLONOSCOPY N/A 10/19/2010    SIGMOID DIVERTICULOSIS & INTERNAL HEMORRHOIDS, HEMORRHOIDAL BANDING X4, DR. TANISHA THORNTON   • COLONOSCOPY W/ BIOPSIES N/A 05/12/2008    RECTUM BIOPSY: COLONIC MUCOSA W/ FOCAL INCREASE OF INFLAMMATORY CELLS IN THE MUSCULARIS MUCOSA INCLUDING EOSINOPHILS, SIGMOID DIVERTICULOSIS, POSSIBLE PROCTITIS, DR. TANISHA THORNTON   • CYSTOSCOPY W/ LASER LITHOTRIPSY Right 10/10/2014    CYSTOSCOPY W/ BILATERAL RETROGRAD PYELOGRAMS, RIGHT URETERAL PYELOSCOPY & LASER LITOTRIPSY, RIGHT DOUBLE-J STENT PLACEMENT, DR. CLAIRE ROCK   • EXTRACORPOREAL SHOCK WAVE LITHOTRIPSY (ESWL) Right 09/10/2009    DR. CLAIRE ROCK   • LAPAROSCOPIC CHOLECYSTECTOMY N/A 07/19/2012    DR. TANISHA THORNTON   • LUMBAR EPIDURAL INJECTION N/A 04/23/2015    LUMBAR EPIDURAL STEROID INJECTION X3   • PARATHYROIDECTOMY     • TOTAL KNEE ARTHROPLASTY Right 03/25/2014    DR. MARK MARES         Visit Dx:     ICD-10-CM ICD-9-CM   1. Other dysphagia R13.19 787.29                 SLP Adult Swallow Evaluation - 06/05/18 1015        Rehab Evaluation    Document Type evaluation  -OC    Subjective Information no complaints  -OC    Patient Observations alert;cooperative;agree to therapy  -OC    Patient Effort excellent  -OC    Symptoms Noted During/After Treatment none  -OC       General Information    Patient Profile Reviewed yes  -OC    Pertinent History Of Current Problem Intermittent difficulty swallowing  -OC    Current Method of Nutrition regular textures;thin liquids   -OC    Prior Level of Function-Swallowing no diet consistency restrictions  -OC    Plans/Goals Discussed with patient;agreed upon  -OC    Barriers to Rehab none identified  -OC       MBS/VFSS    Utensils Used spoon;cup;straw  -OC    Consistencies Trialed regular textures;soft textures;pureed;nectar/syrup-thick liquids;thin liquids  -OC       MBS/VFSS Interpretation    Oral Prep Phase WFL  -OC    Oral Transit Phase WFL  -OC    Oral Residue WFL  -OC    VFSS Summary Pt presents with functional swallow. Pt with adequate hyolaryngeal elevation and excursion. Pt with inconsistent mistiming thin liquids. No penetration or aspiration thin, nectar thick, puree, mech soft, and regular textures. ? Prominent CP and upper esophageal stasis with backflow observed with puree.   -OC       Initiation of Pharyngeal Swallow    Initiation of Pharyngeal Swallow bolus in valleculae;bolus in pyriform sinuses;other (see comments)   mistiming  -OC    Pharyngeal Phase functional pharyngeal phase of swallowing  -OC       Esophageal Phase    Esophageal Phase other (see comments)   minimal upper esophageal stasis, ? Prominent CP  -OC       SLP Communication to Radiology    Summary Statement Pt presents with functional swallow. Esophageal scan WFL. Pt with adequate hyolaryngeal elevation and excursion. Pt with inconsistent mistiming thin liquids. No penetration or aspiration thin, nectar thick, puree, mech soft, and regular textures. ? Prominent CP and upper esophageal stasis with backflow observed with puree.   -OC       Clinical Impression    SLP Swallowing Diagnosis functional oral phase;functional pharyngeal phase;esophageal dysfunction  -OC    Functional Impact no impact on function  -OC    Rehab Potential/Prognosis, Swallowing good, to achieve stated therapy goals  -OC    Criteria for Skilled Therapeutic Interventions Met no problems identified which require skilled intervention  -OC       Recommendations    Therapy Frequency (Swallow)  evaluation only  -OC    SLP Diet Recommendation regular textures;thin liquids  -OC    Recommended Precautions and Strategies upright posture during/after eating;small bites of food and sips of liquid;alternate between small bites of food and sips of liquid  -OC    SLP Rec. for Method of Medication Administration meds whole;with thin liquids  -OC    Monitor for Signs of Aspiration yes;notify SLP if any concerns  -OC    Demonstrates Need for Referral to Another Service otolaryngology (ENT);gastroenterology;other (see comments)   ? prominent CP  -OC      User Key  (r) = Recorded By, (t) = Taken By, (c) = Cosigned By    Initials Name Provider Type    OC Vania Santillan MA,[facility]-SLP Speech and Language Pathologist                              OP SLP Education     Row Name 06/05/18 1024       Education    Barriers to Learning No barriers identified  -OC    Education Provided Described results of evaluation;Patient expressed understanding of evaluation  -OC    Assessed Learning needs;Learning motivation;Learning preferences;Learning readiness  -OC    Learning Motivation Strong  -OC    Learning Method Explanation  -OC    Teaching Response Verbalized understanding  -OC      User Key  (r) = Recorded By, (t) = Taken By, (c) = Cosigned By    Initials Name Effective Dates    OC Vania Santillan MA,MARIAM-SLP 04/05/17 -                     OP SLP Assessment/Plan - 06/05/18 1023        SLP Assessment    Functional Problems Swallowing  -OC    Impact on Function: Swallowing Risk of aspiration  -OC    Clinical Impression: Swallowing WNL  -OC    Please refer to paper survey for additional self-reported information No  -OC    Please refer to items scanned into chart for additional diagnostic informaiton and handouts as provided by clinician No  -OC    Prognosis Good (comment)  -OC    Patient/caregiver participated in establishment of treatment plan and goals Yes  -OC    Patient would benefit from skilled therapy intervention No  -OC       SLP Plan     Frequency N/A  -OC    Duration N/A  -OC    Planned CPT's? SLP MBS: 76164  -OC    Expected Duration Therapy Session - minutes 45-60 minutes  -OC    Plan Comments Follow up with ENT for POC.  -OC      User Key  (r) = Recorded By, (t) = Taken By, (c) = Cosigned By    Initials Name Provider Type    OC Vania Santillan MA,CCC-SLP Speech and Language Pathologist              SLP Outcome Measures (last 72 hours)      SLP Outcome Measures     Row Name 06/05/18 1024             SLP Outcome Measures    Outcome Measure Used? Adult NOMS  -OC         FCM Scores    FCM Chosen Swallowing  -OC      Swallowing FCM Score 7  -OC        User Key  (r) = Recorded By, (t) = Taken By, (c) = Cosigned By    Initials Name Effective Dates    OC Vnaia Santillan MA, CCC-SLP 04/05/17 -                Time Calculation:   SLP Start Time: 0920  SLP Stop Time: 1020  SLP Time Calculation (min): 60 min    Therapy Charges for Today     Code Description Service Date Service Provider Modifiers Qty    71581671842 HC ST SWALLOWING CURRENT STATUS 6/5/2018 Vania Santillan [facility]-SLP GN, CH 1    09481321739 HC ST SWALLOWING PROJECTED 6/5/2018 Vania Santillan [facility]-SLP GN, CH 1    22769193153 HC ST SWALLOWING DISCHARGE 6/5/2018 Vania Santillan [facility]-SLP GN, CH 1    97526316078 HC ST MOTION FLUORO EVAL SWALLOW 4 6/5/2018 Vania Santillan [facility]-SLP GÓMEZ, KX 1          SLP G-Codes  SLP NOMS Used?: Yes  Functional Limitations: Swallowing  Swallow Current Status (): 0 percent impaired, limited or restricted  Swallow Goal Status (): 0 percent impaired, limited or restricted  Swallow Discharge Status (): 0 percent impaired, limited or restricted        Vania Santillan MA, CCC-SLP  6/5/2018   Location Indication Override (Is Already Calculated Based On Selected Body Location): Area H

## 2022-02-16 ENCOUNTER — HOSPITAL ENCOUNTER (OUTPATIENT)
Dept: CARDIOLOGY | Facility: HOSPITAL | Age: 82
Discharge: HOME | End: 2022-02-16
Admitting: NURSE PRACTITIONER

## 2022-02-16 ENCOUNTER — ANTICOAGULATION VISIT (OUTPATIENT)
Dept: INTERNAL MEDICINE | Facility: CLINIC | Age: 82
End: 2022-02-16

## 2022-02-16 ENCOUNTER — TELEPHONE (OUTPATIENT)
Dept: CARDIOLOGY | Facility: HOSPITAL | Age: 82
End: 2022-02-16

## 2022-02-16 VITALS
BODY MASS INDEX: 34.8 KG/M2 | DIASTOLIC BLOOD PRESSURE: 72 MMHG | HEART RATE: 76 BPM | HEIGHT: 63 IN | SYSTOLIC BLOOD PRESSURE: 118 MMHG | WEIGHT: 196.4 LBS | RESPIRATION RATE: 20 BRPM | OXYGEN SATURATION: 92 %

## 2022-02-16 DIAGNOSIS — I48.0 PAROXYSMAL ATRIAL FIBRILLATION: ICD-10-CM

## 2022-02-16 DIAGNOSIS — I10 PRIMARY HYPERTENSION: ICD-10-CM

## 2022-02-16 DIAGNOSIS — Z99.89 OSA ON CPAP: Chronic | ICD-10-CM

## 2022-02-16 DIAGNOSIS — E78.5 HYPERLIPIDEMIA, UNSPECIFIED HYPERLIPIDEMIA TYPE: ICD-10-CM

## 2022-02-16 DIAGNOSIS — G47.33 OSA ON CPAP: Chronic | ICD-10-CM

## 2022-02-16 DIAGNOSIS — I44.7 LBBB (LEFT BUNDLE BRANCH BLOCK): ICD-10-CM

## 2022-02-16 DIAGNOSIS — I50.30 HEART FAILURE WITH PRESERVED EJECTION FRACTION, NYHA CLASS II: Primary | ICD-10-CM

## 2022-02-16 LAB
ANION GAP SERPL CALCULATED.3IONS-SCNC: 12 MMOL/L (ref 5–15)
BASOPHILS # BLD AUTO: 0.06 10*3/MM3 (ref 0–0.2)
BASOPHILS NFR BLD AUTO: 1 % (ref 0–1.5)
BUN SERPL-MCNC: 22 MG/DL (ref 8–23)
BUN/CREAT SERPL: 20.2 (ref 7–25)
CALCIUM SPEC-SCNC: 10.1 MG/DL (ref 8.6–10.5)
CHLORIDE SERPL-SCNC: 103 MMOL/L (ref 98–107)
CHOLEST SERPL-MCNC: 230 MG/DL (ref 0–200)
CO2 SERPL-SCNC: 24 MMOL/L (ref 22–29)
CREAT SERPL-MCNC: 1.09 MG/DL (ref 0.57–1)
DEPRECATED RDW RBC AUTO: 40.2 FL (ref 37–54)
EOSINOPHIL # BLD AUTO: 0.13 10*3/MM3 (ref 0–0.4)
EOSINOPHIL NFR BLD AUTO: 2.3 % (ref 0.3–6.2)
ERYTHROCYTE [DISTWIDTH] IN BLOOD BY AUTOMATED COUNT: 11.8 % (ref 12.3–15.4)
FERRITIN SERPL-MCNC: 64.4 NG/ML (ref 13–150)
GFR SERPL CREATININE-BSD FRML MDRD: 48 ML/MIN/1.73
GLUCOSE SERPL-MCNC: 108 MG/DL (ref 65–99)
HCT VFR BLD AUTO: 43.6 % (ref 34–46.6)
HDLC SERPL-MCNC: 62 MG/DL (ref 40–60)
HGB BLD-MCNC: 15.2 G/DL (ref 12–15.9)
IMM GRANULOCYTES # BLD AUTO: 0.01 10*3/MM3 (ref 0–0.05)
IMM GRANULOCYTES NFR BLD AUTO: 0.2 % (ref 0–0.5)
INR PPP: 1.7 (ref 0.9–1.1)
IRON 24H UR-MRATE: 90 MCG/DL (ref 37–145)
IRON SATN MFR SERPL: 22 % (ref 20–50)
LDLC SERPL CALC-MCNC: 142 MG/DL (ref 0–100)
LDLC/HDLC SERPL: 2.23 {RATIO}
LYMPHOCYTES # BLD AUTO: 1.93 10*3/MM3 (ref 0.7–3.1)
LYMPHOCYTES NFR BLD AUTO: 33.6 % (ref 19.6–45.3)
MCH RBC QN AUTO: 32.4 PG (ref 26.6–33)
MCHC RBC AUTO-ENTMCNC: 34.9 G/DL (ref 31.5–35.7)
MCV RBC AUTO: 93 FL (ref 79–97)
MONOCYTES # BLD AUTO: 0.47 10*3/MM3 (ref 0.1–0.9)
MONOCYTES NFR BLD AUTO: 8.2 % (ref 5–12)
NEUTROPHILS NFR BLD AUTO: 3.14 10*3/MM3 (ref 1.7–7)
NEUTROPHILS NFR BLD AUTO: 54.7 % (ref 42.7–76)
NRBC BLD AUTO-RTO: 0 /100 WBC (ref 0–0.2)
NT-PROBNP SERPL-MCNC: 37.8 PG/ML (ref 0–1800)
PLATELET # BLD AUTO: 212 10*3/MM3 (ref 140–450)
PMV BLD AUTO: 10.7 FL (ref 6–12)
POTASSIUM SERPL-SCNC: 4.4 MMOL/L (ref 3.5–5.2)
RBC # BLD AUTO: 4.69 10*6/MM3 (ref 3.77–5.28)
SODIUM SERPL-SCNC: 139 MMOL/L (ref 136–145)
TIBC SERPL-MCNC: 417 MCG/DL (ref 298–536)
TRANSFERRIN SERPL-MCNC: 280 MG/DL (ref 200–360)
TRIGL SERPL-MCNC: 148 MG/DL (ref 0–150)
VLDLC SERPL-MCNC: 26 MG/DL (ref 5–40)
WBC NRBC COR # BLD: 5.74 10*3/MM3 (ref 3.4–10.8)

## 2022-02-16 PROCEDURE — 83540 ASSAY OF IRON: CPT

## 2022-02-16 PROCEDURE — 83880 ASSAY OF NATRIURETIC PEPTIDE: CPT

## 2022-02-16 PROCEDURE — 84466 ASSAY OF TRANSFERRIN: CPT

## 2022-02-16 PROCEDURE — 99214 OFFICE O/P EST MOD 30 MIN: CPT | Performed by: NURSE PRACTITIONER

## 2022-02-16 PROCEDURE — 85025 COMPLETE CBC W/AUTO DIFF WBC: CPT

## 2022-02-16 PROCEDURE — 85610 PROTHROMBIN TIME: CPT | Performed by: INTERNAL MEDICINE

## 2022-02-16 PROCEDURE — 36416 COLLJ CAPILLARY BLOOD SPEC: CPT | Performed by: INTERNAL MEDICINE

## 2022-02-16 PROCEDURE — G0463 HOSPITAL OUTPT CLINIC VISIT: HCPCS

## 2022-02-16 PROCEDURE — 82728 ASSAY OF FERRITIN: CPT

## 2022-02-16 PROCEDURE — 80061 LIPID PANEL: CPT

## 2022-02-16 PROCEDURE — 80048 BASIC METABOLIC PNL TOTAL CA: CPT

## 2022-02-16 NOTE — ADDENDUM NOTE
Encounter addended by: Helena Valencia RN on: 2/16/2022 1:59 PM   Actions taken: Charge Capture section accepted

## 2022-02-16 NOTE — TELEPHONE ENCOUNTER
----- Message from AGA Tolbert sent at 2/16/2022  3:08 PM EST -----  Please let the patient know that labs are stable.  No change to current plan of care.Thanks,Alisa  ----- Message -----  From: Lab, Background User  Sent: 2/16/2022   1:35 PM EST  To: [facility] Clinical Pool

## 2022-02-16 NOTE — PROGRESS NOTES
[facility] [place] HEART FAILURE      Patient Name: Inessa Peoples  :1940  Age: 82 y.o.  Sex: female  Referring Provider: Mirela Ríos APRN   Primary Cardiologist: Demar Stone MD  Encounter Provider:  AGA Tolbert      Chief Complaint:   Chronic diastolic CHF      Congestive Heart Failure  Associated symptoms include palpitations and shortness of breath. Pertinent negatives include no chest pain, fatigue or unexpected weight change.    this 82-year-old female, known to this provider, comes to us today for further evaluation regarding her chronic diastolic heart failure.  Current diagnoses to include paroxysmal atrial fibrillation, prior DVT, obesity, hypertension, hyperlipidemia, hyperthyroidism, left bundle branch block status post placement of CRT, sarcoid lung disease, sleep apnea.    Historically she has been anticoagulated with Xarelto for her atrial fibrillation, however after developing a DVT she was transitioned to warfarin.  Echocardiogram in 2015 revealed an EF of 64% with grade 1 LV diastolic dysfunction and mild valvular heart disease.    Stress echocardiogram 2020 yielded LVEF of 63%, septal wall motion abnormality consistent with bundle branch block, normal LV diastolic function, post exercise ejection fraction 40%, global hypokinesis of LV post exercise, O2 sats diminished to 80% with exercise.  Subsequent cardiac catheterization 2020 yielded right dominant system with no significant CAD, LVEF 50 to 55%.  Given severe reduction of LVEF under stress she underwent CRT pacemaker placement.    AF burden on device check in 2021 was 0.    2021 message was received from the patient stating that she was having some hypotension with blood pressure as low as 90 systolically as well as some lower extremity edema.  Dr. Stone reduced her losartan by half and increased her spironolactone to 50 mg daily.    She was transitioned to metoprolol early   2021.    She did unfortunately develop a yeast infection once starting Jardiance, as well as a UTI.      She continues to do well on her current GDMT.  She does still get short of breath on exertion, but overall feels very stable from a heart failure standpoint.  She has had some trouble with palpitations since having her CRT-P placed, her primary cardiologist has made adjustments to her settings and this has resolved this issue.      The following portions of the patient's history were reviewed and updated as appropriate: allergies, current medications, past family history, past medical history, past social history, past surgical history and problem list.    Current Outpatient Medications   Medication Sig Dispense Refill   • acetaminophen (TYLENOL) 650 MG 8 hr tablet Take 650 mg by mouth Every 8 (Eight) Hours As Needed for Mild Pain .     • Calcium-Phosphorus-Vitamin D (CITRACAL +D3 PO) Take 1 tablet by mouth Daily.     • celecoxib (CeleBREX) 200 MG capsule TAKE ONE CAPSULE BY MOUTH DAILY 90 capsule 1   • ezetimibe (ZETIA) 10 MG tablet TAKE ONE TABLET BY MOUTH DAILY 90 tablet 1   • furosemide (LASIX) 20 MG tablet Take 1 tablet by mouth Daily.     • levothyroxine (SYNTHROID, LEVOTHROID) 25 MCG tablet TAKE ONE TABLET BY MOUTH DAILY 90 tablet 1   • melatonin 5 MG sublingual tablet sublingual tablet Take 5 mg by mouth Every Night.     • metoprolol succinate XL (TOPROL-XL) 25 MG 24 hr tablet Take 0.5 tablets by mouth Daily. 45 tablet 3   • Multiple Vitamin (MULTI-DAY VITAMINS) tablet Take 1 tablet by mouth daily.     • sacubitril-valsartan (ENTRESTO) 24-26 MG tablet Take 1 tablet by mouth 2 (Two) Times a Day. 60 tablet 5   • spironolactone (ALDACTONE) 25 MG tablet Take 1 tablet by mouth Daily. 90 tablet 1   • warfarin (COUMADIN) 3 MG tablet Take 1 tablet by mouth Daily. 90 tablet 0     No current facility-administered medications for this encounter.       Past Medical History:   Diagnosis Date   • Abnormal ECG    •  Allergic Iodine used in on xrays   • Allergic rhinitis    • Atrial fibrillation (HCC)    • Bleeds easily (HCC)     warfarin   • Breast screening    • Broken bones    • Calf DVT (deep venous thrombosis) (HCC)    • Chronic anticoagulation    • Class 2 severe obesity due to excess calories with serious comorbidity and body mass index (BMI) of 37.0 to 37.9 in adult (HCC)    • Colon polyp    • CTS (carpal tunnel syndrome)    • Deep vein thrombosis (HCC)     left calf   • Diverticulitis of colon    • Diverticulitis of large intestine without perforation or abscess without bleeding 1/3/2017   • AHN (dyspnea on exertion)    • Fatigue    • H/O malignant melanoma of skin    • Heart failure with preserved ejection fraction, NYHA class II (HCC)     II-III---decreased EF with stress   • Hematuria, gross    • History of colon polyps    • History of kidney stones    • Hyperlipidemia    • Hypertension    • Hyperthyroidism    • IFG (impaired fasting glucose)    • Insomnia    • LBBB (left bundle branch block)    • Left leg swelling    • LLQ abdominal pain    • Long term current use of systemic steroids    • Low back pain    • Malignant melanoma of skin (HCC)    • Neck pain    • Obesity     class 2 obesity due to excess calories with BMI of 35.0 to 35.9 in adult   • Osteoarthritis    • Osteopenia    • Paralysis, diaphragm    • Paroxysmal atrial fibrillation (HCC)    • 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 Lu   • Sleep apnea     USES CPAP   • UTI (urinary tract infection)    • Visit for screening mammogram        Past Surgical History:   Procedure Laterality Date   • CARDIAC CATHETERIZATION N/A 6/29/2020    Procedure: Coronary angiography, Jessica PHILIP;  Surgeon: Noe Reynoso MD;  Location: [facility] INVASIVE LOCATION;   Service: Cardiovascular;  Laterality: N/A;   • CARDIAC CATHETERIZATION N/A 6/29/2020    Procedure: Left ventriculography;  Surgeon: Noe Reynoso MD;  Location: [facility] CATH INVASIVE LOCATION;  Service: Cardiovascular;  Laterality: N/A;   • CARDIAC CATHETERIZATION N/A 6/29/2020    Procedure: Right and Left Heart Cath;  Surgeon: Noe Reynoso MD;  Location: [facility] CATH INVASIVE LOCATION;  Service: Cardiovascular;  Laterality: N/A;   • CARDIAC CATHETERIZATION     • CARDIAC ELECTROPHYSIOLOGY PROCEDURE N/A 1/5/2021    Procedure: Biventricular Device Insertion -- CRT-p ([employer]tronic);  Surgeon: Demar Stone MD;  Location: [facility] CATH INVASIVE LOCATION;  Service: Cardiology;  Laterality: N/A;   • CARPAL TUNNEL RELEASE Bilateral 1980'S    [facility]   • CATARACT EXTRACTION Bilateral 2001    Dr. Nic Hidalgo   • COLONOSCOPY N/A 06/26/2013    MILD SIGMOID DIVERTICULOSIS, repeat 5 years based on sister w/ colona cancer-DR. NASRA CASTRO   • COLONOSCOPY N/A 10/19/2010    SIGMOID DIVERTICULOSIS & INTERNAL HEMORRHOIDS, HEMORRHOIDAL BANDING X4, DR. NASRA CASTRO   • COLONOSCOPY N/A 8/6/2018    Procedure: COLONOSCOPY TO CECUM AND INTO TERMINAL ILEUM;  Surgeon: Nasra Castro MD;  Location: [facility] ENDOSCOPY;  Service: Gastroenterology   • COLONOSCOPY N/A 10/4/2021    Procedure: COLONOSCOPY to CECUM;  Surgeon: Nasra Castro MD;  Location: [facility] ENDOSCOPY;  Service: General;  Laterality: N/A;  FAMILY HX COLON CANCER  --DIVERTICULOSIS    • COLONOSCOPY W/ BIOPSIES N/A 05/12/2008    RECTUM BIOPSY: COLONIC MUCOSA W/ FOCAL INCREASE OF INFLAMMATORY CELLS IN THE MUSCULARIS MUCOSA INCLUDING EOSINOPHILS, SIGMOID DIVERTICULOSIS, POSSIBLE PROCTITIS, DR. NASRA CASTRO   • CYSTOSCOPY, RETROGRADE PYELOGRAM AND STENT INSERTION Bilateral 10/10/2014    w/ Right ureteral pyeloscopy & laser lithotripsy, Right Double-J stent placement-Dr. Julio Tai   • ENDOSCOPY N/A 11/11/2020    Procedure:  ESOPHAGOGASTRODUODENOSCOPY with biopsies and dilation 54fr fernández;  Surgeon: Garret Piedra MD;  Location: [facility] ENDOSCOPY;  Service: Gastroenterology;  Laterality: N/A;  pre- dysphagia  post-- gastritis, dilation of esophageal ring, watermelon stomach    • EXTRACORPOREAL SHOCK WAVE LITHOTRIPSY (ESWL) Right 09/10/2009    DR. CLAIRE ROCK   • HAMMER TOE REPAIR     • LAPAROSCOPIC CHOLECYSTECTOMY N/A 07/19/2012    DR. TANISHA THORNTON   • LUMBAR EPIDURAL INJECTION N/A 04/23/2015    LUMBAR EPIDURAL STEROID INJECTION X3   • MEDIAL BRANCH BLOCK Bilateral 11/2/2021    Procedure: MEDIAL BRANCH BLOCK--bilateral Lumbar2-5;  Surgeon: Wolfgang Means MD;  [place] [facility] [place]  Service: Pain Management;  Laterality: Bilateral;   • PACEMAKER IMPLANTATION     • PARATHYROIDECTOMY N/A 2001   • SACROILIAC JOINT INJECTION Left 6/15/2021    Procedure: SACROILIAC INJECTION-- left and left greater trochanteric bursa injection;  Surgeon: Wolfgang Means MD;  [place] [facility] [place];  Service: Pain Management;  Laterality: Left;   • SKIN CANCER EXCISION     • THUMB ARTHROSCOPY Right 1982   • TOTAL KNEE ARTHROPLASTY Right 03/25/2014    [employer] Vanguard total knee, 55 femoral component, 71 tibial tray w/ a 40 mm stem, 34 mm x 7.8 mm three-peg patella, and a 14 standard poly-Dr. Ty Canchola       Physical Exam  Vitals and nursing note reviewed.   Constitutional:       General: She is not in acute distress.     Appearance: She is well-developed. She is obese. She is not ill-appearing.   HENT:      Head: Normocephalic and atraumatic.   Eyes:      Conjunctiva/sclera: Conjunctivae normal.      Pupils: Pupils are equal, round, and reactive to light.   Neck:      Vascular: No JVD.   Cardiovascular:      Rate and Rhythm: Normal rate and regular rhythm.      Heart sounds: Normal heart sounds. No murmur heard.  No friction rub. No gallop.    Pulmonary:      Effort: Pulmonary effort is normal. No respiratory distress.      Breath  "sounds: Normal breath sounds.   Abdominal:      General: Bowel sounds are normal. There is no distension.      Palpations: Abdomen is soft.   Musculoskeletal:         General: No swelling or deformity.   Skin:     General: Skin is warm and dry.      Capillary Refill: Capillary refill takes less than 2 seconds.   Neurological:      Mental Status: She is alert and oriented to person, place, and time. Mental status is at baseline.   Psychiatric:         Mood and Affect: Mood normal.         Behavior: Behavior normal.          Review of Systems   Constitutional: Negative for fatigue and unexpected weight change.   HENT: Negative for congestion and nosebleeds.    Eyes: Negative for photophobia and visual disturbance.   Respiratory: Positive for shortness of breath. Negative for cough and chest tightness.         On exertion; stable per baseline   Cardiovascular: Positive for palpitations. Negative for chest pain and leg swelling.   Gastrointestinal: Negative for abdominal distention and blood in stool.   Endocrine: Negative for polyphagia and polyuria.   Genitourinary: Negative for frequency and urgency.   Musculoskeletal: Negative for joint swelling and myalgias.   Skin: Negative for pallor and rash.   Neurological: Negative for dizziness, syncope, weakness, light-headedness, numbness and headaches.   Hematological: Does not bruise/bleed easily.   Psychiatric/Behavioral: Negative for confusion and sleep disturbance.        OBJECTIVE:  /72 (BP Location: Right arm, Patient Position: Sitting)   Pulse 76   Resp 20   Ht 158.8 cm (62.52\")   Wt 89.1 kg (196 lb 6.4 oz)   SpO2 92%   BMI 35.33 kg/m²      Body mass index is 35.33 kg/m².  Wt Readings from Last 1 Encounters:   02/16/22 89.1 kg (196 lb 6.4 oz)       Lab Review:  Renal Function: CrCl cannot be calculated (Patient's most recent lab result is older than the maximum 30 days allowed.).    Lab Results   Component Value Date    PROBNP 58.2 11/11/2021 "       Results for orders placed during the hospital encounter of 01/05/21    Adult Transthoracic Echo Limited W/ Cont if Necessary Per Protocol    Interpretation Summary  · Limited echo requested to assess LV function  · Estimated left ventricular EF = 70% Left ventricular systolic function is normal.      Procedures      6 MINUTE WALK                      Cardiac Procedures:  1. 6/29/2020 Cardiac catheterization   Hemodynamics:  1.  Cardiac output (Trino): 3.28 L/min  2.  Cardiac index (Trino): 1.7 L/min/m²  3.  Right atrium: A wave 9, V wave 7, mean 6  4.  Right ventricle: 28/4  5.  Pulmonary artery, 28/12, mean 18  6.  PCW: A wave 10, V wave 11, mean is 9  7.  Aorta: 140/65, mean 91  8.  Left ventricle: 140/8     Cineangiography:  1.  Left main: The left main coronary artery is virtually nonexistent.  There is almost a common ostium of the LAD and left circumflex.  2.  LAD: The left anterior descending coronary artery is a tortuous vessel.  The artery appears to have minor plaquing in the proximal and mid segments but otherwise no significant stenosis.  The distal portion is tortuous.  The diagonal branches are small but normal.  3.  LCx: The left circumflex coronary artery is a large vessel.  The proximal portion of the circumflex appears to be normal.  The mid and distal segments are normal as well.  There is a major branching marginal system arising from the mid segment that appears to be normal.  The distal marginal is small and normal.  4.  RCA: The right coronary artery is a dominant vessel.  The right coronary artery has minimal plaquing in the proximal and mid segments.  The distal segment is normal.  The posterior descending branch appears to be tortuous but normal.     Left ventriculography: Overall size of the ventricle is normal.  The global contractility of the ventricle is within normal limits with an estimated ejection fraction of 50 to 55%.     Assessment:  1.  Normal coronary arteries  2.  Normal  intracardiac pressures  3.  Normal left ventricular function  4.  Mildly reduced cardiac output and cardiac index.       Previously trialed diuretics  Spironolactone      Previously trialed GDMT:    Spironolactone  Carvedilol  Losartan  Entresto  Jardiance    ASSESSMENT:     Diagnosis Plan   1. Heart failure with preserved ejection fraction, NYHA class II (CMS/HCC)II-III---decreased EF with stress  CBC & Differential    Basic Metabolic Panel    BNP    Lipid Panel    Ferritin    Iron Profile   2. Paroxysmal atrial fibrillation (HCC)     3. LBBB (left bundle branch block)     4. Primary hypertension     5. Hyperlipidemia, unspecified hyperlipidemia type     6. ANY on CPAP + 10 - Dr Lu           PLAN OF CARE:  1.  HFpEF-NYHA class II.  Most recent ejection fraction is greater than 70% per echocardiogram.  On stress echo however, patient is noted to have a significantly reduced LVEF under stress of 40%.  Current guideline directed medical therapy to include metoprolol, spironolactone, and Entresto.  Unable to tolerate SGLT2 inhibitor given recurrent yeast infection.    She remains euvolemic on exam.  I would like to continue her current GDMT and will check labs today as below.  She is to remain on a low-sodium diet of 2000 mg daily or less, fluid restriction of 1500 mL daily, as well as remain adherent with daily weights.    Directions for when to call the clinic reviewed with the patient to include weight gain of 2 to 3 pounds in 24 hours, weight gain of 5 to 10 pounds within 7 days; worsening shortness of breath; worsening lower extremity edema or abdominal distention.    2.  Hypertension-stable and controlled    3.  Paroxysmal atrial fibrillation-stable and controlled on beta-blockade, anticoagulated on warfarin.  Managed by Dr. Stone.      4.  Obstructive sleep apnea-compliant with CPAP therapy.     5.  Left bundle branch block-presence of CRT-P noted given symptomatology.    6.  Hyperlipidemia-LDL poorly  controlled.  Recheck lipid panel today.  I think she could benefit from PCSK9 inhibitor, will defer this to her PCP.      CBC/CMP/BNP/lipid panel/ferritin/iron profile; continue current GDMT; follow-up in 3 months or sooner if needed      Thank you for allowing me to participate in the care of your patient,        Miriam Gabriel APRMEE  \[facility]\"" HEART FAILURE  02/16/22  13:56 EDT      **Krishna Disclaimer:**  Much of this encounter note is an electronic transcription/translation of spoken language to printed text. The electronic translation of spoken language may permit erroneous, or at times, nonsensical words or phrases to be inadvertently transcribed. Although I have reviewed the note for such errors, some may still exist.

## 2022-03-28 ENCOUNTER — OFFICE VISIT (OUTPATIENT)
Dept: INTERNAL MEDICINE | Facility: CLINIC | Age: 82
End: 2022-03-28

## 2022-03-28 VITALS
HEART RATE: 72 BPM | HEIGHT: 63 IN | WEIGHT: 190 LBS | BODY MASS INDEX: 33.66 KG/M2 | SYSTOLIC BLOOD PRESSURE: 142 MMHG | OXYGEN SATURATION: 98 % | DIASTOLIC BLOOD PRESSURE: 80 MMHG

## 2022-03-28 DIAGNOSIS — I48.0 PAROXYSMAL ATRIAL FIBRILLATION: Primary | ICD-10-CM

## 2022-03-28 DIAGNOSIS — I10 PRIMARY HYPERTENSION: ICD-10-CM

## 2022-03-28 DIAGNOSIS — M17.12 PRIMARY OSTEOARTHRITIS OF LEFT KNEE: ICD-10-CM

## 2022-03-28 LAB — INR PPP: 1.7 (ref 0.9–1.1)

## 2022-03-28 PROCEDURE — 99213 OFFICE O/P EST LOW 20 MIN: CPT | Performed by: INTERNAL MEDICINE

## 2022-03-28 PROCEDURE — 36416 COLLJ CAPILLARY BLOOD SPEC: CPT | Performed by: INTERNAL MEDICINE

## 2022-03-28 PROCEDURE — 85610 PROTHROMBIN TIME: CPT | Performed by: INTERNAL MEDICINE

## 2022-03-28 RX ORDER — FUROSEMIDE 20 MG/1
20 TABLET ORAL DAILY
COMMUNITY
End: 2022-07-25

## 2022-03-28 NOTE — PROGRESS NOTES
Subjective     Inessa Peoples is a 82 y.o. female who presents with   Chief Complaint   Patient presents with   • Surgery Clearance       History of Present Illness     Preop clearance for left knee surgery.  It is scheduled for April 27th at [place] with Dr. Raleigh Patrick.    She needs to get cardiac clearance but from a medical standpoint, she is very stable.  SOA is chronic but at baseline.      Review of Systems   Respiratory: Positive for shortness of breath.    Cardiovascular: Negative for chest pain, palpitations and leg swelling.       The following portions of the patient's history were reviewed and updated as appropriate: allergies, current medications and problem list.    Patient Active Problem List    Diagnosis Date Noted   • Heart failure with preserved ejection fraction, NYHA class II (CMS/[facility])II-III---decreased EF with stress 02/11/2021   • Paroxysmal atrial fibrillation ([facility]) 12/09/2020     Note Last Updated: 12/9/2020     Added automatically from request for surgery [id number]     • LBBB (left bundle branch block) 11/08/2019   • Trochanteric bursitis of both hips 05/29/2019   • Lumbar facet arthropathy 11/06/2018   • Class 2 severe obesity due to excess calories with serious comorbidity and body mass index (BMI) of 35.0 to 35.9 in adult ([facility]) 03/27/2018   • Bulge of lumbar disc without myelopathy 07/13/2017   • Dilation of thoracic aorta ([facility]) 06/23/2017   • ANY on CPAP + 10 - Dr Lu 10/15/2016   • GERD (gastroesophageal reflux disease) 08/05/2016   • Sarcoidosis 04/18/2016   • 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       • Chronic osteoarthritis 04/18/2016       Current Outpatient Medications on File Prior to Visit   Medication Sig Dispense Refill   •  "acetaminophen (TYLENOL) 650 MG 8 hr tablet Take 650 mg by mouth Every 8 (Eight) Hours As Needed for Mild Pain .     • Calcium-Phosphorus-Vitamin D (CITRACAL +D3 PO) Take 1 tablet by mouth Daily.     • celecoxib (CeleBREX) 200 MG capsule TAKE ONE CAPSULE BY MOUTH DAILY 90 capsule 1   • ezetimibe (ZETIA) 10 MG tablet TAKE ONE TABLET BY MOUTH DAILY 90 tablet 1   • furosemide (LASIX) 40 MG tablet Take 20 mg by mouth Daily.     • levothyroxine (SYNTHROID, LEVOTHROID) 25 MCG tablet TAKE ONE TABLET BY MOUTH DAILY 90 tablet 1   • melatonin 5 MG sublingual tablet sublingual tablet Take 5 mg by mouth Every Night.     • metoprolol succinate XL (TOPROL-XL) 25 MG 24 hr tablet Take 0.5 tablets by mouth Daily. 45 tablet 3   • Multiple Vitamin (MULTI-DAY VITAMINS) tablet Take 1 tablet by mouth daily.     • sacubitril-valsartan (ENTRESTO) 24-26 MG tablet Take 1 tablet by mouth 2 (Two) Times a Day. 60 tablet 5   • spironolactone (ALDACTONE) 25 MG tablet Take 1 tablet by mouth Daily. 90 tablet 1   • warfarin (COUMADIN) 3 MG tablet Take 1 tablet by mouth Daily. 90 tablet 0   • [DISCONTINUED] furosemide (LASIX) 20 MG tablet Take 1 tablet by mouth Daily.       No current facility-administered medications on file prior to visit.       Objective     /80   Pulse 72   Ht 158.8 cm (62.52\")   Wt 86.2 kg (190 lb)   SpO2 98%   BMI 34.18 kg/m²     Physical Exam  Constitutional:       Appearance: She is well-developed.   HENT:      Head: Normocephalic and atraumatic.   Cardiovascular:      Rate and Rhythm: Normal rate and regular rhythm.      Heart sounds: Normal heart sounds.   Pulmonary:      Effort: Pulmonary effort is normal.      Breath sounds: Normal breath sounds.   Skin:     General: Skin is warm and dry.   Neurological:      Mental Status: She is alert and oriented to person, place, and time.   Psychiatric:         Behavior: Behavior normal.         Assessment/Plan   Diagnoses and all orders for this visit:    1. Paroxysmal " atrial fibrillation (HCC) (Primary)  -     POC INR    2. Primary hypertension    3. Primary osteoarthritis of left knee        Discussion    Patient is stable from a medical standpoint for surgery.  She needs to get cardiac clearance as well.   She will hold coumadin for five days prior to surgery.  She will hold NSAIDs one week prior to surgery.    INR 1.7 today.  Coumadin is adjusted today.         Future Appointments   Date Time Provider Department Center   5/16/2022  2:00 PM [facility]   5/31/2022  1:00 PM [facility]   6/3/2022  1:00 PM [facility]   6/28/2022  2:30 PM Gisele Dockery MD [facility]   8/19/2022 11:00 AM [facility] IN [facility]ICE[facility]   8/19/2022 11:30 AM Mirela Ríos APRN [facility]

## 2022-03-29 ENCOUNTER — TELEPHONE (OUTPATIENT)
Dept: CARDIOLOGY | Facility: CLINIC | Age: 82
End: 2022-03-29

## 2022-03-29 NOTE — TELEPHONE ENCOUNTER
Patient is calling for cardiac clearance for a total knee replacement that she is scheduled for 4/27 at [place] with Dr. Raleigh Bunch. Please advise on if she needs to be seen.     Thanks  Lisbet

## 2022-03-30 NOTE — TELEPHONE ENCOUNTER
Patient needing clearance & instructions on hold warfarin prior to total knee replacement with Dr. Bunch at [place] on 4/27.    Last seen in the office on 1/19/22, NO hx of stroke or valve replacement.

## 2022-04-11 ENCOUNTER — PREP FOR SURGERY (OUTPATIENT)
Dept: SURGERY | Facility: SURGERY CENTER | Age: 82
End: 2022-04-11

## 2022-04-11 ENCOUNTER — OFFICE VISIT (OUTPATIENT)
Dept: PAIN MEDICINE | Facility: CLINIC | Age: 82
End: 2022-04-11

## 2022-04-11 VITALS
SYSTOLIC BLOOD PRESSURE: 126 MMHG | TEMPERATURE: 97.3 F | WEIGHT: 185.4 LBS | OXYGEN SATURATION: 99 % | HEART RATE: 66 BPM | BODY MASS INDEX: 32.85 KG/M2 | DIASTOLIC BLOOD PRESSURE: 72 MMHG | HEIGHT: 63 IN | RESPIRATION RATE: 16 BRPM

## 2022-04-11 DIAGNOSIS — M47.816 LUMBAR FACET ARTHROPATHY: ICD-10-CM

## 2022-04-11 DIAGNOSIS — G89.29 OTHER CHRONIC PAIN: Primary | ICD-10-CM

## 2022-04-11 DIAGNOSIS — M51.36 BULGE OF LUMBAR DISC WITHOUT MYELOPATHY: ICD-10-CM

## 2022-04-11 DIAGNOSIS — M47.816 LUMBAR FACET ARTHROPATHY: Primary | ICD-10-CM

## 2022-04-11 PROCEDURE — 93296 REM INTERROG EVL PM/IDS: CPT | Performed by: INTERNAL MEDICINE

## 2022-04-11 PROCEDURE — 99214 OFFICE O/P EST MOD 30 MIN: CPT | Performed by: NURSE PRACTITIONER

## 2022-04-11 PROCEDURE — 93294 REM INTERROG EVL PM/LDLS PM: CPT | Performed by: INTERNAL MEDICINE

## 2022-04-11 RX ORDER — SODIUM CHLORIDE 0.9 % (FLUSH) 0.9 %
10 SYRINGE (ML) INJECTION EVERY 12 HOURS SCHEDULED
Status: CANCELLED | OUTPATIENT
Start: 2022-04-11

## 2022-04-11 RX ORDER — SODIUM CHLORIDE 0.9 % (FLUSH) 0.9 %
10 SYRINGE (ML) INJECTION AS NEEDED
Status: CANCELLED | OUTPATIENT
Start: 2022-04-11

## 2022-04-11 NOTE — PROGRESS NOTES
CHIEF COMPLAINT  FOLLOW UP BACK PAIN   4 Month f/u visit- patient reports her pain has increased over the last  month , states she has been experiencing a lot pressure on her lower back .    Subjective   Inessa Peoples is a 82 y.o. female  who presents for follow-up.  She has a history of chronic low back pain. Reports this is worse since last evaluation. Reports her pain worsened over approximately the last month, no new injury or trauma.     Complains of pain in her low back. Today her pain is 5/10VAS. Describes her pain as nearly continuous. Pain increases with walking, standing, prolonged sitting, chores; pain decrease with changing position, rest and procedures. ADL's by self. Denies any bowel or bladder changes.    Is preparing for left knee replacement 4-27-22 with Dr rojas.    Patient was last evaluated the office by Arti YUNG on 12-15-21--at that time she was status post bilateral L2-L5 MBB on 11/2/2021.  Was reporting 100% ongoing relief.  Plan was to follow-up as needed and repeat interventions in the future as needed.    Also has a history of kidney stones.     Patient remained masked during entire encounter. No cough present. I donned a mask and eye protection throughout entire visit. Prior to donning mask and eye protection, hand hygiene was performed, as well as when it was doffed.  I was closer than 6 feet, but not for an extended period of time. No obvious exposure to any bodily fluids.    Back Pain  This is a chronic problem. The current episode started more than 1 year ago. The problem occurs constantly. The problem has been gradually worsening since onset. The pain is present in the lumbar spine and sacro-iliac. The pain does not radiate. The pain is at a severity of 5/10. The pain is moderate. The pain is worse during the day (worsens as day progresses). The symptoms are aggravated by bending, position, sitting, standing and twisting (housework). Associated symptoms include bladder  incontinence. Pertinent negatives include no abdominal pain, bowel incontinence, chest pain, dysuria, fever, headaches, numbness or weakness. Risk factors include obesity, menopause, history of cancer and lack of exercise (history of melanoma--no Chemo or RAD). She has tried analgesics and heat for the symptoms. The treatment provided moderate relief.        Procedure List:  11-12-21-- bilateral L2-L5 MBB-- 100% relief for 2-3 months  6-15-21-- Left SI and Left GTB-- 50% relief for 1-2 days  1-21-21-- bilateral L2-L5 MBB-- 90% relief for several weeks  6-16-20-- left SI +GTB-- 75% for 2 weeks  6-23-19-- bilateral L2-L5 MBB-- 90% for a few weeks  11-15-18--- bilateral L2-L5 MBB-- significant relief for several days  4-26-28-- LEFT SI-- significant relief  9-26-17-- RIGHT SI-- significant    Patient previously told she could interrupt coumadin for injections.    PEG Assessment   What number best describes your pain on average in the past week?8  What number best describes how, during the past week, pain has interfered with your enjoyment of life?8  What number best describes how, during the past week, pain has interfered with your general activity?  8    The following portions of the patient's history were reviewed and updated as appropriate: allergies, current medications, past family history, past medical history, past social history, past surgical history and problem list.    Review of Systems   Constitutional: Negative for activity change, fatigue and fever.   HENT: Negative for congestion.    Eyes: Negative for visual disturbance.   Respiratory: Negative for cough and chest tightness.    Cardiovascular: Negative for chest pain.   Gastrointestinal: Negative for abdominal pain, bowel incontinence, constipation and diarrhea.   Genitourinary: Positive for bladder incontinence. Negative for difficulty urinating and dysuria.   Musculoskeletal: Positive for back pain.   Neurological: Negative for dizziness, weakness,  "light-headedness, numbness and headaches.   Psychiatric/Behavioral: Negative for agitation, sleep disturbance and suicidal ideas. The patient is not nervous/anxious.      I have reviewed and confirmed the accuracy of the ROS as documented by the MA/LPN/RN AGA Katz      Vitals:    04/11/22 1107   BP: 126/72   BP Location: Left arm   Patient Position: Sitting   Pulse: 66   Resp: 16   Temp: 97.3 °F (36.3 °C)   SpO2: 99%   Weight: 84.1 kg (185 lb 6.4 oz)   Height: 158.8 cm (62.52\")   PainSc:   5   PainLoc: Back         Objective   Physical Exam  Vitals and nursing note reviewed.   Constitutional:       Appearance: Normal appearance. She is well-developed.   HENT:      Head: Normocephalic and atraumatic.   Musculoskeletal:      Lumbar back: Tenderness and bony tenderness (lumbar facet tenderness-- left worse than right; + loading manuever) present. Decreased range of motion. Negative right straight leg raise test and negative left straight leg raise test.      Comments:      Skin:     General: Skin is warm and dry.   Neurological:      Mental Status: She is alert.      Gait: Gait abnormal.      Deep Tendon Reflexes:      Reflex Scores:       Patellar reflexes are 1+ on the right side and 1+ on the left side.  Psychiatric:         Speech: Speech normal.         Behavior: Behavior normal. Behavior is cooperative.         Thought Content: Thought content normal.         Judgment: Judgment normal.         Assessment/Plan   Diagnoses and all orders for this visit:    1. Other chronic pain (Primary)    2. Lumbar facet arthropathy    3. 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 Patient will call to schedule once authorized by Dr rojas. Reviewed the procedure at length with the patient.  Included in the review was expectations, complications, risk and benefits.The procedure was described in detail and the risks, benefits and alternatives were discussed with the patient " "(including but not limited to: bleeding, infection, nerve damage, worsening of pain, inability to perform injection, paralysis, seizures, coma, no pain relief and death) who agreed to proceed.  Discussed the potential for sedation if warranted/wanted.  The procedure will plan to be performed at [facility] with fluoroscopic guidance(unless ultrasound is indicated) and could potentially have steroids and contrast dye used. Questions were answered and in a way the patient could understand.  Patient verbalized understanding and wishes to proceed.  This intervention will be ordered.  Discussed with patient that all procedures are part of a multimodal plan of care and include either formal PT or a home exercise program.  Patient has no evidence of coagulopathy or current infection.    --- Follow-up after procedure or sooner if needed.    -------  Education about Medial Branch Blockade and RF Therapy:    This medial branch blockade (MBB) suggested is intended for diagnostic purposes, with the intent of offering the patient Radiofrequency thermal rhizotomy (RF) if the MBB is diagnostically effective.  The diagnostic blockade is necessary to determine the likelihood that RF therapy could be efficacious in providing long term relief to the patient.    Medial branches are sensory nerve branches that connect to a facet joint and transmit sensations & pain signals from that joint.  Facet is a term for the type of joints found in the spine.  Medial branches are the nerves that go to a facet, and therefore are also sometimes called \"facet joint nerves\" (FJNs).      In a medial branch blockade procedure, xray fluoroscopy is used to verify the locations of the outside of the joint lines which are being targeted.  Under xray guidance, needles are placed to these areas.  Contrast dye is injected to confirm proper placement, with dye flowing over the joint area, and to ensure that the dye does not flow into " unintended areas such as a vein.  When this is confirmed, local anesthetic is injected to block the medial branch at that joint level.      If MBBs are diagnostically successful in blocking pain, then the patient is most likely a great candidate for Radiofrequency of those facet joint nerves.  In the RF procedure, needles are placed to the joint lines in the same fashion, and after testing, the needle tips are heated to thermally treat the nerves, blocking the nerves by in essence damaging the nerves with the heat treatment(non-pulsed).       Medically, a successful RF procedure should provide a patient with 50% pain relief or more for at least 6 months.  Clinical experience suggests that successful patients receive relief more in the range of 12 months on average.  We also discussed that a fortunate minority of patients receive therapeutic success from the MBB, and may not require RF ablation.  If a patient receives more than 8 weeks of relief from MBB, then occasional repeat MBB for therapeutic purposes is a very reasonable alternative therapy.  This course of therapy is consistent with our LCDs according to our CMS  in the area, and therefore other insurance providers should follow accordingly.  We will monitor our patients to screen for these therapeutic responders and will offer RF therapy only when necessary.        We discussed that MBB & RF are not without risks.  Guidelines regarding anticoagulant use & neuraxial procedures will be respected.  Patients that are ill or otherwise may be at risk for sepsis will not have their spines accessed by neuraxial injections of any type.  This patient will not be offered these therapies if there is an increased risk.   We discussed that there is a risk of postprocedural pain and also a risk of worsening of clinical picture with these procedures as with any neuraxial procedure.    -------       LUIS REPORT  As the clinician, I personally reviewed the  LUIS from 4-11-22 while the patient was in the office today.         Dictated utilizing Dragon dictation.     This document is intended for medical expert use only. Reading of this document by patients and/or patient's family without participating medical staff guidance may result in misinterpretation and unintended morbidity.   Any interpretation of such data is the responsibility of the patient and/or family member responsible for the patient in concert with their primary or specialist providers, not to be left for sources of online searches such as [employer], [employer] or similar queries. Relying on these approaches to knowledge may result in misinterpretation, misguided goals of care and even death should patients or family members try recommendations outside of the realm of professional medical care in a supervised way.

## 2022-04-29 ENCOUNTER — ANTICOAGULATION VISIT (OUTPATIENT)
Dept: PHARMACY | Facility: HOSPITAL | Age: 82
End: 2022-04-29

## 2022-04-29 ENCOUNTER — TELEPHONE (OUTPATIENT)
Dept: INTERNAL MEDICINE | Facility: CLINIC | Age: 82
End: 2022-04-29

## 2022-04-29 LAB — INR PPP: 1.1

## 2022-04-29 NOTE — PROGRESS NOTES
A user error has taken place: encounter opened in error, closed for administrative reasons.  Patient is actually managed by PCP, contacted [email or web address] to notify.

## 2022-04-29 NOTE — TELEPHONE ENCOUNTER
I d/w her nurse.  Hold Lovenox likely source of bleeding.  Restart coumadin at regular dosing.  Recheck INR in one week.  BEST

## 2022-04-29 NOTE — TELEPHONE ENCOUNTER
Rebeka called and said Chavo INR was 1.1 today. Had Knee replacement and was told to resume Warfarin yesterday but  states patients incision was bleeding very good and ortho advised to not take warfarin and call PCP.     Darci # [phone].  Please call Rebeka and advise what needs to be done with warfarin.

## 2022-05-04 ENCOUNTER — TELEPHONE (OUTPATIENT)
Dept: INTERNAL MEDICINE | Facility: CLINIC | Age: 82
End: 2022-05-04

## 2022-05-04 RX ORDER — WARFARIN SODIUM 3 MG/1
3 TABLET ORAL DAILY
Qty: 90 TABLET | Refills: 3 | Status: SHIPPED | OUTPATIENT
Start: 2022-05-04 | End: 2022-09-30 | Stop reason: SDUPTHER

## 2022-05-04 NOTE — TELEPHONE ENCOUNTER
Caller: Inessa Peoples    Relationship: Self    Best call back number: [phone]     Requested Prescriptions:      warfarin (COUMADIN) 3 MG tablet     Pharmacy where request should be sent:    [facility] AT [address] - [phone]  - [phone] FX  [phone]    Additional details provided by patient: PATIENT IS CALLING TO STATE SHE HAS BEEN TRYING TO GET HER PRESCRIPTION FOR THE ABOVE MEDICATION SINCE Saturday.  SHE STATES SHE TOOK LAST OF THE MEDICATION TODAY.  SHE STATES HER PHARMACY CLOSES EARLY NOW AND IS HOPING TO GET THIS CALLED IN BEFORE 4:00 PM.    Does the patient have less than a 3 day supply:  [x] Yes  [] No    Fly [employer] Rep   05/04/22 15:51 EDT     PLEASE ADVISE.

## 2022-05-06 ENCOUNTER — ANTICOAGULATION VISIT (OUTPATIENT)
Dept: INTERNAL MEDICINE | Facility: CLINIC | Age: 82
End: 2022-05-06

## 2022-05-06 ENCOUNTER — TELEPHONE (OUTPATIENT)
Dept: INTERNAL MEDICINE | Facility: CLINIC | Age: 82
End: 2022-05-06

## 2022-05-06 LAB — INR PPP: 1.4 (ref 0.9–1.1)

## 2022-05-06 PROCEDURE — 36416 COLLJ CAPILLARY BLOOD SPEC: CPT | Performed by: INTERNAL MEDICINE

## 2022-05-06 PROCEDURE — 85610 PROTHROMBIN TIME: CPT | Performed by: INTERNAL MEDICINE

## 2022-05-06 NOTE — TELEPHONE ENCOUNTER
Caller: SHIELA    Relationship: [facility]    Best call back number: [phone]      What was the call regarding: SHIELA WITH [facility] CALLED TO REPORT PATIENT'S RECENT PT/INR     PT = 16.4    INR = 1.4    Do you require a callback: IF NECESSARY

## 2022-05-11 ENCOUNTER — TELEPHONE (OUTPATIENT)
Dept: INTERNAL MEDICINE | Facility: CLINIC | Age: 82
End: 2022-05-11

## 2022-05-11 LAB — INR PPP: 1.5 (ref 0.9–1.1)

## 2022-05-11 NOTE — TELEPHONE ENCOUNTER
Caller: SHIELA     Relationship: [phone]    Best call back number: [phone]  What is your medical concern?     PP 18.3   INR 1.5      THIS IS HOME HEALTH'S LAST VISIT TODAY.  THEREFORE, PATIENT WILL NEED AN APPOINTMENT IN THE OFFICE FOR HER NEXT PROTIME.

## 2022-05-12 ENCOUNTER — TELEPHONE (OUTPATIENT)
Dept: PAIN MEDICINE | Facility: CLINIC | Age: 82
End: 2022-05-12

## 2022-05-12 NOTE — TELEPHONE ENCOUNTER
It is generally used for low back pain. She wasn't really having sciatica at last office visit. Is she having different symptoms? Also, may want re-evaluation since surgery. Let me know thanks. BB

## 2022-05-12 NOTE — TELEPHONE ENCOUNTER
Hub staff attempted to follow warm transfer process and was unsuccessful     Caller: Inessa Peoples    Relationship to patient: Self    Best call back number: [phone]    Patient is needing: PATIENT NEEDS TO KNOW THE NAME OF THE PROCEDURE THAT MS. FAROOQ WAS GOING TO ORDER FOR HER. SHE STATED THAT SHE NEEDS TO DISCUSS IT WITH HER PCP BECAUSE OF HER KNEE REPLACEMENT. PLEASE CALL HER.

## 2022-05-12 NOTE — TELEPHONE ENCOUNTER
Called and spoke with pt. Informed her you recommended repeat bilateral L2-L5 mbb. She wants to know if that injection will treat her sciatica pain. She sts left sciatica pain worse than the right. Please advise. Thank you.

## 2022-05-12 NOTE — TELEPHONE ENCOUNTER
Spoke with pt. She sts she always had sciatica pain but it has worsened since her left knee surgery. She sts she is scheduled to see knee surgeon on 5/26/22. I encouraged pt to call surgeon's office and let them know what is going on. She sts she will call.

## 2022-05-16 ENCOUNTER — HOSPITAL ENCOUNTER (OUTPATIENT)
Dept: CARDIOLOGY | Facility: HOSPITAL | Age: 82
Discharge: HOME | End: 2022-05-16
Admitting: NURSE PRACTITIONER

## 2022-05-16 VITALS
DIASTOLIC BLOOD PRESSURE: 70 MMHG | OXYGEN SATURATION: 96 % | HEIGHT: 63 IN | SYSTOLIC BLOOD PRESSURE: 110 MMHG | HEART RATE: 76 BPM | WEIGHT: 175.6 LBS | BODY MASS INDEX: 31.11 KG/M2

## 2022-05-16 DIAGNOSIS — E78.5 HYPERLIPIDEMIA, UNSPECIFIED HYPERLIPIDEMIA TYPE: ICD-10-CM

## 2022-05-16 DIAGNOSIS — I10 PRIMARY HYPERTENSION: ICD-10-CM

## 2022-05-16 DIAGNOSIS — I44.7 LBBB (LEFT BUNDLE BRANCH BLOCK): ICD-10-CM

## 2022-05-16 DIAGNOSIS — R79.9 ABNORMAL FINDING OF BLOOD CHEMISTRY, UNSPECIFIED: ICD-10-CM

## 2022-05-16 DIAGNOSIS — I48.0 PAROXYSMAL ATRIAL FIBRILLATION: ICD-10-CM

## 2022-05-16 DIAGNOSIS — I50.30 HEART FAILURE WITH PRESERVED EJECTION FRACTION, NYHA CLASS II: Primary | ICD-10-CM

## 2022-05-16 LAB
ALBUMIN SERPL-MCNC: 4.6 G/DL (ref 3.5–5.2)
ALBUMIN/GLOB SERPL: 1.7 G/DL
ALP SERPL-CCNC: 116 U/L (ref 39–117)
ALT SERPL W P-5'-P-CCNC: 21 U/L (ref 1–33)
ANION GAP SERPL CALCULATED.3IONS-SCNC: 16 MMOL/L (ref 5–15)
AST SERPL-CCNC: 21 U/L (ref 1–32)
BILIRUB SERPL-MCNC: 0.7 MG/DL (ref 0–1.2)
BUN SERPL-MCNC: 33 MG/DL (ref 8–23)
BUN/CREAT SERPL: 25.6 (ref 7–25)
CALCIUM SPEC-SCNC: 10.3 MG/DL (ref 8.6–10.5)
CHLORIDE SERPL-SCNC: 99 MMOL/L (ref 98–107)
CHOLEST SERPL-MCNC: 204 MG/DL (ref 0–200)
CO2 SERPL-SCNC: 20 MMOL/L (ref 22–29)
CREAT SERPL-MCNC: 1.29 MG/DL (ref 0.57–1)
EGFRCR SERPLBLD CKD-EPI 2021: 42 ML/MIN/1.73
GLOBULIN UR ELPH-MCNC: 2.7 GM/DL
GLUCOSE SERPL-MCNC: 94 MG/DL (ref 65–99)
HBA1C MFR BLD: 5.3 % (ref 4.8–5.6)
HDLC SERPL-MCNC: 52 MG/DL (ref 40–60)
LDLC SERPL CALC-MCNC: 126 MG/DL (ref 0–100)
LDLC/HDLC SERPL: 2.37 {RATIO}
NT-PROBNP SERPL-MCNC: 57.9 PG/ML (ref 0–1800)
POTASSIUM SERPL-SCNC: 4.5 MMOL/L (ref 3.5–5.2)
PROT SERPL-MCNC: 7.3 G/DL (ref 6–8.5)
SODIUM SERPL-SCNC: 135 MMOL/L (ref 136–145)
TRIGL SERPL-MCNC: 145 MG/DL (ref 0–150)
VLDLC SERPL-MCNC: 26 MG/DL (ref 5–40)

## 2022-05-16 PROCEDURE — G0463 HOSPITAL OUTPT CLINIC VISIT: HCPCS

## 2022-05-16 PROCEDURE — 99214 OFFICE O/P EST MOD 30 MIN: CPT | Performed by: NURSE PRACTITIONER

## 2022-05-16 PROCEDURE — 83036 HEMOGLOBIN GLYCOSYLATED A1C: CPT

## 2022-05-16 PROCEDURE — 83880 ASSAY OF NATRIURETIC PEPTIDE: CPT

## 2022-05-16 PROCEDURE — 80061 LIPID PANEL: CPT

## 2022-05-16 PROCEDURE — 80053 COMPREHEN METABOLIC PANEL: CPT

## 2022-05-16 RX ORDER — HYDROCODONE BITARTRATE AND ACETAMINOPHEN 5; 325 MG/1; MG/1
TABLET ORAL
COMMUNITY
Start: 2022-05-11 | End: 2022-07-13

## 2022-05-16 NOTE — TELEPHONE ENCOUNTER
Ok. Yes. See surgeon. When they say she can have a procedure with steroids, let's get her in for evaluation rather than going straight for lumbar MBB. Thanks. BB

## 2022-05-16 NOTE — ADDENDUM NOTE
Encounter addended by: Helena Valencia RN on: 5/16/2022 3:14 PM   Actions taken: Charge Capture section accepted

## 2022-05-16 NOTE — PROGRESS NOTES
[facility] HEART FAILURE      Patient Name: Inessa Peoples  :1940  Age: 82 y.o.  Sex: female  Referring Provider: Mirela Ríos APRN   Primary Cardiologist: Demar Stone MD  Encounter Provider:  AGA Tolbert      Chief Complaint:   Chronic diastolic CHF      Congestive Heart Failure  Associated symptoms include palpitations and shortness of breath. Pertinent negatives include no chest pain, fatigue or unexpected weight change.    this 82-year-old female, known to this provider, comes to us today for further evaluation regarding her chronic diastolic heart failure.  Current diagnoses to include paroxysmal atrial fibrillation, prior DVT, obesity, hypertension, hyperlipidemia, hyperthyroidism, left bundle branch block status post placement of CRT, sarcoid lung disease, sleep apnea.    Historically she has been anticoagulated with Xarelto for her atrial fibrillation, however after developing a DVT she was transitioned to warfarin.  Echocardiogram in 2015 revealed an EF of 64% with grade 1 LV diastolic dysfunction and mild valvular heart disease.    Stress echocardiogram 2020 yielded LVEF of 63%, septal wall motion abnormality consistent with bundle branch block, normal LV diastolic function, post exercise ejection fraction 40%, global hypokinesis of LV post exercise, O2 sats diminished to 80% with exercise.  Subsequent cardiac catheterization 2020 yielded right dominant system with no significant CAD, LVEF 50 to 55%.  Given severe reduction of LVEF under stress she underwent CRT pacemaker placement.    AF burden on device check in 2021 was 0.    2021 message was received from the patient stating that she was having some hypotension with blood pressure as low as 90 systolically as well as some lower extremity edema.  Dr. Stone reduced her losartan by half and increased her spironolactone to 50 mg daily.    She was transitioned to metoprolol early   2021.    She did unfortunately develop a yeast infection once starting Jardiance, as well as a UTI.      She is currently feeling quite well and has lost 10 pounds since having the surgery.  In total she is down 20 pounds over the last 3 months.      The following portions of the patient's history were reviewed and updated as appropriate: allergies, current medications, past family history, past medical history, past social history, past surgical history and problem list.    Current Outpatient Medications   Medication Sig Dispense Refill   • acetaminophen (TYLENOL) 650 MG 8 hr tablet Take 650 mg by mouth Every 8 (Eight) Hours As Needed for Mild Pain .     • Calcium-Phosphorus-Vitamin D (CITRACAL +D3 PO) Take 1 tablet by mouth Daily.     • celecoxib (CeleBREX) 200 MG capsule TAKE ONE CAPSULE BY MOUTH DAILY 90 capsule 1   • ezetimibe (ZETIA) 10 MG tablet TAKE ONE TABLET BY MOUTH DAILY 90 tablet 1   • furosemide (LASIX) 20 MG tablet Take 20 mg by mouth Daily.     • HYDROcodone-acetaminophen (NORCO) 5-325 MG per tablet      • levothyroxine (SYNTHROID, LEVOTHROID) 25 MCG tablet TAKE ONE TABLET BY MOUTH DAILY 90 tablet 1   • melatonin 5 MG sublingual tablet sublingual tablet Take 5 mg by mouth Every Night.     • Multiple Vitamin (MULTI-DAY VITAMINS) tablet Take 1 tablet by mouth daily.     • sacubitril-valsartan (ENTRESTO) 24-26 MG tablet Take 1 tablet by mouth 2 (Two) Times a Day. 60 tablet 5   • spironolactone (ALDACTONE) 25 MG tablet Take 1 tablet by mouth Daily. 90 tablet 1   • warfarin (COUMADIN) 3 MG tablet Take 1 tablet by mouth Daily. 90 tablet 3   • metoprolol succinate XL (TOPROL-XL) 25 MG 24 hr tablet Take 0.5 tablets by mouth Daily. 45 tablet 3     No current facility-administered medications for this encounter.       Past Medical History:   Diagnosis Date   • Abnormal ECG    • Allergic Iodine used in on xrays   • Allergic rhinitis    • Atrial fibrillation (HCC)    • Bleeds easily (HCC)     warfarin   • Breast  screening    • Broken bones    • Calf DVT (deep venous thrombosis) (HCC)    • Chronic anticoagulation    • Class 2 severe obesity due to excess calories with serious comorbidity and body mass index (BMI) of 37.0 to 37.9 in adult (HCC)    • Colon polyp    • CTS (carpal tunnel syndrome)    • Deep vein thrombosis (HCC)     left calf   • Diverticulitis of colon    • Diverticulitis of large intestine without perforation or abscess without bleeding 1/3/2017   • AHN (dyspnea on exertion)    • Fatigue    • H/O malignant melanoma of skin    • Heart failure with preserved ejection fraction, NYHA class II (HCC)     II-III---decreased EF with stress   • Hematuria, gross    • History of colon polyps    • History of kidney stones    • Hyperlipidemia    • Hypertension    • Hyperthyroidism    • IFG (impaired fasting glucose)    • Insomnia    • LBBB (left bundle branch block)    • Left leg swelling    • LLQ abdominal pain    • Long term current use of systemic steroids    • Low back pain    • Malignant melanoma of skin (HCC)    • Neck pain    • Obesity     class 2 obesity due to excess calories with BMI of 35.0 to 35.9 in adult   • Osteoarthritis    • Osteopenia    • Paralysis, diaphragm    • Paroxysmal atrial fibrillation (HCC)    • 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 Lu   • Sleep apnea     USES CPAP   • UTI (urinary tract infection)    • Visit for screening mammogram        Past Surgical History:   Procedure Laterality Date   • CARDIAC CATHETERIZATION N/A 6/29/2020    Procedure: Coronary angiography, Jessica PHILIP;  Surgeon: Noe Reynoso MD;  Location:  INVASIVE LOCATION;  Service: Cardiovascular;  Laterality: N/A;   • CARDIAC CATHETERIZATION N/A 6/29/2020    Procedure: Left ventriculography;  Surgeon:  Noe Reynoso MD;  Location: [facility] CATH INVASIVE LOCATION;  Service: Cardiovascular;  Laterality: N/A;   • CARDIAC CATHETERIZATION N/A 6/29/2020    Procedure: Right and Left Heart Cath;  Surgeon: Noe Reynoso MD;  Location: [facility] CATH INVASIVE LOCATION;  Service: Cardiovascular;  Laterality: N/A;   • CARDIAC CATHETERIZATION     • CARDIAC ELECTROPHYSIOLOGY PROCEDURE N/A 1/5/2021    Procedure: Biventricular Device Insertion -- CRT-p ([employer]);  Surgeon: Demar Stone MD;  Location: [facility] CATH INVASIVE LOCATION;  Service: Cardiology;  Laterality: N/A;   • CARPAL TUNNEL RELEASE Bilateral 1980'S    [facility]   • CATARACT EXTRACTION Bilateral 2001    Dr. Nic Hidalgo   • COLONOSCOPY N/A 06/26/2013    MILD SIGMOID DIVERTICULOSIS, repeat 5 years based on sister w/ colona cancer-DR. NASRA CASTRO   • COLONOSCOPY N/A 10/19/2010    SIGMOID DIVERTICULOSIS & INTERNAL HEMORRHOIDS, HEMORRHOIDAL BANDING X4, DR. NASRA CASTRO   • COLONOSCOPY N/A 8/6/2018    Procedure: COLONOSCOPY TO CECUM AND INTO TERMINAL ILEUM;  Surgeon: Nasra Castro MD;  Location: [facility] ENDOSCOPY;  Service: Gastroenterology   • COLONOSCOPY N/A 10/4/2021    Procedure: COLONOSCOPY to CECUM;  Surgeon: Nasra Castro MD;  Location: [facility] ENDOSCOPY;  Service: General;  Laterality: N/A;  FAMILY HX COLON CANCER  --DIVERTICULOSIS    • COLONOSCOPY W/ BIOPSIES N/A 05/12/2008    RECTUM BIOPSY: COLONIC MUCOSA W/ FOCAL INCREASE OF INFLAMMATORY CELLS IN THE MUSCULARIS MUCOSA INCLUDING EOSINOPHILS, SIGMOID DIVERTICULOSIS, POSSIBLE PROCTITIS, DR. NASRA CASTRO   • CYSTOSCOPY, RETROGRADE PYELOGRAM AND STENT INSERTION Bilateral 10/10/2014    w/ Right ureteral pyeloscopy & laser lithotripsy, Right Double-J stent placement-Dr. Julio Tai   • ENDOSCOPY N/A 11/11/2020    Procedure: ESOPHAGOGASTRODUODENOSCOPY with biopsies and dilation 54fr fernández;  Surgeon: Garret Piedra MD;  Location: [facility] ENDOSCOPY;  Service: Gastroenterology;   Laterality: N/A;  pre- dysphagia  post-- gastritis, dilation of esophageal ring, watermelon stomach    • EXTRACORPOREAL SHOCK WAVE LITHOTRIPSY (ESWL) Right 09/10/2009    DR. CLAIRE ROCK   • HAMMER TOE REPAIR     • LAPAROSCOPIC CHOLECYSTECTOMY N/A 07/19/2012    DR. TANISHA THORNTON   • LUMBAR EPIDURAL INJECTION N/A 04/23/2015    LUMBAR EPIDURAL STEROID INJECTION X3   • MEDIAL BRANCH BLOCK Bilateral 11/2/2021    Procedure: MEDIAL BRANCH BLOCK--bilateral Lumbar2-5;  Surgeon: Wolfgang Means MD;  Location: [facility] MAIN OR;  Service: Pain Management;  Laterality: Bilateral;   • PACEMAKER IMPLANTATION     • PARATHYROIDECTOMY N/A 2001   • SACROILIAC JOINT INJECTION Left 6/15/2021    Procedure: SACROILIAC INJECTION-- left and left greater trochanteric bursa injection;  Surgeon: Wolfgang Means MD;  Location: [facility] MAIN OR;  Service: Pain Management;  Laterality: Left;   • SKIN CANCER EXCISION     • THUMB ARTHROSCOPY Right 1982   • TOTAL KNEE ARTHROPLASTY Right 03/25/2014    [employer] Vanguard total knee, 55 femoral component, 71 tibial tray w/ a 40 mm stem, 34 mm x 7.8 mm three-peg patella, and a 14 standard poly-Dr. Ty Canchola       Physical Exam  Vitals and nursing note reviewed.   Constitutional:       General: She is not in acute distress.     Appearance: She is well-developed. She is obese. She is not ill-appearing.   HENT:      Head: Normocephalic and atraumatic.   Eyes:      Conjunctiva/sclera: Conjunctivae normal.      Pupils: Pupils are equal, round, and reactive to light.   Neck:      Vascular: No JVD.   Cardiovascular:      Rate and Rhythm: Normal rate and regular rhythm.      Heart sounds: Normal heart sounds. No murmur heard.    No friction rub. No gallop.   Pulmonary:      Effort: Pulmonary effort is normal. No respiratory distress.      Breath sounds: Normal breath sounds.   Abdominal:      General: Bowel sounds are normal. There is no distension.      Palpations: Abdomen is soft.   Musculoskeletal:    "      General: No swelling or deformity.   Skin:     General: Skin is warm and dry.      Capillary Refill: Capillary refill takes less than 2 seconds.   Neurological:      Mental Status: She is alert and oriented to person, place, and time. Mental status is at baseline.   Psychiatric:         Mood and Affect: Mood normal.         Behavior: Behavior normal.          Review of Systems   Constitutional: Negative for fatigue and unexpected weight change.   HENT: Negative for congestion and nosebleeds.    Eyes: Negative for photophobia and visual disturbance.   Respiratory: Positive for shortness of breath. Negative for cough and chest tightness.         On exertion; stable per baseline   Cardiovascular: Positive for palpitations. Negative for chest pain and leg swelling.   Gastrointestinal: Negative for abdominal distention and blood in stool.   Endocrine: Negative for polyphagia and polyuria.   Genitourinary: Negative for frequency and urgency.   Musculoskeletal: Negative for joint swelling and myalgias.   Skin: Negative for pallor and rash.   Neurological: Negative for dizziness, syncope, weakness, light-headedness, numbness and headaches.   Hematological: Does not bruise/bleed easily.   Psychiatric/Behavioral: Negative for confusion and sleep disturbance.        OBJECTIVE:  /70 (BP Location: Left arm, Patient Position: Sitting)   Pulse 76   Ht 158.8 cm (62.52\")   Wt 79.7 kg (175 lb 9.6 oz)   SpO2 96%   BMI 31.59 kg/m²      Body mass index is 31.59 kg/m².  Wt Readings from Last 1 Encounters:   05/16/22 79.7 kg (175 lb 9.6 oz)       Lab Review:  Renal Function: CrCl cannot be calculated (Patient's most recent lab result is older than the maximum 30 days allowed.).    Lab Results   Component Value Date    PROBNP 37.8 02/16/2022       Results for orders placed during the hospital encounter of 01/05/21    Adult Transthoracic Echo Limited W/ Cont if Necessary Per Protocol    Interpretation Summary  · Limited " echo requested to assess LV function  · Estimated left ventricular EF = 70% Left ventricular systolic function is normal.      Procedures      6 MINUTE WALK                      Cardiac Procedures:  1. 6/29/2020 Cardiac catheterization   Hemodynamics:  1.  Cardiac output (Trino): 3.28 L/min  2.  Cardiac index (Trino): 1.7 L/min/m²  3.  Right atrium: A wave 9, V wave 7, mean 6  4.  Right ventricle: 28/4  5.  Pulmonary artery, 28/12, mean 18  6.  PCW: A wave 10, V wave 11, mean is 9  7.  Aorta: 140/65, mean 91  8.  Left ventricle: 140/8     Cineangiography:  1.  Left main: The left main coronary artery is virtually nonexistent.  There is almost a common ostium of the LAD and left circumflex.  2.  LAD: The left anterior descending coronary artery is a tortuous vessel.  The artery appears to have minor plaquing in the proximal and mid segments but otherwise no significant stenosis.  The distal portion is tortuous.  The diagonal branches are small but normal.  3.  LCx: The left circumflex coronary artery is a large vessel.  The proximal portion of the circumflex appears to be normal.  The mid and distal segments are normal as well.  There is a major branching marginal system arising from the mid segment that appears to be normal.  The distal marginal is small and normal.  4.  RCA: The right coronary artery is a dominant vessel.  The right coronary artery has minimal plaquing in the proximal and mid segments.  The distal segment is normal.  The posterior descending branch appears to be tortuous but normal.     Left ventriculography: Overall size of the ventricle is normal.  The global contractility of the ventricle is within normal limits with an estimated ejection fraction of 50 to 55%.     Assessment:  1.  Normal coronary arteries  2.  Normal intracardiac pressures  3.  Normal left ventricular function  4.  Mildly reduced cardiac output and cardiac index.       Previously trialed diuretics  Spironolactone      Previously  trialed GDMT:    Spironolactone  Carvedilol  Losartan  Entresto  Jardiance    ASSESSMENT:     Diagnosis Plan   1. Heart failure with preserved ejection fraction, NYHA class II (CMS/[facility])II-III---decreased EF with stress  Comprehensive Metabolic Panel    BNP    Lipid Panel    Hemoglobin A1c   2. Abnormal finding of blood chemistry, unspecified   Hemoglobin A1c   3. Paroxysmal atrial fibrillation (HCC)     4. LBBB (left bundle branch block)     5. Hyperlipidemia, unspecified hyperlipidemia type     6. Primary hypertension           PLAN OF CARE:  1.  HFpEF-NYHA class II.  Most recent ejection fraction is greater than 70% per echocardiogram.  On stress echo however, patient is noted to have a significantly reduced LVEF under stress of 40%.  Current guideline directed medical therapy to include metoprolol, spironolactone, and Entresto.  Unable to tolerate SGLT2 inhibitor given recurrent yeast infection.    She continues to be euvolemic on exam.  I would like her to continue her current GDMT.  We will check her labs today as below.  She is to continue her fluid restriction of 1500 mL daily, and remain adherent with daily weights.  She states she has been nonadherent to low-sodium diet, so we reviewed the parameters of this again.    Directions for when to call the clinic reviewed with the patient to include weight gain of 2 to 3 pounds in 24 hours, weight gain of 5 to 10 pounds within 7 days; worsening shortness of breath; worsening lower extremity edema or abdominal distention.    2.  Hypertension- stable and controlled    3.  Paroxysmal atrial fibrillation- stable and controlled on beta-blockade, anticoagulated on warfarin.  Managed by Dr. Stone.      4.  Obstructive sleep apnea- compliant with CPAP therapy.     5.  Left bundle branch block-presence of CRT-P noted given symptomatology.    6.  Hyperlipidemia-LDL poorly controlled.  Check lipid panel today I think she could benefit from PCSK9 inhibitor, will defer this  to her PCP.      CMP/lipid/BMP/hemoglobin A1c; continue current GDMT; follow-up in 6 months or sooner if needed      Thank you for allowing me to participate in the care of your patient,        AGA Tolbert  [facility] HEART FAILURE  05/16/22  13:56 EDT      **Krishna Disclaimer:**  Much of this encounter note is an electronic transcription/translation of spoken language to printed text. The electronic translation of spoken language may permit erroneous, or at times, nonsensical words or phrases to be inadvertently transcribed. Although I have reviewed the note for such errors, some may still exist.

## 2022-05-17 ENCOUNTER — ANTICOAGULATION VISIT (OUTPATIENT)
Dept: INTERNAL MEDICINE | Facility: CLINIC | Age: 82
End: 2022-05-17

## 2022-05-17 ENCOUNTER — TELEPHONE (OUTPATIENT)
Dept: CARDIOLOGY | Facility: HOSPITAL | Age: 82
End: 2022-05-17

## 2022-05-17 PROCEDURE — 85610 PROTHROMBIN TIME: CPT | Performed by: INTERNAL MEDICINE

## 2022-05-17 PROCEDURE — 36416 COLLJ CAPILLARY BLOOD SPEC: CPT | Performed by: INTERNAL MEDICINE

## 2022-05-17 NOTE — TELEPHONE ENCOUNTER
Called Pt with lab results.  Results and instructions given as per Alisa's note.  Pt said she has been taking her Lasix every other day per her primary cardiologist.  I asked her to please call us if she starts using it more and we will get her back in sooner than her scheduled 6 month follow-up so that we can check her kidney function.All questions and concerns addressed. Understanding and agreement verbalized.      Helena Valencia RN  [facility] Failure [facility]

## 2022-05-17 NOTE — TELEPHONE ENCOUNTER
----- Message from AGA Tolbert sent at 5/17/2022  9:43 AM EDT -----  Her kidney function has trended up a little bit.  Please let her know that if she is using her Lasix that I would like her to let us know so that we can continue to monitor her kidney function and make sure that its okay.    Thanks,  Alisa  ----- Message -----  From: Lab, Background User  Sent: 5/16/2022   3:20 PM EDT  To: AGA Tolbert

## 2022-05-23 RX ORDER — METOPROLOL SUCCINATE 25 MG/1
12.5 TABLET, EXTENDED RELEASE ORAL DAILY
Qty: 45 TABLET | Refills: 1 | Status: SHIPPED | OUTPATIENT
Start: 2022-05-23 | End: 2022-11-28

## 2022-05-31 ENCOUNTER — HOSPITAL ENCOUNTER (OUTPATIENT)
Dept: BONE DENSITY | Facility: HOSPITAL | Age: 82
Discharge: HOME | End: 2022-05-31
Admitting: INTERNAL MEDICINE

## 2022-05-31 DIAGNOSIS — Z78.0 MENOPAUSE: ICD-10-CM

## 2022-05-31 PROCEDURE — 77080 DXA BONE DENSITY AXIAL: CPT

## 2022-06-01 DIAGNOSIS — E78.5 HYPERLIPIDEMIA, UNSPECIFIED HYPERLIPIDEMIA TYPE: ICD-10-CM

## 2022-06-01 DIAGNOSIS — E03.9 ACQUIRED HYPOTHYROIDISM: ICD-10-CM

## 2022-06-01 DIAGNOSIS — I10 PRIMARY HYPERTENSION: Primary | ICD-10-CM

## 2022-06-03 LAB
ALBUMIN SERPL-MCNC: 4.9 G/DL (ref 3.7–4.7)
ALBUMIN/GLOB SERPL: 2 {RATIO} (ref 1.2–2.2)
ALP SERPL-CCNC: 119 IU/L (ref 44–121)
ALT SERPL-CCNC: 19 IU/L (ref 0–32)
AST SERPL-CCNC: 25 IU/L (ref 0–40)
BASOPHILS # BLD AUTO: 0.1 X10E3/UL (ref 0–0.2)
BASOPHILS NFR BLD AUTO: 1 %
BILIRUB SERPL-MCNC: 1 MG/DL (ref 0–1.2)
BUN SERPL-MCNC: 35 MG/DL (ref 8–27)
BUN/CREAT SERPL: 29 (ref 12–28)
CALCIUM SERPL-MCNC: 10.7 MG/DL (ref 8.7–10.3)
CHLORIDE SERPL-SCNC: 99 MMOL/L (ref 96–106)
CHOLEST SERPL-MCNC: 224 MG/DL (ref 100–199)
CO2 SERPL-SCNC: 22 MMOL/L (ref 20–29)
CREAT SERPL-MCNC: 1.19 MG/DL (ref 0.57–1)
EGFRCR SERPLBLD CKD-EPI 2021: 46 ML/MIN/1.73
EOSINOPHIL # BLD AUTO: 0.1 X10E3/UL (ref 0–0.4)
EOSINOPHIL NFR BLD AUTO: 1 %
ERYTHROCYTE [DISTWIDTH] IN BLOOD BY AUTOMATED COUNT: 12.6 % (ref 11.7–15.4)
GLOBULIN SER CALC-MCNC: 2.5 G/DL (ref 1.5–4.5)
GLUCOSE SERPL-MCNC: 95 MG/DL (ref 65–99)
HCT VFR BLD AUTO: 44.3 % (ref 34–46.6)
HDLC SERPL-MCNC: 61 MG/DL
HGB BLD-MCNC: 15.2 G/DL (ref 11.1–15.9)
IMM GRANULOCYTES # BLD AUTO: 0 X10E3/UL (ref 0–0.1)
IMM GRANULOCYTES NFR BLD AUTO: 0 %
LDLC SERPL CALC-MCNC: 143 MG/DL (ref 0–99)
LYMPHOCYTES # BLD AUTO: 2.1 X10E3/UL (ref 0.7–3.1)
LYMPHOCYTES NFR BLD AUTO: 23 %
MCH RBC QN AUTO: 31.9 PG (ref 26.6–33)
MCHC RBC AUTO-ENTMCNC: 34.3 G/DL (ref 31.5–35.7)
MCV RBC AUTO: 93 FL (ref 79–97)
MONOCYTES # BLD AUTO: 0.8 X10E3/UL (ref 0.1–0.9)
MONOCYTES NFR BLD AUTO: 8 %
NEUTROPHILS # BLD AUTO: 6 X10E3/UL (ref 1.4–7)
NEUTROPHILS NFR BLD AUTO: 67 %
PLATELET # BLD AUTO: 231 X10E3/UL (ref 150–450)
POTASSIUM SERPL-SCNC: 4.9 MMOL/L (ref 3.5–5.2)
PROT SERPL-MCNC: 7.4 G/DL (ref 6–8.5)
RBC # BLD AUTO: 4.76 X10E6/UL (ref 3.77–5.28)
SODIUM SERPL-SCNC: 138 MMOL/L (ref 134–144)
TRIGL SERPL-MCNC: 113 MG/DL (ref 0–149)
TSH SERPL DL<=0.005 MIU/L-ACNC: 2.71 UIU/ML (ref 0.45–4.5)
VLDLC SERPL CALC-MCNC: 20 MG/DL (ref 5–40)
WBC # BLD AUTO: 9.1 X10E3/UL (ref 3.4–10.8)

## 2022-06-20 RX ORDER — CELECOXIB 200 MG/1
CAPSULE ORAL
Qty: 90 CAPSULE | Refills: 1 | Status: SHIPPED | OUTPATIENT
Start: 2022-06-20 | End: 2023-01-09

## 2022-07-13 ENCOUNTER — TELEMEDICINE (OUTPATIENT)
Dept: INTERNAL MEDICINE | Facility: CLINIC | Age: 82
End: 2022-07-13

## 2022-07-13 ENCOUNTER — TELEPHONE (OUTPATIENT)
Dept: INTERNAL MEDICINE | Facility: CLINIC | Age: 82
End: 2022-07-13

## 2022-07-13 DIAGNOSIS — J06.9 UPPER RESPIRATORY TRACT INFECTION, UNSPECIFIED TYPE: Primary | ICD-10-CM

## 2022-07-13 PROCEDURE — 99213 OFFICE O/P EST LOW 20 MIN: CPT | Performed by: INTERNAL MEDICINE

## 2022-07-13 NOTE — TELEPHONE ENCOUNTER
Caller: Inessa Peoples    Relationship to patient: Self    Best call back number: [phone]    Date of exposure: 7/12/22    Date of positive COVID19 test: SPOUSE TESTED POSITIVE FOR COVID ON 7/12/22    COVID19 symptoms: CURRENTLY THE PATIENT STATES SHE HAS A COUGH AND RUNNY NOSE    Additional information or concerns: PATIENT REQUEST A CALL BACK TO ADVISE ON WHAT SHE NEEDS TO DO OR TAKE.    What is the patients preferred pharmacy:  [facility] [address] [place] [address] & [facility] - [phone] [facility] [phone]   [phone]

## 2022-07-13 NOTE — PROGRESS NOTES
Subjective     Inessa Peoples is a 82 y.o. female who presents with   Chief Complaint   Patient presents with   • Covid-19 Home Monitoring Video Visit       History of Present Illness     The following data was reviewed by: Gisele Dockery MD on 07/13/2022:  Common labs    Common Labsle 4/28/22 5/16/22 5/16/22 5/16/22 6/2/22 6/2/22 6/2/22     1506 1506 1506 1309 1309 1309   Glucose    94  95    BUN    33 (A)  35 (A)    Creatinine    1.29 (A)  1.19 (A)    Sodium    135 (A)  138    Potassium    4.5  4.9    Chloride    99  99    Calcium    10.3  10.7 (A)    Total Protein      7.4    Albumin    4.60  4.9 (A)    Total Bilirubin    0.7  1.0    Alkaline Phosphatase    116  119    AST (SGOT)    21  25    ALT (SGPT)    21  19    WBC 6.76    9.1     Hemoglobin 11.9 (A)    15.2     Hematocrit 35.0 (A)    44.3     Platelets 134 (A)    231     Total Cholesterol   204 (A)       Total Cholesterol       224 (A)   Triglycerides   145    113   HDL Cholesterol   52    61   LDL Cholesterol    126 (A)    143 (A)   Hemoglobin A1C  5.30        (A) Abnormal value                You have chosen to receive care through a telehealth visit.  Do you consent to use a video/audio connection for your medical care today? Yes    Last night she has URI symptoms.  Cough, runny nose.  No HA.  No aches.  No SOA.  No chest pain.      Review of Systems   Respiratory: Negative for chest tightness.    Cardiovascular: Negative for chest pain.       The following portions of the patient's history were reviewed and updated as appropriate: allergies, current medications and problem list.    Patient Active Problem List    Diagnosis Date Noted   • Heart failure with preserved ejection fraction, NYHA class II (CMS/HCC)II-III---decreased EF with stress 02/11/2021   • Paroxysmal atrial fibrillation (HCC) 12/09/2020     Note Last Updated: 12/9/2020     Added automatically from request for surgery [id number]     • LBBB (left bundle branch block) 11/08/2019   •  Trochanteric bursitis of both hips 05/29/2019   • Lumbar facet arthropathy 11/06/2018   • Class 2 severe obesity due to excess calories with serious comorbidity and body mass index (BMI) of 35.0 to 35.9 in adult ([facility]) 03/27/2018   • Bulge of lumbar disc without myelopathy 07/13/2017   • 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       • Chronic osteoarthritis 04/18/2016       Current Outpatient Medications on File Prior to Visit   Medication Sig Dispense Refill   • acetaminophen (TYLENOL) 650 MG 8 hr tablet Take 650 mg by mouth Every 8 (Eight) Hours As Needed for Mild Pain .     • Calcium-Phosphorus-Vitamin D (CITRACAL +D3 PO) Take 1 tablet by mouth Daily.     • celecoxib (CeleBREX) 200 MG capsule TAKE ONE CAPSULE BY MOUTH DAILY 90 capsule 1   • ezetimibe (ZETIA) 10 MG tablet TAKE ONE TABLET BY MOUTH DAILY 90 tablet 1   • furosemide (LASIX) 20 MG tablet Take 20 mg by mouth Daily.     • levothyroxine (SYNTHROID, LEVOTHROID) 25 MCG tablet TAKE ONE TABLET BY MOUTH DAILY 90 tablet 1   • melatonin 5 MG sublingual tablet sublingual tablet Take 5 mg by mouth Every Night.     • metoprolol succinate XL (TOPROL-XL) 25 MG 24 hr tablet Take 0.5 tablets by mouth Daily. 45 tablet 1   • Multiple Vitamin (MULTI-DAY VITAMINS) tablet Take 1 tablet by mouth daily.     • sacubitril-valsartan (ENTRESTO) 24-26 MG tablet Take 1 tablet by mouth 2 (Two) Times a Day. 60 tablet 5   • spironolactone (ALDACTONE) 25 MG tablet Take 1 tablet by mouth Daily. 90 tablet 1   • warfarin (COUMADIN) 3 MG tablet Take 1 tablet by mouth Daily. 90  tablet 3   • [DISCONTINUED] HYDROcodone-acetaminophen (NORCO) 5-325 MG per tablet        No current facility-administered medications on file prior to visit.       Objective     There were no vitals taken for this visit.    Physical Exam  Constitutional:       Appearance: She is well-developed.   HENT:      Head: Normocephalic and atraumatic.   Pulmonary:      Effort: Pulmonary effort is normal.   Neurological:      Mental Status: She is alert and oriented to person, place, and time.   Psychiatric:         Behavior: Behavior normal.         Assessment & Plan   Diagnoses and all orders for this visit:    1. Upper respiratory tract infection, unspecified type (Primary)        Discussion    Patient presents with COVID symptoms.  Her  has COVID.  She thus far has tested negative. I recommend attention to rest and fluids. I recommend as needed tylenol for fevers and aches.  She will let me know if she tests positive so [facility]  She would need renal dosing.        Future Appointments   Date Time Provider Department Center   7/25/2022 11:00 AM Gisele Dockery MD [facility] [place]   8/19/2022 11:00 AM [facility] IN [facility] [place] [facility] [place]   8/19/2022 11:30 AM Mirela Ríos APRN [facility] [place]   11/16/2022  3:00 PM [facility] HE[facility] FAIL [facility] [place] [facility]

## 2022-07-14 ENCOUNTER — TELEPHONE (OUTPATIENT)
Dept: INTERNAL MEDICINE | Facility: CLINIC | Age: 82
End: 2022-07-14

## 2022-07-18 RX ORDER — LEVOTHYROXINE SODIUM 0.03 MG/1
TABLET ORAL
Qty: 90 TABLET | Refills: 1 | Status: SHIPPED | OUTPATIENT
Start: 2022-07-18 | End: 2023-01-16

## 2022-07-25 RX ORDER — EZETIMIBE 10 MG/1
TABLET ORAL
Qty: 90 TABLET | Refills: 1 | Status: SHIPPED | OUTPATIENT
Start: 2022-07-25 | End: 2023-01-27

## 2022-07-25 RX ORDER — SPIRONOLACTONE 25 MG/1
TABLET ORAL
Qty: 90 TABLET | Refills: 1 | Status: SHIPPED | OUTPATIENT
Start: 2022-07-25

## 2022-07-25 RX ORDER — FUROSEMIDE 20 MG/1
TABLET ORAL
Qty: 90 TABLET | Refills: 1 | Status: SHIPPED | OUTPATIENT
Start: 2022-07-25

## 2022-07-27 ENCOUNTER — ANTICOAGULATION VISIT (OUTPATIENT)
Dept: INTERNAL MEDICINE | Facility: CLINIC | Age: 82
End: 2022-07-27

## 2022-07-27 ENCOUNTER — OFFICE VISIT (OUTPATIENT)
Dept: INTERNAL MEDICINE | Facility: CLINIC | Age: 82
End: 2022-07-27

## 2022-07-27 VITALS
HEIGHT: 63 IN | OXYGEN SATURATION: 98 % | SYSTOLIC BLOOD PRESSURE: 130 MMHG | DIASTOLIC BLOOD PRESSURE: 70 MMHG | BODY MASS INDEX: 29.95 KG/M2 | HEART RATE: 71 BPM | WEIGHT: 169 LBS

## 2022-07-27 DIAGNOSIS — M79.602 PAIN OF LEFT UPPER EXTREMITY: Primary | ICD-10-CM

## 2022-07-27 DIAGNOSIS — M54.2 NECK PAIN: ICD-10-CM

## 2022-07-27 DIAGNOSIS — I48.0 PAROXYSMAL ATRIAL FIBRILLATION: ICD-10-CM

## 2022-07-27 LAB — INR PPP: 1.4 (ref 0.9–1.1)

## 2022-07-27 PROCEDURE — 72050 X-RAY EXAM NECK SPINE 4/5VWS: CPT | Performed by: INTERNAL MEDICINE

## 2022-07-27 PROCEDURE — 99213 OFFICE O/P EST LOW 20 MIN: CPT | Performed by: INTERNAL MEDICINE

## 2022-07-27 PROCEDURE — 85610 PROTHROMBIN TIME: CPT | Performed by: INTERNAL MEDICINE

## 2022-07-27 PROCEDURE — 73030 X-RAY EXAM OF SHOULDER: CPT | Performed by: INTERNAL MEDICINE

## 2022-07-27 PROCEDURE — 36416 COLLJ CAPILLARY BLOOD SPEC: CPT | Performed by: INTERNAL MEDICINE

## 2022-07-27 RX ORDER — CYCLOBENZAPRINE HCL 5 MG
5 TABLET ORAL
Qty: 14 TABLET | Refills: 0 | Status: SHIPPED | OUTPATIENT
Start: 2022-07-27 | End: 2022-08-19

## 2022-07-27 RX ORDER — SULFAMETHOXAZOLE AND TRIMETHOPRIM 800; 160 MG/1; MG/1
TABLET ORAL
COMMUNITY
Start: 2022-07-22 | End: 2022-08-19

## 2022-07-27 RX ORDER — METHYLPREDNISOLONE 4 MG/1
TABLET ORAL
Qty: 21 TABLET | Refills: 0 | Status: SHIPPED | OUTPATIENT
Start: 2022-07-27 | End: 2022-08-19

## 2022-07-27 NOTE — PROGRESS NOTES
Subjective     Inessa Peoples is a 82 y.o. female who presents with   Chief Complaint   Patient presents with   • Shoulder Pain       History of Present Illness     C/o neck and left sided shoulder pain.  Radiates to the left arm.  No tingling.  No weakness.  No injury associated.     Review of Systems   Respiratory: Negative.    Cardiovascular: Negative.        The following portions of the patient's history were reviewed and updated as appropriate: allergies, current medications and problem list.    Patient Active Problem List    Diagnosis Date Noted   • Heart failure with preserved ejection fraction, NYHA class II (CMS/[facility])II-III---decreased EF with stress 02/11/2021   • Paroxysmal atrial fibrillation ([facility]) 12/09/2020     Note Last Updated: 12/9/2020     Added automatically from request for surgery [id number]     • LBBB (left bundle branch block) 11/08/2019   • Trochanteric bursitis of both hips 05/29/2019   • Lumbar facet arthropathy 11/06/2018   • Class 2 severe obesity due to excess calories with serious comorbidity and body mass index (BMI) of 35.0 to 35.9 in adult ([facility]) 03/27/2018   • Bulge of lumbar disc without myelopathy 07/13/2017   • Dilation of thoracic aorta ([facility]) 06/23/2017   • ANY on CPAP + 10 - Dr Lu 10/15/2016   • GERD (gastroesophageal reflux disease) 08/05/2016   • Sarcoidosis 04/18/2016   • 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       • Chronic osteoarthritis 04/18/2016       Current Outpatient Medications on File Prior to Visit   Medication Sig Dispense Refill   • acetaminophen (TYLENOL) 650 MG 8 hr tablet Take 650 mg by mouth Every 8 (Eight) Hours As Needed for Mild Pain .     • Calcium-Phosphorus-Vitamin D (CITRACAL +D3 PO) Take 1 tablet by mouth  "Daily.     • celecoxib (CeleBREX) 200 MG capsule TAKE ONE CAPSULE BY MOUTH DAILY 90 capsule 1   • ezetimibe (ZETIA) 10 MG tablet TAKE ONE TABLET BY MOUTH DAILY 90 tablet 1   • furosemide (LASIX) 20 MG tablet TAKE ONE TABLET BY MOUTH DAILY 90 tablet 1   • levothyroxine (SYNTHROID, LEVOTHROID) 25 MCG tablet TAKE ONE TABLET BY MOUTH DAILY 90 tablet 1   • melatonin 5 MG sublingual tablet sublingual tablet Take 5 mg by mouth Every Night.     • metoprolol succinate XL (TOPROL-XL) 25 MG 24 hr tablet Take 0.5 tablets by mouth Daily. 45 tablet 1   • Multiple Vitamin (MULTI-DAY VITAMINS) tablet Take 1 tablet by mouth daily.     • sacubitril-valsartan (ENTRESTO) 24-26 MG tablet Take 1 tablet by mouth 2 (Two) Times a Day. 60 tablet 5   • spironolactone (ALDACTONE) 25 MG tablet TAKE ONE TABLET BY MOUTH DAILY 90 tablet 1   • sulfamethoxazole-trimethoprim (BACTRIM DS,SEPTRA DS) 800-160 MG per tablet      • warfarin (COUMADIN) 3 MG tablet Take 1 tablet by mouth Daily. 90 tablet 3     No current facility-administered medications on file prior to visit.       Objective     /70   Pulse 71   Ht 158.8 cm (62.52\")   Wt 76.7 kg (169 lb)   SpO2 98%   BMI 30.40 kg/m²     Physical Exam  Constitutional:       Appearance: She is well-developed.   HENT:      Head: Normocephalic and atraumatic.   Pulmonary:      Effort: Pulmonary effort is normal.   Musculoskeletal:      Left shoulder: Tenderness present. No bony tenderness. Normal range of motion.      Cervical back: Tenderness present. No bony tenderness.      Thoracic back: Tenderness present. No bony tenderness.   Neurological:      Mental Status: She is alert and oriented to person, place, and time.   Psychiatric:         Behavior: Behavior normal.     X-rays of the neck and left shoulder  performed today for following indication:   pain.  X-ray reveal DDD neck.  There is no available x-ray for comparison.  X-ray sent to radiology for official interpretation and findings.  "       Assessment & Plan   Diagnoses and all orders for this visit:    1. Pain of left upper extremity (Primary)  -     XR Spine Cervical Complete 4 or 5 View (In Office)  -     XR Shoulder 2+ View Left (In Office)    2. Neck pain  -     XR Spine Cervical Complete 4 or 5 View (In Office)  -     XR Shoulder 2+ View Left (In Office)    3. Paroxysmal atrial fibrillation (HCC)  -     POC INR    Other orders  -     methylPREDNISolone (MEDROL) 4 MG dose pack; Take as directed on package instructions.  Dispense: 21 tablet; Refill: 0  -     cyclobenzaprine (FLEXERIL) 5 MG tablet; Take 1 tablet by mouth every night at bedtime.  Dispense: 14 tablet; Refill: 0        Discussion    Patient presents with left sided neck and shoulder pain.  I discussed with the patient a trial of conservative management with:   muscle relaxer and medrol dose archana.  Let me know if not feeling better over the next week or if there is any change in symptoms.  Chronic anticoagulation.  INR adjusted per flow sheet.          Future Appointments   Date Time Provider Department Center   8/19/2022 11:00 AM CARLYN IN OFFICE, AKIN BACON [facility]   8/19/2022 11:30 AM Mirela Ríos APRN [facility]   11/18/2022  3:00 PM [facility]  [facility]

## 2022-08-02 ENCOUNTER — TELEPHONE (OUTPATIENT)
Dept: INTERNAL MEDICINE | Facility: CLINIC | Age: 82
End: 2022-08-02

## 2022-08-02 DIAGNOSIS — M54.2 NECK PAIN: ICD-10-CM

## 2022-08-02 DIAGNOSIS — M79.602 PAIN OF LEFT UPPER EXTREMITY: Primary | ICD-10-CM

## 2022-08-02 NOTE — TELEPHONE ENCOUNTER
----- Message from Grace Kemp MA sent at 8/1/2022  4:15 PM EDT -----  She wants to go ahead get referral for PT and she wants to go to [facility] in [place].

## 2022-08-15 ENCOUNTER — APPOINTMENT (OUTPATIENT)
Dept: WOMENS IMAGING | Facility: HOSPITAL | Age: 82
End: 2022-08-15

## 2022-08-15 PROCEDURE — 77067 SCR MAMMO BI INCL CAD: CPT | Performed by: RADIOLOGY

## 2022-08-15 PROCEDURE — 77063 BREAST TOMOSYNTHESIS BI: CPT | Performed by: RADIOLOGY

## 2022-08-19 ENCOUNTER — CLINICAL SUPPORT NO REQUIREMENTS (OUTPATIENT)
Dept: CARDIOLOGY | Facility: CLINIC | Age: 82
End: 2022-08-19

## 2022-08-19 ENCOUNTER — OFFICE VISIT (OUTPATIENT)
Dept: CARDIOLOGY | Facility: CLINIC | Age: 82
End: 2022-08-19

## 2022-08-19 VITALS
HEIGHT: 63 IN | BODY MASS INDEX: 30.3 KG/M2 | SYSTOLIC BLOOD PRESSURE: 132 MMHG | DIASTOLIC BLOOD PRESSURE: 72 MMHG | WEIGHT: 171 LBS | HEART RATE: 71 BPM

## 2022-08-19 DIAGNOSIS — I10 PRIMARY HYPERTENSION: ICD-10-CM

## 2022-08-19 DIAGNOSIS — I42.8 NON-ISCHEMIC CARDIOMYOPATHY: Primary | ICD-10-CM

## 2022-08-19 DIAGNOSIS — I44.7 LBBB (LEFT BUNDLE BRANCH BLOCK): ICD-10-CM

## 2022-08-19 DIAGNOSIS — I48.0 PAROXYSMAL ATRIAL FIBRILLATION: ICD-10-CM

## 2022-08-19 DIAGNOSIS — I50.30 HEART FAILURE WITH PRESERVED EJECTION FRACTION, NYHA CLASS II: Primary | ICD-10-CM

## 2022-08-19 DIAGNOSIS — Z95.0 PRESENCE OF BIVENTRICULAR CARDIAC PACEMAKER: ICD-10-CM

## 2022-08-19 PROCEDURE — 93000 ELECTROCARDIOGRAM COMPLETE: CPT | Performed by: NURSE PRACTITIONER

## 2022-08-19 PROCEDURE — 93281 PM DEVICE PROGR EVAL MULTI: CPT | Performed by: INTERNAL MEDICINE

## 2022-08-19 PROCEDURE — 93290 INTERROG DEV EVAL ICPMS IP: CPT | Performed by: INTERNAL MEDICINE

## 2022-08-19 PROCEDURE — 99213 OFFICE O/P EST LOW 20 MIN: CPT | Performed by: NURSE PRACTITIONER

## 2022-08-19 NOTE — PROGRESS NOTES
Date of Office Visit: 2022  Encounter Provider: AGA Bhakta  Place of Service: [facility] CARDIOLOGY  Patient Name: Inessa Peoples  :1940    Chief Complaint   Patient presents with   • LBBB     6 month f/u   • Atrial Fibrillation   • Pacemaker Check   :     HPI: Inessa Peoples is a 82 y.o. female who follows with Dr. Stone for PAF, LBBB, HFpEF, s/p CRT-P 2021.     Presents of follow up and device check.    Doing well. No chest pain, dyspnea, PND, orthopnea, dizziness or edema.     Device interrogation--normal function, A paced <1%, effective CRT 98%, no arrhythmia episodes.     Warfarin for AC, no issues.     Limited echo 2021---EF >70%.     She also [facility] with [facility]--next appt in Nov.      Labs in , reviewed.      Past Medical History:   Diagnosis Date   • Abnormal ECG    • Allergic Iodine used in on xrays   • Allergic rhinitis    • Atrial fibrillation (HCC)    • Bleeds easily ([facility])     warfarin   • Breast screening    • Broken bones    • Calf DVT (deep venous thrombosis) (HCC)    • Chronic anticoagulation    • Class 2 severe obesity due to excess calories with serious comorbidity and body mass index (BMI) of 37.0 to 37.9 in adult (HCC)    • Colon polyp    • CTS (carpal tunnel syndrome)    • Deep vein thrombosis (HCC)     left calf   • Diverticulitis of colon    • Diverticulitis of large intestine without perforation or abscess without bleeding 1/3/2017   • AHN (dyspnea on exertion)    • Fatigue    • H/O malignant melanoma of skin    • Heart failure with preserved ejection fraction, NYHA class II (HCC)     II-III---decreased EF with stress   • Hematuria, gross    • History of colon polyps    • History of kidney stones    • Hyperlipidemia    • Hypertension    • Hyperthyroidism    • IFG (impaired fasting glucose)    • Insomnia    • LBBB (left bundle branch block)    • Left leg swelling    • LLQ abdominal pain    • Long term current use  of systemic steroids    • Low back pain    • Malignant melanoma of skin (HCC)    • Neck pain    • Obesity     class 2 obesity due to excess calories with BMI of 35.0 to 35.9 in adult   • Osteoarthritis    • Osteopenia    • Paralysis, diaphragm    • Paroxysmal atrial fibrillation (HCC)    • 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 Lu   • Sleep apnea     USES CPAP   • UTI (urinary tract infection)    • Visit for screening mammogram        Past Surgical History:   Procedure Laterality Date   • CARDIAC CATHETERIZATION N/A 6/29/2020    Procedure: Coronary angiography, [place] L;  Surgeon: Noe Reynoso MD;  Location:  [facility]VASIVE [facility];  Service: Cardiovascular;  Laterality: N/A;   • CARDIAC CATHETERIZATION N/A 6/29/2020    Procedure: Left ventriculography;  Surgeon: Noe Reynoso MD;  Location:  [facility];  Service: Cardiovascular;  Laterality: N/A;   • CARDIAC CATHETERIZATION N/A 6/29/2020    Procedure: Right and Left Heart Cath;  Surgeon: Noe Reynoso MD;  Location:  [facility];  Service: Cardiovascular;  Laterality: N/A;   • CARDIAC CATHETERIZATION     • CARDIAC ELECTROPHYSIOLOGY PROCEDURE N/A 1/5/2021    Procedure: Biventricular Device Insertion -- CRT-p ([employer]tr[employer]);  Surgeon: Demar Stone MD;  Location:  [facility]VASIVE [facility];  Service: Cardiology;  Laterality: N/A;   • CARPAL TUNNEL RELEASE Bilateral 1980'S    [facility]   • CATARACT EXTRACTION Bilateral 2001    Dr. Nic Hidalgo   • COLONOSCOPY N/A 06/26/2013    MILD SIGMOID DIVERTICULOSIS, repeat 5 years based on sister w/ colona cancer-DR. TANISHA THORNTON   • COLONOSCOPY N/A 10/19/2010    SIGMOID DIVERTICULOSIS & INTERNAL HEMORRHOIDS, HEMORRHOIDAL BANDING X4, DR. GARAY  NORBERTO   • COLONOSCOPY N/A 8/6/2018    Procedure: COLONOSCOPY TO CECUM AND INTO TERMINAL ILEUM;  Surgeon: Nasra Castro MD;  [facility];  Service: Gastroenterology   • COLONOSCOPY N/A 10/4/2021    Procedure: COLONOSCOPY to CECUM;  Surgeon: Nasra Castro MD;  Location: [facility] ENDOSCOPY;  Service: General;  Laterality: N/A;  FAMILY HX COLON CANCER  --DIVERTICULOSIS    • COLONOSCOPY W/ BIOPSIES N/A 05/12/2008    RECTUM BIOPSY: COLONIC MUCOSA W/ FOCAL INCREASE OF INFLAMMATORY CELLS IN THE MUSCULARIS MUCOSA INCLUDING EOSINOPHILS, SIGMOID DIVERTICULOSIS, POSSIBLE PROCTITIS, DR. NASRA CASTRO   • CYSTOSCOPY, RETROGRADE PYELOGRAM AND STENT INSERTION Bilateral 10/10/2014    w/ Right ureteral pyeloscopy & laser lithotripsy, Right Double-J stent placement-Dr. Julio Tai   • ENDOSCOPY N/A 11/11/2020    Procedure: ESOPHAGOGASTRODUODENOSCOPY with biopsies and dilation 54fr fernández;  Surgeon: Garret Piedra MD;  Location: [facility] ENDOSCOPY;  Service: Gastroenterology;  Laterality: N/A;  pre- dysphagia  post-- gastritis, dilation of esophageal ring, watermelon stomach    • EXTRACORPOREAL SHOCK WAVE LITHOTRIPSY (ESWL) Right 09/10/2009    DR. JULIO TAI   • HAMMER TOE REPAIR     • LAPAROSCOPIC CHOLECYSTECTOMY N/A 07/19/2012    DR. NASRA CASTRO   • LUMBAR EPIDURAL INJECTION N/A 04/23/2015    LUMBAR EPIDURAL STEROID INJECTION X3   • MEDIAL BRANCH BLOCK Bilateral 11/2/2021    Procedure: MEDIAL BRANCH BLOCK--bilateral Lumbar2-5;  Surgeon: Wolfgang Means MD;  [facility];  Service: Pain Management;  Laterality: Bilateral;   • PACEMAKER IMPLANTATION     • PARATHYROIDECTOMY N/A 2001   • SACROILIAC JOINT INJECTION Left 6/15/2021    Procedure: SACROILIAC INJECTION-- left and left greater trochanteric bursa injection;  Surgeon: Wolfgang Means MD;  Location: [facility];  Service: Pain Management;  Laterality: Left;   • SKIN CANCER EXCISION     • THUMB ARTHROSCOPY Right 1982   • TOTAL  KNEE ARTHROPLASTY Right 03/25/2014    [employer] Vanguard total knee, 55 femoral component, 71 tibial tray w/ a 40 mm stem, 34 mm x 7.8 mm three-peg patella, and a 14 standard poly-Dr. Ty Canchola       Social History     Socioeconomic History   • Marital status:      Spouse name: EDY   • Number of children: 4   • Years of education: 14yrs   Tobacco Use   • Smoking status: Never Smoker   • Smokeless tobacco: Never Used   • Tobacco comment: No history of tobacco use   Vaping Use   • Vaping Use: Never used   Substance and Sexual Activity   • Alcohol use: Yes     Alcohol/week: 2.0 standard drinks     Types: 2 Glasses of wine per week     Comment: Occasionally   • Drug use: Never   • Sexual activity: Defer       Family History   Problem Relation Age of Onset   • Heart disease Mother    • Diabetes type II Mother    • Heart disease Father    • Hypertension Father    • Cancer Sister    • Colon polyps Sister    • Lung cancer Sister    • Heart disease Brother         CABG   • Diabetes type II Brother    • Colon polyps Brother    • Arrhythmia Brother    • Colon cancer Brother    • Cancer Brother    • Heart disease Sister    • Diabetes type II Sister    • Aortic aneurysm Sister    • COPD Sister    • Arrhythmia Sister    • Cerebral aneurysm Sister    • Arthritis Sister    • COPD Sister    • Lupus Sister    • Asthma Maternal Aunt    • Aortic aneurysm Brother        Review of Systems   Constitutional: Negative for chills, fever and malaise/fatigue.   Cardiovascular: Negative for chest pain, dyspnea on exertion, leg swelling, near-syncope, orthopnea, palpitations, paroxysmal nocturnal dyspnea and syncope.   Respiratory: Negative for cough and shortness of breath.    Hematologic/Lymphatic: Negative.    Musculoskeletal: Negative for joint pain, joint swelling and myalgias.   Gastrointestinal: Negative for abdominal pain, diarrhea, melena, nausea and vomiting.   Genitourinary: Negative for frequency and hematuria.    Neurological: Negative for light-headedness, numbness, paresthesias and seizures.   Allergic/Immunologic: Negative.    All other systems reviewed and are negative.      Allergies   Allergen Reactions   • Iodinated Diagnostic Agents Anaphylaxis, Swelling and Other (See Comments)     PAINS ACROSS CHEST, Facial swelling   • Contrast Dye Other (See Comments)   • Fenofibrate Myalgia   • Livalo [Pitavastatin] Myalgia     Joint Pain, Muscle tightness   • Plaquenil [Hydroxychloroquine Sulfate] Myalgia     Joint Pain, Muscle Tightness   • Statins Myalgia     Joint pain, Muscle Tightness  Other reaction(s): Muscle cramps         Current Outpatient Medications:   •  acetaminophen (TYLENOL) 650 MG 8 hr tablet, Take 650 mg by mouth Every 8 (Eight) Hours As Needed for Mild Pain ., Disp: , Rfl:   •  Calcium-Phosphorus-Vitamin D (CITRACAL +D3 PO), Take 1 tablet by mouth Daily., Disp: , Rfl:   •  celecoxib (CeleBREX) 200 MG capsule, TAKE ONE CAPSULE BY MOUTH DAILY, Disp: 90 capsule, Rfl: 1  •  ezetimibe (ZETIA) 10 MG tablet, TAKE ONE TABLET BY MOUTH DAILY, Disp: 90 tablet, Rfl: 1  •  furosemide (LASIX) 20 MG tablet, TAKE ONE TABLET BY MOUTH DAILY, Disp: 90 tablet, Rfl: 1  •  levothyroxine (SYNTHROID, LEVOTHROID) 25 MCG tablet, TAKE ONE TABLET BY MOUTH DAILY, Disp: 90 tablet, Rfl: 1  •  melatonin 5 MG sublingual tablet sublingual tablet, Take 5 mg by mouth Every Night., Disp: , Rfl:   •  metoprolol succinate XL (TOPROL-XL) 25 MG 24 hr tablet, Take 0.5 tablets by mouth Daily., Disp: 45 tablet, Rfl: 1  •  Multiple Vitamin (MULTI-DAY VITAMINS) tablet, Take 1 tablet by mouth daily., Disp: , Rfl:   •  sacubitril-valsartan (ENTRESTO) 24-26 MG tablet, Take 1 tablet by mouth 2 (Two) Times a Day., Disp: 60 tablet, Rfl: 5  •  spironolactone (ALDACTONE) 25 MG tablet, TAKE ONE TABLET BY MOUTH DAILY, Disp: 90 tablet, Rfl: 1  •  warfarin (COUMADIN) 3 MG tablet, Take 1 tablet by mouth Daily., Disp: 90 tablet, Rfl: 3      Objective:     Vitals:     "08/19/22 1115   BP: 132/72   Pulse: 71   Weight: 77.6 kg (171 lb)   Height: 158.8 cm (62.52\")     Body mass index is 30.76 kg/m².    PHYSICAL EXAM:    Vitals Reviewed.   General Appearance: No acute distress, well developed and well nourished.   Eyes: Conjunctiva and lids: No erythema, swelling, or discharge. Sclera non-icteric.   HENT: Atraumatic, normocephalic. External eyes, ears, and nose normal.   Respiratory: No signs of respiratory distress. Respiration rhythm and depth normal.   Clear to auscultation. No rales, crackles, rhonchi, or wheezing auscultated.   Cardiovascular:  Heart Rate and Rhythm: Normal, Heart Sounds: Normal S1 and S2. No S3 or S4 noted.  Murmurs: No murmurs noted. No rubs, thrills, or gallops.   Lower Extremities: No edema noted.  Gastrointestinal:  Abdomen soft, non-distended, non-tender.   Musculoskeletal: Normal movement of extremities  Skin: Warm and dry.   Psychiatric: Patient alert and oriented to person, place, and time. Speech and behavior appropriate. Normal mood and affect.       ECG 12 Lead    Date/Time: 8/19/2022 11:13 AM  Performed by: Mirela Ríos APRN  Authorized by: Mirela Ríos APRN   Comparison: compared with previous ECG   Similar to previous ECG  Rhythm: paced  BPM: 71  Pacing: atrial sensed rhythm, ventricular paced rhythm and 100% capture            Assessment:       Diagnosis Plan   1. Heart failure with preserved ejection fraction, NYHA class II (CMS/HCC)II-III---decreased EF with stress  ECG 12 Lead   2. LBBB (left bundle branch block)  ECG 12 Lead   3. Presence of biventricular cardiac pacemaker  ECG 12 Lead   4. Paroxysmal atrial fibrillation (HCC)     5. Primary hypertension            Plan:       1.-3. HFpEF, LBBB, s/p CRT-P--doing well, euvolemic by exam. Follows with [facility] also. Normal device function.     4. PAF, no episodes, warfarin for AC.     5. HTN, controlled.     Follow up with Dr. Stone in 6 months w/device check.     As always, it has been a " pleasure to participate in your patient's care.      Sincerely,         AGA Hartman

## 2022-08-31 ENCOUNTER — OFFICE VISIT (OUTPATIENT)
Dept: PAIN MEDICINE | Facility: CLINIC | Age: 82
End: 2022-08-31

## 2022-08-31 ENCOUNTER — PREP FOR SURGERY (OUTPATIENT)
Dept: SURGERY | Facility: SURGERY CENTER | Age: 82
End: 2022-08-31

## 2022-08-31 VITALS
HEIGHT: 63 IN | SYSTOLIC BLOOD PRESSURE: 127 MMHG | RESPIRATION RATE: 20 BRPM | DIASTOLIC BLOOD PRESSURE: 86 MMHG | HEART RATE: 71 BPM | BODY MASS INDEX: 30.48 KG/M2 | TEMPERATURE: 97.5 F | OXYGEN SATURATION: 96 % | WEIGHT: 172 LBS

## 2022-08-31 DIAGNOSIS — G89.29 OTHER CHRONIC PAIN: ICD-10-CM

## 2022-08-31 DIAGNOSIS — M54.50 LOW BACK PAIN, UNSPECIFIED BACK PAIN LATERALITY, UNSPECIFIED CHRONICITY, UNSPECIFIED WHETHER SCIATICA PRESENT: ICD-10-CM

## 2022-08-31 DIAGNOSIS — M47.816 LUMBAR FACET ARTHROPATHY: Primary | ICD-10-CM

## 2022-08-31 PROCEDURE — 99214 OFFICE O/P EST MOD 30 MIN: CPT | Performed by: NURSE PRACTITIONER

## 2022-08-31 RX ORDER — SODIUM CHLORIDE 0.9 % (FLUSH) 0.9 %
10 SYRINGE (ML) INJECTION AS NEEDED
Status: CANCELLED | OUTPATIENT
Start: 2022-08-31

## 2022-08-31 RX ORDER — SODIUM CHLORIDE 0.9 % (FLUSH) 0.9 %
10 SYRINGE (ML) INJECTION EVERY 12 HOURS SCHEDULED
Status: CANCELLED | OUTPATIENT
Start: 2022-08-31

## 2022-08-31 NOTE — PROGRESS NOTES
CHIEF COMPLAINT  F/u back pain. Pt sts pain has worsened since last ov and would like to discuss lumbar injections.     Subjective   Inessa Peoples is a 82 y.o. female  who presents for follow-up.  She has a history of back pain.  Today her pain is 5/10VAS in severity. Her back pain is continuous aching with intermittent sharp pain. This will intermittently radiate into her right lower extremity, but her primary complaint is her midline low back pain that radiates across in a band-like fashion. Her pain is worsened by prolonged standing and walking; it is improved by rest, sitting, and lying down. She uses heat sparingly, this is temporarily helpful.     She completed a Left TKA on 4/27/2022.     Procedure List:  11-12-21-- bilateral L2-L5 MBB-- 100% relief for 2-3 months  6-15-21-- Left SI and Left GTB-- 50% relief for 1-2 days  1-21-21-- bilateral L2-L5 MBB-- 90% relief for several weeks  6-16-20-- left SI +GTB-- 75% for 2 weeks  6-23-19-- bilateral L2-L5 MBB-- 90% for a few weeks  11-15-18--- bilateral L2-L5 MBB-- significant relief for several days  4-26-28-- LEFT SI-- significant relief  9-26-17-- RIGHT SI-- significant     Back Pain  This is a chronic problem. The current episode started more than 1 year ago. The problem occurs constantly. The problem has been gradually worsening since onset. The pain is present in the lumbar spine and sacro-iliac. The pain radiates to the right thigh (intermittently radiates into the lateral/posterior right leg). The pain is at a severity of 5/10. The pain is mild. The pain is worse during the day (worsens as day progresses). The symptoms are aggravated by bending, position, standing and twisting (housework). Associated symptoms include bladder incontinence and weakness (right leg). Pertinent negatives include no abdominal pain, bowel incontinence, chest pain, dysuria, fever, headaches or numbness. Risk factors include obesity, menopause, history of cancer and lack of  exercise (history of melanoma--no Chemo or RAD). She has tried analgesics and heat for the symptoms. The treatment provided moderate relief.      PEG Assessment   What number best describes your pain on average in the past week?5  What number best describes how, during the past week, pain has interfered with your enjoyment of life?5  What number best describes how, during the past week, pain has interfered with your general activity?  5    Review of Pertinent Medical Data ---  Office visit from 4/11/2022 with AGA Caraballo reviewed.  Patient has a history of chronic back pain.  Her back pain is nearly continuous and increases with walking, standing, prolonged sitting, chores.  It improves with changing position, rest and procedures.  She completed a bilateral L2-L5 MBB on 11/2/2021 with 100% ongoing relief at that time.  That lasted approximately 2 to 3 months.  Patient has previously been told she can interrupt Coumadin for injections.  Repeat bilateral L2-L5 MBB with follow-up after procedure.    The following portions of the patient's history were reviewed and updated as appropriate: allergies, current medications, past family history, past medical history, past social history, past surgical history and problem list.    Review of Systems   Constitutional: Positive for fatigue. Negative for activity change and fever.   HENT: Negative for congestion.    Eyes: Negative for visual disturbance.   Respiratory: Negative for cough and shortness of breath.    Cardiovascular: Negative for chest pain.   Gastrointestinal: Negative for abdominal pain, bowel incontinence, constipation and diarrhea.   Genitourinary: Positive for bladder incontinence. Negative for difficulty urinating and dysuria.   Musculoskeletal: Positive for back pain.   Neurological: Positive for weakness (right leg). Negative for dizziness, light-headedness, numbness and headaches.   Psychiatric/Behavioral: Positive for sleep disturbance (occ).  "Negative for agitation and suicidal ideas. The patient is not nervous/anxious.      --  The aforementioned information the Chief Complaint section and above subjective data including any HPI data, and also the Review of Systems data, has been personally reviewed and affirmed.  --    Vitals:    08/31/22 1425   BP: 127/86   Pulse: 71   Resp: 20   Temp: 97.5 °F (36.4 °C)   SpO2: 96%   Weight: 78 kg (172 lb)   Height: 158.8 cm (62.52\")   PainSc:   5   PainLoc: Back     Objective   Physical Exam  Vitals and nursing note reviewed.   Constitutional:       Appearance: Normal appearance. She is well-developed.   Eyes:      General: Lids are normal.   Cardiovascular:      Rate and Rhythm: Normal rate.   Pulmonary:      Effort: Pulmonary effort is normal.   Musculoskeletal:      Lumbar back: Tenderness and bony tenderness present. Decreased range of motion.      Comments:   +lumbar facet loading   Neurological:      Mental Status: She is alert and oriented to person, place, and time.   Psychiatric:         Attention and Perception: Attention normal.         Mood and Affect: Mood normal.         Speech: Speech normal.         Behavior: Behavior normal.         Judgment: Judgment normal.       Assessment & Plan   Diagnoses and all orders for this visit:    1. Lumbar facet arthropathy (Primary)    2. Low back pain, unspecified back pain laterality, unspecified chronicity, unspecified whether sciatica present    3. Other chronic pain      --- Proceed with Bilateral L3-L5 MBB for both diagnostic and therapeutic purposes. Hold coumadin x 5 days prior to procedure.   Reviewed the procedure at length with the patient.  Included in the review was expectations, complications, risk and benefits.The procedure was described in detail and the risks, benefits and alternatives were discussed with the patient (including but not limited to: bleeding, infection, nerve damage, worsening of pain, inability to perform injection, paralysis, " seizures, coma, no pain relief and death) who agreed to proceed.  Discussed the potential for sedation if warranted/wanted.  The procedure will plan to be performed at [facility] with fluoroscopic guidance(unless ultrasound is indicated) and could potentially have steroids and contrast dye used. Questions were answered and in a way the patient could understand.  Patient verbalized understanding and wishes to proceed.  This intervention will be ordered.  Discussed with patient that all procedures are part of a multimodal plan of care and include either formal PT or a home exercise program.  Patient has no evidence of coagulopathy or current infection.    --- Follow-up after procedure     Dictated utilizing Dragon dictation.     This document is intended for medical expert use only. Reading of this document by patients and/or patient's family without participating medical staff guidance may result in misinterpretation and unintended morbidity.   Any interpretation of such data is the responsibility of the patient and/or family member responsible for the patient in concert with their primary or specialist providers, not to be left for sources of online searches such as [employer], [employer] or similar queries. Relying on these approaches to knowledge may result in misinterpretation, misguided goals of care and even death should patients or family members try recommendations outside of the realm of professional medical care in a supervised way.    Patient remained masked during entire encounter. No cough present. I donned a mask and eye protection throughout entire visit. Prior to donning mask and eye protection, hand hygiene was performed, as well as when it was doffed.  I was closer than 6 feet, but not for an extended period of time. No obvious exposure to any bodily fluids.

## 2022-09-01 ENCOUNTER — TRANSCRIBE ORDERS (OUTPATIENT)
Dept: SURGERY | Facility: SURGERY CENTER | Age: 82
End: 2022-09-01

## 2022-09-01 DIAGNOSIS — Z41.9 SURGERY, ELECTIVE: Primary | ICD-10-CM

## 2022-09-15 ENCOUNTER — ANTICOAGULATION VISIT (OUTPATIENT)
Dept: INTERNAL MEDICINE | Facility: CLINIC | Age: 82
End: 2022-09-15

## 2022-09-15 ENCOUNTER — OFFICE VISIT (OUTPATIENT)
Dept: SLEEP MEDICINE | Facility: HOSPITAL | Age: 82
End: 2022-09-15

## 2022-09-15 VITALS — WEIGHT: 172 LBS | HEART RATE: 84 BPM | OXYGEN SATURATION: 92 % | HEIGHT: 63 IN | BODY MASS INDEX: 30.48 KG/M2

## 2022-09-15 DIAGNOSIS — Z99.89 OSA ON CPAP: Primary | Chronic | ICD-10-CM

## 2022-09-15 DIAGNOSIS — G47.33 OSA ON CPAP: Primary | Chronic | ICD-10-CM

## 2022-09-15 DIAGNOSIS — I48.11 LONGSTANDING PERSISTENT ATRIAL FIBRILLATION: Primary | ICD-10-CM

## 2022-09-15 LAB — INR PPP: 1.7 (ref 0.9–1.1)

## 2022-09-15 PROCEDURE — G0463 HOSPITAL OUTPT CLINIC VISIT: HCPCS

## 2022-09-15 PROCEDURE — 36416 COLLJ CAPILLARY BLOOD SPEC: CPT | Performed by: INTERNAL MEDICINE

## 2022-09-15 PROCEDURE — 99213 OFFICE O/P EST LOW 20 MIN: CPT | Performed by: INTERNAL MEDICINE

## 2022-09-15 PROCEDURE — 85610 PROTHROMBIN TIME: CPT | Performed by: INTERNAL MEDICINE

## 2022-09-15 NOTE — PROGRESS NOTES
"Follow Up [facility] Note     Chief Complaint:  ANY     Primary Care Physician: Gisele Dockery MD    Interval History:   The patient is a 82 y.o. female  who I last saw 10/29/2021 and that note was reviewed.  The patient states she is doing well without new complaints.  She goes to bed at 11 PM and awakens at 9 AM.    The patient reports having COVID 3 or 4 months ago.    Review of Systems:    A complete review of systems was done and all were negative with the exception of some nasal congestion and occasional cough    Social History:    Social History     Socioeconomic History   • Marital status:      Spouse name: EDY   • Number of children: 4   • Years of education: 14yrs   Tobacco Use   • Smoking status: Never Smoker   • Smokeless tobacco: Never Used   • Tobacco comment: No history of tobacco use   Vaping Use   • Vaping Use: Never used   Substance and Sexual Activity   • Alcohol use: Yes     Alcohol/week: 3.0 standard drinks     Types: 2 Glasses of wine, 1 Drinks containing 0.5 oz of alcohol per week     Comment: Occasionally   • Drug use: Never   • Sexual activity: Yes     Partners: Male     Birth control/protection: None       Allergies:  Iodinated diagnostic agents, Contrast dye, Fenofibrate, Livalo [pitavastatin], Plaquenil [hydroxychloroquine sulfate], and Statins     Medication Review: Her list was reviewed.      Vital Signs:    Vitals:    09/15/22 1009   Pulse: 84   SpO2: 92%   Weight: 78 kg (172 lb)   Height: 158.8 cm (62.52\")     Body mass index is 30.94 kg/m².    Physical Exam:    Constitutional:  Well developed 82 y.o. female that appears in no apparent distress.  Awake & oriented times 3.  Normal mood with normal recent and remote memory and normal judgement.  Eyes:  Conjunctivae normal.  Oropharynx: Previously, moist mucous membranes without exudate and a large tongue and normal uvula and moderate narrowing of the posterior pharyngeal opening, patient is wearing a " facemask.    Self-administered [place] Sleepiness Scale test results: 0  0-5 Lower normal daytime sleepiness  6-10 Higher normal daytime sleepiness  11-12 Mild, 13-15 Moderate, & 16-24 Severe excessive daytime sleepiness     Downloaded PAP Data Reviewed For Compliance:  DME is [facility] and she uses nasal pillows.  Downloads between 6/15 and 9/12/2022 compliance 98%.  Average usage is 8 hours and 9 minutes.  Average AHI is normal without leak.  CPAP pressure is +10    I have reviewed the above results and compared them with the patient's last downloads and reviewed with the patient.    Impression:   Obstructive sleep apnea adequately treated with CPAP +10. The patient appears to be at goal with good compliance and usage. The patient has no complaints of hypersomnolence.    Plan:  Good sleep hygiene measures should be maintained.  Weight loss would be beneficial in this patient who is obese by BMI.      After evaluating the patient and assessing results available, the patient is benefiting from the treatment being provided.     The patient will continue CPAP +10.  After clinical evaluation and review of downloads, I recommend no changes to the patient's pressures.  A new prescription will be sent to the patient's DME.    The patient has registered her device related to the Rashmi recall    I answered all of the patient's questions.  The patient will call for any problems and will follow up in 1 year.      Jewel Lu MD  Sleep Medicine  09/24/22  09:32 EDT

## 2022-09-15 NOTE — PROGRESS NOTES
Can you please call patient and ask if she missed any doses of her warfarin recently?  If not she needs a dose increase.    Can you please let patient know that she should switch the days she takes 4.5 mg of her warfarin.  So instead of taking 3 mg on Sunday, Tuesday, Thursday, Saturday and 4.5 mg on Monday, Wednesday, Friday she will instead take 4.5 mg on Sunday, Tuesday, Thursday, and Saturday and take 3 mg on Monday, Wednesday, Friday.    She should return for repeat INR in 2 weeks.

## 2022-09-24 PROBLEM — E66.01 CLASS 2 SEVERE OBESITY DUE TO EXCESS CALORIES WITH SERIOUS COMORBIDITY AND BODY MASS INDEX (BMI) OF 35.0 TO 35.9 IN ADULT (HCC): Status: RESOLVED | Noted: 2018-03-27 | Resolved: 2022-09-24

## 2022-09-24 PROBLEM — E66.812 CLASS 2 SEVERE OBESITY DUE TO EXCESS CALORIES WITH SERIOUS COMORBIDITY AND BODY MASS INDEX (BMI) OF 35.0 TO 35.9 IN ADULT: Status: RESOLVED | Noted: 2018-03-27 | Resolved: 2022-09-24

## 2022-09-29 ENCOUNTER — ANTICOAGULATION VISIT (OUTPATIENT)
Dept: INTERNAL MEDICINE | Facility: CLINIC | Age: 82
End: 2022-09-29

## 2022-09-29 ENCOUNTER — CLINICAL SUPPORT (OUTPATIENT)
Dept: INTERNAL MEDICINE | Facility: CLINIC | Age: 82
End: 2022-09-29

## 2022-09-29 DIAGNOSIS — I48.0 PAROXYSMAL ATRIAL FIBRILLATION: Primary | ICD-10-CM

## 2022-09-29 DIAGNOSIS — Z23 NEED FOR IMMUNIZATION AGAINST INFLUENZA: Primary | ICD-10-CM

## 2022-09-29 LAB — INR PPP: 1.8 (ref 0.9–1.1)

## 2022-09-29 PROCEDURE — G0008 ADMIN INFLUENZA VIRUS VAC: HCPCS | Performed by: STUDENT IN AN ORGANIZED HEALTH CARE EDUCATION/TRAINING PROGRAM

## 2022-09-29 PROCEDURE — 93793 ANTICOAG MGMT PT WARFARIN: CPT | Performed by: STUDENT IN AN ORGANIZED HEALTH CARE EDUCATION/TRAINING PROGRAM

## 2022-09-29 PROCEDURE — 85610 PROTHROMBIN TIME: CPT | Performed by: STUDENT IN AN ORGANIZED HEALTH CARE EDUCATION/TRAINING PROGRAM

## 2022-09-29 PROCEDURE — 90686 IIV4 VACC NO PRSV 0.5 ML IM: CPT | Performed by: STUDENT IN AN ORGANIZED HEALTH CARE EDUCATION/TRAINING PROGRAM

## 2022-09-29 PROCEDURE — 36416 COLLJ CAPILLARY BLOOD SPEC: CPT | Performed by: STUDENT IN AN ORGANIZED HEALTH CARE EDUCATION/TRAINING PROGRAM

## 2022-09-30 RX ORDER — WARFARIN SODIUM 3 MG/1
3 TABLET ORAL DAILY
Qty: 90 TABLET | Refills: 3 | Status: SHIPPED | OUTPATIENT
Start: 2022-09-30 | End: 2022-12-19 | Stop reason: SDUPTHER

## 2022-10-06 RX ORDER — FEXOFENADINE HCL 180 MG/1
TABLET ORAL
COMMUNITY
Start: 2022-09-30 | End: 2023-01-09

## 2022-10-06 RX ORDER — CETIRIZINE HYDROCHLORIDE 10 MG/1
TABLET ORAL
COMMUNITY
Start: 2022-09-30 | End: 2023-01-09

## 2022-10-10 PROCEDURE — 93294 REM INTERROG EVL PM/LDLS PM: CPT | Performed by: INTERNAL MEDICINE

## 2022-10-10 PROCEDURE — 93296 REM INTERROG EVL PM/IDS: CPT | Performed by: INTERNAL MEDICINE

## 2022-10-11 ENCOUNTER — HOSPITAL ENCOUNTER (OUTPATIENT)
Dept: GENERAL RADIOLOGY | Facility: SURGERY CENTER | Age: 82
Setting detail: HOSPITAL OUTPATIENT SURGERY
End: 2022-10-11

## 2022-10-11 ENCOUNTER — HOSPITAL ENCOUNTER (OUTPATIENT)
Facility: SURGERY CENTER | Age: 82
Setting detail: HOSPITAL OUTPATIENT SURGERY
Discharge: HOME | End: 2022-10-11
Attending: ANESTHESIOLOGY | Admitting: ANESTHESIOLOGY

## 2022-10-11 VITALS
SYSTOLIC BLOOD PRESSURE: 114 MMHG | OXYGEN SATURATION: 97 % | TEMPERATURE: 98 F | DIASTOLIC BLOOD PRESSURE: 67 MMHG | HEART RATE: 71 BPM | RESPIRATION RATE: 16 BRPM

## 2022-10-11 DIAGNOSIS — Z41.9 SURGERY, ELECTIVE: ICD-10-CM

## 2022-10-11 DIAGNOSIS — M47.816 LUMBAR FACET ARTHROPATHY: ICD-10-CM

## 2022-10-11 LAB
INR PPP: 1.2 (ref 0.8–1.2)
PROTHROMBIN TIME: 14 SECONDS (ref 12.8–15.2)

## 2022-10-11 PROCEDURE — 25010000002 IOPAMIDOL 61 % SOLUTION 30 ML VIAL: Performed by: ANESTHESIOLOGY

## 2022-10-11 PROCEDURE — 64494 INJ PARAVERT F JNT L/S 2 LEV: CPT | Performed by: ANESTHESIOLOGY

## 2022-10-11 PROCEDURE — 85610 PROTHROMBIN TIME: CPT

## 2022-10-11 PROCEDURE — 76000 FLUOROSCOPY <1 HR PHYS/QHP: CPT

## 2022-10-11 PROCEDURE — 25010000002 MIDAZOLAM PER 1 MG: Performed by: ANESTHESIOLOGY

## 2022-10-11 PROCEDURE — 25010000002 METHYLPREDNISOLONE PER 80 MG: Performed by: ANESTHESIOLOGY

## 2022-10-11 PROCEDURE — 64493 INJ PARAVERT F JNT L/S 1 LEV: CPT | Performed by: ANESTHESIOLOGY

## 2022-10-11 PROCEDURE — 77002 NEEDLE LOCALIZATION BY XRAY: CPT

## 2022-10-11 PROCEDURE — S0260 H&P FOR SURGERY: HCPCS | Performed by: ANESTHESIOLOGY

## 2022-10-11 RX ORDER — SODIUM CHLORIDE 0.9 % (FLUSH) 0.9 %
10 SYRINGE (ML) INJECTION EVERY 12 HOURS SCHEDULED
Status: DISCONTINUED | OUTPATIENT
Start: 2022-10-11 | End: 2022-10-11 | Stop reason: HOSPADM

## 2022-10-11 RX ORDER — SODIUM CHLORIDE 0.9 % (FLUSH) 0.9 %
10 SYRINGE (ML) INJECTION AS NEEDED
Status: DISCONTINUED | OUTPATIENT
Start: 2022-10-11 | End: 2022-10-11 | Stop reason: HOSPADM

## 2022-10-11 RX ORDER — MIDAZOLAM HYDROCHLORIDE 1 MG/ML
INJECTION INTRAMUSCULAR; INTRAVENOUS AS NEEDED
Status: DISCONTINUED | OUTPATIENT
Start: 2022-10-11 | End: 2022-10-11 | Stop reason: HOSPADM

## 2022-10-11 NOTE — H&P
Brief Pre-procedural / Pre-operative H&P        -----    Pertinent Diagnosis:   Lumbar spondylosis/lumbar facet arthropathy    Proposed Procedure: Lumbar medial branch blockade, anticipated diagnostic and therapeutic      Subjective   Inessa Peoples is a 82 y.o. female  who presents for intervention.  She has a history of significant sustained relief of of 100% for a few months or more from previous block.      History of Present Illness     Axial back pain radiating bilateral in a bandlike distribution across the lower and mid lumbar area and it responded significantly therapeutically to medial branch blocks in the past.    -------    The following portions of the patient's history were reviewed and updated as appropriate: allergies, current medications, past family history, past medical history, past social history, past surgical history and problem list.    Allergies   Allergen Reactions   • Iodinated Diagnostic Agents Anaphylaxis, Swelling and Other (See Comments)     PAINS ACROSS CHEST, Facial swelling   • Contrast Dye Other (See Comments)   • Fenofibrate Myalgia   • Livalo [Pitavastatin] Myalgia     Joint Pain, Muscle tightness   • Plaquenil [Hydroxychloroquine Sulfate] Myalgia     Joint Pain, Muscle Tightness   • Statins Myalgia     Joint pain, Muscle Tightness  Other reaction(s): Muscle cramps         Current Facility-Administered Medications:   •  sodium chloride 0.9 % flush 10 mL, 10 mL, Intravenous, Q12H, Wolfgnag Means MD  •  sodium chloride 0.9 % flush 10 mL, 10 mL, Intravenous, PRN, Wolfgang Means MD    No current facility-administered medications on file prior to encounter.     Current Outpatient Medications on File Prior to Encounter   Medication Sig Dispense Refill   • acetaminophen (TYLENOL) 650 MG 8 hr tablet Take 650 mg by mouth Every 8 (Eight) Hours As Needed for Mild Pain .     • Calcium-Phosphorus-Vitamin D (CITRACAL +D3 PO) Take 1 tablet by mouth Daily.     • celecoxib (CeleBREX)  200 MG capsule TAKE ONE CAPSULE BY MOUTH DAILY 90 capsule 1   • cetirizine (zyrTEC) 10 MG tablet      • ezetimibe (ZETIA) 10 MG tablet TAKE ONE TABLET BY MOUTH DAILY 90 tablet 1   • fexofenadine (ALLEGRA) 180 MG tablet      • furosemide (LASIX) 20 MG tablet TAKE ONE TABLET BY MOUTH DAILY 90 tablet 1   • levothyroxine (SYNTHROID, LEVOTHROID) 25 MCG tablet TAKE ONE TABLET BY MOUTH DAILY 90 tablet 1   • melatonin 5 MG sublingual tablet sublingual tablet Take 5 mg by mouth Every Night.     • metoprolol succinate XL (TOPROL-XL) 25 MG 24 hr tablet Take 0.5 tablets by mouth Daily. 45 tablet 1   • Multiple Vitamin (MULTI-DAY VITAMINS) tablet Take 1 tablet by mouth daily.     • sacubitril-valsartan (ENTRESTO) 24-26 MG tablet Take 1 tablet by mouth 2 (Two) Times a Day. 60 tablet 5   • spironolactone (ALDACTONE) 25 MG tablet TAKE ONE TABLET BY MOUTH DAILY 90 tablet 1       Patient Active Problem List   Diagnosis   • Sarcoidosis   • Diaphragmatic paralysis   • Osteopenia   • Impaired fasting glucose   • Hypothyroidism   • Hypertension   • Hyperlipidemia   • Chronic osteoarthritis   • GERD (gastroesophageal reflux disease)   • ANY on CPAP + 10 - Dr Lu   • Dilation of thoracic aorta (HCC)   • Bulge of lumbar disc without myelopathy   • Lumbar facet arthropathy   • Trochanteric bursitis of both hips   • LBBB (left bundle branch block)   • Paroxysmal atrial fibrillation (HCC)   • Heart failure with preserved ejection fraction, NYHA class II (CMS/HCC)II-III---decreased EF with stress   • Presence of biventricular cardiac pacemaker       Past Medical History:   Diagnosis Date   • Abnormal ECG    • Allergic Iodine used in on xrays   • Allergic rhinitis    • Atrial fibrillation (HCC)    • Bleeds easily ([facility])     warfarin   • Breast screening    • Broken bones    • Calf DVT (deep venous thrombosis) ([facility])    • Chronic anticoagulation    • Class 2 severe obesity due to excess calories with serious comorbidity and body mass index  (BMI) of 37.0 to 37.9 in adult (HCC)    • Colon polyp    • CTS (carpal tunnel syndrome)    • Deep vein thrombosis (HCC)     left calf   • Diverticulitis of colon    • Diverticulitis of large intestine without perforation or abscess without bleeding 1/3/2017   • AHN (dyspnea on exertion)    • Fatigue    • H/O malignant melanoma of skin    • Heart failure with preserved ejection fraction, NYHA class II (HCC)     II-III---decreased EF with stress   • Hematuria, gross    • History of colon polyps    • History of kidney stones    • Hyperlipidemia    • Hypertension    • Hyperthyroidism    • IFG (impaired fasting glucose)    • Insomnia    • LBBB (left bundle branch block)    • Left leg swelling    • LLQ abdominal pain    • Long term current use of systemic steroids    • Low back pain    • Malignant melanoma of skin (HCC)    • Neck pain    • Obesity     class 2 obesity due to excess calories with BMI of 35.0 to 35.9 in adult   • Osteoarthritis    • Osteopenia    • Paralysis, diaphragm    • Paroxysmal atrial fibrillation (HCC)    • 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 Lu   • Sleep apnea     USES CPAP   • UTI (urinary tract infection)    • Visit for screening mammogram        Past Surgical History:   Procedure Laterality Date   • CARDIAC CATHETERIZATION N/A 6/29/2020    Procedure: Coronary angiography, Jessica PHILIP;  Surgeon: Noe Reynoso MD;  Location:  [facility];  Service: Cardiovascular;  Laterality: N/A;   • CARDIAC CATHETERIZATION N/A 6/29/2020    Procedure: Left ventriculography;  Surgeon: Noe Reynoso MD;  Location:  [facility];  Service: Cardiovascular;  Laterality: N/A;   • CARDIAC CATHETERIZATION N/A 6/29/2020    Procedure: Right and Left Heart Cath;  Surgeon:  Noe Reynoso MD;  Location: [facility] CATH INVASIVE LOCATION;  Service: Cardiovascular;  Laterality: N/A;   • CARDIAC CATHETERIZATION     • CARDIAC ELECTROPHYSIOLOGY PROCEDURE N/A 1/5/2021    Procedure: Biventricular Device Insertion -- CRT-p ([employer]tronic);  Surgeon: Demar Stone MD;  Location: [facility] CATH INVASIVE LOCATION;  Service: Cardiology;  Laterality: N/A;   • CARPAL TUNNEL RELEASE Bilateral 1980'S    [facility] SURGEONS   • CATARACT EXTRACTION Bilateral 2001    Dr. Nic Hidalgo   • COLONOSCOPY N/A 06/26/2013    MILD SIGMOID DIVERTICULOSIS, repeat 5 years based on sister w/ colona cancer-DR. NASRA CASTRO   • COLONOSCOPY N/A 10/19/2010    SIGMOID DIVERTICULOSIS & INTERNAL HEMORRHOIDS, HEMORRHOIDAL BANDING X4, DR. NASRA CASTRO   • COLONOSCOPY N/A 8/6/2018    Procedure: COLONOSCOPY TO CECUM AND INTO TERMINAL ILEUM;  Surgeon: Nasra Castro MD;  Location: [facility] ENDOSCOPY;  Service: Gastroenterology   • COLONOSCOPY N/A 10/4/2021    Procedure: COLONOSCOPY to CECUM;  Surgeon: Nasra Castro MD;  Location: [facility] ENDOSCOPY;  Service: General;  Laterality: N/A;  FAMILY HX COLON CANCER  --DIVERTICULOSIS    • COLONOSCOPY W/ BIOPSIES N/A 05/12/2008    RECTUM BIOPSY: COLONIC MUCOSA W/ FOCAL INCREASE OF INFLAMMATORY CELLS IN THE MUSCULARIS MUCOSA INCLUDING EOSINOPHILS, SIGMOID DIVERTICULOSIS, POSSIBLE PROCTITIS, DR. NASRA CASTRO   • CYSTOSCOPY, RETROGRADE PYELOGRAM AND STENT INSERTION Bilateral 10/10/2014    w/ Right ureteral pyeloscopy & laser lithotripsy, Right Double-J stent placement-Dr. Julio Tai   • ENDOSCOPY N/A 11/11/2020    Procedure: ESOPHAGOGASTRODUODENOSCOPY with biopsies and dilation 54fr fernández;  Surgeon: Garret Piedra MD;  Location: [facility] ENDOSCOPY;  Service: Gastroenterology;  Laterality: N/A;  pre- dysphagia  post-- gastritis, dilation of esophageal ring, watermelon stomach    • EXTRACORPOREAL SHOCK WAVE LITHOTRIPSY (ESWL) Right 09/10/2009    DR. JULIO TAI   • SATYA  TOE REPAIR     • LAPAROSCOPIC CHOLECYSTECTOMY N/A 07/19/2012    DR. TANISHA THORNTON   • LUMBAR EPIDURAL INJECTION N/A 04/23/2015    LUMBAR EPIDURAL STEROID INJECTION X3   • MEDIAL BRANCH BLOCK Bilateral 11/2/2021    Procedure: MEDIAL BRANCH BLOCK--bilateral Lumbar2-5;  Surgeon: Wolfgang Means MD;  Location: [facility] MAIN OR;  Service: Pain Management;  Laterality: Bilateral;   • PACEMAKER IMPLANTATION     • PARATHYROIDECTOMY N/A 2001   • SACROILIAC JOINT INJECTION Left 6/15/2021    Procedure: SACROILIAC INJECTION-- left and left greater trochanteric bursa injection;  Surgeon: Wolfgang Means MD;  Location: [facility];  Service: Pain Management;  Laterality: Left;   • SKIN CANCER EXCISION     • THUMB ARTHROSCOPY Right 1982   • TOTAL KNEE ARTHROPLASTY Right 03/25/2014    [employer] Vanguard total knee, 55 femoral component, 71 tibial tray w/ a 40 mm stem, 34 mm x 7.8 mm three-peg patella, and a 14 standard poly-Dr. Ty Canchola       Family History   Problem Relation Age of Onset   • Heart disease Mother    • Diabetes type II Mother    • Heart disease Father    • Hypertension Father    • Cancer Sister    • Colon polyps Sister    • Lung cancer Sister    • Heart disease Brother         CABG   • Diabetes type II Brother    • Colon polyps Brother    • Arrhythmia Brother    • Colon cancer Brother    • Cancer Brother    • Heart disease Sister    • Diabetes type II Sister    • Aortic aneurysm Sister    • COPD Sister    • Arrhythmia Sister    • Cerebral aneurysm Sister    • Arthritis Sister    • COPD Sister    • Lupus Sister    • Asthma Maternal Aunt    • Aortic aneurysm Brother        Social History     Socioeconomic History   • Marital status:      Spouse name: EDY   • Number of children: 4   • Years of education: 14yrs   Tobacco Use   • Smoking status: Never   • Smokeless tobacco: Never   • Tobacco comments:     No history of tobacco use   Vaping Use   • Vaping Use: Never used   Substance and Sexual Activity   •  Alcohol use: Yes     Alcohol/week: 3.0 standard drinks     Types: 2 Glasses of wine, 1 Drinks containing 0.5 oz of alcohol per week     Comment: Occasionally   • Drug use: Never   • Sexual activity: Yes     Partners: Male     Birth control/protection: None       -------       Review of Systems  No Fever, No Chills, No ear pain, No sinus pressure or drainage, No eye pain or drainage, No cough, No SOB, No chest tightness nor chest pain, no palpitations.          Vitals:    10/11/22 1013   BP: 129/71   BP Location: Left arm   Patient Position: Lying   Pulse: 68   Resp: 16   Temp: 98 °F (36.7 °C)   TempSrc: Tympanic   SpO2: 98%         Objective   Physical Exam  VSS, NNR, NCAT, NMNA, NRD, AAOx3.  Exam under fluoroscpy to follow    -------    Assessment & Plan:  - as noted above, the stated intervention is indicated  - Follow-up plan will be noted in the operative report        OV 1-2 weeks      EMR Dragon/Transcription disclaimer:   Typed items in this encounter note may have been created by electronic transcription/translation software which converts spoken language to printed text. The electronic translation of spoken language may permit erroneous, or at times, nonsensical words or phrases to be inadvertently transcribed; Although I have reviewed the note for such errors, some may still exist.

## 2022-10-11 NOTE — DISCHARGE INSTRUCTIONS
Procedure today is bilateral L3 and L4 and L5 medial branch blockade.  Education below is about both the branch block procedure as well as the radiofrequency procedure like we discussed.  Hope you do well continually with the medial branch procedure and can avoid the RF procedure if possible.                  [facility] - Post-procedure Instructions          --  While there are no absolute restrictions, it is recommended that you do not perform strenuous activity today. In the morning, you may resume your level of activity as before your block.    --  If you have a band-aid at your injection site, please remove it later today. Observe the area for any redness, swelling, pus-like drainage, or a temperature over 101°. If any of these symptoms occur, please call your doctor at [phone]. If after office hours, leave a message and the on-call provider will return your call.    --  Ice may be applied to your injection site. It is recommended you avoid direct heat (heating pad; hot tub) for 1-2 days.    --  Call [facility] at [phone] if you experience persistent headache, persistent bleeding from the injection site, or severe pain not relieved by heat or oral medication.    --  Do not make important decisions today.    --  Due to the effects of the block and/or the I.V. Sedation, DO NOT drive or operate hazardous machinery for 12 hours.  Local anesthetics may cause numbness after procedure and precautions must be taken with regards to operating equipment as well as with walking, even if ambulating with assistance of another person or with an assistive device.    --  Do not drink alcohol for 12 hours.    -- You may return to work tomorrow, or as directed by your referring doctor.    --  Occasionally you may notice a slight increase in your pain after the procedure. This should start to improve within the next 24-48 hours. Radiofrequency ablation procedure pain may last 3-4 weeks.    --  It may  take as long as 3-4 days before you notice a gradual improvement in your pain and/or other symptoms.    -- You may continue to take your prescribed pain medication as needed.    --  Some normal possible side effects of steroid use could include fluid retention, increased blood sugar, dull headache, increased sweating, increased appetite, mood swings and flushing.    --  Diabetics are recommended to watch their blood glucose level closely for 24-48 hours after the injection.    --  Must stay in PACU for 20 min upon arrival and prove no leg weakness before being discharged.    --  IN THE EVENT OF A LIFE THREATENING EMERGENCY, (CHEST PAIN, BREATHING DIFFICULTIES, PARALYSIS…) YOU SHOULD GO TO YOUR NEAREST EMERGENCY ROOM.    --  You should be contacted by our office within 2-3 days to schedule follow up or next appointment date.  If not contacted within 7 days, please call the office at [phone]      ---      --------  Education about Radiofrequency Lesioning:    The local anesthetic nerve blockade was intended for diagnostic purposes, with the intent of offering the patient Radiofrequency thermal rhizotomy if the blockade was diagnostically effective.  The diagnostic blockade is necessary to determine the likelihood that RF therapy could be efficacious in providing long term relief to the patient.  As indicated above, diagnostic efficacy was established.      In the RF procedure, needles are placed to the same area in the same fashion, and after testing, the needle tips are heated to thermally treat the nerves, blocking the nerves by in essence damaging the nerves with the heat treatment.      Medically, a successful RF procedure should provide a patient with 50% pain relief or more for at least 6 months.  We estimate a likelihood of about an 80% chance that medical success will be realized, and this is based on the positive diagnostic blockade.  We discussed & educated once again that not all patients have a  medically successful result, and the patient voices understanding.  However, our clinical experience suggests that successful patients receive relief more in the range of 12 months on average.  (We also discussed that a fortunate minority of patients receive therapeutic success from the blockades alone, and may not require RF ablation.  If a patient receives more than 8 weeks of relief from the block, then occasional repeat block for therapeutic purposes is a very reasonable alternative therapy.  This course of therapy is consistent with our LCDs according to our CMS  in the area, and therefore other insurance providers should follow accordingly.  We will monitor our patients to screen for these therapeutic responders and will offer RF therapy only when necessary.  However, in this clinical scenario, this therapeutic result was not realized, and therefore Radiofrequency Lesioning is medically necessary.)      We discussed that RF are not without risks.  Guidelines regarding anticoagulant use & neuraxial procedures will be respected.  Patients that are ill or otherwise may be at risk for sepsis will not be good candidates for needle punctures of any type.  This patient will not be offered these therapies if there is an increased risk.   We discussed that there is a risk of postprocedural pain and also a risk of worsening of clinical picture with these procedures as with any invasive procedure.  All invasive procedures have risks including but not limited to bleeding, infection, injury, nerve injury, paralysis, coma, death, lack of pain relief, and worsening of clinical picture.      In this education session, all of these topics were covered and the patient voiced understanding.    ---------      -------  Education about Medial Branch Blockade and RF Therapy:    This medial branch blockade (MBB) suggested is intended for diagnostic purposes, with the intent of offering the patient Radiofrequency thermal  "rhizotomy (RF) if the MBB is diagnostically effective.  The diagnostic blockade is necessary to determine the likelihood that RF therapy could be efficacious in providing long term relief to the patient.    Medial branches are sensory nerve branches that connect to a facet joint and transmit sensations & pain signals from that joint.  Facet is a term for the type of joints found in the spine.  Medial branches are the nerves that go to a facet, and therefore are also sometimes called \"facet joint nerves\" (FJNs).      In a medial branch blockade procedure, xray fluoroscopy is used to verify the locations of the outside of the joint lines which are being targeted.  Under xray guidance, needles are placed to these areas.  Contrast dye is injected to confirm proper placement, with dye flowing over the joint area, and to ensure that the dye does not flow into unintended areas such as a vein.  When this is confirmed, local anesthetic is injected to block the medial branch at that joint level.      If MBBs are diagnostically successful in blocking pain, then the patient is most likely a great candidate for Radiofrequency of those facet joint nerves.  In the RF procedure, needles are placed to the joint lines in the same fashion, and after testing, the needle tips are heated to thermally treat the nerves, blocking the nerves by in essence damaging the nerves with the heat treatment(non-pulsed).       Medically, a successful RF procedure should provide a patient with 50% pain relief or more for at least 6 months.  Clinical experience suggests that successful patients receive relief more in the range of 12 months on average.  We also discussed that a fortunate minority of patients receive therapeutic success from the MBB, and may not require RF ablation.  If a patient receives more than 8 weeks of relief from MBB, then occasional repeat MBB for therapeutic purposes is a very reasonable alternative therapy.  This course of " therapy is consistent with our LCDs according to our CMS  in the area, and therefore other insurance providers should follow accordingly.  We will monitor our patients to screen for these therapeutic responders and will offer RF therapy only when necessary.        We discussed that MBB & RF are not without risks.  Guidelines regarding anticoagulant use & neuraxial procedures will be respected.  Patients that are ill or otherwise may be at risk for sepsis will not have their spines accessed by neuraxial injections of any type.  This patient will not be offered these therapies if there is an increased risk.   We discussed that there is a risk of postprocedural pain and also a risk of worsening of clinical picture with these procedures as with any neuraxial procedure.    -------

## 2022-10-11 NOTE — OP NOTE
Bilateral L3-5 Lumbar Medial Branch Blockade  [facility]      PREOPERATIVE DIAGNOSIS:  Lumbar spondylosis without myelopathy    POSTOPERATIVE DIAGNOSIS:  Lumbar spondylosis without myelopathy    PROCEDURE:   Diagnostic Bilateral Lumbar Medial Branch Nerve Blockades, with fluoroscopy:  L3, L4, and L5 nerves (at the L4 & L5 transverse processes and the sacral alar groove) to block facet joints L4-5, and L5-S1  1. [id number] -- Bilateral Lumbar Facet blocks, 1st Level  2. [id number] -- Bilateral Lumbar Facet blocks, 2nd  Level    PRE-PROCEDURE DISCUSSION WITH PATIENT:    Risks and complications were discussed with the patient prior to starting the procedure and informed consent was obtained.      SURGEON:  Wolfgang Means MD    REASON FOR PROCEDURE:    The patient complains of pain that seems to have a significant axial component and Tenderness of the affected facet joints on exam under fluoroscopy    The decision to proceed with a therapeutic blockade is complex.  She has had significant sustained relief for months at a time on multiple therapeutic blockades in the past which supports the decision.  She has significant degenerative changes that are very extensive and would make the placement of radiofrequency probes very technically challenging therefore we considered therapeutic blockades previously and she has succeeded.  If she did not get long-term relief from therapy blockades we could try radiofrequency once but the fact is we are providing patient centered specific care for this patient that in is eliminating her need for multiple procedure and eliminating need for radiofrequency ablation at this time and in fact being very efficient with her care and conserving health or resource dollars with this decision for this patient.    SEDATION:  Versed 2mg IV, The use of increased procedural sedation was carefully considered, and for this particular patient the need for additional procedural sedation  seemed necessary in this instance to safely perform the procedure. and The patient had higher than average levels of procedural anxiety and the need to provide additional procedural sedation was needed to safely proceed.  Who was conversive through the procedure and not sedated but she was more comfortable.  She had a decrease and muscle spasm.  Had some tenderness and tension on marking the sites with exam under fluoroscopy.  It is also of note with therapeutic blockade that the need to avoid all sedatives in order to evaluate a diagnostic effect is a bit of a moot point.    ANESTHETIC:  Marcaine 0.5%  STEROID:  Methylprednisolone (DEPO MEDROL) 60mg  TOTAL VOLUME OF SOLUTION:  6ml    DESCRIPTON OF PROCEDURE:  After obtaining informed consent, IV access was obtained in the preoperative area.   The patient was taken to the operating room.  The patient was placed in the prone position with a pillow under the abdomen. All pressure points were well padded.  EKG, blood pressure, and pulse oximeter were monitored.  The patient was monitored and sedated by the RN under my direction. The lumbosacral area was prepped with Chloraprep and draped in a sterile fashion. Under fluoroscopic guidance the transverse processes of the L4 and L5 vertebrae at the junctions of the superior articular processes were identified on the right. Also identified was the groove between the ala and the superior articular process of the sacrum on the ipsilateral side.  Skin and subcutaneous tissue were anesthetized with 1% lidocaine above each of these points. A 22-gauge spinal needle was introduced under fluoroscopic guidance at the above junctions. Aspiration was negative for blood and CSF.  After confirming the position of the needle with fluoroscope in all views, 0.25 mL of Omnipaque was injected, and after seeing the proper spread a total of 1 mL of the anesthetic solution noted above was injected at each of these points.  Needles were removed  intact from each of the areas.  A similar procedure was repeated to block the L3, L4, and L5 nerves on the contralateral side.   Onset of analgesia was noted.  Vital signs remained stable throughout.      ESTIMATED BLOOD LOSS:  <5 mL  SPECIMENS:  none    COMPLICATIONS:   No complications were noted., There was no indication of vascular uptake on live injection of contrast dye., There was not any evidence of dural puncture.   and The patient did not have any signs of postprocedure numbness nor weakness.    TOLERANCE & DISCHARGE CONDITION:    The patient tolerated the procedure well.  The patient was transported to the recovery area without difficulties.  The patient was discharged to home under the care of family in stable and satisfactory condition.    PLAN OF CARE:  1. The patient was given our standard instruction sheet.  2. We discussed that Lumbar Medial Branch Blockade is a diagnostic procedure in consideration for radiofrequency ablation if two diagnostic procedures prove to be positive for significant benefit.  If sustained relief of 6 to eight weeks is obtained, then an alternative plan could be therapeutic lumbar branch blockades.  3. The patient is asked to keep a pain log each hour for 8 hours after the procedure today.  4. The patient will  Return to clinic 2-3 wks.  5. The patient will resume all medications as per the medication reconciliation sheet.

## 2022-10-11 NOTE — INTERVAL H&P NOTE
H&P reviewed. The patient was examined and there are no changes to the H&P. - will discuss further as to whether or not to make it therapeutic

## 2022-10-25 ENCOUNTER — OFFICE VISIT (OUTPATIENT)
Dept: PAIN MEDICINE | Facility: CLINIC | Age: 82
End: 2022-10-25

## 2022-10-25 VITALS
RESPIRATION RATE: 12 BRPM | HEART RATE: 76 BPM | TEMPERATURE: 98.2 F | WEIGHT: 177 LBS | HEIGHT: 63 IN | SYSTOLIC BLOOD PRESSURE: 127 MMHG | DIASTOLIC BLOOD PRESSURE: 82 MMHG | BODY MASS INDEX: 31.36 KG/M2 | OXYGEN SATURATION: 94 %

## 2022-10-25 DIAGNOSIS — M47.816 LUMBAR FACET ARTHROPATHY: Primary | ICD-10-CM

## 2022-10-25 DIAGNOSIS — M54.50 LOW BACK PAIN, UNSPECIFIED BACK PAIN LATERALITY, UNSPECIFIED CHRONICITY, UNSPECIFIED WHETHER SCIATICA PRESENT: ICD-10-CM

## 2022-10-25 DIAGNOSIS — G89.29 OTHER CHRONIC PAIN: ICD-10-CM

## 2022-10-25 PROCEDURE — 99212 OFFICE O/P EST SF 10 MIN: CPT | Performed by: NURSE PRACTITIONER

## 2022-10-25 NOTE — PROGRESS NOTES
CHIEF COMPLAINT  PROCEDURE FOLLOW UP bilateral L3-L5 medial branch blockade 10/11/2022    Subjective   Inessa Peoples is a 82 y.o. female  who presents to the office for follow-up of procedure.  She completed a Bilateral L3-L5 MBB   on  10/11/2022 performed by Dr. Means for management of back pain. Patient reports 100% ONGOING relief from the procedure.     Today her pain is 1/10VAS in severity.  Her low back pain at this time is stable.     Procedure List:  11-12-21-- bilateral L2-L5 MBB-- 100% relief for 2-3 months  6-15-21-- Left SI and Left GTB-- 50% relief for 1-2 days  1-21-21-- bilateral L2-L5 MBB-- 90% relief for several weeks  6-16-20-- left SI +GTB-- 75% for 2 weeks  6-23-19-- bilateral L2-L5 MBB-- 90% for a few weeks  11-15-18--- bilateral L2-L5 MBB-- significant relief for several days  4-26-28-- LEFT SI-- significant relief  9-26-17-- RIGHT SI-- significant    Back Pain  This is a chronic problem. The current episode started more than 1 year ago. The problem occurs constantly. Progression since onset: Improved since last office  The pain is present in the lumbar spine and sacro-iliac. The pain radiates to the right thigh (intermittently radiates into the lateral/posterior right leg). The pain is at a severity of 1/10. The pain is mild. The pain is worse during the day (worsens as day progresses). The symptoms are aggravated by bending, position, standing and twisting (housework). Associated symptoms include bladder incontinence. Pertinent negatives include no abdominal pain, bowel incontinence, chest pain, dysuria, fever, headaches, numbness or weakness. Risk factors include obesity, menopause, history of cancer and lack of exercise (history of melanoma--no Chemo or RAD). She has tried analgesics and heat for the symptoms. The treatment provided moderate relief.      PEG Assessment   What number best describes your pain on average in the past week?3  What number best describes how, during the past  "week, pain has interfered with your enjoyment of life?2  What number best describes how, during the past week, pain has interfered with your general activity?  2    The following portions of the patient's history were reviewed and updated as appropriate: allergies, current medications, past family history, past medical history, past social history, past surgical history and problem list.    Review of Systems   Constitutional: Positive for activity change. Negative for fatigue and fever.   HENT: Negative for congestion.    Eyes: Negative for visual disturbance.   Respiratory: Negative for cough and chest tightness.    Cardiovascular: Negative for chest pain.   Gastrointestinal: Negative for abdominal pain, bowel incontinence, constipation and diarrhea.   Genitourinary: Positive for bladder incontinence. Negative for difficulty urinating and dysuria.   Musculoskeletal: Positive for back pain.   Neurological: Negative for dizziness, weakness, light-headedness, numbness and headaches.   Psychiatric/Behavioral: Negative for agitation, sleep disturbance and suicidal ideas. The patient is not nervous/anxious.      --  The aforementioned information the Chief Complaint section and above subjective data including any HPI data, and also the Review of Systems data, has been personally reviewed and affirmed.  --     Vitals:    10/25/22 1424   BP: 127/82   BP Location: Left arm   Patient Position: Sitting   Cuff Size: Large Adult   Pulse: 76   Resp: 12   Temp: 98.2 °F (36.8 °C)   TempSrc: Temporal   SpO2: 94%   Weight: 80.3 kg (177 lb)   Height: 158.8 cm (62.52\")   PainSc:   1   PainLoc: Comment: BACK     Objective   Physical Exam  Vitals and nursing note reviewed.   Constitutional:       Appearance: Normal appearance. She is well-developed.   Eyes:      General: Lids are normal.   Cardiovascular:      Rate and Rhythm: Normal rate.   Pulmonary:      Effort: Pulmonary effort is normal.   Musculoskeletal:      Lumbar back: No " tenderness.   Neurological:      Mental Status: She is alert and oriented to person, place, and time.   Psychiatric:         Attention and Perception: Attention normal.         Mood and Affect: Mood normal.         Speech: Speech normal.         Behavior: Behavior normal.         Judgment: Judgment normal.       Assessment & Plan   Diagnoses and all orders for this visit:    1. Lumbar facet arthropathy (Primary)    2. Low back pain, unspecified back pain laterality, unspecified chronicity, unspecified whether sciatica present    3. Other chronic pain      --- Discussed that with her low back pain currently well controlled we will hold off on any additional procedures at this time.   --- Follow-up if pain returns/worsens.      Dictated utilizing Dragon dictation.      This document is intended for medical expert use only. Reading of this document by patients and/or patient's family without participating medical staff guidance may result in misinterpretation and unintended morbidity.   Any interpretation of such data is the responsibility of the patient and/or family member responsible for the patient in concert with their primary or specialist providers, not to be left for sources of online searches such as [employer], [employer] or similar queries. Relying on these approaches to knowledge may result in misinterpretation, misguided goals of care and even death should patients or family members try recommendations outside of the realm of professional medical care in a supervised way.    Patient remained masked during entire encounter. No cough present. I donned a mask and eye protection throughout entire visit. Prior to donning mask and eye protection, hand hygiene was performed, as well as when it was doffed.  I was closer than 6 feet, but not for an extended period of time. No obvious exposure to any bodily fluids.

## 2022-10-28 ENCOUNTER — ANTICOAGULATION VISIT (OUTPATIENT)
Dept: INTERNAL MEDICINE | Facility: CLINIC | Age: 82
End: 2022-10-28

## 2022-10-28 DIAGNOSIS — Z95.0 PRESENCE OF BIVENTRICULAR CARDIAC PACEMAKER: ICD-10-CM

## 2022-10-28 DIAGNOSIS — I48.0 PAROXYSMAL ATRIAL FIBRILLATION: ICD-10-CM

## 2022-10-28 LAB — INR PPP: 2.6 (ref 0.9–1.1)

## 2022-10-28 PROCEDURE — 36416 COLLJ CAPILLARY BLOOD SPEC: CPT | Performed by: INTERNAL MEDICINE

## 2022-10-28 PROCEDURE — 85610 PROTHROMBIN TIME: CPT | Performed by: INTERNAL MEDICINE

## 2022-11-18 ENCOUNTER — HOSPITAL ENCOUNTER (OUTPATIENT)
Dept: CARDIOLOGY | Facility: HOSPITAL | Age: 82
Discharge: HOME | End: 2022-11-18
Admitting: INTERNAL MEDICINE

## 2022-11-18 VITALS
WEIGHT: 179.6 LBS | DIASTOLIC BLOOD PRESSURE: 74 MMHG | BODY MASS INDEX: 31.82 KG/M2 | HEART RATE: 69 BPM | SYSTOLIC BLOOD PRESSURE: 133 MMHG | HEIGHT: 63 IN

## 2022-11-18 DIAGNOSIS — E78.5 HYPERLIPIDEMIA, UNSPECIFIED HYPERLIPIDEMIA TYPE: ICD-10-CM

## 2022-11-18 DIAGNOSIS — I10 PRIMARY HYPERTENSION: ICD-10-CM

## 2022-11-18 DIAGNOSIS — I48.0 PAROXYSMAL ATRIAL FIBRILLATION: ICD-10-CM

## 2022-11-18 DIAGNOSIS — I50.30 HEART FAILURE WITH PRESERVED EJECTION FRACTION, NYHA CLASS II: Primary | ICD-10-CM

## 2022-11-18 DIAGNOSIS — G47.33 OSA ON CPAP: Chronic | ICD-10-CM

## 2022-11-18 DIAGNOSIS — Z99.89 OSA ON CPAP: Chronic | ICD-10-CM

## 2022-11-18 PROCEDURE — G0463 HOSPITAL OUTPT CLINIC VISIT: HCPCS

## 2022-11-18 PROCEDURE — 99214 OFFICE O/P EST MOD 30 MIN: CPT | Performed by: NURSE PRACTITIONER

## 2022-11-18 NOTE — PROGRESS NOTES
\[facility]\"" HEART FAILURE      Patient Name: Inessa Peoples  :1940  Age: 82 y.o.  Sex: female  Referring Provider: Demar Stone MD   Primary Cardiologist: Demar Stone MD  Encounter Provider:  AGA Tolbert      Chief Complaint:   Chronic diastolic CHF      Congestive Heart Failure  Associated symptoms include palpitations and shortness of breath. Pertinent negatives include no chest pain, fatigue or unexpected weight change.    this 82-year-old female, known to this provider, comes to us today for further evaluation regarding her chronic diastolic heart failure.  Current diagnoses to include paroxysmal atrial fibrillation, prior DVT, obesity, hypertension, hyperlipidemia, hyperthyroidism, left bundle branch block status post placement of CRT, sarcoid lung disease, sleep apnea.    Historically she has been anticoagulated with Xarelto for her atrial fibrillation, however after developing a DVT she was transitioned to warfarin.  Echocardiogram in 2015 revealed an EF of 64% with grade 1 LV diastolic dysfunction and mild valvular heart disease.    Stress echocardiogram 2020 yielded LVEF of 63%, septal wall motion abnormality consistent with bundle branch block, normal LV diastolic function, post exercise ejection fraction 40%, global hypokinesis of LV post exercise, O2 sats diminished to 80% with exercise.  Subsequent cardiac catheterization 2020 yielded right dominant system with no significant CAD, LVEF 50 to 55%.  Given severe reduction of LVEF under stress she underwent CRT pacemaker placement.    AF burden on device check in 2021 was 0.    2021 message was received from the patient stating that she was having some hypotension with blood pressure as low as 90 systolically as well as some lower extremity edema.  Dr. Stone reduced her losartan by half and increased her spironolactone to 50 mg daily.    She was transitioned to metoprolol early   2021.    She did unfortunately develop a yeast infection once starting Jardiance, as well as a UTI.      She underwent back surgery on 10/11/2022.  She is now currently still doing very well and has not had any exacerbation of her HF in greater than 6 months.        The following portions of the patient's history were reviewed and updated as appropriate: allergies, current medications, past family history, past medical history, past social history, past surgical history and problem list.    Current Outpatient Medications   Medication Sig Dispense Refill   • acetaminophen (TYLENOL) 650 MG 8 hr tablet Take 650 mg by mouth Every 8 (Eight) Hours As Needed for Mild Pain .     • Calcium-Phosphorus-Vitamin D (CITRACAL +D3 PO) Take 1 tablet by mouth Daily.     • celecoxib (CeleBREX) 200 MG capsule TAKE ONE CAPSULE BY MOUTH DAILY 90 capsule 1   • cetirizine (zyrTEC) 10 MG tablet      • ezetimibe (ZETIA) 10 MG tablet TAKE ONE TABLET BY MOUTH DAILY 90 tablet 1   • fexofenadine (ALLEGRA) 180 MG tablet      • furosemide (LASIX) 20 MG tablet TAKE ONE TABLET BY MOUTH DAILY 90 tablet 1   • levothyroxine (SYNTHROID, LEVOTHROID) 25 MCG tablet TAKE ONE TABLET BY MOUTH DAILY 90 tablet 1   • melatonin 5 MG sublingual tablet sublingual tablet Take 5 mg by mouth Every Night.     • metoprolol succinate XL (TOPROL-XL) 25 MG 24 hr tablet Take 0.5 tablets by mouth Daily. 45 tablet 1   • Multiple Vitamin (MULTI-DAY VITAMINS) tablet Take 1 tablet by mouth daily.     • sacubitril-valsartan (ENTRESTO) 24-26 MG tablet Take 1 tablet by mouth 2 (Two) Times a Day. 60 tablet 5   • spironolactone (ALDACTONE) 25 MG tablet TAKE ONE TABLET BY MOUTH DAILY 90 tablet 1   • warfarin (COUMADIN) 3 MG tablet Take 1 tablet by mouth Daily. 90 tablet 3     No current facility-administered medications for this encounter.       Past Medical History:   Diagnosis Date   • Abnormal ECG    • Allergic Iodine used in on xrays   • Allergic rhinitis    • Atrial fibrillation  (HCC)    • Bleeds easily (HCC)     warfarin   • Breast screening    • Broken bones    • Calf DVT (deep venous thrombosis) (HCC)    • Chronic anticoagulation    • Class 2 severe obesity due to excess calories with serious comorbidity and body mass index (BMI) of 37.0 to 37.9 in adult (HCC)    • Colon polyp    • CTS (carpal tunnel syndrome)    • Deep vein thrombosis (HCC)     left calf   • Diverticulitis of colon    • Diverticulitis of large intestine without perforation or abscess without bleeding 1/3/2017   • AHN (dyspnea on exertion)    • Fatigue    • H/O malignant melanoma of skin    • Heart failure with preserved ejection fraction, NYHA class II (HCC)     II-III---decreased EF with stress   • Hematuria, gross    • History of colon polyps    • History of kidney stones    • Hyperlipidemia    • Hypertension    • Hyperthyroidism    • IFG (impaired fasting glucose)    • Insomnia    • LBBB (left bundle branch block)    • Left leg swelling    • LLQ abdominal pain    • Long term current use of systemic steroids    • Low back pain    • Malignant melanoma of skin (HCC)    • Neck pain    • Obesity     class 2 obesity due to excess calories with BMI of 35.0 to 35.9 in adult   • Osteoarthritis    • Osteopenia    • Paralysis, diaphragm    • Paroxysmal atrial fibrillation (HCC)    • 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 Lu   • Sleep apnea     USES CPAP   • UTI (urinary tract infection)    • Visit for screening mammogram        Past Surgical History:   Procedure Laterality Date   • CARDIAC CATHETERIZATION N/A 6/29/2020    Procedure: Coronary angiography, Jessica PHILIP;  Surgeon: Noe Reynoso MD;  Location: [facility] INVASIVE LOCATION;  Service: Cardiovascular;  Laterality: N/A;   • CARDIAC CATHETERIZATION N/A 6/29/2020     Procedure: Left ventriculography;  Surgeon: Noe Reynoso MD;  Location: [facility] CATH INVASIVE LOCATION;  Service: Cardiovascular;  Laterality: N/A;   • CARDIAC CATHETERIZATION N/A 6/29/2020    Procedure: Right and Left Heart Cath;  Surgeon: Noe Reynoso MD;  Location: [facility] CATH INVASIVE LOCATION;  Service: Cardiovascular;  Laterality: N/A;   • CARDIAC CATHETERIZATION     • CARDIAC ELECTROPHYSIOLOGY PROCEDURE N/A 1/5/2021    Procedure: Biventricular Device Insertion -- CRT-p ([employer]);  Surgeon: Demar Stone MD;  Location: [facility] CATH INVASIVE LOCATION;  Service: Cardiology;  Laterality: N/A;   • CARPAL TUNNEL RELEASE Bilateral 1980'S    [facility] SURGEONS   • CATARACT EXTRACTION Bilateral 2001    Dr. Nic Hidalgo   • COLONOSCOPY N/A 06/26/2013    MILD SIGMOID DIVERTICULOSIS, repeat 5 years based on sister w/ colona cancer-DR. NASRA CASTRO   • COLONOSCOPY N/A 10/19/2010    SIGMOID DIVERTICULOSIS & INTERNAL HEMORRHOIDS, HEMORRHOIDAL BANDING X4, DR. NASRA CASTRO   • COLONOSCOPY N/A 8/6/2018    Procedure: COLONOSCOPY TO CECUM AND INTO TERMINAL ILEUM;  Surgeon: Nasra Castro MD;  Location: [facility] ENDOSCOPY;  Service: Gastroenterology   • COLONOSCOPY N/A 10/4/2021    Procedure: COLONOSCOPY to CECUM;  Surgeon: Nasra Castro MD;  Location: [facility] ENDOSCOPY;  Service: General;  Laterality: N/A;  FAMILY HX COLON CANCER  --DIVERTICULOSIS    • COLONOSCOPY W/ BIOPSIES N/A 05/12/2008    RECTUM BIOPSY: COLONIC MUCOSA W/ FOCAL INCREASE OF INFLAMMATORY CELLS IN THE MUSCULARIS MUCOSA INCLUDING EOSINOPHILS, SIGMOID DIVERTICULOSIS, POSSIBLE PROCTITIS, DR. NASRA CASTRO   • CYSTOSCOPY, RETROGRADE PYELOGRAM AND STENT INSERTION Bilateral 10/10/2014    w/ Right ureteral pyeloscopy & laser lithotripsy, Right Double-J stent placement-Dr. Julio Tai   • ENDOSCOPY N/A 11/11/2020    Procedure: ESOPHAGOGASTRODUODENOSCOPY with biopsies and dilation 54fr fernández;  Surgeon: Garret Piedra MD;   Location: [facility] ENDOSCOPY;  Service: Gastroenterology;  Laterality: N/A;  pre- dysphagia  post-- gastritis, dilation of esophageal ring, watermelon stomach    • EXTRACORPOREAL SHOCK WAVE LITHOTRIPSY (ESWL) Right 09/10/2009    DR. CLAIRE ROCK   • HAMMER TOE REPAIR     • LAPAROSCOPIC CHOLECYSTECTOMY N/A 07/19/2012    DR. TANISHA THORNTON   • LUMBAR EPIDURAL INJECTION N/A 04/23/2015    LUMBAR EPIDURAL STEROID INJECTION X3   • MEDIAL BRANCH BLOCK Bilateral 11/2/2021    Procedure: MEDIAL BRANCH BLOCK--bilateral Lumbar2-5;  Surgeon: Wolfgang Means MD;  [facility];  Service: Pain Management;  Laterality: Bilateral;   • MEDIAL BRANCH BLOCK Bilateral 10/11/2022    Procedure: bilateral L3-L5 medial branch blockade. Hold coumadin x 5 days prior;  Surgeon: Wolfgang Means MD;  Location: [facility];  Service: Pain Management;  Laterality: Bilateral;   • PACEMAKER IMPLANTATION     • PARATHYROIDECTOMY N/A 2001   • SACROILIAC JOINT INJECTION Left 6/15/2021    Procedure: SACROILIAC INJECTION-- left and left greater trochanteric bursa injection;  Surgeon: Wolfgang Means MD;  Location: [facility];  Service: Pain Management;  Laterality: Left;   • SKIN CANCER EXCISION     • THUMB ARTHROSCOPY Right 1982   • TOTAL KNEE ARTHROPLASTY Right 03/25/2014    [employer] Vanguard total knee, 55 femoral component, 71 tibial tray w/ a 40 mm stem, 34 mm x 7.8 mm three-peg patella, and a 14 standard poly-Dr. Ty Canchola       Physical Exam  Vitals and nursing note reviewed.   Constitutional:       General: She is not in acute distress.     Appearance: She is well-developed. She is obese. She is not ill-appearing.   HENT:      Head: Normocephalic and atraumatic.   Eyes:      Conjunctiva/sclera: Conjunctivae normal.      Pupils: Pupils are equal, round, and reactive to light.   Neck:      Vascular: No JVD.   Cardiovascular:      Rate and Rhythm: Normal rate and regular rhythm.      Heart sounds: Normal heart sounds. No  "murmur heard.    No friction rub. No gallop.   Pulmonary:      Effort: Pulmonary effort is normal. No respiratory distress.      Breath sounds: Normal breath sounds.   Abdominal:      General: Bowel sounds are normal. There is no distension.      Palpations: Abdomen is soft.   Musculoskeletal:         General: No swelling or deformity.   Skin:     General: Skin is warm and dry.      Capillary Refill: Capillary refill takes less than 2 seconds.   Neurological:      Mental Status: She is alert and oriented to person, place, and time. Mental status is at baseline.   Psychiatric:         Mood and Affect: Mood normal.         Behavior: Behavior normal.          Review of Systems   Constitutional: Negative for fatigue and unexpected weight change.   HENT: Negative for congestion and nosebleeds.    Eyes: Negative for photophobia and visual disturbance.   Respiratory: Positive for shortness of breath. Negative for cough and chest tightness.         On exertion; stable per baseline   Cardiovascular: Positive for palpitations. Negative for chest pain and leg swelling.   Gastrointestinal: Negative for abdominal distention and blood in stool.   Endocrine: Negative for polyphagia and polyuria.   Genitourinary: Negative for frequency and urgency.   Musculoskeletal: Negative for joint swelling and myalgias.   Skin: Negative for pallor and rash.   Neurological: Negative for dizziness, syncope, weakness, light-headedness, numbness and headaches.   Hematological: Does not bruise/bleed easily.   Psychiatric/Behavioral: Negative for confusion and sleep disturbance.        OBJECTIVE:  /74   Pulse 69   Ht 158.8 cm (62.5\")   Wt 81.5 kg (179 lb 9.6 oz)   BMI 32.33 kg/m²      Body mass index is 32.33 kg/m².  Wt Readings from Last 1 Encounters:   11/18/22 81.5 kg (179 lb 9.6 oz)       Lab Review:  Renal Function: CrCl cannot be calculated (Patient's most recent lab result is older than the maximum 30 days allowed.).    Lab Results "   Component Value Date    PROBNP 57.9 05/16/2022       Results for orders placed during the hospital encounter of 01/05/21    Adult Transthoracic Echo Limited W/ Cont if Necessary Per Protocol    Interpretation Summary  · Limited echo requested to assess LV function  · Estimated left ventricular EF = 70% Left ventricular systolic function is normal.      Procedures      6 MINUTE WALK                      Cardiac Procedures:  1. 6/29/2020 Cardiac catheterization   Hemodynamics:  1.  Cardiac output (Trino): 3.28 L/min  2.  Cardiac index (Trino): 1.7 L/min/m²  3.  Right atrium: A wave 9, V wave 7, mean 6  4.  Right ventricle: 28/4  5.  Pulmonary artery, 28/12, mean 18  6.  PCW: A wave 10, V wave 11, mean is 9  7.  Aorta: 140/65, mean 91  8.  Left ventricle: 140/8     Cineangiography:  1.  Left main: The left main coronary artery is virtually nonexistent.  There is almost a common ostium of the LAD and left circumflex.  2.  LAD: The left anterior descending coronary artery is a tortuous vessel.  The artery appears to have minor plaquing in the proximal and mid segments but otherwise no significant stenosis.  The distal portion is tortuous.  The diagonal branches are small but normal.  3.  LCx: The left circumflex coronary artery is a large vessel.  The proximal portion of the circumflex appears to be normal.  The mid and distal segments are normal as well.  There is a major branching marginal system arising from the mid segment that appears to be normal.  The distal marginal is small and normal.  4.  RCA: The right coronary artery is a dominant vessel.  The right coronary artery has minimal plaquing in the proximal and mid segments.  The distal segment is normal.  The posterior descending branch appears to be tortuous but normal.     Left ventriculography: Overall size of the ventricle is normal.  The global contractility of the ventricle is within normal limits with an estimated ejection fraction of 50 to  55%.     Assessment:  1.  Normal coronary arteries  2.  Normal intracardiac pressures  3.  Normal left ventricular function  4.  Mildly reduced cardiac output and cardiac index.       Previously trialed diuretics  Spironolactone      Previously trialed GDMT:    Spironolactone  Carvedilol  Losartan  Entresto  Jardiance    ASSESSMENT:     Diagnosis Plan   1. Heart failure with preserved ejection fraction, NYHA class II (CMS/[facility])II-III---decreased EF with stress        2. Paroxysmal atrial fibrillation ([facility])        3. Hyperlipidemia, unspecified hyperlipidemia type        4. Primary hypertension        5. ANY on CPAP + 10 - Dr Lu              PLAN OF CARE:  1.  HFpEF-NYHA class II.  Most recent ejection fraction is greater than 70% per echocardiogram.  On stress echo however, patient is noted to have a significantly reduced LVEF under stress of 40%.  Current guideline directed medical therapy to include metoprolol, spironolactone, and Entresto.  Unable to tolerate SGLT2 inhibitor given recurrent yeast infection.    She remains euvolemic on exam.  I will have her continue her current GDMT.  We will submit her patient assistance application for her Entresto, and moving forward we will see her as needed.  She is doing very well from a heart failure standpoint and has had no exacerbation in greater than 6 months.  She should continue her low-sodium diet of 2000 mg daily or less, fluid restriction of 1500 mL daily, as well as remain adherent with daily weights.    Directions for when to call the clinic reviewed with the patient to include weight gain of 2 to 3 pounds in 24 hours, weight gain of 5 to 10 pounds within 7 days; worsening shortness of breath; worsening lower extremity edema or abdominal distention.    2.  Hypertension- stable and controlled.    3.  Paroxysmal atrial fibrillation- stable, rate controlled on beta-blockade, anticoagulated on warfarin.  Managed by Dr. Stone.      4.  Obstructive sleep  apnea- compliant with CPAP therapy.     5.  Left bundle branch block-presence of CRT-P noted given symptomatology.    6.  Hyperlipidemia-LDL poorly controlled.      Continue current GDMT; follow-up as needed      Thank you for allowing me to participate in the care of your patient,        Miriam Gabriel AGA  [facility] HEART FAILURE  11/18/22  13:56 EDT      **Krishna Disclaimer:**  Much of this encounter note is an electronic transcription/translation of spoken language to printed text. The electronic translation of spoken language may permit erroneous, or at times, nonsensical words or phrases to be inadvertently transcribed. Although I have reviewed the note for such errors, some may still exist.

## 2022-11-28 RX ORDER — METOPROLOL SUCCINATE 25 MG/1
TABLET, EXTENDED RELEASE ORAL
Qty: 45 TABLET | Refills: 1 | Status: SHIPPED | OUTPATIENT
Start: 2022-11-28

## 2022-11-29 ENCOUNTER — OFFICE VISIT (OUTPATIENT)
Dept: INTERNAL MEDICINE | Facility: CLINIC | Age: 82
End: 2022-11-29

## 2022-11-29 VITALS
DIASTOLIC BLOOD PRESSURE: 72 MMHG | HEART RATE: 70 BPM | HEIGHT: 63 IN | OXYGEN SATURATION: 98 % | WEIGHT: 175.5 LBS | BODY MASS INDEX: 31.1 KG/M2 | SYSTOLIC BLOOD PRESSURE: 124 MMHG

## 2022-11-29 DIAGNOSIS — I48.0 PAROXYSMAL ATRIAL FIBRILLATION: ICD-10-CM

## 2022-11-29 DIAGNOSIS — I48.0 PAROXYSMAL ATRIAL FIBRILLATION: Primary | ICD-10-CM

## 2022-11-29 DIAGNOSIS — M54.50 RIGHT-SIDED LOW BACK PAIN WITHOUT SCIATICA, UNSPECIFIED CHRONICITY: Primary | ICD-10-CM

## 2022-11-29 LAB — INR PPP: 2.2 (ref 0.9–1.1)

## 2022-11-29 PROCEDURE — 72110 X-RAY EXAM L-2 SPINE 4/>VWS: CPT | Performed by: INTERNAL MEDICINE

## 2022-11-29 PROCEDURE — 99213 OFFICE O/P EST LOW 20 MIN: CPT | Performed by: INTERNAL MEDICINE

## 2022-11-29 PROCEDURE — 85610 PROTHROMBIN TIME: CPT | Performed by: INTERNAL MEDICINE

## 2022-11-29 PROCEDURE — 36416 COLLJ CAPILLARY BLOOD SPEC: CPT | Performed by: INTERNAL MEDICINE

## 2022-11-29 RX ORDER — METHYLPREDNISOLONE 4 MG/1
TABLET ORAL
Qty: 21 TABLET | Refills: 0 | Status: SHIPPED | OUTPATIENT
Start: 2022-11-29 | End: 2023-01-09

## 2022-11-29 RX ORDER — CYCLOBENZAPRINE HCL 5 MG
5 TABLET ORAL NIGHTLY PRN
Qty: 30 TABLET | Refills: 0 | Status: SHIPPED | OUTPATIENT
Start: 2022-11-29 | End: 2023-01-09

## 2022-11-29 NOTE — PROGRESS NOTES
Subjective     Inessa Peoples is a 82 y.o. female who presents with   Chief Complaint   Patient presents with   • Back Pain       History of Present Illness     C/o LBP.  Started Sunday morning getting out of bed.  Right sided.  No radiation down legs.  No injury but entertaining over thanksgiving holiday so more physically active.  10/10 at the worst.      Review of Systems   Cardiovascular: Negative for chest pain.   Gastrointestinal: Negative for abdominal pain.   Genitourinary: Negative for dysuria and pelvic pain.   Musculoskeletal: Positive for back pain.   Neurological: Negative for weakness, numbness and headaches.       The following portions of the patient's history were reviewed and updated as appropriate: allergies, current medications and problem list.    Patient Active Problem List    Diagnosis Date Noted   • Heart failure with preserved ejection fraction, NYHA class II (CMS/[facility])II-III---decreased EF with stress 02/11/2021   • Presence of biventricular cardiac pacemaker 02/11/2021   • Paroxysmal atrial fibrillation ([facility]) 12/09/2020     Note Last Updated: 12/9/2020     Added automatically from request for surgery [id number]     • LBBB (left bundle branch block) 11/08/2019   • Trochanteric bursitis of both hips 05/29/2019   • Lumbar facet arthropathy 11/06/2018   • Bulge of lumbar disc without myelopathy 07/13/2017   • Dilation of thoracic aorta ([facility]) 06/23/2017   • ANY on CPAP + 10 - Dr Lu 10/15/2016   • GERD (gastroesophageal reflux disease) 08/05/2016   • Sarcoidosis 04/18/2016   • 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       • Chronic osteoarthritis 04/18/2016       Current Outpatient Medications on File Prior to Visit   Medication Sig Dispense Refill   •  "acetaminophen (TYLENOL) 650 MG 8 hr tablet Take 650 mg by mouth Every 8 (Eight) Hours As Needed for Mild Pain .     • Calcium-Phosphorus-Vitamin D (CITRACAL +D3 PO) Take 1 tablet by mouth Daily.     • celecoxib (CeleBREX) 200 MG capsule TAKE ONE CAPSULE BY MOUTH DAILY 90 capsule 1   • cetirizine (zyrTEC) 10 MG tablet      • ezetimibe (ZETIA) 10 MG tablet TAKE ONE TABLET BY MOUTH DAILY 90 tablet 1   • fexofenadine (ALLEGRA) 180 MG tablet      • furosemide (LASIX) 20 MG tablet TAKE ONE TABLET BY MOUTH DAILY 90 tablet 1   • levothyroxine (SYNTHROID, LEVOTHROID) 25 MCG tablet TAKE ONE TABLET BY MOUTH DAILY 90 tablet 1   • melatonin 5 MG sublingual tablet sublingual tablet Take 5 mg by mouth Every Night.     • metoprolol succinate XL (TOPROL-XL) 25 MG 24 hr tablet TAKE 1/2 TABLET BY MOUTH DAILY 45 tablet 1   • Multiple Vitamin (MULTI-DAY VITAMINS) tablet Take 1 tablet by mouth daily.     • sacubitril-valsartan (ENTRESTO) 24-26 MG tablet Take 1 tablet by mouth 2 (Two) Times a Day. 180 tablet 3   • spironolactone (ALDACTONE) 25 MG tablet TAKE ONE TABLET BY MOUTH DAILY 90 tablet 1   • warfarin (COUMADIN) 3 MG tablet Take 1 tablet by mouth Daily. 90 tablet 3     No current facility-administered medications on file prior to visit.       Objective     /72   Pulse 70   Ht 158.8 cm (62.52\")   Wt 79.6 kg (175 lb 8 oz)   SpO2 98%   BMI 31.57 kg/m²     Physical Exam  Constitutional:       Appearance: She is well-developed.   HENT:      Head: Normocephalic and atraumatic.   Pulmonary:      Effort: Pulmonary effort is normal.   Neurological:      Mental Status: She is alert and oriented to person, place, and time.      Motor: Motor function is intact.      Deep Tendon Reflexes:      Reflex Scores:       Patellar reflexes are 2+ on the right side and 2+ on the left side.       Achilles reflexes are 2+ on the right side and 2+ on the left side.  Psychiatric:         Behavior: Behavior normal.     X-rays of the l/s spine  " performed today for following indication:   pain.  X-ray reveal DDD.  There is no available x-ray for comparison.  X-ray sent to radiology for official interpretation and findings.        Assessment & Plan   Diagnoses and all orders for this visit:    1. Right-sided low back pain without sciatica, unspecified chronicity (Primary)  -     XR Spine Lumbar Complete 4+VW        Discussion    Patient presents with acute right sided low back pain after increased exertion entertaining.  I discussed with the patient a trial of conservative management with:   muscle relaxer and medrol dose archana.  Let me know if not feeling better over the next several days or if there is any change in symptoms.         Future Appointments   Date Time Provider Department Center   1/3/2023 10:50 AM [facility]   1/9/2023  2:15 PM Gisele Dockery MD [facility]   2/22/2023 12:30 PM [facility]   2/22/2023  1:20 PM Demar Stone MD [facility]   9/14/2023 11:45 AM Jewel Lu MD [facility] None

## 2022-11-30 ENCOUNTER — TELEPHONE (OUTPATIENT)
Dept: INTERNAL MEDICINE | Facility: CLINIC | Age: 82
End: 2022-11-30

## 2022-11-30 NOTE — TELEPHONE ENCOUNTER
Pt calling stating she had a missed call from our office but wasn't sure who tried to call.    Best call back number [phone]

## 2022-12-19 RX ORDER — WARFARIN SODIUM 3 MG/1
3 TABLET ORAL DAILY
Qty: 90 TABLET | Refills: 3 | Status: SHIPPED | OUTPATIENT
Start: 2022-12-19 | End: 2022-12-20 | Stop reason: SDUPTHER

## 2022-12-19 NOTE — TELEPHONE ENCOUNTER
Caller: SheltonInessa    Relationship: Self    Best call back number:[phone]  Requested Prescriptions:   Requested Prescriptions     Pending Prescriptions Disp Refills   • warfarin (COUMADIN) 3 MG tablet 90 tablet 3     Sig: Take 1 tablet by mouth Daily.        Pharmacy where request should be sent: [facility] [id number] [address] AT [facility] - [phone]  - [phone]     Additional details provided by patient: NO  Does the patient have less than a 3 day supply:  [x] Yes  [] No    Would you like a call back once the refill request has been completed: [x] Yes [] No    If the office needs to give you a call back, can they leave a voicemail: [x] Yes [] No    Fly Eddy Rep   12/19/22 11:10 EST

## 2022-12-20 ENCOUNTER — TELEPHONE (OUTPATIENT)
Dept: INTERNAL MEDICINE | Facility: CLINIC | Age: 82
End: 2022-12-20

## 2022-12-20 RX ORDER — WARFARIN SODIUM 3 MG/1
4.5 TABLET ORAL DAILY
Qty: 135 TABLET | Refills: 3 | Status: SHIPPED | OUTPATIENT
Start: 2022-12-20

## 2022-12-20 NOTE — TELEPHONE ENCOUNTER
Caller: Inessa Peoples    Relationship: Self    Best call back number: [phone]    What was the call regarding: PATIENT STATED THAT SHE WAS IN FOR HER INR WHEN DR GUTIERREZ WAS NOT IN AND THE OTHER DOCTOR THAT WAS IN WAS THE ONE WHO CHANGED IT. PATIENT STATED THAT IT WAS CHANGED TO PATIENT TAKING WARFARIN 4.5MG 5 DAYS A WEEK AND WARFARIN 3 MG THE OTHER TWO DAYS. PATIENT STATED THAT SHE IS UNABLE TO GET THE PRESCRIPTION BECAUSE WHAT IS BEING SENT IN IS THE warfarin (COUMADIN) 3 MG tablet. PATIENT STATED THAT INSURANCE WILL NOT FILL IT UNTIL A NEW PRESCRIPTION IS SENT IN. PATIENT STATED THAT SHE HAS BEEN OFF OF IT FOR THREE DAYS AND WANTS TO KNOW IF THERE IS ANYTHING SHE NEEDS TO DO. PLEASE ADVISE.    Do you require a callback: YES

## 2022-12-27 DIAGNOSIS — I10 PRIMARY HYPERTENSION: Primary | ICD-10-CM

## 2022-12-27 DIAGNOSIS — E03.9 ACQUIRED HYPOTHYROIDISM: ICD-10-CM

## 2022-12-27 DIAGNOSIS — E78.5 HYPERLIPIDEMIA, UNSPECIFIED HYPERLIPIDEMIA TYPE: ICD-10-CM

## 2022-12-30 ENCOUNTER — ANTICOAGULATION VISIT (OUTPATIENT)
Dept: INTERNAL MEDICINE | Facility: CLINIC | Age: 82
End: 2022-12-30
Payer: MEDICARE

## 2022-12-30 DIAGNOSIS — I48.0 PAROXYSMAL ATRIAL FIBRILLATION: Primary | ICD-10-CM

## 2022-12-30 LAB — INR PPP: 1.2 (ref 0.9–1.1)

## 2022-12-30 PROCEDURE — 85610 PROTHROMBIN TIME: CPT | Performed by: NURSE PRACTITIONER

## 2022-12-30 PROCEDURE — 36416 COLLJ CAPILLARY BLOOD SPEC: CPT | Performed by: NURSE PRACTITIONER

## 2023-01-03 LAB
ALBUMIN SERPL-MCNC: 4.4 G/DL (ref 3.5–5.2)
ALBUMIN/GLOB SERPL: 1.9 G/DL
ALP SERPL-CCNC: 117 U/L (ref 39–117)
ALT SERPL-CCNC: 19 U/L (ref 1–33)
APPEARANCE UR: CLEAR
AST SERPL-CCNC: 19 U/L (ref 1–32)
BACTERIA #/AREA URNS HPF: ABNORMAL /HPF
BASOPHILS # BLD AUTO: 0.06 10*3/MM3 (ref 0–0.2)
BASOPHILS NFR BLD AUTO: 1.2 % (ref 0–1.5)
BILIRUB SERPL-MCNC: 0.7 MG/DL (ref 0–1.2)
BILIRUB UR QL STRIP: NEGATIVE
BUN SERPL-MCNC: 31 MG/DL (ref 8–23)
BUN/CREAT SERPL: 27.7 (ref 7–25)
CALCIUM SERPL-MCNC: 10.4 MG/DL (ref 8.6–10.5)
CASTS URNS MICRO: ABNORMAL
CHLORIDE SERPL-SCNC: 105 MMOL/L (ref 98–107)
CHOLEST SERPL-MCNC: 257 MG/DL (ref 0–200)
CO2 SERPL-SCNC: 27.8 MMOL/L (ref 22–29)
COLOR UR: YELLOW
CREAT SERPL-MCNC: 1.12 MG/DL (ref 0.57–1)
EGFRCR SERPLBLD CKD-EPI 2021: 49.8 ML/MIN/1.73
EOSINOPHIL # BLD AUTO: 0.24 10*3/MM3 (ref 0–0.4)
EOSINOPHIL NFR BLD AUTO: 4.6 % (ref 0.3–6.2)
EPI CELLS #/AREA URNS HPF: ABNORMAL /HPF
ERYTHROCYTE [DISTWIDTH] IN BLOOD BY AUTOMATED COUNT: 12.3 % (ref 12.3–15.4)
GLOBULIN SER CALC-MCNC: 2.3 GM/DL
GLUCOSE SERPL-MCNC: 120 MG/DL (ref 65–99)
GLUCOSE UR QL STRIP: NEGATIVE
HCT VFR BLD AUTO: 45.8 % (ref 34–46.6)
HDLC SERPL-MCNC: 64 MG/DL (ref 40–60)
HGB BLD-MCNC: 15.3 G/DL (ref 12–15.9)
HGB UR QL STRIP: NEGATIVE
IMM GRANULOCYTES # BLD AUTO: 0.01 10*3/MM3 (ref 0–0.05)
IMM GRANULOCYTES NFR BLD AUTO: 0.2 % (ref 0–0.5)
KETONES UR QL STRIP: NEGATIVE
LDLC SERPL CALC-MCNC: 161 MG/DL (ref 0–100)
LEUKOCYTE ESTERASE UR QL STRIP: ABNORMAL
LYMPHOCYTES # BLD AUTO: 2.18 10*3/MM3 (ref 0.7–3.1)
LYMPHOCYTES NFR BLD AUTO: 42.2 % (ref 19.6–45.3)
MCH RBC QN AUTO: 31.4 PG (ref 26.6–33)
MCHC RBC AUTO-ENTMCNC: 33.4 G/DL (ref 31.5–35.7)
MCV RBC AUTO: 93.9 FL (ref 79–97)
MONOCYTES # BLD AUTO: 0.5 10*3/MM3 (ref 0.1–0.9)
MONOCYTES NFR BLD AUTO: 9.7 % (ref 5–12)
NEUTROPHILS # BLD AUTO: 2.18 10*3/MM3 (ref 1.7–7)
NEUTROPHILS NFR BLD AUTO: 42.1 % (ref 42.7–76)
NITRITE UR QL STRIP: NEGATIVE
NRBC BLD AUTO-RTO: 0 /100 WBC (ref 0–0.2)
PH UR STRIP: 7.5 [PH] (ref 5–8)
PLATELET # BLD AUTO: 202 10*3/MM3 (ref 140–450)
POTASSIUM SERPL-SCNC: 4.7 MMOL/L (ref 3.5–5.2)
PROT SERPL-MCNC: 6.7 G/DL (ref 6–8.5)
PROT UR QL STRIP: NEGATIVE
RBC # BLD AUTO: 4.88 10*6/MM3 (ref 3.77–5.28)
RBC #/AREA URNS HPF: ABNORMAL /HPF
SODIUM SERPL-SCNC: 141 MMOL/L (ref 136–145)
SP GR UR STRIP: 1.02 (ref 1–1.03)
TRIGL SERPL-MCNC: 178 MG/DL (ref 0–150)
TSH SERPL DL<=0.005 MIU/L-ACNC: 3.07 UIU/ML (ref 0.27–4.2)
UROBILINOGEN UR STRIP-MCNC: ABNORMAL MG/DL
VLDLC SERPL CALC-MCNC: 32 MG/DL (ref 5–40)
WBC # BLD AUTO: 5.17 10*3/MM3 (ref 3.4–10.8)
WBC #/AREA URNS HPF: ABNORMAL /HPF

## 2023-01-09 ENCOUNTER — TELEPHONE (OUTPATIENT)
Dept: CARDIOLOGY | Facility: HOSPITAL | Age: 83
End: 2023-01-09
Payer: MEDICARE

## 2023-01-09 ENCOUNTER — ANTICOAGULATION VISIT (OUTPATIENT)
Dept: INTERNAL MEDICINE | Facility: CLINIC | Age: 83
End: 2023-01-09

## 2023-01-09 ENCOUNTER — OFFICE VISIT (OUTPATIENT)
Dept: INTERNAL MEDICINE | Facility: CLINIC | Age: 83
End: 2023-01-09
Payer: MEDICARE

## 2023-01-09 VITALS
HEART RATE: 72 BPM | BODY MASS INDEX: 31.54 KG/M2 | DIASTOLIC BLOOD PRESSURE: 78 MMHG | SYSTOLIC BLOOD PRESSURE: 104 MMHG | OXYGEN SATURATION: 98 % | WEIGHT: 178 LBS | HEIGHT: 63 IN

## 2023-01-09 DIAGNOSIS — E03.9 ACQUIRED HYPOTHYROIDISM: ICD-10-CM

## 2023-01-09 DIAGNOSIS — Z00.00 MEDICARE ANNUAL WELLNESS VISIT, SUBSEQUENT: Primary | ICD-10-CM

## 2023-01-09 DIAGNOSIS — I10 PRIMARY HYPERTENSION: ICD-10-CM

## 2023-01-09 DIAGNOSIS — I48.0 PAROXYSMAL ATRIAL FIBRILLATION: ICD-10-CM

## 2023-01-09 DIAGNOSIS — E78.5 HYPERLIPIDEMIA, UNSPECIFIED HYPERLIPIDEMIA TYPE: ICD-10-CM

## 2023-01-09 LAB — INR PPP: 1.6 (ref 0.9–1.1)

## 2023-01-09 PROCEDURE — 36416 COLLJ CAPILLARY BLOOD SPEC: CPT | Performed by: INTERNAL MEDICINE

## 2023-01-09 PROCEDURE — G0439 PPPS, SUBSEQ VISIT: HCPCS | Performed by: INTERNAL MEDICINE

## 2023-01-09 PROCEDURE — 85610 PROTHROMBIN TIME: CPT | Performed by: INTERNAL MEDICINE

## 2023-01-09 PROCEDURE — 93294 REM INTERROG EVL PM/LDLS PM: CPT | Performed by: INTERNAL MEDICINE

## 2023-01-09 PROCEDURE — 93296 REM INTERROG EVL PM/IDS: CPT | Performed by: INTERNAL MEDICINE

## 2023-01-09 PROCEDURE — 99214 OFFICE O/P EST MOD 30 MIN: CPT | Performed by: INTERNAL MEDICINE

## 2023-01-09 PROCEDURE — 1126F AMNT PAIN NOTED NONE PRSNT: CPT | Performed by: INTERNAL MEDICINE

## 2023-01-09 PROCEDURE — 1170F FXNL STATUS ASSESSED: CPT | Performed by: INTERNAL MEDICINE

## 2023-01-09 PROCEDURE — 1159F MED LIST DOCD IN RCRD: CPT | Performed by: INTERNAL MEDICINE

## 2023-01-09 RX ORDER — CELECOXIB 200 MG/1
CAPSULE ORAL
Qty: 90 CAPSULE | Refills: 1 | Status: SHIPPED | OUTPATIENT
Start: 2023-01-09

## 2023-01-09 NOTE — PROGRESS NOTES
The ABCs of the Annual Wellness Visit  Subsequent Medicare Wellness Visit    Subjective    Inessa Peoples is a 82 y.o. female who presents for a Subsequent Medicare Wellness Visit.    The following portions of the patient's history were reviewed and   updated as appropriate: allergies, current medications, past family history, past medical history, past social history, past surgical history and problem list.    Compared to one year ago, the patient feels her physical   health is the same.    Compared to one year ago, the patient feels her mental   health is the same.    Recent Hospitalizations:  She was not admitted to the hospital during the last year.       Current Medical Providers:  Patient Care Team:  Gisele Dockery MD as PCP - General (Internal Medicine)  Raleigh Bunch MD as Consulting Physician (Orthopedic Surgery)    Outpatient Medications Prior to Visit   Medication Sig Dispense Refill   • acetaminophen (TYLENOL) 650 MG 8 hr tablet Take 650 mg by mouth Every 8 (Eight) Hours As Needed for Mild Pain .     • Calcium-Phosphorus-Vitamin D (CITRACAL +D3 PO) Take 1 tablet by mouth Daily.     • celecoxib (CeleBREX) 200 MG capsule TAKE ONE CAPSULE BY MOUTH DAILY 90 capsule 1   • ezetimibe (ZETIA) 10 MG tablet TAKE ONE TABLET BY MOUTH DAILY 90 tablet 1   • furosemide (LASIX) 20 MG tablet TAKE ONE TABLET BY MOUTH DAILY 90 tablet 1   • levothyroxine (SYNTHROID, LEVOTHROID) 25 MCG tablet TAKE ONE TABLET BY MOUTH DAILY 90 tablet 1   • melatonin 5 MG sublingual tablet sublingual tablet Take 5 mg by mouth Every Night.     • metoprolol succinate XL (TOPROL-XL) 25 MG 24 hr tablet TAKE 1/2 TABLET BY MOUTH DAILY 45 tablet 1   • Multiple Vitamin (MULTI-DAY VITAMINS) tablet Take 1 tablet by mouth daily.     • sacubitril-valsartan (ENTRESTO) 24-26 MG tablet Take 1 tablet by mouth 2 (Two) Times a Day. 180 tablet 3   • spironolactone (ALDACTONE) 25 MG tablet TAKE ONE TABLET BY MOUTH DAILY 90 tablet 1   • warfarin  (COUMADIN) 3 MG tablet Take 1.5 tablets by mouth Daily. 135 tablet 3   • cetirizine (zyrTEC) 10 MG tablet      • cyclobenzaprine (FLEXERIL) 5 MG tablet Take 1 tablet by mouth At Night As Needed for Muscle Spasms. 30 tablet 0   • fexofenadine (ALLEGRA) 180 MG tablet      • methylPREDNISolone (MEDROL) 4 MG dose pack Take as directed on package instructions. 21 tablet 0     No facility-administered medications prior to visit.       No opioid medication identified on active medication list. I have reviewed chart for other potential  high risk medication/s and harmful drug interactions in the elderly.          Aspirin is not on active medication list.  Aspirin use is not indicated based on review of current medical condition/s. Risk of harm outweighs potential benefits.  .    Patient Active Problem List   Diagnosis   • Sarcoidosis   • Diaphragmatic paralysis   • Osteopenia   • Impaired fasting glucose   • Hypothyroidism   • Hypertension   • Hyperlipidemia   • Chronic osteoarthritis   • GERD (gastroesophageal reflux disease)   • ANY on CPAP + 10 - Dr Lu   • Dilation of thoracic aorta (HCC)   • Bulge of lumbar disc without myelopathy   • Lumbar facet arthropathy   • Trochanteric bursitis of both hips   • LBBB (left bundle branch block)   • Paroxysmal atrial fibrillation (HCC)   • Heart failure with preserved ejection fraction, NYHA class II (CMS/HCC)II-III---decreased EF with stress   • Presence of biventricular cardiac pacemaker     Advance Care Planning  Advance Directive is on file.  ACP discussion was held with the patient during this visit. Patient has an advance directive in EMR which is still valid.      Objective    Vitals:    01/09/23 1409   BP: 104/78   Pulse: 72   SpO2: 98%   Weight: 80.7 kg (178 lb)   Height: 158.8 cm (62.52\")   PainSc: 0-No pain     Estimated body mass index is 32.02 kg/m² as calculated from the following:    Height as of this encounter: 158.8 cm (62.52\").    Weight as of this encounter:  80.7 kg (178 lb).    BMI is >= 30 and <35. (Class 1 Obesity). The following options were offered after discussion;: nutrition counseling/recommendations      Does the patient have evidence of cognitive impairment? No    Lab Results   Component Value Date    CHLPL 257 (H) 01/03/2023    TRIG 178 (H) 01/03/2023    HDL 64 (H) 01/03/2023     (H) 01/03/2023    VLDL 32 01/03/2023        HEALTH RISK ASSESSMENT    Smoking Status:  Social History     Tobacco Use   Smoking Status Never   Smokeless Tobacco Never   Tobacco Comments    No history of tobacco use     Alcohol Consumption:  Social History     Substance and Sexual Activity   Alcohol Use Yes   • Alcohol/week: 3.0 standard drinks   • Types: 2 Glasses of wine, 1 Drinks containing 0.5 oz of alcohol per week    Comment: Occasionally     Fall Risk Screen:    JESUS Fall Risk Assessment was completed, and patient is at LOW risk for falls.Assessment completed on:1/9/2023    Depression Screening:  PHQ-2/PHQ-9 Depression Screening 1/9/2023   Little Interest or Pleasure in Doing Things 0-->not at all   Feeling Down, Depressed or Hopeless 0-->not at all   PHQ-9: Brief Depression Severity Measure Score 0       Health Habits and Functional and Cognitive Screening:  Functional & Cognitive Status 1/9/2023   Do you have difficulty preparing food and eating? No   Do you have difficulty bathing yourself, getting dressed or grooming yourself? No   Do you have difficulty using the toilet? No   Do you have difficulty moving around from place to place? No   Do you have trouble with steps or getting out of a bed or a chair? No   Current Diet Well Balanced Diet   Dental Exam Up to date   Eye Exam Up to date   Exercise (times per week) 0 times per week   Current Exercises Include No Regular Exercise   Current Exercise Activities Include -   Do you need help using the phone?  No   Are you deaf or do you have serious difficulty hearing?  No   Do you need help with transportation? No   Do  you need help shopping? No   Do you need help preparing meals?  No   Do you need help with housework?  No   Do you need help with laundry? No   Do you need help taking your medications? No   Do you need help managing money? No   Do you ever drive or ride in a car without wearing a seat belt? No   Have you felt unusual stress, anger or loneliness in the last month? No   Who do you live with? Spouse   If you need help, do you have trouble finding someone available to you? No   Have you been bothered in the last four weeks by sexual problems? No   Do you have difficulty concentrating, remembering or making decisions? No       Age-appropriate Screening Schedule:  Refer to the list below for future screening recommendations based on patient's age, sex and/or medical conditions. Orders for these recommended tests are listed in the plan section. The patient has been provided with a written plan.    Health Maintenance   Topic Date Due   • ZOSTER VACCINE (2 of 3) 11/10/2007   • LIPID PANEL  01/03/2024   • DXA SCAN  05/31/2024   • MAMMOGRAM  08/15/2024   • TDAP/TD VACCINES (3 - Td or Tdap) 08/05/2026   • INFLUENZA VACCINE  Completed                CMS Preventative Services Quick Reference  Risk Factors Identified During Encounter  Immunizations Discussed/Encouraged: Shingrix  The above risks/problems have been discussed with the patient.  Pertinent information has been shared with the patient in the After Visit Summary.  An After Visit Summary and PPPS were made available to the patient.    Follow Up:   Next Medicare Wellness visit to be scheduled in 1 year.       Additional E&M Note during same encounter follows:  Patient has multiple medical problems which are significant and separately identifiable that require additional work above and beyond the Medicare Wellness Visit.      Chief Complaint  Medicare Wellness-subsequent, Hypertension, Hyperlipidemia, and Hypothyroidism    Subjective        HPI  Inessa Peoples is also  being seen today for htn, hld, thyroid, afib      HTN.  Control is good.  HLD.  Fair control on Zetia alone.  Unable to tolerate statins.   Hypothyroidism.  TSH is controlled.  Afib.  She is due for INR today.    Review of Systems   Respiratory: Negative.    Cardiovascular: Negative.    Musculoskeletal: Positive for arthralgias.       Objective   Vital Signs:  /78   Pulse 72   Ht 158.8 cm (62.52\")   Wt 80.7 kg (178 lb)   SpO2 98%   BMI 32.02 kg/m²     Physical Exam  Constitutional:       Appearance: She is well-developed.   HENT:      Head: Normocephalic and atraumatic.      Right Ear: Hearing, tympanic membrane and external ear normal.      Left Ear: Hearing, tympanic membrane and external ear normal.      Nose: Nose normal.   Neck:      Thyroid: No thyromegaly.   Cardiovascular:      Rate and Rhythm: Normal rate and regular rhythm.      Heart sounds: Normal heart sounds. No murmur heard.  Pulmonary:      Effort: Pulmonary effort is normal.      Breath sounds: Normal breath sounds.   Abdominal:      General: There is no distension.      Palpations: Abdomen is soft.      Tenderness: There is no abdominal tenderness.   Musculoskeletal:      Cervical back: Neck supple.   Lymphadenopathy:      Cervical: No cervical adenopathy.   Skin:     General: Skin is warm and dry.   Neurological:      Mental Status: She is alert and oriented to person, place, and time.   Psychiatric:         Speech: Speech normal.         Behavior: Behavior normal.         Thought Content: Thought content normal.         Judgment: Judgment normal.          The following data was reviewed by: Gisele Dockery MD on 01/09/2023:  Common labs    Common Labs 5/16/22 5/16/22 5/16/22 6/2/22 6/2/22 6/2/22 1/3/23 1/3/23 1/3/23    1506 1506 1506 1309 1309 1309 1041 1041 1041   Glucose   94  95   120 (A)    BUN   33 (A)  35 (A)   31 (A)    Creatinine   1.29 (A)  1.19 (A)   1.12 (A)    Sodium   135 (A)  138   141    Potassium   4.5  4.9   4.7     Chloride   99  99   105    Calcium   10.3  10.7 (A)   10.4    Total Protein     7.4   6.7    Albumin   4.60  4.9 (A)   4.4    Total Bilirubin   0.7  1.0   0.7    Alkaline Phosphatase   116  119   117    AST (SGOT)   21  25   19    ALT (SGPT)   21  19   19    WBC    9.1   5.17     Hemoglobin    15.2   15.3     Hematocrit    44.3   45.8     Platelets    231   202     Total Cholesterol  204 (A)          Total Cholesterol      224 (A)   257 (A)   Triglycerides  145    113   178 (A)   HDL Cholesterol  52    61   64 (A)   LDL Cholesterol   126 (A)    143 (A)   161 (A)   Hemoglobin A1C 5.30           (A) Abnormal value       Comments are available for some flowsheets but are not being displayed.                      Assessment and Plan   Diagnoses and all orders for this visit:    1. Medicare annual wellness visit, subsequent (Primary)    2. Primary hypertension    3. Hyperlipidemia, unspecified hyperlipidemia type    4. Acquired hypothyroidism    5. Paroxysmal atrial fibrillation (HCC)  -     POC INR      HTN.  Control is good.  The patient is advised to continue current dosage of metoprolol and entresto.    HLD.  Fair control.    The patient is advised to continue current dosage of Zetia.   Hypothyroidism. Control is good.  The patient is advised to continue current dosage of levothyroxine.    Afib.  INR is subtherapeutic.  Medication adjusted today.  Recheck two weeks.               Follow Up   Return in about 6 months (around 7/9/2023) for Recheck.  Patient was given instructions and counseling regarding her condition or for health maintenance advice. Please see specific information pulled into the AVS if appropriate.

## 2023-01-09 NOTE — PATIENT INSTRUCTIONS
Medicare Wellness  Personal Prevention Plan of Service     Date of Office Visit:    Encounter Provider:  Gisele Dockery MD  Place of Service:  [facility]  Patient Name: Inessa Peoples  :  1940    As part of the Medicare Wellness portion of your visit today, we are providing you with this personalized preventive plan of services (PPPS). This plan is based upon recommendations of the [place] Preventive Services Task Force (USPSTF) and the Advisory Committee on Immunization Practices (ACIP).    This lists the preventive care services that should be considered, and provides dates of when you are due. Items listed as completed are up-to-date and do not require any further intervention.    Health Maintenance   Topic Date Due    ZOSTER VACCINE (2 of 3) 11/10/2007    ANNUAL WELLNESS VISIT  2022    LIPID PANEL  2024    DXA SCAN  2024    MAMMOGRAM  08/15/2024    COLORECTAL CANCER SCREENING  10/04/2024    TDAP/TD VACCINES (3 - Td or Tdap) 2026    COVID-19 Vaccine  Completed    INFLUENZA VACCINE  Completed    Pneumococcal Vaccine 65+  Completed       Orders Placed This Encounter   Procedures    POC INR     Order Specific Question:   Release to patient     Answer:   Routine Release       Return in about 6 months (around 2023) for Recheck.

## 2023-01-09 NOTE — TELEPHONE ENCOUNTER
Called patient to inform her that we received a Denial letter from [employer] Patient assistance for patient's Entresto medication.    Left a voicemail for patient to return my call.    Thanks,  Catherine GALEAS [facility]

## 2023-01-10 NOTE — TELEPHONE ENCOUNTER
Patient returned call this morning. I let her know that she was denied for the patient assistance program for her Entresto medication. She reports that she had also received a letter and was already aware.  She asked that we send a prescription of Entresto to her [facility] at [facility]. She reports she has already looked into the price and it is affordable.    Patient verbalized understanding. No questions at this time.    Thanks,  Catherine GALEAS [facility]

## 2023-01-16 RX ORDER — LEVOTHYROXINE SODIUM 0.03 MG/1
TABLET ORAL
Qty: 90 TABLET | Refills: 1 | Status: SHIPPED | OUTPATIENT
Start: 2023-01-16

## 2023-01-27 RX ORDER — EZETIMIBE 10 MG/1
TABLET ORAL
Qty: 90 TABLET | Refills: 1 | Status: SHIPPED | OUTPATIENT
Start: 2023-01-27

## 2023-01-30 ENCOUNTER — ANTICOAGULATION VISIT (OUTPATIENT)
Dept: INTERNAL MEDICINE | Facility: CLINIC | Age: 83
End: 2023-01-30
Payer: MEDICARE

## 2023-01-30 DIAGNOSIS — Z95.0 PRESENCE OF BIVENTRICULAR CARDIAC PACEMAKER: Primary | ICD-10-CM

## 2023-01-30 LAB — INR PPP: 1.7 (ref 0.9–1.1)

## 2023-01-30 PROCEDURE — 36416 COLLJ CAPILLARY BLOOD SPEC: CPT | Performed by: INTERNAL MEDICINE

## 2023-01-30 PROCEDURE — 85610 PROTHROMBIN TIME: CPT | Performed by: INTERNAL MEDICINE

## 2023-02-22 ENCOUNTER — CLINICAL SUPPORT NO REQUIREMENTS (OUTPATIENT)
Dept: CARDIOLOGY | Facility: CLINIC | Age: 83
End: 2023-02-22
Payer: MEDICARE

## 2023-02-22 ENCOUNTER — OFFICE VISIT (OUTPATIENT)
Dept: CARDIOLOGY | Facility: CLINIC | Age: 83
End: 2023-02-22
Payer: MEDICARE

## 2023-02-22 VITALS
WEIGHT: 181 LBS | DIASTOLIC BLOOD PRESSURE: 76 MMHG | SYSTOLIC BLOOD PRESSURE: 120 MMHG | HEIGHT: 63 IN | HEART RATE: 68 BPM | BODY MASS INDEX: 32.07 KG/M2

## 2023-02-22 DIAGNOSIS — I44.7 LBBB (LEFT BUNDLE BRANCH BLOCK): Primary | ICD-10-CM

## 2023-02-22 DIAGNOSIS — I42.8 NON-ISCHEMIC CARDIOMYOPATHY: Primary | ICD-10-CM

## 2023-02-22 DIAGNOSIS — I48.0 PAROXYSMAL ATRIAL FIBRILLATION: ICD-10-CM

## 2023-02-22 DIAGNOSIS — Z95.0 PRESENCE OF BIVENTRICULAR CARDIAC PACEMAKER: ICD-10-CM

## 2023-02-22 PROCEDURE — 93290 INTERROG DEV EVAL ICPMS IP: CPT | Performed by: INTERNAL MEDICINE

## 2023-02-22 PROCEDURE — 93284 PRGRMG EVAL IMPLANTABLE DFB: CPT | Performed by: INTERNAL MEDICINE

## 2023-02-22 PROCEDURE — 93000 ELECTROCARDIOGRAM COMPLETE: CPT | Performed by: INTERNAL MEDICINE

## 2023-02-22 PROCEDURE — 99214 OFFICE O/P EST MOD 30 MIN: CPT | Performed by: INTERNAL MEDICINE

## 2023-02-22 NOTE — PROGRESS NOTES
Date of Office Visit: 2023  Encounter Provider: Demar Stone MD  Place of Service: [facility] CARDIOLOGY  Patient Name: Inessa Peoples  : 1940    Subjective:     Encounter Date:2023      Patient ID: Inessa Peoples is a 83 y.o. female who has a cc of  PAF and LBB and NICM and I did a crt -p in .     The patient had a good year.   No anginal chest pain,   No sig ahn,   No soa,   No fainting,  No orthostasis.   No edema.   Exercise tolerance: quite limited due to arthritis     There have been no hospital admission since the last visit.     There have been no bleeding events.       Past Medical History:   Diagnosis Date   • Abnormal ECG    • Allergic Iodine used in on xrays   • Allergic rhinitis    • Atrial fibrillation (HCC)    • Bleeds easily (HCC)     warfarin   • Breast screening    • Broken bones    • Calf DVT (deep venous thrombosis) (HCC)    • Chronic anticoagulation    • Class 2 severe obesity due to excess calories with serious comorbidity and body mass index (BMI) of 37.0 to 37.9 in adult (HCC)    • Colon polyp    • CTS (carpal tunnel syndrome)    • Deep vein thrombosis (HCC)     left calf   • Diverticulitis of colon    • Diverticulitis of large intestine without perforation or abscess without bleeding 2017   • Diverticulosis    • AHN (dyspnea on exertion)    • Fatigue    • Fibromyalgia, primary    • H/O malignant melanoma of skin    • Heart failure with preserved ejection fraction, NYHA class II (HCC)     II-III---decreased EF with stress   • Hematuria, gross    • History of colon polyps    • History of kidney stones    • Hyperlipidemia    • Hypertension    • Hyperthyroidism    • IFG (impaired fasting glucose)    • Insomnia    • LBBB (left bundle branch block)    • Left leg swelling    • LLQ abdominal pain    • Long term current use of systemic steroids    • Low back pain    • Malignant melanoma of skin (HCC)    • Neck pain    • Obesity     class 2 obesity  due to excess calories with BMI of 35.0 to 35.9 in adult   • Osteoarthritis    • Osteopenia    • Paralysis, diaphragm    • Paroxysmal atrial fibrillation (HCC)    • 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 Lu   • Sleep apnea     USES CPAP   • UTI (urinary tract infection)    • Visit for screening mammogram        Social History     Socioeconomic History   • Marital status:      Spouse name: EDY   • Number of children: 4   • Years of education: 14yrs   Tobacco Use   • Smoking status: Never   • Smokeless tobacco: Never   • Tobacco comments:     No history of tobacco use   Vaping Use   • Vaping Use: Never used   Substance and Sexual Activity   • Alcohol use: Yes     Alcohol/week: 3.0 standard drinks     Types: 2 Glasses of wine, 1 Drinks containing 0.5 oz of alcohol per week     Comment: Occasionally   • Drug use: Never   • Sexual activity: Yes     Partners: Male     Birth control/protection: None       Family History   Problem Relation Age of Onset   • Heart disease Mother    • Diabetes type II Mother    • Heart disease Father    • Hypertension Father    • Cancer Sister    • Colon polyps Sister    • Lung cancer Sister    • Heart disease Brother         CABG   • Diabetes type II Brother    • Colon polyps Brother    • Arrhythmia Brother    • Colon cancer Brother    • Cancer Brother    • Heart disease Sister    • Diabetes type II Sister    • Aortic aneurysm Sister    • COPD Sister    • Arrhythmia Sister    • Cerebral aneurysm Sister    • Arthritis Sister    • COPD Sister    • Lupus Sister    • Asthma Maternal Aunt    • Aortic aneurysm Brother        Review of Systems   Constitutional: Negative for fever and night sweats.   HENT: Negative for ear pain and stridor.    Eyes: Negative for discharge and visual  "halos.   Cardiovascular: Negative for cyanosis.   Respiratory: Negative for hemoptysis and sputum production.    Hematologic/Lymphatic: Negative for adenopathy.   Skin: Negative for nail changes and unusual hair distribution.   Musculoskeletal: Positive for arthritis and joint pain. Negative for gout and joint swelling.   Gastrointestinal: Negative for bowel incontinence and flatus.   Genitourinary: Negative for dysuria and flank pain.   Neurological: Negative for seizures and tremors.   Psychiatric/Behavioral: Negative for altered mental status. The patient is not nervous/anxious.             Objective:     Vitals:    02/22/23 1250   BP: 120/76   Pulse: 68   Weight: 82.1 kg (181 lb)   Height: 158.8 cm (62.52\")         Eyes:      General:         Right eye: No discharge.         Left eye: No discharge.   HENT:      Head: Normocephalic and atraumatic.   Neck:      Thyroid: No thyromegaly.      Vascular: No JVD.   Pulmonary:      Effort: Pulmonary effort is normal.      Breath sounds: Normal breath sounds. No rales.   Cardiovascular:      Normal rate. Regular rhythm.      No gallop.   Edema:     Peripheral edema absent.   Abdominal:      General: Bowel sounds are normal.      Palpations: Abdomen is soft.      Tenderness: There is no abdominal tenderness.   Musculoskeletal: Normal range of motion.         General: No deformity. Skin:     General: Skin is warm and dry.      Findings: No erythema.   Neurological:      Mental Status: Alert and oriented to person, place, and time.      Motor: Normal muscle tone.   Psychiatric:         Behavior: Behavior normal.         Thought Content: Thought content normal.           ECG 12 Lead    Date/Time: 2/22/2023 1:20 PM  Performed by: Demar Stone MD  Authorized by: Demar Stone MD   Comparison: compared with previous ECG   Rhythm: sinus rhythm and paced            Lab Review:       Assessment:          Diagnosis Plan   1. LBBB (left bundle branch block)        2. Paroxysmal " atrial fibrillation (HCC)        3. Presence of biventricular cardiac pacemaker               Plan:     HF -- it was reduced EF but now normal after the pacemaker     I considered stopping the BB and increasing the pacing rate given the new revelations, but, she is doing so well, I left things alone.     AF -- none but remains on warfarin     CRT --I reviewed the pacemaker/ICD tracings and the pacing and sensing parameters are normal.  AF burden is now 0

## 2023-03-24 NOTE — PROGRESS NOTES
Capillary Blood Specimen Collection  Capillary blood collection performed in  by Nita Us MA/ADDIS.    03/24/23   Nita Us MA/LMR  Answers for HPI/ROS submitted by the patient on 12/30/2022

## 2023-03-29 ENCOUNTER — ANTICOAGULATION VISIT (OUTPATIENT)
Dept: INTERNAL MEDICINE | Facility: CLINIC | Age: 83
End: 2023-03-29
Payer: MEDICARE

## 2023-03-29 DIAGNOSIS — Z95.0 PRESENCE OF BIVENTRICULAR CARDIAC PACEMAKER: Primary | ICD-10-CM

## 2023-03-30 LAB — INR PPP: 2.8 (ref 0.9–1.1)

## 2023-03-30 NOTE — PROGRESS NOTES
Capillary Blood Specimen Collection  Capillary blood collection performed in  by Nita Us MA/ADDIS.   03/30/23   Nita Us MA/ADDIS

## 2023-04-10 PROCEDURE — 93294 REM INTERROG EVL PM/LDLS PM: CPT | Performed by: INTERNAL MEDICINE

## 2023-04-10 PROCEDURE — 93296 REM INTERROG EVL PM/IDS: CPT | Performed by: INTERNAL MEDICINE

## 2023-05-05 RX ORDER — FUROSEMIDE 20 MG/1
TABLET ORAL
Qty: 90 TABLET | Refills: 1 | Status: SHIPPED | OUTPATIENT
Start: 2023-05-05

## 2023-05-18 ENCOUNTER — ANTICOAGULATION VISIT (OUTPATIENT)
Dept: INTERNAL MEDICINE | Facility: CLINIC | Age: 83
End: 2023-05-18
Payer: MEDICARE

## 2023-05-18 DIAGNOSIS — I48.0 PAROXYSMAL ATRIAL FIBRILLATION: Primary | ICD-10-CM

## 2023-05-18 LAB — INR PPP: 1.9 (ref 0.9–1.1)

## 2023-05-18 PROCEDURE — 85610 PROTHROMBIN TIME: CPT | Performed by: INTERNAL MEDICINE

## 2023-05-18 PROCEDURE — 36416 COLLJ CAPILLARY BLOOD SPEC: CPT | Performed by: INTERNAL MEDICINE

## 2023-05-18 NOTE — PROGRESS NOTES
Capillary Blood Specimen Collection  Capillary blood collection performed in  by Nita Us MA/LMR. Patient tolerated the procedure well without complications.   05/18/23   Nita Us MA/ADDIS

## 2023-05-30 RX ORDER — METOPROLOL SUCCINATE 25 MG/1
TABLET, EXTENDED RELEASE ORAL
Qty: 45 TABLET | Refills: 1 | Status: SHIPPED | OUTPATIENT
Start: 2023-05-30

## 2023-05-30 NOTE — TELEPHONE ENCOUNTER
Last Office Visit: 05/16/2022  Labs: 01/03/2023  Next Follow-up appointment: needs appointment scheduled.       Reviewed by:    Yan Lee DNP, MSN, RN  RN Clinical Coordinator  [facility]

## 2023-07-24 RX ORDER — LEVOTHYROXINE SODIUM 0.03 MG/1
TABLET ORAL
Qty: 90 TABLET | Refills: 1 | Status: SHIPPED | OUTPATIENT
Start: 2023-07-24

## 2023-07-25 ENCOUNTER — ANTICOAGULATION VISIT (OUTPATIENT)
Dept: INTERNAL MEDICINE | Facility: CLINIC | Age: 83
End: 2023-07-25
Payer: MEDICARE

## 2023-07-25 DIAGNOSIS — I50.30 HEART FAILURE WITH PRESERVED EJECTION FRACTION, NYHA CLASS II: Primary | ICD-10-CM

## 2023-07-25 LAB — INR PPP: 4 (ref 0.9–1.1)

## 2023-07-25 PROCEDURE — 36416 COLLJ CAPILLARY BLOOD SPEC: CPT | Performed by: INTERNAL MEDICINE

## 2023-07-25 PROCEDURE — 85610 PROTHROMBIN TIME: CPT | Performed by: INTERNAL MEDICINE

## 2023-07-25 NOTE — PROGRESS NOTES
Capillary Blood Specimen Collection  Capillary blood collection performed in  by Ingrid Doty MA. Patient tolerated the procedure well without complications.   07/25/23   Pt will take 3 mg Mon, wed, and Friday  4 mg rest of week  Hold tomorrow 7/26/2023  Recheck 1 week    Ingrid Doty MA

## 2023-07-31 ENCOUNTER — OFFICE VISIT (OUTPATIENT)
Dept: INTERNAL MEDICINE | Facility: CLINIC | Age: 83
End: 2023-07-31
Payer: MEDICARE

## 2023-07-31 VITALS
HEIGHT: 63 IN | OXYGEN SATURATION: 98 % | DIASTOLIC BLOOD PRESSURE: 64 MMHG | SYSTOLIC BLOOD PRESSURE: 110 MMHG | HEART RATE: 78 BPM | WEIGHT: 181 LBS | BODY MASS INDEX: 32.07 KG/M2

## 2023-07-31 DIAGNOSIS — I48.0 PAROXYSMAL ATRIAL FIBRILLATION: ICD-10-CM

## 2023-07-31 DIAGNOSIS — R21 RASH: Primary | ICD-10-CM

## 2023-07-31 LAB — INR PPP: 2.7 (ref 0.9–1.1)

## 2023-08-01 ENCOUNTER — ANTICOAGULATION VISIT (OUTPATIENT)
Dept: INTERNAL MEDICINE | Facility: CLINIC | Age: 83
End: 2023-08-01
Payer: MEDICARE

## 2023-08-01 LAB
ALBUMIN SERPL-MCNC: 4.6 G/DL (ref 3.5–5.2)
ALBUMIN/GLOB SERPL: 2.1 G/DL
ALP SERPL-CCNC: 102 U/L (ref 39–117)
ALT SERPL-CCNC: 21 U/L (ref 1–33)
AST SERPL-CCNC: 21 U/L (ref 1–32)
BASOPHILS # BLD AUTO: 0.03 10*3/MM3 (ref 0–0.2)
BASOPHILS NFR BLD AUTO: 0.5 % (ref 0–1.5)
BILIRUB SERPL-MCNC: 1.1 MG/DL (ref 0–1.2)
BUN SERPL-MCNC: 29 MG/DL (ref 8–23)
BUN/CREAT SERPL: 25.4 (ref 7–25)
CALCIUM SERPL-MCNC: 9.9 MG/DL (ref 8.6–10.5)
CHLORIDE SERPL-SCNC: 102 MMOL/L (ref 98–107)
CO2 SERPL-SCNC: 25.1 MMOL/L (ref 22–29)
CREAT SERPL-MCNC: 1.14 MG/DL (ref 0.57–1)
EGFRCR SERPLBLD CKD-EPI 2021: 48.5 ML/MIN/1.73
EOSINOPHIL # BLD AUTO: 0.2 10*3/MM3 (ref 0–0.4)
EOSINOPHIL NFR BLD AUTO: 3.2 % (ref 0.3–6.2)
ERYTHROCYTE [DISTWIDTH] IN BLOOD BY AUTOMATED COUNT: 12.3 % (ref 12.3–15.4)
GLOBULIN SER CALC-MCNC: 2.2 GM/DL
GLUCOSE SERPL-MCNC: 88 MG/DL (ref 65–99)
HCT VFR BLD AUTO: 44.3 % (ref 34–46.6)
HGB BLD-MCNC: 15.4 G/DL (ref 12–15.9)
IMM GRANULOCYTES # BLD AUTO: 0.01 10*3/MM3 (ref 0–0.05)
IMM GRANULOCYTES NFR BLD AUTO: 0.2 % (ref 0–0.5)
LYMPHOCYTES # BLD AUTO: 2.22 10*3/MM3 (ref 0.7–3.1)
LYMPHOCYTES NFR BLD AUTO: 35.5 % (ref 19.6–45.3)
MCH RBC QN AUTO: 32.3 PG (ref 26.6–33)
MCHC RBC AUTO-ENTMCNC: 34.8 G/DL (ref 31.5–35.7)
MCV RBC AUTO: 92.9 FL (ref 79–97)
MONOCYTES # BLD AUTO: 0.6 10*3/MM3 (ref 0.1–0.9)
MONOCYTES NFR BLD AUTO: 9.6 % (ref 5–12)
NEUTROPHILS # BLD AUTO: 3.2 10*3/MM3 (ref 1.7–7)
NEUTROPHILS NFR BLD AUTO: 51 % (ref 42.7–76)
NRBC BLD AUTO-RTO: 0 /100 WBC (ref 0–0.2)
PLATELET # BLD AUTO: 186 10*3/MM3 (ref 140–450)
POTASSIUM SERPL-SCNC: 4.9 MMOL/L (ref 3.5–5.2)
PROT SERPL-MCNC: 6.8 G/DL (ref 6–8.5)
RBC # BLD AUTO: 4.77 10*6/MM3 (ref 3.77–5.28)
SODIUM SERPL-SCNC: 140 MMOL/L (ref 136–145)
WBC # BLD AUTO: 6.26 10*3/MM3 (ref 3.4–10.8)

## 2023-08-04 RX ORDER — EZETIMIBE 10 MG/1
TABLET ORAL
Qty: 90 TABLET | Refills: 1 | Status: SHIPPED | OUTPATIENT
Start: 2023-08-04

## 2023-08-04 RX ORDER — SPIRONOLACTONE 25 MG/1
TABLET ORAL
Qty: 90 TABLET | Refills: 1 | Status: SHIPPED | OUTPATIENT
Start: 2023-08-04

## 2023-08-16 ENCOUNTER — APPOINTMENT (OUTPATIENT)
Dept: WOMENS IMAGING | Facility: HOSPITAL | Age: 83
End: 2023-08-16
Payer: MEDICARE

## 2023-08-16 PROCEDURE — 77063 BREAST TOMOSYNTHESIS BI: CPT | Performed by: RADIOLOGY

## 2023-08-16 PROCEDURE — 77067 SCR MAMMO BI INCL CAD: CPT | Performed by: RADIOLOGY

## 2023-08-21 DIAGNOSIS — R92.8 ABNORMAL MAMMOGRAM OF RIGHT BREAST: Primary | ICD-10-CM

## 2023-08-25 ENCOUNTER — OFFICE VISIT (OUTPATIENT)
Dept: PAIN MEDICINE | Facility: CLINIC | Age: 83
End: 2023-08-25
Payer: MEDICARE

## 2023-08-25 VITALS
OXYGEN SATURATION: 96 % | HEART RATE: 59 BPM | WEIGHT: 183 LBS | TEMPERATURE: 97 F | RESPIRATION RATE: 18 BRPM | BODY MASS INDEX: 32.43 KG/M2 | SYSTOLIC BLOOD PRESSURE: 118 MMHG | HEIGHT: 63 IN | DIASTOLIC BLOOD PRESSURE: 70 MMHG

## 2023-08-25 DIAGNOSIS — M47.816 LUMBAR FACET ARTHROPATHY: ICD-10-CM

## 2023-08-25 DIAGNOSIS — M51.36 BULGE OF LUMBAR DISC WITHOUT MYELOPATHY: ICD-10-CM

## 2023-08-25 DIAGNOSIS — M54.50 LOW BACK PAIN, UNSPECIFIED BACK PAIN LATERALITY, UNSPECIFIED CHRONICITY, UNSPECIFIED WHETHER SCIATICA PRESENT: Primary | ICD-10-CM

## 2023-08-25 DIAGNOSIS — M25.511 ACUTE PAIN OF RIGHT SHOULDER: ICD-10-CM

## 2023-08-25 PROCEDURE — 3074F SYST BP LT 130 MM HG: CPT | Performed by: NURSE PRACTITIONER

## 2023-08-25 PROCEDURE — 1125F AMNT PAIN NOTED PAIN PRSNT: CPT | Performed by: NURSE PRACTITIONER

## 2023-08-25 PROCEDURE — 1160F RVW MEDS BY RX/DR IN RCRD: CPT | Performed by: NURSE PRACTITIONER

## 2023-08-25 PROCEDURE — 99214 OFFICE O/P EST MOD 30 MIN: CPT | Performed by: NURSE PRACTITIONER

## 2023-08-25 PROCEDURE — 3078F DIAST BP <80 MM HG: CPT | Performed by: NURSE PRACTITIONER

## 2023-08-25 PROCEDURE — 1159F MED LIST DOCD IN RCRD: CPT | Performed by: NURSE PRACTITIONER

## 2023-08-25 RX ORDER — METHYLPREDNISOLONE 4 MG/1
TABLET ORAL
Qty: 21 TABLET | Refills: 0 | Status: SHIPPED | OUTPATIENT
Start: 2023-08-25

## 2023-08-25 RX ORDER — IPRATROPIUM BROMIDE 42 UG/1
SPRAY, METERED NASAL
COMMUNITY
Start: 2023-08-23

## 2023-08-25 NOTE — PROGRESS NOTES
"CHIEF COMPLAINT  Low back pain  Right shoulder pain      Subjective   Inessa Peoples is a 83 y.o. female  who presents to the office for follow up of low back pain. She states that her low back pain worsened ~ 1 week ago. She states that she fell recently and landing on her right shoulder and right hip. She then developed located in her right low back radiating transversely in a band-like fashion across her right low back. She denies any left low back pain. She denies any radicular pain. She describes her pain as aching that was worsened by standing, walking, and \"trying to straighten up\". She does state that her low back pain has slowly improved over the last week. She states that this has been a different pain then she has experienced in the past and that it felt like \"a pinched nerve\".     Today her pain is 5-6/10VAS in severity. She continues to utilize celebrex daily.  She also continues to report right shoulder pain since her fall. She does have ROM in her shoulder.     Procedure List:  10/11/2022-bilateral L3-L5 MBB-100% relief  11-12-21-- bilateral L2-L5 MBB-- 100% relief for 2-3 months  6-15-21-- Left SI and Left GTB-- 50% relief for 1-2 days  1-21-21-- bilateral L2-L5 MBB-- 90% relief for several weeks  6-16-20-- left SI +GTB-- 75% for 2 weeks  6-23-19-- bilateral L2-L5 MBB-- 90% for a few weeks  11-15-18--- bilateral L2-L5 MBB-- significant relief for several days  4-26-28-- LEFT SI-- significant relief  9-26-17-- RIGHT SI-- significant    Back Pain  This is a chronic problem. The current episode started more than 1 year ago. The problem occurs constantly. The problem has been gradually worsening since onset. The pain is present in the lumbar spine (right). The quality of the pain is described as aching. The pain does not radiate. The pain is at a severity of 6/10. The pain is mild. The pain is Worse during the day (worsens as day progresses). The symptoms are aggravated by bending, position, standing " and twisting (housework). Associated symptoms include bladder incontinence. Pertinent negatives include no abdominal pain, bowel incontinence, chest pain, dysuria, fever, headaches, numbness or weakness. Risk factors include obesity, menopause, history of cancer and lack of exercise (history of melanoma--no Chemo or RAD). She has tried analgesics and heat for the symptoms. The treatment provided moderate relief.      PEG Assessment   What number best describes your pain on average in the past week?9  What number best describes how, during the past week, pain has interfered with your enjoyment of life?7  What number best describes how, during the past week, pain has interfered with your general activity?  7    Review of Pertinent Medical Data ---  Patient was last seen by myself on 10/25/2022 at that time she followed up from bilateral L3-L5 MBB performed on 10/11/2022.  She reported 100% relief from her procedure.  With her pain controlled plan was to follow-up as needed.    The following portions of the patient's history were reviewed and updated as appropriate: allergies, current medications, past family history, past medical history, past social history, past surgical history, and problem list.    Review of Systems   Constitutional:  Positive for activity change and fatigue. Negative for fever.   Cardiovascular:  Negative for chest pain.   Gastrointestinal:  Negative for abdominal pain, bowel incontinence, constipation and diarrhea.   Genitourinary:  Positive for bladder incontinence. Negative for difficulty urinating and dysuria.   Musculoskeletal:  Positive for back pain.   Neurological:  Negative for dizziness, weakness, light-headedness, numbness and headaches.   Psychiatric/Behavioral:  Negative for agitation, sleep disturbance and suicidal ideas. The patient is not nervous/anxious.      --  The aforementioned information the Chief Complaint section and above subjective data including any HPI data, and also  "the Review of Systems data, has been personally reviewed and affirmed.  --     Vitals:    08/25/23 1528   BP: 118/70   BP Location: Right arm   Patient Position: Sitting   Cuff Size: Adult   Pulse: 59   Resp: 18   Temp: 97 øF (36.1 øC)   SpO2: 96%   Weight: 83 kg (183 lb)   Height: 158.8 cm (62.52\")   PainSc:   7     Objective   Physical Exam  Vitals and nursing note reviewed.   Constitutional:       Appearance: Normal appearance. She is well-developed.   Eyes:      General: Lids are normal.   Cardiovascular:      Rate and Rhythm: Normal rate.   Pulmonary:      Effort: Pulmonary effort is normal.   Musculoskeletal:      Right shoulder: Tenderness present.      Lumbar back: Tenderness and bony tenderness (over right lumbar facet joints) present. Decreased range of motion.   Neurological:      Mental Status: She is alert and oriented to person, place, and time.   Psychiatric:         Attention and Perception: Attention normal.         Mood and Affect: Mood normal.         Speech: Speech normal.         Behavior: Behavior normal.         Judgment: Judgment normal.       Assessment & Plan   Diagnoses and all orders for this visit:    1. Low back pain, unspecified back pain laterality, unspecified chronicity, unspecified whether sciatica present (Primary)  -     methylPREDNISolone (MEDROL) 4 MG dose pack; Take as directed on package instructions.  Dispense: 21 tablet; Refill: 0  -     Ambulatory Referral to Physical Therapy Evaluate and treat    2. Lumbar facet arthropathy  -     Ambulatory Referral to Physical Therapy Evaluate and treat    3. Bulge of lumbar disc without myelopathy  -     Ambulatory Referral to Physical Therapy Evaluate and treat    4. Acute pain of right shoulder        Inessa Peoples reports a pain score of 7.  Given her pain assessment as noted, treatment options were discussed and the following options were decided upon as a follow-up plan to address the patient's pain:  physical therapy and " steroid dose pack .    --- Start Medol dose pack.   --- Recommend PT for her right low back pain, if her pain worsens will see her back to consider right lumbar RFA  --- If shoulder pain persists will consider x-ray and referral to orthopedics.   --- Follow-up 6 weeks or sooner if needed. Reviewed that if her pain resolves she can cancel this appointment.      Dictated utilizing Dragon dictation.

## 2023-08-28 ENCOUNTER — CLINICAL SUPPORT NO REQUIREMENTS (OUTPATIENT)
Dept: CARDIOLOGY | Facility: CLINIC | Age: 83
End: 2023-08-28
Payer: MEDICARE

## 2023-08-28 ENCOUNTER — OFFICE VISIT (OUTPATIENT)
Dept: CARDIOLOGY | Facility: CLINIC | Age: 83
End: 2023-08-28
Payer: MEDICARE

## 2023-08-28 ENCOUNTER — TELEPHONE (OUTPATIENT)
Dept: INTERNAL MEDICINE | Facility: CLINIC | Age: 83
End: 2023-08-28
Payer: MEDICARE

## 2023-08-28 ENCOUNTER — ANTICOAGULATION VISIT (OUTPATIENT)
Dept: INTERNAL MEDICINE | Facility: CLINIC | Age: 83
End: 2023-08-28
Payer: MEDICARE

## 2023-08-28 VITALS
SYSTOLIC BLOOD PRESSURE: 132 MMHG | DIASTOLIC BLOOD PRESSURE: 88 MMHG | HEIGHT: 63 IN | HEART RATE: 59 BPM | BODY MASS INDEX: 32.43 KG/M2 | WEIGHT: 183 LBS

## 2023-08-28 DIAGNOSIS — Z99.89 OSA ON CPAP: Chronic | ICD-10-CM

## 2023-08-28 DIAGNOSIS — I48.0 PAROXYSMAL ATRIAL FIBRILLATION: ICD-10-CM

## 2023-08-28 DIAGNOSIS — I48.11 LONGSTANDING PERSISTENT ATRIAL FIBRILLATION: Primary | ICD-10-CM

## 2023-08-28 DIAGNOSIS — I50.30 HEART FAILURE WITH PRESERVED EJECTION FRACTION, NYHA CLASS II: ICD-10-CM

## 2023-08-28 DIAGNOSIS — I10 PRIMARY HYPERTENSION: ICD-10-CM

## 2023-08-28 DIAGNOSIS — I44.7 LBBB (LEFT BUNDLE BRANCH BLOCK): ICD-10-CM

## 2023-08-28 DIAGNOSIS — G47.33 OSA ON CPAP: Chronic | ICD-10-CM

## 2023-08-28 DIAGNOSIS — I42.8 NON-ISCHEMIC CARDIOMYOPATHY: Primary | ICD-10-CM

## 2023-08-28 DIAGNOSIS — Z95.0 PRESENCE OF BIVENTRICULAR CARDIAC PACEMAKER: ICD-10-CM

## 2023-08-28 LAB — INR PPP: 4.4 (ref 0.9–1.1)

## 2023-08-28 PROCEDURE — 99214 OFFICE O/P EST MOD 30 MIN: CPT | Performed by: NURSE PRACTITIONER

## 2023-08-28 PROCEDURE — 3075F SYST BP GE 130 - 139MM HG: CPT | Performed by: NURSE PRACTITIONER

## 2023-08-28 PROCEDURE — 93000 ELECTROCARDIOGRAM COMPLETE: CPT | Performed by: NURSE PRACTITIONER

## 2023-08-28 PROCEDURE — 3079F DIAST BP 80-89 MM HG: CPT | Performed by: NURSE PRACTITIONER

## 2023-08-28 PROCEDURE — 1159F MED LIST DOCD IN RCRD: CPT | Performed by: NURSE PRACTITIONER

## 2023-08-28 PROCEDURE — 1160F RVW MEDS BY RX/DR IN RCRD: CPT | Performed by: NURSE PRACTITIONER

## 2023-08-28 NOTE — PROGRESS NOTES
Date of Office Visit: 2023  Encounter Provider: AGA Bhakta  Place of Service: [facility] CARDIOLOGY  Patient Name: Inessa Peoples  :1940    Chief Complaint   Patient presents with    LBBB    paroxysmal AFIB    NICM    Pacemaker Check   :     HPI: Inessa Peoples is a 83 y.o. female who follows with Dr. Stone--- PAF, LBBB, HFpEF, status post CRT-P 2021.    Presents for follow-up and device check.    Doing well.  No chest pain, dyspnea, PND, orthopnea, dizziness or edema.    Echo in 2021 EF greater than 70%.    Device interrogation shows normal testing and function. A paced <1%, effective CRT pacing 98%.  No arrhythmia events.    Warfarin for anticoagulation.     Past Medical History:   Diagnosis Date    Abnormal ECG     Allergic Iodine used in on xrays    Allergic rhinitis     Atrial fibrillation     Bleeds easily     warfarin    Breast screening     Broken bones     Calf DVT (deep venous thrombosis)     Chronic anticoagulation     Chronic pain disorder Lower back, shoulder, neck and knees    Class 2 severe obesity due to excess calories with serious comorbidity and body mass index (BMI) of 37.0 to 37.9 in adult     Colon polyp     CTS (carpal tunnel syndrome)     Deep vein thrombosis     left calf    Diverticulitis of colon     Diverticulitis of large intestine without perforation or abscess without bleeding 2017    Diverticulosis     AHN (dyspnea on exertion)     Fatigue     Fibromyalgia, primary     H/O malignant melanoma of skin     Heart failure with preserved ejection fraction, NYHA class II     II-III---decreased EF with stress    Hematuria, gross     History of colon polyps     History of kidney stones     Hyperlipidemia     Hypertension     Hyperthyroidism     IFG (impaired fasting glucose)     Insomnia     Joint pain Knees, shoulders and neck    LBBB (left bundle branch block)     Left leg swelling     LLQ abdominal pain      Long term current use of systemic steroids     Low back pain     Malignant melanoma of skin     Neck pain     Obesity     class 2 obesity due to excess calories with BMI of 35.0 to 35.9 in adult    Osteoarthritis     Osteopenia     Paralysis, diaphragm     Paroxysmal atrial fibrillation     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 for tuberculosis     Presence of biventricular cardiac pacemaker     Rash     RBBB (right bundle branch block)     Renal calculi     Sarcoid     Shingles     Sigmoid diverticulosis 06/26/2013    Sleep apnea     on CPAP, +10- Dr. Lu    Sleep apnea     USES CPAP    UTI (urinary tract infection)     Visit for screening mammogram        Past Surgical History:   Procedure Laterality Date    CARDIAC CATHETERIZATION N/A 06/29/2020    Procedure: Coronary angiography, [place] L;  Surgeon: Noe Reynoso MD;  Location:  [facility];  Service: Cardiovascular;  Laterality: N/A;    CARDIAC CATHETERIZATION N/A 06/29/2020    Procedure: Left ventriculography;  Surgeon: Noe Reynoso MD;  Location:  [facility];  Service: Cardiovascular;  Laterality: N/A;    CARDIAC CATHETERIZATION N/A 06/29/2020    Procedure: Right and Left Heart Cath;  Surgeon: Noe Reynoso MD;  Location:  [facility];  Service: Cardiovascular;  Laterality: N/A;    CARDIAC CATHETERIZATION      CARDIAC ELECTROPHYSIOLOGY PROCEDURE N/A 01/05/2021    Procedure: Biventricular Device Insertion -- CRT-p (Medtronic);  Surgeon: Demar Stone MD;  Location:  [facility];  Service: Cardiology;  Laterality: N/A;    CARPAL TUNNEL RELEASE Bilateral 1980'S    [facility]    CATARACT EXTRACTION Bilateral 2001    Dr. Nic Hidalgo    COLONOSCOPY N/A 06/26/2013    MILD SIGMOID DIVERTICULOSIS, repeat 5 years based on sister w/ colona cancer-DR. TANISHA THORNTON    COLONOSCOPY N/A 10/19/2010    SIGMOID DIVERTICULOSIS & INTERNAL HEMORRHOIDS, HEMORRHOIDAL BANDING X4, DR. GARAY  NORBERTO    COLONOSCOPY N/A 08/06/2018    Procedure: COLONOSCOPY TO CECUM AND INTO TERMINAL ILEUM;  Surgeon: Nasra Castro MD;  Location:  [facility];  Service: Gastroenterology    COLONOSCOPY N/A 10/04/2021    Procedure: COLONOSCOPY to CECUM;  Surgeon: Nasra Castro MD;  Location:  [facility];  Service: General;  Laterality: N/A;  FAMILY HX COLON CANCER  --DIVERTICULOSIS     COLONOSCOPY W/ BIOPSIES N/A 05/12/2008    RECTUM BIOPSY: COLONIC MUCOSA W/ FOCAL INCREASE OF INFLAMMATORY CELLS IN THE MUSCULARIS MUCOSA INCLUDING EOSINOPHILS, SIGMOID DIVERTICULOSIS, POSSIBLE PROCTITIS, DR. NASRA CASTRO    CYSTOSCOPY, RETROGRADE PYELOGRAM AND STENT INSERTION Bilateral 10/10/2014    w/ Right ureteral pyeloscopy & laser lithotripsy, Right Double-J stent placement-Dr. Julio Tai    ENDOSCOPY N/A 11/11/2020    Procedure: ESOPHAGOGASTRODUODENOSCOPY with biopsies and dilation 54fr fernández;  Surgeon: Garret Piedra MD;  Location:  [facility];  Service: Gastroenterology;  Laterality: N/A;  pre- dysphagia  post-- gastritis, dilation of esophageal ring, watermelon stomach     EXTRACORPOREAL SHOCK WAVE LITHOTRIPSY (ESWL) Right 09/10/2009    DR. JULIO TAI    HAMMER TOE REPAIR      JOINT REPLACEMENT  Not sure    Total knee replacement 4/27/22 both knees    LAPAROSCOPIC CHOLECYSTECTOMY N/A 07/19/2012    DR. NASRA CASTRO    LUMBAR EPIDURAL INJECTION N/A 04/23/2015    LUMBAR EPIDURAL STEROID INJECTION X3    MEDIAL BRANCH BLOCK Bilateral 11/02/2021    Procedure: MEDIAL BRANCH BLOCK--bilateral Lumbar2-5;  Surgeon: Wolfgang Means MD;  Location[place] [facility] [place] [facility];  Service: Pain Management;  Laterality: Bilateral;    MEDIAL BRANCH BLOCK Bilateral 10/11/2022    Procedure: bilateral L3-L5 medial branch blockade. Hold coumadin x 5 days prior;  Surgeon: Wolfgang Means MD;  Location: [facility] [place] [facility];  Service: Pain Management;  Laterality: Bilateral;    ORTHOPEDIC SURGERY  2019 and2020 [id number]    PACEMAKER  IMPLANTATION      PARATHYROIDECTOMY N/A 2001    SACROILIAC JOINT INJECTION Left 06/15/2021    Procedure: SACROILIAC INJECTION-- left and left greater trochanteric bursa injection;  Surgeon: Wolfgang Means MD;  [place] [facility] [place] [facility];  Service: Pain Management;  Laterality: Left;    SKIN CANCER EXCISION      THUMB ARTHROSCOPY Right 1982    TOTAL KNEE ARTHROPLASTY Right 03/25/2014    [employer] Vanguard total knee, 55 femoral component, 71 tibial tray w/ a 40 mm stem, 34 mm x 7.8 mm three-peg patella, and a 14 standard poly-Dr. Ty Canchola    TRIGGER POINT INJECTION         Social History     Socioeconomic History    Marital status:      Spouse name: EDY    Number of children: 4    Years of education: 14yrs   Tobacco Use    Smoking status: Never    Smokeless tobacco: Never    Tobacco comments:     No history of tobacco use   Vaping Use    Vaping Use: Never used   Substance and Sexual Activity    Alcohol use: Yes     Alcohol/week: 3.0 standard drinks     Types: 2 Glasses of wine, 1 Drinks containing 0.5 oz of alcohol per week     Comment: Occasionally    Drug use: Never    Sexual activity: Yes     Partners: Male     Birth control/protection: None       Family History   Problem Relation Age of Onset    Heart disease Mother     Diabetes type II Mother     Arthritis Mother     Heart disease Father     Hypertension Father     Cancer Sister     Colon polyps Sister     Lung cancer Sister     Heart disease Brother         CABG    Diabetes type II Brother     Colon polyps Brother     Arrhythmia Brother     Colon cancer Brother     Cancer Brother     Hypertension Brother     Heart disease Sister     Diabetes type II Sister     Aortic aneurysm Sister     COPD Sister     Arrhythmia Sister     Cerebral aneurysm Sister     Arthritis Sister     Hypertension Sister     COPD Sister     Lupus Sister     Asthma Maternal Aunt     Aortic aneurysm Brother     Arthritis Brother     Hypertension Brother        Review of Systems    Constitutional: Negative for chills, fever and malaise/fatigue.   Cardiovascular:  Negative for chest pain, dyspnea on exertion, leg swelling, near-syncope, orthopnea, palpitations, paroxysmal nocturnal dyspnea and syncope.   Respiratory:  Negative for cough and shortness of breath.    Hematologic/Lymphatic: Negative.    Musculoskeletal:  Positive for back pain. Negative for joint pain, joint swelling and myalgias.   Gastrointestinal:  Negative for abdominal pain, diarrhea, melena, nausea and vomiting.   Genitourinary:  Negative for frequency and hematuria.   Neurological:  Negative for light-headedness, numbness, paresthesias and seizures.   Allergic/Immunologic: Negative.    All other systems reviewed and are negative.    Allergies   Allergen Reactions    Iodinated Contrast Media Anaphylaxis, Swelling and Other (See Comments)     PAINS ACROSS CHEST, Facial swelling    Contrast Dye (Echo Or Unknown Ct/Mr) Other (See Comments)    Fenofibrate Myalgia    Livalo [Pitavastatin] Myalgia     Joint Pain, Muscle tightness    Plaquenil [Hydroxychloroquine Sulfate] Myalgia     Joint Pain, Muscle Tightness    Statins Myalgia     Joint pain, Muscle Tightness  Other reaction(s): Muscle cramps         Current Outpatient Medications:     acetaminophen (TYLENOL) 650 MG 8 hr tablet, Take 1 tablet by mouth Every 8 (Eight) Hours As Needed for Mild Pain., Disp: , Rfl:     azelastine (ASTELIN) 0.1 % nasal spray, 2 sprays into the nostril(s) as directed by provider 2 (Two) Times a Day. Use in each nostril as directed, Disp: 30 mL, Rfl: 12    Calcium-Phosphorus-Vitamin D (CITRACAL +D3 PO), Take 1 tablet by mouth Daily., Disp: , Rfl:     celecoxib (CeleBREX) 200 MG capsule, TAKE ONE CAPSULE BY MOUTH DAILY, Disp: 90 capsule, Rfl: 1    ezetimibe (ZETIA) 10 MG tablet, TAKE ONE TABLET BY MOUTH DAILY, Disp: 90 tablet, Rfl: 1    furosemide (LASIX) 20 MG tablet, TAKE ONE TABLET BY MOUTH DAILY, Disp: 90 tablet, Rfl: 1    ipratropium (ATROVENT)  "0.06 % nasal spray, , Disp: , Rfl:     levothyroxine (SYNTHROID, LEVOTHROID) 25 MCG tablet, TAKE ONE TABLET BY MOUTH DAILY, Disp: 90 tablet, Rfl: 1    melatonin 5 MG sublingual tablet sublingual tablet, Take 1 tablet by mouth Every Night., Disp: , Rfl:     methylPREDNISolone (MEDROL) 4 MG dose pack, Take as directed on package instructions., Disp: 21 tablet, Rfl: 0    metoprolol succinate XL (TOPROL-XL) 25 MG 24 hr tablet, TAKE 1/2 TABLET BY MOUTH DAILY, Disp: 45 tablet, Rfl: 1    Multiple Vitamin (MULTI-DAY VITAMINS) tablet, Take 1 tablet by mouth Daily., Disp: , Rfl:     sacubitril-valsartan (ENTRESTO) 24-26 MG tablet, Take 1 tablet by mouth 2 (Two) Times a Day., Disp: 180 tablet, Rfl: 3    spironolactone (ALDACTONE) 25 MG tablet, TAKE ONE TABLET BY MOUTH DAILY, Disp: 90 tablet, Rfl: 1    warfarin (COUMADIN) 3 MG tablet, Take 1.5 tablets by mouth Daily., Disp: 135 tablet, Rfl: 3      Objective:     Vitals:    08/28/23 1227   BP: 132/88   Pulse: 59   Weight: 83 kg (183 lb)   Height: 160 cm (63\")     Body mass index is 32.42 kg/mý.    PHYSICAL EXAM:    Vitals Reviewed.   General Appearance: No acute distress, well developed and well nourished.   Eyes: Conjunctiva and lids: No erythema, swelling, or discharge. Sclera non-icteric.   HENT: Atraumatic, normocephalic. External eyes, ears, and nose normal.   Respiratory: No signs of respiratory distress. Respiration rhythm and depth normal.   Clear to auscultation. No rales, crackles, rhonchi, or wheezing auscultated.   Cardiovascular:  Heart Rate and Rhythm: Normal, Heart Sounds: Normal S1 and S2.   Murmurs: No murmurs noted. No rubs, thrills, or gallops.   Lower Extremities: No edema noted.  Gastrointestinal:  Abdomen soft, non-distended, non-tender.   Musculoskeletal: Normal movement of extremities  Skin: Warm and dry.   Psychiatric: Patient alert and oriented to person, place, and time. Speech and behavior appropriate. Normal mood and affect.       ECG 12 " Lead    Date/Time: 8/28/2023 12:37 PM  Performed by: Mirela Ríos APRN  Authorized by: Mirela Ríos APRN   Comparison: compared with previous ECG   Similar to previous ECG  Rhythm: paced  BPM: 59  Pacing: atrial sensed rhythm, ventricular paced rhythm and 100% capture        Assessment:       Diagnosis Plan   1. Heart failure with preserved ejection fraction, NYHA class II        2. LBBB (left bundle branch block)        3. Presence of biventricular cardiac pacemaker        4. Primary hypertension        5. Paroxysmal atrial fibrillation        6. ANY on CPAP + 10 - Dr Lu               Plan:       1.-3. HFpEF, LBBB, s/p CRT-P.  Normal testing and function, euvolemic by exam, doing well.    4.  Hypertension, controlled.    5.  PAF, no episodes.  Remains on warfarin for anticoagulation.    6.  ANY, reports compliance with CPAP.    Follow-up with Dr. Stone in 6 months with device check.    As always, it has been a pleasure to participate in your patient's care.      Sincerely,         AGA Hartman

## 2023-08-28 NOTE — PROGRESS NOTES
Capillary Blood Specimen Collection  Capillary blood collection performed in  by Dennies Garrick, MA. Patient tolerated the procedure well without complications.   08/28/23   Dennies Garrick, MA     Hold one day. Change to 3 mg daily except 4.5 Tues / Saturday. 9/5/2023

## 2023-08-28 NOTE — TELEPHONE ENCOUNTER
Patient is schedule to have mole surgery on 9/7/2023. Was told to hold warfarin 3 days prior to procedure.

## 2023-08-30 ENCOUNTER — APPOINTMENT (OUTPATIENT)
Dept: WOMENS IMAGING | Facility: HOSPITAL | Age: 83
End: 2023-08-30
Payer: MEDICARE

## 2023-08-30 PROCEDURE — 77065 DX MAMMO INCL CAD UNI: CPT | Performed by: RADIOLOGY

## 2023-08-30 PROCEDURE — G0279 TOMOSYNTHESIS, MAMMO: HCPCS | Performed by: RADIOLOGY

## 2023-08-30 PROCEDURE — 76642 ULTRASOUND BREAST LIMITED: CPT | Performed by: RADIOLOGY

## 2023-09-05 ENCOUNTER — ANTICOAGULATION VISIT (OUTPATIENT)
Dept: INTERNAL MEDICINE | Facility: CLINIC | Age: 83
End: 2023-09-05
Payer: MEDICARE

## 2023-09-05 DIAGNOSIS — I50.30 HEART FAILURE WITH PRESERVED EJECTION FRACTION, NYHA CLASS II: Primary | ICD-10-CM

## 2023-09-05 LAB — INR PPP: 1.8 (ref 0.9–1.1)

## 2023-09-05 NOTE — PROGRESS NOTES
Capillary Blood Specimen Collection  Capillary blood collection performed in  by Nita Us MA/LMR. Patient tolerated the procedure well without complications.   Patient is having mole removal surgery on Thursday. She was advised to hold her dose for 3 days before surgery.    09/05/23   Nita Us MA/ADDIS

## 2023-09-14 ENCOUNTER — OFFICE VISIT (OUTPATIENT)
Dept: SLEEP MEDICINE | Facility: HOSPITAL | Age: 83
End: 2023-09-14
Payer: MEDICARE

## 2023-09-14 VITALS — BODY MASS INDEX: 32.25 KG/M2 | WEIGHT: 182 LBS | OXYGEN SATURATION: 94 % | HEIGHT: 63 IN | HEART RATE: 85 BPM

## 2023-09-14 DIAGNOSIS — Z99.89 OSA ON CPAP: Primary | Chronic | ICD-10-CM

## 2023-09-14 DIAGNOSIS — G47.33 OSA ON CPAP: Primary | Chronic | ICD-10-CM

## 2023-09-14 PROCEDURE — G0463 HOSPITAL OUTPT CLINIC VISIT: HCPCS

## 2023-09-14 NOTE — PROGRESS NOTES
"Follow Up [facility] Disorders Center Note     Chief Complaint:  ANY     Primary Care Physician: Gisele Dockery MD    Interval History:   The patient is a 83 y.o. female  who I last saw 9/15/2022 10/29/2021 and that note was reviewed.  The patient states she is doing well without new complaints.  She goes to bed at 11 PM and awakens at 9 AM.    The patient reports having melanoma removed from her left leg.    Review of Systems:    A complete review of systems was done and all were negative with the exception of some postnasal drip     Social History:    Social History     Socioeconomic History    Marital status:      Spouse name: EDY    Number of children: 4    Years of education: 14yrs   Tobacco Use    Smoking status: Never    Smokeless tobacco: Never    Tobacco comments:     No history of tobacco use   Vaping Use    Vaping Use: Never used   Substance and Sexual Activity    Alcohol use: Yes     Alcohol/week: 3.0 standard drinks     Types: 2 Glasses of wine, 1 Drinks containing 0.5 oz of alcohol per week     Comment: Occasionally    Drug use: Never    Sexual activity: Yes     Partners: Male     Birth control/protection: None       Allergies:  Iodinated contrast media, Contrast dye (echo or unknown ct/mr), Fenofibrate, Livalo [pitavastatin], Plaquenil [hydroxychloroquine sulfate], and Statins     Medication Review: Reviewed.      Vital Signs:    Vitals:    09/14/23 1100   Pulse: 85   SpO2: 94%   Weight: 82.6 kg (182 lb)   Height: 158.8 cm (62.52\")     Body mass index is 32.74 kg/m².    Physical Exam:    Constitutional:  Well developed 83 y.o. female that appears in no apparent distress.  Awake & oriented times 3.  Normal mood with normal recent and remote memory and normal judgement.  Eyes:  Conjunctivae normal.  Oropharynx: Previously, moist mucous membranes without exudate and a large tongue and normal uvula and moderate narrowing of the posterior pharyngeal opening.    Self-administered [place] Sleepiness " Scale test results: 1, previously 0  0-5 Lower normal daytime sleepiness  6-10 Higher normal daytime sleepiness  11-12 Mild, 13-15 Moderate, & 16-24 Severe excessive daytime sleepiness     Downloaded PAP Data Reviewed For Compliance:  DME is [employer] and she uses nasal pillows.  Downloads between 6/1 and 8/29/2023 compliance 97%.  Average usage is 8 hours and 13 minutes.  Average AHI is normal without leak.  CPAP pressure is +10    I have reviewed the above results and compared them with the patient's last downloads and reviewed with the patient.    Impression:   Obstructive sleep apnea adequately treated with DreamStation 2 auto CPAP set at +10. The patient appears to be at goal with good compliance and usage. The patient has no complaints of hypersomnolence.    Plan:  Good sleep hygiene measures should be maintained.  Weight loss would be beneficial in this patient who is obese by BMI.  The patient's weight has increased from 172 9/15/2022 to her present weight of 182.    After evaluating the patient and assessing results available, the patient is benefiting from the treatment being provided.     The patient will continue DreamStation 2 auto CPAP set at +10.  After clinical evaluation and review of downloads, I recommend no changes to the patient's pressures.  A new prescription will be sent to the patient's DME.      I answered all of the patient's questions.  The patient will call for any problems and will follow up in 1 year.      Jewel Lu MD  Sleep Medicine  09/14/23  12:29 EDT

## 2023-11-20 RX ORDER — METOPROLOL SUCCINATE 25 MG/1
TABLET, EXTENDED RELEASE ORAL
Qty: 45 TABLET | Refills: 1 | OUTPATIENT
Start: 2023-11-20

## 2023-11-22 RX ORDER — METOPROLOL SUCCINATE 25 MG/1
12.5 TABLET, EXTENDED RELEASE ORAL DAILY
Qty: 45 TABLET | Refills: 3 | Status: SHIPPED | OUTPATIENT
Start: 2023-11-22

## 2023-12-29 DIAGNOSIS — I10 PRIMARY HYPERTENSION: Primary | ICD-10-CM

## 2023-12-29 DIAGNOSIS — E78.5 HYPERLIPIDEMIA, UNSPECIFIED HYPERLIPIDEMIA TYPE: ICD-10-CM

## 2023-12-29 DIAGNOSIS — E03.9 ACQUIRED HYPOTHYROIDISM: ICD-10-CM

## 2024-01-08 RX ORDER — CELECOXIB 200 MG/1
200 CAPSULE ORAL DAILY
Qty: 90 CAPSULE | Refills: 1 | Status: SHIPPED | OUTPATIENT
Start: 2024-01-08

## 2024-01-10 ENCOUNTER — ANTICOAGULATION VISIT (OUTPATIENT)
Dept: INTERNAL MEDICINE | Facility: CLINIC | Age: 84
End: 2024-01-10
Payer: MEDICARE

## 2024-01-10 LAB — INR PPP: 2 (ref 2–3)

## 2024-01-11 LAB
ALBUMIN SERPL-MCNC: 4.6 G/DL (ref 3.5–5.2)
ALBUMIN/GLOB SERPL: 1.9 G/DL
ALP SERPL-CCNC: 99 U/L (ref 39–117)
ALT SERPL-CCNC: 24 U/L (ref 1–33)
AST SERPL-CCNC: 24 U/L (ref 1–32)
BASOPHILS # BLD AUTO: 0.04 10*3/MM3 (ref 0–0.2)
BASOPHILS NFR BLD AUTO: 0.6 % (ref 0–1.5)
BILIRUB SERPL-MCNC: 1.2 MG/DL (ref 0–1.2)
BUN SERPL-MCNC: 29 MG/DL (ref 8–23)
BUN/CREAT SERPL: 25.9 (ref 7–25)
CALCIUM SERPL-MCNC: 10.6 MG/DL (ref 8.6–10.5)
CHLORIDE SERPL-SCNC: 102 MMOL/L (ref 98–107)
CHOLEST SERPL-MCNC: 238 MG/DL (ref 0–200)
CO2 SERPL-SCNC: 24.4 MMOL/L (ref 22–29)
CREAT SERPL-MCNC: 1.12 MG/DL (ref 0.57–1)
EGFRCR SERPLBLD CKD-EPI 2021: 49.5 ML/MIN/1.73
EOSINOPHIL # BLD AUTO: 0.13 10*3/MM3 (ref 0–0.4)
EOSINOPHIL NFR BLD AUTO: 2 % (ref 0.3–6.2)
ERYTHROCYTE [DISTWIDTH] IN BLOOD BY AUTOMATED COUNT: 12.6 % (ref 12.3–15.4)
GLOBULIN SER CALC-MCNC: 2.4 GM/DL
GLUCOSE SERPL-MCNC: 104 MG/DL (ref 65–99)
HCT VFR BLD AUTO: 47.9 % (ref 34–46.6)
HDLC SERPL-MCNC: 60 MG/DL (ref 40–60)
HGB BLD-MCNC: 15.8 G/DL (ref 12–15.9)
IMM GRANULOCYTES # BLD AUTO: 0.04 10*3/MM3 (ref 0–0.05)
IMM GRANULOCYTES NFR BLD AUTO: 0.6 % (ref 0–0.5)
LDLC SERPL CALC-MCNC: 159 MG/DL (ref 0–100)
LYMPHOCYTES # BLD AUTO: 1.68 10*3/MM3 (ref 0.7–3.1)
LYMPHOCYTES NFR BLD AUTO: 25.9 % (ref 19.6–45.3)
MCH RBC QN AUTO: 30.2 PG (ref 26.6–33)
MCHC RBC AUTO-ENTMCNC: 33 G/DL (ref 31.5–35.7)
MCV RBC AUTO: 91.4 FL (ref 79–97)
MONOCYTES # BLD AUTO: 0.64 10*3/MM3 (ref 0.1–0.9)
MONOCYTES NFR BLD AUTO: 9.9 % (ref 5–12)
NEUTROPHILS # BLD AUTO: 3.95 10*3/MM3 (ref 1.7–7)
NEUTROPHILS NFR BLD AUTO: 61 % (ref 42.7–76)
NRBC BLD AUTO-RTO: 0 /100 WBC (ref 0–0.2)
PLATELET # BLD AUTO: 242 10*3/MM3 (ref 140–450)
POTASSIUM SERPL-SCNC: 4.4 MMOL/L (ref 3.5–5.2)
PROT SERPL-MCNC: 7 G/DL (ref 6–8.5)
RBC # BLD AUTO: 5.24 10*6/MM3 (ref 3.77–5.28)
SODIUM SERPL-SCNC: 137 MMOL/L (ref 136–145)
TRIGL SERPL-MCNC: 110 MG/DL (ref 0–150)
TSH SERPL DL<=0.005 MIU/L-ACNC: 2.26 UIU/ML (ref 0.27–4.2)
VLDLC SERPL CALC-MCNC: 19 MG/DL (ref 5–40)
WBC # BLD AUTO: 6.48 10*3/MM3 (ref 3.4–10.8)

## 2024-01-15 ENCOUNTER — TELEPHONE (OUTPATIENT)
Dept: INTERNAL MEDICINE | Facility: CLINIC | Age: 84
End: 2024-01-15

## 2024-01-15 ENCOUNTER — TELEMEDICINE (OUTPATIENT)
Dept: INTERNAL MEDICINE | Facility: CLINIC | Age: 84
End: 2024-01-15
Payer: MEDICARE

## 2024-01-15 DIAGNOSIS — U07.1 COVID-19 VIRUS DETECTED: Primary | ICD-10-CM

## 2024-01-15 PROCEDURE — 99213 OFFICE O/P EST LOW 20 MIN: CPT | Performed by: INTERNAL MEDICINE

## 2024-01-15 RX ORDER — BENZONATATE 100 MG/1
100 CAPSULE ORAL 3 TIMES DAILY PRN
Qty: 30 CAPSULE | Refills: 3 | Status: SHIPPED | OUTPATIENT
Start: 2024-01-15

## 2024-01-15 RX ORDER — LEVOTHYROXINE SODIUM 0.03 MG/1
25 TABLET ORAL DAILY
Qty: 90 TABLET | Refills: 1 | Status: SHIPPED | OUTPATIENT
Start: 2024-01-15

## 2024-01-15 NOTE — PROGRESS NOTES
"Chief Complaint   Patient presents with    COVID 19       History of Present Illness   Inessa Peoples is a 83 y.o. female presents for acute care.Mode of Visit: Video  Location of patient: home  Location of provider: [facility] clinic  You have chosen to receive care through a telehealth visit.  Does the patient consent to use a video/audio connection their medical care today? yes  The visit included audio and video interaction. No technical issues occurred during this visit.   Patient reports that she received COVID vaccination about one week ago. She started to have a cough about two days ago \"I started coughing quite a bit\". Of note patient has chf, atrial fibrillation, and a pace maker along with htn and osmin.       The following portions of the patient's history were reviewed and updated as appropriate: allergies, current medications, past family history, past medical history, past social history, past surgical history and problem list.  Current Outpatient Medications on File Prior to Visit   Medication Sig Dispense Refill    acetaminophen (TYLENOL) 650 MG 8 hr tablet Take 1 tablet by mouth Every 8 (Eight) Hours As Needed for Mild Pain.      azelastine (ASTELIN) 0.1 % nasal spray 2 sprays into the nostril(s) as directed by provider 2 (Two) Times a Day. Use in each nostril as directed 30 mL 12    Calcium-Phosphorus-Vitamin D (CITRACAL +D3 PO) Take 1 tablet by mouth Daily.      celecoxib (CeleBREX) 200 MG capsule TAKE 1 CAPSULE BY MOUTH DAILY 90 capsule 1    ezetimibe (ZETIA) 10 MG tablet TAKE ONE TABLET BY MOUTH DAILY 90 tablet 1    furosemide (LASIX) 20 MG tablet TAKE ONE TABLET BY MOUTH DAILY 90 tablet 1    ipratropium (ATROVENT) 0.06 % nasal spray       levothyroxine (SYNTHROID, LEVOTHROID) 25 MCG tablet TAKE 1 TABLET BY MOUTH DAILY 90 tablet 1    melatonin 5 MG sublingual tablet sublingual tablet Take 1 tablet by mouth Every Night.      methylPREDNISolone (MEDROL) 4 MG dose pack Take as directed on package " instructions. 21 tablet 0    metoprolol succinate XL (TOPROL-XL) 25 MG 24 hr tablet Take 0.5 tablets by mouth Daily. 45 tablet 3    Multiple Vitamin (MULTI-DAY VITAMINS) tablet Take 1 tablet by mouth Daily.      sacubitril-valsartan (ENTRESTO) 24-26 MG tablet Take 1 tablet by mouth 2 (Two) Times a Day. 180 tablet 3    spironolactone (ALDACTONE) 25 MG tablet TAKE ONE TABLET BY MOUTH DAILY 90 tablet 1    warfarin (COUMADIN) 3 MG tablet Take 1.5 tablets by mouth Daily. 135 tablet 3    [DISCONTINUED] levothyroxine (SYNTHROID, LEVOTHROID) 25 MCG tablet TAKE ONE TABLET BY MOUTH DAILY 90 tablet 1     No current facility-administered medications on file prior to visit.     Review of Systems   Constitutional:  Positive for fatigue.   HENT:  Positive for postnasal drip and rhinorrhea.    Eyes: Negative.    Respiratory:  Positive for cough.    Cardiovascular: Negative.    Gastrointestinal: Negative.    Endocrine: Negative.    Genitourinary: Negative.    Musculoskeletal: Negative.    Allergic/Immunologic: Negative.    Neurological: Negative.    Hematological: Negative.    Psychiatric/Behavioral: Negative.         Objective   Physical Exam  Constitutional:       Appearance: Normal appearance.   Pulmonary:      Effort: Pulmonary effort is normal.   Neurological:      General: No focal deficit present.      Mental Status: She is alert and oriented to person, place, and time.   Psychiatric:         Mood and Affect: Mood normal.         Behavior: Behavior normal.         Thought Content: Thought content normal.         Judgment: Judgment normal.          There were no vitals taken for this visit.    Assessment & Plan   Diagnoses and all orders for this visit:    COVID-19 virus detected    Other orders  -     Nirmatrelvir & Ritonavir, 300mg/100mg, (PAXLOVID) 20 x 150 MG & 10 x 100MG tablet therapy pack tablet; Take 2 tablets by mouth 2 (Two) Times a Day.  -     benzonatate (Tessalon Perles) 100 MG capsule; Take 1 capsule by mouth 3  (Three) Times a Day As Needed for Cough.    Patient w acute COVID 19 infection. Symptoms started x 2 days ago. No distress. She has mild renal insufficiency. She has been rx and tolerated paxlovid in the past for COVID 19 and is aware of the risks and benefits of this medication. Given patient's increased risk for poor outcome given age, history of chf, atrial fib, osmin- She is Rx a course of paxlovid. Discussed that INR should be monitored on this therapy. She is to have testing immediately if any new bleeding episode. She has azelastine nasal and will use this w/ tessalon perles or robitussin in a prn fashion for cough and congestion. She will hydrate well and focus on maximizing sleep. She will f/u here routinely.

## 2024-01-16 ENCOUNTER — TELEPHONE (OUTPATIENT)
Dept: INTERNAL MEDICINE | Facility: CLINIC | Age: 84
End: 2024-01-16

## 2024-01-26 ENCOUNTER — OFFICE VISIT (OUTPATIENT)
Dept: INTERNAL MEDICINE | Facility: CLINIC | Age: 84
End: 2024-01-26
Payer: MEDICARE

## 2024-01-26 VITALS
HEIGHT: 63 IN | SYSTOLIC BLOOD PRESSURE: 120 MMHG | HEART RATE: 68 BPM | BODY MASS INDEX: 33.13 KG/M2 | OXYGEN SATURATION: 98 % | DIASTOLIC BLOOD PRESSURE: 80 MMHG | WEIGHT: 187 LBS

## 2024-01-26 DIAGNOSIS — I10 PRIMARY HYPERTENSION: Primary | ICD-10-CM

## 2024-01-26 DIAGNOSIS — E03.9 ACQUIRED HYPOTHYROIDISM: ICD-10-CM

## 2024-01-26 DIAGNOSIS — E78.5 HYPERLIPIDEMIA, UNSPECIFIED HYPERLIPIDEMIA TYPE: ICD-10-CM

## 2024-01-26 PROCEDURE — 3079F DIAST BP 80-89 MM HG: CPT | Performed by: INTERNAL MEDICINE

## 2024-01-26 PROCEDURE — 1159F MED LIST DOCD IN RCRD: CPT | Performed by: INTERNAL MEDICINE

## 2024-01-26 PROCEDURE — 99214 OFFICE O/P EST MOD 30 MIN: CPT | Performed by: INTERNAL MEDICINE

## 2024-01-26 PROCEDURE — 3074F SYST BP LT 130 MM HG: CPT | Performed by: INTERNAL MEDICINE

## 2024-01-26 PROCEDURE — 1160F RVW MEDS BY RX/DR IN RCRD: CPT | Performed by: INTERNAL MEDICINE

## 2024-01-26 NOTE — PROGRESS NOTES
Subjective     Inessa Peoples is a 84 y.o. female who presents with   Chief Complaint   Patient presents with    Hypertension    Hyperlipidemia    Hypothyroidism       Hypertension    Hyperlipidemia  Exacerbating diseases include hypothyroidism.   Hypothyroidism         The following data was reviewed by: Gisele Dockery MD on 01/26/2024:  Common labs          7/6/2023    11:00 7/31/2023    15:34 1/10/2024    13:48   Common Labs   Glucose 101  88  104    BUN 32  29  29    Creatinine 1.07  1.14  1.12    Sodium 142  140  137    Potassium 4.9  4.9  4.4    Chloride 104  102  102    Calcium 10.6  9.9  10.6    Total Protein 6.7  6.8  7.0    Albumin 4.4  4.6  4.6    Total Bilirubin 0.9  1.1  1.2    Alkaline Phosphatase 100  102  99    AST (SGOT) 18  21  24    ALT (SGPT) 37  21  24    WBC 6.89  6.26  6.48    Hemoglobin 15.5  15.4  15.8    Hematocrit 44.0  44.3  47.9    Platelets 231  186  242    Total Cholesterol 200   238    Triglycerides 166   110    HDL Cholesterol 48   60    LDL Cholesterol  123   159      HTN.  Control is good.   HLD.  Not optimal.  She is statin intolerant and maintained on Zetia.    Hypothyroidism.  TSH is well controlled.     Review of Systems   Respiratory: Negative.     Cardiovascular: Negative.        The following portions of the patient's history were reviewed and updated as appropriate: allergies, current medications and problem list.    Patient Active Problem List    Diagnosis Date Noted    Heart failure with preserved ejection fraction, NYHA class II 02/11/2021    Presence of biventricular cardiac pacemaker 02/11/2021    Paroxysmal atrial fibrillation 12/09/2020     Note Last Updated: 12/9/2020     Added automatically from request for surgery [id number]      LBBB (left bundle branch block) 11/08/2019    Trochanteric bursitis of both hips 05/29/2019    Lumbar facet arthropathy 11/06/2018    Bulge of lumbar disc without myelopathy 07/13/2017    Dilation of thoracic aorta 06/23/2017    ANY on  CPAP + 10 - Dr Lu 10/15/2016    GERD (gastroesophageal reflux disease) 08/05/2016    Sarcoidosis 04/18/2016    Diaphragmatic paralysis 04/18/2016    Osteopenia 04/18/2016    Impaired fasting glucose 04/18/2016    Hypothyroidism 04/18/2016    Hypertension 04/18/2016    Hyperlipidemia 04/18/2016     Note Last Updated: 8/5/2016     Intolerant to trials of multiple statins over the years.        Chronic osteoarthritis 04/18/2016       Current Outpatient Medications on File Prior to Visit   Medication Sig Dispense Refill    acetaminophen (TYLENOL) 650 MG 8 hr tablet Take 1 tablet by mouth Every 8 (Eight) Hours As Needed for Mild Pain.      benzonatate (Tessalon Perles) 100 MG capsule Take 1 capsule by mouth 3 (Three) Times a Day As Needed for Cough. 30 capsule 3    Calcium-Phosphorus-Vitamin D (CITRACAL +D3 PO) Take 1 tablet by mouth Daily.      celecoxib (CeleBREX) 200 MG capsule TAKE 1 CAPSULE BY MOUTH DAILY 90 capsule 1    ezetimibe (ZETIA) 10 MG tablet TAKE ONE TABLET BY MOUTH DAILY 90 tablet 1    furosemide (LASIX) 20 MG tablet TAKE ONE TABLET BY MOUTH DAILY 90 tablet 1    levothyroxine (SYNTHROID, LEVOTHROID) 25 MCG tablet TAKE 1 TABLET BY MOUTH DAILY 90 tablet 1    melatonin 5 MG sublingual tablet sublingual tablet Take 1 tablet by mouth Every Night.      metoprolol succinate XL (TOPROL-XL) 25 MG 24 hr tablet Take 0.5 tablets by mouth Daily. 45 tablet 3    Multiple Vitamin (MULTI-DAY VITAMINS) tablet Take 1 tablet by mouth Daily.      sacubitril-valsartan (ENTRESTO) 24-26 MG tablet Take 1 tablet by mouth 2 (Two) Times a Day. 180 tablet 3    spironolactone (ALDACTONE) 25 MG tablet TAKE ONE TABLET BY MOUTH DAILY 90 tablet 1    warfarin (COUMADIN) 3 MG tablet Take 1.5 tablets by mouth Daily. 135 tablet 3    [DISCONTINUED] azelastine (ASTELIN) 0.1 % nasal spray 2 sprays into the nostril(s) as directed by provider 2 (Two) Times a Day. Use in each nostril as directed 30 mL 12    [DISCONTINUED] ipratropium  "(ATROVENT) 0.06 % nasal spray       [DISCONTINUED] methylPREDNISolone (MEDROL) 4 MG dose pack Take as directed on package instructions. 21 tablet 0    [DISCONTINUED] Nirmatrelvir & Ritonavir, 300mg/100mg, (PAXLOVID) 20 x 150 MG & 10 x 100MG tablet therapy pack tablet Take 2 tablets by mouth 2 (Two) Times a Day. 20 each 0     No current facility-administered medications on file prior to visit.       Objective     /80   Pulse 68   Ht 158.8 cm (62.52\")   Wt 84.8 kg (187 lb)   SpO2 98%   BMI 33.64 kg/m²     Physical Exam  Constitutional:       Appearance: She is well-developed.   HENT:      Head: Normocephalic and atraumatic.   Cardiovascular:      Rate and Rhythm: Normal rate and regular rhythm.      Heart sounds: Normal heart sounds.   Pulmonary:      Effort: Pulmonary effort is normal.      Breath sounds: Normal breath sounds.   Skin:     General: Skin is warm and dry.   Neurological:      Mental Status: She is alert and oriented to person, place, and time.   Psychiatric:         Behavior: Behavior normal.         Assessment & Plan   Diagnoses and all orders for this visit:    1. Primary hypertension (Primary)    2. Hyperlipidemia, unspecified hyperlipidemia type    3. Acquired hypothyroidism        Discussion    HTN.  Control is good.  The patient is advised to continue current dosage of metoprolol, entresto and spironolactone.    HLD.  Control is not optimal.  The patient is advised to continue current dosage of Zetia.  Continue attention to diet.  Hypothyroidism.  Control is good.  The patient is advised to continue current dosage of levothyroxine.             Future Appointments   Date Time Provider Department Center   3/22/2024 11:00 AM ARLEEN GERARDO [place] [facility] None   3/22/2024 11:40 AM Demar Stone MD [facility] None   7/12/2024  2:00 PM [facility]   7/19/2024  1:30 PM Gisele Dockery MD [facility]   9/19/2024 11:45 AM Jewel Lu, " MD [facility] None

## 2024-02-02 RX ORDER — WARFARIN SODIUM 3 MG/1
4.5 TABLET ORAL DAILY
Qty: 135 TABLET | Refills: 3 | Status: SHIPPED | OUTPATIENT
Start: 2024-02-02

## 2024-02-05 RX ORDER — EZETIMIBE 10 MG/1
10 TABLET ORAL DAILY
Qty: 90 TABLET | Refills: 1 | Status: SHIPPED | OUTPATIENT
Start: 2024-02-05

## 2024-02-05 RX ORDER — SPIRONOLACTONE 25 MG/1
25 TABLET ORAL DAILY
Qty: 90 TABLET | Refills: 1 | Status: SHIPPED | OUTPATIENT
Start: 2024-02-05

## 2024-02-26 ENCOUNTER — ANTICOAGULATION VISIT (OUTPATIENT)
Dept: INTERNAL MEDICINE | Facility: CLINIC | Age: 84
End: 2024-02-26
Payer: MEDICARE

## 2024-02-26 DIAGNOSIS — I48.0 PAROXYSMAL ATRIAL FIBRILLATION: Primary | ICD-10-CM

## 2024-02-26 LAB — INR PPP: 3 (ref 0.9–1.1)

## 2024-03-22 ENCOUNTER — OFFICE VISIT (OUTPATIENT)
Age: 84
End: 2024-03-22
Payer: MEDICARE

## 2024-03-22 ENCOUNTER — CLINICAL SUPPORT NO REQUIREMENTS (OUTPATIENT)
Age: 84
End: 2024-03-22
Payer: MEDICARE

## 2024-03-22 VITALS
SYSTOLIC BLOOD PRESSURE: 138 MMHG | BODY MASS INDEX: 32.6 KG/M2 | DIASTOLIC BLOOD PRESSURE: 70 MMHG | HEIGHT: 63 IN | HEART RATE: 71 BPM | WEIGHT: 184 LBS

## 2024-03-22 DIAGNOSIS — Z95.0 PRESENCE OF BIVENTRICULAR CARDIAC PACEMAKER: Primary | ICD-10-CM

## 2024-03-22 DIAGNOSIS — I42.8 NON-ISCHEMIC CARDIOMYOPATHY: Primary | ICD-10-CM

## 2024-03-22 DIAGNOSIS — I48.0 PAROXYSMAL ATRIAL FIBRILLATION: ICD-10-CM

## 2024-03-22 DIAGNOSIS — I44.7 LBBB (LEFT BUNDLE BRANCH BLOCK): ICD-10-CM

## 2024-03-22 NOTE — PROGRESS NOTES
Date of Office Visit: 2024  Encounter Provider: Demar Stone MD  Place of Service: [facility] CARDIOLOGY  Patient Name: Inessa Peoples  : 1940    Subjective:     Encounter Date:2024      Patient ID: Inessa Peoples is a 84 y.o. female who has a cc of  PAF and LBB and HFpEF and CRT-P in .     The patient had a good year.   No anginal chest pain,   No sig ahn,   No soa,   No fainting,  No orthostasis.   No edema.   Exercise tolerance: she is sedentary     There have been no hospital admission since the last visit.     There have been no bleeding events.       Past Medical History:   Diagnosis Date    Abnormal ECG     Allergic Iodine used in on xrays    Allergic rhinitis     Atrial fibrillation     Bleeds easily     warfarin    Breast screening     Broken bones     Bulge of lumbar disc without myelopathy 2017    Calf DVT (deep venous thrombosis)     Chronic anticoagulation     Chronic pain disorder Lower back, shoulder, neck and knees    Class 2 severe obesity due to excess calories with serious comorbidity and body mass index (BMI) of 37.0 to 37.9 in adult     Colon polyp     CTS (carpal tunnel syndrome)     Deep vein thrombosis     left calf    Diverticulitis of colon     Diverticulitis of large intestine without perforation or abscess without bleeding 2017    Diverticulosis     AHN (dyspnea on exertion)     Fatigue     Fibromyalgia, primary     H/O malignant melanoma of skin     Heart failure with preserved ejection fraction, NYHA class II     II-III---decreased EF with stress    Hematuria, gross     History of colon polyps     History of kidney stones     HL (hearing loss)     Hyperlipidemia     Hypertension     Hyperthyroidism     IFG (impaired fasting glucose)     Insomnia     Joint pain Knees, shoulders and neck    LBBB (left bundle branch block)     Left leg swelling     LLQ abdominal pain     Long term current use of systemic steroids     Low back pain      Malignant melanoma of skin     Neck pain     Obesity     class 2 obesity due to excess calories with BMI of 35.0 to 35.9 in adult    Osteoarthritis     Osteopenia     Paralysis, diaphragm     Paroxysmal atrial fibrillation     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 for tuberculosis     Presence of biventricular cardiac pacemaker     Rash     RBBB (right bundle branch block)     Renal calculi     Sarcoid     Shingles     Sigmoid diverticulosis 06/26/2013    Sleep apnea     on CPAP, +10- Dr. Lu    Sleep apnea     USES CPAP    UTI (urinary tract infection)     Visit for screening mammogram        Social History     Socioeconomic History    Marital status:      Spouse name: EDY    Number of children: 4    Years of education: 14yrs   Tobacco Use    Smoking status: Never    Smokeless tobacco: Never    Tobacco comments:     No history of tobacco use   Vaping Use    Vaping status: Never Used   Substance and Sexual Activity    Alcohol use: Yes     Alcohol/week: 3.0 standard drinks of alcohol     Types: 2 Glasses of wine, 1 Drinks containing 0.5 oz of alcohol per week     Comment: Occasionally    Drug use: Never    Sexual activity: Yes     Partners: Male     Birth control/protection: None       Family History   Problem Relation Age of Onset    Heart disease Mother     Diabetes type II Mother     Arthritis Mother     Heart disease Father     Hypertension Father     Cancer Sister     Colon polyps Sister     Lung cancer Sister     Heart disease Sister     Heart disease Brother         CABG    Diabetes type II Brother     Colon polyps Brother     Arrhythmia Brother     Colon cancer Brother     Cancer Brother     Hypertension Brother     Heart disease Sister     Diabetes type II Sister     Aortic aneurysm Sister     COPD Sister     Arrhythmia Sister     Cerebral aneurysm Sister     Arthritis Sister     Hypertension Sister     COPD Sister     Lupus Sister     Asthma Maternal Aunt     Aortic aneurysm Brother     Arthritis Brother      "Hypertension Brother        Review of Systems   Constitutional: Negative for fever and night sweats.   HENT:  Negative for ear pain and stridor.    Eyes:  Negative for discharge and visual halos.   Cardiovascular:  Negative for cyanosis.   Respiratory:  Negative for hemoptysis and sputum production.    Hematologic/Lymphatic: Negative for adenopathy.   Skin:  Negative for nail changes and unusual hair distribution.   Musculoskeletal:  Negative for gout and joint swelling.   Gastrointestinal:  Negative for bowel incontinence and flatus.   Genitourinary:  Negative for dysuria and flank pain.   Neurological:  Negative for seizures and tremors.   Psychiatric/Behavioral:  Negative for altered mental status. The patient is not nervous/anxious.             Objective:     Vitals:    03/22/24 1100   BP: 138/70   Pulse: 71   Weight: 83.5 kg (184 lb)   Height: 158.8 cm (62.52\")         Eyes:      General:         Right eye: No discharge.         Left eye: No discharge.   HENT:      Head: Normocephalic and atraumatic.   Neck:      Thyroid: No thyromegaly.      Vascular: No JVD.   Pulmonary:      Effort: Pulmonary effort is normal.      Breath sounds: Normal breath sounds. No rales.   Cardiovascular:      Normal rate. Regular rhythm.      No gallop.    Edema:     Peripheral edema absent.   Abdominal:      General: Bowel sounds are normal.      Palpations: Abdomen is soft.      Tenderness: There is no abdominal tenderness.   Musculoskeletal: Normal range of motion.         General: No deformity. Skin:     General: Skin is warm and dry.      Findings: No erythema.   Neurological:      Mental Status: Alert and oriented to person, place, and time.      Motor: Normal muscle tone.   Psychiatric:         Behavior: Behavior normal.         Thought Content: Thought content normal.           ECG 12 Lead    Date/Time: 3/22/2024 11:15 AM  Performed by: Demar Stone MD    Authorized by: Demar Stone MD  Comparison: compared with " previous ECG   Similar to previous ECG  Rhythm: sinus rhythm and paced          Lab Review:       Assessment:          Diagnosis Plan   1. Presence of biventricular cardiac pacemaker        2. LBBB (left bundle branch block)        3. Paroxysmal atrial fibrillation               Plan:       She is great. Feels great. Pacer great.     I reviewed the pacemaker/ICD tracings and the pacing and sensing parameters are normal.  AF burden is 0    HTN -- controlled on meds.

## 2024-04-05 ENCOUNTER — ANTICOAGULATION VISIT (OUTPATIENT)
Dept: INTERNAL MEDICINE | Facility: CLINIC | Age: 84
End: 2024-04-05
Payer: MEDICARE

## 2024-04-05 DIAGNOSIS — I48.0 PAROXYSMAL ATRIAL FIBRILLATION: Primary | ICD-10-CM

## 2024-04-05 LAB — INR PPP: 2.8 (ref 0.9–1.1)

## 2024-04-05 PROCEDURE — 36416 COLLJ CAPILLARY BLOOD SPEC: CPT | Performed by: INTERNAL MEDICINE

## 2024-04-05 PROCEDURE — 85610 PROTHROMBIN TIME: CPT | Performed by: INTERNAL MEDICINE

## 2024-04-05 PROCEDURE — 36415 COLL VENOUS BLD VENIPUNCTURE: CPT | Performed by: INTERNAL MEDICINE

## 2024-04-10 ENCOUNTER — TELEPHONE (OUTPATIENT)
Dept: INTERNAL MEDICINE | Facility: CLINIC | Age: 84
End: 2024-04-10
Payer: MEDICARE

## 2024-04-10 NOTE — TELEPHONE ENCOUNTER
Caller: Inessa Peoples    Relationship: Self    Best call back number: [phone]    What was the call regarding: PATIENT STATED SHE RECEIVED INR RESULTS BACK THROUGH [facility] AND STATED THEY WERE ABNORMAL.    SHE WOULD LIKE TO KNOW WHAT DR GUTIERREZ ADVISES.    PLEASE CALL.

## 2024-05-09 ENCOUNTER — TRANSCRIBE ORDERS (OUTPATIENT)
Dept: ADMINISTRATIVE | Facility: HOSPITAL | Age: 84
End: 2024-05-09
Payer: MEDICARE

## 2024-05-09 DIAGNOSIS — R13.10 DYSPHAGIA, UNSPECIFIED TYPE: Primary | ICD-10-CM

## 2024-05-21 ENCOUNTER — ANTICOAGULATION VISIT (OUTPATIENT)
Dept: INTERNAL MEDICINE | Facility: CLINIC | Age: 84
End: 2024-05-21
Payer: MEDICARE

## 2024-05-21 DIAGNOSIS — E43 SEVERE PROTEIN-CALORIE MALNUTRITION: Primary | ICD-10-CM

## 2024-05-21 LAB — INR PPP: 1.3 (ref 2–3)

## 2024-05-21 PROCEDURE — 36416 COLLJ CAPILLARY BLOOD SPEC: CPT | Performed by: INTERNAL MEDICINE

## 2024-05-21 PROCEDURE — 85610 PROTHROMBIN TIME: CPT | Performed by: INTERNAL MEDICINE

## 2024-05-23 ENCOUNTER — HOSPITAL ENCOUNTER (OUTPATIENT)
Dept: GENERAL RADIOLOGY | Facility: HOSPITAL | Age: 84
Discharge: HOME | End: 2024-05-23
Payer: MEDICARE

## 2024-05-23 DIAGNOSIS — R13.10 DYSPHAGIA, UNSPECIFIED TYPE: ICD-10-CM

## 2024-05-23 PROCEDURE — 92611 MOTION FLUOROSCOPY/SWALLOW: CPT

## 2024-05-23 PROCEDURE — 74221 X-RAY XM ESOPHAGUS 2CNTRST: CPT

## 2024-05-23 PROCEDURE — 74230 X-RAY XM SWLNG FUNCJ C+: CPT

## 2024-05-23 RX ADMIN — BARIUM SULFATE 135 ML: 980 POWDER, FOR SUSPENSION ORAL at 09:29

## 2024-05-23 RX ADMIN — BARIUM SULFATE 55 ML: 0.81 POWDER, FOR SUSPENSION ORAL at 10:18

## 2024-05-23 RX ADMIN — BARIUM SULFATE 4 ML: 980 POWDER, FOR SUSPENSION ORAL at 10:18

## 2024-05-23 RX ADMIN — BARIUM SULFATE 700 MG: 700 TABLET ORAL at 09:29

## 2024-05-23 RX ADMIN — BARIUM SULFATE 50 ML: 400 SUSPENSION ORAL at 10:18

## 2024-05-23 RX ADMIN — BARIUM SULFATE 1 TEASPOON(S): 0.6 CREAM ORAL at 10:18

## 2024-05-23 RX ADMIN — ANTACID/ANTIFLATULENT 1 PACKET: 380; 550; 10; 10 GRANULE, EFFERVESCENT ORAL at 09:29

## 2024-05-23 RX ADMIN — BARIUM SULFATE 183 ML: 960 POWDER, FOR SUSPENSION ORAL at 09:29

## 2024-05-28 ENCOUNTER — ANTICOAGULATION VISIT (OUTPATIENT)
Dept: INTERNAL MEDICINE | Facility: CLINIC | Age: 84
End: 2024-05-28
Payer: MEDICARE

## 2024-05-28 LAB — INR PPP: 2.5 (ref 0.9–1.1)

## 2024-05-28 PROCEDURE — 85610 PROTHROMBIN TIME: CPT | Performed by: INTERNAL MEDICINE

## 2024-05-28 PROCEDURE — 36416 COLLJ CAPILLARY BLOOD SPEC: CPT | Performed by: INTERNAL MEDICINE

## 2024-06-03 RX ORDER — FUROSEMIDE 20 MG/1
TABLET ORAL
Qty: 90 TABLET | Refills: 1 | Status: SHIPPED | OUTPATIENT
Start: 2024-06-03

## 2024-06-26 ENCOUNTER — TELEPHONE (OUTPATIENT)
Age: 84
End: 2024-06-26
Payer: MEDICARE

## 2024-06-26 NOTE — TELEPHONE ENCOUNTER
Pt has a [employer] CRT-P device with v.pacing programmed LV only.     After reviewing most recent remote, pt had 2 AT/AF events both occurring on 24.     After review, there appeared to be noise/some sort of YANIRA on the atrial channel and not true AT/AF.                 I called and spoke to the pt who stated that she saw her chiropractor on that day at around the time the events were recorded and a stimulator was used on her neck/lower back. Noise/YANIRA were possibly from this?    Pt stated she had this done again on 24 with no events recorded on that day.     Her next appt is on 24. Will monitor to see if any events are recorded on that day.     All lead testing since 24 has been WNL.     Pt does not have a hx of being dependent. On this report, AP: 0.1%, Effective pacin.2%

## 2024-07-08 RX ORDER — CELECOXIB 200 MG/1
200 CAPSULE ORAL DAILY
Qty: 90 CAPSULE | Refills: 1 | Status: SHIPPED | OUTPATIENT
Start: 2024-07-08

## 2024-07-11 DIAGNOSIS — E78.5 HYPERLIPIDEMIA, UNSPECIFIED HYPERLIPIDEMIA TYPE: ICD-10-CM

## 2024-07-11 DIAGNOSIS — I10 PRIMARY HYPERTENSION: Primary | ICD-10-CM

## 2024-07-11 DIAGNOSIS — E03.9 ACQUIRED HYPOTHYROIDISM: ICD-10-CM

## 2024-07-11 RX ORDER — LEVOTHYROXINE SODIUM 0.03 MG/1
25 TABLET ORAL DAILY
Qty: 90 TABLET | Refills: 1 | Status: SHIPPED | OUTPATIENT
Start: 2024-07-11

## 2024-07-13 LAB
ALBUMIN SERPL-MCNC: 4.2 G/DL (ref 3.7–4.7)
ALP SERPL-CCNC: 97 IU/L (ref 44–121)
ALT SERPL-CCNC: 20 IU/L (ref 0–32)
AST SERPL-CCNC: 22 IU/L (ref 0–40)
BASOPHILS # BLD AUTO: 0 X10E3/UL (ref 0–0.2)
BASOPHILS NFR BLD AUTO: 1 %
BILIRUB SERPL-MCNC: 0.5 MG/DL (ref 0–1.2)
BUN SERPL-MCNC: 23 MG/DL (ref 8–27)
BUN/CREAT SERPL: 24 (ref 12–28)
CALCIUM SERPL-MCNC: 9.7 MG/DL (ref 8.7–10.3)
CHLORIDE SERPL-SCNC: 104 MMOL/L (ref 96–106)
CHOLEST SERPL-MCNC: 233 MG/DL (ref 100–199)
CO2 SERPL-SCNC: 20 MMOL/L (ref 20–29)
CREAT SERPL-MCNC: 0.97 MG/DL (ref 0.57–1)
EGFRCR SERPLBLD CKD-EPI 2021: 58 ML/MIN/1.73
EOSINOPHIL # BLD AUTO: 0.2 X10E3/UL (ref 0–0.4)
EOSINOPHIL NFR BLD AUTO: 2 %
ERYTHROCYTE [DISTWIDTH] IN BLOOD BY AUTOMATED COUNT: 12.2 % (ref 11.7–15.4)
GLOBULIN SER CALC-MCNC: 2.4 G/DL (ref 1.5–4.5)
GLUCOSE SERPL-MCNC: 104 MG/DL (ref 70–99)
HCT VFR BLD AUTO: 46.3 % (ref 34–46.6)
HDLC SERPL-MCNC: 56 MG/DL
HGB BLD-MCNC: 15.4 G/DL (ref 11.1–15.9)
IMM GRANULOCYTES # BLD AUTO: 0 X10E3/UL (ref 0–0.1)
IMM GRANULOCYTES NFR BLD AUTO: 0 %
LDLC SERPL CALC-MCNC: 147 MG/DL (ref 0–99)
LYMPHOCYTES # BLD AUTO: 2.1 X10E3/UL (ref 0.7–3.1)
LYMPHOCYTES NFR BLD AUTO: 25 %
MCH RBC QN AUTO: 31.8 PG (ref 26.6–33)
MCHC RBC AUTO-ENTMCNC: 33.3 G/DL (ref 31.5–35.7)
MCV RBC AUTO: 96 FL (ref 79–97)
MONOCYTES # BLD AUTO: 0.6 X10E3/UL (ref 0.1–0.9)
MONOCYTES NFR BLD AUTO: 7 %
NEUTROPHILS # BLD AUTO: 5.4 X10E3/UL (ref 1.4–7)
NEUTROPHILS NFR BLD AUTO: 65 %
PLATELET # BLD AUTO: 263 X10E3/UL (ref 150–450)
POTASSIUM SERPL-SCNC: 4.5 MMOL/L (ref 3.5–5.2)
PROT SERPL-MCNC: 6.6 G/DL (ref 6–8.5)
RBC # BLD AUTO: 4.84 X10E6/UL (ref 3.77–5.28)
SODIUM SERPL-SCNC: 140 MMOL/L (ref 134–144)
TRIGL SERPL-MCNC: 169 MG/DL (ref 0–149)
TSH SERPL DL<=0.005 MIU/L-ACNC: 2.18 UIU/ML (ref 0.45–4.5)
VLDLC SERPL CALC-MCNC: 30 MG/DL (ref 5–40)
WBC # BLD AUTO: 8.3 X10E3/UL (ref 3.4–10.8)

## 2024-07-19 ENCOUNTER — OFFICE VISIT (OUTPATIENT)
Dept: INTERNAL MEDICINE | Facility: CLINIC | Age: 84
End: 2024-07-19
Payer: MEDICARE

## 2024-07-19 DIAGNOSIS — E03.9 ACQUIRED HYPOTHYROIDISM: ICD-10-CM

## 2024-07-19 DIAGNOSIS — I10 PRIMARY HYPERTENSION: Primary | ICD-10-CM

## 2024-07-19 DIAGNOSIS — I48.0 PAROXYSMAL ATRIAL FIBRILLATION: ICD-10-CM

## 2024-07-19 DIAGNOSIS — M54.50 CHRONIC LOW BACK PAIN, UNSPECIFIED BACK PAIN LATERALITY, UNSPECIFIED WHETHER SCIATICA PRESENT: ICD-10-CM

## 2024-07-19 DIAGNOSIS — R13.10 DYSPHAGIA, UNSPECIFIED TYPE: Primary | ICD-10-CM

## 2024-07-19 DIAGNOSIS — G89.29 CHRONIC LOW BACK PAIN, UNSPECIFIED BACK PAIN LATERALITY, UNSPECIFIED WHETHER SCIATICA PRESENT: ICD-10-CM

## 2024-07-19 DIAGNOSIS — R13.10 DYSPHAGIA, UNSPECIFIED TYPE: ICD-10-CM

## 2024-07-19 DIAGNOSIS — Z78.0 MENOPAUSE: ICD-10-CM

## 2024-07-19 DIAGNOSIS — R93.3 ABNORMAL ESOPHAGRAM: ICD-10-CM

## 2024-07-19 DIAGNOSIS — Z12.11 COLON CANCER SCREENING: ICD-10-CM

## 2024-07-19 DIAGNOSIS — M25.552 LEFT HIP PAIN: ICD-10-CM

## 2024-07-19 DIAGNOSIS — E78.5 HYPERLIPIDEMIA, UNSPECIFIED HYPERLIPIDEMIA TYPE: ICD-10-CM

## 2024-07-19 DIAGNOSIS — Z12.31 ENCOUNTER FOR SCREENING MAMMOGRAM FOR BREAST CANCER: ICD-10-CM

## 2024-07-25 ENCOUNTER — TELEPHONE (OUTPATIENT)
Dept: INTERNAL MEDICINE | Facility: CLINIC | Age: 84
End: 2024-07-25
Payer: MEDICARE

## 2024-07-25 LAB — INR PPP: 5 (ref 0.9–1.1)

## 2024-07-25 NOTE — PROGRESS NOTES
[employer] global issue.  EMR not available.  See paper progress noted attacked.  SLW    Patient advised to hold and return Monday for recheck.

## 2024-07-26 VITALS
BODY MASS INDEX: 31.54 KG/M2 | DIASTOLIC BLOOD PRESSURE: 80 MMHG | SYSTOLIC BLOOD PRESSURE: 140 MMHG | HEIGHT: 63 IN | HEART RATE: 83 BPM | OXYGEN SATURATION: 98 % | WEIGHT: 178 LBS

## 2024-07-29 ENCOUNTER — ANTICOAGULATION VISIT (OUTPATIENT)
Dept: INTERNAL MEDICINE | Facility: CLINIC | Age: 84
End: 2024-07-29
Payer: MEDICARE

## 2024-07-29 LAB — INR PPP: 4.3 (ref 2–3)

## 2024-07-29 PROCEDURE — 85610 PROTHROMBIN TIME: CPT | Performed by: INTERNAL MEDICINE

## 2024-07-29 PROCEDURE — 36416 COLLJ CAPILLARY BLOOD SPEC: CPT | Performed by: INTERNAL MEDICINE

## 2024-07-29 RX ORDER — EZETIMIBE 10 MG/1
10 TABLET ORAL DAILY
Qty: 90 TABLET | Refills: 1 | Status: SHIPPED | OUTPATIENT
Start: 2024-07-29

## 2024-08-05 ENCOUNTER — ANTICOAGULATION VISIT (OUTPATIENT)
Dept: INTERNAL MEDICINE | Facility: CLINIC | Age: 84
End: 2024-08-05
Payer: MEDICARE

## 2024-08-05 LAB — INR PPP: 2.3 (ref 2–3)

## 2024-08-05 PROCEDURE — 36416 COLLJ CAPILLARY BLOOD SPEC: CPT | Performed by: INTERNAL MEDICINE

## 2024-08-05 PROCEDURE — 85610 PROTHROMBIN TIME: CPT | Performed by: INTERNAL MEDICINE

## 2024-08-05 RX ORDER — SPIRONOLACTONE 25 MG/1
25 TABLET ORAL DAILY
Qty: 90 TABLET | Refills: 1 | Status: SHIPPED | OUTPATIENT
Start: 2024-08-05

## 2024-08-08 ENCOUNTER — HOSPITAL ENCOUNTER (OUTPATIENT)
Facility: HOSPITAL | Age: 84
Discharge: HOME | End: 2024-08-08
Admitting: INTERNAL MEDICINE
Payer: MEDICARE

## 2024-08-08 DIAGNOSIS — M54.50 CHRONIC LOW BACK PAIN, UNSPECIFIED BACK PAIN LATERALITY, UNSPECIFIED WHETHER SCIATICA PRESENT: ICD-10-CM

## 2024-08-08 DIAGNOSIS — G89.29 CHRONIC LOW BACK PAIN, UNSPECIFIED BACK PAIN LATERALITY, UNSPECIFIED WHETHER SCIATICA PRESENT: ICD-10-CM

## 2024-08-08 PROCEDURE — 72131 CT LUMBAR SPINE W/O DYE: CPT

## 2024-08-09 NOTE — PROGRESS NOTES
SURGERY  Inessa Peoples   1940 08/13/24    Chief Complaint:  dysphagia, abnormal esophagram    HPI    Ms. Peoples is a nice 84 y.o. female who presents with dysphagia.  I saw her in 2017 for a multitude of issues, she being quite frustrated that despite many doctors, no one could make her better.      Esophagram 5/23/24 with distal esophagus looping upward prior to entering the stomach, due to elevated left hemidiaphragm, with 12.5 mm barium tablet temporarily obstructed at the GI junction requiring multiple liquid boluses for passage.            On video swallow, she also has mild oropharyngeal dysphagia with thin liquid.     Her last c scope was 2021 with diverticulosis.  I recommended repeat in 5 years if in excellent health.    She has a fib on coumadin.      She does have degenerative disc disease from L2-3 down L5-S1.    She describes that she has been having 2 different problems, one when she is in the act of swallowing at her throat, which i think is related to oropharyngeal dysfunction.  The second is almost like rumination of dry foods, follwed by attempt to get it to go down with water and emesis.  I think this is related to her looping of the eeopphagus due to the diaphragm issue.  I have tried for greater than 5 minutes to find any pulmonary notes on whether this is an eventration or a paralyzed diaphragm.  She could get a plication of a paralyzed diaphragm, but at her age, i don't know if it would be worth the risk.    She asks if weight loss would help.  I first, i thought no, but upon reflection, it likely would as it would let the stomach fall down in a more dependent position.       Past Medical History:   Diagnosis Date    Abnormal ECG     Allergic Iodine used in on xrays    Allergic rhinitis     Atrial fibrillation     Bleeds easily     warfarin    Breast screening     Broken bones     Bulge of lumbar disc without myelopathy 07/13/2017    Calf DVT (deep venous thrombosis)     Chronic  anticoagulation     Chronic pain disorder Lower back, shoulder, neck and knees    Class 2 severe obesity due to excess calories with serious comorbidity and body mass index (BMI) of 37.0 to 37.9 in adult     Colon polyp     CTS (carpal tunnel syndrome)     Deep vein thrombosis     left calf    Diverticulitis of colon     Diverticulitis of large intestine without perforation or abscess without bleeding 01/03/2017    Diverticulosis     AHN (dyspnea on exertion)     Fatigue     Fibromyalgia, primary     H/O malignant melanoma of skin     Heart failure with preserved ejection fraction, NYHA class II     II-III---decreased EF with stress    Hematuria, gross     History of colon polyps     History of kidney stones     HL (hearing loss)     Hyperlipidemia     Hypertension     Hyperthyroidism     IFG (impaired fasting glucose)     Insomnia     Joint pain Knees, shoulders and neck    LBBB (left bundle branch block)     Left leg swelling     LLQ abdominal pain     Long term current use of systemic steroids     Low back pain     Malignant melanoma of skin     Neck pain     Obesity     class 2 obesity due to excess calories with BMI of 35.0 to 35.9 in adult    Osteoarthritis     Osteopenia     Paralysis, diaphragm     Paroxysmal atrial fibrillation     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 for tuberculosis     Presence of biventricular cardiac pacemaker     Rash     RBBB (right bundle branch block)     Renal calculi     Sarcoid     Shingles     Sigmoid diverticulosis 06/26/2013    Sleep apnea     on CPAP, +10- Dr. Lu    Sleep apnea     USES CPAP    UTI (urinary tract infection)     Visit for screening mammogram      Past Surgical History:   Procedure Laterality Date    BREAST BIOPSY      CARDIAC CATHETERIZATION N/A 06/29/2020    Procedure: Coronary angiography, Jessica PHILIP;  Surgeon: Noe Reynoso MD;  Location: [facility] INVASIVE LOCATION;  Service: Cardiovascular;  Laterality: N/A;    CARDIAC CATHETERIZATION N/A 06/29/2020     Procedure: Left ventriculography;  Surgeon: Noe Reynoso MD;  Location: [facility] CATH INVASIVE LOCATION;  Service: Cardiovascular;  Laterality: N/A;    CARDIAC CATHETERIZATION N/A 06/29/2020    Procedure: Right and Left Heart Cath;  Surgeon: Noe Reynoso MD;  Location: [facility] CATH INVASIVE LOCATION;  Service: Cardiovascular;  Laterality: N/A;    CARDIAC CATHETERIZATION      CARDIAC ELECTROPHYSIOLOGY PROCEDURE N/A 01/05/2021    Procedure: Biventricular Device Insertion -- CRT-p ([employer]);  Surgeon: Demar Stone MD;  Location: [facility] CATH INVASIVE LOCATION;  Service: Cardiology;  Laterality: N/A;    CARPAL TUNNEL RELEASE Bilateral 1980'S    [facility]    CATARACT EXTRACTION Bilateral 2001    Dr. Nic Hidalgo    COLONOSCOPY N/A 06/26/2013    MILD SIGMOID DIVERTICULOSIS, repeat 5 years based on sister w/ colona cancer-DR. NASRA CASTRO    COLONOSCOPY N/A 10/19/2010    SIGMOID DIVERTICULOSIS & INTERNAL HEMORRHOIDS, HEMORRHOIDAL BANDING X4, DR. NASRA CASTRO    COLONOSCOPY N/A 08/06/2018    Procedure: COLONOSCOPY TO CECUM AND INTO TERMINAL ILEUM;  Surgeon: Nasra Castro MD;  Location: [facility] ENDOSCOPY;  Service: Gastroenterology    COLONOSCOPY N/A 10/04/2021    Procedure: COLONOSCOPY to CECUM;  Surgeon: Nasra Castro MD;  Location: [facility] ENDOSCOPY;  Service: General;  Laterality: N/A;  FAMILY HX COLON CANCER  --DIVERTICULOSIS     COLONOSCOPY W/ BIOPSIES N/A 05/12/2008    RECTUM BIOPSY: COLONIC MUCOSA W/ FOCAL INCREASE OF INFLAMMATORY CELLS IN THE MUSCULARIS MUCOSA INCLUDING EOSINOPHILS, SIGMOID DIVERTICULOSIS, POSSIBLE PROCTITIS, DR. NASRA CASTRO    CYSTOSCOPY, RETROGRADE PYELOGRAM AND STENT INSERTION Bilateral 10/10/2014    w/ Right ureteral pyeloscopy & laser lithotripsy, Right Double-J stent placement-Dr. Julio Tai    ENDOSCOPY N/A 11/11/2020    Procedure: ESOPHAGOGASTRODUODENOSCOPY with biopsies and dilation 54fr fernández;  Surgeon: Garret Piedra MD;  Location: [facility]  ENDOSCOPY;  Service: Gastroenterology;  Laterality: N/A;  pre- dysphagia  post-- gastritis, dilation of esophageal ring, watermelon stomach     EXTRACORPOREAL SHOCK WAVE LITHOTRIPSY (ESWL) Right 09/10/2009    DR. CLAIRE ROCK    HAMMREENA TOE REPAIR      JOINT REPLACEMENT Bilateral     Total knee replacement 4/27/22 both knees    LAPAROSCOPIC CHOLECYSTECTOMY N/A 07/19/2012    DR. TANISHA THORNTON    LUMBAR EPIDURAL INJECTION N/A 04/23/2015    LUMBAR EPIDURAL STEROID INJECTION X3    MEDIAL BRANCH BLOCK Bilateral 11/02/2021    Procedure: MEDIAL BRANCH BLOCK--bilateral Lumbar2-5;  Surgeon: Wolfgang Means MD;  Location: [facility];  Service: Pain Management;  Laterality: Bilateral;    MEDIAL BRANCH BLOCK Bilateral 10/11/2022    Procedure: bilateral L3-L5 medial branch blockade. Hold coumadin x 5 days prior;  Surgeon: Wolfgang Means MD;  Location: [facility];  Service: Pain Management;  Laterality: Bilateral;    PACEMAKER IMPLANTATION      PARATHYROIDECTOMY N/A 2001    SACROILIAC JOINT INJECTION Left 06/15/2021    Procedure: SACROILIAC INJECTION-- left and left greater trochanteric bursa injection;  Surgeon: Wolfgang Means MD;  Location: [facility];  Service: Pain Management;  Laterality: Left;    SKIN CANCER EXCISION      THUMB ARTHROSCOPY Right 1982    TOTAL KNEE ARTHROPLASTY Right 03/25/2014    [employer] total knee, 55 femoral component, 71 tibial tray w/ a 40 mm stem, 34 mm x 7.8 mm three-peg patella, and a 14 standard poly-Dr. Ty Canchola    TRIGGER POINT INJECTION       Family History   Problem Relation Age of Onset    Heart disease Mother     Diabetes type II Mother     Arthritis Mother     Heart disease Father     Hypertension Father     Cancer Sister     Colon polyps Sister     Lung cancer Sister     Heart disease Sister     Heart disease Brother         CABG    Diabetes type II Brother     Colon polyps Brother     Arrhythmia Brother     Colon cancer Brother     Cancer Brother      Hypertension Brother     Heart disease Sister     Diabetes type II Sister     Aortic aneurysm Sister     COPD Sister     Arrhythmia Sister     Cerebral aneurysm Sister     Arthritis Sister     Hypertension Sister     COPD Sister     Lupus Sister     Asthma Maternal Aunt     Aortic aneurysm Brother     Arthritis Brother     Hypertension Brother     Drug abuse Brother             Early death Brother              Social History     Socioeconomic History    Marital status:      Spouse name: EDY    Number of children: 4    Years of education: 14yrs   Tobacco Use    Smoking status: Never    Smokeless tobacco: Never    Tobacco comments:     No history of tobacco use   Vaping Use    Vaping status: Never Used   Substance and Sexual Activity    Alcohol use: Yes     Alcohol/week: 3.0 standard drinks of alcohol     Types: 2 Glasses of wine, 1 Drinks containing 0.5 oz of alcohol per week     Comment: Occasionally    Drug use: Never    Sexual activity: Yes     Partners: Male     Birth control/protection: None         Current Outpatient Medications:     Calcium-Phosphorus-Vitamin D (CITRACAL +D3 PO), Take 1 tablet by mouth Daily., Disp: , Rfl:     celecoxib (CeleBREX) 200 MG capsule, TAKE 1 CAPSULE BY MOUTH DAILY, Disp: 90 capsule, Rfl: 1    ezetimibe (ZETIA) 10 MG tablet, TAKE 1 TABLET BY MOUTH DAILY, Disp: 90 tablet, Rfl: 1    furosemide (LASIX) 20 MG tablet, TAKE ONE TABLET BY MOUTH DAILY, Disp: 90 tablet, Rfl: 1    levothyroxine (SYNTHROID, LEVOTHROID) 25 MCG tablet, TAKE 1 TABLET BY MOUTH DAILY, Disp: 90 tablet, Rfl: 1    melatonin 5 MG sublingual tablet sublingual tablet, Take 1 tablet by mouth Every Night., Disp: , Rfl:     metoprolol succinate XL (TOPROL-XL) 25 MG 24 hr tablet, Take 0.5 tablets by mouth Daily., Disp: 45 tablet, Rfl: 3    Multiple Vitamin (MULTI-DAY VITAMINS) tablet, Take 1 tablet by mouth Daily., Disp: , Rfl:     niacinamide 500 MG tablet, Take 1 tablet by mouth 2 (Two) Times a Day  "With Meals., Disp: , Rfl:     sacubitril-valsartan (ENTRESTO) 24-26 MG tablet, Take 1 tablet by mouth 2 (Two) Times a Day., Disp: 180 tablet, Rfl: 3    spironolactone (ALDACTONE) 25 MG tablet, TAKE 1 TABLET BY MOUTH DAILY, Disp: 90 tablet, Rfl: 1    warfarin (COUMADIN) 3 MG tablet, TAKE 1 AND 1/2 TABLET BY MOUTH DAILY, Disp: 135 tablet, Rfl: 3    Allergies   Allergen Reactions    Iodinated Contrast Media Anaphylaxis, Swelling and Other (See Comments)     PAINS ACROSS CHEST, Facial swelling    Contrast Dye (Echo Or Unknown Ct/Mr) Other (See Comments)    Fenofibrate Myalgia    Livalo [Pitavastatin] Myalgia     Joint Pain, Muscle tightness    Plaquenil [Hydroxychloroquine Sulfate] Myalgia     Joint Pain, Muscle Tightness    Statins Myalgia     Joint pain, Muscle Tightness  Other reaction(s): Muscle cramps       PHYSICAL EXAM:    Ht 158.8 cm (62.52\")   Wt 83.2 kg (183 lb 8 oz)   BMI 33.01 kg/m²   Body mass index is 33.01 kg/m².     Constitutional: well developed, over weight, appears  healthy, stated age  ENMT: Hearing intact, neck without masses  CVS: RRR, no murmur  Respiratory: CTA, normal respiratory effort   Gastrointestinal: abdomen soft,nontender  Musculoskeletal: gait slowed, muscle mass normal  Neurological: awake and alert, seems to have reasonable capacity for understanding for medical decision making  Psychiatric: appears to have reasonable judgement, pleasant    Radiographic/Lab Findings:    As above    Reviewed: GERD     IMPRESSION:  Dysphagia  UGI with looping esophagus  History of melanoma  Sleep apnea on CPAP, no longer using  Osteoarthritis on Celebrex  Hypertension on losartan/HCTZ, sotalol, Zetia  History of DVT on Coumadin  History of kidney stones  Atrial fibrillation  Sister with colon cancer  Hypothyroidism on levothyroxine    PLAN:  EGD.  I think we are obligated to do this, to see if there is anything really strange and to confirm this isn't paraesophageal.    Weight loss.  I don't think " surgery would be helpful based on the present picture, with the diaphragm going up at such an acute angle.  There would be no way to suture the stomach down to prevent rotating back up.   Hold coumadin 4 days for EGD    Nasra Castro MD      In order to provide a more personal and interactive patient experience as well as improve efficiency, this note was started prior to the office visit, including review of past history and pertinent images, surgeries.

## 2024-08-12 ENCOUNTER — PREP FOR SURGERY (OUTPATIENT)
Dept: OTHER | Facility: HOSPITAL | Age: 84
End: 2024-08-12
Payer: MEDICARE

## 2024-08-12 ENCOUNTER — OFFICE VISIT (OUTPATIENT)
Dept: SURGERY | Facility: CLINIC | Age: 84
End: 2024-08-12
Payer: MEDICARE

## 2024-08-12 VITALS — BODY MASS INDEX: 32.51 KG/M2 | WEIGHT: 183.5 LBS | HEIGHT: 63 IN

## 2024-08-12 DIAGNOSIS — R13.19 ESOPHAGEAL DYSPHAGIA: Primary | ICD-10-CM

## 2024-08-12 DIAGNOSIS — R13.10 DYSPHAGIA, UNSPECIFIED TYPE: Primary | ICD-10-CM

## 2024-08-12 PROCEDURE — 1159F MED LIST DOCD IN RCRD: CPT | Performed by: SURGERY

## 2024-08-12 PROCEDURE — 99214 OFFICE O/P EST MOD 30 MIN: CPT | Performed by: SURGERY

## 2024-08-12 PROCEDURE — 1160F RVW MEDS BY RX/DR IN RCRD: CPT | Performed by: SURGERY

## 2024-08-12 RX ORDER — NIACINAMIDE 500 MG
500 TABLET ORAL 2 TIMES DAILY WITH MEALS
COMMUNITY
Start: 2024-08-12

## 2024-08-19 ENCOUNTER — ANTICOAGULATION VISIT (OUTPATIENT)
Dept: INTERNAL MEDICINE | Facility: CLINIC | Age: 84
End: 2024-08-19
Payer: MEDICARE

## 2024-08-19 LAB — INR PPP: 2.4 (ref 2–3)

## 2024-08-19 PROCEDURE — 85610 PROTHROMBIN TIME: CPT | Performed by: INTERNAL MEDICINE

## 2024-08-19 PROCEDURE — 36416 COLLJ CAPILLARY BLOOD SPEC: CPT | Performed by: INTERNAL MEDICINE

## 2024-08-20 ENCOUNTER — TELEPHONE (OUTPATIENT)
Dept: INTERNAL MEDICINE | Facility: CLINIC | Age: 84
End: 2024-08-20
Payer: MEDICARE

## 2024-08-20 DIAGNOSIS — Z78.0 MENOPAUSE: Primary | ICD-10-CM

## 2024-08-20 NOTE — TELEPHONE ENCOUNTER
Patient stated she wanted to talk to Dr. Dockery needing clarification about degenerative changes in spine and about the injection treatment recommended.  Call Back [phone]

## 2024-08-29 ENCOUNTER — TELEPHONE (OUTPATIENT)
Dept: PAIN MEDICINE | Facility: CLINIC | Age: 84
End: 2024-08-29

## 2024-09-03 ENCOUNTER — APPOINTMENT (OUTPATIENT)
Dept: WOMENS IMAGING | Facility: HOSPITAL | Age: 84
End: 2024-09-03
Payer: MEDICARE

## 2024-09-03 PROCEDURE — 77067 SCR MAMMO BI INCL CAD: CPT | Performed by: RADIOLOGY

## 2024-09-03 PROCEDURE — 77063 BREAST TOMOSYNTHESIS BI: CPT | Performed by: RADIOLOGY

## 2024-09-13 ENCOUNTER — OFFICE VISIT (OUTPATIENT)
Dept: PAIN MEDICINE | Facility: CLINIC | Age: 84
End: 2024-09-13
Payer: MEDICARE

## 2024-09-13 ENCOUNTER — TELEPHONE (OUTPATIENT)
Dept: PAIN MEDICINE | Facility: CLINIC | Age: 84
End: 2024-09-13

## 2024-09-13 VITALS
BODY MASS INDEX: 32.07 KG/M2 | TEMPERATURE: 96.9 F | RESPIRATION RATE: 18 BRPM | DIASTOLIC BLOOD PRESSURE: 75 MMHG | SYSTOLIC BLOOD PRESSURE: 129 MMHG | WEIGHT: 181 LBS | OXYGEN SATURATION: 97 % | HEIGHT: 63 IN | HEART RATE: 78 BPM

## 2024-09-13 DIAGNOSIS — M70.62 GREATER TROCHANTERIC BURSITIS OF LEFT HIP: ICD-10-CM

## 2024-09-13 DIAGNOSIS — M47.816 LUMBAR FACET ARTHROPATHY: Primary | ICD-10-CM

## 2024-09-13 DIAGNOSIS — M54.50 CHRONIC MIDLINE LOW BACK PAIN WITHOUT SCIATICA: ICD-10-CM

## 2024-09-13 DIAGNOSIS — G89.29 CHRONIC MIDLINE LOW BACK PAIN WITHOUT SCIATICA: ICD-10-CM

## 2024-09-13 NOTE — PROGRESS NOTES
CHIEF COMPLAINT  Follow-up for back pain.    Subjective   Inessa Peoples is a 84 y.o. female  who presents for follow-up.  She has a history of back pain.  Today her pain is 5/10VAS in severity. Ms. Peoples describes her pain as continuous midline low back pain that is aching in nature and radiates into the tailbone. She also complains of continuous left hip pain over her greater trochanteric bursa (this increased with certain movements of her leg). She denies any numbness or tingling in her lower extremities. She denies any bowel or bladder incontinence.  Her pain is worsened by standing, walking, lying down, and bending; it is improved by sitting in a recliner.  She does utilize OTC tylenol once a day--this is not helping her pain. She is maintained on Celebrex 200 mg daily (managed by her PCP).     Her left hip pain increased ~ 1 week ago. Her low back pain has been ongoing for some time.     She is maintained on Warfarin d/t hx of a-fib. She also has a pacemaker. (managed by Dr. Stone)    Past pain medications: Acute supplies of pain medications for surgeries.      Current pain medications: OTC tylenol, Celebrex 200 mg daily     Past therapies:  Physical Therapy: Yes, helpful to her pain, she was prescribed home exercises as well. She has not completed these recently.   Chiropractor: Yes, no relief  Massage Therapy: None  TENS: Yes, in the past, no relief  Neck or back surgery: None    Procedure list:  10/11/2022-bilateral L3-L5 MBB-100% relief  11/2/2021-bilateral L2-L5 MBB-100% relief  6/15/2021-left SI joint injection and left GT bursa injection-50% relief x 1 to 2 days  1/21/2021-bilateral L2-L5 MBB-90% relief  6/16/2020-left SI and left GT bursa injection-75% relief x 2 weeks  12/4/2019-bilateral L2-L5 RFA-75% relief lasting over 6 months  6/25/2019-bilateral L2-L5 MBB-90% relief for a few weeks  6/23/2019-bilateral GT bursa injection-90% relief  11/15/2018-bilateral L2-L5 MBB-90% relief lasting over  a month  4/26/2018-left SI joint injection-significant relief  9/26/2017-right SI joint injection-significant relief  8/31/2017-left SI joint injection-70% relief  8/3/2017-left SI joint injection-70% relief    Back Pain  This is a chronic problem. The current episode started more than 1 year ago. The problem occurs constantly. The problem has been gradually worsening since onset. The pain is present in the lumbar spine (right). The quality of the pain is described as aching. The pain does not radiate. The pain is at a severity of 5/10. The pain is mild. The pain is Worse during the day (worsens as day progresses). The symptoms are aggravated by bending, position, standing and twisting (housework). Associated symptoms include bladder incontinence. Pertinent negatives include no abdominal pain, bowel incontinence, chest pain, dysuria, fever, headaches, numbness or weakness. Risk factors include obesity, menopause, history of cancer and lack of exercise (history of melanoma--no Chemo or RAD). She has tried analgesics and heat for the symptoms. The treatment provided moderate relief.   Hip Pain   The pain is present in the left hip (lateral). The quality of the pain is described as stabbing and aching. The pain is at a severity of 5/10. The pain has been Worsening since onset. Pertinent negatives include no numbness. She has tried NSAIDs and acetaminophen (PT) for the symptoms.      PEG Assessment   What number best describes your pain on average in the past week?6  What number best describes how, during the past week, pain has interfered with your enjoyment of life?2  What number best describes how, during the past week, pain has interfered with your general activity?  2    Review of Pertinent Medical Data ---  CT SCAN OF THE LUMBAR SPINE WITHOUT CONTRAST     CLINICAL HISTORY: Chronic low back pain     TECHNIQUE: CT scan of the lumbar spine was obtained with a combination  of 3 , 2, and 1 mm axial images. Bone and  soft tissue algorithm images  were obtained with sagittal and coronal reconstructed images.     COMPARISON: CT scan of the lumbar spine dated 10/18/2021.     FINDINGS:     There is mild dextroconvex scoliotic curvature of the lumbar spine with  its apex centered at L3.     At T11-12, T12-L1, and L1-2, there is no significant canal or foraminal  stenosis.     At L2-3, there are advanced degenerative disc changes. There is moderate  left foraminal narrowing secondary to a disc osteophyte complex and loss  of foraminal height. There is no significant interval change when  compared to the prior exam.     At L3-4, advanced degenerative disc changes are noted. There is marked  loss of disc space height. Vacuum disc phenomena are noted. There is  moderate left foraminal narrowing secondary to a disc osteophyte complex  and loss of foraminal height. A similar degree of foraminal stenosis was  noted on the prior exam.     At L4-5, advanced degenerative disc changes are noted with vacuum disc  phenomenon. There is a mild degree of canal stenosis secondary to  bulging disc material, ligamentum flavum thickening, and mild to  moderate facet hypertrophic change. There is moderate to severe  bilateral foraminal narrowing secondary to bulging disc material and  facet hypertrophy. Similar findings were noted on the prior exam.     At L5-S1, there is degenerative anterior spondylolisthesis of L5 on S1  by approximately 6 mm. There is moderate to severe right and severe left  foraminal narrowing secondary to loss of foraminal height, disc bulging,  and facet arthropathy. There is severe bilateral foraminal stenosis  secondary to loss of foraminal height, bulging disc material, and facet  hypertrophic change. Advanced degenerative disc changes are identified  with vacuum disc phenomena. No significant canal stenosis is identified.  Again, similar findings were noted on the prior exam.     IMPRESSION:     There is mild dextroconvex  "scoliotic curvature of the lumbar spine with  its apex centered at L3. Grade 1 degenerative anterior spondylolisthesis  of L5 on S1 by approximately 6 mm is seen.     Advanced degenerative disc changes are seen from L2-3 down L5-S1.     The most prominent foraminal stenosis is identified at the L4-5 and  L5-S1 levels. There is moderate to severe bilateral L4-5 and severe  bilateral L5-S1 foraminal stenosis. A similar degree of foraminal  stenosis was identified on the prior study.        Radiation dose reduction techniques were utilized, including automated  exposure control and exposure modulation based on body size.     This report was finalized on 8/8/2024 4:46 PM by Dr. Elio Bernstein M.D  on Workstation: [id number]       The following portions of the patient's history were reviewed and updated as appropriate: allergies, current medications, past family history, past medical history, past social history, past surgical history, and problem list.    Review of Systems   Constitutional:  Negative for fever.   Cardiovascular:  Negative for chest pain.   Gastrointestinal:  Negative for abdominal pain, bowel incontinence, constipation and diarrhea.   Genitourinary:  Positive for bladder incontinence. Negative for difficulty urinating and dysuria.   Musculoskeletal:  Positive for arthralgias (left hip) and back pain.   Neurological:  Negative for weakness, numbness and headaches.   Psychiatric/Behavioral:  Positive for sleep disturbance. Negative for suicidal ideas. The patient is not nervous/anxious.      --  The aforementioned information the Chief Complaint section and above subjective data including any HPI data, and also the Review of Systems data, has been personally reviewed and affirmed.  --    Vitals:    09/13/24 1430   BP: 129/75   Pulse: 78   Resp: 18   Temp: 96.9 °F (36.1 °C)   SpO2: 97%   Weight: 82.1 kg (181 lb)   Height: 158.8 cm (62.52\")   PainSc:   5   PainLoc: Back     Objective   Physical " Exam  Vitals and nursing note reviewed.   Constitutional:       Appearance: Normal appearance. She is well-developed.   Eyes:      General: Lids are normal.   Cardiovascular:      Rate and Rhythm: Normal rate.   Pulmonary:      Effort: Pulmonary effort is normal.   Musculoskeletal:      Lumbar back: Tenderness and bony tenderness present. Decreased range of motion. Negative right straight leg raise test and negative left straight leg raise test.      Right hip: No tenderness.      Left hip: Tenderness (over left greater trochanteric bursa) present.      Comments:   +Lumbar facet loading  - Vaibhav Liset  - Vaibhav Thigh Thrust  - Vaibhav Gaenslen's  - Vaibhav SI Compression   Neurological:      Mental Status: She is alert and oriented to person, place, and time.   Psychiatric:         Attention and Perception: Attention normal.         Mood and Affect: Mood normal.         Speech: Speech normal.         Behavior: Behavior normal.         Judgment: Judgment normal.       Assessment & Plan   Diagnoses and all orders for this visit:    1. Lumbar facet arthropathy (Primary)    2. Chronic midline low back pain without sciatica    3. Greater trochanteric bursitis of left hip  -     Ibuprofen 3 %, Gabapentin 10 %, Baclofen 2 %, lidocaine 4 %; Apply 1-2 g topically to the appropriate area as directed 3 (Three) to 4 (Four) times daily.  Dispense: 90 g; Refill: 0      I spent 44 minutes caring for Inessa on this date of service. This time includes time spent by me in the following activities: preparing for the visit, reviewing tests, performing a medically appropriate examination and/or evaluation, counseling and educating the patient/family/caregiver, referring and communicating with other health care professionals, documenting information in the medical record, ordering medications, and obtaining a separately obtained history     Inessa Peoples reports a pain score of 5.  Given her pain assessment as noted, treatment options were  discussed and the following options were decided upon as a follow-up plan to address the patient's pain:  injections, prescription pain cream, and ice .    Ms. Peoples previously has experienced 75% relief lasting greater than 6 months from bilateral L2-L5 RFA.  She additionally has had multiple diagnostically positive bilateral L3-L5 medial branch blocks.  These were last performed in 2022.  Recommend repeating bilateral L3-L5 MBB x 1 with goal of repeating lumbar RFA at L3-L5.  Will need to reach out to Dr. Stone for clearance to hold warfarin x 5 days prior to each procedure.     Addendum: Clearance to hold Coumadin x 5 days prior to procedure in chart    Reviewed the procedure at length with the patient.  Included in the review was expectations, complications, risk and benefits.The procedure was described in detail and the risks, benefits and alternatives were discussed with the patient (including but not limited to: bleeding, infection, nerve damage, worsening of pain, inability to perform injection, paralysis, seizures, coma, no pain relief and death) who agreed to proceed.  Discussed the potential for sedation if warranted/wanted.  The procedure will plan to be performed at [facility] with fluoroscopic guidance(unless ultrasound is indicated) and could potentially have steroids and contrast dye used. Questions were answered and in a way the patient could understand.  Patient verbalized understanding and wishes to proceed.  This intervention will be ordered.  Discussed with patient that all procedures are part of a multimodal plan of care and include either formal PT or a home exercise program.  Patient has no evidence of coagulopathy or current infection.    --- Compounded pain cream prescription will be [facility] for use on her left lateral hip as well as her low back  --- Recommend applying indirect ice to left lateral hip for 20 minutes at a time 3 times a day  ---  Resume exercise program previously prescribed by physical therapy she states today that this was previously helpful to her pain.  --- Follow-up after procedure     Dictated utilizing Dragon dictation.

## 2024-09-13 NOTE — TELEPHONE ENCOUNTER
Dr. Stone, I am requesting clearance to hold Ms. Peoples's warfarin for 5 days prior to interventional pain procedures. We plan on moving forward with Lumbar medial branch blockades and a lumbar radiofrequency ablation.

## 2024-09-13 NOTE — PATIENT INSTRUCTIONS
"Apply ice to left hip for 20 minutes at a time three times a day  Utilize compounded pain cream to left hip, OK to use up to 4 times a day as needed    -------  Education about Medial Branch Blockade and RF Therapy:    This medial branch blockade (MBB) suggested is intended for diagnostic purposes, with the intent of offering the patient Radiofrequency thermal rhizotomy (RF) if the MBB is diagnostically effective.  The diagnostic blockade is necessary to determine the likelihood that RF therapy could be efficacious in providing long term relief to the patient.    Medial branches are sensory nerve branches that connect to a facet joint and transmit sensations & pain signals from that joint.  Facet is a term for the type of joints found in the spine.  Medial branches are the nerves that go to a facet, and therefore are also sometimes called \"facet joint nerves\" (FJNs).      In a medial branch blockade procedure, xray fluoroscopy is used to verify the locations of the outside of the joint lines which are being targeted.  Under xray guidance, needles are placed to these areas.  Contrast dye is injected to confirm proper placement, with dye flowing over the joint area, and to ensure that the dye does not flow into unintended areas such as a vein.  When this is confirmed, local anesthetic is injected to block the medial branch at that joint level.      If MBBs are diagnostically successful in blocking pain, then the patient is most likely a great candidate for Radiofrequency of those facet joint nerves.  In the RF procedure, needles are placed to the joint lines in the same fashion, and after testing, the needle tips are heated to thermally treat the nerves, blocking the nerves by in essence damaging the nerves with the heat treatment.       Medically, a successful RF procedure should provide a patient with 50% pain relief or more for at least 6 months.  Clinical experience suggests that successful patients receive relief " more in the range of 12 months on average.  We also discussed that a fortunate minority of patients receive therapeutic success from the MBB, and may not require RF ablation.  If a patient receives more than 8 weeks of relief from MBB, then occasional repeat MBB for therapeutic purposes is a very reasonable alternative therapy.  This course of therapy is consistent with our LCDs according to our CMS  in the area, and therefore other insurance providers should follow accordingly.  We will monitor our patients to screen for these therapeutic responders and will offer RF therapy only when necessary.        We discussed that MBB & RF are not without risks.  Guidelines regarding anticoagulant use & neuraxial procedures will be respected.  Patients that are ill or otherwise may be at risk for sepsis will not have their spines accessed by neuraxial injections of any type.  This patient will not be offered these therapies if there is an increased risk.   We discussed that there is a risk of postprocedural pain and also a risk of worsening of clinical picture with these procedures as with any neuraxial procedure.    -------

## 2024-09-16 ENCOUNTER — PREP FOR SURGERY (OUTPATIENT)
Dept: SURGERY | Facility: SURGERY CENTER | Age: 84
End: 2024-09-16
Payer: MEDICARE

## 2024-09-16 DIAGNOSIS — M47.816 LUMBAR FACET ARTHROPATHY: Primary | ICD-10-CM

## 2024-09-16 RX ORDER — SODIUM CHLORIDE 0.9 % (FLUSH) 0.9 %
10 SYRINGE (ML) INJECTION EVERY 12 HOURS SCHEDULED
OUTPATIENT
Start: 2024-09-16

## 2024-09-16 RX ORDER — SODIUM CHLORIDE 0.9 % (FLUSH) 0.9 %
10 SYRINGE (ML) INJECTION AS NEEDED
OUTPATIENT
Start: 2024-09-16

## 2024-09-16 NOTE — TELEPHONE ENCOUNTER
Please let Ms. Peoples know that we have clearance to hold her warfarin x 5 days prior to procedure with our clinic.     Saundra or Alisia, I have ordered a Lumbar MBB x 1 and a lumbar RFA, please schedule. She will need a follow up 1 week after the MBB and 6 weeks after the RFA. Thanks

## 2024-09-19 ENCOUNTER — TRANSCRIBE ORDERS (OUTPATIENT)
Dept: SURGERY | Facility: SURGERY CENTER | Age: 84
End: 2024-09-19
Payer: MEDICARE

## 2024-09-19 ENCOUNTER — TELEPHONE (OUTPATIENT)
Dept: PAIN MEDICINE | Facility: CLINIC | Age: 84
End: 2024-09-19

## 2024-09-19 DIAGNOSIS — Z41.9 SURGERY, ELECTIVE: Primary | ICD-10-CM

## 2024-09-19 NOTE — TELEPHONE ENCOUNTER
Caller: Inessa Peoples    Relationship to patient: Self    Best call back number: [phone]    Patient is needing: PT IS RETURNING A MISSED CALL FROM THE OFFICE, SHE IS UNAWARE OF WHO IT WAS OR WHAT IT WAS ABOUT.. PLEASE ADVISE..

## 2024-09-20 ENCOUNTER — TRANSCRIBE ORDERS (OUTPATIENT)
Dept: SURGERY | Facility: SURGERY CENTER | Age: 84
End: 2024-09-20
Payer: MEDICARE

## 2024-09-20 DIAGNOSIS — Z41.9 SURGERY, ELECTIVE: Primary | ICD-10-CM

## 2024-09-23 ENCOUNTER — ANTICOAGULATION VISIT (OUTPATIENT)
Dept: INTERNAL MEDICINE | Facility: CLINIC | Age: 84
End: 2024-09-23
Payer: MEDICARE

## 2024-09-23 ENCOUNTER — CLINICAL SUPPORT NO REQUIREMENTS (OUTPATIENT)
Age: 84
End: 2024-09-23
Payer: MEDICARE

## 2024-09-23 ENCOUNTER — OFFICE VISIT (OUTPATIENT)
Age: 84
End: 2024-09-23
Payer: MEDICARE

## 2024-09-23 VITALS
HEIGHT: 62 IN | BODY MASS INDEX: 33.31 KG/M2 | HEART RATE: 123 BPM | SYSTOLIC BLOOD PRESSURE: 122 MMHG | DIASTOLIC BLOOD PRESSURE: 80 MMHG | WEIGHT: 181 LBS

## 2024-09-23 DIAGNOSIS — Z95.0 PRESENCE OF BIVENTRICULAR CARDIAC PACEMAKER: ICD-10-CM

## 2024-09-23 DIAGNOSIS — I42.8 NON-ISCHEMIC CARDIOMYOPATHY: Primary | ICD-10-CM

## 2024-09-23 DIAGNOSIS — I44.7 LBBB (LEFT BUNDLE BRANCH BLOCK): ICD-10-CM

## 2024-09-23 DIAGNOSIS — I50.30 HEART FAILURE WITH PRESERVED EJECTION FRACTION, NYHA CLASS II: Primary | ICD-10-CM

## 2024-09-23 DIAGNOSIS — I48.0 PAROXYSMAL ATRIAL FIBRILLATION: ICD-10-CM

## 2024-09-23 LAB — INR PPP: 2.1 (ref 2–3)

## 2024-09-23 PROCEDURE — 36416 COLLJ CAPILLARY BLOOD SPEC: CPT | Performed by: INTERNAL MEDICINE

## 2024-09-23 PROCEDURE — 1160F RVW MEDS BY RX/DR IN RCRD: CPT | Performed by: PHYSICIAN ASSISTANT

## 2024-09-23 PROCEDURE — 3074F SYST BP LT 130 MM HG: CPT | Performed by: PHYSICIAN ASSISTANT

## 2024-09-23 PROCEDURE — 85610 PROTHROMBIN TIME: CPT | Performed by: INTERNAL MEDICINE

## 2024-09-23 PROCEDURE — 3079F DIAST BP 80-89 MM HG: CPT | Performed by: PHYSICIAN ASSISTANT

## 2024-09-23 PROCEDURE — 93281 PM DEVICE PROGR EVAL MULTI: CPT | Performed by: INTERNAL MEDICINE

## 2024-09-23 PROCEDURE — 99214 OFFICE O/P EST MOD 30 MIN: CPT | Performed by: PHYSICIAN ASSISTANT

## 2024-09-23 PROCEDURE — 93000 ELECTROCARDIOGRAM COMPLETE: CPT | Performed by: PHYSICIAN ASSISTANT

## 2024-09-23 PROCEDURE — 1159F MED LIST DOCD IN RCRD: CPT | Performed by: PHYSICIAN ASSISTANT

## 2024-09-23 RX ORDER — VALSARTAN 80 MG/1
80 TABLET ORAL DAILY
Qty: 90 TABLET | Refills: 3 | Status: SHIPPED | OUTPATIENT
Start: 2024-09-23

## 2024-09-30 ENCOUNTER — ANESTHESIA EVENT (OUTPATIENT)
Dept: GASTROENTEROLOGY | Facility: HOSPITAL | Age: 84
End: 2024-09-30
Payer: MEDICARE

## 2024-09-30 ENCOUNTER — ANESTHESIA (OUTPATIENT)
Dept: GASTROENTEROLOGY | Facility: HOSPITAL | Age: 84
End: 2024-09-30
Payer: MEDICARE

## 2024-09-30 ENCOUNTER — HOSPITAL ENCOUNTER (OUTPATIENT)
Facility: HOSPITAL | Age: 84
Setting detail: HOSPITAL OUTPATIENT SURGERY
Discharge: HOME | End: 2024-09-30
Attending: SURGERY | Admitting: SURGERY
Payer: MEDICARE

## 2024-09-30 VITALS
HEART RATE: 70 BPM | SYSTOLIC BLOOD PRESSURE: 112 MMHG | OXYGEN SATURATION: 95 % | RESPIRATION RATE: 19 BRPM | DIASTOLIC BLOOD PRESSURE: 67 MMHG

## 2024-09-30 PROCEDURE — 25010000002 PROPOFOL 10 MG/ML EMULSION: Performed by: NURSE ANESTHETIST, CERTIFIED REGISTERED

## 2024-09-30 PROCEDURE — 25810000003 LACTATED RINGERS PER 1000 ML: Performed by: SURGERY

## 2024-09-30 PROCEDURE — 43235 EGD DIAGNOSTIC BRUSH WASH: CPT | Performed by: SURGERY

## 2024-09-30 RX ORDER — SODIUM CHLORIDE, SODIUM LACTATE, POTASSIUM CHLORIDE, CALCIUM CHLORIDE 600; 310; 30; 20 MG/100ML; MG/100ML; MG/100ML; MG/100ML
1000 INJECTION, SOLUTION INTRAVENOUS CONTINUOUS
Status: DISCONTINUED | OUTPATIENT
Start: 2024-09-30 | End: 2024-09-30 | Stop reason: HOSPADM

## 2024-09-30 RX ORDER — LIDOCAINE HYDROCHLORIDE 20 MG/ML
INJECTION, SOLUTION INFILTRATION; PERINEURAL AS NEEDED
Status: DISCONTINUED | OUTPATIENT
Start: 2024-09-30 | End: 2024-09-30 | Stop reason: SURG

## 2024-09-30 RX ORDER — PROPOFOL 10 MG/ML
VIAL (ML) INTRAVENOUS AS NEEDED
Status: DISCONTINUED | OUTPATIENT
Start: 2024-09-30 | End: 2024-09-30 | Stop reason: SURG

## 2024-09-30 RX ADMIN — LIDOCAINE HYDROCHLORIDE 50 MG: 20 INJECTION, SOLUTION INFILTRATION; PERINEURAL at 10:41

## 2024-09-30 RX ADMIN — PROPOFOL 80 MG: 10 INJECTION, EMULSION INTRAVENOUS at 10:41

## 2024-09-30 RX ADMIN — SODIUM CHLORIDE, POTASSIUM CHLORIDE, SODIUM LACTATE AND CALCIUM CHLORIDE 1000 ML: 600; 310; 30; 20 INJECTION, SOLUTION INTRAVENOUS at 09:29

## 2024-09-30 RX ADMIN — PROPOFOL 180 MCG/KG/MIN: 10 INJECTION, EMULSION INTRAVENOUS at 10:41

## 2024-09-30 NOTE — DISCHARGE INSTRUCTIONS
For the next 24 hours patient needs to be with a responsible adult.    For 24 hours DO NOT drive, operate machinery, appliances, drink alcohol, make important decisions or sign legal documents.    Start with a light or bland diet if you are feeling sick to your stomach otherwise advance to regular diet as tolerated.    Follow recommendations on procedure report if provided by your doctor.    Call Dr Castro for problems [phone]    Problems may include but not limited to: large amounts of bleeding, trouble breathing, repeated vomiting, severe unrelieved pain, fever or chills.

## 2024-09-30 NOTE — OP NOTE
EGD Procedure Note  Inessa Peoples  YOB: 1940    Procedure Date: 09/30/24    Pre-operative Diagnosis:   Dysphagia  UGI with looping esophagus     Post-operative Diagnosis:  Elevated left hemidiaphragm.  No convincing evidence of a paraesophageal hernia  Large uvula, flipped upward at beginning of case  Evidence of bile reflux from the duodenum into the stomach    Procedure:  EGD     Surgeon: Matthew    Anesthetic: MAC per Alicia Rice MD    Associated issues:   History of melanoma  Sleep apnea on CPAP, no longer using  Osteoarthritis on Celebrex  Hypertension on losartan/HCTZ, sotalol, Zetia  History of DVT on Coumadin  History of kidney stones  Atrial fibrillation  Sister with colon cancer  Hypothyroidism on levothyroxine    Recommendations:  If patient has difficulty with reflux, gastric dyspepsia, might respond better to carafate than PPI in face of bile reflux.  No surgery recommended.  Discussed weight loss as a potential symptom improvement remedy  4.  Give patient a copy of the procedural photographs    Technique:    MAC anesthesia was induced.  Bite block was placed and the gastroscope was inserted thru such and advanced under direct vision to the second portion of the duodenum.  A careful inspection was made as the scope was withdrawn and photographs taken, including a retroflexed view of the stomach.   Findings and interventions are described above.     Air was removed at the end of the procedure    Nasra Castro MD  09/30/24  10:49 EDT

## 2024-09-30 NOTE — H&P
Expand All Collapse All    SURGERY  Inessa Peoples   1940 09/30/24       Chief Complaint:  dysphagia, abnormal esophagram     HPI     Ms. Peoples is a nice 84 y.o. female who presents with dysphagia.  I saw her in 2017 for a multitude of issues, she being quite frustrated that despite many doctors, no one could make her better.       Esophagram 5/23/24 with distal esophagus looping upward prior to entering the stomach, due to elevated left hemidiaphragm, with 12.5 mm barium tablet temporarily obstructed at the GI junction requiring multiple liquid boluses for passage.               On video swallow, she also has mild oropharyngeal dysphagia with thin liquid.      Her last c scope was 2021 with diverticulosis.  I recommended repeat in 5 years if in excellent health.     She has a fib on coumadin.       She does have degenerative disc disease from L2-3 down L5-S1.     She describes that she has been having 2 different problems, one when she is in the act of swallowing at her throat, which i think is related to oropharyngeal dysfunction.  The second is almost like rumination of dry foods, follwed by attempt to get it to go down with water and emesis.  I think this is related to her looping of the eeopphagus due to the diaphragm issue.  I have tried for greater than 5 minutes to find any pulmonary notes on whether this is an eventration or a paralyzed diaphragm.  She could get a plication of a paralyzed diaphragm, but at her age, i don't know if it would be worth the risk.     She asks if weight loss would help.  I first, i thought no, but upon reflection, it likely would as it would let the stomach fall down in a more dependent position.        Medical History        Past Medical History:   Diagnosis Date    Abnormal ECG      Allergic Iodine used in on xrays    Allergic rhinitis      Atrial fibrillation      Bleeds easily       warfarin    Breast screening      Broken bones      Bulge of lumbar disc without  myelopathy 07/13/2017    Calf DVT (deep venous thrombosis)      Chronic anticoagulation      Chronic pain disorder Lower back, shoulder, neck and knees    Class 2 severe obesity due to excess calories with serious comorbidity and body mass index (BMI) of 37.0 to 37.9 in adult      Colon polyp      CTS (carpal tunnel syndrome)      Deep vein thrombosis       left calf    Diverticulitis of colon      Diverticulitis of large intestine without perforation or abscess without bleeding 01/03/2017    Diverticulosis      AHN (dyspnea on exertion)      Fatigue      Fibromyalgia, primary      H/O malignant melanoma of skin      Heart failure with preserved ejection fraction, NYHA class II       II-III---decreased EF with stress    Hematuria, gross      History of colon polyps      History of kidney stones      HL (hearing loss)      Hyperlipidemia      Hypertension      Hyperthyroidism      IFG (impaired fasting glucose)      Insomnia      Joint pain Knees, shoulders and neck    LBBB (left bundle branch block)      Left leg swelling      LLQ abdominal pain      Long term current use of systemic steroids      Low back pain      Malignant melanoma of skin      Neck pain      Obesity       class 2 obesity due to excess calories with BMI of 35.0 to 35.9 in adult    Osteoarthritis      Osteopenia      Paralysis, diaphragm      Paroxysmal atrial fibrillation      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 for tuberculosis      Presence of biventricular cardiac pacemaker      Rash      RBBB (right bundle branch block)      Renal calculi      Sarcoid      Shingles      Sigmoid diverticulosis 06/26/2013    Sleep apnea       on CPAP, +10- Dr. Lu    Sleep apnea       USES CPAP    UTI (urinary tract infection)      Visit for screening mammogram           Surgical History         Past Surgical History:   Procedure Laterality Date    BREAST BIOPSY        CARDIAC CATHETERIZATION N/A 06/29/2020     Procedure: Coronary angiography, Jessica PHILIP;  Surgeon: Edgardo  Noe GARRETT MD;  Location:  [facility]VASIVE [facility];  Service: Cardiovascular;  Laterality: N/A;    CARDIAC CATHETERIZATION N/A 06/29/2020     Procedure: Left ventriculography;  Surgeon: Noe Reynoso MD;  Location: [facility] CATH INVASIVE LOCATION;  Service: Cardiovascular;  Laterality: N/A;    CARDIAC CATHETERIZATION N/A 06/29/2020     Procedure: Right and Left Heart Cath;  Surgeon: Noe Reynoso MD;  [facility] C[facility];  Service: Cardiovascular;  Laterality: N/A;    CARDIAC CATHETERIZATION        CARDIAC ELECTROPHYSIOLOGY PROCEDURE N/A 01/05/2021     Procedure: Biventricular Device Insertion -- CRT-p ([employer]);  Surgeon: Demar Stone MD;  Location: [facility] CATH INVASIVE LOCATION;  Service: Cardiology;  Laterality: N/A;    CARPAL TUNNEL RELEASE Bilateral 1980'S     [facility]    CATARACT EXTRACTION Bilateral 2001     Dr. Nic Hidalgo    COLONOSCOPY N/A 06/26/2013     MILD SIGMOID DIVERTICULOSIS, repeat 5 years based on sister w/ colona cancer-DR. NASRA CASTRO    COLONOSCOPY N/A 10/19/2010     SIGMOID DIVERTICULOSIS & INTERNAL HEMORRHOIDS, HEMORRHOIDAL BANDING X4, DR. NASRA CASTRO    COLONOSCOPY N/A 08/06/2018     Procedure: COLONOSCOPY TO CECUM AND INTO TERMINAL ILEUM;  Surgeon: Nasra Castro MD;  Location: [facility] ENDOSCOPY;  Service: Gastroenterology    COLONOSCOPY N/A 10/04/2021     Procedure: COLONOSCOPY to CECUM;  Surgeon: Nasra Castro MD;  Location: [facility] ENDOSCOPY;  Service: General;  Laterality: N/A;  FAMILY HX COLON CANCER  --DIVERTICULOSIS     COLONOSCOPY W/ BIOPSIES N/A 05/12/2008     RECTUM BIOPSY: COLONIC MUCOSA W/ FOCAL INCREASE OF INFLAMMATORY CELLS IN THE MUSCULARIS MUCOSA INCLUDING EOSINOPHILS, SIGMOID DIVERTICULOSIS, POSSIBLE PROCTITIS, DR. NASRA CASTRO    CYSTOSCOPY, RETROGRADE PYELOGRAM AND STENT INSERTION Bilateral 10/10/2014     w/ Right ureteral pyeloscopy & laser lithotripsy, Right Double-J stent placement-Dr. Julio Tai     ENDOSCOPY N/A 11/11/2020     Procedure: ESOPHAGOGASTRODUODENOSCOPY with biopsies and dilation 54fr fernández;  Surgeon: Garret Piedra MD;  Location: [facility] ENDOSCOPY;  Service: Gastroenterology;  Laterality: N/A;  pre- dysphagia  post-- gastritis, dilation of esophageal ring, watermelon stomach     EXTRACORPOREAL SHOCK WAVE LITHOTRIPSY (ESWL) Right 09/10/2009     DR. CLAIRE ROCK    HAMMER TOE REPAIR        JOINT REPLACEMENT Bilateral       Total knee replacement 4/27/22 both knees    LAPAROSCOPIC CHOLECYSTECTOMY N/A 07/19/2012     DR. TANISHA THORNTON    LUMBAR EPIDURAL INJECTION N/A 04/23/2015     LUMBAR EPIDURAL STEROID INJECTION X3    MEDIAL BRANCH BLOCK Bilateral 11/02/2021     Procedure: MEDIAL BRANCH BLOCK--bilateral Lumbar2-5;  Surgeon: Wolfgang Means MD;  [place] [facility];  Service: Pain Management;  Laterality: Bilateral;    MEDIAL BRANCH BLOCK Bilateral 10/11/2022     Procedure: bilateral L3-L5 medial branch blockade. Hold coumadin x 5 days prior;  Surgeon: Wolfgang Means MD;  [place] [facility];  Service: Pain Management;  Laterality: Bilateral;    PACEMAKER IMPLANTATION        PARATHYROIDECTOMY N/A 2001    SACROILIAC JOINT INJECTION Left 06/15/2021     Procedure: SACROILIAC INJECTION-- left and left greater trochanteric bursa injection;  Surgeon: Wolfgang Means MD;  [place] [facility];  Service: Pain Management;  Laterality: Left;    SKIN CANCER EXCISION        THUMB ARTHROSCOPY Right 1982    TOTAL KNEE ARTHROPLASTY Right 03/25/2014     [employer] total knee, 55 femoral component, 71 tibial tray w/ a 40 mm stem, 34 mm x 7.8 mm three-peg patella, and a 14 standard poly-Dr. Ty Canchola    TRIGGER POINT INJECTION                   Family History   Problem Relation Age of Onset    Heart disease Mother      Diabetes type II Mother      Arthritis Mother      Heart disease Father      Hypertension Father      Cancer Sister      Colon polyps Sister      Lung cancer  Sister      Heart disease Sister      Heart disease Brother           CABG    Diabetes type II Brother      Colon polyps Brother      Arrhythmia Brother      Colon cancer Brother      Cancer Brother      Hypertension Brother      Heart disease Sister      Diabetes type II Sister      Aortic aneurysm Sister      COPD Sister      Arrhythmia Sister      Cerebral aneurysm Sister      Arthritis Sister      Hypertension Sister      COPD Sister      Lupus Sister      Asthma Maternal Aunt      Aortic aneurysm Brother      Arthritis Brother      Hypertension Brother      Drug abuse Brother               Early death Brother                 Social History   Social History            Socioeconomic History    Marital status:        Spouse name: EDY    Number of children: 4    Years of education: 14yrs   Tobacco Use    Smoking status: Never    Smokeless tobacco: Never    Tobacco comments:       No history of tobacco use   Vaping Use    Vaping status: Never Used   Substance and Sexual Activity    Alcohol use: Yes       Alcohol/week: 3.0 standard drinks of alcohol       Types: 2 Glasses of wine, 1 Drinks containing 0.5 oz of alcohol per week       Comment: Occasionally    Drug use: Never    Sexual activity: Yes       Partners: Male       Birth control/protection: None              Current Medications      Current Outpatient Medications:     Calcium-Phosphorus-Vitamin D (CITRACAL +D3 PO), Take 1 tablet by mouth Daily., Disp: , Rfl:     celecoxib (CeleBREX) 200 MG capsule, TAKE 1 CAPSULE BY MOUTH DAILY, Disp: 90 capsule, Rfl: 1    ezetimibe (ZETIA) 10 MG tablet, TAKE 1 TABLET BY MOUTH DAILY, Disp: 90 tablet, Rfl: 1    furosemide (LASIX) 20 MG tablet, TAKE ONE TABLET BY MOUTH DAILY, Disp: 90 tablet, Rfl: 1    levothyroxine (SYNTHROID, LEVOTHROID) 25 MCG tablet, TAKE 1 TABLET BY MOUTH DAILY, Disp: 90 tablet, Rfl: 1    melatonin 5 MG sublingual tablet sublingual tablet, Take 1 tablet by mouth Every Night., Disp: ,  "Rfl:     metoprolol succinate XL (TOPROL-XL) 25 MG 24 hr tablet, Take 0.5 tablets by mouth Daily., Disp: 45 tablet, Rfl: 3    Multiple Vitamin (MULTI-DAY VITAMINS) tablet, Take 1 tablet by mouth Daily., Disp: , Rfl:     niacinamide 500 MG tablet, Take 1 tablet by mouth 2 (Two) Times a Day With Meals., Disp: , Rfl:     sacubitril-valsartan (ENTRESTO) 24-26 MG tablet, Take 1 tablet by mouth 2 (Two) Times a Day., Disp: 180 tablet, Rfl: 3    spironolactone (ALDACTONE) 25 MG tablet, TAKE 1 TABLET BY MOUTH DAILY, Disp: 90 tablet, Rfl: 1    warfarin (COUMADIN) 3 MG tablet, TAKE 1 AND 1/2 TABLET BY MOUTH DAILY, Disp: 135 tablet, Rfl: 3        Allergies         Allergies   Allergen Reactions    Iodinated Contrast Media Anaphylaxis, Swelling and Other (See Comments)       PAINS ACROSS CHEST, Facial swelling    Contrast Dye (Echo Or Unknown Ct/Mr) Other (See Comments)    Fenofibrate Myalgia    Livalo [Pitavastatin] Myalgia       Joint Pain, Muscle tightness    Plaquenil [Hydroxychloroquine Sulfate] Myalgia       Joint Pain, Muscle Tightness    Statins Myalgia       Joint pain, Muscle Tightness  Other reaction(s): Muscle cramps            PHYSICAL EXAM:     Ht 158.8 cm (62.52\")   Wt 83.2 kg (183 lb 8 oz)   BMI 33.01 kg/m²   Body mass index is 33.01 kg/m².     Constitutional: well developed, over weight, appears  healthy, stated age  ENMT: Hearing intact, neck without masses  CVS: RRR, no murmur  Respiratory: CTA, normal respiratory effort   Gastrointestinal: abdomen soft,nontender  Musculoskeletal: gait slowed, muscle mass normal  Neurological: awake and alert, seems to have reasonable capacity for understanding for medical decision making  Psychiatric: appears to have reasonable judgement, pleasant     Radiographic/Lab Findings:    As above     Reviewed: GERD      IMPRESSION:  Dysphagia  UGI with looping esophagus  History of melanoma  Sleep apnea on CPAP, no longer using  Osteoarthritis on Celebrex  Hypertension on " losartan/HCTZ, sotalol, Zetia  History of DVT on Coumadin  History of kidney stones  Atrial fibrillation  Sister with colon cancer  Hypothyroidism on levothyroxine     PLAN:  EGD.  I think we are obligated to do this, to see if there is anything really strange and to confirm this isn't paraesophageal.    Weight loss.  I don't think surgery would be helpful based on the present picture, with the diaphragm going up at such an acute angle.  There would be no way to suture the stomach down to prevent rotating back up.   Hold coumadin 4 days for EGD  I attest that the copied text was reviewed by me and remains accurate, with impression/plan being relevant to the patient's status today.  I have left the note intact as it has more complete information with images, etc.

## 2024-09-30 NOTE — ANESTHESIA PREPROCEDURE EVALUATION
Anesthesia Evaluation                  Airway   Mallampati: III  TM distance: >3 FB  Neck ROM: full  No difficulty expected  Dental - normal exam     Pulmonary    (+) ,shortness of breath, sleep apnea  Cardiovascular     PT is on anticoagulation therapy  Patient on routine beta blocker    (+) hypertension, dysrhythmias Atrial Fib, AHN, DVT, hyperlipidemia      Neuro/Psych  (+) numbness  GI/Hepatic/Renal/Endo    (+) morbid obesity, GERD, renal disease-, thyroid problem     Musculoskeletal     (+) myalgias, neck pain  Abdominal    Substance History      OB/GYN          Other   arthritis,   history of cancer                Anesthesia Plan    ASA 4     MAC     intravenous induction     Anesthetic plan, risks, benefits, and alternatives have been provided, discussed and informed consent has been obtained with: patient.    CODE STATUS:

## 2024-09-30 NOTE — ANESTHESIA POSTPROCEDURE EVALUATION
Patient: Inessa Peoples    Procedure Summary       Date: 09/30/24 Room / [facility]:  [facility]    Anesthesia Start: 1034 Anesthesia Stop: 1056    Procedure: ESOPHAGOGASTRODUODENOSCOPY (Esophagus) Diagnosis:       Dysphagia, unspecified type      (Dysphagia, unspecified type [R13.10])    Surgeons: Nasra Castro MD Provider: Alicia Rice MD    Anesthesia Type: MAC ASA Status: 4            Anesthesia Type: MAC    Vitals  Vitals Value Taken Time   /67 09/30/24 1117   Temp     Pulse 71 09/30/24 1119   Resp 19 09/30/24 1117   SpO2 91 % 09/30/24 1119   Vitals shown include unfiled device data.        Post Anesthesia Care and Evaluation    Patient location during evaluation: bedside  Patient participation: complete - patient participated  Level of consciousness: awake  Pain management: adequate    Airway patency: patent  Anesthetic complications: No anesthetic complications    Cardiovascular status: acceptable  Respiratory status: acceptable  Hydration status: acceptable

## 2024-10-22 RX ORDER — DIAZEPAM 5 MG
10 TABLET ORAL ONCE AS NEEDED
Status: COMPLETED | OUTPATIENT
Start: 2024-10-23 | End: 2024-10-23

## 2024-10-23 ENCOUNTER — HOSPITAL ENCOUNTER (OUTPATIENT)
Dept: GENERAL RADIOLOGY | Facility: SURGERY CENTER | Age: 84
Setting detail: HOSPITAL OUTPATIENT SURGERY
End: 2024-10-23
Payer: MEDICARE

## 2024-10-23 ENCOUNTER — TRANSCRIBE ORDERS (OUTPATIENT)
Dept: ADMINISTRATIVE | Facility: HOSPITAL | Age: 84
End: 2024-10-23
Payer: MEDICARE

## 2024-10-23 ENCOUNTER — HOSPITAL ENCOUNTER (OUTPATIENT)
Facility: SURGERY CENTER | Age: 84
Setting detail: HOSPITAL OUTPATIENT SURGERY
Discharge: HOME | End: 2024-10-23
Attending: ANESTHESIOLOGY | Admitting: ANESTHESIOLOGY
Payer: MEDICARE

## 2024-10-23 ENCOUNTER — LAB (OUTPATIENT)
Dept: LAB | Facility: HOSPITAL | Age: 84
End: 2024-10-23
Payer: MEDICARE

## 2024-10-23 VITALS
DIASTOLIC BLOOD PRESSURE: 78 MMHG | SYSTOLIC BLOOD PRESSURE: 134 MMHG | RESPIRATION RATE: 16 BRPM | TEMPERATURE: 97.2 F | WEIGHT: 179 LBS | OXYGEN SATURATION: 96 % | HEART RATE: 75 BPM | BODY MASS INDEX: 32.74 KG/M2

## 2024-10-23 DIAGNOSIS — Z41.9 SURGERY, ELECTIVE: ICD-10-CM

## 2024-10-23 DIAGNOSIS — M47.816 LUMBAR FACET ARTHROPATHY: ICD-10-CM

## 2024-10-23 DIAGNOSIS — M47.816 LUMBAR FACET ARTHROPATHY: Primary | ICD-10-CM

## 2024-10-23 LAB
INR PPP: 1.1 (ref 0.8–1.2)
PROTHROMBIN TIME: 11.4 SECONDS (ref 12.8–15.2)

## 2024-10-23 PROCEDURE — 25510000001 IOPAMIDOL 61 % SOLUTION 30 ML VIAL: Performed by: ANESTHESIOLOGY

## 2024-10-23 PROCEDURE — S0260 H&P FOR SURGERY: HCPCS | Performed by: ANESTHESIOLOGY

## 2024-10-23 PROCEDURE — 64494 INJ PARAVERT F JNT L/S 2 LEV: CPT | Performed by: ANESTHESIOLOGY

## 2024-10-23 PROCEDURE — 76000 FLUOROSCOPY <1 HR PHYS/QHP: CPT

## 2024-10-23 PROCEDURE — 25010000002 LIDOCAINE PF 1% 1 % SOLUTION 30 ML VIAL: Performed by: ANESTHESIOLOGY

## 2024-10-23 PROCEDURE — 64493 INJ PARAVERT F JNT L/S 1 LEV: CPT | Performed by: ANESTHESIOLOGY

## 2024-10-23 PROCEDURE — 77002 NEEDLE LOCALIZATION BY XRAY: CPT

## 2024-10-23 PROCEDURE — 85610 PROTHROMBIN TIME: CPT | Performed by: ANESTHESIOLOGY

## 2024-10-23 PROCEDURE — 25010000002 BUPIVACAINE (PF) 0.5 % SOLUTION 10 ML VIAL: Performed by: ANESTHESIOLOGY

## 2024-10-23 RX ADMIN — DIAZEPAM 10 MG: 5 TABLET ORAL at 13:48

## 2024-10-23 NOTE — OP NOTE
Bilateral L3-5 Lumbar Medial Branch Blockade  [facility]      PREOPERATIVE DIAGNOSIS:  Lumbar spondylosis without myelopathy    POSTOPERATIVE DIAGNOSIS:  Lumbar spondylosis without myelopathy    PROCEDURE:   Diagnostic Bilateral Lumbar Medial Branch Nerve Blockades, with fluoroscopy:  L3, L4, and L5 nerves (at the L4 & L5 transverse processes and the sacral alar groove) to block facet joints L4-5, and L5-S1  25343-53 -- Bilateral Lumbar Facet blocks, 1st Level  24411-11 -- Bilateral Lumbar Facet blocks, 2nd  Level    PRE-PROCEDURE DISCUSSION WITH PATIENT:    Risks and complications were discussed with the patient prior to starting the procedure and informed consent was obtained.      SURGEON:  Wolfgang Means MD    REASON FOR PROCEDURE:    The patient complains of pain that seems to have a significant axial component, Previous diagnostic positivity of a Lumbar Medial Branch Blockade at the same levels, and Previous clinically significant therapeutic effect after prior Radiofrequency procedure    SEDATION:  The patient had higher than average levels of procedural anxiety and the need to provide additional procedural sedation was needed to safely proceed.  She received 10 mg oral diazepam in the preop area  ANESTHETIC:  Marcaine 0.5%  STEROID:  NONE  TOTAL VOLUME OF SOLUTION:  6ml    DESCRIPTON OF PROCEDURE:  After obtaining informed consent, IV access was not obtained in the preoperative area.   The patient was taken to the operating room.  The patient was placed in the prone position with a pillow under the abdomen. All pressure points were well padded.  EKG, blood pressure, and pulse oximeter were monitored.  The patient was monitored and sedated by the RN under my direction. The lumbosacral area was prepped with Chloraprep and draped in a sterile fashion. Under fluoroscopic guidance the transverse processes of the L4 and L5 vertebrae at the junctions of the superior articular processes were  identified on the right. Also identified was the groove between the ala and the superior articular process of the sacrum on the ipsilateral side.  Skin and subcutaneous tissue were anesthetized with 1% lidocaine above each of these points. A 22-gauge spinal needle was introduced under fluoroscopic guidance at the above junctions. Aspiration was negative for blood and CSF.  After confirming the position of the needle with fluoroscope in all views, 0.25 mL of Isovue was injected (of note, the patient had some type of untoward reaction to older generation contrast agent over 20 years ago, had chest pain during heart procedure… She has had contrast agents with us contrast agents with this previously on multiple occasions with small amounts of diluted amounts and not had any problem and we discussed all of this and she agreed to proceed), and after seeing the proper spread a total of 1 mL of the anesthetic solution noted above was injected at each of these points.  Needles were removed intact from each of the areas.  A similar procedure was repeated to block the L3, L4, and L5 nerves on the contralateral side.   Onset of analgesia was noted.  Vital signs remained stable throughout.      ESTIMATED BLOOD LOSS:  <5 mL  SPECIMENS:  none    COMPLICATIONS:   No complications were noted., There was no indication of vascular uptake on live injection of contrast dye., and The patient did not have any signs of postprocedure numbness nor weakness.    TOLERANCE & DISCHARGE CONDITION:    The patient tolerated the procedure well.  The patient was transported to the recovery area without difficulties.  The patient was discharged to home under the care of family in stable and satisfactory condition.    PLAN OF CARE:  The patient was given our standard instruction sheet.  We discussed that Lumbar Medial Branch Blockade is a diagnostic procedure in consideration for radiofrequency ablation if two diagnostic procedures prove to be positive  for significant benefit.  If sustained relief of 6 to eight weeks is obtained, then an alternative plan could be therapeutic lumbar branch blockades.  The patient is asked to keep a pain log each hour for 8 hours after the procedure today.  The patient will  Return to clinic 1 wk.  The patient will resume all medications as per the medication reconciliation sheet.

## 2024-10-23 NOTE — DISCHARGE INSTRUCTIONS
[facility] - Post-procedure Instructions          --  While there are no absolute restrictions, it is recommended that you do not perform strenuous activity today. In the morning, you may resume your level of activity as before your block.    --  If you have a band-aid at your injection site, please remove it later today. Observe the area for any redness, swelling, pus-like drainage, or a temperature over 101°. If any of these symptoms occur, please call your doctor at [phone]. If after office hours, leave a message and the on-call provider will return your call.    --  Ice may be applied to your injection site. It is recommended you avoid direct heat (heating pad; hot tub) for 1-2 days.    --  Call [facility] at [phone] if you experience persistent headache, persistent bleeding from the injection site, or severe pain not relieved by heat or oral medication.    --  Do not make important decisions today.    --  Due to the effects of the block and/or the I.V. Sedation, DO NOT drive or operate hazardous machinery for 12 hours.  Local anesthetics may cause numbness after procedure and precautions must be taken with regards to operating equipment as well as with walking, even if ambulating with assistance of another person or with an assistive device.    --  Do not drink alcohol for 12 hours.    -- You may return to work tomorrow, or as directed by your referring doctor.    --  Occasionally you may notice a slight increase in your pain after the procedure. This should start to improve within the next 24-48 hours. Radiofrequency ablation procedure pain may last 3-4 weeks.    --  It may take as long as 3-4 days before you notice a gradual improvement in your pain and/or other symptoms.    -- You may continue to take your prescribed pain medication as needed.    --  Some normal possible side effects of steroid use could include fluid retention, increased blood sugar, dull headache,  increased sweating, increased appetite, mood swings and flushing.    --  Diabetics are recommended to watch their blood glucose level closely for 24-48 hours after the injection.    --  Must stay in PACU for 20 min upon arrival and prove no leg weakness before being discharged.    --  IN THE EVENT OF A LIFE THREATENING EMERGENCY, (CHEST PAIN, BREATHING DIFFICULTIES, PARALYSIS…) YOU SHOULD GO TO YOUR NEAREST EMERGENCY ROOM.    --  You should be contacted by our office within 2-3 days to schedule follow up or next appointment date.  If not contacted within 7 days, please call the office at [phone]

## 2024-10-23 NOTE — H&P
Brief Pre-procedural / Pre-operative H&P        -----    Pertinent Diagnosis:   Lumbar spondylosis.  Lumbar facet arthropathy    Proposed Procedure: Lumbar medial branch block      Subjective   Inessa Peoples is a 84 y.o. female  who presents for intervention.  She has a history of back pain.      History of Present Illness     She had significant therapeutic relief from radiofrequency ablation in the past.  She has had pain relief when treating the L3 and L4 and L5 medial branches in the past.  She has had therapeutic relief in fact from therapeutic steroid facet nerve blocks in the past also.  Recurrent axial type back pain.  Diagnostic block is reasonable and indicated in order to consider whether to perform radiofrequency ablation once again.  -------    The following portions of the patient's history were reviewed and updated as appropriate: allergies, current medications, past family history, past medical history, past social history, past surgical history and problem list.    Allergies   Allergen Reactions    Iodinated Contrast Media Anaphylaxis, Swelling and Other (See Comments)     PAINS ACROSS CHEST, Facial swelling    Contrast Dye (Echo Or Unknown Ct/Mr) Other (See Comments)    Fenofibrate Myalgia    Livalo [Pitavastatin] Myalgia     Joint Pain, Muscle tightness    Plaquenil [Hydroxychloroquine Sulfate] Myalgia     Joint Pain, Muscle Tightness    Statins Myalgia     Joint pain, Muscle Tightness  Other reaction(s): Muscle cramps       No current facility-administered medications for this encounter.    No current facility-administered medications on file prior to encounter.     Current Outpatient Medications on File Prior to Encounter   Medication Sig Dispense Refill    Calcium-Phosphorus-Vitamin D (CITRACAL +D3 PO) Take 1 tablet by mouth Daily.      celecoxib (CeleBREX) 200 MG capsule TAKE 1 CAPSULE BY MOUTH DAILY 90 capsule 1    ezetimibe (ZETIA) 10 MG tablet TAKE 1 TABLET BY MOUTH DAILY 90 tablet 1     Ibuprofen 3 %, Gabapentin 10 %, Baclofen 2 %, lidocaine 4 % Apply 1-2 g topically to the appropriate area as directed 3 (Three) to 4 (Four) times daily. 90 g 0    levothyroxine (SYNTHROID, LEVOTHROID) 25 MCG tablet TAKE 1 TABLET BY MOUTH DAILY 90 tablet 1    melatonin 5 MG sublingual tablet sublingual tablet Take 1 tablet by mouth Every Night.      metoprolol succinate XL (TOPROL-XL) 25 MG 24 hr tablet Take 0.5 tablets by mouth Daily. 45 tablet 3    Multiple Vitamin (MULTI-DAY VITAMINS) tablet Take 1 tablet by mouth Daily.      niacinamide 500 MG tablet Take 1 tablet by mouth 2 (Two) Times a Day With Meals.      spironolactone (ALDACTONE) 25 MG tablet TAKE 1 TABLET BY MOUTH DAILY 90 tablet 1    furosemide (LASIX) 20 MG tablet TAKE ONE TABLET BY MOUTH DAILY 90 tablet 1    warfarin (COUMADIN) 3 MG tablet TAKE 1 AND 1/2 TABLET BY MOUTH DAILY 135 tablet 3         Lumbar facet arthropathy       Past Medical History:   Diagnosis Date    Abnormal ECG     Allergic Iodine used in on xrays    Allergic rhinitis     Atrial fibrillation     Bleeds easily     warfarin    Breast screening     Broken bones     Bulge of lumbar disc without myelopathy 07/13/2017    Calf DVT (deep venous thrombosis)     Chronic anticoagulation     Chronic pain disorder Lower back, shoulder, neck and knees    Class 2 severe obesity due to excess calories with serious comorbidity and body mass index (BMI) of 37.0 to 37.9 in adult     Colon polyp     CTS (carpal tunnel syndrome)     Deep vein thrombosis     left calf    Diverticulitis of colon     Diverticulitis of large intestine without perforation or abscess without bleeding 01/03/2017    Diverticulosis     AHN (dyspnea on exertion)     Fatigue     Fibromyalgia, primary     H/O malignant melanoma of skin     Heart failure with preserved ejection fraction, NYHA class II     II-III---decreased EF with stress    Hematuria, gross     History of colon polyps     History of kidney stones     HL (hearing  loss)     Hyperlipidemia     Hypertension     Hyperthyroidism     IFG (impaired fasting glucose)     Insomnia     Joint pain Knees, shoulders and neck    LBBB (left bundle branch block)     Left leg swelling     LLQ abdominal pain     Long term current use of systemic steroids     Low back pain     Lumbosacral disc disease     Malignant melanoma of skin     MS (multiple sclerosis)     Neck pain     Obesity     class 2 obesity due to excess calories with BMI of 35.0 to 35.9 in adult    Osteoarthritis     Osteopenia     Paralysis, diaphragm     Paroxysmal atrial fibrillation     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 for tuberculosis     Presence of biventricular cardiac pacemaker     Rash     RBBB (right bundle branch block)     Renal calculi     Sarcoid     Shingles     Sigmoid diverticulosis 06/26/2013    Sleep apnea     on CPAP, +10- Dr. Lu    Sleep apnea     USES CPAP    UTI (urinary tract infection)     Visit for screening mammogram        Past Surgical History:   Procedure Laterality Date    BREAST BIOPSY      CARDIAC CATHETERIZATION N/A 06/29/2020    Procedure: Coronary angiography, [place] [facility]ED[facility];  Surgeon: Noe Reynoso MD;  Location: [facility] CATH INVASIVE LOCATION;  Service: Cardiovascular;  Laterality: N/A;    CARDIAC CATHETERIZATION N/A 06/29/2020    Procedure: Left ventriculography;  Surgeon: Noe Reynoso MD;  Location: [facility] CATH INVASIVE LOCATION;  Service: Cardiovascular;  Laterality: N/A;    CARDIAC CATHETERIZATION N/A 06/29/2020    Procedure: Right and Left Heart Cath;  Surgeon: Noe Reynoso MD;  Location: [facility] CATH INVASIVE LOCATION;  Service: Cardiovascular;  Laterality: N/A;    CARDIAC CATHETERIZATION      CARDIAC ELECTROPHYSIOLOGY PROCEDURE N/A 01/05/2021    Procedure: Biventricular Device Insertion -- CRT-p ([employer]tr[employer]);  Surgeon: Demar Stone MD;  Location: [facility] CATH INVASIVE LOCATION;  Service: Cardiology;  Laterality: N/A;    CARPAL TUNNEL RELEASE Bilateral 1980'S    [facility]  SURGEONS    CATARACT EXTRACTION Bilateral 2001    Dr. Nic Hidalgo    COLONOSCOPY N/A 06/26/2013    MILD SIGMOID DIVERTICULOSIS, repeat 5 years based on sister w/ colona cancer-DR. NASRA CASTRO    COLONOSCOPY N/A 10/19/2010    SIGMOID DIVERTICULOSIS & INTERNAL HEMORRHOIDS, HEMORRHOIDAL BANDING X4, DR. NASRA CASTRO    COLONOSCOPY N/A 08/06/2018    Procedure: COLONOSCOPY TO CECUM AND INTO TERMINAL ILEUM;  Surgeon: Nasra Castro MD;  Location:  [facility];  Service: Gastroenterology    COLONOSCOPY N/A 10/04/2021    Procedure: COLONOSCOPY to CECUM;  Surgeon: Nasra Castro MD;  Location: [facility] ENDOSCOPY;  Service: General;  Laterality: N/A;  FAMILY HX COLON CANCER  --DIVERTICULOSIS     COLONOSCOPY W/ BIOPSIES N/A 05/12/2008    RECTUM BIOPSY: COLONIC MUCOSA W/ FOCAL INCREASE OF INFLAMMATORY CELLS IN THE MUSCULARIS MUCOSA INCLUDING EOSINOPHILS, SIGMOID DIVERTICULOSIS, POSSIBLE PROCTITIS, DR. NASRA CASTRO    CYSTOSCOPY, RETROGRADE PYELOGRAM AND STENT INSERTION Bilateral 10/10/2014    w/ Right ureteral pyeloscopy & laser lithotripsy, Right Double-J stent placement-Dr. Julio Tai    ENDOSCOPY N/A 11/11/2020    Procedure: ESOPHAGOGASTRODUODENOSCOPY with biopsies and dilation 54fr fernández;  Surgeon: Garret Piedra MD;  Location: [facility] ENDOSCOPY;  Service: Gastroenterology;  Laterality: N/A;  pre- dysphagia  post-- gastritis, dilation of esophageal ring, watermelon stomach     ENDOSCOPY N/A 9/30/2024    Procedure: ESOPHAGOGASTRODUODENOSCOPY;  Surgeon: Nasra Castro MD;  Location: [facility] ENDOSCOPY;  Service: General;  Laterality: N/A;  PRE: DYSPHAGIA, ABNORMAL ESOPHAGRAM  POST: DUODENAL GASTRO REFLUX    EXTRACORPOREAL SHOCK WAVE LITHOTRIPSY (ESWL) Right 09/10/2009    DR. JULIO TAI    HAMMER TOE REPAIR      JOINT REPLACEMENT Bilateral     Total knee replacement 4/27/22 both knees    LAPAROSCOPIC CHOLECYSTECTOMY N/A 07/19/2012    DR. NASRA CASTRO    LUMBAR EPIDURAL INJECTION N/A 04/23/2015     LUMBAR EPIDURAL STEROID INJECTION X3    MEDIAL BRANCH BLOCK Bilateral 2021    Procedure: MEDIAL BRANCH BLOCK--bilateral Lumbar2-5;  Surgeon: Wolfgang Means MD;  Location[facility];  Service: Pain Management;  Laterality: Bilateral;    MEDIAL BRANCH BLOCK Bilateral 10/11/2022    Procedure: bilateral L3-L5 medial branch blockade. Hold coumadin x 5 days prior;  Surgeon: Wolfgang Means MD;  Location: [facility];  Service: Pain Management;  Laterality: Bilateral;    PACEMAKER IMPLANTATION      PARATHYROIDECTOMY N/A     SACROILIAC JOINT INJECTION Left 06/15/2021    Procedure: SACROILIAC INJECTION-- left and left greater trochanteric bursa injection;  Surgeon: Wolfgang Means MD;  Location: [facility];  Service: Pain Management;  Laterality: Left;    SKIN CANCER EXCISION      THUMB ARTHROSCOPY Right     TOTAL KNEE ARTHROPLASTY Right 2014    [employer] Vanguard total knee, 55 femoral component, 71 tibial tray w/ a 40 mm stem, 34 mm x 7.8 mm three-peg patella, and a 14 standard poly-Dr. Ty Canchola    TRIGGER POINT INJECTION         Family History   Problem Relation Age of Onset    Heart disease Mother     Diabetes type II Mother     Arthritis Mother     Heart disease Father     Hypertension Father     Cancer Sister     Colon polyps Sister     Lung cancer Sister     Heart disease Sister     Heart disease Brother         CABG    Diabetes type II Brother     Colon polyps Brother     Arrhythmia Brother     Colon cancer Brother     Cancer Brother     Hypertension Brother     Heart disease Sister     Diabetes type II Sister     Aortic aneurysm Sister     COPD Sister     Arrhythmia Sister     Cerebral aneurysm Sister     Arthritis Sister     Hypertension Sister     COPD Sister     Lupus Sister     Asthma Maternal Aunt     Aortic aneurysm Brother     Arthritis Brother     Hypertension Brother     Drug abuse Brother             Early death Brother                Social History      Socioeconomic History    Marital status:      Spouse name: EDY    Number of children: 4    Years of education: 14yrs   Tobacco Use    Smoking status: Never    Smokeless tobacco: Never    Tobacco comments:     No history of tobacco use   Vaping Use    Vaping status: Never Used   Substance and Sexual Activity    Alcohol use: Yes     Alcohol/week: 3.0 standard drinks of alcohol     Types: 2 Glasses of wine, 1 Drinks containing 0.5 oz of alcohol per week     Comment: Occasionally    Drug use: Never    Sexual activity: Yes     Partners: Male     Birth control/protection: None       -------       Review of Systems  No Fever, No Chills, No ear pain, No sinus pressure or drainage, No eye pain or drainage, No cough, No SOB, No chest tightness nor chest pain, no palpitations.          Vitals:    10/23/24 1358   BP: 145/94   BP Location: Left arm   Patient Position: Sitting   Pulse: 79   Resp: 20   Temp: 97.5 °F (36.4 °C)   TempSrc: Temporal   SpO2: 96%   Weight: 81.2 kg (179 lb)         Objective   Physical Exam  VSS, NNR, NCAT, NMNA, NRD, AAOx3.      -------    Assessment & Plan:  - as noted above, the stated intervention is indicated  - Follow-up plan will be noted in the operative report        Office in a week      EMR Dragon/Transcription disclaimer:   Typed items in this encounter note may have been created by electronic transcription/translation software which converts spoken language to printed text. The electronic translation of spoken language may permit erroneous, or at times, nonsensical words or phrases to be inadvertently transcribed; Although I have reviewed the note for such errors, some may still exist.

## 2024-10-25 ENCOUNTER — TELEPHONE (OUTPATIENT)
Dept: INTERNAL MEDICINE | Facility: CLINIC | Age: 84
End: 2024-10-25
Payer: MEDICARE

## 2024-10-25 NOTE — TELEPHONE ENCOUNTER
Tried to call pt 2 alejandrina leonard leaving for weekend  NA    ----- Message from Guanakito Farah sent at 10/25/2024  4:31 PM EDT -----  Call pt and let her know her INR is low at 1.1. was she recently asked to hold her warfarin?

## 2024-10-30 ENCOUNTER — OFFICE VISIT (OUTPATIENT)
Dept: PAIN MEDICINE | Facility: CLINIC | Age: 84
End: 2024-10-30
Payer: MEDICARE

## 2024-10-30 VITALS
BODY MASS INDEX: 32.25 KG/M2 | TEMPERATURE: 96.4 F | SYSTOLIC BLOOD PRESSURE: 110 MMHG | RESPIRATION RATE: 18 BRPM | WEIGHT: 182 LBS | DIASTOLIC BLOOD PRESSURE: 76 MMHG | OXYGEN SATURATION: 93 % | HEART RATE: 76 BPM | HEIGHT: 63 IN

## 2024-10-30 DIAGNOSIS — M47.816 LUMBAR FACET ARTHROPATHY: Primary | ICD-10-CM

## 2024-10-30 DIAGNOSIS — M70.62 GREATER TROCHANTERIC BURSITIS OF LEFT HIP: ICD-10-CM

## 2024-10-30 DIAGNOSIS — M54.50 CHRONIC MIDLINE LOW BACK PAIN WITHOUT SCIATICA: ICD-10-CM

## 2024-10-30 DIAGNOSIS — G89.29 CHRONIC MIDLINE LOW BACK PAIN WITHOUT SCIATICA: ICD-10-CM

## 2024-10-30 PROCEDURE — 1160F RVW MEDS BY RX/DR IN RCRD: CPT | Performed by: NURSE PRACTITIONER

## 2024-10-30 PROCEDURE — 1159F MED LIST DOCD IN RCRD: CPT | Performed by: NURSE PRACTITIONER

## 2024-10-30 PROCEDURE — 3078F DIAST BP <80 MM HG: CPT | Performed by: NURSE PRACTITIONER

## 2024-10-30 PROCEDURE — 3074F SYST BP LT 130 MM HG: CPT | Performed by: NURSE PRACTITIONER

## 2024-10-30 PROCEDURE — 1125F AMNT PAIN NOTED PAIN PRSNT: CPT | Performed by: NURSE PRACTITIONER

## 2024-10-30 PROCEDURE — 99214 OFFICE O/P EST MOD 30 MIN: CPT | Performed by: NURSE PRACTITIONER

## 2024-10-30 NOTE — PROGRESS NOTES
CHIEF COMPLAINT  Follow-up for back pain.    Subjective   Inessa Peoples is a 84 y.o. female  who presents to the office for follow-up of procedure.  She completed a Bilateral L3-5 Lumbar Medial Branch Blockade   on  10/23/2024 performed by Dr. Rachel for management of back pain. Patient reports 80% relief from the procedure.     Today her pain is 7/10VAS in severity. Her primary pain complaint remains her axial low back pain. She does have some lateral left hip pain over the GT bursa, she notes that this has improved with PT in the past, she declines referral back to PT and states that she has the exercises she can do at home.     She is maintained on Warfarin d/t hx of a-fib. She also has a pacemaker. (managed by Dr. Stone). Clearance to hold in chart.      Past pain medications: Acute supplies of pain medications for surgeries.      Current pain medications: OTC tylenol, Celebrex 200 mg daily     Past therapies:  Physical Therapy: Yes, helpful to her pain, she was prescribed home exercises as well. She has not completed these recently.   Chiropractor: Yes, no relief  Massage Therapy: None  TENS: Yes, in the past, no relief  Neck or back surgery: None     Procedure list:  10/11/2022-bilateral L3-L5 MBB-100% relief  11/2/2021-bilateral L2-L5 MBB-100% relief  6/15/2021-left SI joint injection and left GT bursa injection-50% relief x 1 to 2 days  1/21/2021-bilateral L2-L5 MBB-90% relief  6/16/2020-left SI and left GT bursa injection-75% relief x 2 weeks  12/4/2019-bilateral L2-L5 RFA-75% relief lasting over 6 months  6/25/2019-bilateral L2-L5 MBB-90% relief for a few weeks  6/23/2019-bilateral GT bursa injection-90% relief  11/15/2018-bilateral L2-L5 MBB-90% relief lasting over a month  4/26/2018-left SI joint injection-significant relief  9/26/2017-right SI joint injection-significant relief  8/31/2017-left SI joint injection-70% relief  8/3/2017-left SI joint injection-70% relief    Back Pain  This is a  chronic problem. The current episode started more than 1 year ago. The problem occurs constantly. The problem has been gradually worsening (unstable) since onset. The pain is present in the lumbar spine (right). The quality of the pain is described as aching. The pain does not radiate. The pain is at a severity of 7/10. The pain is mild. The pain is Worse during the day (worsens as day progresses). The symptoms are aggravated by bending, position, standing and twisting (housework). Associated symptoms include bladder incontinence and weakness. Pertinent negatives include no abdominal pain, bowel incontinence, chest pain, dysuria, fever, headaches or numbness. Risk factors include obesity, menopause, history of cancer and lack of exercise (history of melanoma--no Chemo or RAD). She has tried analgesics and heat for the symptoms. The treatment provided moderate relief.   Hip Pain   The pain is present in the left hip (lateral). The quality of the pain is described as stabbing and aching. The pain is at a severity of 5/10. The pain has been Fluctuating since onset. Pertinent negatives include no numbness. She has tried NSAIDs and acetaminophen (PT) for the symptoms.      PEG Assessment   What number best describes your pain on average in the past week?8  What number best describes how, during the past week, pain has interfered with your enjoyment of life?8  What number best describes how, during the past week, pain has interfered with your general activity?  5    The following portions of the patient's history were reviewed and updated as appropriate: allergies, current medications, past family history, past medical history, past social history, past surgical history, and problem list.    Review of Systems   Constitutional:  Negative for fever.   Cardiovascular:  Negative for chest pain.   Gastrointestinal:  Negative for abdominal pain, bowel incontinence, constipation and diarrhea.   Genitourinary:  Positive for  "bladder incontinence. Negative for difficulty urinating and dysuria.   Musculoskeletal:  Positive for back pain.   Neurological:  Positive for weakness. Negative for numbness and headaches.   Psychiatric/Behavioral:  Positive for sleep disturbance. Negative for suicidal ideas. The patient is not nervous/anxious.      --  The aforementioned information the Chief Complaint section and above subjective data including any HPI data, and also the Review of Systems data, has been personally reviewed and affirmed.  --     Vitals:    10/30/24 1422   BP: 110/76   Pulse: 76   Resp: 18   Temp: 96.4 °F (35.8 °C)   SpO2: 93%   Weight: 82.6 kg (182 lb)   Height: 158.8 cm (62.52\")   PainSc:   7   PainLoc: Back     Objective   Physical Exam  Vitals and nursing note reviewed.   Constitutional:       Appearance: Normal appearance. She is well-developed.   Eyes:      General: Lids are normal.   Cardiovascular:      Rate and Rhythm: Normal rate.   Pulmonary:      Effort: Pulmonary effort is normal.   Musculoskeletal:      Lumbar back: Tenderness present. Decreased range of motion.      Comments:   +lumbar facet loading   Neurological:      Mental Status: She is alert and oriented to person, place, and time.   Psychiatric:         Attention and Perception: Attention normal.         Mood and Affect: Mood normal.         Speech: Speech normal.         Behavior: Behavior normal.         Judgment: Judgment normal.       Assessment & Plan   Diagnoses and all orders for this visit:    1. Lumbar facet arthropathy (Primary)    2. Chronic midline low back pain without sciatica    3. Greater trochanteric bursitis of left hip      Inessa Peoples reports a pain score of 7.  Given her pain assessment as noted, treatment options were discussed and the following options were decided upon as a follow-up plan to address the patient's pain:  injections .    Thermal Radiofrequency Destruction    This repeat thermal radiofrequency destruction (RFA) of " cervical, thoracic, or lumbar paravertebral facet joint (medial branch) nerves at the same anatomical site is considered medically reasonable and necessary as this patient has met the criteria of having a minimum of consistent 50% improvement in pain for at least six (6) months or at least 50% consistent improvement in the ability to perform previously painful movements and ADLs as compared to baseline measurement using the same scale.    --- Proceed with Bilateral L3-L5 RFA. Hold Warfarin x 5 days prior to procedure  Reviewed the procedure at length with the patient.  Included in the review was expectations, complications, risk and benefits.The procedure was described in detail and the risks, benefits and alternatives were discussed with the patient (including but not limited to: bleeding, infection, nerve damage, worsening of pain, inability to perform injection, paralysis, seizures, coma, no pain relief and death) who agreed to proceed.  Discussed the potential for sedation if warranted/wanted.  The procedure will plan to be performed at [facility] with fluoroscopic guidance(unless ultrasound is indicated) and could potentially have steroids and contrast dye used. Questions were answered and in a way the patient could understand.  Patient verbalized understanding and wishes to proceed.  This intervention will be ordered.  Discussed with patient that all procedures are part of a multimodal plan of care and include either formal PT or a home exercise program.  Patient has no evidence of coagulopathy or current infection.    --------    Anticoagulation risks for procedures....   - there are risks to discontinuation of anticoagulants for neuraxial procedures and surgery.  Risks include but are not limited to deep vein thromboses, pulmonary emboli, myocardial infarction, and stroke    - Neuraxial access with a needle is relatively contraindicated while anticoagulated because of the risk of  hemorrhage and/or epidural hematoma, which can be a neurosurgical emergency to evacuate bleeding.  Left unchecked, bleeding can cause nerve damage leading to paralysis and/or death.  --------    --- Begin HEP previously prescribed by PT for left lateral hip pain  --- Follow-up after procedure     Dictated utilizing Dragon dictation.

## 2024-10-31 PROCEDURE — 93297 REM INTERROG DEV EVAL ICPMS: CPT | Performed by: INTERNAL MEDICINE

## 2024-11-06 ENCOUNTER — TRANSCRIBE ORDERS (OUTPATIENT)
Dept: SURGERY | Facility: SURGERY CENTER | Age: 84
End: 2024-11-06
Payer: MEDICARE

## 2024-11-06 DIAGNOSIS — Z41.9 SURGERY, ELECTIVE: Primary | ICD-10-CM

## 2024-11-11 RX ORDER — METOPROLOL SUCCINATE 25 MG/1
12.5 TABLET, EXTENDED RELEASE ORAL DAILY
Qty: 45 TABLET | Refills: 3 | Status: SHIPPED | OUTPATIENT
Start: 2024-11-11

## 2024-11-22 ENCOUNTER — ANTICOAGULATION VISIT (OUTPATIENT)
Dept: INTERNAL MEDICINE | Facility: CLINIC | Age: 84
End: 2024-11-22
Payer: MEDICARE

## 2024-11-22 DIAGNOSIS — I48.0 PAROXYSMAL ATRIAL FIBRILLATION: Primary | ICD-10-CM

## 2024-11-22 LAB — INR PPP: 3.8 (ref 0.9–1.1)

## 2024-12-03 ENCOUNTER — OFFICE VISIT (OUTPATIENT)
Dept: PAIN MEDICINE | Facility: CLINIC | Age: 84
End: 2024-12-03
Payer: MEDICARE

## 2024-12-03 ENCOUNTER — TELEPHONE (OUTPATIENT)
Dept: CARDIOLOGY | Facility: CLINIC | Age: 84
End: 2024-12-03
Payer: MEDICARE

## 2024-12-03 VITALS
HEIGHT: 63 IN | DIASTOLIC BLOOD PRESSURE: 72 MMHG | BODY MASS INDEX: 32.82 KG/M2 | SYSTOLIC BLOOD PRESSURE: 124 MMHG | OXYGEN SATURATION: 96 % | TEMPERATURE: 97.6 F | RESPIRATION RATE: 18 BRPM | HEART RATE: 81 BPM | WEIGHT: 185.2 LBS

## 2024-12-03 DIAGNOSIS — M54.50 CHRONIC MIDLINE LOW BACK PAIN WITHOUT SCIATICA: ICD-10-CM

## 2024-12-03 DIAGNOSIS — M47.816 LUMBAR FACET ARTHROPATHY: Primary | ICD-10-CM

## 2024-12-03 DIAGNOSIS — G89.29 CHRONIC MIDLINE LOW BACK PAIN WITHOUT SCIATICA: ICD-10-CM

## 2024-12-03 NOTE — TELEPHONE ENCOUNTER
OptiVol Fluid Index elevated at 120. Thoracic impedance stable.    Called patient for symptom check.  No answer, LVM for patient to return call.  Full report sent in [facility].    Larissa Baltimore 12/3/24

## 2024-12-03 NOTE — PROGRESS NOTES
CHIEF COMPLAINT  Follow-up for back pain.    Subjective   Inessa Peoples is a 84 y.o. female  who presents for follow-up.  She has a history of back pain.  Today her pain is 8/10VAS in severity. She was pending a bilateral L3-L5 RFA on 12/17/2024. This was cancelled d/t Ms. Peoples eating the day of the procedure.     Her pain remains primarily axial in nature located in her low back.     She is maintained on Warfarin d/t hx of a-fib. She also has a pacemaker. (managed by Dr. Stone). Clearance to hold in chart.      Procedure list:  10/23/2024-bilateral L3-L5 MBB-80% relief  10/11/2022-bilateral L3-L5 MBB-100% relief  11/2/2021-bilateral L2-L5 MBB-100% relief  6/15/2021-left SI joint injection and left GT bursa injection-50% relief x 1 to 2 days  1/21/2021-bilateral L2-L5 MBB-90% relief  6/16/2020-left SI and left GT bursa injection-75% relief x 2 weeks  12/4/2019-bilateral L2-L5 RFA-75% relief lasting over 6 months  6/25/2019-bilateral L2-L5 MBB-90% relief for a few weeks  6/23/2019-bilateral GT bursa injection-90% relief  11/15/2018-bilateral L2-L5 MBB-90% relief lasting over a month  4/26/2018-left SI joint injection-significant relief  9/26/2017-right SI joint injection-significant relief  8/31/2017-left SI joint injection-70% relief  8/3/2017-left SI joint injection-70% relief    Past pain medications: Acute supplies of pain medications for surgeries.      Current pain medications: OTC tylenol, Celebrex 200 mg daily     Past therapies:  Physical Therapy: Yes, helpful to her pain, she was prescribed home exercises as well. She has not completed these recently.   Chiropractor: Yes, no relief  Massage Therapy: None  TENS: Yes, in the past, no relief  Neck or back surgery: None    Back Pain  This is a chronic problem. The current episode started more than 1 year ago. The problem occurs constantly. The problem has been gradually worsening (unstable) since onset. The pain is present in the lumbar spine (right).  The quality of the pain is described as aching. The pain does not radiate. The pain is at a severity of 8/10. The pain is mild. The pain is Worse during the day (worsens as day progresses). The symptoms are aggravated by bending, position, standing and twisting (housework). Associated symptoms include bladder incontinence. Pertinent negatives include no abdominal pain, bowel incontinence, chest pain, dysuria, fever, headaches or weakness. Risk factors include obesity, menopause, history of cancer and lack of exercise (history of melanoma--no Chemo or RAD). She has tried analgesics and heat for the symptoms. The treatment provided moderate relief.      PEG Assessment   What number best describes your pain on average in the past week?8  What number best describes how, during the past week, pain has interfered with your enjoyment of life?3  What number best describes how, during the past week, pain has interfered with your general activity?  5    The following portions of the patient's history were reviewed and updated as appropriate: allergies, current medications, past family history, past medical history, past social history, past surgical history, and problem list.    Review of Systems   Constitutional:  Negative for fever.   Cardiovascular:  Negative for chest pain.   Gastrointestinal:  Negative for abdominal pain, bowel incontinence, constipation and diarrhea.   Genitourinary:  Positive for bladder incontinence. Negative for difficulty urinating and dysuria.   Musculoskeletal:  Positive for arthralgias (bilateral knees and hips) and back pain.   Neurological:  Negative for weakness and headaches.   Psychiatric/Behavioral:  Positive for sleep disturbance. Negative for suicidal ideas. The patient is not nervous/anxious.      --  The aforementioned information the Chief Complaint section and above subjective data including any HPI data, and also the Review of Systems data, has been personally reviewed and  "affirmed.  --    Vitals:    12/03/24 1340   BP: 124/72   Pulse: 81   Resp: 18   Temp: 97.6 °F (36.4 °C)   SpO2: 96%   Weight: 84 kg (185 lb 3.2 oz)   Height: 158.8 cm (62.52\")   PainSc:   8   PainLoc: Back     Objective   Physical Exam  Vitals and nursing note reviewed.   Constitutional:       Appearance: Normal appearance. She is well-developed.   Eyes:      General: Lids are normal.   Cardiovascular:      Rate and Rhythm: Normal rate.   Pulmonary:      Effort: Pulmonary effort is normal.   Musculoskeletal:      Lumbar back: Tenderness present. Decreased range of motion.      Comments:   +lumbar facet loading   Neurological:      Mental Status: She is alert and oriented to person, place, and time.   Psychiatric:         Attention and Perception: Attention normal.         Mood and Affect: Mood normal.         Speech: Speech normal.         Behavior: Behavior normal.         Judgment: Judgment normal.       Assessment & Plan   Diagnoses and all orders for this visit:    1. Lumbar facet arthropathy (Primary)    2. Chronic midline low back pain without sciatica      Inessa Peoples reports a pain score of 8.  Given her pain assessment as noted, treatment options were discussed and the following options were decided upon as a follow-up plan to address the patient's pain: continuation of current treatment plan for pain.    --- Bilateral L3-L5 RFA. Hold Warfarin 5 days prior to procedure.   Reviewed the procedure at length with the patient.  Included in the review was expectations, complications, risk and benefits.The procedure was described in detail and the risks, benefits and alternatives were discussed with the patient (including but not limited to: bleeding, infection, nerve damage, worsening of pain, inability to perform injection, paralysis, seizures, coma, no pain relief and death) who agreed to proceed.  Discussed the potential for sedation if warranted/wanted.  The procedure will plan to be performed at [facility] [facility] with fluoroscopic guidance(unless ultrasound is indicated) and could potentially have steroids and contrast dye used. Questions were answered and in a way the patient could understand.  Patient verbalized understanding and wishes to proceed.  This intervention will be ordered.  Discussed with patient that all procedures are part of a multimodal plan of care and include either formal PT or a home exercise program.  Patient has no evidence of coagulopathy or current infection.    --------    Anticoagulation risks for procedures....   - there are risks to discontinuation of anticoagulants for neuraxial procedures and surgery.  Risks include but are not limited to deep vein thromboses, pulmonary emboli, myocardial infarction, and stroke    - Neuraxial access with a needle is relatively contraindicated while anticoagulated because of the risk of hemorrhage and/or epidural hematoma, which can be a neurosurgical emergency to evacuate bleeding.  Left unchecked, bleeding can cause nerve damage leading to paralysis and/or death.  --------    --- Follow-up after procedure     Dictated utilizing Dragon dictation.

## 2024-12-11 ENCOUNTER — TELEPHONE (OUTPATIENT)
Dept: PAIN MEDICINE | Facility: CLINIC | Age: 84
End: 2024-12-11

## 2024-12-11 ENCOUNTER — APPOINTMENT (OUTPATIENT)
Dept: GENERAL RADIOLOGY | Facility: HOSPITAL | Age: 84
DRG: 300 | End: 2024-12-11
Payer: MEDICARE

## 2024-12-11 ENCOUNTER — APPOINTMENT (OUTPATIENT)
Dept: CARDIOLOGY | Facility: HOSPITAL | Age: 84
DRG: 300 | End: 2024-12-11
Payer: MEDICARE

## 2024-12-11 ENCOUNTER — HOSPITAL ENCOUNTER (INPATIENT)
Facility: HOSPITAL | Age: 84
LOS: 2 days | Discharge: HOME | DRG: 300 | End: 2024-12-14
Attending: EMERGENCY MEDICINE | Admitting: INTERNAL MEDICINE
Payer: MEDICARE

## 2024-12-11 DIAGNOSIS — M25.561 ACUTE PAIN OF RIGHT KNEE: ICD-10-CM

## 2024-12-11 DIAGNOSIS — I82.461 ACUTE DEEP VEIN THROMBOSIS (DVT) OF CALF MUSCLE VEIN OF RIGHT LOWER EXTREMITY: Primary | ICD-10-CM

## 2024-12-11 DIAGNOSIS — R79.1 SUPRATHERAPEUTIC INR: ICD-10-CM

## 2024-12-11 PROBLEM — I82.409 DVT (DEEP VENOUS THROMBOSIS): Status: ACTIVE | Noted: 2024-12-11

## 2024-12-11 PROBLEM — I82.409 ACUTE DVT (DEEP VENOUS THROMBOSIS): Status: ACTIVE | Noted: 2024-12-11

## 2024-12-11 LAB
ALBUMIN SERPL-MCNC: 4 G/DL (ref 3.5–5.2)
ALBUMIN/GLOB SERPL: 1.7 G/DL
ALP SERPL-CCNC: 86 U/L (ref 39–117)
ALT SERPL W P-5'-P-CCNC: 21 U/L (ref 1–33)
ANION GAP SERPL CALCULATED.3IONS-SCNC: 10 MMOL/L (ref 5–15)
APTT PPP: 45.4 SECONDS (ref 22.7–35.4)
AST SERPL-CCNC: 23 U/L (ref 1–32)
BASOPHILS # BLD AUTO: 0.04 10*3/MM3 (ref 0–0.2)
BASOPHILS NFR BLD AUTO: 0.6 % (ref 0–1.5)
BH CV LOW VAS RIGHT GASTRONEMIUS VESSEL: 1
BH CV LOWER VASCULAR LEFT COMMON FEMORAL AUGMENT: NORMAL
BH CV LOWER VASCULAR LEFT COMMON FEMORAL COMPETENT: NORMAL
BH CV LOWER VASCULAR LEFT COMMON FEMORAL COMPRESS: NORMAL
BH CV LOWER VASCULAR LEFT COMMON FEMORAL PHASIC: NORMAL
BH CV LOWER VASCULAR LEFT COMMON FEMORAL SPONT: NORMAL
BH CV LOWER VASCULAR RIGHT COMMON FEMORAL AUGMENT: NORMAL
BH CV LOWER VASCULAR RIGHT COMMON FEMORAL COMPETENT: NORMAL
BH CV LOWER VASCULAR RIGHT COMMON FEMORAL COMPRESS: NORMAL
BH CV LOWER VASCULAR RIGHT COMMON FEMORAL PHASIC: NORMAL
BH CV LOWER VASCULAR RIGHT COMMON FEMORAL SPONT: NORMAL
BH CV LOWER VASCULAR RIGHT DISTAL FEMORAL COMPRESS: NORMAL
BH CV LOWER VASCULAR RIGHT GASTRONEMIUS COMPRESS: NORMAL
BH CV LOWER VASCULAR RIGHT GASTRONEMIUS THROMBUS: NORMAL
BH CV LOWER VASCULAR RIGHT GREATER SAPH AK COMPRESS: NORMAL
BH CV LOWER VASCULAR RIGHT GREATER SAPH BK COMPRESS: NORMAL
BH CV LOWER VASCULAR RIGHT LESSER SAPH COMPRESS: NORMAL
BH CV LOWER VASCULAR RIGHT MID FEMORAL AUGMENT: NORMAL
BH CV LOWER VASCULAR RIGHT MID FEMORAL COMPETENT: NORMAL
BH CV LOWER VASCULAR RIGHT MID FEMORAL COMPRESS: NORMAL
BH CV LOWER VASCULAR RIGHT MID FEMORAL PHASIC: NORMAL
BH CV LOWER VASCULAR RIGHT MID FEMORAL SPONT: NORMAL
BH CV LOWER VASCULAR RIGHT PERONEAL COMPRESS: NORMAL
BH CV LOWER VASCULAR RIGHT POPLITEAL AUGMENT: NORMAL
BH CV LOWER VASCULAR RIGHT POPLITEAL COMPETENT: NORMAL
BH CV LOWER VASCULAR RIGHT POPLITEAL COMPRESS: NORMAL
BH CV LOWER VASCULAR RIGHT POPLITEAL PHASIC: NORMAL
BH CV LOWER VASCULAR RIGHT POPLITEAL SPONT: NORMAL
BH CV LOWER VASCULAR RIGHT POSTERIOR TIBIAL COMPRESS: NORMAL
BH CV LOWER VASCULAR RIGHT PROFUNDA FEMORAL COMPRESS: NORMAL
BH CV LOWER VASCULAR RIGHT PROXIMAL FEMORAL COMPRESS: NORMAL
BH CV LOWER VASCULAR RIGHT SAPHENOFEMORAL JUNCTION COMPRESS: NORMAL
BH CV VAS PRELIMINARY FINDINGS SCRIPTING: 1
BILIRUB SERPL-MCNC: 0.8 MG/DL (ref 0–1.2)
BUN SERPL-MCNC: 20 MG/DL (ref 8–23)
BUN/CREAT SERPL: 17.7 (ref 7–25)
CALCIUM SPEC-SCNC: 9.8 MG/DL (ref 8.6–10.5)
CHLORIDE SERPL-SCNC: 107 MMOL/L (ref 98–107)
CO2 SERPL-SCNC: 24 MMOL/L (ref 22–29)
CREAT SERPL-MCNC: 1.13 MG/DL (ref 0.57–1)
DEPRECATED RDW RBC AUTO: 44.5 FL (ref 37–54)
EGFRCR SERPLBLD CKD-EPI 2021: 48.7 ML/MIN/1.73
EOSINOPHIL # BLD AUTO: 0.07 10*3/MM3 (ref 0–0.4)
EOSINOPHIL NFR BLD AUTO: 1 % (ref 0.3–6.2)
ERYTHROCYTE [DISTWIDTH] IN BLOOD BY AUTOMATED COUNT: 12.9 % (ref 12.3–15.4)
GLOBULIN UR ELPH-MCNC: 2.4 GM/DL
GLUCOSE SERPL-MCNC: 92 MG/DL (ref 65–99)
HCT VFR BLD AUTO: 37.6 % (ref 34–46.6)
HGB BLD-MCNC: 13.2 G/DL (ref 12–15.9)
IMM GRANULOCYTES # BLD AUTO: 0.01 10*3/MM3 (ref 0–0.05)
IMM GRANULOCYTES NFR BLD AUTO: 0.1 % (ref 0–0.5)
INR PPP: 4.04 (ref 0.9–1.1)
LYMPHOCYTES # BLD AUTO: 1.88 10*3/MM3 (ref 0.7–3.1)
LYMPHOCYTES NFR BLD AUTO: 26.6 % (ref 19.6–45.3)
MCH RBC QN AUTO: 33.4 PG (ref 26.6–33)
MCHC RBC AUTO-ENTMCNC: 35.1 G/DL (ref 31.5–35.7)
MCV RBC AUTO: 95.2 FL (ref 79–97)
MONOCYTES # BLD AUTO: 0.64 10*3/MM3 (ref 0.1–0.9)
MONOCYTES NFR BLD AUTO: 9 % (ref 5–12)
NEUTROPHILS NFR BLD AUTO: 4.44 10*3/MM3 (ref 1.7–7)
NEUTROPHILS NFR BLD AUTO: 62.7 % (ref 42.7–76)
NRBC BLD AUTO-RTO: 0 /100 WBC (ref 0–0.2)
PLATELET # BLD AUTO: 214 10*3/MM3 (ref 140–450)
PMV BLD AUTO: 9.9 FL (ref 6–12)
POTASSIUM SERPL-SCNC: 4.2 MMOL/L (ref 3.5–5.2)
PROT SERPL-MCNC: 6.4 G/DL (ref 6–8.5)
PROTHROMBIN TIME: 39.5 SECONDS (ref 11.7–14.2)
RBC # BLD AUTO: 3.95 10*6/MM3 (ref 3.77–5.28)
SODIUM SERPL-SCNC: 141 MMOL/L (ref 136–145)
WBC NRBC COR # BLD AUTO: 7.08 10*3/MM3 (ref 3.4–10.8)

## 2024-12-11 PROCEDURE — 99285 EMERGENCY DEPT VISIT HI MDM: CPT

## 2024-12-11 PROCEDURE — 85730 THROMBOPLASTIN TIME PARTIAL: CPT | Performed by: PHYSICIAN ASSISTANT

## 2024-12-11 PROCEDURE — 85610 PROTHROMBIN TIME: CPT | Performed by: PHYSICIAN ASSISTANT

## 2024-12-11 PROCEDURE — 73560 X-RAY EXAM OF KNEE 1 OR 2: CPT

## 2024-12-11 PROCEDURE — 25010000002 HEPARIN (PORCINE) 25000-0.45 UT/250ML-% SOLUTION: Performed by: PHYSICIAN ASSISTANT

## 2024-12-11 PROCEDURE — 80053 COMPREHEN METABOLIC PANEL: CPT | Performed by: PHYSICIAN ASSISTANT

## 2024-12-11 PROCEDURE — 85025 COMPLETE CBC W/AUTO DIFF WBC: CPT | Performed by: PHYSICIAN ASSISTANT

## 2024-12-11 PROCEDURE — G0378 HOSPITAL OBSERVATION PER HR: HCPCS

## 2024-12-11 PROCEDURE — 93971 EXTREMITY STUDY: CPT | Performed by: SURGERY

## 2024-12-11 PROCEDURE — 93971 EXTREMITY STUDY: CPT

## 2024-12-11 RX ORDER — HYDROCODONE BITARTRATE AND ACETAMINOPHEN 5; 325 MG/1; MG/1
1 TABLET ORAL ONCE
Status: COMPLETED | OUTPATIENT
Start: 2024-12-11 | End: 2024-12-11

## 2024-12-11 RX ORDER — ACETAMINOPHEN 650 MG/1
650 SUPPOSITORY RECTAL EVERY 4 HOURS PRN
Status: DISCONTINUED | OUTPATIENT
Start: 2024-12-11 | End: 2024-12-14 | Stop reason: HOSPADM

## 2024-12-11 RX ORDER — ONDANSETRON 4 MG/1
4 TABLET, ORALLY DISINTEGRATING ORAL EVERY 6 HOURS PRN
Status: DISCONTINUED | OUTPATIENT
Start: 2024-12-11 | End: 2024-12-14 | Stop reason: HOSPADM

## 2024-12-11 RX ORDER — LEVOTHYROXINE SODIUM 25 UG/1
25 TABLET ORAL DAILY
Status: DISCONTINUED | OUTPATIENT
Start: 2024-12-12 | End: 2024-12-14 | Stop reason: HOSPADM

## 2024-12-11 RX ORDER — BISACODYL 5 MG/1
5 TABLET, DELAYED RELEASE ORAL DAILY PRN
Status: DISCONTINUED | OUTPATIENT
Start: 2024-12-11 | End: 2024-12-14 | Stop reason: HOSPADM

## 2024-12-11 RX ORDER — VALSARTAN 80 MG/1
80 TABLET ORAL DAILY
Status: DISCONTINUED | OUTPATIENT
Start: 2024-12-12 | End: 2024-12-14 | Stop reason: HOSPADM

## 2024-12-11 RX ORDER — SPIRONOLACTONE 25 MG/1
25 TABLET ORAL DAILY
Status: DISCONTINUED | OUTPATIENT
Start: 2024-12-12 | End: 2024-12-14 | Stop reason: HOSPADM

## 2024-12-11 RX ORDER — ACETAMINOPHEN 325 MG/1
650 TABLET ORAL EVERY 4 HOURS PRN
Status: DISCONTINUED | OUTPATIENT
Start: 2024-12-11 | End: 2024-12-14 | Stop reason: HOSPADM

## 2024-12-11 RX ORDER — HEPARIN SODIUM 10000 [USP'U]/100ML
18 INJECTION, SOLUTION INTRAVENOUS
Status: DISCONTINUED | OUTPATIENT
Start: 2024-12-11 | End: 2024-12-12

## 2024-12-11 RX ORDER — FUROSEMIDE 20 MG/1
20 TABLET ORAL DAILY
Status: DISCONTINUED | OUTPATIENT
Start: 2024-12-12 | End: 2024-12-14 | Stop reason: HOSPADM

## 2024-12-11 RX ORDER — ACETAMINOPHEN 160 MG/5ML
650 SOLUTION ORAL EVERY 4 HOURS PRN
Status: DISCONTINUED | OUTPATIENT
Start: 2024-12-11 | End: 2024-12-14 | Stop reason: HOSPADM

## 2024-12-11 RX ORDER — ONDANSETRON 2 MG/ML
4 INJECTION INTRAMUSCULAR; INTRAVENOUS EVERY 6 HOURS PRN
Status: DISCONTINUED | OUTPATIENT
Start: 2024-12-11 | End: 2024-12-14 | Stop reason: HOSPADM

## 2024-12-11 RX ORDER — AMOXICILLIN 250 MG
2 CAPSULE ORAL 2 TIMES DAILY PRN
Status: DISCONTINUED | OUTPATIENT
Start: 2024-12-11 | End: 2024-12-14 | Stop reason: HOSPADM

## 2024-12-11 RX ORDER — METOPROLOL SUCCINATE 25 MG/1
12.5 TABLET, EXTENDED RELEASE ORAL DAILY
Status: DISCONTINUED | OUTPATIENT
Start: 2024-12-12 | End: 2024-12-14 | Stop reason: HOSPADM

## 2024-12-11 RX ORDER — POLYETHYLENE GLYCOL 3350 17 G/17G
17 POWDER, FOR SOLUTION ORAL DAILY PRN
Status: DISCONTINUED | OUTPATIENT
Start: 2024-12-11 | End: 2024-12-14 | Stop reason: HOSPADM

## 2024-12-11 RX ORDER — BISACODYL 10 MG
10 SUPPOSITORY, RECTAL RECTAL DAILY PRN
Status: DISCONTINUED | OUTPATIENT
Start: 2024-12-11 | End: 2024-12-14 | Stop reason: HOSPADM

## 2024-12-11 RX ORDER — DIPHENOXYLATE HYDROCHLORIDE AND ATROPINE SULFATE 2.5; .025 MG/1; MG/1
1 TABLET ORAL DAILY
Status: DISCONTINUED | OUTPATIENT
Start: 2024-12-12 | End: 2024-12-14 | Stop reason: HOSPADM

## 2024-12-11 RX ORDER — SODIUM CHLORIDE 0.9 % (FLUSH) 0.9 %
10 SYRINGE (ML) INJECTION AS NEEDED
Status: DISCONTINUED | OUTPATIENT
Start: 2024-12-11 | End: 2024-12-14 | Stop reason: HOSPADM

## 2024-12-11 RX ADMIN — Medication 2.5 MG: at 22:54

## 2024-12-11 RX ADMIN — HYDROCODONE BITARTRATE AND ACETAMINOPHEN 1 TABLET: 5; 325 TABLET ORAL at 13:41

## 2024-12-11 RX ADMIN — HEPARIN SODIUM 18 UNITS/KG/HR: 10000 INJECTION, SOLUTION INTRAVENOUS at 18:47

## 2024-12-11 NOTE — Clinical Note
Level of Care: Telemetry [5]   Diagnosis: Acute DVT (deep venous thrombosis) [869610]   Admitting Physician: ASHLEY HARTMAN [[id number]]   Attending Physician: ASHLEY HARTMAN [[id number]]   Is patient appropriate for Inpatient Observation Unit?: No [0]

## 2024-12-11 NOTE — ED PROVIDER NOTES
MD ATTESTATION NOTE    SHARED VISIT: This visit was performed by BOTH a physician and an APC. The substantive portion of the medical decision making was performed by this attesting physician who made or approved the management plan and takes responsibility for patient management. All studies documented in the APC note (if performed) were independently interpreted by me.    The ALESSANDRO and I have discussed this patient's history, physical exam, MDM, and treatment plan.  I have reviewed the documentation and personally had a face to face interaction with the patient. The attached note describes my personal findings.      Inessa Peoples is a 84 y.o. female who presents to the ED c/o acute right knee pain since Thanksgiving.  Pain has been constant.  Located primarily in the right popliteal fossa.  Worse with ambulation.  No fever.  No preceding trauma.    On exam:  GENERAL: not distressed  HENT: nares patent  EYES: no scleral icterus  CV: regular rhythm, regular rate, 2+ right DP pulse  RESPIRATORY: normal effort  ABDOMEN: soft  MUSCULOSKELETAL: no deformity, mild pain with passive range of motion of the right knee, tenderness palpation hypophyseal fossa and proximal gastrocnemius region, no overlying redness or warmth, intact right knee extension  NEURO: alert, moves all extremities, follows commands  SKIN: warm, dry    Labs  No results found for this or any previous visit (from the past 24 hours).    Radiology  XR Knee 1 or 2 View Right    Result Date: 12/11/2024  XR KNEE 1 OR 2 VW RIGHT-  INDICATIONS: Pain.  TECHNIQUE: 2 VIEWS OF THE RIGHT KNEE  COMPARISON: None available  FINDINGS:  Right knee arthroplasty hardware appears intact. No acute fracture, erosion, or dislocation is identified. Mild knee effusion is apparent. Follow-up/further evaluation can be obtained as indications persist.       As described.    This report was finalized on 12/11/2024 12:28 PM by Dr. Lj Mei M.D on Workstation: [id number]        Medications given in the ED:  Medications   HYDROcodone-acetaminophen (NORCO) 5-325 MG per tablet 1 tablet (has no administration in time range)       Orders placed during this visit:  Orders Placed This Encounter   Procedures    XR Knee 1 or 2 View Right       Medical Decision Making:  ED Course as of 12/12/24 1019   Wed Dec 11, 2024   1307 Right knee independently interpreted by myself.  I see no fracture or dislocation.  Arthroplasty hardware appears intact. [TD]   1422 Discussed with vascular lab who reports US is positive for right calf DVT. [DC]   1531 WBC: 7.08 [DC]   1531 Hemoglobin: 13.2 [DC]   1531 Platelets: 214 [DC]   1625 INR(!!): 4.04 [DC]   1738 Discussed case with Dr. Herrera, hem-onc, who recommends heparin gtt and admission for further evaluation. [DC]      ED Course User Index  [DC] Nandini Pinto PA  [TD] Bakari Linares II, MD           Diagnosis  Final diagnoses:   Acute deep vein thrombosis (DVT) of calf muscle vein of right lower extremity   Supratherapeutic INR   Acute pain of right knee          Bakari Linares II, MD  12/12/24 1019

## 2024-12-11 NOTE — ED NOTES
Patient c/o pain behind the right knee radiating down into her calf. Patient states that it started after she was on her feet for 3 days preparing for Thanksgiving. Patient states she recently had a change in her blood thinner medication. + PMS RLE.

## 2024-12-11 NOTE — ED NOTES
Nursing report ED to floor  Inessa Peoples  84 y.o.  female    HPI :  HPI  Stated Reason for Visit: knee pain    Chief Complaint  Chief Complaint   Patient presents with    Knee Pain       Admitting doctor:   Yaneli Clemons MD    Admitting diagnosis:   The primary encounter diagnosis was Acute deep vein thrombosis (DVT) of calf muscle vein of right lower extremity. Diagnoses of Supratherapeutic INR and Acute pain of right knee were also pertinent to this visit.    Code status:   Current Code Status       Date Active Code Status Order ID Comments User Context       12/11/2024 1804 CPR (Attempt to Resuscitate) [id number]  Yaneli Clemons MD ED        Question Answer    Code Status (Patient has no pulse and is not breathing) CPR (Attempt to Resuscitate)    Medical Interventions (Patient has pulse or is breathing) Full                    Allergies:   Iodinated contrast media, Contrast dye (echo or unknown ct/mr), Fenofibrate, Livalo [pitavastatin], Plaquenil [hydroxychloroquine sulfate], and Statins    Isolation:   No active isolations    Intake and Output  No intake or output data in the 24 hours ending 12/11/24 1854    Weight:       12/11/24  1518   Weight: 82.6 kg (182 lb)       Most recent vitals:   Vitals:    12/11/24 1701 12/11/24 1702 12/11/24 1712 12/11/24 1847   BP: 147/79   132/89   Pulse: 72  75 71   Resp:   16 16   Temp:       SpO2:  95% 93% 91%   Weight:       Height:           Active LDAs/IV Access:   Lines, Drains & Airways       Active LDAs       Name Placement date Placement time Site Days    Peripheral IV 12/11/24 1517 Left Antecubital 12/11/24  1517  Antecubital  less than 1                    Labs (abnormal labs have a star):   Labs Reviewed   COMPREHENSIVE METABOLIC PANEL - Abnormal; Notable for the following components:       Result Value    Creatinine 1.13 (*)     eGFR 48.7 (*)     All other components within normal limits    Narrative:     GFR Categories in Chronic Kidney Disease  (CKD)      GFR Category          GFR (mL/min/1.73)    Interpretation  G1                     90 or greater         Normal or high (1)  G2                      60-89                Mild decrease (1)  G3a                   45-59                Mild to moderate decrease  G3b                   30-44                Moderate to severe decrease  G4                    15-29                Severe decrease  G5                    14 or less           Kidney failure          (1)In the absence of evidence of kidney disease, neither GFR category G1 or G2 fulfill the criteria for CKD.    eGFR calculation 2021 CKD-EPI creatinine equation, which does not include race as a factor   PROTIME-INR - Abnormal; Notable for the following components:    Protime 39.5 (*)     INR 4.04 (*)     All other components within normal limits   CBC WITH AUTO DIFFERENTIAL - Abnormal; Notable for the following components:    MCH 33.4 (*)     All other components within normal limits   APTT - Abnormal; Notable for the following components:    PTT 45.4 (*)     All other components within normal limits   CBC AND DIFFERENTIAL    Narrative:     The following orders were created for panel order CBC & Differential.  Procedure                               Abnormality         Status                     ---------                               -----------         ------                     CBC Auto Differential[[id number]]        Abnormal            Final result                 Please view results for these tests on the individual orders.       EKG:   No orders to display       Meds given in ED:   Medications   sodium chloride 0.9 % flush 10 mL (has no administration in time range)   heparin 28858 units/250 mL (100 units/mL) in 0.45 % NaCl infusion (18 Units/kg/hr × 82.6 kg Intravenous New Bag 12/11/24 5332)   acetaminophen (TYLENOL) tablet 650 mg (has no administration in time range)     Or   acetaminophen (TYLENOL) 160 MG/5ML oral solution 650 mg (has no  administration in time range)     Or   acetaminophen (TYLENOL) suppository 650 mg (has no administration in time range)   ondansetron ODT (ZOFRAN-ODT) disintegrating tablet 4 mg (has no administration in time range)     Or   ondansetron (ZOFRAN) injection 4 mg (has no administration in time range)   melatonin tablet 2.5 mg (has no administration in time range)   sennosides-docusate (PERICOLACE) 8.6-50 MG per tablet 2 tablet (has no administration in time range)     And   polyethylene glycol (MIRALAX) packet 17 g (has no administration in time range)     And   bisacodyl (DULCOLAX) EC tablet 5 mg (has no administration in time range)     And   bisacodyl (DULCOLAX) suppository 10 mg (has no administration in time range)   HYDROcodone-acetaminophen (NORCO) 5-325 MG per tablet 1 tablet (1 tablet Oral Given 12/11/24 1341)       Imaging results:  XR Knee 1 or 2 View Right    Result Date: 12/11/2024   As described.    This report was finalized on 12/11/2024 12:28 PM by Dr. Lj Mei M.D on Workstation: [email or web address]       Ambulatory status:   - unable to bear weight on right leg; assist x 1 with wheelchair    Social issues:   Social History     Socioeconomic History    Marital status:      Spouse name: EDY    Number of children: 4    Years of education: 14yrs   Tobacco Use    Smoking status: Never    Smokeless tobacco: Never    Tobacco comments:     No history of tobacco use   Vaping Use    Vaping status: Never Used   Substance and Sexual Activity    Alcohol use: Yes     Alcohol/week: 3.0 standard drinks of alcohol     Types: 2 Glasses of wine, 1 Drinks containing 0.5 oz of alcohol per week     Comment: Occasionally    Drug use: Never    Sexual activity: Yes     Partners: Male     Birth control/protection: None       Peripheral Neurovascular  Peripheral Neurovascular (Adult)  Peripheral Neurovascular WDL: WDL  RLE Neurovascular Assessment  Temperature RLE: warm  Color RLE: no discoloration  Sensation RLE:  tenderness present    Neuro Cognitive  Neuro Cognitive (Adult)  Cognitive/Neuro/Behavioral WDL: WDL    Learning  Learning Assessment  Learning Readiness and Ability: no barriers identified    Respiratory  Respiratory WDL  Respiratory WDL: WDL    Abdominal Pain       Pain Assessments  Pain (Adult)  (0-10) Pain Rating: Rest: 4  (0-10) Pain Rating: Activity: 10  Preferred Pain Scale: FACES (Hines-Baker FACES Pain Rating Scale)  FACES Pain Rating: Rest: 8-->hurts whole lot  Response to Pain Interventions: other (see comments), functional ability unchanged (somewhat effective)    NIH Stroke Scale       Nandini Valderrama RN  12/11/24 18:54 EST

## 2024-12-11 NOTE — TELEPHONE ENCOUNTER
Caller: SheltonInessa    Relationship to patient: Self    Best call back number: [phone] (home)     Type of visit: CANCEL Bilateral L3-L5 radiofrequency ablation 72205; [id number]     Additional notes: MS ROWE IS IN THE HOSPITAL WITH A BLOOD CLOT AND NEEDS TO CANCEL HER Bilateral L3-L5 radiofrequency ablation 87170; 91264 ON 12/17/24

## 2024-12-11 NOTE — ED PROVIDER NOTES
EMERGENCY DEPARTMENT ENCOUNTER      PCP: Gisele Dockery MD  Patient Care Team:  Gisele Dockery MD as PCP - General (Internal Medicine)  Raleigh Bunch MD as Consulting Physician (Orthopedic Surgery)   Independent Historians: Patient    HPI:  Chief Complaint: Knee pain   A complete HPI/ROS/PMH/PSH/SH/FH are unobtainable due to: None    Chronic or social conditions impacting patient care (social determinants of health): None    Context: Inessa Peoples is a 84 y.o. female w/ hx of atrial fibrillation on coumadin who presents to the ED c/o acute right knee pain x 2 weeks. Pain is intermittent but at times severe. She states today she is having trouble bearing weight secondary to the pain.  Patient does report she has been intermittently off of her Coumadin recently for various procedures on her back but has been taking over the past couple of weeks.    Review of prior external notes and/or external test results outside of this encounter: INR on 11/22/2024 was 3.8.  CMP on 7/12/2024 showed normal creatinine of 0.97.      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/2016    Osteopenia 04/18/2016    Impaired fasting glucose 04/18/2016    Hypothyroidism 04/18/2016    Hypertension 04/18/2016    Hyperlipidemia 04/18/2016    Chronic osteoarthritis 04/18/2016    GERD (gastroesophageal reflux disease) 08/05/2016    ANY on CPAP + 10 - Dr Lu 10/15/2016    Dilation of thoracic aorta 06/23/2017    Bulge of lumbar disc without myelopathy 07/13/2017    Lumbar facet arthropathy 11/06/2018    Trochanteric bursitis of both hips 05/29/2019    LBBB (left bundle branch block) 11/08/2019    Paroxysmal atrial fibrillation 12/09/2020    Heart failure with preserved ejection fraction, NYHA class II 02/11/2021    Presence of biventricular cardiac pacemaker 02/11/2021    Dysphagia 08/12/2024     Resolved Ambulatory Problems     Diagnosis Date  Noted    Neck pain 04/18/2016    Low back pain 04/18/2016    Insomnia 04/18/2016    Dyspnea on exertion 04/18/2016    History of DVT (deep vein thrombosis) 04/18/2016    Atrial fibrillation 04/18/2016    History of melanoma excision 08/05/2016    History of kidney stones 08/05/2016    Diverticulitis of large intestine without perforation or abscess without bleeding 01/03/2017    Chronic anticoagulation 05/15/2017    Chronic pain 07/13/2017    Sacroiliac inflammation 07/13/2017    Sacroiliac pain 07/13/2017    Class 2 severe obesity due to excess calories with serious comorbidity and body mass index (BMI) of 35.0 to 35.9 in adult 03/27/2018    Family history of colon cancer 06/19/2018    Colon cancer screening 06/19/2018    Abnormal stress test 06/24/2020    Sacroiliac inflammation 06/04/2021     Past Medical History:   Diagnosis Date    Abnormal ECG     Allergic Iodine used in on xrays    Allergic rhinitis     Bleeds easily     Breast screening     Broken bones     Calf DVT (deep venous thrombosis)     Chronic pain disorder Lower back, shoulder, neck and knees    Class 2 severe obesity due to excess calories with serious comorbidity and body mass index (BMI) of 37.0 to 37.9 in adult     Colon polyp     CTS (carpal tunnel syndrome)     Deep vein thrombosis     Diverticulitis of colon     Diverticulosis     AHN (dyspnea on exertion)     Fatigue     Fibromyalgia, primary     H/O malignant melanoma of skin     Hematuria, gross     History of colon polyps     HL (hearing loss)     Hyperthyroidism     IFG (impaired fasting glucose)     Joint pain Knees, shoulders and neck    Left leg swelling     LLQ abdominal pain     Long term current use of systemic steroids     Lumbosacral disc disease     Malignant melanoma of skin     MS (multiple sclerosis)     Obesity     Osteoarthritis     Paralysis, diaphragm     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 for tuberculosis     Rash     RBBB (right bundle branch block)     Renal calculi     Sarcoid      Shingles     Sigmoid diverticulosis 06/26/2013    Sleep apnea     Sleep apnea     UTI (urinary tract infection)     Visit for screening mammogram        The patient has a COVID HM Topic on their chart, and they are fully vaccinated.    PAST SURGICAL HISTORY  Past Surgical History:   Procedure Laterality Date    BREAST BIOPSY      CARDIAC CATHETERIZATION N/A 06/29/2020    Procedure: Coronary angiography, Bethany L;  Surgeon: Noe Reynoso MD;  Location:  [facility] CATH INVASIVE LOCATION;  Service: Cardiovascular;  Laterality: N/A;    CARDIAC CATHETERIZATION N/A 06/29/2020    Procedure: Left ventriculography;  Surgeon: Noe Reynoso MD;  Location[facility] C[facility];  Service: Cardiovascular;  Laterality: N/A;    CARDIAC CATHETERIZATION N/A 06/29/2020    Procedure: Right and Left Heart Cath;  Surgeon: Noe Reynoso MD;  [facility];  Service: Cardiovascular;  Laterality: N/A;    CARDIAC CATHETERIZATION      CARDIAC ELECTROPHYSIOLOGY PROCEDURE N/A 01/05/2021    Procedure: Biventricular Device Insertion -- CRT-p ([employer]tr[employer]);  Surgeon: Demar Stone MD;  Location: [facility] CATH INVASIVE LOCATION;  Service: Cardiology;  Laterality: N/A;    CARPAL TUNNEL RELEASE Bilateral 1980'S    [facility]    CATARACT EXTRACTION Bilateral 2001    Dr. Nic Hidalgo    COLONOSCOPY N/A 06/26/2013    MILD SIGMOID DIVERTICULOSIS, repeat 5 years based on sister w/ colona cancer-DR. NASRA CASTRO    COLONOSCOPY N/A 10/19/2010    SIGMOID DIVERTICULOSIS & INTERNAL HEMORRHOIDS, HEMORRHOIDAL BANDING X4, DR. NASRA CASTRO    COLONOSCOPY N/A 08/06/2018    Procedure: COLONOSCOPY TO CECUM AND INTO TERMINAL ILEUM;  Surgeon: Nasra Castro MD;  Location: [facility] ENDOSCOPY;  Service: Gastroenterology    COLONOSCOPY N/A 10/04/2021    Procedure: COLONOSCOPY to CECUM;  Surgeon: Nasra Castro MD;  Location: [facility] ENDOSCOPY;  Service: General;  Laterality: N/A;  FAMILY HX COLON  CANCER  --DIVERTICULOSIS     COLONOSCOPY W/ BIOPSIES N/A 05/12/2008    RECTUM BIOPSY: COLONIC MUCOSA W/ FOCAL INCREASE OF INFLAMMATORY CELLS IN THE MUSCULARIS MUCOSA INCLUDING EOSINOPHILS, SIGMOID DIVERTICULOSIS, POSSIBLE PROCTITIS, DR. NASRA CASTRO    CYSTOSCOPY, RETROGRADE PYELOGRAM AND STENT INSERTION Bilateral 10/10/2014    w/ Right ureteral pyeloscopy & laser lithotripsy, Right Double-J stent placement-Dr. Julio Tai    ENDOSCOPY N/A 11/11/2020    Procedure: ESOPHAGOGASTRODUODENOSCOPY with biopsies and dilation 54fr fernández;  Surgeon: Garret Piedra MD;  Location: [facility] ENDOSCOPY;  Service: Gastroenterology;  Laterality: N/A;  pre- dysphagia  post-- gastritis, dilation of esophageal ring, watermelon stomach     ENDOSCOPY N/A 9/30/2024    Procedure: ESOPHAGOGASTRODUODENOSCOPY;  Surgeon: Nasra Castro MD;  Location: [facility] ENDOSCOPY;  Service: General;  Laterality: N/A;  PRE: DYSPHAGIA, ABNORMAL ESOPHAGRAM  POST: DUODENAL GASTRO REFLUX    EXTRACORPOREAL SHOCK WAVE LITHOTRIPSY (ESWL) Right 09/10/2009    DR. JULIO TAI    HAMMER TOE REPAIR      JOINT REPLACEMENT Bilateral     Total knee replacement 4/27/22 both knees    LAPAROSCOPIC CHOLECYSTECTOMY N/A 07/19/2012    DR. NASRA CASTRO    LUMBAR EPIDURAL INJECTION N/A 04/23/2015    LUMBAR EPIDURAL STEROID INJECTION X3    MEDIAL BRANCH BLOCK Bilateral 11/02/2021    Procedure: MEDIAL BRANCH BLOCK--bilateral Lumbar2-5;  Surgeon: Wolfgang Means MD;  [place] [facility] [place];  Service: Pain Management;  Laterality: Bilateral;    MEDIAL BRANCH BLOCK Bilateral 10/11/2022    Procedure: bilateral L3-L5 medial branch blockade. Hold coumadin x 5 days prior;  Surgeon: Wolfgang Means MD;  [place] [facility] [place];  Service: Pain Management;  Laterality: Bilateral;    MEDIAL BRANCH BLOCK Bilateral 10/23/2024    Procedure: Bilateral L3-L5 medial branch blockade 40945; [id number];  Surgeon: Wolfgang Means MD;  [place] [facility] [place];  Service: Pain  Management;  Laterality: Bilateral;    PACEMAKER IMPLANTATION      PARATHYROIDECTOMY N/A     SACROILIAC JOINT INJECTION Left 06/15/2021    Procedure: SACROILIAC INJECTION-- left and left greater trochanteric bursa injection;  Surgeon: Wolfgang Means MD;  Location: [facility];  Service: Pain Management;  Laterality: Left;    SKIN CANCER EXCISION      THUMB ARTHROSCOPY Right     TOTAL KNEE ARTHROPLASTY Right 2014    [employer] Vanguard total knee, 55 femoral component, 71 tibial tray w/ a 40 mm stem, 34 mm x 7.8 mm three-peg patella, and a 14 standard poly-Dr. Ty Canchola    TRIGGER POINT INJECTION           FAMILY HISTORY  Family History   Problem Relation Age of Onset    Heart disease Mother     Diabetes type II Mother     Arthritis Mother     Heart disease Father     Hypertension Father     Cancer Sister     Colon polyps Sister     Lung cancer Sister     Heart disease Sister     Heart disease Brother         CABG    Diabetes type II Brother     Colon polyps Brother     Arrhythmia Brother     Colon cancer Brother     Cancer Brother     Hypertension Brother     Heart disease Sister     Diabetes type II Sister     Aortic aneurysm Sister     COPD Sister     Arrhythmia Sister     Cerebral aneurysm Sister     Arthritis Sister     Hypertension Sister     COPD Sister     Lupus Sister     Asthma Maternal Aunt     Aortic aneurysm Brother     Arthritis Brother     Hypertension Brother     Drug abuse Brother             Early death Brother                  SOCIAL HISTORY  Social History     Socioeconomic History    Marital status:      Spouse name: EDY    Number of children: 4    Years of education: 14yrs   Tobacco Use    Smoking status: Never    Smokeless tobacco: Never    Tobacco comments:     No history of tobacco use   Vaping Use    Vaping status: Never Used   Substance and Sexual Activity    Alcohol use: Yes     Alcohol/week: 3.0 standard drinks of alcohol     Types: 2 Glasses of  wine, 1 Drinks containing 0.5 oz of alcohol per week     Comment: Occasionally    Drug use: Never    Sexual activity: Yes     Partners: Male     Birth control/protection: None         ALLERGIES  Iodinated contrast media, Contrast dye (echo or unknown ct/mr), Fenofibrate, Livalo [pitavastatin], Plaquenil [hydroxychloroquine sulfate], and Statins        REVIEW OF SYSTEMS  Review of Systems   Musculoskeletal:  Positive for arthralgias (Right knee).        All systems reviewed and negative except for those discussed in HPI.       PHYSICAL EXAM    I have reviewed the triage vital signs and nursing notes.    ED Triage Vitals   Temp Heart Rate Resp BP SpO2   12/11/24 1143 12/11/24 1143 12/11/24 1143 12/11/24 1200 12/11/24 1143   97.8 °F (36.6 °C) 89 16 142/80 97 %      Temp src Heart Rate Source Patient Position BP Location FiO2 (%)   -- -- -- -- --              Physical Exam  GENERAL: alert, no acute distress, antalgic gait  SKIN: Warm, dry  HENT: Normocephalic, atraumatic  EYES: no scleral icterus  CV: regular rhythm, regular rate  RESPIRATORY: normal effort, lungs clear  ABDOMEN: soft, nontender, nondistended  MUSCULOSKELETAL: no deformity, there is tenderness on palpation of the right medial calf and medial knee/popliteal region  NEURO: alert, moves all extremities, follows commands          LAB RESULTS  Recent Results (from the past 24 hours)   Duplex Venous Lower Extremity - RIGHT    Collection Time: 12/11/24  2:22 PM   Result Value Ref Range    Right Gastronemius Vessel 1.0     Right Common Femoral Spont Y     Right Common Femoral Competent Y     Right Common Femoral Phasic Y     Right Common Femoral Compress C     Right Common Femoral Augment Y     Right Saphenofemoral Junction Compress C     Right Profunda Femoral Compress C     Right Proximal Femoral Compress C     Right Mid Femoral Spont Y     Right Mid Femoral Competent Y     Right Mid Femoral Phasic Y     Right Mid Femoral Compress C     Right Mid Femoral  Augment Y     Right Distal Femoral Compress C     Right Popliteal Spont Y     Right Popliteal Competent Y     Right Popliteal Phasic Y     Right Popliteal Compress C     Right Popliteal Augment Y     Right Posterior Tibial Compress C     Right Peroneal Compress C     Right Gastronemius Compress N     Right Gastronemius Thrombus A     Right Greater Saph AK Compress C     Right Greater Saph BK Compress C     Right Lesser Saph Compress C     Left Common Femoral Spont Y     Left Common Femoral Competent Y     Left Common Femoral Phasic Y     Left Common Femoral Compress C     Left Common Femoral Augment Y     BH CV VAS PRELIMINARY FINDINGS SCRIPTING 1.0    Comprehensive Metabolic Panel    Collection Time: 12/11/24  3:17 PM    Specimen: Blood   Result Value Ref Range    Glucose 92 65 - 99 mg/dL    BUN 20 8 - 23 mg/dL    Creatinine 1.13 (H) 0.57 - 1.00 mg/dL    Sodium 141 136 - 145 mmol/L    Potassium 4.2 3.5 - 5.2 mmol/L    Chloride 107 98 - 107 mmol/L    CO2 24.0 22.0 - 29.0 mmol/L    Calcium 9.8 8.6 - 10.5 mg/dL    Total Protein 6.4 6.0 - 8.5 g/dL    Albumin 4.0 3.5 - 5.2 g/dL    ALT (SGPT) 21 1 - 33 U/L    AST (SGOT) 23 1 - 32 U/L    Alkaline Phosphatase 86 39 - 117 U/L    Total Bilirubin 0.8 0.0 - 1.2 mg/dL    Globulin 2.4 gm/dL    A/G Ratio 1.7 g/dL    BUN/Creatinine Ratio 17.7 7.0 - 25.0    Anion Gap 10.0 5.0 - 15.0 mmol/L    eGFR 48.7 (L) >60.0 mL/min/1.73   Protime-INR    Collection Time: 12/11/24  3:17 PM    Specimen: Blood   Result Value Ref Range    Protime 39.5 (H) 11.7 - 14.2 Seconds    INR 4.04 (C) 0.90 - 1.10   CBC Auto Differential    Collection Time: 12/11/24  3:17 PM    Specimen: Blood   Result Value Ref Range    WBC 7.08 3.40 - 10.80 10*3/mm3    RBC 3.95 3.77 - 5.28 10*6/mm3    Hemoglobin 13.2 12.0 - 15.9 g/dL    Hematocrit 37.6 34.0 - 46.6 %    MCV 95.2 79.0 - 97.0 fL    MCH 33.4 (H) 26.6 - 33.0 pg    MCHC 35.1 31.5 - 35.7 g/dL    RDW 12.9 12.3 - 15.4 %    RDW-SD 44.5 37.0 - 54.0 fl    MPV 9.9 6.0 -  12.0 fL    Platelets 214 140 - 450 10*3/mm3    Neutrophil % 62.7 42.7 - 76.0 %    Lymphocyte % 26.6 19.6 - 45.3 %    Monocyte % 9.0 5.0 - 12.0 %    Eosinophil % 1.0 0.3 - 6.2 %    Basophil % 0.6 0.0 - 1.5 %    Immature Grans % 0.1 0.0 - 0.5 %    Neutrophils, Absolute 4.44 1.70 - 7.00 10*3/mm3    Lymphocytes, Absolute 1.88 0.70 - 3.10 10*3/mm3    Monocytes, Absolute 0.64 0.10 - 0.90 10*3/mm3    Eosinophils, Absolute 0.07 0.00 - 0.40 10*3/mm3    Basophils, Absolute 0.04 0.00 - 0.20 10*3/mm3    Immature Grans, Absolute 0.01 0.00 - 0.05 10*3/mm3    nRBC 0.0 0.0 - 0.2 /100 WBC       Ordered the above labs and independently reviewed and interpreted the results.        RADIOLOGY  Duplex Venous Lower Extremity - RIGHT    Result Date: 12/11/2024    Acute right lower extremity deep vein thrombosis noted in the gastrocnemius.   All other right sided veins appeared normal.     XR Knee 1 or 2 View Right    Result Date: 12/11/2024  XR KNEE 1 OR 2 VW RIGHT-  INDICATIONS: Pain.  TECHNIQUE: 2 VIEWS OF THE RIGHT KNEE  COMPARISON: None available  FINDINGS:  Right knee arthroplasty hardware appears intact. No acute fracture, erosion, or dislocation is identified. Mild knee effusion is apparent. Follow-up/further evaluation can be obtained as indications persist.       As described.    This report was finalized on 12/11/2024 12:28 PM by Dr. Lj Mei M.D on Workstation: [id number]       I ordered the above noted radiological studies. Independently reviewed and interpreted by me.  See dictation for official radiology interpretation.      PROCEDURES    Procedures      MEDICATIONS GIVEN IN ER    Medications   HYDROcodone-acetaminophen (NORCO) 5-325 MG per tablet 1 tablet (1 tablet Oral Given 12/11/24 1341)         PROGRESS, DATA ANALYSIS, CONSULTS, AND MEDICAL DECISION MAKING    All labs have been independently reviewed and interpreted by me.  All radiology studies have been independently reviewed and interpreted by me and discussed  with radiologist dictating the report.   EKG's independently reviewed and interpreted by me.  Discussion below represents my analysis of pertinent findings related to patient's condition, differential diagnosis, treatment plan and final disposition.    Differential diagnosis: Fracture, sprain, ligament injury, DVT    ED Course as of 12/14/24 1516   Wed Dec 11, 2024   1307 Right knee independently interpreted by myself.  I see no fracture or dislocation.  Arthroplasty hardware appears intact. [TD]   1422 Discussed with vascular lab who reports US is positive for right calf DVT. [DC]   1531 WBC: 7.08 [DC]   1531 Hemoglobin: 13.2 [DC]   1531 Platelets: 214 [DC]   1625 INR(!!): 4.04 [DC]   1738 Discussed case with Dr. Herrera, hem-onc, who recommends heparin gtt and admission for further evaluation. [DC]   1752 Discussed case with Dr. Clemons [facility], who will admit the patient. [DC]      ED Course User Index  [DC] Nandini Pinto PA  [TD] Bakari Linares II, MD             AS OF 15:16 EST VITALS:    BP - 105/82  HR - 111  TEMP - 98.2 °F (36.8 °C) (Oral)  O2 SATS - 98%        DIAGNOSIS  Final diagnoses:   Acute deep vein thrombosis (DVT) of calf muscle vein of right lower extremity   Supratherapeutic INR   Acute pain of right knee         DISPOSITION  ED Disposition       ED Disposition   Decision to Admit    Condition   --    Comment   Level of Care: Telemetry [5]   Diagnosis: DVT (deep venous thrombosis) [142753]   Admitting Physician: ASHLEY CLEMONS [[id number]]   Attending Physician: ASHLEY CLEMONS [[id number]]                    Note Disclaimer: At [facility], we believe that sharing information builds trust and better relationships. You are receiving this note because you recently visited [facility]. It is possible you will see health information before a provider has talked with you about it. This kind of information can be easy to misunderstand. To help you fully understand what it means for your  health, we urge you to discuss this note with your provider.         Nandini Pinto PA  12/14/24 [id number]

## 2024-12-12 LAB
ANION GAP SERPL CALCULATED.3IONS-SCNC: 11.4 MMOL/L (ref 5–15)
APTT PPP: 45.9 SECONDS (ref 22.7–35.4)
BASOPHILS # BLD AUTO: 0.04 10*3/MM3 (ref 0–0.2)
BASOPHILS NFR BLD AUTO: 0.7 % (ref 0–1.5)
BUN SERPL-MCNC: 21 MG/DL (ref 8–23)
BUN/CREAT SERPL: 21 (ref 7–25)
CALCIUM SPEC-SCNC: 9.4 MG/DL (ref 8.6–10.5)
CHLORIDE SERPL-SCNC: 105 MMOL/L (ref 98–107)
CO2 SERPL-SCNC: 22.6 MMOL/L (ref 22–29)
CREAT SERPL-MCNC: 1 MG/DL (ref 0.57–1)
DEPRECATED RDW RBC AUTO: 43.9 FL (ref 37–54)
DEPRECATED RDW RBC AUTO: 46 FL (ref 37–54)
EGFRCR SERPLBLD CKD-EPI 2021: 56.4 ML/MIN/1.73
EOSINOPHIL # BLD AUTO: 0.19 10*3/MM3 (ref 0–0.4)
EOSINOPHIL NFR BLD AUTO: 3.2 % (ref 0.3–6.2)
ERYTHROCYTE [DISTWIDTH] IN BLOOD BY AUTOMATED COUNT: 12.6 % (ref 12.3–15.4)
ERYTHROCYTE [DISTWIDTH] IN BLOOD BY AUTOMATED COUNT: 13.1 % (ref 12.3–15.4)
GLUCOSE SERPL-MCNC: 96 MG/DL (ref 65–99)
HCT VFR BLD AUTO: 36.2 % (ref 34–46.6)
HCT VFR BLD AUTO: 36.6 % (ref 34–46.6)
HGB BLD-MCNC: 12.7 G/DL (ref 12–15.9)
HGB BLD-MCNC: 13 G/DL (ref 12–15.9)
IMM GRANULOCYTES # BLD AUTO: 0.01 10*3/MM3 (ref 0–0.05)
IMM GRANULOCYTES NFR BLD AUTO: 0.2 % (ref 0–0.5)
INR PPP: 4.31 (ref 0.9–1.1)
LYMPHOCYTES # BLD AUTO: 2.89 10*3/MM3 (ref 0.7–3.1)
LYMPHOCYTES NFR BLD AUTO: 48.2 % (ref 19.6–45.3)
MCH RBC QN AUTO: 33.2 PG (ref 26.6–33)
MCH RBC QN AUTO: 34 PG (ref 26.6–33)
MCHC RBC AUTO-ENTMCNC: 35.1 G/DL (ref 31.5–35.7)
MCHC RBC AUTO-ENTMCNC: 35.5 G/DL (ref 31.5–35.7)
MCV RBC AUTO: 94.8 FL (ref 79–97)
MCV RBC AUTO: 95.8 FL (ref 79–97)
MONOCYTES # BLD AUTO: 0.62 10*3/MM3 (ref 0.1–0.9)
MONOCYTES NFR BLD AUTO: 10.3 % (ref 5–12)
NEUTROPHILS NFR BLD AUTO: 2.25 10*3/MM3 (ref 1.7–7)
NEUTROPHILS NFR BLD AUTO: 37.4 % (ref 42.7–76)
NRBC BLD AUTO-RTO: 0 /100 WBC (ref 0–0.2)
PLATELET # BLD AUTO: 205 10*3/MM3 (ref 140–450)
PLATELET # BLD AUTO: 218 10*3/MM3 (ref 140–450)
PMV BLD AUTO: 10.2 FL (ref 6–12)
PMV BLD AUTO: 9.8 FL (ref 6–12)
POTASSIUM SERPL-SCNC: 3.7 MMOL/L (ref 3.5–5.2)
PROTHROMBIN TIME: 41.6 SECONDS (ref 11.7–14.2)
RBC # BLD AUTO: 3.82 10*6/MM3 (ref 3.77–5.28)
RBC # BLD AUTO: 3.82 10*6/MM3 (ref 3.77–5.28)
SODIUM SERPL-SCNC: 139 MMOL/L (ref 136–145)
WBC NRBC COR # BLD AUTO: 5.41 10*3/MM3 (ref 3.4–10.8)
WBC NRBC COR # BLD AUTO: 6 10*3/MM3 (ref 3.4–10.8)

## 2024-12-12 PROCEDURE — 36415 COLL VENOUS BLD VENIPUNCTURE: CPT | Performed by: INTERNAL MEDICINE

## 2024-12-12 PROCEDURE — 99223 1ST HOSP IP/OBS HIGH 75: CPT | Performed by: INTERNAL MEDICINE

## 2024-12-12 PROCEDURE — 85025 COMPLETE CBC W/AUTO DIFF WBC: CPT | Performed by: PHYSICIAN ASSISTANT

## 2024-12-12 PROCEDURE — 85730 THROMBOPLASTIN TIME PARTIAL: CPT | Performed by: HOSPITALIST

## 2024-12-12 PROCEDURE — 85610 PROTHROMBIN TIME: CPT | Performed by: HOSPITALIST

## 2024-12-12 PROCEDURE — 85027 COMPLETE CBC AUTOMATED: CPT | Performed by: INTERNAL MEDICINE

## 2024-12-12 PROCEDURE — 80048 BASIC METABOLIC PNL TOTAL CA: CPT | Performed by: INTERNAL MEDICINE

## 2024-12-12 RX ORDER — FAMOTIDINE 20 MG/1
20 TABLET, FILM COATED ORAL
Status: DISCONTINUED | OUTPATIENT
Start: 2024-12-12 | End: 2024-12-14 | Stop reason: HOSPADM

## 2024-12-12 RX ORDER — HEPARIN SODIUM 10000 [USP'U]/100ML
18 INJECTION, SOLUTION INTRAVENOUS
Status: DISCONTINUED | OUTPATIENT
Start: 2024-12-12 | End: 2024-12-12

## 2024-12-12 RX ADMIN — Medication 1 TABLET: at 09:40

## 2024-12-12 RX ADMIN — ACETAMINOPHEN 650 MG: 325 TABLET ORAL at 21:48

## 2024-12-12 RX ADMIN — ACETAMINOPHEN 650 MG: 325 TABLET ORAL at 16:38

## 2024-12-12 RX ADMIN — FUROSEMIDE 20 MG: 20 TABLET ORAL at 09:40

## 2024-12-12 RX ADMIN — METOPROLOL SUCCINATE 12.5 MG: 25 TABLET, EXTENDED RELEASE ORAL at 09:40

## 2024-12-12 RX ADMIN — FAMOTIDINE 20 MG: 20 TABLET, FILM COATED ORAL at 16:38

## 2024-12-12 RX ADMIN — SPIRONOLACTONE 25 MG: 25 TABLET ORAL at 09:40

## 2024-12-12 RX ADMIN — Medication 2.5 MG: at 21:48

## 2024-12-12 RX ADMIN — LEVOTHYROXINE SODIUM 25 MCG: 25 TABLET ORAL at 09:39

## 2024-12-12 RX ADMIN — VALSARTAN 80 MG: 80 TABLET, FILM COATED ORAL at 09:40

## 2024-12-12 RX ADMIN — FAMOTIDINE 20 MG: 20 TABLET, FILM COATED ORAL at 09:40

## 2024-12-12 NOTE — PLAN OF CARE
Goal Outcome Evaluation:            Outcome Evaluation: Pt has stable vital signs on room air. alert and oriented. on heparin drip. safety precautions maintained. call light within reach.

## 2024-12-12 NOTE — PROGRESS NOTES
"    DAILY PROGRESS NOTE  [facility]    Patient Identification:  Name: Inessa Peoples  Age: 84 y.o.  Sex: female  :  1940  MRN: [id number]         Primary Care Physician: Gisele Dockery MD    Subjective:  Interval History: No active bleeding complaints.  Right leg pain is improved though still present.  No chest pain no troubles breathing with stable vitals.  No confusion.  Case discussed with spouse at bedside    Objective:    Scheduled Meds:famotidine, 20 mg, Oral, BID AC  furosemide, 20 mg, Oral, Daily  levothyroxine, 25 mcg, Oral, Daily  melatonin, 2.5 mg, Oral, Nightly  metoprolol succinate XL, 12.5 mg, Oral, Daily  multivitamin, 1 tablet, Oral, Daily  spironolactone, 25 mg, Oral, Daily  valsartan, 80 mg, Oral, Daily      Continuous Infusions:     Vital signs in last 24 hours:  Temp:  [97.7 °F (36.5 °C)-97.9 °F (36.6 °C)] 97.9 °F (36.6 °C)  Heart Rate:  [70-89] 79  Resp:  [16-18] 18  BP: (118-160)/(56-89) 135/80    Intake/Output:    Intake/Output Summary (Last 24 hours) at 2024 0951  Last data filed at 2024  Gross per 24 hour   Intake 120 ml   Output --   Net 120 ml       Exam:  /80 (BP Location: Right arm, Patient Position: Lying)   Pulse 79   Temp 97.9 °F (36.6 °C) (Oral)   Resp 18   Ht 158.8 cm (62.5\")   Wt 82.2 kg (181 lb 3.5 oz)   SpO2 94%   BMI 32.62 kg/m²     General Appearance:    Alert, cooperative, nontoxic, AAOx3                         Throat:   Oral mucosa pink and moist                           Neck:   Supple, symmetrical, trachea midline, no JVD                         Lungs:    Clear to auscultation bilaterally, respirations unlabored                 Chest Wall:    No tenderness or deformity                          Heart:    Regular rate and rhythm, S1 and S2 normal                  Abdomen:     Soft, nontender, bowel sounds active                 Extremities: Baseline right lower extremity seems larger than the left, chronic changes, " negative Homans' sign, NVM intact distally                        Pulses:   Pulses palpable in all extremities                  Neurologic:   CNII-XII intact, no focal deficits noted     Data Review:  Labs in chart were reviewed.    Assessment:  Active Hospital Problems    Diagnosis  POA    **Acute DVT (deep venous thrombosis) [I82.409]  Yes    Paroxysmal atrial fibrillation [I48.0]  Yes    GERD (gastroesophageal reflux disease) [K21.9]  Yes    Hypertension [I10]  Yes    Hyperlipidemia [E78.5]  Yes    Hypothyroidism [E03.9]  Yes    Sarcoidosis [D86.9]  Yes      Resolved Hospital Problems   No resolved problems to display.       Plan:      Right calf vein thrombosis/gastrocnemius:    This is not Coumadin failure.  Patient had recent stoppage for some type of biopsy.  Heparin drip not indicated so will DC given the fact that INR is supratherapeutic and has actually trended up from 4.0-4.3    No active bleeding complaints    Case discussed with Dr. Guthrie and he agrees with transition over to Eliquis avoiding the higher dose for the first week of will initiate at 5 mg twice daily.  Review of this patient's old INR values shows that she is quite labile and spends quite a bit of time supratherapeutic and I think Eliquis would be a safer choice    Case discussed at length with patient and spouse at bedside and they are in agreement with this change in treatment plan     Case discussed with managing RN/pharmacy/MDR at length as well    Christopher Xiao MD  12/12/2024  09:51 EST\

## 2024-12-12 NOTE — H&P
HISTORY AND PHYSICAL   [facility]        Date of Admission: 2024  Patient Identification:  Name: Inessa Peoples  Age: 84 y.o.  Sex: female  :  1940  MRN: [id number]                     Primary Care Physician: Gisele Dockery MD    Chief Complaint:  84 year old female presented to the emergency room with pain of her right knee which started two weeks ago; it was worse today and she was not able to bear weight on the leg; denies injury; no fever or chills; she is on coumadin     History of Present Illness:   As above    Past Medical History:  Past Medical History:   Diagnosis Date    Abnormal ECG     Allergic Iodine used in on xrays    Allergic rhinitis     Atrial fibrillation     Bleeds easily     warfarin    Breast screening     Broken bones     Bulge of lumbar disc without myelopathy 2017    Calf DVT (deep venous thrombosis)     Chronic anticoagulation     Chronic pain disorder Lower back, shoulder, neck and knees    Class 2 severe obesity due to excess calories with serious comorbidity and body mass index (BMI) of 37.0 to 37.9 in adult     Colon polyp     CTS (carpal tunnel syndrome)     Deep vein thrombosis     left calf    Diverticulitis of colon     Diverticulitis of large intestine without perforation or abscess without bleeding 2017    Diverticulosis     AHN (dyspnea on exertion)     Fatigue     Fibromyalgia, primary     H/O malignant melanoma of skin     Heart failure with preserved ejection fraction, NYHA class II     II-III---decreased EF with stress    Hematuria, gross     History of colon polyps     History of kidney stones     HL (hearing loss)     Hyperlipidemia     Hypertension     Hyperthyroidism     IFG (impaired fasting glucose)     Insomnia     Joint pain Knees, shoulders and neck    LBBB (left bundle branch block)     Left leg swelling     LLQ abdominal pain     Long term current use of systemic steroids     Low back pain     Lumbosacral disc disease      Malignant melanoma of skin     MS (multiple sclerosis)     Neck pain     Obesity     class 2 obesity due to excess calories with BMI of 35.0 to 35.9 in adult    Osteoarthritis     Osteopenia     Paralysis, diaphragm     Paroxysmal atrial fibrillation     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 for tuberculosis     Presence of biventricular cardiac pacemaker     Rash     RBBB (right bundle branch block)     Renal calculi     Sarcoid     Shingles     Sigmoid diverticulosis 06/26/2013    Sleep apnea     on CPAP, +10- Dr. Lu    Sleep apnea     USES CPAP    UTI (urinary tract infection)     Visit for screening mammogram      Past Surgical History:  Past Surgical History:   Procedure Laterality Date    BREAST BIOPSY      CARDIAC CATHETERIZATION N/A 06/29/2020    Procedure: Coronary angiography, [place] L;  Surgeon: Noe Reynoso MD;  Location:  [facility];  Service: Cardiovascular;  Laterality: N/A;    CARDIAC CATHETERIZATION N/A 06/29/2020    Procedure: Left ventriculography;  Surgeon: Noe Reynoso MD;  Location:  [facility];  Service: Cardiovascular;  Laterality: N/A;    CARDIAC CATHETERIZATION N/A 06/29/2020    Procedure: Right and Left Heart Cath;  Surgeon: Noe Reynoso MD;  Location:  [facility];  Service: Cardiovascular;  Laterality: N/A;    CARDIAC CATHETERIZATION      CARDIAC ELECTROPHYSIOLOGY PROCEDURE N/A 01/05/2021    Procedure: Biventricular Device Insertion -- CRT-p ([employer]tronic);  Surgeon: Demar Stone MD;  Location:  [facility];  Service: Cardiology;  Laterality: N/A;    CARPAL TUNNEL RELEASE Bilateral 1980'S    [facility]    CATARACT EXTRACTION Bilateral 2001    Dr. Nic Hidalgo    COLONOSCOPY N/A 06/26/2013    MILD SIGMOID DIVERTICULOSIS, repeat 5 years based on sister w/ colona cancer-DR. TANISHA THORNTON    COLONOSCOPY N/A 10/19/2010    SIGMOID DIVERTICULOSIS & INTERNAL HEMORRHOIDS, HEMORRHOIDAL BANDING X4,   NASRA CASTRO    COLONOSCOPY N/A 08/06/2018    Procedure: COLONOSCOPY TO CECUM AND INTO TERMINAL ILEUM;  Surgeon: Nasra Castro MD;  Location: [facility] ENDOSCOPY;  Service: Gastroenterology    COLONOSCOPY N/A 10/04/2021    Procedure: COLONOSCOPY to CECUM;  Surgeon: Nasra Castro MD;  Location: [facility] ENDOSCOPY;  Service: General;  Laterality: N/A;  FAMILY HX COLON CANCER  --DIVERTICULOSIS     COLONOSCOPY W/ BIOPSIES N/A 05/12/2008    RECTUM BIOPSY: COLONIC MUCOSA W/ FOCAL INCREASE OF INFLAMMATORY CELLS IN THE MUSCULARIS MUCOSA INCLUDING EOSINOPHILS, SIGMOID DIVERTICULOSIS, POSSIBLE PROCTITIS, DR. NASRA CASTRO    CYSTOSCOPY, RETROGRADE PYELOGRAM AND STENT INSERTION Bilateral 10/10/2014    w/ Right ureteral pyeloscopy & laser lithotripsy, Right Double-J stent placement-Dr. Julio Tai    ENDOSCOPY N/A 11/11/2020    Procedure: ESOPHAGOGASTRODUODENOSCOPY with biopsies and dilation 54fr fernández;  Surgeon: Garret Piedra MD;  Location: [facility] ENDOSCOPY;  Service: Gastroenterology;  Laterality: N/A;  pre- dysphagia  post-- gastritis, dilation of esophageal ring, watermelon stomach     ENDOSCOPY N/A 9/30/2024    Procedure: ESOPHAGOGASTRODUODENOSCOPY;  Surgeon: Nasra Castro MD;  Location: [facility] ENDOSCOPY;  Service: General;  Laterality: N/A;  PRE: DYSPHAGIA, ABNORMAL ESOPHAGRAM  POST: DUODENAL GASTRO REFLUX    EXTRACORPOREAL SHOCK WAVE LITHOTRIPSY (ESWL) Right 09/10/2009    DR. JULIO TAI    HAMMER TOE REPAIR      JOINT REPLACEMENT Bilateral     Total knee replacement 4/27/22 both knees    LAPAROSCOPIC CHOLECYSTECTOMY N/A 07/19/2012    DR. NASRA CASTRO    LUMBAR EPIDURAL INJECTION N/A 04/23/2015    LUMBAR EPIDURAL STEROID INJECTION X3    MEDIAL BRANCH BLOCK Bilateral 11/02/2021    Procedure: MEDIAL BRANCH BLOCK--bilateral Lumbar2-5;  Surgeon: Wolfgang Means MD;  Location: [facility];  Service: Pain Management;  Laterality: Bilateral;    MEDIAL BRANCH BLOCK Bilateral 10/11/2022    Procedure:  bilateral L3-L5 medial branch blockade. Hold coumadin x 5 days prior;  Surgeon: Wolfgang Means MD;  Location: [facility];  Service: Pain Management;  Laterality: Bilateral;    MEDIAL BRANCH BLOCK Bilateral 10/23/2024    Procedure: Bilateral L3-L5 medial branch blockade 74204; 05914;  Surgeon: Wolfgang Means MD;  Location: [facility];  Service: Pain Management;  Laterality: Bilateral;    PACEMAKER IMPLANTATION      PARATHYROIDECTOMY N/A 2001    SACROILIAC JOINT INJECTION Left 06/15/2021    Procedure: SACROILIAC INJECTION-- left and left greater trochanteric bursa injection;  Surgeon: Wolfgang Means MD;  Location: [facility];  Service: Pain Management;  Laterality: Left;    SKIN CANCER EXCISION      THUMB ARTHROSCOPY Right 1982    TOTAL KNEE ARTHROPLASTY Right 03/25/2014    [employer] Vanguard total knee, 55 femoral component, 71 tibial tray w/ a 40 mm stem, 34 mm x 7.8 mm three-peg patella, and a 14 standard poly-Dr. Ty Canchola    TRIGGER POINT INJECTION        Home Meds:  Medications Prior to Admission   Medication Sig Dispense Refill Last Dose/Taking    Calcium-Phosphorus-Vitamin D (CITRACAL +D3 PO) Take 1 tablet by mouth Daily.   12/10/2024 Bedtime    celecoxib (CeleBREX) 200 MG capsule TAKE 1 CAPSULE BY MOUTH DAILY 90 capsule 1 12/11/2024 Morning    ezetimibe (ZETIA) 10 MG tablet TAKE 1 TABLET BY MOUTH DAILY 90 tablet 1 12/11/2024 Morning    furosemide (LASIX) 20 MG tablet TAKE ONE TABLET BY MOUTH DAILY 90 tablet 1 12/11/2024 Morning    levothyroxine (SYNTHROID, LEVOTHROID) 25 MCG tablet TAKE 1 TABLET BY MOUTH DAILY 90 tablet 1 12/11/2024 Morning    melatonin 5 MG sublingual tablet sublingual tablet Take 1 tablet by mouth Every Night.   12/10/2024 Bedtime    metoprolol succinate XL (TOPROL-XL) 25 MG 24 hr tablet TAKE 1/2 TABLET BY MOUTH DAILY 45 tablet 3 12/11/2024 Morning    Multiple Vitamin (MULTI-DAY VITAMINS) tablet Take 1 tablet by mouth Daily.   12/10/2024 Evening    niacinamide 500 MG  tablet Take 1 tablet by mouth 2 (Two) Times a Day With Meals.   12/11/2024 Morning    spironolactone (ALDACTONE) 25 MG tablet TAKE 1 TABLET BY MOUTH DAILY 90 tablet 1 12/11/2024 Morning    valsartan (DIOVAN) 80 MG tablet Take 1 tablet by mouth Daily. 90 tablet 3 12/11/2024 Morning    warfarin (COUMADIN) 3 MG tablet TAKE 1 AND 1/2 TABLET BY MOUTH DAILY (Patient taking differently: Take 1.5 tablets by mouth Daily. 4.5 mg Monday and Friday, 3.0 mg remaining days of the week) 135 tablet 3 Patient Taking Differently       Allergies:  Allergies   Allergen Reactions    Iodinated Contrast Media Anaphylaxis, Swelling and Other (See Comments)     PAINS ACROSS CHEST, Facial swelling    Contrast Dye (Echo Or Unknown Ct/Mr) Other (See Comments)    Fenofibrate Myalgia    Livalo [Pitavastatin] Myalgia     Joint Pain, Muscle tightness    Plaquenil [Hydroxychloroquine Sulfate] Myalgia     Joint Pain, Muscle Tightness    Statins Myalgia     Joint pain, Muscle Tightness  Other reaction(s): Muscle cramps     Immunizations:  Immunization History   Administered Date(s) Administered    Arexvy (RSV, Adults 60+ yrs) 09/29/2023    COVID-19 (MODERNA) 12YRS+ (SPIKEVAX) 10/05/2024    COVID-19 (PFIZER) BIVALENT 12+YRS 10/18/2022, 01/02/2024    COVID-19 (PFIZER) Purple Cap Monovalent 02/03/2021, 02/24/2021, 09/29/2021    Covid-19 (Pfizer) Gray Cap Monovalent 04/20/2022    FLUAD TRI 65YR+ 10/04/2019    FluMist 2-49yrs 10/30/2015    Fluad Quad 65+ 10/04/2019, 09/18/2020    Fluzone (or Fluarix & Flulaval for VFC) >6mos 09/29/2022    Fluzone High-Dose 65+YRS 09/15/2017, 09/14/2018, 10/04/2019    Fluzone High-Dose 65+yrs 12/01/2021, 09/29/2023    Influenza, Unspecified 09/29/2022    Pneumococcal Conjugate 13-Valent (PCV13) 07/23/2015    Pneumococcal Polysaccharide (PPSV23) 08/05/2016    Shingrix 08/21/2023, 01/02/2024    Tdap 09/15/2006, 08/05/2016, 03/31/2024    Zostavax 09/15/2007     Social History:   Social History     Social History Narrative     Not on file     Social History     Socioeconomic History    Marital status:      Spouse name: EDY    Number of children: 4    Years of education: 14yrs   Tobacco Use    Smoking status: Never    Smokeless tobacco: Never    Tobacco comments:     No history of tobacco use   Vaping Use    Vaping status: Never Used   Substance and Sexual Activity    Alcohol use: Yes     Alcohol/week: 3.0 standard drinks of alcohol     Types: 2 Glasses of wine, 1 Drinks containing 0.5 oz of alcohol per week     Comment: Occasionally    Drug use: Never    Sexual activity: Yes     Partners: Male     Birth control/protection: None       Family History:  Family History   Problem Relation Age of Onset    Heart disease Mother     Diabetes type II Mother     Arthritis Mother     Heart disease Father     Hypertension Father     Cancer Sister     Colon polyps Sister     Lung cancer Sister     Heart disease Sister     Heart disease Brother         CABG    Diabetes type II Brother     Colon polyps Brother     Arrhythmia Brother     Colon cancer Brother     Cancer Brother     Hypertension Brother     Heart disease Sister     Diabetes type II Sister     Aortic aneurysm Sister     COPD Sister     Arrhythmia Sister     Cerebral aneurysm Sister     Arthritis Sister     Hypertension Sister     COPD Sister     Lupus Sister     Asthma Maternal Aunt     Aortic aneurysm Brother     Arthritis Brother     Hypertension Brother     Drug abuse Brother             Early death Brother                 Review of Systems  See history of present illness and past medical history.  Patient denies headache, dizziness, syncope, falls, trauma, change in vision, change in hearing, change in taste, changes in weight, changes in appetite, focal weakness, numbness, or paresthesia.  Patient denies chest pain, palpitations, dyspnea, orthopnea, PND, cough, sinus pressure, rhinorrhea, epistaxis, hemoptysis, nausea, vomiting,hematemesis, diarrhea, constipation  "or hematochezia.  Denies cold or heat intolerance, polydipsia, polyuria, polyphagia. Denies hematuria, pyuria, dysuria, hesitancy, frequency or urgency. Denies consumption of raw and under cooked meats foods or change in water source.  Denies fever, chills, sweats, night sweats.  Denies missing any routine medications. Remainder of ROS is negative.    Objective:  T Max 24 hrs: Temp (24hrs), Av.9 °F (36.6 °C), Min:97.8 °F (36.6 °C), Max:97.9 °F (36.6 °C)    Vitals Ranges:   Temp:  [97.8 °F (36.6 °C)-97.9 °F (36.6 °C)] 97.9 °F (36.6 °C)  Heart Rate:  [70-89] 74  Resp:  [16] 16  BP: (124-160)/(73-89) 135/77      Exam:  /77 (BP Location: Right arm, Patient Position: Lying)   Pulse 74   Temp 97.9 °F (36.6 °C) (Oral)   Resp 16   Ht 158.8 cm (62.5\")   Wt 82.6 kg (182 lb)   SpO2 94%   BMI 32.76 kg/m²     General Appearance:    Alert, cooperative, no distress, appears stated age   Head:    Normocephalic, without obvious abnormality, atraumatic   Eyes:    PERRL, conjunctivae/corneas clear, EOM's intact, both eyes   Ears:    Normal external ear canals, both ears   Nose:   Nares normal, septum midline, mucosa normal, no drainage    or sinus tenderness   Throat:   Lips, mucosa, and tongue normal   Neck:   Supple, symmetrical, trachea midline, no adenopathy;     thyroid:  no enlargement/tenderness/nodules; no carotid    bruit or JVD   Back:     Symmetric, no curvature, ROM normal, no CVA tenderness   Lungs:     Clear to auscultation bilaterally, respirations unlabored   Chest Wall:    No tenderness or deformity    Heart:    Regular rate and rhythm, S1 and S2 normal, no murmur, rub   or gallop   Abdomen:     Soft, nontender, bowel sounds active all four quadrants,     no masses, no hepatomegaly, no splenomegaly   Extremities:   Extremities normal, atraumatic, no cyanosis or edema                       .    Data Review:  Labs in chart were reviewed.  WBC   Date Value Ref Range Status   2024 7.08 3.40 - 10.80 " 10*3/mm3 Final     Hemoglobin   Date Value Ref Range Status   12/11/2024 13.2 12.0 - 15.9 g/dL Final     Hematocrit   Date Value Ref Range Status   12/11/2024 37.6 34.0 - 46.6 % Final     Platelets   Date Value Ref Range Status   12/11/2024 214 140 - 450 10*3/mm3 Final     Sodium   Date Value Ref Range Status   12/11/2024 141 136 - 145 mmol/L Final     Potassium   Date Value Ref Range Status   12/11/2024 4.2 3.5 - 5.2 mmol/L Final     Chloride   Date Value Ref Range Status   12/11/2024 107 98 - 107 mmol/L Final     CO2   Date Value Ref Range Status   12/11/2024 24.0 22.0 - 29.0 mmol/L Final     BUN   Date Value Ref Range Status   12/11/2024 20 8 - 23 mg/dL Final     Creatinine   Date Value Ref Range Status   12/11/2024 1.13 (H) 0.57 - 1.00 mg/dL Final     Glucose   Date Value Ref Range Status   12/11/2024 92 65 - 99 mg/dL Final     Calcium   Date Value Ref Range Status   12/11/2024 9.8 8.6 - 10.5 mg/dL Final     AST (SGOT)   Date Value Ref Range Status   12/11/2024 23 1 - 32 U/L Final     ALT (SGPT)   Date Value Ref Range Status   12/11/2024 21 1 - 33 U/L Final     Alkaline Phosphatase   Date Value Ref Range Status   12/11/2024 86 39 - 117 U/L Final                Imaging Results (All)       Procedure Component Value Units Date/Time    XR Knee 1 or 2 View Right [[id number]] Collected: 12/11/24 1228     Updated: 12/11/24 1231    Narrative:      XR KNEE 1 OR 2 VW RIGHT-     INDICATIONS: Pain.     TECHNIQUE: 2 VIEWS OF THE RIGHT KNEE     COMPARISON: None available     FINDINGS:     Right knee arthroplasty hardware appears intact. No acute fracture,  erosion, or dislocation is identified. Mild knee effusion is apparent.  Follow-up/further evaluation can be obtained as indications persist.       Impression:         As described.           This report was finalized on 12/11/2024 12:28 PM by Dr. Lj Mei M.D on Workstation: [id number]                 Assessment:  Active Hospital Problems    Diagnosis  POA    **Acute  DVT (deep venous thrombosis) [I82.409]  Yes    DVT (deep venous thrombosis) [I82.409]  Yes      Resolved Hospital Problems   No resolved problems to display.   Right knee pain  Sarcoidosis  Hypertension  Hypothyroidism  A fib  Obesity      Plan:  Will continue heparin  Ask hematology to see her  Monitor on telemetry  Trend labs  Dw patient and ed provider    Yaneli Clemons MD  12/11/2024  21:58 EST

## 2024-12-12 NOTE — CONSULTS
Subjective     REASON FOR CONSULTATION:  calf vein thrombus  Provide an opinion on any further workup or treatment                             REQUESTING PHYSICIAN:  Kaci    RECORDS OBTAINED:  Records of the patients history including those obtained from the referring provider were reviewed and summarized in detail.    HISTORY OF PRESENT ILLNESS:  The patient is a 84 y.o. year old female who is here for an opinion about the above issue.    History of Present Illness   This is a pleasant 84-year-old woman on chronic anticoagulation with warfarin for atrial fibrillation and also has been reports a rather remote history of DVT.  That being said, the patient has been off her anticoagulation at least 2 times recently for procedures on the back.  She presents with acute pain in the right calf and a venous duplex was performed which showed acute DVT in the right gastrocnemius vein was 4.04 but was 1.1 1 month ago on 10/23/2024.  She is currently on unfractionated heparin drip but INR remains elevated 4.3.  She denies shortness of breath or pain in the chest.    Past Medical History:   Diagnosis Date    Abnormal ECG     Allergic Iodine used in on xrays    Allergic rhinitis     Atrial fibrillation     Bleeds easily     warfarin    Breast screening     Broken bones     Bulge of lumbar disc without myelopathy 07/13/2017    Calf DVT (deep venous thrombosis)     Chronic anticoagulation     Chronic pain disorder Lower back, shoulder, neck and knees    Class 2 severe obesity due to excess calories with serious comorbidity and body mass index (BMI) of 37.0 to 37.9 in adult     Colon polyp     CTS (carpal tunnel syndrome)     Deep vein thrombosis     left calf    Diverticulitis of colon     Diverticulitis of large intestine without perforation or abscess without bleeding 01/03/2017    Diverticulosis     ANH (dyspnea on exertion)     Fatigue     Fibromyalgia, primary     H/O malignant melanoma of skin     Heart failure with  preserved ejection fraction, NYHA class II     II-III---decreased EF with stress    Hematuria, gross     History of colon polyps     History of kidney stones     HL (hearing loss)     Hyperlipidemia     Hypertension     Hyperthyroidism     IFG (impaired fasting glucose)     Insomnia     Joint pain Knees, shoulders and neck    LBBB (left bundle branch block)     Left leg swelling     LLQ abdominal pain     Long term current use of systemic steroids     Low back pain     Lumbosacral disc disease     Malignant melanoma of skin     MS (multiple sclerosis)     Neck pain     Obesity     class 2 obesity due to excess calories with BMI of 35.0 to 35.9 in adult    Osteoarthritis     Osteopenia     Paralysis, diaphragm     Paroxysmal atrial fibrillation     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 for tuberculosis     Presence of biventricular cardiac pacemaker     Rash     RBBB (right bundle branch block)     Renal calculi     Sarcoid     Shingles     Sigmoid diverticulosis 06/26/2013    Sleep apnea     on CPAP, +10- Dr. Lu    Sleep apnea     USES CPAP    UTI (urinary tract infection)     Visit for screening mammogram         Past Surgical History:   Procedure Laterality Date    BREAST BIOPSY      CARDIAC CATHETERIZATION N/A 06/29/2020    Procedure: Coronary angiography, Milo L;  Surgeon: Noe Reynoso MD;  Location:  [facility];  Service: Cardiovascular;  Laterality: N/A;    CARDIAC CATHETERIZATION N/A 06/29/2020    Procedure: Left ventriculography;  Surgeon: Noe Reynoso MD;  Location:  [facility];  Service: Cardiovascular;  Laterality: N/A;    CARDIAC CATHETERIZATION N/A 06/29/2020    Procedure: Right and Left Heart Cath;  Surgeon: Noe Reynoso MD;  Location:  [facility];  Service: Cardiovascular;  Laterality: N/A;    CARDIAC CATHETERIZATION      CARDIAC ELECTROPHYSIOLOGY PROCEDURE N/A 01/05/2021    Procedure: Biventricular Device Insertion -- CRT-p ([employer]tr[employer]);   Surgeon: Demar Stone MD;  Location: [facility] CATH INVASIVE LOCATION;  Service: Cardiology;  Laterality: N/A;    CARPAL TUNNEL RELEASE Bilateral 1980'S    [facility]    CATARACT EXTRACTION Bilateral 2001    Dr. Nic Hidalgo    COLONOSCOPY N/A 06/26/2013    MILD SIGMOID DIVERTICULOSIS, repeat 5 years based on sister w/ colona cancer-DR. NASRA CASTRO    COLONOSCOPY N/A 10/19/2010    SIGMOID DIVERTICULOSIS & INTERNAL HEMORRHOIDS, HEMORRHOIDAL BANDING X4, DR. NASRA CASTRO    COLONOSCOPY N/A 08/06/2018    Procedure: COLONOSCOPY TO CECUM AND INTO TERMINAL ILEUM;  Surgeon: Nasra Castro MD;  Location: [facility] ENDOSCOPY;  Service: Gastroenterology    COLONOSCOPY N/A 10/04/2021    Procedure: COLONOSCOPY to CECUM;  Surgeon: Nasra Castro MD;  Location: [facility] ENDOSCOPY;  Service: General;  Laterality: N/A;  FAMILY HX COLON CANCER  --DIVERTICULOSIS     COLONOSCOPY W/ BIOPSIES N/A 05/12/2008    RECTUM BIOPSY: COLONIC MUCOSA W/ FOCAL INCREASE OF INFLAMMATORY CELLS IN THE MUSCULARIS MUCOSA INCLUDING EOSINOPHILS, SIGMOID DIVERTICULOSIS, POSSIBLE PROCTITIS, DR. NASRA CASTRO    CYSTOSCOPY, RETROGRADE PYELOGRAM AND STENT INSERTION Bilateral 10/10/2014    w/ Right ureteral pyeloscopy & laser lithotripsy, Right Double-J stent placement-Dr. Julio Tai    ENDOSCOPY N/A 11/11/2020    Procedure: ESOPHAGOGASTRODUODENOSCOPY with biopsies and dilation 54fr fernández;  Surgeon: Garret Piedra MD;  Location: [facility] ENDOSCOPY;  Service: Gastroenterology;  Laterality: N/A;  pre- dysphagia  post-- gastritis, dilation of esophageal ring, watermelon stomach     ENDOSCOPY N/A 9/30/2024    Procedure: ESOPHAGOGASTRODUODENOSCOPY;  Surgeon: Nasra Castro MD;  Location: [facility] ENDOSCOPY;  Service: General;  Laterality: N/A;  PRE: DYSPHAGIA, ABNORMAL ESOPHAGRAM  POST: DUODENAL GASTRO REFLUX    EXTRACORPOREAL SHOCK WAVE LITHOTRIPSY (ESWL) Right 09/10/2009    DR. JULIO TAI    HAMMREENA TOE REPAIR      JOINT REPLACEMENT  Bilateral     Total knee replacement 4/27/22 both knees    LAPAROSCOPIC CHOLECYSTECTOMY N/A 07/19/2012    DR. TANISHA THORNTON    LUMBAR EPIDURAL INJECTION N/A 04/23/2015    LUMBAR EPIDURAL STEROID INJECTION X3    MEDIAL BRANCH BLOCK Bilateral 11/02/2021    Procedure: MEDIAL BRANCH BLOCK--bilateral Lumbar2-5;  Surgeon: Wolfgang Means MD;  Location: [facility];  Service: Pain Management;  Laterality: Bilateral;    MEDIAL BRANCH BLOCK Bilateral 10/11/2022    Procedure: bilateral L3-L5 medial branch blockade. Hold coumadin x 5 days prior;  Surgeon: Wolfgang Means MD;  Location: [facility];  Service: Pain Management;  Laterality: Bilateral;    MEDIAL BRANCH BLOCK Bilateral 10/23/2024    Procedure: Bilateral L3-L5 medial branch blockade 32966; 06586;  Surgeon: Wolfgang Means MD;  Location[facility];  Service: Pain Management;  Laterality: Bilateral;    PACEMAKER IMPLANTATION      PARATHYROIDECTOMY N/A 2001    SACROILIAC JOINT INJECTION Left 06/15/2021    Procedure: SACROILIAC INJECTION-- left and left greater trochanteric bursa injection;  Surgeon: Wolfgang Means MD;  Location: [facility];  Service: Pain Management;  Laterality: Left;    SKIN CANCER EXCISION      THUMB ARTHROSCOPY Right 1982    TOTAL KNEE ARTHROPLASTY Right 03/25/2014    [employer] Vanguard total knee, 55 femoral component, 71 tibial tray w/ a 40 mm stem, 34 mm x 7.8 mm three-peg patella, and a 14 standard poly-Dr. Ty Canchola    TRIGGER POINT INJECTION          No current facility-administered medications on file prior to encounter.     Current Outpatient Medications on File Prior to Encounter   Medication Sig Dispense Refill    Calcium-Phosphorus-Vitamin D (CITRACAL +D3 PO) Take 1 tablet by mouth Daily.      celecoxib (CeleBREX) 200 MG capsule TAKE 1 CAPSULE BY MOUTH DAILY 90 capsule 1    ezetimibe (ZETIA) 10 MG tablet TAKE 1 TABLET BY MOUTH DAILY 90 tablet 1    furosemide (LASIX) 20 MG tablet TAKE ONE TABLET BY MOUTH DAILY 90  tablet 1    levothyroxine (SYNTHROID, LEVOTHROID) 25 MCG tablet TAKE 1 TABLET BY MOUTH DAILY 90 tablet 1    melatonin 5 MG sublingual tablet sublingual tablet Take 1 tablet by mouth Every Night.      metoprolol succinate XL (TOPROL-XL) 25 MG 24 hr tablet TAKE 1/2 TABLET BY MOUTH DAILY 45 tablet 3    Multiple Vitamin (MULTI-DAY VITAMINS) tablet Take 1 tablet by mouth Daily.      niacinamide 500 MG tablet Take 1 tablet by mouth 2 (Two) Times a Day With Meals.      spironolactone (ALDACTONE) 25 MG tablet TAKE 1 TABLET BY MOUTH DAILY 90 tablet 1    valsartan (DIOVAN) 80 MG tablet Take 1 tablet by mouth Daily. 90 tablet 3    warfarin (COUMADIN) 3 MG tablet TAKE 1 AND 1/2 TABLET BY MOUTH DAILY (Patient taking differently: Take 1.5 tablets by mouth Daily. 4.5 mg Monday and Friday, 3.0 mg remaining days of the week) 135 tablet 3        ALLERGIES:    Allergies   Allergen Reactions    Iodinated Contrast Media Anaphylaxis, Swelling and Other (See Comments)     PAINS ACROSS CHEST, Facial swelling    Contrast Dye (Echo Or Unknown Ct/Mr) Other (See Comments)    Fenofibrate Myalgia    Livalo [Pitavastatin] Myalgia     Joint Pain, Muscle tightness    Plaquenil [Hydroxychloroquine Sulfate] Myalgia     Joint Pain, Muscle Tightness    Statins Myalgia     Joint pain, Muscle Tightness  Other reaction(s): Muscle cramps        Social History     Socioeconomic History    Marital status:      Spouse name: EDY    Number of children: 4    Years of education: 14yrs   Tobacco Use    Smoking status: Never    Smokeless tobacco: Never    Tobacco comments:     No history of tobacco use   Vaping Use    Vaping status: Never Used   Substance and Sexual Activity    Alcohol use: Yes     Alcohol/week: 3.0 standard drinks of alcohol     Types: 2 Glasses of wine, 1 Drinks containing 0.5 oz of alcohol per week     Comment: Occasionally    Drug use: Never    Sexual activity: Yes     Partners: Male     Birth control/protection: None        Family  History   Problem Relation Age of Onset    Heart disease Mother     Diabetes type II Mother     Arthritis Mother     Heart disease Father     Hypertension Father     Cancer Sister     Colon polyps Sister     Lung cancer Sister     Heart disease Sister     Heart disease Brother         CABG    Diabetes type II Brother     Colon polyps Brother     Arrhythmia Brother     Colon cancer Brother     Cancer Brother     Hypertension Brother     Heart disease Sister     Diabetes type II Sister     Aortic aneurysm Sister     COPD Sister     Arrhythmia Sister     Cerebral aneurysm Sister     Arthritis Sister     Hypertension Sister     COPD Sister     Lupus Sister     Asthma Maternal Aunt     Aortic aneurysm Brother     Arthritis Brother     Hypertension Brother     Drug abuse Brother             Early death Brother                 Review of Systems   Respiratory: Negative.     Cardiovascular: Negative.    Gastrointestinal: Negative.    Genitourinary: Negative.    Musculoskeletal:  Positive for myalgias.   Neurological: Negative.    Hematological: Negative.    Psychiatric/Behavioral: Negative.            Objective     Vitals:    24 2004 24 2321 24 0530 24 0705   BP:  118/56  135/80   BP Location:  Left arm  Right arm   Patient Position:  Lying  Lying   Pulse: 74 73  79   Resp:  18  18   Temp:  97.7 °F (36.5 °C)  97.9 °F (36.6 °C)   TempSrc:  Oral  Oral   SpO2: 94% 94%  94%   Weight:   82.2 kg (181 lb 3.5 oz)    Height:              No data to display                Physical Exam    CONSTITUTIONAL: pleasant well-developed elderly woman  RESP: cta bilat, no wheezing, no rales  GI: soft, nontender, no splenomegaly, +BS  MUSC: There is a cord palpable in the right calf with tenderness, normal gait  NEURO: alert and oriented x3, normal strength  PSYCH: normal mood and affect, mild decreased memory      RECENT LABS:  Hematology WBC   Date Value Ref Range Status   2024 6.00 3.40 - 10.80  10*3/mm3 Final   12/12/2024 5.41 3.40 - 10.80 10*3/mm3 Final     RBC   Date Value Ref Range Status   12/12/2024 3.82 3.77 - 5.28 10*6/mm3 Final   12/12/2024 3.82 3.77 - 5.28 10*6/mm3 Final     Hemoglobin   Date Value Ref Range Status   12/12/2024 12.7 12.0 - 15.9 g/dL Final   12/12/2024 13.0 12.0 - 15.9 g/dL Final     Hematocrit   Date Value Ref Range Status   12/12/2024 36.2 34.0 - 46.6 % Final   12/12/2024 36.6 34.0 - 46.6 % Final     Platelets   Date Value Ref Range Status   12/12/2024 205 140 - 450 10*3/mm3 Final   12/12/2024 218 140 - 450 10*3/mm3 Final        Lab Results   Component Value Date    GLUCOSE 96 12/12/2024    BUN 21 12/12/2024    CREATININE 1.00 12/12/2024     12/12/2024    K 3.7 12/12/2024     12/12/2024    CALCIUM 9.4 12/12/2024    PROTEINTOT 6.4 12/11/2024    ALBUMIN 4.0 12/11/2024    ALT 21 12/11/2024    AST 23 12/11/2024    ALKPHOS 86 12/11/2024    BILITOT 0.8 12/11/2024    GLOB 2.4 12/11/2024    AGRATIO 1.7 12/11/2024    BCR 21.0 12/12/2024    ANIONGAP 11.4 12/12/2024    EGFR 56.4 (L) 12/12/2024   Interpretation Summary         Acute right lower extremity deep vein thrombosis noted in the gastrocnemius.    All other right sided veins appeared normal.       Assessment & Plan     *Right calf vein thrombosis in the gastrocnemius vein  The patient is on chronic anticoagulation already for atrial fibrillation and also reports a remote history of lower extremity DVT (on warfarin)  I would not consider this a failure of warfarin as the patient has been off anticoagulation at least twice over the previous several weeks for procedures on her back    Hematology plan/recommendation:  Case discussed with Dr. Xiao-since the patient's INR is elevated at 4.3 this morning I recommended to discontinue heparin at this time especially since this is not clearly a failure of warfarin.  Future anticoagulation discussed with the patient and Dr. Xiao.  Unless the patient has a contraindication for  NOAC from an atrial fibrillation standpoint (valvular disease etc.), the patient would prefer to be on a NOAC such as Eliquis 5 mg every 12 hours moving forward.  I think this would be easier for the patient to manage with respect to drug levels and coming on off anticoagulation for procedures etc.  If Eliquis is chosen I would recommend to begin once her INR is close to 2.0.  I do not think she would need a loading dose since she is already therapeutically anticoagulated and DVT is calf only without extension above the knee.    Hematology will sign off-please call if further needed during the hospital stay.

## 2024-12-12 NOTE — CASE MANAGEMENT/SOCIAL WORK
Discharge Planning Assessment  [facility]     Patient Name: Inessa Peoples  MRN: [id number]  Today's Date: 12/12/2024    Admit Date: 12/11/2024    Plan: Plan to return home with family to transport.   Discharge Needs Assessment       Row Name 12/12/24 1444       Living Environment    People in Home spouse    Current Living Arrangements home    Potentially Unsafe Housing Conditions none    Primary Care Provided by self    Provides Primary Care For spouse       Transition Planning    Patient/Family Anticipates Transition to home with family    Transportation Anticipated family or friend will provide       Discharge Needs Assessment    Readmission Within the Last 30 Days no previous admission in last 30 days    Equipment Currently Used at Home none    Concerns to be Addressed no discharge needs identified;denies needs/concerns at this time                   Discharge Plan       Row Name 12/12/24 1441       Plan    Plan Plan to return home with family to transport.    Patient/Family in Agreement with Plan yes    Plan Comments CCP met with pt, pt’s /Tor and pt’s son at bedside. Introduced self and role of CCP. Patient gave CCP permission to speak in front of  and son. Face sheet information and pharmacy verified.  Pt lives in a 2STE single story home with spouse. Pt does drive, is IADLs and has no DME. Pt does have a living will. Pt is enrolled in meds to beds and denies trouble affording or managing medications. Pt has used HH in the past but does not recall the agency. Pt has no history of using SNF. The plan is to return home with pt’s family to transport.  RLutes RN/CCP                  Continued Care and Services - Admitted Since 12/11/2024    No active coordination exists for this encounter.          Demographic Summary       Row Name 12/12/24 1449       General Information    Admission Type inpatient    Arrived From home    Required Notices Provided Observation Status Notice    Referral Source  case finding;admission list    Preferred Language English       Contact Information    Permission Granted to Share Info With ;facility                    Functional Status       Row Name 12/12/24 2861       Functional Status    Usual Activity Tolerance moderate    Current Activity Tolerance moderate       Physical Activity    On average, how many days per week do you engage in moderate to strenuous exercise (like a brisk walk)? Pt Declined    On average, how many minutes do you engage in exercise at this level? Pt Declined       Assessment of Health Literacy    How often do you have someone help you read hospital materials? Never    How often do you have problems learning about your medical condition because of difficulty understanding written information? Never    How often do you have a problem understanding what is told to you about your medical condition? Never    How confident are you filling out medical forms by yourself? Extremely    Health Literacy Excellent                          Shira Isaacs RN

## 2024-12-13 LAB
DEPRECATED RDW RBC AUTO: 43.4 FL (ref 37–54)
ERYTHROCYTE [DISTWIDTH] IN BLOOD BY AUTOMATED COUNT: 12.7 % (ref 12.3–15.4)
HCT VFR BLD AUTO: 37.4 % (ref 34–46.6)
HGB BLD-MCNC: 13.3 G/DL (ref 12–15.9)
INR PPP: 2.67 (ref 0.9–1.1)
INR PPP: 3.08 (ref 0.9–1.1)
MCH RBC QN AUTO: 33.4 PG (ref 26.6–33)
MCHC RBC AUTO-ENTMCNC: 35.6 G/DL (ref 31.5–35.7)
MCV RBC AUTO: 94 FL (ref 79–97)
PLATELET # BLD AUTO: 215 10*3/MM3 (ref 140–450)
PMV BLD AUTO: 10.2 FL (ref 6–12)
PROTHROMBIN TIME: 28.7 SECONDS (ref 11.7–14.2)
PROTHROMBIN TIME: 32.1 SECONDS (ref 11.7–14.2)
RBC # BLD AUTO: 3.98 10*6/MM3 (ref 3.77–5.28)
WBC NRBC COR # BLD AUTO: 5.89 10*3/MM3 (ref 3.4–10.8)

## 2024-12-13 PROCEDURE — 85027 COMPLETE CBC AUTOMATED: CPT | Performed by: HOSPITALIST

## 2024-12-13 PROCEDURE — 85610 PROTHROMBIN TIME: CPT | Performed by: HOSPITALIST

## 2024-12-13 RX ORDER — HYDROCODONE BITARTRATE AND ACETAMINOPHEN 7.5; 325 MG/1; MG/1
1 TABLET ORAL EVERY 4 HOURS PRN
Status: DISCONTINUED | OUTPATIENT
Start: 2024-12-13 | End: 2024-12-13

## 2024-12-13 RX ORDER — HYDROCODONE BITARTRATE AND ACETAMINOPHEN 7.5; 325 MG/1; MG/1
1 TABLET ORAL EVERY 4 HOURS PRN
Status: DISCONTINUED | OUTPATIENT
Start: 2024-12-13 | End: 2024-12-14 | Stop reason: HOSPADM

## 2024-12-13 RX ADMIN — SPIRONOLACTONE 25 MG: 25 TABLET ORAL at 08:22

## 2024-12-13 RX ADMIN — Medication 1 TABLET: at 08:22

## 2024-12-13 RX ADMIN — FUROSEMIDE 20 MG: 20 TABLET ORAL at 08:22

## 2024-12-13 RX ADMIN — FAMOTIDINE 20 MG: 20 TABLET, FILM COATED ORAL at 08:22

## 2024-12-13 RX ADMIN — APIXABAN 5 MG: 5 TABLET, FILM COATED ORAL at 20:57

## 2024-12-13 RX ADMIN — VALSARTAN 80 MG: 80 TABLET, FILM COATED ORAL at 08:21

## 2024-12-13 RX ADMIN — METOPROLOL SUCCINATE 12.5 MG: 25 TABLET, EXTENDED RELEASE ORAL at 08:21

## 2024-12-13 RX ADMIN — LEVOTHYROXINE SODIUM 25 MCG: 25 TABLET ORAL at 06:00

## 2024-12-13 RX ADMIN — Medication 2.5 MG: at 20:57

## 2024-12-13 RX ADMIN — FAMOTIDINE 20 MG: 20 TABLET, FILM COATED ORAL at 17:09

## 2024-12-13 RX ADMIN — ACETAMINOPHEN 650 MG: 325 TABLET ORAL at 09:08

## 2024-12-13 NOTE — DISCHARGE PLACEMENT REQUEST
"Inessa Peoples (84 y.o. Female)       Date of Birth   1940    Social Security Number       Address   [address] [place] [address]    Home Phone   [phone]    MRN   [id number]       Tenriism   Bahai    Marital Status                               Admission Date   12/11/24    Admission Type   Emergency    Admitting Provider   Yaneli Clemons MD    Attending Provider   Christopher Xiao MD    Department, Room/Bed   [address], [address]/1       Discharge Date       Discharge Disposition       Discharge Destination                                 Attending Provider: Christopher Xiao MD    Allergies: Iodinated Contrast Media, Contrast Dye (Echo Or Unknown Ct/mr), Fenofibrate, Livalo [Pitavastatin], Plaquenil [Hydroxychloroquine Sulfate], Statins    Isolation: None   Infection: None   Code Status: CPR    Ht: 158.8 cm (62.5\")   Wt: 82.8 kg (182 lb 9.6 oz)    Admission Cmt: None   Principal Problem: Acute DVT (deep venous thrombosis) [I82.409]                   Active Insurance as of 12/11/2024       Primary Coverage       Payor Plan Insurance Group Employer/Plan Group    MEDICARE MEDICARE A & B        Payor Plan Address Payor Plan Phone Number Payor Plan Fax Number Effective Dates    [address] [phone]  1/1/2007 - None Entered    [facility] [id number]         Subscriber Name Subscriber Birth Date Member ID       INESSA PEOPLES 1940 [id number]               Secondary Coverage       Payor Plan Insurance Group Employer/Plan Group    [facility] SUPP [id number]       Payor Plan Address Payor Plan Phone Number Payor Plan Fax Number Effective Dates    [address]   10/1/2018 - None Entered    [facility] [address]         Subscriber Name Subscriber Birth Date Member ID       INESSA PEOPLES 1940 [id number]                     Emergency Contacts        (Rel.) Home Phone Work Phone Mobile Phone    " Tor Peoples (Spouse) [phone]    EDUARDO KAMINSKI (Daughter) [phone] -- [phone]

## 2024-12-13 NOTE — PLAN OF CARE
Goal Outcome Evaluation:         No signs of bleeding. Ambulated to bathroom independently.  expressed concern that pt is only on Tylenol for pain. This was addressed by dayshift RN per report. No further complaints of pain throughout night. Plan is for discharge home today.

## 2024-12-13 NOTE — CONSULTS
"Nutrition Services    Patient Name:  Inessa Peoples  YOB: 1940  MRN: [id number]  Admit Date:  12/11/2024  Assessment Date:  12/13/24    NUTRITION SCREENING      Reason for Encounter Admission assessment   Diagnosis/Problem Right knee pain, DVT       PO Diet Diet: Cardiac; Healthy Heart (2-3 Na+); Fluid Consistency: Thin (IDDSI 0)   Supplements    PO Intake % 100% breakfast today       Medications MAR reviewed by RD   Labs  Listed below, reviewed   Physical Findings No evidence of muscle wasting or fat loss   GI Function Last BM 12/13   Skin Status Intact       Height  Weight  BMI  Weight Trend     Height: 158.8 cm (62.5\")  Weight: 82.8 kg (182 lb 9.6 oz) (12/13/24 0524)  Body mass index is 32.87 kg/m².  Stable       Nutrition Problem (PES) No nutrition diagnosis at this time.       Intervention/Plan RD to follow up per protocol.     Results from last 7 days   Lab Units 12/12/24  0330 12/11/24  1517   SODIUM mmol/L 139 141   POTASSIUM mmol/L 3.7 4.2   CHLORIDE mmol/L 105 107   CO2 mmol/L 22.6 24.0   BUN mg/dL 21 20   CREATININE mg/dL 1.00 1.13*   CALCIUM mg/dL 9.4 9.8   BILIRUBIN mg/dL  --  0.8   ALK PHOS U/L  --  86   ALT (SGPT) U/L  --  21   AST (SGOT) U/L  --  23   GLUCOSE mg/dL 96 92     Results from last 7 days   Lab Units 12/13/24  0338   HEMOGLOBIN g/dL 13.3   HEMATOCRIT % 37.4     Lab Results   Component Value Date    HGBA1C 5.30 05/16/2022     Electronically signed by:  Ronni Reeves RDN, JOANNE  12/13/24 16:28 EST    "

## 2024-12-13 NOTE — PROGRESS NOTES
DAILY PROGRESS NOTE  [facility]    Patient Identification:  Name: Inessa Peoples  Age: 84 y.o.  Sex: female  :  1940  MRN: [id number]         Primary Care Physician: Gisele Dockery MD    Subjective:  Interval History: Both patient and spouse were the most pleasant people yesterday but today they definitely are agitated and mad and I am unsure as to why is it directed in my direction.  She states she wants an orthopedic consult.  She now states that her leg pain is as bad as it ever was.  She is obviously more anxious and distraught today and I think that is amplifying her pain.  She told me yesterday it was better but now denies that today.  I came to the room hoping to discharge them both home but given her complaints of this pain being just as bad as the reason she came here, forgive me for being apprehensive with the idea of discharge.  Case discussed in multidisciplinary rounds as well as with managing RN independently.  She reports her knee pain is fullest in the back of the knee consistent with location of clot and I think it would be wise to use thigh-high over knee-high    Objective:    Scheduled Meds:famotidine, 20 mg, Oral, BID AC  furosemide, 20 mg, Oral, Daily  levothyroxine, 25 mcg, Oral, Daily  melatonin, 2.5 mg, Oral, Nightly  metoprolol succinate XL, 12.5 mg, Oral, Daily  multivitamin, 1 tablet, Oral, Daily  spironolactone, 25 mg, Oral, Daily  valsartan, 80 mg, Oral, Daily      Continuous Infusions:     Vital signs in last 24 hours:  Temp:  [97.3 °F (36.3 °C)-98.4 °F (36.9 °C)] 97.9 °F (36.6 °C)  Heart Rate:  [71-80] 71  Resp:  [16-18] 18  BP: (107-119)/(68-76) 119/75    Intake/Output:    Intake/Output Summary (Last 24 hours) at 2024 1131  Last data filed at 2024 0825  Gross per 24 hour   Intake 240 ml   Output --   Net 240 ml       Exam:  /75 (BP Location: Right arm, Patient Position: Lying)   Pulse 71   Temp 97.9 °F (36.6 °C) (Oral)   Resp 18    "Ht 158.8 cm (62.5\")   Wt 82.8 kg (182 lb 9.6 oz)   SpO2 94%   BMI 32.87 kg/m²     General Appearance:    Alert, cooperative, NT, AAOx3, agitated and anxious                         Throat:   Oral mucosa pink and moist                           Neck:   No JVD                         Lungs:    Clear to auscultation bilaterally, respirations unlabored                          Heart:    Regular rate and rhythm, S1 and S2 normal                  Abdomen:     Soft, nontender, bowel sounds active                 Extremities: Right lower extremity slightly more swollen than the left.  Arthritic changes and old scar noted on knee.  NVM intact distally.  Nothing infectious                       Pulses:   Pulses palpable in lower extremities                               Data Review:  Labs in chart were reviewed.    Assessment:  Active Hospital Problems    Diagnosis  POA    **Acute DVT (deep venous thrombosis) [I82.409]  Yes    DVT (deep venous thrombosis) [I82.409]  Yes    Paroxysmal atrial fibrillation [I48.0]  Yes    GERD (gastroesophageal reflux disease) [K21.9]  Yes    Hypertension [I10]  Yes    Hyperlipidemia [E78.5]  Yes    Hypothyroidism [E03.9]  Yes    Sarcoidosis [D86.9]  Yes      Resolved Hospital Problems   No resolved problems to display.       Plan:    RLE DVT   -INR came down as anticipated -3+ and blood is still supratherapeutic   -Hemoglobin holding steady   -Recheck a.m. labs and likely transition to Eliquis in a.m.   -Given her complaints of pain in that right leg, I had offered Norco at a higher dose for pain as she is only really utilize Tylenol besides a one-time dose in the ER but she states nothing helped.  I am also asking RN to reach out to [employer] to have this patient fitted for thigh-high Jobst stockings as I think compression will be the best answer to help with her discomfort.  Her DVT involves her gastrocnemius and her pain is most pronounced in that area/posterior knee.  X-ray of her knee was " unremarkable for any acute change with stable arthroplasty.  Nothing on exam consistent with any secondary infection/effusion or compounding features   -Recheck INR this afternoon at 4 PM and if this trends down further into the 2 range would initiate the Eliquis tonight        Disposition -plans to discharge today now aborted based on her complaints of pain.  I do not understand the change in behavior overnight at least towards myself but hopefully things simmer down      Addendum - INR trending down nicely - 2.67 - start Nathalia Xiao MD  12/13/2024  11:31 EST

## 2024-12-13 NOTE — PLAN OF CARE
Problem: Adult Inpatient Plan of Care  Goal: Absence of Hospital-Acquired Illness or Injury  Intervention: Identify and Manage Fall Risk  Recent Flowsheet Documentation  Taken 12/13/2024 0825 by Ricarda Martinez RN  Safety Promotion/Fall Prevention:   safety round/check completed   room organization consistent     Problem: Adult Inpatient Plan of Care  Goal: Absence of Hospital-Acquired Illness or Injury  Intervention: Prevent Skin Injury  Recent Flowsheet Documentation  Taken 12/13/2024 0825 by Ricarda Martinez RN  Body Position: dangle, side of bed     Problem: Adult Inpatient Plan of Care  Goal: Absence of Hospital-Acquired Illness or Injury  Intervention: Prevent Infection  Recent Flowsheet Documentation  Taken 12/13/2024 0825 by Ricarda Martinez RN  Infection Prevention: single patient room provided   Goal Outcome Evaluation:           Progress: improving  Outcome Evaluation: Patient AO x4 VSS, today apply Jobst compression on right leg and gave her tylenol refusewd narcotic medication state only hurt when I walk, will start with eliquis tonight.

## 2024-12-13 NOTE — CASE MANAGEMENT/SOCIAL WORK
Continued Stay Note  [facility]     Patient Name: Inessa Peoples  MRN: [id number]  Today's Date: 12/13/2024    Admit Date: 12/11/2024    Plan: Home with family to transport.   Discharge Plan       Row Name 12/13/24 1209       Plan    Plan Home with family to transport.    Patient/Family in Agreement with Plan yes    Plan Comments Order for Jobst stocking noted. CCP sent referral to [place]. CCP spoke with Mala/[facility]. Mala/[place] will deliver Jobst stocking to bedside within the next hour or so; RN notified. CCP will continue to follow for medical DC readiness and any DC needs. RLutes RN/CCP                   Discharge Codes    No documentation.                 Expected Discharge Date and Time       Expected Discharge Date Expected Discharge Time    Dec 13, 2024               Shira Isaacs, NATHALIE

## 2024-12-14 ENCOUNTER — READMISSION MANAGEMENT (OUTPATIENT)
Dept: CALL CENTER | Facility: HOSPITAL | Age: 84
End: 2024-12-14
Payer: MEDICARE

## 2024-12-14 VITALS
BODY MASS INDEX: 32.07 KG/M2 | HEART RATE: 111 BPM | RESPIRATION RATE: 18 BRPM | TEMPERATURE: 98.2 F | DIASTOLIC BLOOD PRESSURE: 82 MMHG | WEIGHT: 181 LBS | OXYGEN SATURATION: 98 % | HEIGHT: 63 IN | SYSTOLIC BLOOD PRESSURE: 105 MMHG

## 2024-12-14 LAB
HCT VFR BLD AUTO: 40 % (ref 34–46.6)
HGB BLD-MCNC: 13 G/DL (ref 12–15.9)
INR PPP: 2.78 (ref 0.9–1.1)
PROTHROMBIN TIME: 29.6 SECONDS (ref 11.7–14.2)

## 2024-12-14 PROCEDURE — 85610 PROTHROMBIN TIME: CPT | Performed by: HOSPITALIST

## 2024-12-14 PROCEDURE — 85018 HEMOGLOBIN: CPT | Performed by: HOSPITALIST

## 2024-12-14 PROCEDURE — 85014 HEMATOCRIT: CPT | Performed by: HOSPITALIST

## 2024-12-14 RX ADMIN — APIXABAN 5 MG: 5 TABLET, FILM COATED ORAL at 08:31

## 2024-12-14 RX ADMIN — FUROSEMIDE 20 MG: 20 TABLET ORAL at 08:32

## 2024-12-14 RX ADMIN — LEVOTHYROXINE SODIUM 25 MCG: 25 TABLET ORAL at 06:11

## 2024-12-14 RX ADMIN — VALSARTAN 80 MG: 80 TABLET, FILM COATED ORAL at 08:31

## 2024-12-14 RX ADMIN — METOPROLOL SUCCINATE 12.5 MG: 25 TABLET, EXTENDED RELEASE ORAL at 08:31

## 2024-12-14 RX ADMIN — Medication 1 TABLET: at 08:32

## 2024-12-14 RX ADMIN — FAMOTIDINE 20 MG: 20 TABLET, FILM COATED ORAL at 08:32

## 2024-12-14 RX ADMIN — SPIRONOLACTONE 25 MG: 25 TABLET ORAL at 08:32

## 2024-12-14 NOTE — OUTREACH NOTE
Prep Survey      Flowsheet Row Responses   [facility] facility patient discharged from? [facility]   Is LACE score < 7 ? No   Eligibility [facility]   Date of Admission 12/11/24   Date of Discharge 12/14/24   Discharge Disposition Home or Self Care   Discharge diagnosis Acute DVT   Does the patient have one of the following disease processes/diagnoses(primary or secondary)? Other   Does the patient have Home health ordered? No   Is there a DME ordered? Yes   What DME was ordered? Jobst stocking noted. CCP sent referral to [place].   Prep survey completed? Yes            BELKYS HOLLIS - Registered Nurse

## 2024-12-14 NOTE — PLAN OF CARE
Goal Outcome Evaluation:  Plan of Care Reviewed With: patient        Progress: no change        Patient alert and oriented X4, on room air. Up and immanuel. She was carried along with her plan of care. No new complain made this moment. I will continue to monitor and render assistance as needed during the shift.

## 2024-12-14 NOTE — DISCHARGE SUMMARY
Date of Discharge:  12/14/2024    PCP: Gisele Dockery MD    Discharge Diagnosis:   Active Hospital Problems    Diagnosis  POA    **Acute DVT (deep venous thrombosis) [I82.409]  Yes    DVT (deep venous thrombosis) [I82.409]  Yes    Paroxysmal atrial fibrillation [I48.0]  Yes    GERD (gastroesophageal reflux disease) [K21.9]  Yes    Hypertension [I10]  Yes    Hyperlipidemia [E78.5]  Yes    Hypothyroidism [E03.9]  Yes    Sarcoidosis [D86.9]  Yes      Resolved Hospital Problems   No resolved problems to display.          Consults       Date and Time Order Name Status Description    12/11/2024 10:04 PM Inpatient Hematology & Oncology Consult Completed     12/11/2024  5:49 PM [facility] (on-call MD unless specified) Details      12/11/2024  5:00 PM Hematology and Oncology (on-call MD unless specified)            Hospital Course  Patient is a 84 y.o. female who ultimately is admitted for thrombosis while on Coumadin.  We do not feel this is Coumadin failure as Case has been discussed with Dr. Guthrie with hematology.  She recently had her Coumadin held for upcoming procedure and that likely was the culprit.  Will review all her previous levels it is amazing she has not had more problems with intermittent bleeding because her INR is always seem quite supratherapeutic.  She was found to have a new thrombus involving her right lower extremity/gastrocnemius.  Her INR was supratherapeutic at admission and we allowed this to trend down and temporarily utilized a heparin drip but have transitioned her over to Eliquis.  We think this is a safer mode of anticoagulation going forward.  Pros and cons of been discussed with the patient and she understands and accepts.  She has no bleeding complaints and her hemoglobin seems to hold quite stable despite variations in her INR.  No Coumadin will be utilized post disposition.  She has tolerated transition towards Eliquis.  I would have discharged her yesterday but she reported that her right  lower extremity hurt as bad as when she got here.  She request an orthopedic consult but there is no need for that on an inpatient basis as her right knee pain is secondary to the clot and x-ray imaging of her previous TKA shows everything is normal and intact.  I added Norco for as needed pain control but more importantly got her fitted for Jobst stockings with 20 to 30 mmHg and the compression has made a big impact in her discomfort.  I have counseled her to utilize this for the next couple weeks and she can likely transition to MIR hose at that point.  She was given a 30-day supply of Eliquis and instructed follow with her PCP within the next couple weeks for further prescribing.  Vital signs are stable as well as afebrile with no complaints of chest pain or hypoxia.  Case discussed with managing RN at bedside on day of discharge.  Patient is thankful of the care she received today and amenable to the above plan.  CCP coordinating to discharge needs and there are none with family to transport        Temp:  [97.7 °F (36.5 °C)-98.2 °F (36.8 °C)] 98.2 °F (36.8 °C)  Heart Rate:  [] 111  Resp:  [18] 18  BP: (105-137)/(61-82) 105/82  Body mass index is 32.58 kg/m².    Physical Exam  Constitutional:       Comments: More calm today.  Nontoxic.  Underlying anxiety   HENT:      Head: Normocephalic.      Nose: Nose normal.      Mouth/Throat:      Mouth: Mucous membranes are moist.      Pharynx: Oropharynx is clear.   Cardiovascular:      Rate and Rhythm: Normal rate and regular rhythm.   Pulmonary:      Effort: Pulmonary effort is normal. No respiratory distress.      Breath sounds: Normal breath sounds.   Abdominal:      General: Bowel sounds are normal. There is no distension.      Palpations: Abdomen is soft.      Tenderness: There is no abdominal tenderness.   Musculoskeletal:      Comments: Old TKA scar to right knee.  Slight swelling to right lower extremity-NVM intact distally   Neurological:      Mental  Status: She is alert and oriented to person, place, and time. Mental status is at baseline.       Disposition: Home or Self Care       Discharge Medications        New Medications        Instructions Start Date   apixaban 5 MG tablet tablet  Commonly known as: ELIQUIS   5 mg, Oral, Every 12 Hours Scheduled             Continue These Medications        Instructions Start Date   CITRACAL +D3 PO   1 tablet, Daily      ezetimibe 10 MG tablet  Commonly known as: ZETIA   10 mg, Oral, Daily      furosemide 20 MG tablet  Commonly known as: LASIX   TAKE ONE TABLET BY MOUTH DAILY      levothyroxine 25 MCG tablet  Commonly known as: SYNTHROID, LEVOTHROID   25 mcg, Oral, Daily      melatonin 5 MG sublingual tablet sublingual tablet   5 mg, Nightly      metoprolol succinate XL 25 MG 24 hr tablet  Commonly known as: TOPROL-XL   12.5 mg, Oral, Daily      Multi-Day Vitamins tablet tablet  Generic drug: multivitamin   1 tablet, Daily      niacinamide 500 MG tablet   500 mg, 2 Times Daily With Meals      spironolactone 25 MG tablet  Commonly known as: ALDACTONE   25 mg, Oral, Daily      valsartan 80 MG tablet  Commonly known as: DIOVAN   80 mg, Oral, Daily             Stop These Medications      celecoxib 200 MG capsule  Commonly known as: CeleBREX     warfarin 3 MG tablet  Commonly known as: COUMADIN               Additional Instructions for the Follow-ups that You Need to Schedule       Discharge Follow-up with PCP   As directed       Currently Documented PCP:    Gisele Dockery MD    PCP Phone Number:    [phone]     Follow Up Details: PCP 2 weeks.  Hematology as needed               Follow-up Information       Gisele Dockery MD .    Specialty: Internal Medicine  Why: PCP 2 weeks.  Hematology as needed  Contact information:  [address] [place] [address]  [phone]                            Future Appointments   Date Time Provider Department Center   1/22/2025  2:30 PM [facility]   1/29/2025  3:30 PM Gisele Dockery MD [facility]   3/31/2025 11:00 AM [facility] DEVICE CHECK [id number] None   3/31/2025 11:40 AM Demar Stone MD [facility] None        Christopher Xiao MD  [facility]  12/14/24    Discharge time spent greater than 30 minutes.

## 2024-12-14 NOTE — PLAN OF CARE
Problem: Adult Inpatient Plan of Care  Goal: Absence of Hospital-Acquired Illness or Injury  Intervention: Identify and Manage Fall Risk  Recent Flowsheet Documentation  Taken 12/14/2024 0837 by Ricarda Martinez RN  Safety Promotion/Fall Prevention:   safety round/check completed   room organization consistent   activity supervised   assistive device/personal items within reach     Problem: Adult Inpatient Plan of Care  Goal: Absence of Hospital-Acquired Illness or Injury  Intervention: Prevent Skin Injury  Recent Flowsheet Documentation  Taken 12/14/2024 0837 by Ricarda Martinez RN  Body Position: position changed independently     Problem: Adult Inpatient Plan of Care  Goal: Absence of Hospital-Acquired Illness or Injury  Intervention: Prevent and Manage VTE (Venous Thromboembolism) Risk  Recent Flowsheet Documentation  Taken 12/14/2024 0837 by Ricarda Martinez RN  VTE Prevention/Management: compression stockings off   Goal Outcome Evaluation:           Progress: improving  Outcome Evaluation: Patient AO x4 VSS, state feel better removed Jobst at 5 am state wearing the kobst decrease pain on her right leg MD explain her about Eliquis and the consistency in taking her medication and wear the Jobst right leg. This nurse put back her jobst compression on her Right leg patient educated to put during the day and can removed during the night. will be discharge home today.

## 2024-12-16 ENCOUNTER — TRANSITIONAL CARE MANAGEMENT TELEPHONE ENCOUNTER (OUTPATIENT)
Dept: CALL CENTER | Facility: HOSPITAL | Age: 84
End: 2024-12-16
Payer: MEDICARE

## 2024-12-16 NOTE — OUTREACH NOTE
Call Center TCM Note      Flowsheet Row Responses   [facility] patient discharged from? [place]   Does the patient have one of the following disease processes/diagnoses(primary or secondary)? Other   TCM attempt successful? Yes   Call start time 0858   Call end time 0912   Discharge diagnosis Acute DVT   Person spoke with today (if not patient) and relationship pt   Meds reviewed with patient/caregiver? Yes   Is the patient having any side effects they believe may be caused by any medication additions or changes? No   Does the patient have all medications ordered at discharge? Yes   Is the patient taking all medications as directed (includes completed medication regime)? Yes   Comments Hematology if needed   Does the patient have an appointment with their PCP within 7-14 days of discharge? No appointments available   Nursing Interventions Routed TCM call to PCP office, PCP office requested to make appointment - message sent   Psychosocial issues? No   Did the patient receive a copy of their discharge instructions? Yes   Nursing interventions Reviewed instructions with patient   What is the patient's perception of their health status since discharge? Same   Is the patient/caregiver able to teach back signs and symptoms related to disease process for when to call PCP? Yes   Is the patient/caregiver able to teach back signs and symptoms related to disease process for when to call 911? Yes   Is the patient/caregiver able to teach back the hierarchy of who to call/visit for symptoms/problems? PCP, Specialist, Home health nurse, Urgent Care, ED, 911 Yes   If the patient is a current smoker, are they able to teach back resources for cessation? Not a smoker   TCM call completed? Yes   Wrap up additional comments Pt reports ongoing right LE calf pain r/t DVT. Revewed AVS/meds with pt. Pt inquired about compression stockings coming off at night,  pt advised to ask PCP if these can be removed at night.   Call end time  0912   Would this patient benefit from a Referral to [facility] Social Work? No   Is the patient interested in additional calls from an ambulatory ? No            Valeria uGnter RN    12/16/2024, 09:18 EST

## 2024-12-16 NOTE — CASE MANAGEMENT/SOCIAL WORK
Case Management Discharge Note      Final Note: Home via family, no additional CCP needs.         Selected Continued Care - Discharged on 12/14/2024 Admission date: 12/11/2024 - Discharge disposition: Home or Self Care      Destination    No services have been selected for the patient.                Durable Medical Equipment    No services have been selected for the patient.                Dialysis/Infusion    No services have been selected for the patient.                Home Medical Care    No services have been selected for the patient.                Therapy    No services have been selected for the patient.                Community Resources    No services have been selected for the patient.                Community & DME    No services have been selected for the patient.                         Final Discharge Disposition Code: 01 - home or self-care

## 2024-12-17 ENCOUNTER — OFFICE VISIT (OUTPATIENT)
Dept: INTERNAL MEDICINE | Facility: CLINIC | Age: 84
End: 2024-12-17
Payer: MEDICARE

## 2024-12-17 VITALS
DIASTOLIC BLOOD PRESSURE: 62 MMHG | SYSTOLIC BLOOD PRESSURE: 116 MMHG | HEART RATE: 85 BPM | OXYGEN SATURATION: 98 % | HEIGHT: 63 IN | BODY MASS INDEX: 31.71 KG/M2 | WEIGHT: 179 LBS

## 2024-12-17 DIAGNOSIS — G89.29 CHRONIC LOW BACK PAIN, UNSPECIFIED BACK PAIN LATERALITY, UNSPECIFIED WHETHER SCIATICA PRESENT: ICD-10-CM

## 2024-12-17 DIAGNOSIS — M54.50 CHRONIC LOW BACK PAIN, UNSPECIFIED BACK PAIN LATERALITY, UNSPECIFIED WHETHER SCIATICA PRESENT: ICD-10-CM

## 2024-12-17 DIAGNOSIS — I82.4Z1 ACUTE DEEP VEIN THROMBOSIS (DVT) OF DISTAL VEIN OF RIGHT LOWER EXTREMITY: Primary | ICD-10-CM

## 2024-12-17 PROCEDURE — 1160F RVW MEDS BY RX/DR IN RCRD: CPT | Performed by: INTERNAL MEDICINE

## 2024-12-17 PROCEDURE — 3078F DIAST BP <80 MM HG: CPT | Performed by: INTERNAL MEDICINE

## 2024-12-17 PROCEDURE — 1159F MED LIST DOCD IN RCRD: CPT | Performed by: INTERNAL MEDICINE

## 2024-12-17 PROCEDURE — 3074F SYST BP LT 130 MM HG: CPT | Performed by: INTERNAL MEDICINE

## 2024-12-17 PROCEDURE — 1125F AMNT PAIN NOTED PAIN PRSNT: CPT | Performed by: INTERNAL MEDICINE

## 2024-12-17 PROCEDURE — 1111F DSCHRG MED/CURRENT MED MERGE: CPT | Performed by: INTERNAL MEDICINE

## 2024-12-17 PROCEDURE — 99495 TRANSJ CARE MGMT MOD F2F 14D: CPT | Performed by: INTERNAL MEDICINE

## 2024-12-17 RX ORDER — GABAPENTIN 100 MG/1
100 CAPSULE ORAL 3 TIMES DAILY
Qty: 90 CAPSULE | Refills: 5 | Status: SHIPPED | OUTPATIENT
Start: 2024-12-17

## 2024-12-17 NOTE — PROGRESS NOTES
Transitional Care Follow Up Visit  Subjective     Inessa Peoples is a 84 y.o. female who presents for a transitional care management visit.    Within 48 business hours after discharge our office contacted her via telephone to coordinate her care and needs.      I reviewed and discussed the details of that call along with the discharge summary, hospital problems, inpatient lab results, inpatient diagnostic studies, and consultation reports with Inessa.     Current outpatient and discharge medications have been reconciled for the patient.  Reviewed by: Gisele Dockery MD          12/14/2024     4:14 PM   Date of TCM Phone Call   [facility]   Date of Admission 12/11/2024   Date of Discharge 12/14/2024   Discharge Disposition Home or Self Care     Risk for Readmission (LACE) Score: 8 (12/14/2024  6:00 AM)      Extremity Pain   The pain is present in the right lower leg. The problem has been unchanged. The quality of the pain is described as other. The pain is at a severity of 10/10. Associated symptoms include an inability to bear weight. Pertinent negatives include no tingling or lower extremity swelling.      Course During Hospital Stay:  Patient presents in hospital f/u.  She was admitted for acute DVT.  I have reviewed discharge summary, labs, scans, cardiovascular studies and medication changes.      Reviewed discharge summary with the patient.   Patient is a 84 y.o. female who ultimately is admitted for thrombosis while on Coumadin.  We do not feel this is Coumadin failure as Case has been discussed with Dr. Guthrie with hematology.  She recently had her Coumadin held for upcoming procedure and that likely was the culprit.  Will review all her previous levels it is amazing she has not had more problems with intermittent bleeding because her INR is always seem quite supratherapeutic.  She was found to have a new thrombus involving her right lower extremity/gastrocnemius.  Her INR was supratherapeutic  at admission and we allowed this to trend down and temporarily utilized a heparin drip but have transitioned her over to Eliquis.  We think this is a safer mode of anticoagulation going forward.  Pros and cons of been discussed with the patient and she understands and accepts.  She has no bleeding complaints and her hemoglobin seems to hold quite stable despite variations in her INR.  No Coumadin will be utilized post disposition.  She has tolerated transition towards Eliquis.  I would have discharged her yesterday but she reported that her right lower extremity hurt as bad as when she got here.  She request an orthopedic consult but there is no need for that on an inpatient basis as her right knee pain is secondary to the clot and x-ray imaging of her previous TKA shows everything is normal and intact.  I added Norco for as needed pain control but more importantly got her fitted for Jobst stockings with 20 to 30 mmHg and the compression has made a big impact in her discomfort.  I have counseled her to utilize this for the next couple weeks and she can likely transition to MIR hose at that point.  She was given a 30-day supply of Eliquis and instructed follow with her PCP within the next couple weeks for further prescribing.  Vital signs are stable as well as afebrile with no complaints of chest pain or hypoxia.  Case discussed with managing RN at bedside on day of discharge.  Patient is thankful of the care she received today and amenable to the above plan.  CCP coordinating to discharge needs and there are none with family to transport        The following portions of the patient's history were reviewed and updated as appropriate: allergies, current medications, past family history, past medical history, past social history, past surgical history, and problem list.    Review of Systems   Musculoskeletal:  Positive for back pain.   Neurological:  Negative for tingling.       Objective   /62   Pulse 85   Ht  "158.8 cm (62.52\")   Wt 81.2 kg (179 lb)   SpO2 98%   BMI 32.20 kg/m²   Physical Exam  Constitutional:       Appearance: She is well-developed.   HENT:      Head: Normocephalic and atraumatic.   Pulmonary:      Effort: Pulmonary effort is normal.   Neurological:      Mental Status: She is alert and oriented to person, place, and time.   Psychiatric:         Behavior: Behavior normal.         Assessment & Plan   Diagnoses and all orders for this visit:    1. Acute deep vein thrombosis (DVT) of distal vein of right lower extremity (Primary)    2. Chronic low back pain, unspecified back pain laterality, unspecified whether sciatica present  -     gabapentin (NEURONTIN) 100 MG capsule; Take 1 capsule by mouth 3 (Three) Times a Day.  Dispense: 90 capsule; Refill: 5      F/u acute DVT secondary to being off coumadin.  She has transitioned to Eliquis.  We are going to try to limit need to be off anticoagulation.    Chronic low back pain.  Discussed options for pain control.  Take tylenol up to three grams total daily.  Add low dose gabapentin.  F/u in January to assess pain control.      I spent 31 minutes caring for Inessa on this date of service. This time includes time spent by me in the following activities: reviewing tests, counseling and educating the patient/family/caregiver, and documenting information in the medical record.              "

## 2025-01-03 ENCOUNTER — TELEPHONE (OUTPATIENT)
Dept: INTERNAL MEDICINE | Facility: CLINIC | Age: 85
End: 2025-01-03
Payer: MEDICARE

## 2025-01-03 NOTE — TELEPHONE ENCOUNTER
I d/w patient.    Celebrex would be a relative contraindication.    She is not taking gabapentin regularly.  Instructed to take TID and see if that is effective.  PARRISHW

## 2025-01-06 RX ORDER — CELECOXIB 200 MG/1
200 CAPSULE ORAL DAILY
Qty: 90 CAPSULE | Refills: 1 | OUTPATIENT
Start: 2025-01-06

## 2025-01-06 RX ORDER — LEVOTHYROXINE SODIUM 25 UG/1
25 TABLET ORAL DAILY
Qty: 90 TABLET | Refills: 3 | Status: SHIPPED | OUTPATIENT
Start: 2025-01-06

## 2025-01-08 NOTE — TELEPHONE ENCOUNTER
Caller: SheltonInessa    Relationship: Self    Best call back number: [phone]     Requested Prescriptions:   Requested Prescriptions     Pending Prescriptions Disp Refills    apixaban (ELIQUIS) 5 MG tablet tablet 60 tablet 0     Sig: Take 1 tablet by mouth Every 12 (Twelve) Hours. Indications: DVT/PE (active thrombosis)        Pharmacy where request should be sent: [facility] [id number] [facility] AT [facility] [phone] [facility] [phone] FX     Last office visit with prescribing clinician: 12/17/2024   Last telemedicine visit with prescribing clinician: Visit date not found   Next office visit with prescribing clinician: 1/29/2025         Does the patient have less than a 3 day supply:  [x] Yes  [] No    Would you like a call back once the refill request has been completed: [] Yes [] No    If the office needs to give you a call back, can they leave a voicemail: [] Yes [] No    Fly Echeverria Rep   01/08/25 10:35 EST

## 2025-01-21 DIAGNOSIS — E78.5 HYPERLIPIDEMIA, UNSPECIFIED HYPERLIPIDEMIA TYPE: ICD-10-CM

## 2025-01-21 DIAGNOSIS — I10 PRIMARY HYPERTENSION: Primary | ICD-10-CM

## 2025-01-21 DIAGNOSIS — E03.9 ACQUIRED HYPOTHYROIDISM: ICD-10-CM

## 2025-01-23 LAB
ALBUMIN SERPL-MCNC: 4.7 G/DL (ref 3.7–4.7)
ALP SERPL-CCNC: 100 IU/L (ref 44–121)
ALT SERPL-CCNC: 19 IU/L (ref 0–32)
AST SERPL-CCNC: 23 IU/L (ref 0–40)
BASOPHILS # BLD AUTO: 0.1 X10E3/UL (ref 0–0.2)
BASOPHILS NFR BLD AUTO: 1 %
BILIRUB SERPL-MCNC: 0.9 MG/DL (ref 0–1.2)
BUN SERPL-MCNC: 28 MG/DL (ref 8–27)
BUN/CREAT SERPL: 25 (ref 12–28)
CALCIUM SERPL-MCNC: 10.4 MG/DL (ref 8.7–10.3)
CHLORIDE SERPL-SCNC: 101 MMOL/L (ref 96–106)
CHOLEST SERPL-MCNC: 228 MG/DL (ref 100–199)
CO2 SERPL-SCNC: 22 MMOL/L (ref 20–29)
CREAT SERPL-MCNC: 1.13 MG/DL (ref 0.57–1)
EGFRCR SERPLBLD CKD-EPI 2021: 48 ML/MIN/1.73
EOSINOPHIL # BLD AUTO: 0.2 X10E3/UL (ref 0–0.4)
EOSINOPHIL NFR BLD AUTO: 2 %
ERYTHROCYTE [DISTWIDTH] IN BLOOD BY AUTOMATED COUNT: 12.2 % (ref 11.7–15.4)
GLOBULIN SER CALC-MCNC: 2.6 G/DL (ref 1.5–4.5)
GLUCOSE SERPL-MCNC: 110 MG/DL (ref 70–99)
HCT VFR BLD AUTO: 44.5 % (ref 34–46.6)
HDLC SERPL-MCNC: 63 MG/DL
HGB BLD-MCNC: 14.9 G/DL (ref 11.1–15.9)
IMM GRANULOCYTES # BLD AUTO: 0 X10E3/UL (ref 0–0.1)
IMM GRANULOCYTES NFR BLD AUTO: 0 %
LDLC SERPL CALC-MCNC: 136 MG/DL (ref 0–99)
LYMPHOCYTES # BLD AUTO: 2.8 X10E3/UL (ref 0.7–3.1)
LYMPHOCYTES NFR BLD AUTO: 32 %
MCH RBC QN AUTO: 32.6 PG (ref 26.6–33)
MCHC RBC AUTO-ENTMCNC: 33.5 G/DL (ref 31.5–35.7)
MCV RBC AUTO: 97 FL (ref 79–97)
MONOCYTES # BLD AUTO: 0.8 X10E3/UL (ref 0.1–0.9)
MONOCYTES NFR BLD AUTO: 9 %
NEUTROPHILS # BLD AUTO: 4.9 X10E3/UL (ref 1.4–7)
NEUTROPHILS NFR BLD AUTO: 56 %
PLATELET # BLD AUTO: 225 X10E3/UL (ref 150–450)
POTASSIUM SERPL-SCNC: 4.5 MMOL/L (ref 3.5–5.2)
PROT SERPL-MCNC: 7.3 G/DL (ref 6–8.5)
RBC # BLD AUTO: 4.57 X10E6/UL (ref 3.77–5.28)
SODIUM SERPL-SCNC: 139 MMOL/L (ref 134–144)
TRIGL SERPL-MCNC: 167 MG/DL (ref 0–149)
TSH SERPL DL<=0.005 MIU/L-ACNC: 2.57 UIU/ML (ref 0.45–4.5)
UNABLE TO VOID: NORMAL
VLDLC SERPL CALC-MCNC: 29 MG/DL (ref 5–40)
WBC # BLD AUTO: 8.7 X10E3/UL (ref 3.4–10.8)

## 2025-01-29 ENCOUNTER — OFFICE VISIT (OUTPATIENT)
Dept: INTERNAL MEDICINE | Facility: CLINIC | Age: 85
End: 2025-01-29
Payer: MEDICARE

## 2025-01-29 VITALS
OXYGEN SATURATION: 98 % | BODY MASS INDEX: 31.71 KG/M2 | HEIGHT: 63 IN | DIASTOLIC BLOOD PRESSURE: 82 MMHG | WEIGHT: 179 LBS | SYSTOLIC BLOOD PRESSURE: 130 MMHG | HEART RATE: 85 BPM

## 2025-01-29 DIAGNOSIS — E78.5 HYPERLIPIDEMIA, UNSPECIFIED HYPERLIPIDEMIA TYPE: ICD-10-CM

## 2025-01-29 DIAGNOSIS — M19.90 CHRONIC OSTEOARTHRITIS: ICD-10-CM

## 2025-01-29 DIAGNOSIS — I50.30 HEART FAILURE WITH PRESERVED EJECTION FRACTION, NYHA CLASS II: ICD-10-CM

## 2025-01-29 DIAGNOSIS — Z00.00 MEDICARE ANNUAL WELLNESS VISIT, SUBSEQUENT: Primary | ICD-10-CM

## 2025-01-29 DIAGNOSIS — I10 PRIMARY HYPERTENSION: ICD-10-CM

## 2025-01-29 DIAGNOSIS — I82.409 RECURRENT DEEP VEIN THROMBOSIS (DVT): ICD-10-CM

## 2025-01-29 DIAGNOSIS — E03.9 ACQUIRED HYPOTHYROIDISM: ICD-10-CM

## 2025-01-29 DIAGNOSIS — I48.0 PAROXYSMAL ATRIAL FIBRILLATION: ICD-10-CM

## 2025-01-29 PROCEDURE — 3079F DIAST BP 80-89 MM HG: CPT | Performed by: INTERNAL MEDICINE

## 2025-01-29 PROCEDURE — G2211 COMPLEX E/M VISIT ADD ON: HCPCS | Performed by: INTERNAL MEDICINE

## 2025-01-29 PROCEDURE — 3075F SYST BP GE 130 - 139MM HG: CPT | Performed by: INTERNAL MEDICINE

## 2025-01-29 PROCEDURE — 1160F RVW MEDS BY RX/DR IN RCRD: CPT | Performed by: INTERNAL MEDICINE

## 2025-01-29 PROCEDURE — 1126F AMNT PAIN NOTED NONE PRSNT: CPT | Performed by: INTERNAL MEDICINE

## 2025-01-29 PROCEDURE — 1159F MED LIST DOCD IN RCRD: CPT | Performed by: INTERNAL MEDICINE

## 2025-01-29 PROCEDURE — 99214 OFFICE O/P EST MOD 30 MIN: CPT | Performed by: INTERNAL MEDICINE

## 2025-01-29 PROCEDURE — G0439 PPPS, SUBSEQ VISIT: HCPCS | Performed by: INTERNAL MEDICINE

## 2025-01-29 RX ORDER — TRAMADOL HYDROCHLORIDE 50 MG/1
50 TABLET ORAL EVERY 6 HOURS PRN
Qty: 50 TABLET | Refills: 0 | Status: SHIPPED | OUTPATIENT
Start: 2025-01-29

## 2025-01-30 PROBLEM — I82.409 ACUTE DVT (DEEP VENOUS THROMBOSIS): Status: RESOLVED | Noted: 2024-12-11 | Resolved: 2025-01-30

## 2025-01-30 PROBLEM — I77.810 DILATION OF THORACIC AORTA: Status: RESOLVED | Noted: 2017-06-23 | Resolved: 2025-01-30

## 2025-01-30 PROBLEM — I82.409 DVT (DEEP VENOUS THROMBOSIS): Status: RESOLVED | Noted: 2024-12-11 | Resolved: 2025-01-30

## 2025-01-30 NOTE — ASSESSMENT & PLAN NOTE
Orders:    traMADol (ULTRAM) 50 MG tablet; Take 1 tablet by mouth Every 6 (Six) Hours As Needed for Moderate Pain.

## 2025-01-30 NOTE — PROGRESS NOTES
Subjective   The ABCs of the Annual Wellness Visit  Medicare Wellness Visit      Inessa Peoples is a 85 y.o. patient who presents for a Medicare Wellness Visit.    The following portions of the patient's history were reviewed and   updated as appropriate: allergies, current medications, past family history, past medical history, past social history, past surgical history, and problem list.    Compared to one year ago, the patient's physical   health is the same.  Compared to one year ago, the patient's mental   health is the same.    Recent Hospitalizations:  This patient has had a [facility] admission record on file within the last 365 days.  Current Medical Providers:  Patient Care Team:  Gisele Dockery MD as PCP - General (Internal Medicine)  Raleigh Bunch MD as Consulting Physician (Orthopedic Surgery)    Outpatient Medications Prior to Visit   Medication Sig Dispense Refill    Calcium-Phosphorus-Vitamin D (CITRACAL +D3 PO) Take 1 tablet by mouth Daily.      ezetimibe (ZETIA) 10 MG tablet TAKE 1 TABLET BY MOUTH DAILY 90 tablet 1    furosemide (LASIX) 20 MG tablet TAKE ONE TABLET BY MOUTH DAILY 90 tablet 1    levothyroxine (SYNTHROID, LEVOTHROID) 25 MCG tablet TAKE 1 TABLET BY MOUTH DAILY 90 tablet 3    melatonin 5 MG sublingual tablet sublingual tablet Take 1 tablet by mouth Every Night.      metoprolol succinate XL (TOPROL-XL) 25 MG 24 hr tablet TAKE 1/2 TABLET BY MOUTH DAILY 45 tablet 3    Multiple Vitamin (MULTI-DAY VITAMINS) tablet Take 1 tablet by mouth Daily.      niacinamide 500 MG tablet Take 1 tablet by mouth 2 (Two) Times a Day With Meals.      spironolactone (ALDACTONE) 25 MG tablet TAKE 1 TABLET BY MOUTH DAILY 90 tablet 1    valsartan (DIOVAN) 80 MG tablet Take 1 tablet by mouth Daily. 90 tablet 3    apixaban (ELIQUIS) 5 MG tablet tablet Take 1 tablet by mouth Every 12 (Twelve) Hours. Indications: DVT/PE (active thrombosis) 60 tablet 0    gabapentin (NEURONTIN) 100 MG capsule Take 1  "capsule by mouth 3 (Three) Times a Day. 90 capsule 5    omeprazole (priLOSEC) 20 MG capsule Take 1 capsule by mouth Daily.       No facility-administered medications prior to visit.     Opioid medication/s are on active medication list.  and I have evaluated her active treatment plan and pain score trends (see table).  Vitals:    01/29/25 1518   PainSc: 0-No pain     I have reviewed the chart for potential of high risk medication and harmful drug interactions in the elderly.        Aspirin is not on active medication list.  Aspirin use is not indicated based on review of current medical condition/s. Risk of harm outweighs potential benefits.  .    Patient Active Problem List   Diagnosis    Sarcoidosis    Diaphragmatic paralysis    Osteopenia    Impaired fasting glucose    Hypothyroidism    Hypertension    Hyperlipidemia    Chronic osteoarthritis    GERD (gastroesophageal reflux disease)    ANY on CPAP + 10 - Dr Lu    Bulge of lumbar disc without myelopathy    Lumbar facet arthropathy    Trochanteric bursitis of both hips    LBBB (left bundle branch block)    Paroxysmal atrial fibrillation    Heart failure with preserved ejection fraction, NYHA class II    Presence of biventricular cardiac pacemaker    Dysphagia    Recurrent deep vein thrombosis (DVT)     Advance Care Planning Advance Directive is on file.  ACP discussion was held with the patient during this visit. Patient has an advance directive in EMR which is still valid.             Objective   Vitals:    01/29/25 1518   BP: 130/82   Pulse: 85   SpO2: 98%   Weight: 81.2 kg (179 lb)   Height: 158.8 cm (62.52\")   PainSc: 0-No pain       Estimated body mass index is 32.2 kg/m² as calculated from the following:    Height as of this encounter: 158.8 cm (62.52\").    Weight as of this encounter: 81.2 kg (179 lb).                Does the patient have evidence of cognitive impairment? No  Lab Results   Component Value Date    CHLPL 228 (H) 01/22/2025    TRIG 167 " (H) 2025    HDL 63 2025     (H) 2025    VLDL 29 2025                                                                                                Health  Risk Assessment    Smoking Status:  Social History     Tobacco Use   Smoking Status Never   Smokeless Tobacco Never   Tobacco Comments    No history of tobacco use     Alcohol Consumption:  Social History     Substance and Sexual Activity   Alcohol Use Yes    Alcohol/week: 3.0 standard drinks of alcohol    Types: 2 Glasses of wine, 1 Drinks containing 0.5 oz of alcohol per week    Comment: Occasionally       Fall Risk Screen  QUITAADI Fall Risk Assessment was completed, and patient is at LOW risk for falls.Assessment completed on:2025    Depression Screening   Little interest or pleasure in doing things? Not at all   Feeling down, depressed, or hopeless? Not at all   PHQ-2 Total Score 0      Health Habits and Functional and Cognitive Screenin/23/2025    10:49 PM   Functional & Cognitive Status   Do you have difficulty preparing food and eating? No    Do you have difficulty bathing yourself, getting dressed or grooming yourself? No    Do you have difficulty using the toilet? No    Do you have difficulty moving around from place to place? No    Do you have trouble with steps or getting out of a bed or a chair? No    Current Diet Other    Dental Exam Up to date    Eye Exam Not up to date    Exercise (times per week) 0 times per week    Current Exercises Include No Regular Exercise    Do you need help using the phone?  No    Are you deaf or do you have serious difficulty hearing?  No    Do you need help to go to places out of walking distance? Yes    Do you need help shopping? No    Do you need help preparing meals?  No    Do you need help with housework?  No    Do you need help with laundry? No    Do you need help taking your medications? No    Do you need help managing money? No    Do you ever drive or ride in a car  without wearing a seat belt? No    Have you felt unusual stress, anger or loneliness in the last month? No    Who do you live with? Spouse    If you need help, do you have trouble finding someone available to you? No    Have you been bothered in the last four weeks by sexual problems? No    Do you have difficulty concentrating, remembering or making decisions? No        Patient-reported           Age-appropriate Screening Schedule:  Refer to the list below for future screening recommendations based on patient's age, sex and/or medical conditions. Orders for these recommended tests are listed in the plan section. The patient has been provided with a written plan.    Health Maintenance List  Health Maintenance   Topic Date Due    ANNUAL WELLNESS VISIT  01/09/2024    DXA SCAN  05/31/2024    BMI FOLLOWUP  12/17/2025    LIPID PANEL  01/22/2026    MAMMOGRAM  09/03/2026    COLORECTAL CANCER SCREENING  10/04/2026    TDAP/TD VACCINES (4 - Td or Tdap) 03/31/2034    COVID-19 Vaccine  Completed    RSV Vaccine - Adults  Completed    INFLUENZA VACCINE  Completed    Pneumococcal Vaccine 65+  Completed    ZOSTER VACCINE  Completed                                                                                                                                                CMS Preventative Services Quick Reference  Risk Factors Identified During Encounter  Chronic Pain:  discussed options for pain control.      The above risks/problems have been discussed with the patient.  Pertinent information has been shared with the patient in the After Visit Summary.  An After Visit Summary and PPPS were made available to the patient.    Follow Up:   Next Medicare Wellness visit to be scheduled in 1 year.         Additional E&M Note during same encounter follows:  Patient has additional, significant, and separately identifiable condition(s)/problem(s) that require work above and beyond the Medicare Wellness Visit     Chief Complaint  Medicare  "Wellness-subsequent    Subjective   HPI  Inessa is also being seen today for additional medical problem/s.    HTN.  Blood pressure is under good control.  HLD.  Not optimal but statin intolerant. Hypothyroidism.  TSH is in normal range.        Review of Systems   Respiratory:  Negative for shortness of breath.    Cardiovascular:  Negative for chest pain.   Musculoskeletal:  Positive for back pain.              Objective   Vital Signs:  /82   Pulse 85   Ht 158.8 cm (62.52\")   Wt 81.2 kg (179 lb)   SpO2 98%   BMI 32.20 kg/m²   Physical Exam  Constitutional:       Appearance: She is well-developed.   HENT:      Head: Normocephalic and atraumatic.      Right Ear: Hearing and tympanic membrane normal.      Left Ear: Hearing and tympanic membrane normal.      Mouth/Throat:      Pharynx: No oropharyngeal exudate or posterior oropharyngeal erythema.   Cardiovascular:      Rate and Rhythm: Normal rate and regular rhythm.      Heart sounds: Normal heart sounds.   Pulmonary:      Effort: Pulmonary effort is normal.      Breath sounds: Normal breath sounds.   Skin:     General: Skin is warm and dry.   Neurological:      Mental Status: She is alert and oriented to person, place, and time.   Psychiatric:         Behavior: Behavior normal.         The following data was reviewed by: Gisele Dockery MD on 01/29/2025:    Common labs          12/13/2024    03:38 12/14/2024    04:15 1/22/2025    14:33   Common Labs   Glucose   110    BUN   28    Creatinine   1.13    Sodium   139    Potassium   4.5    Chloride   101    Calcium   10.4    Total Protein   7.3    Albumin   4.7    Total Bilirubin   0.9    Alkaline Phosphatase   100    AST (SGOT)   23    ALT (SGPT)   19    WBC 5.89   8.7    Hemoglobin 13.3  13.0  14.9    Hematocrit 37.4  40.0  44.5    Platelets 215   225    Total Cholesterol   228    Triglycerides   167    HDL Cholesterol   63    LDL Cholesterol    136              Assessment and Plan Additional age appropriate " preventative wellness advice topics were discussed during today's preventative wellness exam(some topics already addressed during AWV portion of the note above):   Nutrition: Discussed nutrition plan with patient. Information shared in after visit summary. Goal is for a well balanced diet to enhance overall health.           Medicare annual wellness visit, subsequent         Primary hypertension           Hyperlipidemia, unspecified hyperlipidemia type            Acquired hypothyroidism         Chronic osteoarthritis    Orders:    traMADol (ULTRAM) 50 MG tablet; Take 1 tablet by mouth Every 6 (Six) Hours As Needed for Moderate Pain.    Recurrent deep vein thrombosis (DVT)         Heart failure with preserved ejection fraction, NYHA class II         Paroxysmal atrial fibrillation       HTN.  Control is good.  The patient is advised to continue current dosage of metoprolol, valsartan and spironolactone.    HLD.  Control is not optimal.  The patient is advised to continue current dosage of Zetia.  Continue attention to diet.  Hypothyroidism.  Control is good.  The patient is advised to continue current dosage of levothyroxine.    Chronic OA pain.  Unable to take NSAIDs.  Gabapentin not effective.  Tylenol not fully effective.  Trial of Tramadol in addition to up to 3000 mg of tylenol daily.    Afib and recurrent DVT.  She is maintained on Eliquis.    CHF.  She is euvolemic.  The patient is advised to continue current dosage of lasix, metoprolol, valsartan and spironolactone.               Follow Up   Return in about 6 months (around 7/29/2025) for Recheck.  Patient was given instructions and counseling regarding her condition or for health maintenance advice. Please see specific information pulled into the AVS if appropriate.

## 2025-01-30 NOTE — ASSESSMENT & PLAN NOTE
HTN.  Control is good.  The patient is advised to continue current dosage of metoprolol, valsartan and spironolactone.    HLD.  Control is not optimal.  The patient is advised to continue current dosage of Zetia.  Continue attention to diet.  Hypothyroidism.  Control is good.  The patient is advised to continue current dosage of levothyroxine.    Chronic OA pain.  Unable to take NSAIDs.  Gabapentin not effective.  Tylenol not fully effective.  Trial of Tramadol in addition to up to 3000 mg of tylenol daily.    Afib and recurrent DVT.  She is maintained on Eliquis.    CHF.  She is euvolemic.  The patient is advised to continue current dosage of lasix, metoprolol, valsartan and spironolactone.

## 2025-01-30 NOTE — PATIENT INSTRUCTIONS
Medicare Wellness  Personal Prevention Plan of Service     Date of Office Visit:    Encounter Provider:  Gisele Dockery MD  Place of Service:  [facility]  Patient Name: Inessa Peoples  :  1940    As part of the Medicare Wellness portion of your visit today, we are providing you with this personalized preventive plan of services (PPPS). This plan is based upon recommendations of the [place] Preventive Services Task Force (USPSTF) and the Advisory Committee on Immunization Practices (ACIP).    This lists the preventive care services that should be considered, and provides dates of when you are due. Items listed as completed are up-to-date and do not require any further intervention.    Health Maintenance   Topic Date Due    ANNUAL WELLNESS VISIT  2024    DXA SCAN  2024    BMI FOLLOWUP  2025    LIPID PANEL  2026    MAMMOGRAM  2026    COLORECTAL CANCER SCREENING  10/04/2026    TDAP/TD VACCINES (4 - Td or Tdap) 2034    COVID-19 Vaccine  Completed    RSV Vaccine - Adults  Completed    INFLUENZA VACCINE  Completed    Pneumococcal Vaccine 65+  Completed    ZOSTER VACCINE  Completed       No orders of the defined types were placed in this encounter.      Return in about 6 months (around 2025) for Recheck.

## 2025-02-04 RX ORDER — APIXABAN 5 MG/1
TABLET, FILM COATED ORAL
Qty: 60 TABLET | Refills: 11 | Status: SHIPPED | OUTPATIENT
Start: 2025-02-04

## 2025-02-10 RX ORDER — CELECOXIB 200 MG/1
200 CAPSULE ORAL DAILY
Qty: 90 CAPSULE | Refills: 1 | OUTPATIENT
Start: 2025-02-10

## 2025-02-10 RX ORDER — SPIRONOLACTONE 25 MG/1
25 TABLET ORAL DAILY
Qty: 90 TABLET | Refills: 1 | Status: SHIPPED | OUTPATIENT
Start: 2025-02-10

## 2025-02-10 RX ORDER — EZETIMIBE 10 MG/1
10 TABLET ORAL DAILY
Qty: 90 TABLET | Refills: 1 | Status: SHIPPED | OUTPATIENT
Start: 2025-02-10

## 2025-02-11 RX ORDER — FUROSEMIDE 20 MG/1
20 TABLET ORAL DAILY
Qty: 90 TABLET | Refills: 1 | Status: SHIPPED | OUTPATIENT
Start: 2025-02-11

## 2025-02-22 DIAGNOSIS — M19.90 CHRONIC OSTEOARTHRITIS: ICD-10-CM

## 2025-02-24 RX ORDER — TRAMADOL HYDROCHLORIDE 50 MG/1
50 TABLET ORAL EVERY 6 HOURS PRN
Qty: 60 TABLET | Refills: 0 | Status: SHIPPED | OUTPATIENT
Start: 2025-02-24

## 2025-03-20 DIAGNOSIS — M19.90 CHRONIC OSTEOARTHRITIS: ICD-10-CM

## 2025-03-21 RX ORDER — TRAMADOL HYDROCHLORIDE 50 MG/1
50 TABLET ORAL
Qty: 60 TABLET | Refills: 5 | Status: SHIPPED | OUTPATIENT
Start: 2025-03-21

## 2025-04-23 RX ORDER — CELECOXIB 200 MG/1
200 CAPSULE ORAL DAILY
Qty: 90 CAPSULE | Refills: 3 | Status: SHIPPED | OUTPATIENT
Start: 2025-04-23

## 2025-05-16 ENCOUNTER — CLINICAL SUPPORT NO REQUIREMENTS (OUTPATIENT)
Age: 85
End: 2025-05-16
Payer: MEDICARE

## 2025-05-16 ENCOUNTER — OFFICE VISIT (OUTPATIENT)
Age: 85
End: 2025-05-16
Payer: MEDICARE

## 2025-05-16 VITALS
HEART RATE: 77 BPM | WEIGHT: 168 LBS | HEIGHT: 63 IN | SYSTOLIC BLOOD PRESSURE: 130 MMHG | BODY MASS INDEX: 29.77 KG/M2 | DIASTOLIC BLOOD PRESSURE: 80 MMHG

## 2025-05-16 DIAGNOSIS — Z95.0 PRESENCE OF BIVENTRICULAR CARDIAC PACEMAKER: ICD-10-CM

## 2025-05-16 DIAGNOSIS — I48.0 PAROXYSMAL ATRIAL FIBRILLATION: ICD-10-CM

## 2025-05-16 DIAGNOSIS — Z95.0 PRESENCE OF BIVENTRICULAR CARDIAC PACEMAKER: Primary | ICD-10-CM

## 2025-05-16 DIAGNOSIS — I50.30 HEART FAILURE WITH PRESERVED EJECTION FRACTION, NYHA CLASS II: ICD-10-CM

## 2025-05-16 DIAGNOSIS — I44.7 LBBB (LEFT BUNDLE BRANCH BLOCK): Primary | ICD-10-CM

## 2025-05-16 PROCEDURE — 93000 ELECTROCARDIOGRAM COMPLETE: CPT | Performed by: INTERNAL MEDICINE

## 2025-05-16 PROCEDURE — 3079F DIAST BP 80-89 MM HG: CPT | Performed by: INTERNAL MEDICINE

## 2025-05-16 PROCEDURE — 99214 OFFICE O/P EST MOD 30 MIN: CPT | Performed by: INTERNAL MEDICINE

## 2025-05-16 PROCEDURE — 3075F SYST BP GE 130 - 139MM HG: CPT | Performed by: INTERNAL MEDICINE

## 2025-05-16 RX ORDER — ESOMEPRAZOLE MAGNESIUM 40 MG/1
40 CAPSULE, DELAYED RELEASE ORAL
COMMUNITY
Start: 2025-04-21

## 2025-05-16 RX ORDER — FLUOROURACIL 50 MG/G
CREAM TOPICAL
COMMUNITY
Start: 2025-02-12

## 2025-05-16 NOTE — PROGRESS NOTES
Date of Office Visit: 2025  Encounter Provider: Demar Stone MD  Place of Service: [facility]  Patient Name: Inessa Peoples  : 1940    Subjective:     Encounter Date:2025      Patient ID: Inessa Peoples is a 85 y.o. female who has a cc of  LBBB and PAF and I did CRT_P in 21. She has done well and now has a normal EF     The patient had a good year.   No anginal chest pain,   No sig ahn,   No soa,   No fainting,  No orthostasis.   No edema.   Exercise tolerance: good     There have been no hospital admission since the last visit.     There have been no bleeding events.       Past Medical History:   Diagnosis Date    Abnormal ECG     Acute DVT (deep venous thrombosis) 2024    Allergic Iodine used in on xrays    Allergic rhinitis     Atrial fibrillation     Bleeds easily     warfarin    Breast screening     Broken bones     Bulge of lumbar disc without myelopathy 2017    Calf DVT (deep venous thrombosis)     Chronic anticoagulation     Chronic pain disorder Lower back, shoulder, neck and knees    Class 2 severe obesity due to excess calories with serious comorbidity and body mass index (BMI) of 37.0 to 37.9 in adult     Colon polyp     CTS (carpal tunnel syndrome)     Deep vein thrombosis     left calf    Diverticulitis of colon     Diverticulitis of large intestine without perforation or abscess without bleeding 2017    Diverticulosis     AHN (dyspnea on exertion)     Fatigue     Fibromyalgia, primary     H/O malignant melanoma of skin     Heart failure with preserved ejection fraction, NYHA class II     II-III---decreased EF with stress    Hematuria, gross     History of colon polyps     History of kidney stones     HL (hearing loss)     Hyperlipidemia     Hypertension     Hyperthyroidism     IFG (impaired fasting glucose)     Insomnia     Joint pain Knees, shoulders and neck    LBBB (left bundle branch block)     Left leg swelling     LLQ  abdominal pain     Long term current use of systemic steroids     Low back pain     Lumbosacral disc disease     Malignant melanoma of skin     MS (multiple sclerosis)     Neck pain     Obesity     class 2 obesity due to excess calories with BMI of 35.0 to 35.9 in adult    Osteoarthritis     Osteopenia     Paralysis, diaphragm     Paroxysmal atrial fibrillation     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 for tuberculosis     Presence of biventricular cardiac pacemaker     Rash     RBBB (right bundle branch block)     Renal calculi     Sarcoid     Shingles     Sigmoid diverticulosis 06/26/2013    Sleep apnea     on CPAP, +10- Dr. Lu    Sleep apnea     USES CPAP    UTI (urinary tract infection)     Visit for screening mammogram        Social History     Socioeconomic History    Marital status:      Spouse name: EDY    Number of children: 4    Years of education: 14yrs   Tobacco Use    Smoking status: Never     Passive exposure: Never    Smokeless tobacco: Never    Tobacco comments:     No history of tobacco use   Vaping Use    Vaping status: Never Used   Substance and Sexual Activity    Alcohol use: Yes     Alcohol/week: 3.0 standard drinks of alcohol     Types: 2 Glasses of wine, 1 Drinks containing 0.5 oz of alcohol per week     Comment: Occasionally    Drug use: Never    Sexual activity: Yes     Partners: Male     Birth control/protection: None       Family History   Problem Relation Age of Onset    Heart disease Mother     Diabetes type II Mother     Arthritis Mother     Heart disease Father     Hypertension Father     Cancer Sister     Colon polyps Sister     Lung cancer Sister     Heart disease Sister     Heart disease Brother         CABG    Diabetes type II Brother     Colon polyps Brother     Arrhythmia Brother     Colon cancer Brother     Cancer Brother     Hypertension Brother     Heart disease Sister     Diabetes type II Sister     Aortic aneurysm Sister     COPD Sister     Arrhythmia Sister     Cerebral  "aneurysm Sister     Arthritis Sister     Hypertension Sister     COPD Sister     Lupus Sister     Asthma Maternal Aunt     Aortic aneurysm Brother     Arthritis Brother     Hypertension Brother     Drug abuse Brother             Early death Brother                Review of Systems   Constitutional: Negative for fever and night sweats.   HENT:  Negative for ear pain and stridor.    Eyes:  Negative for discharge and visual halos.   Cardiovascular:  Negative for cyanosis.   Respiratory:  Negative for hemoptysis and sputum production.    Hematologic/Lymphatic: Negative for adenopathy.   Skin:  Negative for nail changes and unusual hair distribution.   Musculoskeletal:  Negative for gout and joint swelling.   Gastrointestinal:  Negative for bowel incontinence and flatus.   Genitourinary:  Negative for dysuria and flank pain.   Neurological:  Negative for seizures and tremors.   Psychiatric/Behavioral:  Negative for altered mental status. The patient is not nervous/anxious.             Objective:     Vitals:    25 1251   BP: 130/80   BP Location: Right arm   Patient Position: Sitting   Pulse: 77   Weight: 76.2 kg (168 lb)   Height: 158.8 cm (62.52\")         Eyes:      General:         Right eye: No discharge.         Left eye: No discharge.   HENT:      Head: Normocephalic and atraumatic.   Neck:      Thyroid: No thyromegaly.      Vascular: No JVD.   Pulmonary:      Effort: Pulmonary effort is normal.      Breath sounds: Normal breath sounds. No rales.   Cardiovascular:      Normal rate. Regular rhythm.      No gallop.    Edema:     Peripheral edema absent.   Abdominal:      General: Bowel sounds are normal.      Palpations: Abdomen is soft.      Tenderness: There is no abdominal tenderness.   Musculoskeletal: Normal range of motion.         General: No deformity. Skin:     General: Skin is warm and dry.      Findings: No erythema.   Neurological:      Mental Status: Alert and oriented to person, " place, and time.      Motor: Normal muscle tone.   Psychiatric:         Behavior: Behavior normal.         Thought Content: Thought content normal.           ECG 12 Lead    Date/Time: 5/16/2025 1:34 PM  Performed by: Demar Stone MD    Authorized by: Demar Stone MD  Comparison: compared with previous ECG   Similar to previous ECG  Rhythm: sinus rhythm and paced  Ectopy: unifocal PVCs          Lab Review:       Assessment:          Diagnosis Plan   1. LBBB (left bundle branch block)        2. Heart failure with preserved ejection fraction, NYHA class II        3. Presence of biventricular cardiac pacemaker        4. Paroxysmal atrial fibrillation               Plan:     I reviewed the pacemaker/ICD tracings and the pacing and sensing parameters are normal.  AF burden is 0     She has a nice narrow paced QRS and preserved LVEF     No HF symptoms     On BB, MRA and ARB     BP is ok

## 2025-06-30 LAB
MC_CV_MDC_IDC_RATE_1: 171
MC_CV_MDC_IDC_RATE_1: 171
MC_CV_MDC_IDC_THERAPIES: NORMAL
MC_CV_MDC_IDC_ZONE_ID: 2
MC_CV_MDC_IDC_ZONE_ID: 6
MDC_IDC_MSMT_BATTERY_REMAINING_LONGEVITY: 101 MO
MDC_IDC_MSMT_BATTERY_RRT_TRIGGER: 2.6
MDC_IDC_MSMT_BATTERY_STATUS: NORMAL
MDC_IDC_MSMT_BATTERY_VOLTAGE: 2.99
MDC_IDC_MSMT_LEADCHNL_LV_DTM: NORMAL
MDC_IDC_MSMT_LEADCHNL_LV_IMPEDANCE_VALUE: 380
MDC_IDC_MSMT_LEADCHNL_LV_PACING_THRESHOLD_AMPLITUDE: 0.62
MDC_IDC_MSMT_LEADCHNL_LV_PACING_THRESHOLD_POLARITY: NORMAL
MDC_IDC_MSMT_LEADCHNL_LV_PACING_THRESHOLD_PULSEWIDTH: 0.5
MDC_IDC_MSMT_LEADCHNL_RA_DTM: NORMAL
MDC_IDC_MSMT_LEADCHNL_RA_IMPEDANCE_VALUE: 380
MDC_IDC_MSMT_LEADCHNL_RA_PACING_THRESHOLD_AMPLITUDE: 0.62
MDC_IDC_MSMT_LEADCHNL_RA_PACING_THRESHOLD_POLARITY: NORMAL
MDC_IDC_MSMT_LEADCHNL_RA_PACING_THRESHOLD_PULSEWIDTH: 0.4
MDC_IDC_MSMT_LEADCHNL_RA_SENSING_INTR_AMPL: 1.38
MDC_IDC_MSMT_LEADCHNL_RV_DTM: NORMAL
MDC_IDC_MSMT_LEADCHNL_RV_IMPEDANCE_VALUE: 418
MDC_IDC_MSMT_LEADCHNL_RV_PACING_THRESHOLD_AMPLITUDE: 0.75
MDC_IDC_MSMT_LEADCHNL_RV_PACING_THRESHOLD_POLARITY: NORMAL
MDC_IDC_MSMT_LEADCHNL_RV_PACING_THRESHOLD_PULSEWIDTH: 0.4
MDC_IDC_MSMT_LEADCHNL_RV_SENSING_INTR_AMPL: 19.38
MDC_IDC_PG_IMPLANT_DTM: NORMAL
MDC_IDC_PG_MFG: NORMAL
MDC_IDC_PG_MODEL: NORMAL
MDC_IDC_PG_SERIAL: NORMAL
MDC_IDC_PG_TYPE: NORMAL
MDC_IDC_SESS_DTM: NORMAL
MDC_IDC_SESS_TYPE: NORMAL
MDC_IDC_SET_BRADY_AT_MODE_SWITCH_RATE: 171
MDC_IDC_SET_BRADY_LOWRATE: 50
MDC_IDC_SET_BRADY_MAX_SENSOR_RATE: 120
MDC_IDC_SET_BRADY_MAX_TRACKING_RATE: 130
MDC_IDC_SET_BRADY_MODE: NORMAL
MDC_IDC_SET_BRADY_PAV_DELAY: 140
MDC_IDC_SET_BRADY_SAV_DELAY: 130
MDC_IDC_SET_CRT_LVRV_DELAY: 0
MDC_IDC_SET_CRT_PACED_CHAMBERS: NORMAL
MDC_IDC_SET_LEADCHNL_LV_PACING_AMPLITUDE: 1.25
MDC_IDC_SET_LEADCHNL_LV_PACING_POLARITY: NORMAL
MDC_IDC_SET_LEADCHNL_LV_PACING_PULSEWIDTH: 0.5
MDC_IDC_SET_LEADCHNL_RA_PACING_AMPLITUDE: 1.5
MDC_IDC_SET_LEADCHNL_RA_PACING_POLARITY: NORMAL
MDC_IDC_SET_LEADCHNL_RA_PACING_PULSEWIDTH: 0.4
MDC_IDC_SET_LEADCHNL_RA_SENSING_POLARITY: NORMAL
MDC_IDC_SET_LEADCHNL_RA_SENSING_SENSITIVITY: 0.3
MDC_IDC_SET_LEADCHNL_RV_PACING_AMPLITUDE: 2
MDC_IDC_SET_LEADCHNL_RV_PACING_POLARITY: NORMAL
MDC_IDC_SET_LEADCHNL_RV_PACING_PULSEWIDTH: 0.4
MDC_IDC_SET_LEADCHNL_RV_SENSING_POLARITY: NORMAL
MDC_IDC_SET_LEADCHNL_RV_SENSING_SENSITIVITY: 0.9
MDC_IDC_SET_ZONE_STATUS: NORMAL
MDC_IDC_SET_ZONE_STATUS: NORMAL
MDC_IDC_SET_ZONE_TYPE: NORMAL
MDC_IDC_SET_ZONE_TYPE: NORMAL
MDC_IDC_STAT_BRADY_RA_PERCENT_PACED: 0.16

## 2025-07-28 DIAGNOSIS — E03.9 ACQUIRED HYPOTHYROIDISM: ICD-10-CM

## 2025-07-28 DIAGNOSIS — E78.5 HYPERLIPIDEMIA, UNSPECIFIED HYPERLIPIDEMIA TYPE: ICD-10-CM

## 2025-07-28 DIAGNOSIS — I10 PRIMARY HYPERTENSION: Primary | ICD-10-CM

## 2025-07-28 LAB
MC_CV_MDC_IDC_RATE_1: 171
MC_CV_MDC_IDC_RATE_1: 171
MC_CV_MDC_IDC_THERAPIES: NORMAL
MC_CV_MDC_IDC_ZONE_ID: 2
MC_CV_MDC_IDC_ZONE_ID: 6
MDC_IDC_MSMT_BATTERY_REMAINING_LONGEVITY: 100 MO
MDC_IDC_MSMT_BATTERY_RRT_TRIGGER: 2.6
MDC_IDC_MSMT_BATTERY_STATUS: NORMAL
MDC_IDC_MSMT_BATTERY_VOLTAGE: 2.99
MDC_IDC_MSMT_LEADCHNL_LV_DTM: NORMAL
MDC_IDC_MSMT_LEADCHNL_LV_IMPEDANCE_VALUE: 399
MDC_IDC_MSMT_LEADCHNL_LV_PACING_THRESHOLD_AMPLITUDE: 0.62
MDC_IDC_MSMT_LEADCHNL_LV_PACING_THRESHOLD_POLARITY: NORMAL
MDC_IDC_MSMT_LEADCHNL_LV_PACING_THRESHOLD_PULSEWIDTH: 0.5
MDC_IDC_MSMT_LEADCHNL_RA_DTM: NORMAL
MDC_IDC_MSMT_LEADCHNL_RA_IMPEDANCE_VALUE: 361
MDC_IDC_MSMT_LEADCHNL_RA_PACING_THRESHOLD_AMPLITUDE: 0.62
MDC_IDC_MSMT_LEADCHNL_RA_PACING_THRESHOLD_POLARITY: NORMAL
MDC_IDC_MSMT_LEADCHNL_RA_PACING_THRESHOLD_PULSEWIDTH: 0.4
MDC_IDC_MSMT_LEADCHNL_RA_SENSING_INTR_AMPL: 1.75
MDC_IDC_MSMT_LEADCHNL_RV_DTM: NORMAL
MDC_IDC_MSMT_LEADCHNL_RV_IMPEDANCE_VALUE: 399
MDC_IDC_MSMT_LEADCHNL_RV_PACING_THRESHOLD_AMPLITUDE: 0.75
MDC_IDC_MSMT_LEADCHNL_RV_PACING_THRESHOLD_POLARITY: NORMAL
MDC_IDC_MSMT_LEADCHNL_RV_PACING_THRESHOLD_PULSEWIDTH: 0.4
MDC_IDC_MSMT_LEADCHNL_RV_SENSING_INTR_AMPL: 16.62
MDC_IDC_PG_IMPLANT_DTM: NORMAL
MDC_IDC_PG_MFG: NORMAL
MDC_IDC_PG_MODEL: NORMAL
MDC_IDC_PG_SERIAL: NORMAL
MDC_IDC_PG_TYPE: NORMAL
MDC_IDC_SESS_DTM: NORMAL
MDC_IDC_SESS_TYPE: NORMAL
MDC_IDC_SET_BRADY_AT_MODE_SWITCH_RATE: 171
MDC_IDC_SET_BRADY_LOWRATE: 50
MDC_IDC_SET_BRADY_MAX_SENSOR_RATE: 120
MDC_IDC_SET_BRADY_MAX_TRACKING_RATE: 130
MDC_IDC_SET_BRADY_MODE: NORMAL
MDC_IDC_SET_BRADY_PAV_DELAY: 170
MDC_IDC_SET_BRADY_SAV_DELAY: 140
MDC_IDC_SET_CRT_LVRV_DELAY: 0
MDC_IDC_SET_CRT_PACED_CHAMBERS: NORMAL
MDC_IDC_SET_LEADCHNL_LV_PACING_AMPLITUDE: 1.25
MDC_IDC_SET_LEADCHNL_LV_PACING_POLARITY: NORMAL
MDC_IDC_SET_LEADCHNL_LV_PACING_PULSEWIDTH: 0.5
MDC_IDC_SET_LEADCHNL_RA_PACING_AMPLITUDE: 1.5
MDC_IDC_SET_LEADCHNL_RA_PACING_POLARITY: NORMAL
MDC_IDC_SET_LEADCHNL_RA_PACING_PULSEWIDTH: 0.4
MDC_IDC_SET_LEADCHNL_RA_SENSING_POLARITY: NORMAL
MDC_IDC_SET_LEADCHNL_RA_SENSING_SENSITIVITY: 0.3
MDC_IDC_SET_LEADCHNL_RV_PACING_AMPLITUDE: 2
MDC_IDC_SET_LEADCHNL_RV_PACING_POLARITY: NORMAL
MDC_IDC_SET_LEADCHNL_RV_PACING_PULSEWIDTH: 0.4
MDC_IDC_SET_LEADCHNL_RV_SENSING_POLARITY: NORMAL
MDC_IDC_SET_LEADCHNL_RV_SENSING_SENSITIVITY: 0.9
MDC_IDC_SET_ZONE_STATUS: NORMAL
MDC_IDC_SET_ZONE_STATUS: NORMAL
MDC_IDC_SET_ZONE_TYPE: NORMAL
MDC_IDC_SET_ZONE_TYPE: NORMAL
MDC_IDC_STAT_AT_BURDEN_PERCENT: 0
MDC_IDC_STAT_BRADY_RA_PERCENT_PACED: 0.1

## 2025-07-29 LAB
ALBUMIN SERPL-MCNC: 4 G/DL (ref 3.7–4.7)
ALP SERPL-CCNC: 100 IU/L (ref 44–121)
ALT SERPL-CCNC: 19 IU/L (ref 0–32)
AST SERPL-CCNC: 23 IU/L (ref 0–40)
BASOPHILS # BLD AUTO: 0 X10E3/UL (ref 0–0.2)
BASOPHILS NFR BLD AUTO: 1 %
BILIRUB SERPL-MCNC: 0.9 MG/DL (ref 0–1.2)
BUN SERPL-MCNC: 19 MG/DL (ref 8–27)
BUN/CREAT SERPL: 19 (ref 12–28)
CALCIUM SERPL-MCNC: 10 MG/DL (ref 8.7–10.3)
CHLORIDE SERPL-SCNC: 107 MMOL/L (ref 96–106)
CHOLEST SERPL-MCNC: 221 MG/DL (ref 100–199)
CO2 SERPL-SCNC: 20 MMOL/L (ref 20–29)
CREAT SERPL-MCNC: 1.02 MG/DL (ref 0.57–1)
EGFRCR SERPLBLD CKD-EPI 2021: 55 ML/MIN/1.73
EOSINOPHIL # BLD AUTO: 0.2 X10E3/UL (ref 0–0.4)
EOSINOPHIL NFR BLD AUTO: 3 %
ERYTHROCYTE [DISTWIDTH] IN BLOOD BY AUTOMATED COUNT: 13.1 % (ref 11.7–15.4)
GLOBULIN SER CALC-MCNC: 2.4 G/DL (ref 1.5–4.5)
GLUCOSE SERPL-MCNC: 100 MG/DL (ref 70–99)
HCT VFR BLD AUTO: 46.8 % (ref 34–46.6)
HDLC SERPL-MCNC: 68 MG/DL
HGB BLD-MCNC: 15.1 G/DL (ref 11.1–15.9)
IMM GRANULOCYTES # BLD AUTO: 0 X10E3/UL (ref 0–0.1)
IMM GRANULOCYTES NFR BLD AUTO: 0 %
LDLC SERPL CALC-MCNC: 137 MG/DL (ref 0–99)
LYMPHOCYTES # BLD AUTO: 2.4 X10E3/UL (ref 0.7–3.1)
LYMPHOCYTES NFR BLD AUTO: 34 %
MCH RBC QN AUTO: 32.2 PG (ref 26.6–33)
MCHC RBC AUTO-ENTMCNC: 32.3 G/DL (ref 31.5–35.7)
MCV RBC AUTO: 100 FL (ref 79–97)
MONOCYTES # BLD AUTO: 0.7 X10E3/UL (ref 0.1–0.9)
MONOCYTES NFR BLD AUTO: 10 %
NEUTROPHILS # BLD AUTO: 3.8 X10E3/UL (ref 1.4–7)
NEUTROPHILS NFR BLD AUTO: 52 %
PLATELET # BLD AUTO: 206 X10E3/UL (ref 150–450)
POTASSIUM SERPL-SCNC: 4.4 MMOL/L (ref 3.5–5.2)
PROT SERPL-MCNC: 6.4 G/DL (ref 6–8.5)
RBC # BLD AUTO: 4.69 X10E6/UL (ref 3.77–5.28)
SODIUM SERPL-SCNC: 141 MMOL/L (ref 134–144)
TRIGL SERPL-MCNC: 91 MG/DL (ref 0–149)
TSH SERPL DL<=0.005 MIU/L-ACNC: 3.51 UIU/ML (ref 0.45–4.5)
VLDLC SERPL CALC-MCNC: 16 MG/DL (ref 5–40)
WBC # BLD AUTO: 7.1 X10E3/UL (ref 3.4–10.8)

## 2025-08-01 ENCOUNTER — OFFICE VISIT (OUTPATIENT)
Dept: INTERNAL MEDICINE | Facility: CLINIC | Age: 85
End: 2025-08-01
Payer: MEDICARE

## 2025-08-01 VITALS
WEIGHT: 168 LBS | DIASTOLIC BLOOD PRESSURE: 78 MMHG | SYSTOLIC BLOOD PRESSURE: 120 MMHG | HEART RATE: 74 BPM | BODY MASS INDEX: 29.77 KG/M2 | OXYGEN SATURATION: 98 % | HEIGHT: 63 IN

## 2025-08-01 DIAGNOSIS — E03.9 ACQUIRED HYPOTHYROIDISM: ICD-10-CM

## 2025-08-01 DIAGNOSIS — I10 PRIMARY HYPERTENSION: Primary | ICD-10-CM

## 2025-08-01 DIAGNOSIS — E78.5 HYPERLIPIDEMIA, UNSPECIFIED HYPERLIPIDEMIA TYPE: ICD-10-CM

## 2025-08-01 DIAGNOSIS — Z78.0 MENOPAUSE: ICD-10-CM

## 2025-08-01 NOTE — PROGRESS NOTES
Subjective     Inessa Peoples is a 85 y.o. female who presents with   Chief Complaint   Patient presents with    Hypertension    Hyperlipidemia    Hypothyroidism       Hypertension  Hyperlipidemia  Exacerbating diseases include hypothyroidism.   Hypothyroidism  Her past medical history is significant for hyperlipidemia.        The following data was reviewed by: Gisele Dockery MD on 08/01/2025:  Common labs          12/14/2024    04:15 1/22/2025    14:33 7/28/2025    11:36   Common Labs   Glucose  110  100    BUN  28  19    Creatinine  1.13  1.02    Sodium  139  141    Potassium  4.5  4.4    Chloride  101  107    Calcium  10.4  10.0    Albumin  4.7  4.0    Total Bilirubin  0.9  0.9    Alkaline Phosphatase  100  100    AST (SGOT)  23  23    ALT (SGPT)  19  19    WBC  8.7  7.1    Hemoglobin 13.0  14.9  15.1    Hematocrit 40.0  44.5  46.8    Platelets  225  206    Total Cholesterol  228  221    Triglycerides  167  91    HDL Cholesterol  63  68    LDL Cholesterol   136  137      HTN.  Blood pressure is under good control.  HLD.  Fair control. Hypothyroidism.  TSH is in normal range.        Review of Systems   Psychiatric/Behavioral:          Occasionally forgetful       The following portions of the patient's history were reviewed and updated as appropriate: allergies, current medications and problem list.    Patient Active Problem List    Diagnosis Date Noted    Recurrent deep vein thrombosis (DVT) 12/11/2024    Dysphagia 08/12/2024    Heart failure with preserved ejection fraction, NYHA class II 02/11/2021    Presence of biventricular cardiac pacemaker 02/11/2021    Paroxysmal atrial fibrillation 12/09/2020     Note Last Updated: 12/9/2020     Added automatically from request for surgery [id number]      LBBB (left bundle branch block) 11/08/2019    Trochanteric bursitis of both hips 05/29/2019    Lumbar facet arthropathy 11/06/2018    Bulge of lumbar disc without myelopathy 07/13/2017    ANY on CPAP + 10 -   Aly 10/15/2016    GERD (gastroesophageal reflux disease) 08/05/2016    Sarcoidosis 04/18/2016    Diaphragmatic paralysis 04/18/2016    Osteopenia 04/18/2016    Impaired fasting glucose 04/18/2016    Hypothyroidism 04/18/2016    Hypertension 04/18/2016    Hyperlipidemia 04/18/2016     Note Last Updated: 8/5/2016     Intolerant to trials of multiple statins over the years.        Chronic osteoarthritis 04/18/2016       Current Outpatient Medications on File Prior to Visit   Medication Sig Dispense Refill    Calcium-Phosphorus-Vitamin D (CITRACAL +D3 PO) Take 1 tablet by mouth Daily.      celecoxib (CeleBREX) 200 MG capsule Take 1 capsule by mouth Daily. 90 capsule 3    Eliquis 5 MG tablet tablet TAKE ONE TABLET BY MOUTH EVERY 12 HOURS FOR DVT/PE (ACTIVE THROMBOSIS) 60 tablet 11    ezetimibe (ZETIA) 10 MG tablet TAKE 1 TABLET BY MOUTH DAILY 90 tablet 1    furosemide (LASIX) 20 MG tablet Take 1 tablet by mouth Daily. 90 tablet 1    levothyroxine (SYNTHROID, LEVOTHROID) 25 MCG tablet TAKE 1 TABLET BY MOUTH DAILY 90 tablet 3    melatonin 5 MG sublingual tablet sublingual tablet Take 1 tablet by mouth Every Night.      metoprolol succinate XL (TOPROL-XL) 25 MG 24 hr tablet TAKE 1/2 TABLET BY MOUTH DAILY 45 tablet 3    Multiple Vitamin (MULTI-DAY VITAMINS) tablet Take 1 tablet by mouth Daily.      spironolactone (ALDACTONE) 25 MG tablet TAKE 1 TABLET BY MOUTH DAILY 90 tablet 1    traMADol (ULTRAM) 50 MG tablet TAKE 1 TABLET BY MOUTH EVERY 6 HOURS AS NEEDED FOR MODERATE PAIN 60 tablet 5    valsartan (DIOVAN) 80 MG tablet Take 1 tablet by mouth Daily. 90 tablet 3    [DISCONTINUED] esomeprazole (nexIUM) 40 MG capsule Take 1 capsule by mouth Every Morning Before Breakfast.      [DISCONTINUED] fluorouracil (EFUDEX) 5 % cream       [DISCONTINUED] niacinamide 500 MG tablet Take 1 tablet by mouth 2 (Two) Times a Day With Meals.      [DISCONTINUED] omeprazole (priLOSEC) 20 MG capsule Take 1 capsule by mouth Daily.       No  "current facility-administered medications on file prior to visit.       Objective     /78   Pulse 74   Ht 158.8 cm (62.52\")   Wt 76.2 kg (168 lb)   SpO2 98%   BMI 30.22 kg/m²     Physical Exam  Constitutional:       Appearance: She is well-developed.   HENT:      Head: Normocephalic and atraumatic.   Pulmonary:      Effort: Pulmonary effort is normal.   Neurological:      Mental Status: She is alert and oriented to person, place, and time.   Psychiatric:         Behavior: Behavior normal.     29/30 MMSE    Assessment & Plan   Diagnoses and all orders for this visit:    1. Primary hypertension (Primary)    2. Hyperlipidemia, unspecified hyperlipidemia type    3. Acquired hypothyroidism    4. Menopause  -     DEXA Bone Density Axial; Future        Discussion    HTN.  Control is good.  The patient is advised to continue current dosage of metoprolol, valsartan and spironolactone.    HLD.  Control is fair.  The patient is advised to continue current dosage of Zetia.  Continue attention to diet.  Hypothyroidism.  Control is good.  The patient is advised to continue current dosage of levothyroxine.    Subjective memory impairment.  MMSE is reassuring.         Future Appointments   Date Time Provider Department Center   11/21/2025 11:00 AM [facility] [place] [facility] DEVICE CHECK [facility] None   11/21/2025 11:30 AM Dalton Riddle PA-C [facility] None   2/3/2026 11:50 AM [facility]   2/10/2026  1:30 PM Gisele Dockery MD [facility]         "

## 2025-08-11 RX ORDER — SPIRONOLACTONE 25 MG/1
25 TABLET ORAL DAILY
Qty: 90 TABLET | Refills: 1 | Status: SHIPPED | OUTPATIENT
Start: 2025-08-11

## 2025-08-11 RX ORDER — EZETIMIBE 10 MG/1
10 TABLET ORAL DAILY
Qty: 90 TABLET | Refills: 1 | Status: SHIPPED | OUTPATIENT
Start: 2025-08-11

## (undated) DEVICE — NDL SPINE 22G 31/2IN BLK

## (undated) DEVICE — GLV SURG TRIUMPH PF LTX 7.5 STRL

## (undated) DEVICE — LOU PACE DEFIB: Brand: MEDLINE INDUSTRIES, INC.

## (undated) DEVICE — SENSR O2 OXIMAX FNGR A/ 18IN NONSTR

## (undated) DEVICE — Device: Brand: DEFENDO AIR/WATER/SUCTION AND BIOPSY VALVE

## (undated) DEVICE — CATH DIAG IMPULSE FR4 5F 100CM

## (undated) DEVICE — EPIDURAL TRAY: Brand: MEDLINE INDUSTRIES, INC.

## (undated) DEVICE — ADAPT CLN BIOGUARD AIR/H2O DISP

## (undated) DEVICE — PENCL E/S HNDSWCH ROCKR CB

## (undated) DEVICE — TUBING, SUCTION, 1/4" X 10', STRAIGHT: Brand: MEDLINE

## (undated) DEVICE — LN SMPL CO2 SHTRM SD STREAM W/M LUER

## (undated) DEVICE — CATH DS ATTAIN SELECT2 SUREVALVE TP/CRV 7F 130DEG

## (undated) DEVICE — GUIDE WIRE WITH HYDROPHILIC COATING: Brand: ACUITY WHISPER VIEW™

## (undated) DEVICE — FRCP BX RADJAW4 NDL 2.8 240CM LG OG BX40

## (undated) DEVICE — HI-TORQUE EXTRA S'PORT GUIDE WIRE .014 STRAIGHT TIP 3.0 CM X 190 CM: Brand: HI-TORQUE EXTRA S'PORT

## (undated) DEVICE — SKIN AFFIX SURG ADHESIVE 72/CS 0.55ML: Brand: MEDLINE

## (undated) DEVICE — BLCK/BITE BLOX W/DENTL/RIM W/STRAP 54F

## (undated) DEVICE — Device

## (undated) DEVICE — THE TORRENT IRRIGATION SCOPE CONNECTOR IS USED WITH THE TORRENT IRRIGATION TUBING TO PROVIDE IRRIGATION FLUIDS SUCH AS STERILE WATER DURING GASTROINTESTINAL ENDOSCOPIC PROCEDURES WHEN USED IN CONJUNCTION WITH AN IRRIGATION PUMP (OR ELECTROSURGICAL UNIT).: Brand: TORRENT

## (undated) DEVICE — NDL SPINE 25G 31/2IN BLU

## (undated) DEVICE — MSK PROC CURAPLEX O2 2/ADAPT 7FT

## (undated) DEVICE — Device: Brand: WEBSTER

## (undated) DEVICE — PK CATH CARD 40

## (undated) DEVICE — GLIDESHEATH BASIC HYDROPHILIC COATED INTRODUCER SHEATH: Brand: GLIDESHEATH

## (undated) DEVICE — CANNULA,ADULT,SOFT-TOUCH,7'TUBE,UC: Brand: PENDING

## (undated) DEVICE — INTRO SHEATH PRELUDE SNAP .038 9.5F 13CM W/SDPRT GRY

## (undated) DEVICE — INTRO SHEATH PRELUDE SNAP .038 7F 13CM W/SDPRT

## (undated) DEVICE — GW EMR FIX EXCHG J STD .035 3MM 260CM

## (undated) DEVICE — KT ORCA ORCAPOD DISP STRL

## (undated) DEVICE — CATH GUIDE ATTAIN COMMND SURVLV MP9F50CM

## (undated) DEVICE — BALN PRESS WEDGE 5F 110CM

## (undated) DEVICE — NEEDLE, QUINCKE 22GX3.5": Brand: MEDLINE INDUSTRIES, INC.

## (undated) DEVICE — CATH DIAG IMPULSE FL3.5 5F 100CM

## (undated) DEVICE — CANN O2 ETCO2 FITS ALL CONN CO2 SMPL A/ 7IN DISP LF

## (undated) DEVICE — BALN CORNRY SINUS 6F 1X80CM

## (undated) DEVICE — SLITTER CATH GUIDE ATTAIN ADJ

## (undated) DEVICE — BITEBLOCK OMNI BLOC

## (undated) DEVICE — KT MANIFLD CARDIAC

## (undated) DEVICE — CATH DIAG IMPULSE PIG 5F 100CM